# Patient Record
Sex: MALE | Race: BLACK OR AFRICAN AMERICAN | NOT HISPANIC OR LATINO | Employment: OTHER | RURAL
[De-identification: names, ages, dates, MRNs, and addresses within clinical notes are randomized per-mention and may not be internally consistent; named-entity substitution may affect disease eponyms.]

---

## 2021-06-26 ENCOUNTER — HOSPITAL ENCOUNTER (INPATIENT)
Facility: HOSPITAL | Age: 51
LOS: 2 days | Discharge: HOME | DRG: 440 | End: 2021-06-28
Attending: HOSPITALIST | Admitting: HOSPITALIST

## 2021-06-26 ENCOUNTER — HOSPITAL ENCOUNTER (EMERGENCY)
Facility: HOSPITAL | Age: 51
Discharge: ANOTHER HOSPITAL | End: 2021-06-26

## 2021-06-26 VITALS
SYSTOLIC BLOOD PRESSURE: 146 MMHG | DIASTOLIC BLOOD PRESSURE: 93 MMHG | TEMPERATURE: 98 F | RESPIRATION RATE: 21 BRPM | WEIGHT: 230 LBS | BODY MASS INDEX: 36.96 KG/M2 | HEIGHT: 66 IN | OXYGEN SATURATION: 95 % | HEART RATE: 77 BPM

## 2021-06-26 DIAGNOSIS — K85.90 ACUTE PANCREATITIS, UNSPECIFIED COMPLICATION STATUS, UNSPECIFIED PANCREATITIS TYPE: Primary | ICD-10-CM

## 2021-06-26 DIAGNOSIS — E83.42 HYPOMAGNESEMIA: ICD-10-CM

## 2021-06-26 DIAGNOSIS — I16.0 HYPERTENSIVE URGENCY: ICD-10-CM

## 2021-06-26 DIAGNOSIS — R06.02 SHORTNESS OF BREATH: ICD-10-CM

## 2021-06-26 DIAGNOSIS — I50.9 CHF (CONGESTIVE HEART FAILURE): ICD-10-CM

## 2021-06-26 DIAGNOSIS — I10 HYPERTENSION, UNSPECIFIED TYPE: ICD-10-CM

## 2021-06-26 DIAGNOSIS — K86.1 ACUTE ON CHRONIC PANCREATITIS: ICD-10-CM

## 2021-06-26 DIAGNOSIS — K85.90 ACUTE PANCREATITIS: ICD-10-CM

## 2021-06-26 DIAGNOSIS — K85.90 ACUTE ON CHRONIC PANCREATITIS: ICD-10-CM

## 2021-06-26 DIAGNOSIS — R10.9 INTRACTABLE ABDOMINAL PAIN: ICD-10-CM

## 2021-06-26 DIAGNOSIS — N39.0 ACUTE UTI: ICD-10-CM

## 2021-06-26 LAB
ALBUMIN SERPL BCP-MCNC: 4.2 G/DL (ref 3.5–5)
ALBUMIN/GLOB SERPL: 1 {RATIO}
ALP SERPL-CCNC: 79 U/L (ref 45–115)
ALT SERPL W P-5'-P-CCNC: 104 U/L (ref 16–61)
ANION GAP SERPL CALCULATED.3IONS-SCNC: 20 MMOL/L (ref 7–16)
AST SERPL W P-5'-P-CCNC: 105 U/L (ref 15–37)
BACTERIA #/AREA URNS HPF: ABNORMAL /HPF
BASOPHILS # BLD AUTO: 0.03 K/UL (ref 0–0.2)
BASOPHILS NFR BLD AUTO: 0.6 % (ref 0–1)
BILIRUB SERPL-MCNC: 1.4 MG/DL (ref 0–1.2)
BILIRUB UR QL STRIP: ABNORMAL
BUN SERPL-MCNC: 10 MG/DL (ref 7–18)
BUN/CREAT SERPL: 8 (ref 6–20)
CALCIUM SERPL-MCNC: 10 MG/DL (ref 8.5–10.1)
CHLORIDE SERPL-SCNC: 92 MMOL/L (ref 98–107)
CLARITY UR: ABNORMAL
CO2 SERPL-SCNC: 25 MMOL/L (ref 21–32)
COLOR UR: ABNORMAL
CREAT SERPL-MCNC: 1.23 MG/DL (ref 0.7–1.3)
DIFFERENTIAL METHOD BLD: ABNORMAL
EOSINOPHIL # BLD AUTO: 0.05 K/UL (ref 0–0.5)
EOSINOPHIL NFR BLD AUTO: 0.9 % (ref 1–4)
ERYTHROCYTE [DISTWIDTH] IN BLOOD BY AUTOMATED COUNT: 13.2 % (ref 11.5–14.5)
FINE GRAN CASTS #/AREA URNS LPF: ABNORMAL /LPF
GLOBULIN SER-MCNC: 4.4 G/DL (ref 2–4)
GLUCOSE SERPL-MCNC: 159 MG/DL (ref 74–106)
GLUCOSE UR STRIP-MCNC: NEGATIVE MG/DL
HCT VFR BLD AUTO: 47.4 % (ref 40–54)
HGB BLD-MCNC: 15.8 G/DL (ref 13.5–18)
KETONES UR STRIP-SCNC: >=160 MG/DL
LACTATE SERPL-SCNC: 0.8 MMOL/L (ref 0.4–2)
LEUKOCYTE ESTERASE UR QL STRIP: NEGATIVE
LIPASE SERPL-CCNC: 772 U/L (ref 73–393)
LYMPHOCYTES # BLD AUTO: 1.35 K/UL (ref 1–4.8)
LYMPHOCYTES NFR BLD AUTO: 24.8 % (ref 27–41)
MAGNESIUM SERPL-MCNC: 1.5 MG/DL (ref 1.7–2.3)
MCH RBC QN AUTO: 31.5 PG (ref 27–31)
MCHC RBC AUTO-ENTMCNC: 33.3 G/DL (ref 32–36)
MCV RBC AUTO: 94.4 FL (ref 80–96)
MONOCYTES # BLD AUTO: 0.44 K/UL (ref 0–0.8)
MONOCYTES NFR BLD AUTO: 8.1 % (ref 2–6)
MPC BLD CALC-MCNC: 11 FL (ref 9.4–12.4)
MUCOUS THREADS #/AREA URNS HPF: ABNORMAL /HPF
NEUTROPHILS # BLD AUTO: 3.57 K/UL (ref 1.8–7.7)
NEUTROPHILS NFR BLD AUTO: 65.6 % (ref 53–65)
NITRITE UR QL STRIP: POSITIVE
PH UR STRIP: 6 PH UNITS
PLATELET # BLD AUTO: 146 K/UL (ref 150–400)
POTASSIUM SERPL-SCNC: 3.8 MMOL/L (ref 3.5–5.1)
PROT SERPL-MCNC: 8.6 G/DL (ref 6.4–8.2)
PROT UR QL STRIP: >=300
RBC # BLD AUTO: 5.02 M/UL (ref 4.6–6.2)
RBC # UR STRIP: ABNORMAL /UL
RBC #/AREA URNS HPF: ABNORMAL /HPF
SODIUM SERPL-SCNC: 133 MMOL/L (ref 136–145)
SP GR UR STRIP: >=1.03
SQUAMOUS #/AREA URNS LPF: ABNORMAL /LPF
TRICHOMONAS #/AREA URNS HPF: ABNORMAL /HPF
TROPONIN I SERPL-MCNC: <0.017 NG/ML
UROBILINOGEN UR STRIP-ACNC: 1 MG/DL
WBC # BLD AUTO: 5.44 K/UL (ref 4.5–11)
WBC #/AREA URNS HPF: ABNORMAL /HPF
YEAST #/AREA URNS HPF: ABNORMAL /HPF

## 2021-06-26 PROCEDURE — 25000003 PHARM REV CODE 250: Performed by: HOSPITALIST

## 2021-06-26 PROCEDURE — 25500020 PHARM REV CODE 255: Performed by: NURSE PRACTITIONER

## 2021-06-26 PROCEDURE — 93010 ELECTROCARDIOGRAM REPORT: CPT | Performed by: HOSPITALIST

## 2021-06-26 PROCEDURE — 85025 COMPLETE CBC W/AUTO DIFF WBC: CPT | Performed by: NURSE PRACTITIONER

## 2021-06-26 PROCEDURE — 11000001 HC ACUTE MED/SURG PRIVATE ROOM

## 2021-06-26 PROCEDURE — 99285 EMERGENCY DEPT VISIT HI MDM: CPT | Mod: 25

## 2021-06-26 PROCEDURE — 96361 HYDRATE IV INFUSION ADD-ON: CPT

## 2021-06-26 PROCEDURE — 99223 PR INITIAL HOSPITAL CARE,LEVL III: ICD-10-PCS | Mod: GC,,, | Performed by: INTERNAL MEDICINE

## 2021-06-26 PROCEDURE — 96375 TX/PRO/DX INJ NEW DRUG ADDON: CPT

## 2021-06-26 PROCEDURE — 36415 COLL VENOUS BLD VENIPUNCTURE: CPT | Performed by: NURSE PRACTITIONER

## 2021-06-26 PROCEDURE — 99285 PR EMERGENCY DEPT VISIT,LEVEL V: ICD-10-PCS | Mod: ,,, | Performed by: NURSE PRACTITIONER

## 2021-06-26 PROCEDURE — 63600175 PHARM REV CODE 636 W HCPCS: Performed by: HOSPITALIST

## 2021-06-26 PROCEDURE — 83605 ASSAY OF LACTIC ACID: CPT | Performed by: NURSE PRACTITIONER

## 2021-06-26 PROCEDURE — 96376 TX/PRO/DX INJ SAME DRUG ADON: CPT

## 2021-06-26 PROCEDURE — 96368 THER/DIAG CONCURRENT INF: CPT

## 2021-06-26 PROCEDURE — 87040 BLOOD CULTURE FOR BACTERIA: CPT | Performed by: NURSE PRACTITIONER

## 2021-06-26 PROCEDURE — 99285 EMERGENCY DEPT VISIT HI MDM: CPT | Mod: ,,, | Performed by: NURSE PRACTITIONER

## 2021-06-26 PROCEDURE — 81003 URINALYSIS AUTO W/O SCOPE: CPT | Performed by: NURSE PRACTITIONER

## 2021-06-26 PROCEDURE — 83690 ASSAY OF LIPASE: CPT | Performed by: NURSE PRACTITIONER

## 2021-06-26 PROCEDURE — 96365 THER/PROPH/DIAG IV INF INIT: CPT

## 2021-06-26 PROCEDURE — 83735 ASSAY OF MAGNESIUM: CPT | Performed by: NURSE PRACTITIONER

## 2021-06-26 PROCEDURE — 81001 URINALYSIS AUTO W/SCOPE: CPT | Performed by: NURSE PRACTITIONER

## 2021-06-26 PROCEDURE — 85610 PROTHROMBIN TIME: CPT | Performed by: HOSPITALIST

## 2021-06-26 PROCEDURE — 80053 COMPREHEN METABOLIC PANEL: CPT | Performed by: NURSE PRACTITIONER

## 2021-06-26 PROCEDURE — 63600175 PHARM REV CODE 636 W HCPCS: Performed by: NURSE PRACTITIONER

## 2021-06-26 PROCEDURE — 87086 URINE CULTURE/COLONY COUNT: CPT | Performed by: NURSE PRACTITIONER

## 2021-06-26 PROCEDURE — 25000003 PHARM REV CODE 250: Performed by: NURSE PRACTITIONER

## 2021-06-26 PROCEDURE — 99223 1ST HOSP IP/OBS HIGH 75: CPT | Mod: GC,,, | Performed by: INTERNAL MEDICINE

## 2021-06-26 PROCEDURE — 93005 ELECTROCARDIOGRAM TRACING: CPT

## 2021-06-26 PROCEDURE — 84484 ASSAY OF TROPONIN QUANT: CPT | Performed by: NURSE PRACTITIONER

## 2021-06-26 RX ORDER — HYDRALAZINE HYDROCHLORIDE 20 MG/ML
10 INJECTION INTRAMUSCULAR; INTRAVENOUS
Status: COMPLETED | OUTPATIENT
Start: 2021-06-26 | End: 2021-06-26

## 2021-06-26 RX ORDER — MORPHINE SULFATE 4 MG/ML
4 INJECTION, SOLUTION INTRAMUSCULAR; INTRAVENOUS
Status: COMPLETED | OUTPATIENT
Start: 2021-06-26 | End: 2021-06-26

## 2021-06-26 RX ORDER — CARVEDILOL 12.5 MG/1
12.5 TABLET ORAL
COMMUNITY
End: 2022-04-04 | Stop reason: SDUPTHER

## 2021-06-26 RX ORDER — SODIUM CHLORIDE 9 MG/ML
INJECTION, SOLUTION INTRAVENOUS
Status: COMPLETED | OUTPATIENT
Start: 2021-06-26 | End: 2021-06-26

## 2021-06-26 RX ORDER — PANTOPRAZOLE SODIUM 40 MG/10ML
40 INJECTION, POWDER, LYOPHILIZED, FOR SOLUTION INTRAVENOUS DAILY
Status: DISCONTINUED | OUTPATIENT
Start: 2021-06-27 | End: 2021-06-28

## 2021-06-26 RX ORDER — ONDANSETRON 2 MG/ML
4 INJECTION INTRAMUSCULAR; INTRAVENOUS
Status: COMPLETED | OUTPATIENT
Start: 2021-06-26 | End: 2021-06-26

## 2021-06-26 RX ORDER — CARVEDILOL 12.5 MG/1
12.5 TABLET ORAL ONCE
Status: DISCONTINUED | OUTPATIENT
Start: 2021-06-27 | End: 2021-06-27

## 2021-06-26 RX ORDER — MAGNESIUM SULFATE HEPTAHYDRATE 40 MG/ML
2 INJECTION, SOLUTION INTRAVENOUS
Status: COMPLETED | OUTPATIENT
Start: 2021-06-26 | End: 2021-06-26

## 2021-06-26 RX ORDER — MAGNESIUM SULFATE HEPTAHYDRATE 40 MG/ML
2 INJECTION, SOLUTION INTRAVENOUS ONCE
Status: COMPLETED | OUTPATIENT
Start: 2021-06-27 | End: 2021-06-27

## 2021-06-26 RX ORDER — HYDROMORPHONE HYDROCHLORIDE 2 MG/ML
1 INJECTION, SOLUTION INTRAMUSCULAR; INTRAVENOUS; SUBCUTANEOUS
Status: COMPLETED | OUTPATIENT
Start: 2021-06-26 | End: 2021-06-26

## 2021-06-26 RX ORDER — LABETALOL HYDROCHLORIDE 5 MG/ML
20 INJECTION, SOLUTION INTRAVENOUS
Status: COMPLETED | OUTPATIENT
Start: 2021-06-26 | End: 2021-06-26

## 2021-06-26 RX ORDER — HYDROMORPHONE HYDROCHLORIDE 2 MG/ML
2 INJECTION, SOLUTION INTRAMUSCULAR; INTRAVENOUS; SUBCUTANEOUS
Status: DISCONTINUED | OUTPATIENT
Start: 2021-06-26 | End: 2021-06-28 | Stop reason: HOSPADM

## 2021-06-26 RX ORDER — ONDANSETRON 2 MG/ML
4 INJECTION INTRAMUSCULAR; INTRAVENOUS EVERY 6 HOURS PRN
Status: DISCONTINUED | OUTPATIENT
Start: 2021-06-27 | End: 2021-06-28 | Stop reason: HOSPADM

## 2021-06-26 RX ORDER — SODIUM CHLORIDE 9 MG/ML
INJECTION, SOLUTION INTRAVENOUS CONTINUOUS
Status: DISCONTINUED | OUTPATIENT
Start: 2021-06-26 | End: 2021-06-27

## 2021-06-26 RX ORDER — ENOXAPARIN SODIUM 100 MG/ML
40 INJECTION SUBCUTANEOUS EVERY 24 HOURS
Status: DISCONTINUED | OUTPATIENT
Start: 2021-06-27 | End: 2021-06-28 | Stop reason: HOSPADM

## 2021-06-26 RX ADMIN — PROMETHAZINE HYDROCHLORIDE 25 MG: 25 INJECTION INTRAMUSCULAR; INTRAVENOUS at 02:06

## 2021-06-26 RX ADMIN — IOPAMIDOL 100 ML: 755 INJECTION, SOLUTION INTRAVENOUS at 10:06

## 2021-06-26 RX ADMIN — HYDRALAZINE HYDROCHLORIDE 10 MG: 20 INJECTION, SOLUTION INTRAMUSCULAR; INTRAVENOUS at 09:06

## 2021-06-26 RX ADMIN — HYDROMORPHONE HYDROCHLORIDE 1 MG: 2 INJECTION, SOLUTION INTRAMUSCULAR; INTRAVENOUS; SUBCUTANEOUS at 11:06

## 2021-06-26 RX ADMIN — HYDROMORPHONE HYDROCHLORIDE 2 MG: 2 INJECTION, SOLUTION INTRAMUSCULAR; INTRAVENOUS; SUBCUTANEOUS at 10:06

## 2021-06-26 RX ADMIN — LABETALOL HYDROCHLORIDE 20 MG: 5 INJECTION, SOLUTION INTRAVENOUS at 03:06

## 2021-06-26 RX ADMIN — MAGNESIUM SULFATE HEPTAHYDRATE 2 G: 40 INJECTION, SOLUTION INTRAVENOUS at 05:06

## 2021-06-26 RX ADMIN — HYDRALAZINE HYDROCHLORIDE 10 MG: 20 INJECTION, SOLUTION INTRAMUSCULAR; INTRAVENOUS at 02:06

## 2021-06-26 RX ADMIN — SODIUM CHLORIDE 1000 ML: 9 INJECTION, SOLUTION INTRAVENOUS at 07:06

## 2021-06-26 RX ADMIN — CEFTRIAXONE 1 G: 1 INJECTION, POWDER, FOR SOLUTION INTRAMUSCULAR; INTRAVENOUS at 02:06

## 2021-06-26 RX ADMIN — LABETALOL HYDROCHLORIDE 20 MG: 5 INJECTION, SOLUTION INTRAVENOUS at 04:06

## 2021-06-26 RX ADMIN — SODIUM CHLORIDE: 9 INJECTION, SOLUTION INTRAVENOUS at 10:06

## 2021-06-26 RX ADMIN — HYDROMORPHONE HYDROCHLORIDE 1 MG: 2 INJECTION, SOLUTION INTRAMUSCULAR; INTRAVENOUS; SUBCUTANEOUS at 08:06

## 2021-06-26 RX ADMIN — SODIUM CHLORIDE 150 ML/HR: 9 INJECTION, SOLUTION INTRAVENOUS at 02:06

## 2021-06-26 RX ADMIN — MORPHINE SULFATE 4 MG: 4 INJECTION, SOLUTION INTRAMUSCULAR; INTRAVENOUS at 07:06

## 2021-06-26 RX ADMIN — MAGNESIUM SULFATE IN WATER 2 G: 40 INJECTION, SOLUTION INTRAVENOUS at 11:06

## 2021-06-26 RX ADMIN — ONDANSETRON 4 MG: 2 INJECTION INTRAMUSCULAR; INTRAVENOUS at 07:06

## 2021-06-27 PROBLEM — I25.10 CAD (CORONARY ARTERY DISEASE): Chronic | Status: ACTIVE | Noted: 2021-06-27

## 2021-06-27 LAB
ALBUMIN SERPL BCP-MCNC: 3.6 G/DL (ref 3.5–5)
ALBUMIN/GLOB SERPL: 0.8 {RATIO}
ALP SERPL-CCNC: 74 U/L (ref 45–115)
ALT SERPL W P-5'-P-CCNC: 68 U/L (ref 16–61)
ANION GAP SERPL CALCULATED.3IONS-SCNC: 17 MMOL/L (ref 7–16)
AST SERPL W P-5'-P-CCNC: 46 U/L (ref 15–37)
BILIRUB SERPL-MCNC: 0.7 MG/DL (ref 0–1.2)
BUN SERPL-MCNC: 9 MG/DL (ref 7–18)
BUN/CREAT SERPL: 9 (ref 6–20)
CALCIUM SERPL-MCNC: 9.5 MG/DL (ref 8.5–10.1)
CHLORIDE SERPL-SCNC: 96 MMOL/L (ref 98–107)
CO2 SERPL-SCNC: 27 MMOL/L (ref 21–32)
CREAT SERPL-MCNC: 0.99 MG/DL (ref 0.7–1.3)
GLOBULIN SER-MCNC: 4.3 G/DL (ref 2–4)
GLUCOSE SERPL-MCNC: 116 MG/DL (ref 74–106)
IGA SERPL-MCNC: 295 MG/DL (ref 61–356)
IGG SERPL-MCNC: 975 MG/DL (ref 767–1590)
IGM SERPL-MCNC: 48 MG/DL (ref 37–286)
INR BLD: 0.92 (ref 0.9–1.1)
POTASSIUM SERPL-SCNC: 3.6 MMOL/L (ref 3.5–5.1)
PROT SERPL-MCNC: 7.9 G/DL (ref 6.4–8.2)
PROTHROMBIN TIME: 11.9 SECONDS (ref 11.7–14.7)
SODIUM SERPL-SCNC: 136 MMOL/L (ref 136–145)
TRIGL SERPL-MCNC: 115 MG/DL (ref 35–150)

## 2021-06-27 PROCEDURE — 99221 1ST HOSP IP/OBS SF/LOW 40: CPT | Mod: ,,, | Performed by: STUDENT IN AN ORGANIZED HEALTH CARE EDUCATION/TRAINING PROGRAM

## 2021-06-27 PROCEDURE — 36415 COLL VENOUS BLD VENIPUNCTURE: CPT | Performed by: HOSPITALIST

## 2021-06-27 PROCEDURE — 11000001 HC ACUTE MED/SURG PRIVATE ROOM

## 2021-06-27 PROCEDURE — 99232 PR SUBSEQUENT HOSPITAL CARE,LEVL II: ICD-10-PCS | Mod: ,,, | Performed by: INTERNAL MEDICINE

## 2021-06-27 PROCEDURE — 25000003 PHARM REV CODE 250: Performed by: INTERNAL MEDICINE

## 2021-06-27 PROCEDURE — 25000003 PHARM REV CODE 250: Performed by: HOSPITALIST

## 2021-06-27 PROCEDURE — 99221 PR INITIAL HOSPITAL CARE,LEVL I: ICD-10-PCS | Mod: ,,, | Performed by: STUDENT IN AN ORGANIZED HEALTH CARE EDUCATION/TRAINING PROGRAM

## 2021-06-27 PROCEDURE — 82787 IGG 1 2 3 OR 4 EACH: CPT | Mod: 90 | Performed by: INTERNAL MEDICINE

## 2021-06-27 PROCEDURE — 84478 ASSAY OF TRIGLYCERIDES: CPT | Performed by: STUDENT IN AN ORGANIZED HEALTH CARE EDUCATION/TRAINING PROGRAM

## 2021-06-27 PROCEDURE — 63600175 PHARM REV CODE 636 W HCPCS: Performed by: HOSPITALIST

## 2021-06-27 PROCEDURE — 80053 COMPREHEN METABOLIC PANEL: CPT | Performed by: HOSPITALIST

## 2021-06-27 PROCEDURE — C9113 INJ PANTOPRAZOLE SODIUM, VIA: HCPCS | Performed by: HOSPITALIST

## 2021-06-27 PROCEDURE — 83921 ORGANIC ACID SINGLE QUANT: CPT | Mod: 90 | Performed by: STUDENT IN AN ORGANIZED HEALTH CARE EDUCATION/TRAINING PROGRAM

## 2021-06-27 PROCEDURE — 82784 ASSAY IGA/IGD/IGG/IGM EACH: CPT | Performed by: STUDENT IN AN ORGANIZED HEALTH CARE EDUCATION/TRAINING PROGRAM

## 2021-06-27 PROCEDURE — 99232 SBSQ HOSP IP/OBS MODERATE 35: CPT | Mod: ,,, | Performed by: INTERNAL MEDICINE

## 2021-06-27 RX ORDER — SODIUM CHLORIDE, SODIUM GLUCONATE, SODIUM ACETATE, POTASSIUM CHLORIDE AND MAGNESIUM CHLORIDE 30; 37; 368; 526; 502 MG/100ML; MG/100ML; MG/100ML; MG/100ML; MG/100ML
1000 INJECTION, SOLUTION INTRAVENOUS CONTINUOUS
Status: DISCONTINUED | OUTPATIENT
Start: 2021-06-27 | End: 2021-06-28 | Stop reason: HOSPADM

## 2021-06-27 RX ORDER — SODIUM CHLORIDE, SODIUM GLUCONATE, SODIUM ACETATE, POTASSIUM CHLORIDE AND MAGNESIUM CHLORIDE 30; 37; 368; 526; 502 MG/100ML; MG/100ML; MG/100ML; MG/100ML; MG/100ML
1000 INJECTION, SOLUTION INTRAVENOUS CONTINUOUS
Status: DISCONTINUED | OUTPATIENT
Start: 2021-06-27 | End: 2021-06-27

## 2021-06-27 RX ORDER — HYDRALAZINE HYDROCHLORIDE 20 MG/ML
5 INJECTION INTRAMUSCULAR; INTRAVENOUS EVERY 4 HOURS PRN
Status: DISCONTINUED | OUTPATIENT
Start: 2021-06-27 | End: 2021-06-28 | Stop reason: HOSPADM

## 2021-06-27 RX ORDER — CARVEDILOL 12.5 MG/1
12.5 TABLET ORAL
Status: DISCONTINUED | OUTPATIENT
Start: 2021-06-27 | End: 2021-06-27

## 2021-06-27 RX ORDER — CARVEDILOL 6.25 MG/1
6.25 TABLET ORAL 2 TIMES DAILY WITH MEALS
Status: DISCONTINUED | OUTPATIENT
Start: 2021-06-27 | End: 2021-06-28 | Stop reason: HOSPADM

## 2021-06-27 RX ADMIN — HYDROMORPHONE HYDROCHLORIDE 2 MG: 2 INJECTION, SOLUTION INTRAMUSCULAR; INTRAVENOUS; SUBCUTANEOUS at 05:06

## 2021-06-27 RX ADMIN — CARVEDILOL 6.25 MG: 6.25 TABLET, FILM COATED ORAL at 08:06

## 2021-06-27 RX ADMIN — ONDANSETRON 4 MG: 2 INJECTION INTRAMUSCULAR; INTRAVENOUS at 08:06

## 2021-06-27 RX ADMIN — SODIUM CHLORIDE, SODIUM GLUCONATE, SODIUM ACETATE, POTASSIUM CHLORIDE AND MAGNESIUM CHLORIDE 1000 ML: 526; 502; 368; 37; 30 INJECTION, SOLUTION INTRAVENOUS at 11:06

## 2021-06-27 RX ADMIN — PANTOPRAZOLE SODIUM 40 MG: 40 INJECTION, POWDER, FOR SOLUTION INTRAVENOUS at 08:06

## 2021-06-27 RX ADMIN — HYDROMORPHONE HYDROCHLORIDE 2 MG: 2 INJECTION, SOLUTION INTRAMUSCULAR; INTRAVENOUS; SUBCUTANEOUS at 08:06

## 2021-06-27 RX ADMIN — CARVEDILOL 6.25 MG: 6.25 TABLET, FILM COATED ORAL at 05:06

## 2021-06-27 RX ADMIN — ENOXAPARIN SODIUM 40 MG: 40 INJECTION SUBCUTANEOUS at 05:06

## 2021-06-27 RX ADMIN — HYDROMORPHONE HYDROCHLORIDE 2 MG: 2 INJECTION, SOLUTION INTRAMUSCULAR; INTRAVENOUS; SUBCUTANEOUS at 12:06

## 2021-06-27 RX ADMIN — HYDROMORPHONE HYDROCHLORIDE 2 MG: 2 INJECTION, SOLUTION INTRAMUSCULAR; INTRAVENOUS; SUBCUTANEOUS at 04:06

## 2021-06-28 VITALS
DIASTOLIC BLOOD PRESSURE: 87 MMHG | HEART RATE: 64 BPM | HEIGHT: 66 IN | WEIGHT: 236.31 LBS | BODY MASS INDEX: 37.98 KG/M2 | SYSTOLIC BLOOD PRESSURE: 131 MMHG | RESPIRATION RATE: 17 BRPM | OXYGEN SATURATION: 99 % | TEMPERATURE: 98 F

## 2021-06-28 LAB
ALBUMIN SERPL BCP-MCNC: 2.9 G/DL (ref 3.5–5)
ALBUMIN/GLOB SERPL: 0.8 {RATIO}
ALP SERPL-CCNC: 60 U/L (ref 45–115)
ALT SERPL W P-5'-P-CCNC: 42 U/L (ref 16–61)
ANION GAP SERPL CALCULATED.3IONS-SCNC: 15 MMOL/L (ref 7–16)
AST SERPL W P-5'-P-CCNC: 30 U/L (ref 15–37)
AV INDEX (PROSTH): 0.91
AV VALVE AREA: 4.13 CM2
BASOPHILS # BLD AUTO: 0.05 K/UL (ref 0–0.2)
BASOPHILS NFR BLD AUTO: 0.8 % (ref 0–1)
BILIRUB SERPL-MCNC: 0.7 MG/DL (ref 0–1.2)
BSA FOR ECHO PROCEDURE: 2.23 M2
BUN SERPL-MCNC: 11 MG/DL (ref 7–18)
BUN/CREAT SERPL: 12 (ref 6–20)
CALCIUM SERPL-MCNC: 9 MG/DL (ref 8.5–10.1)
CHLORIDE SERPL-SCNC: 97 MMOL/L (ref 98–107)
CO2 SERPL-SCNC: 28 MMOL/L (ref 21–32)
CREAT SERPL-MCNC: 0.94 MG/DL (ref 0.7–1.3)
CV ECHO LV RWT: 0.7 CM
DIFFERENTIAL METHOD BLD: ABNORMAL
DOP CALC AO VTI: 28.3 CM
DOP CALC LVOT AREA: 4.5 CM2
DOP CALC LVOT DIAMETER: 2.4 CM
DOP CALC LVOT STROKE VOLUME: 116.79 CM3
DOP CALCLVOT PEAK VEL VTI: 25.83 CM
ECHO LV POSTERIOR WALL: 1.3 CM (ref 0.6–1.1)
EJECTION FRACTION: 65 %
EOSINOPHIL # BLD AUTO: 0.15 K/UL (ref 0–0.5)
EOSINOPHIL NFR BLD AUTO: 2.3 % (ref 1–4)
ERYTHROCYTE [DISTWIDTH] IN BLOOD BY AUTOMATED COUNT: 13.6 % (ref 11.5–14.5)
FRACTIONAL SHORTENING: 35 % (ref 28–44)
GLOBULIN SER-MCNC: 3.8 G/DL (ref 2–4)
GLUCOSE SERPL-MCNC: 80 MG/DL (ref 74–106)
HCT VFR BLD AUTO: 39.7 % (ref 40–54)
HGB BLD-MCNC: 13 G/DL (ref 13.5–18)
IMM GRANULOCYTES # BLD AUTO: 0.02 K/UL (ref 0–0.04)
IMM GRANULOCYTES NFR BLD: 0.3 % (ref 0–0.4)
INTERVENTRICULAR SEPTUM: 1.4 CM (ref 0.6–1.1)
IVC DIAMETER: 1.8 CM
LEFT ATRIUM SIZE: 3.9 CM
LEFT INTERNAL DIMENSION IN SYSTOLE: 2.4 CM (ref 2.1–4)
LEFT VENTRICLE MASS INDEX: 82 G/M2
LEFT VENTRICULAR INTERNAL DIMENSION IN DIASTOLE: 3.7 CM (ref 3.5–6)
LEFT VENTRICULAR MASS: 176.56 G
LYMPHOCYTES # BLD AUTO: 1.65 K/UL (ref 1–4.8)
LYMPHOCYTES NFR BLD AUTO: 25.4 % (ref 27–41)
MAGNESIUM SERPL-MCNC: 1.9 MG/DL (ref 1.7–2.3)
MCH RBC QN AUTO: 31.6 PG (ref 27–31)
MCHC RBC AUTO-ENTMCNC: 32.7 G/DL (ref 32–36)
MCV RBC AUTO: 96.6 FL (ref 80–96)
MONOCYTES # BLD AUTO: 0.55 K/UL (ref 0–0.8)
MONOCYTES NFR BLD AUTO: 8.5 % (ref 2–6)
MPC BLD CALC-MCNC: 10.9 FL (ref 9.4–12.4)
NEUTROPHILS # BLD AUTO: 4.07 K/UL (ref 1.8–7.7)
NEUTROPHILS NFR BLD AUTO: 62.7 % (ref 53–65)
NRBC # BLD AUTO: 0 X10E3/UL
NRBC, AUTO (.00): 0 %
PLATELET # BLD AUTO: 113 K/UL (ref 150–400)
POTASSIUM SERPL-SCNC: 3.9 MMOL/L (ref 3.5–5.1)
PROT SERPL-MCNC: 6.7 G/DL (ref 6.4–8.2)
RA PRESSURE: 3 MMHG
RBC # BLD AUTO: 4.11 M/UL (ref 4.6–6.2)
SODIUM SERPL-SCNC: 136 MMOL/L (ref 136–145)
UA COMPLETE W REFLEX CULTURE PNL UR: NO GROWTH
WBC # BLD AUTO: 6.49 K/UL (ref 4.5–11)

## 2021-06-28 PROCEDURE — 99232 SBSQ HOSP IP/OBS MODERATE 35: CPT | Mod: ,,, | Performed by: STUDENT IN AN ORGANIZED HEALTH CARE EDUCATION/TRAINING PROGRAM

## 2021-06-28 PROCEDURE — 99239 PR HOSPITAL DISCHARGE DAY,>30 MIN: ICD-10-PCS | Mod: ,,, | Performed by: INTERNAL MEDICINE

## 2021-06-28 PROCEDURE — 83735 ASSAY OF MAGNESIUM: CPT | Performed by: INTERNAL MEDICINE

## 2021-06-28 PROCEDURE — 85025 COMPLETE CBC W/AUTO DIFF WBC: CPT | Performed by: INTERNAL MEDICINE

## 2021-06-28 PROCEDURE — 63600175 PHARM REV CODE 636 W HCPCS: Performed by: HOSPITALIST

## 2021-06-28 PROCEDURE — 99239 HOSP IP/OBS DSCHRG MGMT >30: CPT | Mod: ,,, | Performed by: INTERNAL MEDICINE

## 2021-06-28 PROCEDURE — 36415 COLL VENOUS BLD VENIPUNCTURE: CPT | Performed by: INTERNAL MEDICINE

## 2021-06-28 PROCEDURE — 25000003 PHARM REV CODE 250: Performed by: INTERNAL MEDICINE

## 2021-06-28 PROCEDURE — C9113 INJ PANTOPRAZOLE SODIUM, VIA: HCPCS | Performed by: HOSPITALIST

## 2021-06-28 PROCEDURE — 25000003 PHARM REV CODE 250: Performed by: HOSPITALIST

## 2021-06-28 PROCEDURE — 99232 PR SUBSEQUENT HOSPITAL CARE,LEVL II: ICD-10-PCS | Mod: ,,, | Performed by: STUDENT IN AN ORGANIZED HEALTH CARE EDUCATION/TRAINING PROGRAM

## 2021-06-28 PROCEDURE — 80053 COMPREHEN METABOLIC PANEL: CPT | Performed by: INTERNAL MEDICINE

## 2021-06-28 RX ORDER — PANTOPRAZOLE SODIUM 40 MG/1
40 TABLET, DELAYED RELEASE ORAL DAILY
Qty: 30 TABLET | Refills: 11 | Status: SHIPPED | OUTPATIENT
Start: 2021-06-28 | End: 2022-05-02 | Stop reason: CLARIF

## 2021-06-28 RX ORDER — PANTOPRAZOLE SODIUM 40 MG/1
40 TABLET, DELAYED RELEASE ORAL DAILY
Status: DISCONTINUED | OUTPATIENT
Start: 2021-06-29 | End: 2021-06-28 | Stop reason: HOSPADM

## 2021-06-28 RX ADMIN — HYDROMORPHONE HYDROCHLORIDE 2 MG: 2 INJECTION, SOLUTION INTRAMUSCULAR; INTRAVENOUS; SUBCUTANEOUS at 12:06

## 2021-06-28 RX ADMIN — PANTOPRAZOLE SODIUM 40 MG: 40 INJECTION, POWDER, FOR SOLUTION INTRAVENOUS at 09:06

## 2021-06-28 RX ADMIN — CARVEDILOL 6.25 MG: 6.25 TABLET, FILM COATED ORAL at 09:06

## 2021-06-28 RX ADMIN — SODIUM CHLORIDE, SODIUM GLUCONATE, SODIUM ACETATE, POTASSIUM CHLORIDE AND MAGNESIUM CHLORIDE 1000 ML: 526; 502; 368; 37; 30 INJECTION, SOLUTION INTRAVENOUS at 02:06

## 2021-06-28 RX ADMIN — HYDROMORPHONE HYDROCHLORIDE 2 MG: 2 INJECTION, SOLUTION INTRAMUSCULAR; INTRAVENOUS; SUBCUTANEOUS at 05:06

## 2021-06-29 ENCOUNTER — TELEPHONE (OUTPATIENT)
Dept: ORTHOPEDICS | Facility: HOSPITAL | Age: 51
End: 2021-06-29

## 2021-06-29 LAB — MAYO GENERIC ORDERABLE RESULT: NORMAL

## 2021-06-30 ENCOUNTER — TELEPHONE (OUTPATIENT)
Dept: ORTHOPEDICS | Facility: HOSPITAL | Age: 51
End: 2021-06-30

## 2021-06-30 LAB — METHYLMALONATE SERPL-SCNC: 0.09 NMOL/ML

## 2021-07-02 LAB
BACTERIA BLD CULT: NORMAL
BACTERIA BLD CULT: NORMAL

## 2022-03-08 ENCOUNTER — HOSPITAL ENCOUNTER (EMERGENCY)
Facility: HOSPITAL | Age: 52
Discharge: ANOTHER HOSPITAL | End: 2022-03-08
Attending: EMERGENCY MEDICINE
Payer: MEDICARE

## 2022-03-08 VITALS
WEIGHT: 240 LBS | DIASTOLIC BLOOD PRESSURE: 92 MMHG | HEIGHT: 66 IN | HEART RATE: 87 BPM | BODY MASS INDEX: 38.57 KG/M2 | OXYGEN SATURATION: 100 % | SYSTOLIC BLOOD PRESSURE: 131 MMHG | RESPIRATION RATE: 15 BRPM | TEMPERATURE: 97 F

## 2022-03-08 DIAGNOSIS — K85.90 ACUTE PANCREATITIS, UNSPECIFIED COMPLICATION STATUS, UNSPECIFIED PANCREATITIS TYPE: ICD-10-CM

## 2022-03-08 DIAGNOSIS — E11.65 TYPE 2 DIABETES MELLITUS WITH HYPERGLYCEMIA, WITHOUT LONG-TERM CURRENT USE OF INSULIN: ICD-10-CM

## 2022-03-08 DIAGNOSIS — E86.0 DEHYDRATION: Primary | ICD-10-CM

## 2022-03-08 DIAGNOSIS — R11.2 N&V (NAUSEA AND VOMITING): ICD-10-CM

## 2022-03-08 LAB
ACETONE SERPL QL SCN: NEGATIVE
ALBUMIN SERPL BCP-MCNC: 3.8 G/DL (ref 3.5–5)
ALBUMIN/GLOB SERPL: 0.8 {RATIO}
ALP SERPL-CCNC: 84 U/L (ref 45–115)
ALT SERPL W P-5'-P-CCNC: 18 U/L (ref 16–61)
AMYLASE SERPL-CCNC: 58 U/L (ref 25–115)
ANION GAP SERPL CALCULATED.3IONS-SCNC: 13 MMOL/L (ref 7–16)
AST SERPL W P-5'-P-CCNC: 23 U/L (ref 15–37)
BACTERIA #/AREA URNS HPF: ABNORMAL /HPF
BASOPHILS # BLD AUTO: 0.02 K/UL (ref 0–0.2)
BASOPHILS NFR BLD AUTO: 0.2 % (ref 0–1)
BILIRUB SERPL-MCNC: 1 MG/DL (ref 0–1.2)
BILIRUB UR QL STRIP: ABNORMAL
BUN SERPL-MCNC: 29 MG/DL (ref 7–18)
BUN/CREAT SERPL: 22 (ref 6–20)
CALCIUM SERPL-MCNC: 10.8 MG/DL (ref 8.5–10.1)
CHLORIDE SERPL-SCNC: 92 MMOL/L (ref 98–107)
CLARITY UR: CLEAR
CO2 SERPL-SCNC: 30 MMOL/L (ref 21–32)
COLOR UR: YELLOW
CREAT SERPL-MCNC: 1.33 MG/DL (ref 0.7–1.3)
DIFFERENTIAL METHOD BLD: ABNORMAL
EOSINOPHIL # BLD AUTO: 0.18 K/UL (ref 0–0.5)
EOSINOPHIL NFR BLD AUTO: 2 % (ref 1–4)
ERYTHROCYTE [DISTWIDTH] IN BLOOD BY AUTOMATED COUNT: 13.7 % (ref 11.5–14.5)
GLOBULIN SER-MCNC: 4.7 G/DL (ref 2–4)
GLUCOSE SERPL-MCNC: 297 MG/DL (ref 70–105)
GLUCOSE SERPL-MCNC: 350 MG/DL (ref 74–106)
GLUCOSE UR STRIP-MCNC: >=1000 MG/DL
HCT VFR BLD AUTO: 44.7 % (ref 40–54)
HGB BLD-MCNC: 15.5 G/DL (ref 13.5–18)
KETONES UR STRIP-SCNC: 15 MG/DL
LEUKOCYTE ESTERASE UR QL STRIP: NEGATIVE
LIPASE SERPL-CCNC: 623 U/L (ref 73–393)
LYMPHOCYTES # BLD AUTO: 1.16 K/UL (ref 1–4.8)
LYMPHOCYTES NFR BLD AUTO: 12.8 % (ref 27–41)
MCH RBC QN AUTO: 32.2 PG (ref 27–31)
MCHC RBC AUTO-ENTMCNC: 34.7 G/DL (ref 32–36)
MCV RBC AUTO: 92.7 FL (ref 80–96)
MONOCYTES # BLD AUTO: 0.79 K/UL (ref 0–0.8)
MONOCYTES NFR BLD AUTO: 8.7 % (ref 2–6)
MPC BLD CALC-MCNC: 10.7 FL (ref 9.4–12.4)
MUCOUS THREADS #/AREA URNS HPF: ABNORMAL /HPF
NEUTROPHILS # BLD AUTO: 6.91 K/UL (ref 1.8–7.7)
NEUTROPHILS NFR BLD AUTO: 76.3 % (ref 53–65)
NITRITE UR QL STRIP: NEGATIVE
PH UR STRIP: 6 PH UNITS
PLATELET # BLD AUTO: 126 K/UL (ref 150–400)
POTASSIUM SERPL-SCNC: 3.6 MMOL/L (ref 3.5–5.1)
PROT SERPL-MCNC: 8.5 G/DL (ref 6.4–8.2)
PROT UR QL STRIP: >=300
RBC # BLD AUTO: 4.82 M/UL (ref 4.6–6.2)
RBC # UR STRIP: ABNORMAL /UL
RBC #/AREA URNS HPF: ABNORMAL /HPF
SARS-COV+SARS-COV-2 AG RESP QL IA.RAPID: NEGATIVE
SODIUM SERPL-SCNC: 131 MMOL/L (ref 136–145)
SP GR UR STRIP: 1.02
SQUAMOUS #/AREA URNS LPF: ABNORMAL /LPF
TROPONIN I SERPL HS-MCNC: 19.6 PG/ML
UROBILINOGEN UR STRIP-ACNC: 0.2 MG/DL
WBC # BLD AUTO: 9.06 K/UL (ref 4.5–11)
WBC #/AREA URNS HPF: ABNORMAL /HPF

## 2022-03-08 PROCEDURE — 25000003 PHARM REV CODE 250: Performed by: EMERGENCY MEDICINE

## 2022-03-08 PROCEDURE — 99285 EMERGENCY DEPT VISIT HI MDM: CPT | Mod: 25,CS

## 2022-03-08 PROCEDURE — 82009 KETONE BODYS QUAL: CPT | Performed by: EMERGENCY MEDICINE

## 2022-03-08 PROCEDURE — 63600175 PHARM REV CODE 636 W HCPCS: Performed by: EMERGENCY MEDICINE

## 2022-03-08 PROCEDURE — 63600175 PHARM REV CODE 636 W HCPCS: Performed by: NURSE PRACTITIONER

## 2022-03-08 PROCEDURE — 99284 EMERGENCY DEPT VISIT MOD MDM: CPT | Performed by: EMERGENCY MEDICINE

## 2022-03-08 PROCEDURE — 96374 THER/PROPH/DIAG INJ IV PUSH: CPT

## 2022-03-08 PROCEDURE — C9113 INJ PANTOPRAZOLE SODIUM, VIA: HCPCS

## 2022-03-08 PROCEDURE — 83690 ASSAY OF LIPASE: CPT | Performed by: EMERGENCY MEDICINE

## 2022-03-08 PROCEDURE — 81001 URINALYSIS AUTO W/SCOPE: CPT | Performed by: EMERGENCY MEDICINE

## 2022-03-08 PROCEDURE — 96376 TX/PRO/DX INJ SAME DRUG ADON: CPT

## 2022-03-08 PROCEDURE — 82962 GLUCOSE BLOOD TEST: CPT

## 2022-03-08 PROCEDURE — 87426 SARSCOV CORONAVIRUS AG IA: CPT | Mod: CR | Performed by: NURSE PRACTITIONER

## 2022-03-08 PROCEDURE — 96361 HYDRATE IV INFUSION ADD-ON: CPT

## 2022-03-08 PROCEDURE — 96375 TX/PRO/DX INJ NEW DRUG ADDON: CPT

## 2022-03-08 PROCEDURE — 93005 ELECTROCARDIOGRAM TRACING: CPT

## 2022-03-08 PROCEDURE — 63600175 PHARM REV CODE 636 W HCPCS

## 2022-03-08 PROCEDURE — 84484 ASSAY OF TROPONIN QUANT: CPT | Performed by: EMERGENCY MEDICINE

## 2022-03-08 PROCEDURE — 82150 ASSAY OF AMYLASE: CPT | Performed by: EMERGENCY MEDICINE

## 2022-03-08 PROCEDURE — 80053 COMPREHEN METABOLIC PANEL: CPT | Performed by: EMERGENCY MEDICINE

## 2022-03-08 PROCEDURE — 93010 ELECTROCARDIOGRAM REPORT: CPT | Performed by: INTERNAL MEDICINE

## 2022-03-08 PROCEDURE — 85025 COMPLETE CBC W/AUTO DIFF WBC: CPT | Performed by: EMERGENCY MEDICINE

## 2022-03-08 PROCEDURE — 36415 COLL VENOUS BLD VENIPUNCTURE: CPT | Performed by: EMERGENCY MEDICINE

## 2022-03-08 RX ORDER — ATORVASTATIN CALCIUM 40 MG/1
40 TABLET, FILM COATED ORAL DAILY
COMMUNITY
End: 2022-04-04 | Stop reason: DRUGHIGH

## 2022-03-08 RX ORDER — PANTOPRAZOLE SODIUM 40 MG/10ML
INJECTION, POWDER, LYOPHILIZED, FOR SOLUTION INTRAVENOUS
Status: COMPLETED
Start: 2022-03-08 | End: 2022-03-08

## 2022-03-08 RX ORDER — PANTOPRAZOLE SODIUM 40 MG/10ML
40 INJECTION, POWDER, LYOPHILIZED, FOR SOLUTION INTRAVENOUS DAILY
Status: DISCONTINUED | OUTPATIENT
Start: 2022-03-09 | End: 2022-03-08 | Stop reason: HOSPADM

## 2022-03-08 RX ORDER — HYDROMORPHONE HYDROCHLORIDE 2 MG/ML
0.5 INJECTION, SOLUTION INTRAMUSCULAR; INTRAVENOUS; SUBCUTANEOUS
Status: COMPLETED | OUTPATIENT
Start: 2022-03-08 | End: 2022-03-08

## 2022-03-08 RX ORDER — NITROGLYCERIN 2.5 MG/1
2.5 CAPSULE ORAL EVERY 8 HOURS
COMMUNITY
End: 2022-05-02 | Stop reason: CLARIF

## 2022-03-08 RX ORDER — HYDROMORPHONE HYDROCHLORIDE 2 MG/ML
1 INJECTION, SOLUTION INTRAMUSCULAR; INTRAVENOUS; SUBCUTANEOUS
Status: COMPLETED | OUTPATIENT
Start: 2022-03-08 | End: 2022-03-08

## 2022-03-08 RX ORDER — ONDANSETRON 2 MG/ML
4 INJECTION INTRAMUSCULAR; INTRAVENOUS
Status: COMPLETED | OUTPATIENT
Start: 2022-03-08 | End: 2022-03-08

## 2022-03-08 RX ADMIN — PANTOPRAZOLE SODIUM 40 MG: 40 INJECTION, POWDER, LYOPHILIZED, FOR SOLUTION INTRAVENOUS at 04:03

## 2022-03-08 RX ADMIN — HYDROMORPHONE HYDROCHLORIDE 1 MG: 2 INJECTION, SOLUTION INTRAMUSCULAR; INTRAVENOUS; SUBCUTANEOUS at 05:03

## 2022-03-08 RX ADMIN — PANTOPRAZOLE SODIUM 40 MG: 40 INJECTION, POWDER, FOR SOLUTION INTRAVENOUS at 04:03

## 2022-03-08 RX ADMIN — ONDANSETRON 4 MG: 2 INJECTION INTRAMUSCULAR; INTRAVENOUS at 04:03

## 2022-03-08 RX ADMIN — SODIUM CHLORIDE 500 ML: 9 INJECTION, SOLUTION INTRAVENOUS at 04:03

## 2022-03-08 RX ADMIN — SODIUM CHLORIDE 500 ML: 9 INJECTION, SOLUTION INTRAVENOUS at 07:03

## 2022-03-08 RX ADMIN — HYDROMORPHONE HYDROCHLORIDE 0.5 MG: 2 INJECTION, SOLUTION INTRAMUSCULAR; INTRAVENOUS; SUBCUTANEOUS at 09:03

## 2022-03-08 NOTE — ED PROVIDER NOTES
Encounter Date: 3/8/2022       History     Chief Complaint   Patient presents with    Weakness    Abdominal Pain     Pt has hx of pancreatitis and has not been eating, feels weak     PT HAS HAD ABDOMINAL PAIN AND N/V ONSET 5 D AGO WITH LAST N/V 2 D AGO. PT HAS CONTINUED WEAKNESS AND PRESENTS DUE TO MINIMAL IMPROVEMENT IN 5 DAYS. PT HAS HX PANCREATITIS 6/2022 TREATED AT [facility]. PT HAS HAD MINIMAL INTAKE FOR THE PAST 5 DAYS DUE TO THE FACT THAT HE THOUGHT HE HAD PANCREATITIS. PT DENIES ETOH IN 20 YRS BUT DID DRINK HEAVILY FOR 10 YRS PRIOR TO THAT TIME. PT DENIES DIARRHEA; HX LAST BM 3 D AGO.  PT HAS HX CAD WITH STENT 2005 AND 2012. PT HAS HX CHF. PT DENIES CP OR DYSPNEA.  PT HAS NOT SEEN MDIN FOLLOW UP SINCE DISCHARGE PER [place] 6/21; EXCEPT FREE CLINIC OCCASIONALLY.        Review of patient's allergies indicates:  No Known Allergies  Past Medical History:   Diagnosis Date    Cardiomegaly     CHF (congestive heart failure)     Coronary artery disease     Diabetes mellitus     Hypertension     Irregular heart beat     Myocardial infarction     Pancreatitis     Renal failure      Past Surgical History:   Procedure Laterality Date    CARDIAC SURGERY      Stents x2     Family History   Problem Relation Age of Onset    Hypertension Father     Heart disease Brother      Social History     Tobacco Use    Smoking status: Current Some Day Smoker    Smokeless tobacco: Never Used   Substance Use Topics    Alcohol use: Not Currently    Drug use: Never     Review of Systems   Constitutional: Positive for activity change, appetite change and fatigue.   HENT: Negative.    Eyes: Negative.    Respiratory: Negative.    Cardiovascular: Negative.    Gastrointestinal: Positive for abdominal pain, diarrhea, nausea and vomiting.   Endocrine: Negative.    Genitourinary: Negative.    Musculoskeletal: Negative.    Skin: Negative.    Allergic/Immunologic: Negative.    Neurological: Positive for weakness (GENERALIZED).    Psychiatric/Behavioral: Negative.        Physical Exam     Initial Vitals [03/08/22 1629]   BP Pulse Resp Temp SpO2   (!) 152/106 105 18 97.3 °F (36.3 °C) 100 %      MAP       --         Physical Exam    Constitutional: He appears well-developed and well-nourished. He is cooperative. Distressed: MODERATE.   HENT:   Head: Normocephalic and atraumatic.   Eyes: Conjunctivae and EOM are normal. Pupils are equal, round, and reactive to light.   Neck: Trachea normal. Neck supple.   Normal range of motion.  Cardiovascular: Regular rhythm, normal heart sounds and intact distal pulses.   Pulses:       Radial pulses are 3+ on the right side and 3+ on the left side.        Dorsalis pedis pulses are 3+ on the right side and 3+ on the left side.   Pulmonary/Chest: Breath sounds normal. No respiratory distress. He has no wheezes. He has no rales.   Abdominal: Abdomen is soft. He exhibits distension. There is abdominal tenderness (MIDEPIGASTRIC).   Genitourinary:    Penis normal.     Musculoskeletal:         General: Normal range of motion.      Right shoulder: Normal.      Left shoulder: Normal.      Right upper arm: Normal.      Left upper arm: Normal.      Right elbow: Normal.      Left elbow: Normal.      Right forearm: Normal.      Left forearm: Normal.      Right wrist: Normal.      Left wrist: Normal.      Right hand: Normal.      Left hand: Normal.      Cervical back: Normal range of motion and neck supple.     Lymphadenopathy:     He has no cervical adenopathy.     He has no axillary adenopathy.   Neurological: He is alert and oriented to person, place, and time. He has normal strength. No cranial nerve deficit or sensory deficit. He displays a negative Romberg sign. GCS eye subscore is 4. GCS verbal subscore is 5. GCS motor subscore is 6.   Reflex Scores:       Tricep reflexes are 4+ on the left side.       Bicep reflexes are 4+ on the right side and 4+ on the left side.       Brachioradialis reflexes are 4+ on the  right side and 4+ on the left side.       Patellar reflexes are 4+ on the right side and 4+ on the left side.       Achilles reflexes are 4+ on the right side and 4+ on the left side.  Skin: Skin is warm. Capillary refill takes less than 2 seconds.   MILDLY DIAPHORETIC   Psychiatric: He has a normal mood and affect. His speech is normal and behavior is normal. Judgment and thought content normal. Cognition and memory are normal.         Medical Screening Exam   See Full Note    ED Course   Procedures  Labs Reviewed   COMPREHENSIVE METABOLIC PANEL - Abnormal; Notable for the following components:       Result Value    Sodium 131 (*)     Chloride 92 (*)     Glucose 350 (*)     BUN 29 (*)     Creatinine 1.33 (*)     BUN/Creatinine Ratio 22 (*)     Calcium 10.8 (*)     Total Protein 8.5 (*)     Globulin 4.7 (*)     All other components within normal limits   URINALYSIS - Abnormal; Notable for the following components:    Protein, UA >=300 (*)     Glucose, UA >=1000 (*)     Ketones, UA 15  (*)     Bilirubin, UA Moderate (*)     Blood, UA Small (*)     All other components within normal limits   LIPASE - Abnormal; Notable for the following components:    Lipase 623 (*)     All other components within normal limits   CBC WITH DIFFERENTIAL - Abnormal; Notable for the following components:    MCH 32.2 (*)     Platelet Count 126 (*)     Neutrophils % 76.3 (*)     Lymphocytes % 12.8 (*)     Monocytes % 8.7 (*)     All other components within normal limits   URINALYSIS, MICROSCOPIC - Abnormal; Notable for the following components:    RBC, UA 5-10 (*)     Bacteria, UA Few (*)     Squamous Epithelial Cells, UA Few (*)     Mucus, UA Moderate (*)     All other components within normal limits   AMYLASE - Normal   TROPONIN I - Normal   CBC W/ AUTO DIFFERENTIAL    Narrative:     The following orders were created for panel order CBC auto differential.  Procedure                               Abnormality         Status                      ---------                               -----------         ------                     CBC with Differential[[id number]]        Abnormal            Final result                 Please view results for these tests on the individual orders.   ACETONE     EKG Readings: (Independently Interpreted)   Initial Reading: No STEMI. Rhythm: Normal Sinus Rhythm. Ectopy: No Ectopy. Conduction: LAFB. ST Segments: Normal ST Segments. T Waves Flipped: AVR, V1 and V2. Q Waves: II, III, AVF and V3. Clinical Impression: Normal Sinus Rhythm     ECG Results          EKG 12-lead (In process)  Result time 03/08/22 18:20:37    In process by Interface, Lab In [facility] (03/08/22 18:20:37)                 Narrative:    Test Reason : R11.2,    Vent. Rate : 089 BPM     Atrial Rate : 089 BPM     P-R Int : 166 ms          QRS Dur : 086 ms      QT Int : 376 ms       P-R-T Axes : 037 -69 049 degrees     QTc Int : 457 ms    Normal sinus rhythm  Possible Left atrial enlargement  Low voltage QRS  Left anterior fascicular block  Inferior infarct (cited on or before 26-JUN-2021)  Anterolateral infarct (cited on or before 26-JUN-2021)  Abnormal ECG  When compared with ECG of 26-JUN-2021 07:25,  No significant change was found    Referred By: [email or web address]   SELF           Confirmed By:                             Imaging Results          CT Abdomen Pelvis  Without Contrast (Final result)  Result time 03/08/22 17:08:19    Final result by Sly Garcia MD (03/08/22 17:08:19)                 Impression:      Motion degraded exam.  Stranding surrounding the pancreas suggestive of pancreatitis.      Electronically signed by: Sly Garcia  Date:    03/08/2022  Time:    17:08             Narrative:    EXAMINATION:  CT ABDOMEN PELVIS WITHOUT CONTRAST    CLINICAL HISTORY:  Pancreatitis, acute, severe;    TECHNIQUE:  Low dose axial images, sagittal and coronal reformations were obtained from the lung bases to the pubic  symphysis.    COMPARISON:  06/26/2021    FINDINGS:  Motion compromises this exam.    Lung bases appear clear.  Liver and gallbladder unremarkable.  Kidneys appear without hydronephrosis or stone.  Adrenals and spleen are unremarkable.  There is stranding surrounding min the pancreas as seen previously.    There is no pneumoperitoneum.  No ascites.  No bowel obstruction or acute bowel abnormality detected.  Vascular structures have calcifications.    The urinary bladder is unremarkable.    No adenopathy.    There is no fracture or osseous lesion.                               X-Ray Chest 1 View (Final result)  Result time 03/08/22 16:53:57    Final result by Sly Garcia MD (03/08/22 16:53:57)                 Impression:      No acute findings.      Electronically signed by: Sly Garcia  Date:    03/08/2022  Time:    16:53             Narrative:    EXAMINATION:  XR CHEST 1 VIEW    CLINICAL HISTORY:  ABDOMINAL PAIN;    TECHNIQUE:  Single frontal view of the chest was performed.    COMPARISON:  09/12/2019    FINDINGS:  Heart size normal. Lungs clear. No pneumothorax or pleural effusion.                              X-Rays:   Independently Interpreted Readings:   Chest X-Ray: Normal heart size.  No infiltrates.  No acute abnormalities.   Abdomen: Pancreas: Thickened from pancreatitis.    Medications   pantoprazole injection 40 mg (40 mg Intravenous Given 3/8/22 1651)   sodium chloride 0.9% bolus 500 mL (has no administration in time range)   sodium chloride 0.9% bolus 500 mL (0 mLs Intravenous Stopped 3/8/22 1730)   ondansetron injection 4 mg (4 mg Intravenous Given 3/8/22 1653)   HYDROmorphone (PF) injection 1 mg (1 mg Intravenous Given 3/8/22 1739)     Medical Decision Making:   Clinical Tests:   Lab Tests: Ordered and Reviewed  The following lab test(s) were unremarkable: CBC       <> Summary of Lab: CBC NL EXCEPT   Radiological Study: Ordered and Reviewed  Medical Tests: Ordered and Reviewed  ED  Management:  PT HAS PANCREATITIS, DEHYDRATION  AND HYPERGLYCEMIA WITH DM2  WITH NONCOMPLIANCE  PT IMPROVED WITH IVF, ZOFRAN, PROTONIX AND DILAUDID  AWAITING TRANSFER TO [facility]  VBG 22/42/7.43   WITH CO2 30  BUN/CREAT 29/1.33  LIPASE 623  CA 10.8  CBC NORMAL, UA >1000 GLU, PROT >300  PT WILL BE TRANSFERRED TO HIGHER LEVEL OF CARE  1850 AWAITING TRANSFER TO [facility] PER [facility]             ED Course as of 03/23/22 2303   Tue Mar 08, 2022   2052 Nausea improved. Pain returning. LUQ tenderness. Rush holding pts in ER. Accepted by Dr Stinson at [facility]. [GM]      ED Course User Index  [GM] DOC Cordon          Clinical Impression:   Final diagnoses:  [R11.2] N&V (nausea and vomiting)  [E86.0] Dehydration (Primary)  [K85.90] Acute pancreatitis, unspecified complication status, unspecified pancreatitis type  [E11.65] Type 2 diabetes mellitus with hyperglycemia, without long-term current use of insulin          ED Disposition Condition    Transfer to Another Facility Stable              Janet Bautista MD  03/08/22 [id number]       Janet Bautista MD  03/23/22 1269

## 2022-03-09 ENCOUNTER — TELEPHONE (OUTPATIENT)
Dept: EMERGENCY MEDICINE | Facility: HOSPITAL | Age: 52
End: 2022-03-09
Payer: MEDICARE

## 2022-03-11 LAB
HCO3 UR-SCNC: 27.9 MMOL/L (ref 24–28)
PCO2 BLDA: 42 MMHG (ref 41–51)
PH SMN: 7.43 [PH] (ref 7.32–7.42)
PO2 BLDA: 22 MMHG (ref 25–40)
POC BASE EXCESS: 3.2 MMOL/L (ref -2–3)
POC CO2: 29.2 MMOL/L
POC SATURATED O2: 40 %

## 2022-03-17 ENCOUNTER — OFFICE VISIT (OUTPATIENT)
Dept: FAMILY MEDICINE | Facility: CLINIC | Age: 52
End: 2022-03-17
Payer: MEDICARE

## 2022-03-17 VITALS
WEIGHT: 249 LBS | HEART RATE: 93 BPM | DIASTOLIC BLOOD PRESSURE: 100 MMHG | BODY MASS INDEX: 40.02 KG/M2 | SYSTOLIC BLOOD PRESSURE: 158 MMHG | HEIGHT: 66 IN

## 2022-03-17 DIAGNOSIS — Z12.5 SCREENING FOR MALIGNANT NEOPLASM OF PROSTATE: ICD-10-CM

## 2022-03-17 DIAGNOSIS — E11.9 TYPE 2 DIABETES MELLITUS WITHOUT COMPLICATION, WITHOUT LONG-TERM CURRENT USE OF INSULIN: Primary | Chronic | ICD-10-CM

## 2022-03-17 DIAGNOSIS — Z12.11 SCREENING FOR MALIGNANT NEOPLASM OF COLON: ICD-10-CM

## 2022-03-17 DIAGNOSIS — I25.2 HISTORY OF MI (MYOCARDIAL INFARCTION): ICD-10-CM

## 2022-03-17 DIAGNOSIS — I25.10 CORONARY ARTERY DISEASE INVOLVING NATIVE CORONARY ARTERY OF NATIVE HEART, UNSPECIFIED WHETHER ANGINA PRESENT: Chronic | ICD-10-CM

## 2022-03-17 DIAGNOSIS — Z23 IMMUNIZATION DUE: ICD-10-CM

## 2022-03-17 DIAGNOSIS — I10 PRIMARY HYPERTENSION: Chronic | ICD-10-CM

## 2022-03-17 DIAGNOSIS — I50.22 CHRONIC SYSTOLIC CONGESTIVE HEART FAILURE: Chronic | ICD-10-CM

## 2022-03-17 DIAGNOSIS — Z23 NEED FOR VACCINATION: ICD-10-CM

## 2022-03-17 LAB
ALBUMIN SERPL BCP-MCNC: 3.1 G/DL (ref 3.5–5)
ALBUMIN/GLOB SERPL: 0.8 {RATIO}
ALP SERPL-CCNC: 68 U/L (ref 45–115)
ALT SERPL W P-5'-P-CCNC: 17 U/L (ref 16–61)
ANION GAP SERPL CALCULATED.3IONS-SCNC: 13 MMOL/L (ref 7–16)
AST SERPL W P-5'-P-CCNC: 14 U/L (ref 15–37)
BASOPHILS # BLD AUTO: 0.09 K/UL (ref 0–0.2)
BASOPHILS NFR BLD AUTO: 1.2 % (ref 0–1)
BILIRUB SERPL-MCNC: 0.2 MG/DL (ref 0–1.2)
BUN SERPL-MCNC: 6 MG/DL (ref 7–18)
BUN/CREAT SERPL: 6 (ref 6–20)
CALCIUM SERPL-MCNC: 8.9 MG/DL (ref 8.5–10.1)
CHLORIDE SERPL-SCNC: 107 MMOL/L (ref 98–107)
CHOLEST SERPL-MCNC: 165 MG/DL (ref 0–200)
CHOLEST/HDLC SERPL: 4.6 {RATIO}
CO2 SERPL-SCNC: 28 MMOL/L (ref 21–32)
CREAT SERPL-MCNC: 0.98 MG/DL (ref 0.7–1.3)
DIFFERENTIAL METHOD BLD: ABNORMAL
EOSINOPHIL # BLD AUTO: 0.11 K/UL (ref 0–0.5)
EOSINOPHIL NFR BLD AUTO: 1.5 % (ref 1–4)
ERYTHROCYTE [DISTWIDTH] IN BLOOD BY AUTOMATED COUNT: 14.2 % (ref 11.5–14.5)
EST. AVERAGE GLUCOSE BLD GHB EST-MCNC: 127 MG/DL
GLOBULIN SER-MCNC: 4.1 G/DL (ref 2–4)
GLUCOSE SERPL-MCNC: 124 MG/DL (ref 74–106)
HBA1C MFR BLD HPLC: 6.4 % (ref 4.5–6.6)
HCT VFR BLD AUTO: 40.1 % (ref 40–54)
HDLC SERPL-MCNC: 36 MG/DL (ref 40–60)
HGB BLD-MCNC: 12.7 G/DL (ref 13.5–18)
IMM GRANULOCYTES # BLD AUTO: 0.04 K/UL (ref 0–0.04)
IMM GRANULOCYTES NFR BLD: 0.5 % (ref 0–0.4)
LDLC SERPL CALC-MCNC: 101 MG/DL
LDLC/HDLC SERPL: 2.8 {RATIO}
LYMPHOCYTES # BLD AUTO: 2.25 K/UL (ref 1–4.8)
LYMPHOCYTES NFR BLD AUTO: 30.8 % (ref 27–41)
MCH RBC QN AUTO: 32.1 PG (ref 27–31)
MCHC RBC AUTO-ENTMCNC: 31.7 G/DL (ref 32–36)
MCV RBC AUTO: 101.3 FL (ref 80–96)
MONOCYTES # BLD AUTO: 0.45 K/UL (ref 0–0.8)
MONOCYTES NFR BLD AUTO: 6.2 % (ref 2–6)
MPC BLD CALC-MCNC: 10.5 FL (ref 9.4–12.4)
NEUTROPHILS # BLD AUTO: 4.36 K/UL (ref 1.8–7.7)
NEUTROPHILS NFR BLD AUTO: 59.8 % (ref 53–65)
NONHDLC SERPL-MCNC: 129 MG/DL
NRBC # BLD AUTO: 0 X10E3/UL
NRBC, AUTO (.00): 0 %
PLATELET # BLD AUTO: 451 K/UL (ref 150–400)
POTASSIUM SERPL-SCNC: 3.8 MMOL/L (ref 3.5–5.1)
PROT SERPL-MCNC: 7.2 G/DL (ref 6.4–8.2)
PSA SERPL-MCNC: 0.4 NG/ML (ref 0–3.1)
RBC # BLD AUTO: 3.96 M/UL (ref 4.6–6.2)
SODIUM SERPL-SCNC: 144 MMOL/L (ref 136–145)
TRIGL SERPL-MCNC: 138 MG/DL (ref 35–150)
VLDLC SERPL-MCNC: 28 MG/DL
WBC # BLD AUTO: 7.3 K/UL (ref 4.5–11)

## 2022-03-17 PROCEDURE — 85025 CBC WITH DIFFERENTIAL: ICD-10-PCS | Mod: ,,, | Performed by: CLINICAL MEDICAL LABORATORY

## 2022-03-17 PROCEDURE — 80061 LIPID PANEL: ICD-10-PCS | Mod: ,,, | Performed by: CLINICAL MEDICAL LABORATORY

## 2022-03-17 PROCEDURE — 90686 IIV4 VACC NO PRSV 0.5 ML IM: CPT | Mod: ,,, | Performed by: FAMILY MEDICINE

## 2022-03-17 PROCEDURE — 90686 FLU VACCINE (QUAD) GREATER THAN OR EQUAL TO 3YO PRESERVATIVE FREE IM: ICD-10-PCS | Mod: ,,, | Performed by: FAMILY MEDICINE

## 2022-03-17 PROCEDURE — 0064A COVID-19, MRNA, LNP-S, PF, 100 MCG/0.25 ML DOSE VACCINE (MODERNA BOOSTER): ICD-10-PCS | Mod: ,,, | Performed by: FAMILY MEDICINE

## 2022-03-17 PROCEDURE — 99214 PR OFFICE/OUTPT VISIT, EST, LEVL IV, 30-39 MIN: ICD-10-PCS | Mod: ,,, | Performed by: FAMILY MEDICINE

## 2022-03-17 PROCEDURE — 0064A COVID-19, MRNA, LNP-S, PF, 100 MCG/0.25 ML DOSE VACCINE (MODERNA BOOSTER): CPT | Mod: ,,, | Performed by: FAMILY MEDICINE

## 2022-03-17 PROCEDURE — G0103 PSA SCREENING: HCPCS | Mod: ,,, | Performed by: CLINICAL MEDICAL LABORATORY

## 2022-03-17 PROCEDURE — 85025 COMPLETE CBC W/AUTO DIFF WBC: CPT | Mod: ,,, | Performed by: CLINICAL MEDICAL LABORATORY

## 2022-03-17 PROCEDURE — 83036 HEMOGLOBIN GLYCOSYLATED A1C: CPT | Mod: ,,, | Performed by: CLINICAL MEDICAL LABORATORY

## 2022-03-17 PROCEDURE — 99214 OFFICE O/P EST MOD 30 MIN: CPT | Mod: ,,, | Performed by: FAMILY MEDICINE

## 2022-03-17 PROCEDURE — 80061 LIPID PANEL: CPT | Mod: ,,, | Performed by: CLINICAL MEDICAL LABORATORY

## 2022-03-17 PROCEDURE — 83036 HEMOGLOBIN A1C: ICD-10-PCS | Mod: ,,, | Performed by: CLINICAL MEDICAL LABORATORY

## 2022-03-17 PROCEDURE — G0103 PSA, SCREENING: ICD-10-PCS | Mod: ,,, | Performed by: CLINICAL MEDICAL LABORATORY

## 2022-03-17 PROCEDURE — 80053 COMPREHEN METABOLIC PANEL: CPT | Mod: ,,, | Performed by: CLINICAL MEDICAL LABORATORY

## 2022-03-17 PROCEDURE — 91306 COVID-19, MRNA, LNP-S, PF, 100 MCG/0.25 ML DOSE VACCINE (MODERNA BOOSTER): ICD-10-PCS | Mod: ,,, | Performed by: FAMILY MEDICINE

## 2022-03-17 PROCEDURE — G0008 FLU VACCINE (QUAD) GREATER THAN OR EQUAL TO 3YO PRESERVATIVE FREE IM: ICD-10-PCS | Mod: ,,, | Performed by: FAMILY MEDICINE

## 2022-03-17 PROCEDURE — 80053 COMPREHENSIVE METABOLIC PANEL: ICD-10-PCS | Mod: ,,, | Performed by: CLINICAL MEDICAL LABORATORY

## 2022-03-17 PROCEDURE — G0008 ADMIN INFLUENZA VIRUS VAC: HCPCS | Mod: ,,, | Performed by: FAMILY MEDICINE

## 2022-03-17 PROCEDURE — 91306 COVID-19, MRNA, LNP-S, PF, 100 MCG/0.25 ML DOSE VACCINE (MODERNA BOOSTER): CPT | Mod: ,,, | Performed by: FAMILY MEDICINE

## 2022-03-17 RX ORDER — METFORMIN HYDROCHLORIDE 500 MG/1
500 TABLET, EXTENDED RELEASE ORAL
Qty: 90 TABLET | Refills: 2 | Status: SHIPPED | OUTPATIENT
Start: 2022-03-17 | End: 2022-10-04 | Stop reason: SDUPTHER

## 2022-03-17 RX ORDER — BLOOD-GLUCOSE METER
EACH MISCELLANEOUS 2 TIMES DAILY
Status: ON HOLD | COMMUNITY
Start: 2022-03-13 | End: 2023-12-09 | Stop reason: HOSPADM

## 2022-03-17 RX ORDER — ISOSORBIDE MONONITRATE 30 MG/1
15 TABLET, EXTENDED RELEASE ORAL DAILY
COMMUNITY
Start: 2022-03-13 | End: 2022-04-04 | Stop reason: SDUPTHER

## 2022-03-17 RX ORDER — FUROSEMIDE 20 MG/1
20 TABLET ORAL DAILY
Qty: 90 TABLET | Refills: 2 | Status: SHIPPED | OUTPATIENT
Start: 2022-03-17 | End: 2022-10-04 | Stop reason: SDUPTHER

## 2022-03-17 RX ORDER — ATORVASTATIN CALCIUM 80 MG/1
80 TABLET, FILM COATED ORAL DAILY
COMMUNITY
Start: 2022-03-13 | End: 2022-04-04 | Stop reason: SDUPTHER

## 2022-03-17 RX ORDER — FENOFIBRATE 145 MG/1
145 TABLET, FILM COATED ORAL DAILY
COMMUNITY
Start: 2022-03-13 | End: 2022-04-04 | Stop reason: SDUPTHER

## 2022-03-17 RX ORDER — CALCIUM CARB/VITAMIN D3/VIT K1 500-500-40
TABLET,CHEWABLE ORAL 2 TIMES DAILY
Status: ON HOLD | COMMUNITY
Start: 2022-03-13 | End: 2023-12-09 | Stop reason: HOSPADM

## 2022-03-17 RX ORDER — LOSARTAN POTASSIUM 50 MG/1
50 TABLET ORAL DAILY
COMMUNITY
Start: 2022-03-13 | End: 2022-04-04 | Stop reason: SDUPTHER

## 2022-03-17 NOTE — PROGRESS NOTES
Juan Pablo Ryan MD   [facility]  MEDICAL [facility] - FAMILY MEDICINE  [address] [place] [id number]  [phone]      PATIENT NAME: Faustino Fischer  : 1970  DATE: 3/17/22  MRN: [id number]      Billing Provider: Juan Pablo Ryan MD  Level of Service:   Patient PCP Information       Provider PCP Type    Juan Pablo Ryan MD General            Reason for Visit / Chief Complaint: Establish Care and Medication Refill       Update PCP  Update Chief Complaint         History of Present Illness / Problem Focused Workflow     Faustino Fischer presents to the clinic with Establish Care and Medication Refill       50 yo AAM recently admitted to [facility] 3/08/22 for treatment of pancreatitis.  Discharged on 22.  Says that he has been doing well since discharge.  Deniesd any abdominal pain.  Needs to establish care.  He has multiple medical problems and does not have a PCP.  Medical history includes MI x 2, CAD c stent placement, CHF, DM, HTN, renal insufficiency and pancreatitis.  Previous care was through [facility] but has not been in > 1 year.  Last cardiology visit was >2 years ago- Dr. Anthony at [facility].  He has not had flu shot this year.  Needs COVID-19 booster.  Has never had screening colonoscopy.  Needs a couple of meds refilled today.  He does report that he adheres to a diabetic diet and exercises (walks) on a regular basis.  Does not drink alcohol.        Review of Systems     Review of Systems   Constitutional: Negative for chills, fatigue and fever.   HENT: Negative for sinus pressure/congestion, sore throat and trouble swallowing.    Eyes: Negative for pain, itching and visual disturbance.   Respiratory: Negative for cough, shortness of breath and wheezing.    Cardiovascular: Negative for chest pain, palpitations and leg swelling.   Gastrointestinal: Negative for abdominal pain, change in bowel habit, nausea, vomiting and change in bowel habit.    Musculoskeletal: Negative for arthralgias, back pain, joint swelling and myalgias.   Integumentary:  Negative for rash and mole/lesion.   Neurological: Negative for dizziness, syncope, weakness, light-headedness, numbness and headaches.   Psychiatric/Behavioral: Negative for confusion.        Medical / Social / Family History     Past Medical History:   Diagnosis Date    Cardiomegaly     CHF (congestive heart failure)     Coronary artery disease     Diabetes mellitus     Hypertension     Irregular heart beat     Myocardial infarction     Pancreatitis     Renal failure        Past Surgical History:   Procedure Laterality Date    CARDIAC SURGERY      Stents x2    UNDESCENDED TESTICLE EXPLORATION N/A        Social History    reports that he has been smoking. He has never used smokeless tobacco. He reports previous alcohol use. He reports that he does not use drugs.   Social History     Tobacco Use    Smoking status: Current Some Day Smoker    Smokeless tobacco: Never Used   Substance Use Topics    Alcohol use: Not Currently    Drug use: Never       Family History  Family History   Problem Relation Age of Onset    Hypertension Father     Heart disease Brother        Medications and Allergies     Medications  No outpatient medications have been marked as taking for the 3/17/22 encounter (Office Visit) with Juan Pablo Ryan MD.       Allergies  Review of patient's allergies indicates:  No Known Allergies    Physical Examination     Vitals:    03/17/22 0854   BP: (!) 158/100   Pulse: 93     Physical Exam  Vitals reviewed.   Constitutional:       Appearance: Normal appearance. He is obese.   HENT:      Head: Normocephalic and atraumatic.   Eyes:      Extraocular Movements: Extraocular movements intact.      Conjunctiva/sclera: Conjunctivae normal.      Pupils: Pupils are equal, round, and reactive to light.   Cardiovascular:      Rate and Rhythm: Normal rate and regular rhythm.      Heart sounds: Normal  heart sounds.   Pulmonary:      Effort: Pulmonary effort is normal.      Breath sounds: Normal breath sounds.   Musculoskeletal:         General: Normal range of motion.      Cervical back: Normal range of motion and neck supple.   Skin:     General: Skin is warm and dry.   Neurological:      General: No focal deficit present.      Mental Status: He is alert and oriented to person, place, and time.   Psychiatric:         Mood and Affect: Mood normal.         Behavior: Behavior normal.          Assessment and Plan (including Health Maintenance)      Problem List  Smart Sets  Document Outside HM   :    Plan: meds refilled as requested  Referral for screening colonoscopy  Cardiology referral  RTC 1 month        Health Maintenance Due   Topic Date Due    Hepatitis C Screening  Never done    COVID-19 Vaccine (1) 03/21/1975    Pneumococcal Vaccines (Age 0-64) (1 of 2 - PPSV23) Never done    HIV Screening  Never done    TETANUS VACCINE  Never done    Colorectal Cancer Screening  Never done    Shingles Vaccine (1 of 2) Never done       Problem List Items Addressed This Visit        Cardiac/Vascular    CAD (coronary artery disease) (Chronic)    Relevant Orders    Ambulatory referral/consult to Cardiology    Chronic systolic congestive heart failure (Chronic)    Relevant Medications    furosemide (LASIX) 20 MG tablet    Other Relevant Orders    Ambulatory referral/consult to Cardiology    Primary hypertension (Chronic)    Relevant Orders    CBC Auto Differential    Comprehensive Metabolic Panel    Lipid Panel    History of MI (myocardial infarction)    Relevant Orders    Ambulatory referral/consult to Cardiology       Endocrine    Type 2 diabetes mellitus without complication, without long-term current use of insulin - Primary (Chronic)    Relevant Medications    metFORMIN (GLUCOPHAGE-XR) 500 MG ER 24hr tablet    Other Relevant Orders    Hemoglobin A1C      Other Visit Diagnoses     Screening for malignant neoplasm of  prostate        Relevant Orders    PSA, Screening    Immunization due        Relevant Orders    Influenza - Quadrivalent *Preferred* (6 months+) (PF) (Completed)    Need for vaccination        Relevant Orders    COVID-19-MRNA-(PF)(Moderna Booster) Vaccine (Completed)    Screening for malignant neoplasm of colon        Relevant Orders    Ambulatory referral/consult to Gastroenterology          Health Maintenance Topics with due status: Not Due       Topic Last Completion Date    Lipid Panel 06/27/2021    High Dose Statin 03/13/2022       Future Appointments   Date Time Provider Department Center   4/15/2022  3:00 PM AWV NURSE, [facility]            Signature:  [facility]  [facility] - FAMILY MEDICINE  [address] [place] [id number]  [phone]    Date of encounter: 3/17/22

## 2022-04-04 DIAGNOSIS — I10 PRIMARY HYPERTENSION: Primary | ICD-10-CM

## 2022-04-04 DIAGNOSIS — E78.2 MIXED HYPERLIPIDEMIA: ICD-10-CM

## 2022-04-04 DIAGNOSIS — I50.22 CHRONIC SYSTOLIC CONGESTIVE HEART FAILURE: ICD-10-CM

## 2022-04-06 RX ORDER — ISOSORBIDE MONONITRATE 30 MG/1
TABLET, EXTENDED RELEASE ORAL
Qty: 30 TABLET | Refills: 2 | Status: SHIPPED | OUTPATIENT
Start: 2022-04-06 | End: 2022-05-02 | Stop reason: CLARIF

## 2022-04-06 RX ORDER — ATORVASTATIN CALCIUM 80 MG/1
80 TABLET, FILM COATED ORAL DAILY
Qty: 30 TABLET | Refills: 2 | Status: SHIPPED | OUTPATIENT
Start: 2022-04-06 | End: 2022-10-04 | Stop reason: SDUPTHER

## 2022-04-06 RX ORDER — CARVEDILOL 12.5 MG/1
12.5 TABLET ORAL
Qty: 30 TABLET | Refills: 2 | Status: SHIPPED | OUTPATIENT
Start: 2022-04-06 | End: 2022-10-04 | Stop reason: SDUPTHER

## 2022-04-06 RX ORDER — FENOFIBRATE 145 MG/1
145 TABLET, FILM COATED ORAL DAILY
Qty: 30 TABLET | Refills: 2 | Status: SHIPPED | OUTPATIENT
Start: 2022-04-06 | End: 2022-10-04 | Stop reason: SDUPTHER

## 2022-04-06 RX ORDER — LOSARTAN POTASSIUM 50 MG/1
50 TABLET ORAL DAILY
Qty: 30 TABLET | Refills: 2 | Status: SHIPPED | OUTPATIENT
Start: 2022-04-06 | End: 2022-10-04 | Stop reason: SDUPTHER

## 2022-04-15 ENCOUNTER — OFFICE VISIT (OUTPATIENT)
Dept: FAMILY MEDICINE | Facility: CLINIC | Age: 52
End: 2022-04-15
Payer: MEDICARE

## 2022-04-15 VITALS
RESPIRATION RATE: 16 BRPM | HEIGHT: 66 IN | BODY MASS INDEX: 40.02 KG/M2 | TEMPERATURE: 97 F | HEART RATE: 87 BPM | OXYGEN SATURATION: 96 % | WEIGHT: 249 LBS | SYSTOLIC BLOOD PRESSURE: 136 MMHG | DIASTOLIC BLOOD PRESSURE: 92 MMHG

## 2022-04-15 DIAGNOSIS — E78.2 MIXED HYPERLIPIDEMIA: Chronic | ICD-10-CM

## 2022-04-15 DIAGNOSIS — I50.22 CHRONIC SYSTOLIC CONGESTIVE HEART FAILURE: Chronic | ICD-10-CM

## 2022-04-15 DIAGNOSIS — E66.01 MORBID OBESITY DUE TO EXCESS CALORIES: ICD-10-CM

## 2022-04-15 DIAGNOSIS — I10 PRIMARY HYPERTENSION: Primary | Chronic | ICD-10-CM

## 2022-04-15 DIAGNOSIS — Z12.11 SCREENING FOR MALIGNANT NEOPLASM OF COLON: ICD-10-CM

## 2022-04-15 DIAGNOSIS — Z11.59 SCREENING FOR VIRAL DISEASE: ICD-10-CM

## 2022-04-15 DIAGNOSIS — E11.9 TYPE 2 DIABETES MELLITUS WITHOUT COMPLICATION, WITHOUT LONG-TERM CURRENT USE OF INSULIN: Chronic | ICD-10-CM

## 2022-04-15 PROCEDURE — G0402 PR WELCOME MEDICARE PREVENTIVE VISIT NEW ENROLLEE: ICD-10-PCS | Mod: ,,, | Performed by: FAMILY MEDICINE

## 2022-04-15 PROCEDURE — G0402 INITIAL PREVENTIVE EXAM: HCPCS | Mod: ,,, | Performed by: FAMILY MEDICINE

## 2022-04-15 PROCEDURE — 82043 MICROALBUMIN / CREATININE RATIO URINE: ICD-10-PCS | Mod: ,,, | Performed by: CLINICAL MEDICAL LABORATORY

## 2022-04-15 PROCEDURE — 82043 UR ALBUMIN QUANTITATIVE: CPT | Mod: ,,, | Performed by: CLINICAL MEDICAL LABORATORY

## 2022-04-15 PROCEDURE — 82570 ASSAY OF URINE CREATININE: CPT | Mod: ,,, | Performed by: CLINICAL MEDICAL LABORATORY

## 2022-04-15 PROCEDURE — 82570 MICROALBUMIN / CREATININE RATIO URINE: ICD-10-PCS | Mod: ,,, | Performed by: CLINICAL MEDICAL LABORATORY

## 2022-04-15 NOTE — PATIENT INSTRUCTIONS
Counseling and Referral of Other Preventative  (Italic type indicates deductible and co-insurance are waived)    Patient Name: Faustino Fischer  Today's Date: 4/15/2022    Health Maintenance         Date Due Completion Date    Hepatitis C Screening Never done ---    Diabetes Urine Screening Never done ---    Pneumococcal Vaccines (Age 0-64) (1 - PCV) Never done ---    Foot Exam Never done ---    Eye Exam Never done ---    HIV Screening Never done ---    TETANUS VACCINE Never done ---    Colorectal Cancer Screening Never done ---    Shingles Vaccine (1 of 2) Never done ---    Hemoglobin A1c 09/17/2022 3/17/2022    Lipid Panel 03/17/2023 3/17/2022    High Dose Statin 04/15/2023 4/15/2022

## 2022-04-16 LAB
CREAT UR-MCNC: 563 MG/DL (ref 39–259)
MICROALBUMIN UR-MCNC: 45.8 MG/DL (ref 0–2.8)
MICROALBUMIN/CREAT RATIO PNL UR: 81.3 MG/G (ref 0–30)

## 2022-05-02 ENCOUNTER — HOSPITAL ENCOUNTER (EMERGENCY)
Facility: HOSPITAL | Age: 52
Discharge: ANOTHER HOSPITAL | End: 2022-05-02
Attending: EMERGENCY MEDICINE
Payer: MEDICARE

## 2022-05-02 VITALS
TEMPERATURE: 98 F | BODY MASS INDEX: 36.16 KG/M2 | OXYGEN SATURATION: 97 % | WEIGHT: 225 LBS | HEIGHT: 66 IN | DIASTOLIC BLOOD PRESSURE: 110 MMHG | SYSTOLIC BLOOD PRESSURE: 175 MMHG | HEART RATE: 79 BPM | RESPIRATION RATE: 18 BRPM

## 2022-05-02 DIAGNOSIS — E11.9 TYPE 2 DIABETES MELLITUS WITHOUT COMPLICATION, WITHOUT LONG-TERM CURRENT USE OF INSULIN: Chronic | ICD-10-CM

## 2022-05-02 DIAGNOSIS — E83.42 HYPOMAGNESEMIA: ICD-10-CM

## 2022-05-02 DIAGNOSIS — I25.2 HISTORY OF MI (MYOCARDIAL INFARCTION): ICD-10-CM

## 2022-05-02 DIAGNOSIS — E78.2 MIXED HYPERLIPIDEMIA: Chronic | ICD-10-CM

## 2022-05-02 DIAGNOSIS — R06.00 DYSPNEA: ICD-10-CM

## 2022-05-02 DIAGNOSIS — I25.10 CAD (CORONARY ARTERY DISEASE): Chronic | ICD-10-CM

## 2022-05-02 DIAGNOSIS — I50.22 CHRONIC SYSTOLIC CONGESTIVE HEART FAILURE: Chronic | ICD-10-CM

## 2022-05-02 DIAGNOSIS — I50.9 CONGESTIVE HEART FAILURE, UNSPECIFIED HF CHRONICITY, UNSPECIFIED HEART FAILURE TYPE: ICD-10-CM

## 2022-05-02 DIAGNOSIS — I25.10 CAD, MULTIPLE VESSEL: ICD-10-CM

## 2022-05-02 DIAGNOSIS — I10 HYPERTENSION, UNSPECIFIED TYPE: ICD-10-CM

## 2022-05-02 DIAGNOSIS — R07.9 CHEST PAIN, UNSPECIFIED TYPE: Primary | ICD-10-CM

## 2022-05-02 DIAGNOSIS — K85.90 ACUTE ON CHRONIC PANCREATITIS: ICD-10-CM

## 2022-05-02 DIAGNOSIS — I10 PRIMARY HYPERTENSION: Chronic | ICD-10-CM

## 2022-05-02 DIAGNOSIS — K86.1 ACUTE ON CHRONIC PANCREATITIS: ICD-10-CM

## 2022-05-02 LAB
ALBUMIN SERPL BCP-MCNC: 3.5 G/DL (ref 3.5–5)
ALBUMIN/GLOB SERPL: 0.9 {RATIO}
ALP SERPL-CCNC: 95 U/L (ref 45–115)
ALT SERPL W P-5'-P-CCNC: 20 U/L (ref 16–61)
ANION GAP SERPL CALCULATED.3IONS-SCNC: 19 MMOL/L (ref 7–16)
AST SERPL W P-5'-P-CCNC: 27 U/L (ref 15–37)
BACTERIA #/AREA URNS HPF: ABNORMAL /HPF
BASOPHILS # BLD AUTO: 0.04 K/UL (ref 0–0.2)
BASOPHILS NFR BLD AUTO: 0.5 % (ref 0–1)
BILIRUB SERPL-MCNC: 1.6 MG/DL (ref 0–1.2)
BILIRUB UR QL STRIP: ABNORMAL
BUN SERPL-MCNC: 11 MG/DL (ref 7–18)
BUN/CREAT SERPL: 13 (ref 6–20)
CALCIUM SERPL-MCNC: 9 MG/DL (ref 8.5–10.1)
CHLORIDE SERPL-SCNC: 99 MMOL/L (ref 98–107)
CLARITY UR: CLEAR
CO2 SERPL-SCNC: 25 MMOL/L (ref 21–32)
COLOR UR: YELLOW
CREAT SERPL-MCNC: 0.87 MG/DL (ref 0.7–1.3)
D DIMER PPP FEU-MCNC: 0.89 ΜG/ML (ref 0–0.47)
DIFFERENTIAL METHOD BLD: ABNORMAL
EOSINOPHIL # BLD AUTO: 0.03 K/UL (ref 0–0.5)
EOSINOPHIL NFR BLD AUTO: 0.4 % (ref 1–4)
ERYTHROCYTE [DISTWIDTH] IN BLOOD BY AUTOMATED COUNT: 14.9 % (ref 11.5–14.5)
GLOBULIN SER-MCNC: 4 G/DL (ref 2–4)
GLUCOSE SERPL-MCNC: 110 MG/DL (ref 74–106)
GLUCOSE UR STRIP-MCNC: NEGATIVE MG/DL
HCT VFR BLD AUTO: 40.4 % (ref 40–54)
HGB BLD-MCNC: 13 G/DL (ref 13.5–18)
KETONES UR STRIP-SCNC: >=160 MG/DL
LEUKOCYTE ESTERASE UR QL STRIP: NEGATIVE
LYMPHOCYTES # BLD AUTO: 1.27 K/UL (ref 1–4.8)
LYMPHOCYTES NFR BLD AUTO: 17.4 % (ref 27–41)
MCH RBC QN AUTO: 32.3 PG (ref 27–31)
MCHC RBC AUTO-ENTMCNC: 32.2 G/DL (ref 32–36)
MCV RBC AUTO: 100.5 FL (ref 80–96)
MONOCYTES # BLD AUTO: 0.4 K/UL (ref 0–0.8)
MONOCYTES NFR BLD AUTO: 5.5 % (ref 2–6)
MPC BLD CALC-MCNC: 9.3 FL (ref 9.4–12.4)
NEUTROPHILS # BLD AUTO: 5.56 K/UL (ref 1.8–7.7)
NEUTROPHILS NFR BLD AUTO: 76.2 % (ref 53–65)
NITRITE UR QL STRIP: NEGATIVE
NT-PROBNP SERPL-MCNC: 462 PG/ML (ref 1–125)
PH UR STRIP: 7 PH UNITS
PLATELET # BLD AUTO: 247 K/UL (ref 150–400)
POTASSIUM SERPL-SCNC: 3.7 MMOL/L (ref 3.5–5.1)
PROT SERPL-MCNC: 7.5 G/DL (ref 6.4–8.2)
PROT UR QL STRIP: 100
RBC # BLD AUTO: 4.02 M/UL (ref 4.6–6.2)
RBC # UR STRIP: ABNORMAL /UL
RBC #/AREA URNS HPF: ABNORMAL /HPF
SODIUM SERPL-SCNC: 139 MMOL/L (ref 136–145)
SP GR UR STRIP: 1.02
SQUAMOUS #/AREA URNS LPF: ABNORMAL /LPF
TROPONIN I SERPL HS-MCNC: 25.4 PG/ML
UROBILINOGEN UR STRIP-ACNC: 2 MG/DL
WBC # BLD AUTO: 7.3 K/UL (ref 4.5–11)
WBC #/AREA URNS HPF: ABNORMAL /HPF

## 2022-05-02 PROCEDURE — 25500020 PHARM REV CODE 255: Performed by: EMERGENCY MEDICINE

## 2022-05-02 PROCEDURE — 80053 COMPREHEN METABOLIC PANEL: CPT | Performed by: EMERGENCY MEDICINE

## 2022-05-02 PROCEDURE — 96375 TX/PRO/DX INJ NEW DRUG ADDON: CPT | Mod: 59 | Performed by: EMERGENCY MEDICINE

## 2022-05-02 PROCEDURE — 94761 N-INVAS EAR/PLS OXIMETRY MLT: CPT

## 2022-05-02 PROCEDURE — C9113 INJ PANTOPRAZOLE SODIUM, VIA: HCPCS | Performed by: EMERGENCY MEDICINE

## 2022-05-02 PROCEDURE — 93005 ELECTROCARDIOGRAM TRACING: CPT

## 2022-05-02 PROCEDURE — 27000221 HC OXYGEN, UP TO 24 HOURS

## 2022-05-02 PROCEDURE — 85025 COMPLETE CBC W/AUTO DIFF WBC: CPT | Performed by: EMERGENCY MEDICINE

## 2022-05-02 PROCEDURE — 99285 EMERGENCY DEPT VISIT HI MDM: CPT | Performed by: EMERGENCY MEDICINE

## 2022-05-02 PROCEDURE — 36415 COLL VENOUS BLD VENIPUNCTURE: CPT | Performed by: EMERGENCY MEDICINE

## 2022-05-02 PROCEDURE — 25000242 PHARM REV CODE 250 ALT 637 W/ HCPCS: Performed by: EMERGENCY MEDICINE

## 2022-05-02 PROCEDURE — 96374 THER/PROPH/DIAG INJ IV PUSH: CPT | Performed by: EMERGENCY MEDICINE

## 2022-05-02 PROCEDURE — 84484 ASSAY OF TROPONIN QUANT: CPT | Performed by: EMERGENCY MEDICINE

## 2022-05-02 PROCEDURE — 63600175 PHARM REV CODE 636 W HCPCS: Performed by: EMERGENCY MEDICINE

## 2022-05-02 PROCEDURE — 85378 FIBRIN DEGRADE SEMIQUANT: CPT | Performed by: EMERGENCY MEDICINE

## 2022-05-02 PROCEDURE — 81001 URINALYSIS AUTO W/SCOPE: CPT | Performed by: EMERGENCY MEDICINE

## 2022-05-02 PROCEDURE — 83880 ASSAY OF NATRIURETIC PEPTIDE: CPT | Performed by: EMERGENCY MEDICINE

## 2022-05-02 PROCEDURE — 99285 EMERGENCY DEPT VISIT HI MDM: CPT | Mod: 25 | Performed by: EMERGENCY MEDICINE

## 2022-05-02 PROCEDURE — 93010 ELECTROCARDIOGRAM REPORT: CPT | Performed by: HOSPITALIST

## 2022-05-02 PROCEDURE — 25000003 PHARM REV CODE 250: Performed by: EMERGENCY MEDICINE

## 2022-05-02 RX ORDER — PANTOPRAZOLE SODIUM 40 MG/10ML
40 INJECTION, POWDER, LYOPHILIZED, FOR SOLUTION INTRAVENOUS
Status: COMPLETED | OUTPATIENT
Start: 2022-05-02 | End: 2022-05-02

## 2022-05-02 RX ORDER — ASPIRIN 325 MG
325 TABLET ORAL
Status: COMPLETED | OUTPATIENT
Start: 2022-05-02 | End: 2022-05-02

## 2022-05-02 RX ORDER — NITROGLYCERIN 0.4 MG/1
0.4 TABLET SUBLINGUAL
Status: COMPLETED | OUTPATIENT
Start: 2022-05-02 | End: 2022-05-02

## 2022-05-02 RX ORDER — NAPROXEN SODIUM 220 MG/1
81 TABLET, FILM COATED ORAL DAILY
COMMUNITY
End: 2022-10-04 | Stop reason: SDUPTHER

## 2022-05-02 RX ORDER — FUROSEMIDE 10 MG/ML
20 INJECTION INTRAMUSCULAR; INTRAVENOUS
Status: COMPLETED | OUTPATIENT
Start: 2022-05-02 | End: 2022-05-02

## 2022-05-02 RX ADMIN — NITROGLYCERIN 0.4 MG: 0.4 TABLET SUBLINGUAL at 10:05

## 2022-05-02 RX ADMIN — ASPIRIN 325 MG ORAL TABLET 325 MG: 325 PILL ORAL at 10:05

## 2022-05-02 RX ADMIN — PANTOPRAZOLE SODIUM 40 MG: 40 INJECTION, POWDER, FOR SOLUTION INTRAVENOUS at 10:05

## 2022-05-02 RX ADMIN — IOPAMIDOL 100 ML: 755 INJECTION, SOLUTION INTRAVENOUS at 10:05

## 2022-05-02 RX ADMIN — FUROSEMIDE 20 MG: 10 INJECTION, SOLUTION INTRAMUSCULAR; INTRAVENOUS at 11:05

## 2022-05-02 NOTE — ED PROVIDER NOTES
Encounter Date: 5/2/2022       History     Chief Complaint   Patient presents with    Shortness of Breath     Having trouble breathing     PT IS A 52 YR OLD BM WITH DYSPNEA  (DIFFERENT THAN CP PT NOTED 4/19/2022)  NOTED TODAY. PT STATES HE NOTED EDEMA AND HAS BEEN IN BED FOR 2 DAYS WITH FEET ELEVATED WITH IMPROVEMENT.  PT DENIES FEVER, COUGH, HEMOPTYSIS, SYNCOPE OR INCREASING EDEMA.  PT IS S/P MI WITH CAD WITH CARDIAC STENT LAD ON 4/19/2022  AT [facility] PER DR POPE  PT CONTINUES TO SMOKE UNFORTUNATELY.  PT HAS HXCHF, DM2, HTN, PANCREATITIS, RENAL FAILURE AND NONCOMPLIANCE.        Review of patient's allergies indicates:  No Known Allergies  Past Medical History:   Diagnosis Date    Cardiomegaly     CHF (congestive heart failure)     Coronary artery disease     Diabetes mellitus     Hypertension     Irregular heart beat     Myocardial infarction     Pancreatitis     Renal failure      Past Surgical History:   Procedure Laterality Date    CARDIAC SURGERY      Stents x2    UNDESCENDED TESTICLE EXPLORATION N/A      Family History   Problem Relation Age of Onset    Heart disease Father     Hypertension Father     Heart disease Brother     Heart disease Maternal Grandmother      Social History     Tobacco Use    Smoking status: Current Some Day Smoker     Packs/day: 0.25     Years: 18.00     Pack years: 4.50     Types: Cigarettes     Start date: 2004    Smokeless tobacco: Never Used   Substance Use Topics    Alcohol use: Yes     Comment: occasional drink previous heavy drinker quit heavly in 2001    Drug use: Never     Review of Systems   Constitutional: Positive for activity change (IN BED FOR 2 DAYS) and fatigue. Negative for fever.   HENT: Negative.  Negative for sore throat.    Eyes: Negative.    Respiratory: Positive for chest tightness. Negative for cough, choking and shortness of breath.    Cardiovascular: Negative for chest pain.   Gastrointestinal: Negative.  Negative for abdominal  distention, abdominal pain, diarrhea, nausea and vomiting.   Endocrine: Negative.    Genitourinary: Negative for dysuria.   Musculoskeletal: Negative for arthralgias and back pain.   Skin: Negative for rash.   Allergic/Immunologic: Positive for immunocompromised state (DM2).   Neurological: Negative for weakness.   Hematological: Does not bruise/bleed easily.   Psychiatric/Behavioral: Negative for suicidal ideas. The patient is nervous/anxious.        Physical Exam     Initial Vitals   BP Pulse Resp Temp SpO2   05/02/22 0918 05/02/22 0908 05/02/22 0908 05/02/22 0908 05/02/22 0908   (!) 193/87 69 20 98 °F (36.7 °C) 96 %      MAP       --                Physical Exam    Constitutional: He appears well-developed and well-nourished. He is cooperative. He appears distressed.   HENT:   Head: Normocephalic and atraumatic.   Eyes: Conjunctivae and EOM are normal. Pupils are equal, round, and reactive to light.   Neck: Trachea normal. Neck supple.   Normal range of motion.  Cardiovascular: Normal rate, regular rhythm, normal heart sounds and intact distal pulses.   No murmur heard.  Pulses:       Radial pulses are 3+ on the right side and 3+ on the left side.        Dorsalis pedis pulses are 3+ on the right side and 3+ on the left side.   Pulmonary/Chest: Breath sounds normal. No respiratory distress. He has no wheezes. He has no rhonchi. He has no rales. He exhibits no tenderness.   Abdominal: Abdomen is soft. Bowel sounds are normal. He exhibits no distension. There is no abdominal tenderness. There is no guarding.   Musculoskeletal:         General: No tenderness or edema. Normal range of motion.      Right shoulder: Normal.      Left shoulder: Normal.      Right upper arm: Normal.      Left upper arm: Normal.      Right elbow: Normal.      Left elbow: Normal.      Right forearm: Normal.      Left forearm: Normal.      Right wrist: Normal.      Left wrist: Normal.      Right hand: Normal.      Left hand: Normal.       Cervical back: Normal range of motion and neck supple.     Lymphadenopathy:     He has no cervical adenopathy.     He has no axillary adenopathy.   Neurological: He is alert and oriented to person, place, and time. He has normal strength. No cranial nerve deficit or sensory deficit. He displays a negative Romberg sign. GCS eye subscore is 4. GCS verbal subscore is 5. GCS motor subscore is 6.   Reflex Scores:       Patellar reflexes are 2+ on the right side and 2+ on the left side.  Skin: Skin is warm and dry. Capillary refill takes less than 2 seconds. No erythema.   Psychiatric: He has a normal mood and affect. His speech is normal and behavior is normal. Judgment and thought content normal. Cognition and memory are normal.         Medical Screening Exam   See Full Note    ED Course   Lac Repair    Date/Time: 5/2/2022 11:55 AM  Performed by: Janet Bautista MD  Authorized by: Janet Bautista MD     Consent:     Consent obtained:  Verbal    Consent given by:  Patient    Risks, benefits, and alternatives were discussed: yes      Risks discussed:  Infection, need for additional repair and tendon damage    Alternatives discussed:  Referral  Horace protocol:     Procedure explained and questions answered to patient or proxy's satisfaction: yes      Relevant documents present and verified: yes      Test results available: yes      Imaging studies available: yes      Required blood products, implants, devices, and special equipment available: no      Site/side marked: yes      Immediately prior to procedure, a time out was called: yes      Patient identity confirmed:  Verbally with patient  Anesthesia:     Anesthesia method:  Local infiltration    Local anesthetic:  Lidocaine 1% w/o epi  Laceration details:     Length (cm):  25    Depth (mm):  20  Pre-procedure details:     Preparation:  Patient was prepped and draped in usual sterile fashion and imaging obtained to evaluate for foreign bodies  Exploration:      Limited defect created (wound extended): yes      Hemostasis achieved with:  Direct pressure    Imaging obtained: x-ray      Imaging outcome: foreign body noted      Wound exploration: wound explored through full range of motion      Tendon damage: FLEXOR.      Contaminated: yes    Treatment:     Area cleansed with:  Povidone-iodine and saline    Amount of cleaning:  Extensive    Irrigation solution:  Sterile saline    Irrigation volume:  250 ML    Irrigation method:  Pressure wash    Visualized foreign bodies/material removed: no      Debridement:  None    Undermining:  None    Scar revision: no    Comments:      WILL REFER TO PRS FOR EXPLORATION AND TENDON REPAIR  SALINE SOAKED GUAZE DRESSING      Labs Reviewed   COMPREHENSIVE METABOLIC PANEL - Abnormal; Notable for the following components:       Result Value    Anion Gap 19 (*)     Glucose 110 (*)     Bilirubin, Total 1.6 (*)     All other components within normal limits   URINALYSIS - Abnormal; Notable for the following components:    Protein,   (*)     Ketones, UA >=160 (*)     Urobilinogen, UA 2.0 (*)     Bilirubin, UA Small (*)     Blood, UA Large (*)     All other components within normal limits   NT-PRO NATRIURETIC PEPTIDE - Abnormal; Notable for the following components:    ProBNP 462 (*)     All other components within normal limits   D DIMER, QUANTITATIVE - Abnormal; Notable for the following components:    D-Dimer 0.89 (*)     All other components within normal limits   CBC WITH DIFFERENTIAL - Abnormal; Notable for the following components:    RBC 4.02 (*)     Hemoglobin 13.0 (*)     .5 (*)     MCH 32.3 (*)     RDW 14.9 (*)     MPV 9.3 (*)     Neutrophils % 76.2 (*)     Lymphocytes % 17.4 (*)     Eosinophils % 0.4 (*)     All other components within normal limits   URINALYSIS, MICROSCOPIC - Abnormal; Notable for the following components:    RBC, UA 15-25 (*)     Bacteria, UA Occasional (*)     Squamous Epithelial Cells, UA Few (*)     All  other components within normal limits   TROPONIN I - Normal   CBC W/ AUTO DIFFERENTIAL    Narrative:     The following orders were created for panel order CBC auto differential.  Procedure                               Abnormality         Status                     ---------                               -----------         ------                     CBC with Differential[[id number]]        Abnormal            Final result                 Please view results for these tests on the individual orders.   EXTRA TUBES    Narrative:     The following orders were created for panel order EXTRA TUBES.  Procedure                               Abnormality         Status                     ---------                               -----------         ------                     Red Top Hold[[id number]]                                     In process                   Please view results for these tests on the individual orders.   RED TOP HOLD     EKG Readings: (Independently Interpreted)   Initial Reading: No STEMI. Rhythm: Normal Sinus Rhythm. Ectopy: No Ectopy. Conduction: LAFB. T Waves Flipped: AVR, V1 and V2. Q Waves: II, III, AVR, AVF, V1, V2 and V3. Clinical Impression: Normal Sinus Rhythm     ECG Results          EKG 12-lead (Final result)  Result time 05/03/22 21:50:58    Final result by Interface, Lab In [facility] (05/03/22 21:50:58)                 Narrative:    Test Reason : R06.00,    Vent. Rate : 070 BPM     Atrial Rate : 070 BPM     P-R Int : 162 ms          QRS Dur : 082 ms      QT Int : 444 ms       P-R-T Axes : 046 -75 083 degrees     QTc Int : 479 ms    Normal sinus rhythm  Possible Left atrial enlargement  Low voltage QRS  Left anterior fascicular block  Inferior infarct (cited on or before 26-JUN-2021)  Anterolateral infarct (cited on or before 26-JUN-2021)  Abnormal ECG    Confirmed by Reba Guidry MD ([id number]) on 5/3/2022 9:50:48 PM    Referred By: [email or web address]   SELF           Confirmed By:Reba Guidry  MD                            Imaging Results          CTA Chest Non-Coronary (PE Study) (Final result)  Result time 05/02/22 11:07:05    Final result by Naveen Torre II, MD (05/02/22 11:07:05)                 Impression:      No definite pulmonary thromboembolism, enhancement of the pulmonary arteries is limited.  No other definite acute process.      Electronically signed by: Naveen Torre  Date:    05/02/2022  Time:    11:07             Narrative:    EXAMINATION:  CTA CHEST NON CORONARY    CLINICAL HISTORY:  Pulmonary embolism (PE) suspected, positive D-dimer;    TECHNIQUE:  Axial CT imaging of the chest is performed with intravenous contrast. Contrast dose is 100 cc Isovue 370.    CT dose reduction technique used - Dose Rite and tube current modulation.    COMPARISON:  None available    FINDINGS:  There is poor enhancement of the pulmonary arteries.  No definite thrombus or other abnormality is identified in the pulmonary arteries or veins.  The pulmonary vessel caliber is within normal limits.  Large amount of coronary artery calcification present, some of this could be coronary stents.  Otherwise the heart, mediastinum and great vessels appear within normal limits.    Lung parenchyma shows no evidence of airspace disease or abnormal density.  No effusion or pneumothorax is present.                               X-Ray Chest 1 View (Final result)  Result time 05/02/22 09:30:49    Final result by Naveen Torre II, MD (05/02/22 09:30:49)                 Impression:      No evidence of acute cardiopulmonary disease.      Electronically signed by: Naveen Torre  Date:    05/02/2022  Time:    09:30             Narrative:    EXAMINATION:  XR CHEST 1 VIEW    CLINICAL HISTORY:  DYSPNEA;    COMPARISON:  8 March 2022    FINDINGS:  The heart and mediastinum are normal in size and configuration.  The pulmonary vascularity is normal in caliber.  No lung infiltrates, effusions, pneumothorax or other abnormality  is demonstrated.                              X-Rays:   Independently Interpreted Readings:   Chest X-Ray: Normal heart size.  No infiltrates.  No acute abnormalities.   Other Readings:  CT KYLAH NEG FOR PE AND OTHERWISE NEGATIVE    Medications   nitroGLYCERIN SL tablet 0.4 mg (0.4 mg Sublingual Given 5/2/22 1006)   nitroGLYCERIN SL tablet 0.4 mg (0.4 mg Sublingual Given 5/2/22 1017)   nitroGLYCERIN SL tablet 0.4 mg (0.4 mg Sublingual Given 5/2/22 1026)   aspirin tablet 325 mg (325 mg Oral Given 5/2/22 1029)   pantoprazole injection 40 mg (40 mg Intravenous Given 5/2/22 1043)   iopamidoL (ISOVUE-370) injection 100 mL (100 mLs Intravenous Given 5/2/22 1050)   furosemide injection 20 mg (20 mg Intravenous Given 5/2/22 1109)     Medical Decision Making:   Clinical Tests:   Lab Tests: Ordered and Reviewed  The following lab test(s) were unremarkable: CBC  Radiological Study: Ordered and Reviewed  Medical Tests: Ordered and Reviewed  ED Management:  PT HAS CHEST TIGHTNESS AND DYSPNEA WITH RECENT CARDIAC STENT AND HX CHF  PT CONTINUES TO SMOKE  PT IS IMPROVED POST 3 SL NTG.  PT WILL BE TRANSFERRED TO [facility] FOR EVALUATION AND IM/CARDIOLOGY CONSULTATIONS  DR MAYFIELD ACCEPTS FOR ED TO ED TRANSFER  PT IS STABLE FOR TRANSFER PER EMS WITH CARDIAC MONITORING                   Clinical Impression:   Final diagnoses:  [R06.00] Dyspnea  [R07.9] Chest pain, unspecified type (Primary)  [I50.9] Congestive heart failure, unspecified HF chronicity, unspecified heart failure type  [I10] Hypertension, unspecified type  [I25.10] CAD, multiple vessel          ED Disposition Condition    Transfer to Another Facility Stable              Janet Bautista MD  05/02/22 1210       Janet Bautista MD  07/28/22 2120

## 2022-05-10 DIAGNOSIS — Z71.89 COMPLEX CARE COORDINATION: ICD-10-CM

## 2022-09-18 ENCOUNTER — HOSPITAL ENCOUNTER (EMERGENCY)
Facility: HOSPITAL | Age: 52
Discharge: OTHER INSTITUTION | End: 2022-09-19
Payer: MEDICARE

## 2022-09-18 DIAGNOSIS — E83.42 HYPOMAGNESEMIA: ICD-10-CM

## 2022-09-18 DIAGNOSIS — I16.0 HYPERTENSIVE URGENCY: ICD-10-CM

## 2022-09-18 DIAGNOSIS — K85.90 ACUTE PANCREATITIS, UNSPECIFIED COMPLICATION STATUS, UNSPECIFIED PANCREATITIS TYPE: Primary | ICD-10-CM

## 2022-09-18 DIAGNOSIS — R10.13 EPIGASTRIC PAIN: ICD-10-CM

## 2022-09-18 LAB
ALBUMIN SERPL BCP-MCNC: 4.2 G/DL (ref 3.5–5)
ALBUMIN/GLOB SERPL: 1 {RATIO}
ALP SERPL-CCNC: 76 U/L (ref 45–115)
ALT SERPL W P-5'-P-CCNC: 110 U/L (ref 16–61)
AMPHET UR QL SCN: NEGATIVE
AMYLASE SERPL-CCNC: 129 U/L (ref 25–115)
ANION GAP SERPL CALCULATED.3IONS-SCNC: 20 MMOL/L (ref 7–16)
APTT PPP: 28.2 SECONDS (ref 25.2–37.3)
AST SERPL W P-5'-P-CCNC: 155 U/L (ref 15–37)
BACTERIA #/AREA URNS HPF: ABNORMAL /HPF
BARBITURATES UR QL SCN: NEGATIVE
BASOPHILS # BLD AUTO: 0.02 K/UL (ref 0–0.2)
BASOPHILS NFR BLD AUTO: 0.3 % (ref 0–1)
BENZODIAZ METAB UR QL SCN: NEGATIVE
BILIRUB SERPL-MCNC: 2.1 MG/DL (ref ?–1.2)
BILIRUB UR QL STRIP: ABNORMAL
BUN SERPL-MCNC: 7 MG/DL (ref 7–18)
BUN/CREAT SERPL: 7 (ref 6–20)
CALCIUM SERPL-MCNC: 9.8 MG/DL (ref 8.5–10.1)
CANNABINOIDS UR QL SCN: NEGATIVE
CHLORIDE SERPL-SCNC: 93 MMOL/L (ref 98–107)
CLARITY UR: ABNORMAL
CO2 SERPL-SCNC: 26 MMOL/L (ref 21–32)
COCAINE UR QL SCN: NEGATIVE
COLOR UR: ABNORMAL
CREAT SERPL-MCNC: 0.99 MG/DL (ref 0.7–1.3)
DIFFERENTIAL METHOD BLD: ABNORMAL
EGFR (NO RACE VARIABLE) (RUSH/TITUS): 91 ML/MIN/1.73M²
EOSINOPHIL # BLD AUTO: 0.01 K/UL (ref 0–0.5)
EOSINOPHIL NFR BLD AUTO: 0.1 % (ref 1–4)
ERYTHROCYTE [DISTWIDTH] IN BLOOD BY AUTOMATED COUNT: 14.9 % (ref 11.5–14.5)
ETHANOL, BLOOD (CATEGORY): NOT DETECTED
GLOBULIN SER-MCNC: 4.2 G/DL (ref 2–4)
GLUCOSE SERPL-MCNC: 137 MG/DL (ref 74–106)
GLUCOSE UR STRIP-MCNC: NEGATIVE MG/DL
HCT VFR BLD AUTO: 44.8 % (ref 40–54)
HGB BLD-MCNC: 16.2 G/DL (ref 13.5–18)
INR BLD: 0.93
KETONES UR STRIP-SCNC: 80 MG/DL
LACTATE SERPL-SCNC: 1.1 MMOL/L (ref 0.4–2)
LACTATE SERPL-SCNC: 2.9 MMOL/L (ref 0.4–2)
LEUKOCYTE ESTERASE UR QL STRIP: NEGATIVE
LIPASE SERPL-CCNC: 1430 U/L (ref 73–393)
LYMPHOCYTES # BLD AUTO: 0.96 K/UL (ref 1–4.8)
LYMPHOCYTES NFR BLD AUTO: 13.3 % (ref 27–41)
MAGNESIUM SERPL-MCNC: 1.1 MG/DL (ref 1.7–2.3)
MCH RBC QN AUTO: 33.5 PG (ref 27–31)
MCHC RBC AUTO-ENTMCNC: 36.2 G/DL (ref 32–36)
MCV RBC AUTO: 92.6 FL (ref 80–96)
MONOCYTES # BLD AUTO: 0.4 K/UL (ref 0–0.8)
MONOCYTES NFR BLD AUTO: 5.5 % (ref 2–6)
MPC BLD CALC-MCNC: 10.8 FL (ref 9.4–12.4)
NEUTROPHILS # BLD AUTO: 5.84 K/UL (ref 1.8–7.7)
NEUTROPHILS NFR BLD AUTO: 80.8 % (ref 53–65)
NITRITE UR QL STRIP: NEGATIVE
NT-PROBNP SERPL-MCNC: 246 PG/ML (ref 1–125)
OPIATES UR QL SCN: POSITIVE
PCP UR QL SCN: NEGATIVE
PH UR STRIP: 7 PH UNITS
PLATELET # BLD AUTO: 103 K/UL (ref 150–400)
POTASSIUM SERPL-SCNC: 4.1 MMOL/L (ref 3.5–5.1)
PROT SERPL-MCNC: 8.4 G/DL (ref 6.4–8.2)
PROT UR QL STRIP: >=300
PROTHROMBIN TIME: 12.4 SECONDS (ref 11.7–14.7)
RBC # BLD AUTO: 4.84 M/UL (ref 4.6–6.2)
RBC # UR STRIP: ABNORMAL /UL
RBC #/AREA URNS HPF: ABNORMAL /HPF
SODIUM SERPL-SCNC: 135 MMOL/L (ref 136–145)
SP GR UR STRIP: 1.02
SQUAMOUS #/AREA URNS LPF: ABNORMAL /LPF
TROPONIN I SERPL HS-MCNC: 22.9 PG/ML
UROBILINOGEN UR STRIP-ACNC: 4 MG/DL
WBC # BLD AUTO: 7.23 K/UL (ref 4.5–11)
WBC #/AREA URNS HPF: ABNORMAL /HPF

## 2022-09-18 PROCEDURE — 82150 ASSAY OF AMYLASE: CPT | Performed by: NURSE PRACTITIONER

## 2022-09-18 PROCEDURE — 36415 COLL VENOUS BLD VENIPUNCTURE: CPT | Performed by: NURSE PRACTITIONER

## 2022-09-18 PROCEDURE — 96376 TX/PRO/DX INJ SAME DRUG ADON: CPT

## 2022-09-18 PROCEDURE — 93010 ELECTROCARDIOGRAM REPORT: CPT | Performed by: HOSPITALIST

## 2022-09-18 PROCEDURE — 96361 HYDRATE IV INFUSION ADD-ON: CPT

## 2022-09-18 PROCEDURE — 96368 THER/DIAG CONCURRENT INF: CPT

## 2022-09-18 PROCEDURE — 85730 THROMBOPLASTIN TIME PARTIAL: CPT | Performed by: NURSE PRACTITIONER

## 2022-09-18 PROCEDURE — 85610 PROTHROMBIN TIME: CPT | Performed by: NURSE PRACTITIONER

## 2022-09-18 PROCEDURE — 93005 ELECTROCARDIOGRAM TRACING: CPT

## 2022-09-18 PROCEDURE — 83880 ASSAY OF NATRIURETIC PEPTIDE: CPT | Performed by: NURSE PRACTITIONER

## 2022-09-18 PROCEDURE — 83735 ASSAY OF MAGNESIUM: CPT | Performed by: NURSE PRACTITIONER

## 2022-09-18 PROCEDURE — 25500020 PHARM REV CODE 255: Performed by: NURSE PRACTITIONER

## 2022-09-18 PROCEDURE — 96366 THER/PROPH/DIAG IV INF ADDON: CPT

## 2022-09-18 PROCEDURE — 25000003 PHARM REV CODE 250: Performed by: NURSE PRACTITIONER

## 2022-09-18 PROCEDURE — 96375 TX/PRO/DX INJ NEW DRUG ADDON: CPT

## 2022-09-18 PROCEDURE — 99285 EMERGENCY DEPT VISIT HI MDM: CPT | Mod: GF | Performed by: NURSE PRACTITIONER

## 2022-09-18 PROCEDURE — 85025 COMPLETE CBC W/AUTO DIFF WBC: CPT | Performed by: NURSE PRACTITIONER

## 2022-09-18 PROCEDURE — 80053 COMPREHEN METABOLIC PANEL: CPT | Performed by: NURSE PRACTITIONER

## 2022-09-18 PROCEDURE — 99285 EMERGENCY DEPT VISIT HI MDM: CPT | Mod: 25

## 2022-09-18 PROCEDURE — 96365 THER/PROPH/DIAG IV INF INIT: CPT | Mod: 59

## 2022-09-18 PROCEDURE — 80307 DRUG TEST PRSMV CHEM ANLYZR: CPT | Performed by: NURSE PRACTITIONER

## 2022-09-18 PROCEDURE — 83690 ASSAY OF LIPASE: CPT | Performed by: NURSE PRACTITIONER

## 2022-09-18 PROCEDURE — 83605 ASSAY OF LACTIC ACID: CPT | Performed by: NURSE PRACTITIONER

## 2022-09-18 PROCEDURE — 63600175 PHARM REV CODE 636 W HCPCS: Performed by: NURSE PRACTITIONER

## 2022-09-18 PROCEDURE — 82077 ASSAY SPEC XCP UR&BREATH IA: CPT | Performed by: NURSE PRACTITIONER

## 2022-09-18 PROCEDURE — 84484 ASSAY OF TROPONIN QUANT: CPT | Performed by: NURSE PRACTITIONER

## 2022-09-18 PROCEDURE — 96367 TX/PROPH/DG ADDL SEQ IV INF: CPT

## 2022-09-18 PROCEDURE — 81001 URINALYSIS AUTO W/SCOPE: CPT | Performed by: NURSE PRACTITIONER

## 2022-09-18 RX ORDER — MAGNESIUM SULFATE HEPTAHYDRATE 40 MG/ML
2 INJECTION, SOLUTION INTRAVENOUS
Status: COMPLETED | OUTPATIENT
Start: 2022-09-18 | End: 2022-09-19

## 2022-09-18 RX ORDER — MAGNESIUM SULFATE HEPTAHYDRATE 40 MG/ML
2 INJECTION, SOLUTION INTRAVENOUS
Status: COMPLETED | OUTPATIENT
Start: 2022-09-18 | End: 2022-09-18

## 2022-09-18 RX ORDER — HYDROMORPHONE HYDROCHLORIDE 2 MG/ML
1 INJECTION, SOLUTION INTRAMUSCULAR; INTRAVENOUS; SUBCUTANEOUS
Status: COMPLETED | OUTPATIENT
Start: 2022-09-18 | End: 2022-09-18

## 2022-09-18 RX ORDER — ISOSORBIDE MONONITRATE 30 MG/1
30 TABLET, EXTENDED RELEASE ORAL DAILY
COMMUNITY
End: 2022-10-04 | Stop reason: SDUPTHER

## 2022-09-18 RX ORDER — HYDRALAZINE HYDROCHLORIDE 20 MG/ML
10 INJECTION INTRAMUSCULAR; INTRAVENOUS
Status: COMPLETED | OUTPATIENT
Start: 2022-09-18 | End: 2022-09-18

## 2022-09-18 RX ORDER — MORPHINE SULFATE 4 MG/ML
4 INJECTION, SOLUTION INTRAMUSCULAR; INTRAVENOUS
Status: COMPLETED | OUTPATIENT
Start: 2022-09-18 | End: 2022-09-18

## 2022-09-18 RX ORDER — ONDANSETRON 2 MG/ML
4 INJECTION INTRAMUSCULAR; INTRAVENOUS
Status: COMPLETED | OUTPATIENT
Start: 2022-09-18 | End: 2022-09-18

## 2022-09-18 RX ADMIN — IOPAMIDOL 100 ML: 755 INJECTION, SOLUTION INTRAVENOUS at 09:09

## 2022-09-18 RX ADMIN — SODIUM CHLORIDE 1000 ML: 9 INJECTION, SOLUTION INTRAVENOUS at 08:09

## 2022-09-18 RX ADMIN — PIPERACILLIN AND TAZOBACTAM 4.5 G: 4; .5 INJECTION, POWDER, LYOPHILIZED, FOR SOLUTION INTRAVENOUS at 09:09

## 2022-09-18 RX ADMIN — ONDANSETRON 4 MG: 2 INJECTION INTRAMUSCULAR; INTRAVENOUS at 08:09

## 2022-09-18 RX ADMIN — MAGNESIUM SULFATE HEPTAHYDRATE 2 G: 40 INJECTION, SOLUTION INTRAVENOUS at 09:09

## 2022-09-18 RX ADMIN — MORPHINE SULFATE 4 MG: 4 INJECTION INTRAVENOUS at 08:09

## 2022-09-18 RX ADMIN — SODIUM CHLORIDE 1000 ML: 9 INJECTION, SOLUTION INTRAVENOUS at 10:09

## 2022-09-18 RX ADMIN — MAGNESIUM SULFATE HEPTAHYDRATE 2 G: 40 INJECTION, SOLUTION INTRAVENOUS at 10:09

## 2022-09-18 RX ADMIN — PROMETHAZINE HYDROCHLORIDE 25 MG: 25 INJECTION INTRAMUSCULAR; INTRAVENOUS at 10:09

## 2022-09-18 RX ADMIN — HYDROMORPHONE HYDROCHLORIDE 1 MG: 2 INJECTION, SOLUTION INTRAMUSCULAR; INTRAVENOUS; SUBCUTANEOUS at 10:09

## 2022-09-18 RX ADMIN — HYDRALAZINE HYDROCHLORIDE 10 MG: 20 INJECTION INTRAMUSCULAR; INTRAVENOUS at 09:09

## 2022-09-18 RX ADMIN — HYDROMORPHONE HYDROCHLORIDE 1 MG: 2 INJECTION, SOLUTION INTRAMUSCULAR; INTRAVENOUS; SUBCUTANEOUS at 08:09

## 2022-09-19 VITALS
SYSTOLIC BLOOD PRESSURE: 160 MMHG | RESPIRATION RATE: 18 BRPM | OXYGEN SATURATION: 98 % | TEMPERATURE: 98 F | HEART RATE: 90 BPM | BODY MASS INDEX: 36.8 KG/M2 | HEIGHT: 66 IN | DIASTOLIC BLOOD PRESSURE: 107 MMHG | WEIGHT: 229 LBS

## 2022-09-19 PROCEDURE — 63600175 PHARM REV CODE 636 W HCPCS: Performed by: NURSE PRACTITIONER

## 2022-09-19 RX ORDER — HYDROMORPHONE HYDROCHLORIDE 2 MG/ML
1 INJECTION, SOLUTION INTRAMUSCULAR; INTRAVENOUS; SUBCUTANEOUS
Status: COMPLETED | OUTPATIENT
Start: 2022-09-19 | End: 2022-09-19

## 2022-09-19 RX ADMIN — HYDROMORPHONE HYDROCHLORIDE 1 MG: 2 INJECTION, SOLUTION INTRAMUSCULAR; INTRAVENOUS; SUBCUTANEOUS at 12:09

## 2022-09-19 NOTE — ED PROVIDER NOTES
Encounter Date: 9/18/2022       History     Chief Complaint   Patient presents with    Abdominal Pain     Presented with c/o episgastric pain that radiates through to back, nausea and vomiting that started 2 days ago and has worsened. States has PMH of bouts of acute pancreatitis and these symptoms feel like another episode. States when he first felt ill, he started on clear liquid diet in hopes that it would subside but it just worsened. States has vomited more than 10 times today and is just dry heaving now. Denies diarrhea or constipation. Denies chest pain or dyspnea. Denies ETOH use. PMH of hyperlipidemia and DM taking fenofibrate and statin.    Review of patient's allergies indicates:  No Known Allergies  Past Medical History:   Diagnosis Date    Cardiomegaly     CHF (congestive heart failure)     Coronary artery disease     Diabetes mellitus     Hypertension     Irregular heart beat     Myocardial infarction     Pancreatitis     Renal failure      Past Surgical History:   Procedure Laterality Date    CARDIAC SURGERY      Stents x2    UNDESCENDED TESTICLE EXPLORATION N/A      Family History   Problem Relation Age of Onset    Heart disease Father     Hypertension Father     Heart disease Brother     Heart disease Maternal Grandmother      Social History     Tobacco Use    Smoking status: Some Days     Packs/day: 0.25     Years: 18.00     Pack years: 4.50     Types: Cigarettes     Start date: 2004    Smokeless tobacco: Never   Substance Use Topics    Alcohol use: Yes     Comment: occasional drink previous heavy drinker quit heavly in 2001    Drug use: Never     Review of Systems   Constitutional:  Positive for activity change, appetite change and fatigue. Negative for chills and fever.   HENT: Negative.     Eyes: Negative.    Respiratory: Negative.  Negative for cough, chest tightness, shortness of breath and wheezing.    Cardiovascular: Negative.  Negative for chest pain, palpitations and leg swelling.    Gastrointestinal:  Positive for abdominal pain, nausea and vomiting. Negative for constipation and diarrhea.   Genitourinary: Negative.  Negative for difficulty urinating, dysuria and urgency.   Musculoskeletal: Negative.    Skin: Negative.    Neurological:  Negative for dizziness and light-headedness.   Psychiatric/Behavioral: Negative.       Physical Exam     Initial Vitals [09/18/22 1945]   BP Pulse Resp Temp SpO2   (!) 229/120 (!) 125 (!) 22 98.3 °F (36.8 °C) 98 %      MAP       --         Physical Exam    Nursing note and vitals reviewed.  Constitutional: He appears well-developed and well-nourished. He appears distressed (with pain and nausea).   HENT:   Head: Normocephalic.   Right Ear: External ear normal.   Left Ear: External ear normal.   Nose: Nose normal.   Mouth/Throat: Mucous membranes are dry.   Eyes: Conjunctivae are normal. Pupils are equal, round, and reactive to light.   Neck: Neck supple.   Normal range of motion.  Cardiovascular:  Normal rate, regular rhythm, normal heart sounds and intact distal pulses.           Pulmonary/Chest: Breath sounds normal. No respiratory distress. He has no wheezes. He has no rhonchi. He has no rales.   Abdominal: Abdomen is soft. Bowel sounds are normal. He exhibits distension. There is abdominal tenderness (epigastric and RUQ). There is no rebound and no guarding.   Musculoskeletal:         General: No tenderness or edema. Normal range of motion.      Cervical back: Normal range of motion and neck supple.     Neurological: He is alert and oriented to person, place, and time. He has normal strength. GCS score is 15. GCS eye subscore is 4. GCS verbal subscore is 5. GCS motor subscore is 6.   Skin: Skin is warm and dry. Capillary refill takes less than 2 seconds.   Psychiatric: He has a normal mood and affect.       Medical Screening Exam   See Full Note    ED Course   Procedures  Labs Reviewed   COMPREHENSIVE METABOLIC PANEL - Abnormal; Notable for the following  components:       Result Value    Sodium 135 (*)     Chloride 93 (*)     Anion Gap 20 (*)     Glucose 137 (*)     Total Protein 8.4 (*)     Globulin 4.2 (*)     Bilirubin, Total 2.1 (*)      (*)      (*)     All other components within normal limits   URINALYSIS, REFLEX TO URINE CULTURE - Abnormal; Notable for the following components:    Protein, UA >=300 (*)     Ketones, UA 80 (*)     Urobilinogen, UA 4.0 (*)     Bilirubin, UA Moderate (*)     Blood, UA Moderate (*)     All other components within normal limits   AMYLASE - Abnormal; Notable for the following components:    Amylase 129 (*)     All other components within normal limits   LIPASE - Abnormal; Notable for the following components:    Lipase 1,430 (*)     All other components within normal limits   MAGNESIUM - Abnormal; Notable for the following components:    Magnesium 1.1 (*)     All other components within normal limits   DRUG SCREEN, URINE (BEAKER) - Abnormal; Notable for the following components:    Opiates, Urine Positive (*)     All other components within normal limits    Narrative:     The results of screening tests should be considered presumptive. Confirmatory testing is available upon request.    Cutoff Points:  PCP:         25ng/mL  AMPH:        500ng/mL  AICHA:        200ng/mL  JANET:        200ng/mL  THC:         50ng/mL  CHAYITO:         300ng/mL  OPI:         2000ng/mL   LACTIC ACID, PLASMA - Abnormal; Notable for the following components:    Lactic Acid 2.9 (*)     All other components within normal limits   CBC WITH DIFFERENTIAL - Abnormal; Notable for the following components:    MCH 33.5 (*)     MCHC 36.2 (*)     RDW 14.9 (*)     Platelet Count 103 (*)     Neutrophils % 80.8 (*)     Lymphocytes % 13.3 (*)     Lymphocytes, Absolute 0.96 (*)     Eosinophils % 0.1 (*)     All other components within normal limits   NT-PRO NATRIURETIC PEPTIDE - Abnormal; Notable for the following components:    ProBNP 246 (*)     All other  components within normal limits   URINALYSIS, MICROSCOPIC - Abnormal; Notable for the following components:    RBC, UA 5-10 (*)     All other components within normal limits   TROPONIN I - Normal   PROTIME-INR - Normal   APTT - Normal   LACTIC ACID, PLASMA - Normal   CBC W/ AUTO DIFFERENTIAL    Narrative:     The following orders were created for panel order CBC auto differential.  Procedure                               Abnormality         Status                     ---------                               -----------         ------                     CBC with Differential[[id number]]        Abnormal            Final result                 Please view results for these tests on the individual orders.   ALCOHOL,MEDICAL (ETHANOL)     EKG Readings: (Independently Interpreted)   Initial Reading: No STEMI. Previous EKG: Compared with most recent EKG Previous EKG Date: 5/2/22. Rhythm: Sinus Tachycardia. Heart Rate: 115.   Reviewed at 2003.   ECG Results              EKG 12-lead (In process)  Result time 09/18/22 20:24:25      In process by Interface, Lab In [facility] (09/18/22 20:24:25)                   Narrative:    Test Reason : R10.13,    Vent. Rate : 115 BPM     Atrial Rate : 115 BPM     P-R Int : 158 ms          QRS Dur : 084 ms      QT Int : 332 ms       P-R-T Axes : 067 263 080 degrees     QTc Int : 459 ms    Sinus tachycardia  Possible Left atrial enlargement  Inferior infarct (cited on or before 26-JUN-2021)  Anterolateral infarct (cited on or before 26-JUN-2021)  Abnormal ECG  When compared with ECG of 02-MAY-2022 09:26,  Vent. rate has increased BY  45 BPM  Left anterior fascicular block is no longer Present  Questionable change in initial forces of Lateral leads  Nonspecific T wave abnormality no longer evident in Anterior-lateral leads    Referred By: [email or web address]   SELF           Confirmed By:                                   Imaging Results              CT Abdomen Pelvis With Contrast (In process)                    X-Rays:   Independently Interpreted Readings:   Abdomen: Peripancreatic, mesenteric soft tissue stranding and/or edema along with pancreatic head edema. No discrete or defined fluid collection is seen to suggest cyst or abscess. Mild fatter liver. No significant abnormality and without identification of calcification or gall stone. No biliary ductal dilation. Right renal cyst.   Medications   magnesium sulfate 2g in water 50mL IVPB (premix) (2 g Intravenous New Bag 9/18/22 2256)   sodium chloride 0.9% bolus 1,000 mL (0 mLs Intravenous Stopped 9/18/22 2137)   ondansetron injection 4 mg (4 mg Intravenous Given 9/18/22 2010)   morphine injection 4 mg (4 mg Intravenous Given 9/18/22 2011)   HYDROmorphone (PF) injection 1 mg (1 mg Intravenous Given 9/18/22 2059)   magnesium sulfate 2g in water 50mL IVPB (premix) (0 g Intravenous Stopped 9/18/22 2255)   piperacillin-tazobactam (ZOSYN) 4.5 g in sodium chloride 0.9 % 100 mL IVPB (MB+) (0 g Intravenous Stopped 9/18/22 2219)   sodium chloride 0.9% bolus 1,000 mL (0 mLs Intravenous Stopped 9/18/22 2309)   iopamidoL (ISOVUE-370) injection 100 mL (100 mLs Intravenous Given 9/18/22 2126)   hydrALAZINE injection 10 mg (10 mg Intravenous Given 9/18/22 2149)   promethazine (PHENERGAN) 25 mg in dextrose 5 % 50 mL IVPB (0 mg Intravenous Stopped 9/18/22 2248)   HYDROmorphone (PF) injection 1 mg (1 mg Intravenous Given 9/18/22 2226)     Medical Decision Making:   ED Management:  Pt's lipase 1430 with , , BR 2.1. Bun 7, creatinine 0.99. K+ 4.1, Mg 1.1, WBC 7260, H&H 16 and 44.  BP upon arrival 207/132. States has been taking BP meds but BP usually goes high with his pain. Morphine 4 mg IVP with ondansetron IV given. Pain persists. Dilaudid 1 mg IVP given. Pain improved to 5/10 but nausea restarted. Phenergan 25 mg IV given and dilaudid repeated. Pain and nausea have improved. Hydralazine 10 mg IVP for BP. BP improved to 168/93, . Pt has had 2 L/NS. Magnesium  4 Gm IVPB. Zosyn 4.5Gm IVPB.    PFC order placed. Discussed pt's case with Dr. Kim, [facility]. He did not accept patient due to possible need for ERCP. Pt was accepted by Dr. Quintana, ED physician at [facility] ED for transfer. Informed pt of transfer and he is in agreement.                 Clinical Impression:   Final diagnoses:  [R10.13] Epigastric pain  [K85.90] Acute pancreatitis, unspecified complication status, unspecified pancreatitis type (Primary)  [E83.42] Hypomagnesemia  [I16.0] Hypertensive urgency        ED Disposition Condition    Transfer to Another Facility Stable                Sadia Mcpherson NP  09/18/22 3905

## 2022-09-19 NOTE — ED NOTES
PATIENT AMBULATORY TO ER 1 WITH C/O ABDOMINAL PAIN FOR TWO DAYS AND VOMITING. PATIENT STATES THAT IT IS HIS PANCREATITIS FLARED UP. PATIENT RATES HIS PAIN AT 9.5/10.

## 2022-10-04 ENCOUNTER — OFFICE VISIT (OUTPATIENT)
Dept: FAMILY MEDICINE | Facility: CLINIC | Age: 52
End: 2022-10-04
Payer: MEDICARE

## 2022-10-04 VITALS
HEART RATE: 73 BPM | BODY MASS INDEX: 36.8 KG/M2 | HEIGHT: 66 IN | SYSTOLIC BLOOD PRESSURE: 199 MMHG | WEIGHT: 229 LBS | DIASTOLIC BLOOD PRESSURE: 111 MMHG

## 2022-10-04 DIAGNOSIS — I10 PRIMARY HYPERTENSION: ICD-10-CM

## 2022-10-04 DIAGNOSIS — I50.9 CONGESTIVE HEART FAILURE, UNSPECIFIED HF CHRONICITY, UNSPECIFIED HEART FAILURE TYPE: ICD-10-CM

## 2022-10-04 DIAGNOSIS — I50.22 CHRONIC SYSTOLIC CONGESTIVE HEART FAILURE: Chronic | ICD-10-CM

## 2022-10-04 DIAGNOSIS — I25.10 CORONARY ARTERY DISEASE INVOLVING NATIVE CORONARY ARTERY OF NATIVE HEART, UNSPECIFIED WHETHER ANGINA PRESENT: ICD-10-CM

## 2022-10-04 DIAGNOSIS — Z90.49 HISTORY OF CHOLECYSTECTOMY: Primary | ICD-10-CM

## 2022-10-04 DIAGNOSIS — E11.9 TYPE 2 DIABETES MELLITUS WITHOUT COMPLICATION, WITHOUT LONG-TERM CURRENT USE OF INSULIN: Chronic | ICD-10-CM

## 2022-10-04 DIAGNOSIS — E78.2 MIXED HYPERLIPIDEMIA: ICD-10-CM

## 2022-10-04 PROCEDURE — 99214 PR OFFICE/OUTPT VISIT, EST, LEVL IV, 30-39 MIN: ICD-10-PCS | Mod: ,,, | Performed by: FAMILY MEDICINE

## 2022-10-04 PROCEDURE — 99214 OFFICE O/P EST MOD 30 MIN: CPT | Mod: ,,, | Performed by: FAMILY MEDICINE

## 2022-10-04 RX ORDER — CARVEDILOL 12.5 MG/1
12.5 TABLET ORAL
Qty: 90 TABLET | Refills: 1 | Status: ON HOLD | OUTPATIENT
Start: 2022-10-04 | End: 2024-02-20

## 2022-10-04 RX ORDER — FENOFIBRATE 145 MG/1
145 TABLET, FILM COATED ORAL DAILY
Qty: 90 TABLET | Refills: 1 | Status: ON HOLD | OUTPATIENT
Start: 2022-10-04 | End: 2023-12-09 | Stop reason: HOSPADM

## 2022-10-04 RX ORDER — NAPROXEN SODIUM 220 MG/1
81 TABLET, FILM COATED ORAL DAILY
Qty: 90 TABLET | Refills: 1 | Status: SHIPPED | OUTPATIENT
Start: 2022-10-04

## 2022-10-04 RX ORDER — FUROSEMIDE 20 MG/1
20 TABLET ORAL DAILY
Qty: 90 TABLET | Refills: 1 | Status: ON HOLD | OUTPATIENT
Start: 2022-10-04 | End: 2023-06-15 | Stop reason: HOSPADM

## 2022-10-04 RX ORDER — LOSARTAN POTASSIUM 50 MG/1
50 TABLET ORAL DAILY
Qty: 90 TABLET | Refills: 1 | Status: ON HOLD | OUTPATIENT
Start: 2022-10-04 | End: 2023-06-15 | Stop reason: HOSPADM

## 2022-10-04 RX ORDER — METFORMIN HYDROCHLORIDE 500 MG/1
500 TABLET, EXTENDED RELEASE ORAL 2 TIMES DAILY WITH MEALS
Qty: 180 TABLET | Refills: 1 | Status: ON HOLD | OUTPATIENT
Start: 2022-10-04 | End: 2023-06-15 | Stop reason: HOSPADM

## 2022-10-04 RX ORDER — ISOSORBIDE MONONITRATE 30 MG/1
30 TABLET, EXTENDED RELEASE ORAL DAILY
Qty: 90 TABLET | Refills: 1 | Status: SHIPPED | OUTPATIENT
Start: 2022-10-04

## 2022-10-04 RX ORDER — ATORVASTATIN CALCIUM 80 MG/1
80 TABLET, FILM COATED ORAL DAILY
Qty: 90 TABLET | Refills: 1 | Status: ON HOLD | OUTPATIENT
Start: 2022-10-04 | End: 2023-12-09 | Stop reason: HOSPADM

## 2022-10-04 NOTE — PROGRESS NOTES
Juan Pablo Ryan MD   [facility]  [facility]  [address] [place] [id number]  [phone]      PATIENT NAME: Faustino Fischer  : 1970  DATE: 10/4/22  MRN: [id number]      Billing Provider: Juan Pablo Ryan MD  Level of Service:   Patient PCP Information       Provider PCP Type    Juan Pablo Ryan MD General            Reason for Visit / Chief Complaint: Other (Follow-up status post gallbladder removal. )       Update PCP  Update Chief Complaint         History of Present Illness / Problem Focused Workflow     Faustino Fischer presents to the clinic with Other (Follow-up status post gallbladder removal. )       Medical history includes MI x 2, CAD c stent placement, CHF, DM, HTN, renal insufficiency and pancreatitis (nonalcoholic).  He recently was seen at ED (2022) for pancreatitis and transferred to [facility] in [place] for treatment.  (There were no beds available in [place].)  He had lap georges done last 2022.  Has follow up with surgeon in [place], Dr. Ruvalcaba, next week.  He says that he is doing well since discharge.  Has seen his cardiologist since he was here last and had another stent placed in distal LAD (Dr. Anthony) on 2022.  Needs all meds refilled.  He has not had any blood pressure medication today.    Review of Systems     Review of Systems   Constitutional:  Negative for chills, fatigue and fever.   HENT:  Negative for sinus pressure/congestion, sore throat and trouble swallowing.    Eyes:  Negative for pain, itching and visual disturbance.   Respiratory:  Negative for cough, shortness of breath and wheezing.    Cardiovascular:  Negative for chest pain, palpitations and leg swelling.   Gastrointestinal:  Negative for abdominal pain, change in bowel habit, nausea, vomiting and change in bowel habit.   Musculoskeletal:  Negative for arthralgias, back pain, joint swelling and myalgias.   Integumentary:  Negative for  rash and mole/lesion.   Neurological:  Negative for dizziness, syncope, weakness, light-headedness, numbness and headaches.   Psychiatric/Behavioral:  Negative for confusion.       Medical / Social / Family History     Past Medical History:   Diagnosis Date    Cardiomegaly     CHF (congestive heart failure)     Coronary artery disease     Diabetes mellitus     Hypertension     Irregular heart beat     Myocardial infarction     Pancreatitis     Renal failure        Past Surgical History:   Procedure Laterality Date    CARDIAC SURGERY      Stents x2    UNDESCENDED TESTICLE EXPLORATION N/A        Social History    reports that he has been smoking cigarettes. He started smoking about 18 years ago. He has a 4.50 pack-year smoking history. He has never used smokeless tobacco. He reports current alcohol use. He reports that he does not use drugs.   Social History     Tobacco Use    Smoking status: Some Days     Packs/day: 0.25     Years: 18.00     Pack years: 4.50     Types: Cigarettes     Start date: 2004    Smokeless tobacco: Never   Substance Use Topics    Alcohol use: Yes     Comment: occasional drink previous heavy drinker quit heavly in 2001    Drug use: Never       Family History  Family History   Problem Relation Age of Onset    Heart disease Father     Hypertension Father     Heart disease Brother     Heart disease Maternal Grandmother        Medications and Allergies     Medications  Outpatient Medications Marked as Taking for the 10/4/22 encounter (Office Visit) with Juan Pablo Ryan MD   Medication Sig Dispense Refill    SUPER THIN LANCETS 28 gauge Misc 2 (two) times daily.      TRUE METRIX GLUCOSE METER Misc 2 (two) times daily.      [DISCONTINUED] aspirin 81 MG Chew Take 81 mg by mouth once daily.      [DISCONTINUED] atorvastatin (LIPITOR) 80 MG tablet Take 1 tablet (80 mg total) by mouth once daily. 30 tablet 2    [DISCONTINUED] carvediloL (COREG) 12.5 MG tablet Take 1 tablet (12.5  mg total) by mouth before breakfast. 30 tablet 2    [DISCONTINUED] fenofibrate (TRICOR) 145 MG tablet Take 1 tablet (145 mg total) by mouth once daily. 30 tablet 2    [DISCONTINUED] furosemide (LASIX) 20 MG tablet Take 1 tablet (20 mg total) by mouth once daily. 90 tablet 2    [DISCONTINUED] isosorbide mononitrate (IMDUR) 30 MG 24 hr tablet Take 30 mg by mouth once daily. Take 1/2 tablet with breakfast      [DISCONTINUED] losartan (COZAAR) 50 MG tablet Take 1 tablet (50 mg total) by mouth once daily. 30 tablet 2    [DISCONTINUED] metFORMIN (GLUCOPHAGE-XR) 500 MG ER 24hr tablet Take 1 tablet (500 mg total) by mouth daily with breakfast. 90 tablet 2    [DISCONTINUED] ticagrelor (BRILINTA) 90 mg tablet Take 90 mg by mouth 2 (two) times daily.         Allergies  Review of patient's allergies indicates:  No Known Allergies    Physical Examination     Vitals:    10/04/22 0948   BP: (!) 199/111   Pulse: 73     Physical Exam  Vitals reviewed.   Constitutional:       Appearance: Normal appearance. He is not ill-appearing.   HENT:      Head: Normocephalic and atraumatic.   Eyes:      Extraocular Movements: Extraocular movements intact.      Conjunctiva/sclera: Conjunctivae normal.      Pupils: Pupils are equal, round, and reactive to light.   Cardiovascular:      Rate and Rhythm: Normal rate and regular rhythm.      Heart sounds: Normal heart sounds.   Pulmonary:      Effort: Pulmonary effort is normal.      Breath sounds: Normal breath sounds.   Musculoskeletal:         General: Normal range of motion.      Cervical back: Normal range of motion and neck supple.   Skin:     General: Skin is warm and dry.      Findings: Lesion (surgical incisions healing well) present.   Neurological:      General: No focal deficit present.      Mental Status: He is alert and oriented to person, place, and time.   Psychiatric:         Mood and Affect: Mood normal.         Behavior: Behavior normal.        Assessment and Plan (including  Health Maintenance)      Problem List  Smart Sets  Document Outside HM   :    Plan: blood pressure med ASAP.  Continue all other current care.  Follow up with surgeon as scheduled.  Low fat diet.        Health Maintenance Due   Topic Date Due    Hepatitis C Screening  Never done    Pneumococcal Vaccines (Age 0-64) (1 - PCV) Never done    Foot Exam  Never done    Eye Exam  Never done    HIV Screening  Never done    TETANUS VACCINE  Never done    Colorectal Cancer Screening  Never done    Shingles Vaccine (1 of 2) Never done    COVID-19 Vaccine (4 - Booster for Pfizer series) 05/12/2022    Influenza Vaccine (1) 09/01/2022       Problem List Items Addressed This Visit          Cardiac/Vascular    CAD (coronary artery disease) (Chronic)    Relevant Medications    ticagrelor (BRILINTA) 90 mg tablet    aspirin 81 MG Chew    isosorbide mononitrate (IMDUR) 30 MG 24 hr tablet    Chronic systolic congestive heart failure (Chronic)    Relevant Medications    furosemide (LASIX) 20 MG tablet    carvediloL (COREG) 12.5 MG tablet    Primary hypertension (Chronic)    Relevant Medications    losartan (COZAAR) 50 MG tablet    Mixed hyperlipidemia (Chronic)    Relevant Medications    atorvastatin (LIPITOR) 80 MG tablet    fenofibrate (TRICOR) 145 MG tablet       Endocrine    Type 2 diabetes mellitus without complication, without long-term current use of insulin (Chronic)    Relevant Medications    metFORMIN (GLUCOPHAGE-XR) 500 MG ER 24hr tablet     Other Visit Diagnoses       History of cholecystectomy    -  Primary    Congestive heart failure, unspecified HF chronicity, unspecified heart failure type        Relevant Medications    isosorbide mononitrate (IMDUR) 30 MG 24 hr tablet            Health Maintenance Topics with due status: Not Due       Topic Last Completion Date    Diabetes Urine Screening 04/15/2022    Lipid Panel 09/20/2022    Hemoglobin A1c 09/20/2022    High Dose Statin 10/04/2022    Aspirin/Antiplatelet  Therapy 10/04/2022       Future Appointments   Date Time Provider Department Center   11/4/2022  9:45 AM Juan Pablo Ryan MD [facility]   4/20/2023 11:00 AM AWV NURSE, [facility] MEDICINE [facility] Medical            Signature:  MD JOSHUA Jackson LUPIS [facility]  MEDICAL GROUP [facility]  [address] [place] [address]  [phone]    Date of encounter: 10/4/22

## 2022-12-09 DIAGNOSIS — Z71.89 COMPLEX CARE COORDINATION: ICD-10-CM

## 2023-03-07 ENCOUNTER — HOSPITAL ENCOUNTER (EMERGENCY)
Facility: HOSPITAL | Age: 53
Discharge: HOME | End: 2023-03-07
Payer: MEDICARE

## 2023-03-07 VITALS
RESPIRATION RATE: 18 BRPM | BODY MASS INDEX: 34.55 KG/M2 | DIASTOLIC BLOOD PRESSURE: 88 MMHG | OXYGEN SATURATION: 98 % | HEART RATE: 83 BPM | SYSTOLIC BLOOD PRESSURE: 132 MMHG | WEIGHT: 215 LBS | TEMPERATURE: 98 F | HEIGHT: 66 IN

## 2023-03-07 DIAGNOSIS — J10.1 INFLUENZA A: ICD-10-CM

## 2023-03-07 DIAGNOSIS — K85.80 OTHER ACUTE PANCREATITIS WITHOUT INFECTION OR NECROSIS: Primary | ICD-10-CM

## 2023-03-07 DIAGNOSIS — R10.9 ABDOMINAL PAIN: ICD-10-CM

## 2023-03-07 LAB
ALBUMIN SERPL BCP-MCNC: 3.9 G/DL (ref 3.5–5)
ALBUMIN/GLOB SERPL: 0.8 {RATIO}
ALP SERPL-CCNC: 102 U/L (ref 45–115)
ALT SERPL W P-5'-P-CCNC: 26 U/L (ref 16–61)
ANION GAP SERPL CALCULATED.3IONS-SCNC: 19 MMOL/L (ref 7–16)
AST SERPL W P-5'-P-CCNC: 23 U/L (ref 15–37)
BASOPHILS # BLD AUTO: 0.02 K/UL (ref 0–0.2)
BASOPHILS NFR BLD AUTO: 0.3 % (ref 0–1)
BILIRUB SERPL-MCNC: 1.8 MG/DL (ref ?–1.2)
BUN SERPL-MCNC: 12 MG/DL (ref 7–18)
BUN/CREAT SERPL: 10 (ref 6–20)
CALCIUM SERPL-MCNC: 10.1 MG/DL (ref 8.5–10.1)
CHLORIDE SERPL-SCNC: 92 MMOL/L (ref 98–107)
CO2 SERPL-SCNC: 27 MMOL/L (ref 21–32)
CREAT SERPL-MCNC: 1.25 MG/DL (ref 0.7–1.3)
DIFFERENTIAL METHOD BLD: ABNORMAL
EGFR (NO RACE VARIABLE) (RUSH/TITUS): 69 ML/MIN/1.73M²
EOSINOPHIL # BLD AUTO: 0.05 K/UL (ref 0–0.5)
EOSINOPHIL NFR BLD AUTO: 0.8 % (ref 1–4)
ERYTHROCYTE [DISTWIDTH] IN BLOOD BY AUTOMATED COUNT: 14.5 % (ref 11.5–14.5)
FLUAV AG UPPER RESP QL IA.RAPID: POSITIVE
FLUBV AG UPPER RESP QL IA.RAPID: NEGATIVE
GLOBULIN SER-MCNC: 5.1 G/DL (ref 2–4)
GLUCOSE SERPL-MCNC: 168 MG/DL (ref 74–106)
GLUCOSE SERPL-MCNC: 174 MG/DL (ref 70–105)
HCT VFR BLD AUTO: 48.8 % (ref 40–54)
HGB BLD-MCNC: 17.6 G/DL (ref 13.5–18)
LIPASE SERPL-CCNC: 1089 U/L (ref 73–393)
LYMPHOCYTES # BLD AUTO: 1.46 K/UL (ref 1–4.8)
LYMPHOCYTES NFR BLD AUTO: 23.2 % (ref 27–41)
MCH RBC QN AUTO: 33.4 PG (ref 27–31)
MCHC RBC AUTO-ENTMCNC: 36.1 G/DL (ref 32–36)
MCV RBC AUTO: 92.6 FL (ref 80–96)
MONOCYTES # BLD AUTO: 0.65 K/UL (ref 0–0.8)
MONOCYTES NFR BLD AUTO: 10.3 % (ref 2–6)
MPC BLD CALC-MCNC: 11.1 FL (ref 9.4–12.4)
NEUTROPHILS # BLD AUTO: 4.12 K/UL (ref 1.8–7.7)
NEUTROPHILS NFR BLD AUTO: 65.4 % (ref 53–65)
PLATELET # BLD AUTO: 147 K/UL (ref 150–400)
POTASSIUM SERPL-SCNC: 3.6 MMOL/L (ref 3.5–5.1)
PROT SERPL-MCNC: 9 G/DL (ref 6.4–8.2)
RBC # BLD AUTO: 5.27 M/UL (ref 4.6–6.2)
SARS-COV+SARS-COV-2 AG RESP QL IA.RAPID: NEGATIVE
SODIUM SERPL-SCNC: 134 MMOL/L (ref 136–145)
TROPONIN I SERPL HS-MCNC: 29.2 PG/ML
WBC # BLD AUTO: 6.3 K/UL (ref 4.5–11)

## 2023-03-07 PROCEDURE — 80053 COMPREHEN METABOLIC PANEL: CPT | Performed by: NURSE PRACTITIONER

## 2023-03-07 PROCEDURE — 83690 ASSAY OF LIPASE: CPT | Performed by: NURSE PRACTITIONER

## 2023-03-07 PROCEDURE — 82962 GLUCOSE BLOOD TEST: CPT

## 2023-03-07 PROCEDURE — 87428 SARSCOV & INF VIR A&B AG IA: CPT | Performed by: NURSE PRACTITIONER

## 2023-03-07 PROCEDURE — 99284 EMERGENCY DEPT VISIT MOD MDM: CPT | Mod: 25

## 2023-03-07 PROCEDURE — 96374 THER/PROPH/DIAG INJ IV PUSH: CPT

## 2023-03-07 PROCEDURE — 93005 ELECTROCARDIOGRAM TRACING: CPT

## 2023-03-07 PROCEDURE — 84484 ASSAY OF TROPONIN QUANT: CPT | Performed by: NURSE PRACTITIONER

## 2023-03-07 PROCEDURE — 96361 HYDRATE IV INFUSION ADD-ON: CPT

## 2023-03-07 PROCEDURE — 93010 ELECTROCARDIOGRAM REPORT: CPT | Performed by: HOSPITALIST

## 2023-03-07 PROCEDURE — 25000003 PHARM REV CODE 250: Performed by: NURSE PRACTITIONER

## 2023-03-07 PROCEDURE — 63600175 PHARM REV CODE 636 W HCPCS: Performed by: NURSE PRACTITIONER

## 2023-03-07 PROCEDURE — 99284 EMERGENCY DEPT VISIT MOD MDM: CPT | Mod: EDII,,, | Performed by: NURSE PRACTITIONER

## 2023-03-07 PROCEDURE — 85025 COMPLETE CBC W/AUTO DIFF WBC: CPT | Performed by: NURSE PRACTITIONER

## 2023-03-07 PROCEDURE — 99284 PR EMERGENCY DEPT VISIT,LEVEL IV: ICD-10-PCS | Mod: EDII,,, | Performed by: NURSE PRACTITIONER

## 2023-03-07 RX ORDER — PROMETHAZINE HYDROCHLORIDE 25 MG/1
25 TABLET ORAL EVERY 6 HOURS PRN
Qty: 9 TABLET | Refills: 1 | Status: SHIPPED | OUTPATIENT
Start: 2023-03-07 | End: 2023-03-10

## 2023-03-07 RX ORDER — ONDANSETRON 2 MG/ML
4 INJECTION INTRAMUSCULAR; INTRAVENOUS
Status: COMPLETED | OUTPATIENT
Start: 2023-03-07 | End: 2023-03-07

## 2023-03-07 RX ORDER — ONDANSETRON 4 MG/1
4 TABLET, FILM COATED ORAL 3 TIMES DAILY PRN
Qty: 20 TABLET | Refills: 1 | Status: SHIPPED | OUTPATIENT
Start: 2023-03-07 | End: 2023-06-07 | Stop reason: CLARIF

## 2023-03-07 RX ADMIN — SODIUM CHLORIDE 1000 ML: 9 INJECTION, SOLUTION INTRAVENOUS at 12:03

## 2023-03-07 RX ADMIN — ONDANSETRON 4 MG: 2 INJECTION INTRAMUSCULAR; INTRAVENOUS at 12:03

## 2023-03-07 NOTE — ED PROVIDER NOTES
Encounter Date: 3/7/2023       History     Chief Complaint   Patient presents with    Vomiting    Abdominal Pain     X 3 days     Patient presents to the ED with complaints of vomiting and abdominal pain for 3 days. Patient states he has not been able to eat anything for 3 days. Denies any fever. Denies diarrhea, reports he had his gallbladder removed 5 months ago.     The history is provided by the patient.   Review of patient's allergies indicates:  No Known Allergies  Past Medical History:   Diagnosis Date    Cardiomegaly     CHF (congestive heart failure)     Coronary artery disease     Diabetes mellitus     Hypertension     Irregular heart beat     Myocardial infarction     Pancreatitis     Renal failure      Past Surgical History:   Procedure Laterality Date    CARDIAC SURGERY      Stents x2    CHOLECYSTECTOMY      UNDESCENDED TESTICLE EXPLORATION N/A      Family History   Problem Relation Age of Onset    Heart disease Father     Hypertension Father     Heart disease Brother     Heart disease Maternal Grandmother      Social History     Tobacco Use    Smoking status: Some Days     Packs/day: 0.25     Years: 18.00     Pack years: 4.50     Types: Cigarettes     Start date: 2004    Smokeless tobacco: Never   Substance Use Topics    Alcohol use: Never     Comment: occasional drink previous heavy drinker quit heavly in 2001    Drug use: Never     Review of Systems   Constitutional: Negative.    Respiratory: Negative.     Cardiovascular: Negative.    Gastrointestinal:  Positive for abdominal pain, nausea and vomiting. Negative for constipation and diarrhea.   Musculoskeletal: Negative.    Skin: Negative.    Neurological: Negative.    Psychiatric/Behavioral: Negative.     All other systems reviewed and are negative.    Physical Exam     Initial Vitals [03/07/23 1136]   BP Pulse Resp Temp SpO2   (!) 137/95 (!) 117 18 98.1 °F (36.7 °C) 97 %      MAP       --         Physical Exam    Vitals reviewed.  Constitutional: He  appears well-developed and well-nourished.   Cardiovascular:  Normal rate, regular rhythm, normal heart sounds and intact distal pulses.           Pulmonary/Chest: Breath sounds normal.   Abdominal: Abdomen is soft. Bowel sounds are normal. There is abdominal tenderness (generalized).   Musculoskeletal:         General: Normal range of motion.     Neurological: He is alert and oriented to person, place, and time. He has normal strength. GCS score is 15. GCS eye subscore is 4. GCS verbal subscore is 5. GCS motor subscore is 6.   Skin: Skin is warm and dry. Capillary refill takes less than 2 seconds.   Psychiatric: He has a normal mood and affect. His behavior is normal. Judgment and thought content normal.       Medical Screening Exam   See Full Note    ED Course   Procedures  Labs Reviewed   COMPREHENSIVE METABOLIC PANEL - Abnormal; Notable for the following components:       Result Value    Sodium 134 (*)     Chloride 92 (*)     Anion Gap 19 (*)     Glucose 168 (*)     Total Protein 9.0 (*)     Globulin 5.1 (*)     Bilirubin, Total 1.8 (*)     All other components within normal limits   LIPASE - Abnormal; Notable for the following components:    Lipase 1,089 (*)     All other components within normal limits   CBC WITH DIFFERENTIAL - Abnormal; Notable for the following components:    MCH 33.4 (*)     MCHC 36.1 (*)     Platelet Count 147 (*)     Neutrophils % 65.4 (*)     Lymphocytes % 23.2 (*)     Monocytes % 10.3 (*)     Eosinophils % 0.8 (*)     All other components within normal limits   SARS-COV2 (COVID) W/ FLU ANTIGEN - Abnormal; Notable for the following components:    Influenza A Positive (*)     All other components within normal limits    Narrative:     Notification of POSITIVE INFLUENZA A antigen made to [facility]DOC [facility] verified/confirmed receipt.  Negative SARS-CoV results should not be used as the sole basis for treatment or patient management decisions; negative results should  be considered in the context of a patient's recent exposures, history and the presene of clinical signs and symptoms consistent with COVID-19.  Negative results should be treated as presumptive and confirmed by molecular assay, if necessary for patient management.   POCT GLUCOSE MONITORING CONTINUOUS - Abnormal; Notable for the following components:    POC Glucose 174 (*)     All other components within normal limits   TROPONIN I - Normal   CBC W/ AUTO DIFFERENTIAL    Narrative:     The following orders were created for panel order CBC W/ AUTO DIFFERENTIAL.  Procedure                               Abnormality         Status                     ---------                               -----------         ------                     CBC with Differential[[id number]]        Abnormal            Final result                 Please view results for these tests on the individual orders.   URINALYSIS, REFLEX TO URINE CULTURE          Imaging Results    None          Medications   ondansetron injection 4 mg (4 mg Intravenous Given 3/7/23 1226)   sodium chloride 0.9% bolus 1,000 mL 1,000 mL (1,000 mLs Intravenous New Bag 3/7/23 1224)     Medical Decision Making:   Initial Assessment:   Patient appeared ill on arrival, after fluid bolus and Zofran, patient appeared better and reports he feels so much better. Reports he has a long-standing history of pancreatitis, states he knows how to handle this at home and understands he needs to return to the emergency department with any new or worsening symptoms.   Clinical Tests:   Lab Tests: Ordered and Reviewed  The following lab test(s) were unremarkable: CBC, CMP and Troponin       <> Summary of Lab: Lipase 1,089, Na 134, Cl 92, Bilirubin 1.8, Flu A postive  Medical Tests: Ordered and Reviewed  ED Management:  MDM    Patient presents for emergent evaluation of acute abdominal pain and N/V that poses a threat to life and/or bodily function.    In the ED patient found to have acute Flu A  and Pancreatitis.    I ordered labs and personally reviewed them.  Labs significant for Pancreatitis and Flu A   I ordered EKG and personally reviewed it.  EKG significant for NSR.      Discharge MDM  I discussed the patient presentation labs, ekg, with the consultant for [facility] telemedicine, Dr. Castelan, will PO challenge patient prior to discharge, patient has not vomited since arrival and reports he feels much better since getting a fluid bolus, patient states he is very familiar with pancreatitis and knows how to treat it at home and states he will return with any new or worsening of symptoms.     Patient was managed in the ED with IV fluids and Zofran.    The response to treatment was good.    Patient was discharged in stable condition.  Detailed return precautions discussed.                  Clinical Impression:   Final diagnoses:  [R10.9] Abdominal pain  [K85.80] Other acute pancreatitis without infection or necrosis (Primary)  [J10.1] Influenza A        ED Disposition Condition    Discharge Stable          ED Prescriptions       Medication Sig Dispense Start Date End Date Auth. Provider    ondansetron (ZOFRAN) 4 MG tablet Take 1 tablet (4 mg total) by mouth 3 (three) times daily as needed for Nausea. 20 tablet 3/7/2023 -- DOC Rainey    promethazine (PHENERGAN) 25 MG tablet Take 1 tablet (25 mg total) by mouth every 6 (six) hours as needed for Nausea. 9 tablet 3/7/2023 3/10/2023 DOC Rainey          Follow-up Information       Follow up With Specialties Details Why Contact Info    Juan Pablo Ryan MD Family Medicine In 1 week  [address] [facility]  [facility] [address]  [phone]      [facility] - Emergency Department Emergency Medicine  As needed, If symptoms worsen [address] [facility] [address] [phone]             DOC Rainey  03/07/23 5538

## 2023-03-24 ENCOUNTER — HOSPITAL ENCOUNTER (EMERGENCY)
Facility: HOSPITAL | Age: 53
Discharge: HOME | End: 2023-03-24
Payer: MEDICARE

## 2023-03-24 VITALS
HEIGHT: 66 IN | WEIGHT: 226.19 LBS | SYSTOLIC BLOOD PRESSURE: 156 MMHG | OXYGEN SATURATION: 98 % | BODY MASS INDEX: 36.35 KG/M2 | DIASTOLIC BLOOD PRESSURE: 106 MMHG | HEART RATE: 102 BPM | RESPIRATION RATE: 18 BRPM | TEMPERATURE: 98 F

## 2023-03-24 DIAGNOSIS — Z76.89 ENCOUNTER FOR INCISION AND DRAINAGE PROCEDURE: ICD-10-CM

## 2023-03-24 DIAGNOSIS — L02.412 ABSCESS OF LEFT AXILLA: Primary | ICD-10-CM

## 2023-03-24 PROCEDURE — 99284 EMERGENCY DEPT VISIT MOD MDM: CPT | Mod: 25,GF

## 2023-03-24 PROCEDURE — 10060 I&D ABSCESS SIMPLE/SINGLE: CPT

## 2023-03-24 PROCEDURE — 10061 I&D ABSCESS COMP/MULTIPLE: CPT

## 2023-03-24 PROCEDURE — 25000003 PHARM REV CODE 250: Performed by: NURSE PRACTITIONER

## 2023-03-24 PROCEDURE — 96372 THER/PROPH/DIAG INJ SC/IM: CPT | Performed by: NURSE PRACTITIONER

## 2023-03-24 PROCEDURE — 99284 EMERGENCY DEPT VISIT MOD MDM: CPT | Mod: 25

## 2023-03-24 PROCEDURE — 87070 CULTURE OTHR SPECIMN AEROBIC: CPT | Performed by: NURSE PRACTITIONER

## 2023-03-24 PROCEDURE — 63600175 PHARM REV CODE 636 W HCPCS: Performed by: NURSE PRACTITIONER

## 2023-03-24 RX ORDER — LIDOCAINE HYDROCHLORIDE 10 MG/ML
1 INJECTION INFILTRATION; PERINEURAL
Status: COMPLETED | OUTPATIENT
Start: 2023-03-24 | End: 2023-03-24

## 2023-03-24 RX ORDER — ONDANSETRON 4 MG/1
4 TABLET, ORALLY DISINTEGRATING ORAL
Status: COMPLETED | OUTPATIENT
Start: 2023-03-24 | End: 2023-03-24

## 2023-03-24 RX ORDER — LIDOCAINE HYDROCHLORIDE 10 MG/ML
10 INJECTION, SOLUTION EPIDURAL; INFILTRATION; INTRACAUDAL; PERINEURAL
Status: DISCONTINUED | OUTPATIENT
Start: 2023-03-24 | End: 2023-03-24 | Stop reason: HOSPADM

## 2023-03-24 RX ORDER — LIDOCAINE HYDROCHLORIDE 10 MG/ML
5 INJECTION, SOLUTION EPIDURAL; INFILTRATION; INTRACAUDAL; PERINEURAL
Status: COMPLETED | OUTPATIENT
Start: 2023-03-24 | End: 2023-03-24

## 2023-03-24 RX ORDER — LIDOCAINE HYDROCHLORIDE 20 MG/ML
5 INJECTION, SOLUTION EPIDURAL; INFILTRATION; INTRACAUDAL; PERINEURAL
Status: DISCONTINUED | OUTPATIENT
Start: 2023-03-24 | End: 2023-03-24

## 2023-03-24 RX ORDER — KETOROLAC TROMETHAMINE 30 MG/ML
60 INJECTION, SOLUTION INTRAMUSCULAR; INTRAVENOUS
Status: COMPLETED | OUTPATIENT
Start: 2023-03-24 | End: 2023-03-24

## 2023-03-24 RX ORDER — LIDOCAINE HYDROCHLORIDE 20 MG/ML
5 INJECTION INTRAVENOUS
Status: DISCONTINUED | OUTPATIENT
Start: 2023-03-24 | End: 2023-03-24

## 2023-03-24 RX ORDER — SULFAMETHOXAZOLE AND TRIMETHOPRIM 800; 160 MG/1; MG/1
1 TABLET ORAL 2 TIMES DAILY
Qty: 14 TABLET | Refills: 0 | Status: SHIPPED | OUTPATIENT
Start: 2023-03-24 | End: 2023-03-31

## 2023-03-24 RX ORDER — HYDROCODONE BITARTRATE AND ACETAMINOPHEN 5; 325 MG/1; MG/1
1 TABLET ORAL EVERY 6 HOURS PRN
Qty: 6 TABLET | Refills: 0 | Status: SHIPPED | OUTPATIENT
Start: 2023-03-24 | End: 2023-06-07 | Stop reason: CLARIF

## 2023-03-24 RX ADMIN — LIDOCAINE HYDROCHLORIDE 1 ML: 10 INJECTION, SOLUTION INFILTRATION; PERINEURAL at 02:03

## 2023-03-24 RX ADMIN — ONDANSETRON 4 MG: 4 TABLET, ORALLY DISINTEGRATING ORAL at 02:03

## 2023-03-24 RX ADMIN — KETOROLAC TROMETHAMINE 60 MG: 30 INJECTION, SOLUTION INTRAMUSCULAR; INTRAVENOUS at 02:03

## 2023-03-24 RX ADMIN — LIDOCAINE HYDROCHLORIDE 50 MG: 10 INJECTION, SOLUTION EPIDURAL; INFILTRATION; INTRACAUDAL; PERINEURAL at 02:03

## 2023-03-24 NOTE — DISCHARGE INSTRUCTIONS
Take Rx as directed---don't drive while taking pain medications  2. Wash wound daily w/ soap and water. Apply dry gauze daily  3. Packing may be removed in 48 hours---get in hot shower and take pain tablet one hour before you remove it  4. Follow up with your PCP in 3-4 days for wound check, sooner if needed

## 2023-03-24 NOTE — ED PROVIDER NOTES
Encounter Date: 3/24/2023       History     Chief Complaint   Patient presents with    Abscess     Presents to ED with c/o Left Axillary Abscess / Pain x 3 days. Says he has history of Abscess that has required ID in the past. + history of DM. Denies fever / chills. Pain 9/10 and worse w/ movement.     Review of patient's allergies indicates:  No Known Allergies  Past Medical History:   Diagnosis Date    Cardiomegaly     CHF (congestive heart failure)     Coronary artery disease     Diabetes mellitus     Hypertension     Irregular heart beat     Myocardial infarction     Pancreatitis     Renal failure      Past Surgical History:   Procedure Laterality Date    CARDIAC SURGERY      Stents x2    CHOLECYSTECTOMY      UNDESCENDED TESTICLE EXPLORATION N/A      Family History   Problem Relation Age of Onset    Heart disease Father     Hypertension Father     Heart disease Brother     Heart disease Maternal Grandmother      Social History     Tobacco Use    Smoking status: Some Days     Packs/day: 0.25     Years: 18.00     Pack years: 4.50     Types: Cigarettes     Start date: 2004    Smokeless tobacco: Never   Substance Use Topics    Alcohol use: Never     Comment: occasional drink previous heavy drinker quit heavly in 2001    Drug use: Never     Review of Systems   Respiratory:  Negative for chest tightness.    Cardiovascular:  Negative for chest pain.   Skin:  Positive for wound.   All other systems reviewed and are negative.    Physical Exam     Initial Vitals [03/24/23 0116]   BP Pulse Resp Temp SpO2   (!) 171/112 (!) 121 20 98.1 °F (36.7 °C) 98 %      MAP       --         Physical Exam    Nursing note and vitals reviewed.  Constitutional: He appears well-developed and well-nourished.   HENT:   Head: Normocephalic and atraumatic.   Eyes: Conjunctivae and EOM are normal. Pupils are equal, round, and reactive to light.   Neck: Neck supple.   Normal range of motion.  Cardiovascular:  Regular rhythm and normal heart  sounds.           No murmur heard.  Tachycardic - 121 BPM   Pulmonary/Chest: Breath sounds normal.   Abdominal: Abdomen is soft. Bowel sounds are normal.   Musculoskeletal:         General: Normal range of motion.      Cervical back: Normal range of motion and neck supple.     Skin: Skin is warm and dry. Capillary refill takes less than 2 seconds. Abscess noted.   Large Abscess to Left Axilla. Fluctuant w/ exquisite tendernes. Measures 20cm x 7cm    Psychiatric: He has a normal mood and affect. Thought content normal.       Medical Screening Exam   See Full Note    ED Course   I & D - Incision and Drainage    Date/Time: 3/24/2023 2:53 AM  Location procedure was performed: [facility] EMERGENCY DEPARTMENT  Performed by: Ashu Sandoval NP  Authorized by: Ashu Sandoval NP   Post-operative diagnosis: abscess  Type: abscess  Body area: upper extremity (Left Axilla)  Anesthesia: local infiltration    Anesthesia:  Local Anesthetic: lidocaine 1% without epinephrine  Anesthetic total: 14 mL    Patient sedated: no  Scalpel size: 11  Incision type: single straight  Incision depth: subcutaneous  Complexity: complex  Drainage: pus and serosanguinous  Drainage amount: copious  Wound treatment: incision, drainage, expression of material, removal of cyst capsule and wound packed  Packing material: 1/2 in gauze  Complications: No  Estimated blood loss (mL): 5  Specimens: Yes (Wound Culture)  Implants: No  Patient tolerance: Patient tolerated the procedure well with no immediate complications    Incision depth: subcutaneous      Labs Reviewed   CULTURE, WOUND          Imaging Results    None          Medications   LIDOcaine (PF) 10 mg/ml (1%) injection 50 mg (50 mg Infiltration Given 3/24/23 0204)   LIDOcaine HCL 10 mg/ml (1%) injection 1 mL (1 mL Other Given 3/24/23 0220)   ondansetron disintegrating tablet 4 mg (4 mg Oral Given 3/24/23 0249)   ketorolac injection 60 mg (60 mg Intramuscular Given 3/24/23 0249)     Medical  Decision Making:   Differential Diagnosis:   Cellulitis, Abscess   Clinical Tests:   Lab Tests: Ordered  ED Management:  I&D Performed to Left Axillary Abscess. Patient tolerated without complication. Culture Pending. Will treat w/ Bactrim DS x one week and prn [place].                  Clinical Impression:   Final diagnoses:  [L02.412] Abscess of left axilla (Primary)  [Z76.89] Encounter for incision and drainage procedure        ED Disposition Condition    Discharge Good          ED Prescriptions       Medication Sig Dispense Start Date End Date Auth. Provider    sulfamethoxazole-trimethoprim 800-160mg (BACTRIM DS) 800-160 mg Tab Take 1 tablet by mouth 2 (two) times daily. for 7 days 14 tablet 3/24/2023 3/31/2023 Ashu Sandoval NP    HYDROcodone-acetaminophen (NORCO) 5-325 mg per tablet Take 1 tablet by mouth every 6 (six) hours as needed for Pain. 6 tablet 3/24/2023 -- Ashu Sandoval NP          Follow-up Information       Follow up With Specialties Details Why Contact Info    Juan Pablo Ryan MD Family Medicine In 3 days  [address] [place] [address]  [facility] [place] [address]  [phone]               Ashu Sandoval NP  03/25/23 0794

## 2023-03-24 NOTE — ED TRIAGE NOTES
Presents to ER with c/o swollen area to left axilla area. States area started hurting and swelling about 5 days ago. Called his PCP and was waiting to hear to get appointment.  Area is about 2-3 inches long and about 1 1/2 inches from top to bottom. Has been using warm compresses. Nothing has drained from area.

## 2023-03-26 LAB — MICROORGANISM SPEC CULT: NORMAL

## 2023-03-28 NOTE — ADDENDUM NOTE
Encounter addended by: Rosie Modi on: 3/28/2023 3:06 PM   Actions taken: SmartForm saved, Flowsheet accepted

## 2023-04-13 ENCOUNTER — HOSPITAL ENCOUNTER (EMERGENCY)
Facility: HOSPITAL | Age: 53
Discharge: ANOTHER HOSPITAL | End: 2023-04-13
Payer: MEDICARE

## 2023-04-13 VITALS
WEIGHT: 220 LBS | DIASTOLIC BLOOD PRESSURE: 146 MMHG | RESPIRATION RATE: 20 BRPM | BODY MASS INDEX: 35.36 KG/M2 | HEIGHT: 66 IN | HEART RATE: 106 BPM | TEMPERATURE: 98 F | SYSTOLIC BLOOD PRESSURE: 205 MMHG | OXYGEN SATURATION: 91 %

## 2023-04-13 DIAGNOSIS — E87.6 HYPOKALEMIA: ICD-10-CM

## 2023-04-13 DIAGNOSIS — K85.21 ALCOHOL-INDUCED ACUTE PANCREATITIS WITH UNINFECTED NECROSIS: Primary | ICD-10-CM

## 2023-04-13 DIAGNOSIS — E86.0 MILD DEHYDRATION: ICD-10-CM

## 2023-04-13 DIAGNOSIS — I10 HYPERTENSION, UNSPECIFIED TYPE: ICD-10-CM

## 2023-04-13 DIAGNOSIS — R11.2 NAUSEA AND VOMITING, UNSPECIFIED VOMITING TYPE: ICD-10-CM

## 2023-04-13 LAB
ALBUMIN SERPL BCP-MCNC: 2.8 G/DL (ref 3.5–5)
ALBUMIN/GLOB SERPL: 0.7 {RATIO}
ALP SERPL-CCNC: 139 U/L (ref 45–115)
ALT SERPL W P-5'-P-CCNC: 24 U/L (ref 16–61)
ANION GAP SERPL CALCULATED.3IONS-SCNC: 17 MMOL/L (ref 7–16)
ANISOCHROMASIA BLD QL SMEAR: ABNORMAL
ANISOCYTOSIS BLD QL SMEAR: ABNORMAL
AST SERPL W P-5'-P-CCNC: 39 U/L (ref 15–37)
BASOPHILS # BLD AUTO: 0.01 K/UL (ref 0–0.2)
BASOPHILS NFR BLD AUTO: 0.1 % (ref 0–1)
BILIRUB SERPL-MCNC: 0.8 MG/DL (ref ?–1.2)
BUN SERPL-MCNC: 7 MG/DL (ref 7–18)
BUN/CREAT SERPL: 9 (ref 6–20)
CALCIUM SERPL-MCNC: 8.1 MG/DL (ref 8.5–10.1)
CHLORIDE SERPL-SCNC: 99 MMOL/L (ref 98–107)
CO2 SERPL-SCNC: 26 MMOL/L (ref 21–32)
CREAT SERPL-MCNC: 0.82 MG/DL (ref 0.7–1.3)
DIFFERENTIAL METHOD BLD: ABNORMAL
EGFR (NO RACE VARIABLE) (RUSH/TITUS): 104 ML/MIN/1.73M²
EOSINOPHIL # BLD AUTO: 0.01 K/UL (ref 0–0.5)
EOSINOPHIL NFR BLD AUTO: 0.1 % (ref 1–4)
EOSINOPHIL NFR BLD MANUAL: 2 % (ref 1–4)
ERYTHROCYTE [DISTWIDTH] IN BLOOD BY AUTOMATED COUNT: 16.3 % (ref 11.5–14.5)
ETHANOL SERPL-MCNC: 33 MG/DL
GLOBULIN SER-MCNC: 4.1 G/DL (ref 2–4)
GLUCOSE SERPL-MCNC: 175 MG/DL (ref 74–106)
HCT VFR BLD AUTO: 37.3 % (ref 40–54)
HGB BLD-MCNC: 13 G/DL (ref 13.5–18)
LIPASE SERPL-CCNC: 1730 U/L (ref 73–393)
LYMPHOCYTES # BLD AUTO: 0.74 K/UL (ref 1–4.8)
LYMPHOCYTES NFR BLD AUTO: 9.6 % (ref 27–41)
LYMPHOCYTES NFR BLD MANUAL: 14 % (ref 27–41)
MCH RBC QN AUTO: 33.9 PG (ref 27–31)
MCHC RBC AUTO-ENTMCNC: 34.9 G/DL (ref 32–36)
MCV RBC AUTO: 97.4 FL (ref 80–96)
MONOCYTES # BLD AUTO: 0.35 K/UL (ref 0–0.8)
MONOCYTES NFR BLD AUTO: 4.6 % (ref 2–6)
MONOCYTES NFR BLD MANUAL: 4 % (ref 2–6)
MPC BLD CALC-MCNC: 9 FL (ref 9.4–12.4)
NEUTROPHILS # BLD AUTO: 6.58 K/UL (ref 1.8–7.7)
NEUTROPHILS NFR BLD AUTO: 85.6 % (ref 53–65)
NEUTS BAND NFR BLD MANUAL: 3 % (ref 1–5)
NEUTS SEG NFR BLD MANUAL: 77 % (ref 50–62)
NRBC BLD MANUAL-RTO: ABNORMAL %
PLATELET # BLD AUTO: 181 K/UL (ref 150–400)
PLATELET MORPHOLOGY: NORMAL
POLYCHROMASIA BLD QL SMEAR: ABNORMAL
POTASSIUM SERPL-SCNC: 3.1 MMOL/L (ref 3.5–5.1)
PROT SERPL-MCNC: 6.9 G/DL (ref 6.4–8.2)
RBC # BLD AUTO: 3.83 M/UL (ref 4.6–6.2)
SODIUM SERPL-SCNC: 139 MMOL/L (ref 136–145)
WBC # BLD AUTO: 7.69 K/UL (ref 4.5–11)

## 2023-04-13 PROCEDURE — 99285 EMERGENCY DEPT VISIT HI MDM: CPT | Mod: GF

## 2023-04-13 PROCEDURE — 96376 TX/PRO/DX INJ SAME DRUG ADON: CPT

## 2023-04-13 PROCEDURE — 80053 COMPREHEN METABOLIC PANEL: CPT | Performed by: NURSE PRACTITIONER

## 2023-04-13 PROCEDURE — 25500020 PHARM REV CODE 255: Performed by: NURSE PRACTITIONER

## 2023-04-13 PROCEDURE — 82077 ASSAY SPEC XCP UR&BREATH IA: CPT | Performed by: NURSE PRACTITIONER

## 2023-04-13 PROCEDURE — 63600175 PHARM REV CODE 636 W HCPCS: Performed by: NURSE PRACTITIONER

## 2023-04-13 PROCEDURE — 85025 COMPLETE CBC W/AUTO DIFF WBC: CPT | Performed by: NURSE PRACTITIONER

## 2023-04-13 PROCEDURE — 96361 HYDRATE IV INFUSION ADD-ON: CPT

## 2023-04-13 PROCEDURE — 99285 EMERGENCY DEPT VISIT HI MDM: CPT | Mod: 25

## 2023-04-13 PROCEDURE — 83690 ASSAY OF LIPASE: CPT | Performed by: NURSE PRACTITIONER

## 2023-04-13 PROCEDURE — 96375 TX/PRO/DX INJ NEW DRUG ADDON: CPT

## 2023-04-13 PROCEDURE — 96374 THER/PROPH/DIAG INJ IV PUSH: CPT | Mod: 59

## 2023-04-13 PROCEDURE — 99285 PR EMERGENCY DEPT VISIT,LEVEL V: ICD-10-PCS | Mod: EDII,,, | Performed by: NURSE PRACTITIONER

## 2023-04-13 PROCEDURE — 25000003 PHARM REV CODE 250: Performed by: NURSE PRACTITIONER

## 2023-04-13 PROCEDURE — 27000221 HC OXYGEN, UP TO 24 HOURS

## 2023-04-13 PROCEDURE — 99285 EMERGENCY DEPT VISIT HI MDM: CPT | Mod: EDII,,, | Performed by: NURSE PRACTITIONER

## 2023-04-13 RX ORDER — POTASSIUM CHLORIDE 20 MEQ/1
40 TABLET, EXTENDED RELEASE ORAL
Status: COMPLETED | OUTPATIENT
Start: 2023-04-13 | End: 2023-04-13

## 2023-04-13 RX ORDER — SODIUM CHLORIDE 9 MG/ML
1000 INJECTION, SOLUTION INTRAVENOUS
Status: COMPLETED | OUTPATIENT
Start: 2023-04-13 | End: 2023-04-13

## 2023-04-13 RX ORDER — PANTOPRAZOLE SODIUM 40 MG/1
40 TABLET, DELAYED RELEASE ORAL
COMMUNITY
End: 2023-12-06 | Stop reason: CLARIF

## 2023-04-13 RX ORDER — HYDRALAZINE HYDROCHLORIDE 20 MG/ML
10 INJECTION INTRAMUSCULAR; INTRAVENOUS
Status: COMPLETED | OUTPATIENT
Start: 2023-04-13 | End: 2023-04-13

## 2023-04-13 RX ORDER — HYDROMORPHONE HYDROCHLORIDE 2 MG/ML
1 INJECTION, SOLUTION INTRAMUSCULAR; INTRAVENOUS; SUBCUTANEOUS
Status: COMPLETED | OUTPATIENT
Start: 2023-04-13 | End: 2023-04-13

## 2023-04-13 RX ORDER — CLOPIDOGREL BISULFATE 75 MG/1
75 TABLET ORAL
COMMUNITY
End: 2023-12-06 | Stop reason: CLARIF

## 2023-04-13 RX ADMIN — HYDROMORPHONE HYDROCHLORIDE 1 MG: 2 INJECTION, SOLUTION INTRAMUSCULAR; INTRAVENOUS; SUBCUTANEOUS at 08:04

## 2023-04-13 RX ADMIN — HYDROMORPHONE HYDROCHLORIDE 1 MG: 2 INJECTION INTRAMUSCULAR; INTRAVENOUS; SUBCUTANEOUS at 04:04

## 2023-04-13 RX ADMIN — HYDROMORPHONE HYDROCHLORIDE 1 MG: 2 INJECTION, SOLUTION INTRAMUSCULAR; INTRAVENOUS; SUBCUTANEOUS at 06:04

## 2023-04-13 RX ADMIN — SODIUM CHLORIDE 500 ML: 9 INJECTION, SOLUTION INTRAVENOUS at 04:04

## 2023-04-13 RX ADMIN — SODIUM CHLORIDE 1000 ML: 9 INJECTION, SOLUTION INTRAVENOUS at 08:04

## 2023-04-13 RX ADMIN — HYDRALAZINE HYDROCHLORIDE 10 MG: 20 INJECTION INTRAMUSCULAR; INTRAVENOUS at 06:04

## 2023-04-13 RX ADMIN — IOPAMIDOL 100 ML: 755 INJECTION, SOLUTION INTRAVENOUS at 06:04

## 2023-04-13 RX ADMIN — POTASSIUM CHLORIDE 40 MEQ: 1500 TABLET, EXTENDED RELEASE ORAL at 06:04

## 2023-04-13 RX ADMIN — HYDRALAZINE HYDROCHLORIDE 10 MG: 20 INJECTION INTRAMUSCULAR; INTRAVENOUS at 05:04

## 2023-04-13 NOTE — ED PROVIDER NOTES
"Encounter Date: 4/13/2023       History     Chief Complaint   Patient presents with    Abdominal Pain    Vomiting     For 24 hours     53 year old AAM presents to the ER with upper abd pain radiating into his back and n/v.  Patient reports hx of pancreatitis, and he reports these are his typical symptoms.  Pt noted to smell of ETOH, reporting he last had a "shot" at 3am today.  Denies chest pain, SOB, fever and diarrhea.  He is followed by [facility] at [facility] in [place].      The history is provided by the patient.   Abdominal Pain  The current episode started several hours ago. The onset of the illness was gradual. The problem has been gradually worsening. The abdominal pain is located in the epigastric region. The abdominal pain radiates to the back. The severity of the abdominal pain is 8/10. The abdominal pain is relieved by nothing. The abdominal pain is exacerbated by vomiting. The other symptoms of the illness include nausea and vomiting. The other symptoms of the illness do not include fever, fatigue, jaundice, melena, shortness of breath, diarrhea, dysuria, hematemesis or hematochezia.   The patient has not had a change in bowel habit. Symptoms associated with the illness do not include chills, anorexia, diaphoresis, heartburn, constipation, urgency, hematuria, frequency or back pain. Significant associated medical issues include diabetes and cardiac disease. Significant associated medical issues do not include PUD, GERD, inflammatory bowel disease, sickle cell disease, gallstones, liver disease, substance abuse, diverticulitis or HIV.   Review of patient's allergies indicates:  No Known Allergies  Past Medical History:   Diagnosis Date    Cardiomegaly     CHF (congestive heart failure)     Coronary artery disease     Diabetes mellitus     Hypertension     Irregular heart beat     Myocardial infarction     Pancreatitis     Renal failure      Past Surgical History:   Procedure Laterality Date    " CARDIAC SURGERY      Stents x2    CHOLECYSTECTOMY      UNDESCENDED TESTICLE EXPLORATION N/A      Family History   Problem Relation Age of Onset    Heart disease Father     Hypertension Father     Heart disease Brother     Heart disease Maternal Grandmother      Social History     Tobacco Use    Smoking status: Some Days     Packs/day: 0.25     Years: 18.00     Pack years: 4.50     Types: Cigarettes     Start date: 2004    Smokeless tobacco: Never   Substance Use Topics    Alcohol use: Yes     Comment: occasional drink previous heavy drinker quit heavly in 2001    Drug use: Never     Review of Systems   Constitutional:  Negative for chills, diaphoresis, fatigue and fever.   HENT:  Negative for sore throat.    Respiratory:  Negative for shortness of breath.    Cardiovascular:  Negative for chest pain.   Gastrointestinal:  Positive for abdominal pain, nausea and vomiting. Negative for anorexia, constipation, diarrhea, heartburn, hematemesis, hematochezia, jaundice and melena.   Genitourinary:  Negative for dysuria, frequency, hematuria and urgency.   Musculoskeletal:  Negative for back pain.   Skin:  Negative for rash.   Neurological:  Negative for weakness.   Hematological:  Does not bruise/bleed easily.     Physical Exam     Initial Vitals [04/13/23 1635]   BP Pulse Resp Temp SpO2   (!) 204/145 (!) 120 (!) 22 98.2 °F (36.8 °C) 96 %      MAP       --         Physical Exam    Nursing note and vitals reviewed.  Constitutional: He appears well-developed and well-nourished. He is not diaphoretic. He is Obese . He is cooperative.  Non-toxic appearance. He has a sickly appearance. He appears ill. No distress.   HENT:   Head: Normocephalic and atraumatic.   Eyes: Pupils are equal, round, and reactive to light.   Neck: Neck supple.   Cardiovascular:  Regular rhythm, normal heart sounds and intact distal pulses.   Tachycardia present.   Exam reveals no gallop and no friction rub.       No murmur heard.  Pulmonary/Chest: Breath  sounds normal. No respiratory distress. He has no wheezes. He has no rhonchi. He has no rales. He exhibits no tenderness.   Abdominal: Abdomen is soft. There is abdominal tenderness in the epigastric area.   Obese protuberant abd There is no rebound and no guarding.   Musculoskeletal:      Cervical back: Neck supple.     Neurological: He is alert and oriented to person, place, and time. GCS score is 15. GCS eye subscore is 4. GCS verbal subscore is 5. GCS motor subscore is 6.   Skin: Skin is warm and dry. Capillary refill takes less than 2 seconds.   Psychiatric: He has a normal mood and affect.       Medical Screening Exam   See Full Note    ED Course   Procedures  Labs Reviewed   COMPREHENSIVE METABOLIC PANEL - Abnormal; Notable for the following components:       Result Value    Potassium 3.1 (*)     Anion Gap 17 (*)     Glucose 175 (*)     Calcium 8.1 (*)     Albumin 2.8 (*)     Globulin 4.1 (*)     Alk Phos 139 (*)     AST 39 (*)     All other components within normal limits   LIPASE - Abnormal; Notable for the following components:    Lipase 1,730 (*)     All other components within normal limits   ALCOHOL,MEDICAL (ETHANOL) - Abnormal; Notable for the following components:    Ethanol 33 (*)     All other components within normal limits   CBC WITH DIFFERENTIAL - Abnormal; Notable for the following components:    RBC 3.83 (*)     Hemoglobin 13.0 (*)     Hematocrit 37.3 (*)     MCV 97.4 (*)     MCH 33.9 (*)     RDW 16.3 (*)     MPV 9.0 (*)     Neutrophils % 85.6 (*)     Lymphocytes % 9.6 (*)     Lymphocytes, Absolute 0.74 (*)     Eosinophils % 0.1 (*)     All other components within normal limits   MANUAL DIFFERENTIAL - Abnormal; Notable for the following components:    Segmented Neutrophils, Man % 77 (*)     Lymphocytes, Man % 14 (*)     All other components within normal limits   CBC W/ AUTO DIFFERENTIAL    Narrative:     The following orders were created for panel order CBC auto differential.  Procedure                                Abnormality         Status                     ---------                               -----------         ------                     CBC with Differential[[id number]]        Abnormal            Final result               Manual Differential[[id number]]          Abnormal            Final result                 Please view results for these tests on the individual orders.          Imaging Results              CT Abdomen Pelvis With Contrast (Final result)  Result time 04/13/23 18:35:09      Final result by Reinier Baker DO (04/13/23 18:35:09)                   Impression:      Findings consistent with sequela pancreatitis.  Correlate with amylase/lipase.  Prior cholecystectomy.    Similar small irregular hypoattenuation along the ventral liver may reflect small focal fatty infiltration but is nonspecific.  Consider MRI of the liver within without contrast for further evaluation.  Question minimal nodularity of the inferior right hepatic lobe.  Cirrhosis is not excluded.    Other/detailed findings as above.    The CT exam was performed using one or more of the following dose    reduction techniques- Automated exposure control, adjustment of the mA    and/or kV according to patient size, and/or use of iterative    reconstructed technique.    Point of Service: [facility]      Electronically signed by: Reinier Baker  Date:    04/13/2023  Time:    18:35               Narrative:    EXAMINATION:  CT ABDOMEN PELVIS WITH CONTRAST    CLINICAL HISTORY:  upper abd pain, n/v, hx pancreatitis;    COMPARISON:  CT abdomen pelvis September 18, 2022    TECHNIQUE:  Multiple axial tomographic images of the abdomen and pelvis were obtained after administration of 100 cc Isovue 370 intravenous contrast.    FINDINGS:  Mild dependent changes of the lungs noted.    Similar small irregular hypoattenuation along the ventral liver may reflect small focal fatty infiltration but is nonspecific.  Consider MRI of  the liver within without contrast for further evaluation.  Question minimal nodularity of the inferior right hepatic lobe.  Cirrhosis is not excluded.  Prior cholecystectomy.  Fat stranding and small fluid about the pancreas suggestive of pancreatitis.  Correlate with amylase/lipase.  Spleen grossly unremarkable.    Bilateral adrenal glands grossly unremarkable.  Small scarring at several locations within the superior pole of the right kidney.  Incompletely characterized small superior pole right renal cysts which appear grossly stable dating back to October 19, 2019 CT.  No specific imaging follow-up recommended.  No evidence of hydronephrosis.  Urinary bladder incompletely distended.  Prostate and seminal vesicles grossly unremarkable.    No convincing evidence of gastrointestinal obstruction or acute appendicitis.  Atherosclerotic calcification demonstrated.  Scattered skeletal degenerative change.                                       Medications   0.9%  NaCl infusion (has no administration in time range)   HYDROmorphone (PF) injection 1 mg (has no administration in time range)   sodium chloride 0.9% bolus 500 mL 500 mL (0 mLs Intravenous Stopped 4/13/23 1748)   HYDROmorphone (PF) injection 1 mg (1 mg Intravenous Given 4/13/23 1648)   hydrALAZINE injection 10 mg (10 mg Intravenous Given 4/13/23 1739)   iopamidoL (ISOVUE-370) injection 100 mL (100 mLs Intravenous Given 4/13/23 1812)   HYDROmorphone (PF) injection 1 mg (1 mg Intravenous Given 4/13/23 1824)   hydrALAZINE injection 10 mg (10 mg Intravenous Given 4/13/23 1846)   potassium chloride SA CR tablet 40 mEq (40 mEq Oral Given 4/13/23 1847)     Medical Decision Making:   Differential Diagnosis:   Pancreatitis, electrolyte abnormality, GERD, dehydration  Clinical Tests:   Lab Tests: Ordered and Reviewed  Radiological Study: Ordered and Reviewed           ED Course as of 04/13/23 1952   Thu Apr 13, 2023   1711 BP(!): 204/145 [AG]   1711 Pulse(!): 120 [AG]    1715 BP(!): 210/126 [AG]   1736 Alcohol, Serum(!): 33 [AG]   1737 Lipase(!): 1,730 [AG]   1737 Potassium(!): 3.1 [AG]   1737 Anion Gap(!): 17 [AG]   1737 Glucose(!): 175 [AG]   1748 BP(!): 194/124 [AG]   1822 Pain is back up to a 10/10, will repeat pain meds. [AG]   1839 Pt reports this is one of the worst episodes of pancreatitis that he has experienced.  He is still in pain after two rounds of Dilaudid.  No vomiting since arrival.  Unable to bolus too much IVFs due to his CHF.  Due to his poor pain control, will try to transfer patient for admission for acute pancreatitis. Patient has requested we try [facility] first, because this is where his cardiologist is located.  [AG]   1941 Dr Laws at [facility] has accepted the patient  [AG]   1944 Will place patient on oxygen, pulse ox 91-92% on room air. [AG]      ED Course User Index  [AG] DOC Chapman          Clinical Impression:   Final diagnoses:  [K85.21] Alcohol-induced acute pancreatitis with uninfected necrosis (Primary)  [E87.6] Hypokalemia  [R11.2] Nausea and vomiting, unspecified vomiting type  [E86.0] Mild dehydration  [I10] Hypertension, unspecified type        ED Disposition Condition    Transfer to Another Facility Stable                DOC Chapman  04/13/23 1942       DOC Chapman  04/13/23 1945       DOC Chapman  04/13/23 1952

## 2023-04-13 NOTE — ED TRIAGE NOTES
Has a history of pancreatitis, began to have upper abd pain last night that has gotten worse, vomited 4 times today, last BM this am was normal, last oral intake this am at 0500

## 2023-04-17 NOTE — ADDENDUM NOTE
Encounter addended by: Rosie Modi on: 4/17/2023 2:21 PM   Actions taken: SmartForm saved, Flowsheet accepted

## 2023-06-07 ENCOUNTER — HOSPITAL ENCOUNTER (INPATIENT)
Facility: HOSPITAL | Age: 53
LOS: 7 days | Discharge: ANOTHER HOSPITAL | DRG: 439 | End: 2023-06-14
Attending: FAMILY MEDICINE | Admitting: FAMILY MEDICINE
Payer: MEDICARE

## 2023-06-07 DIAGNOSIS — K85.90 ACUTE PANCREATITIS WITHOUT INFECTION OR NECROSIS, UNSPECIFIED PANCREATITIS TYPE: Primary | ICD-10-CM

## 2023-06-07 DIAGNOSIS — L73.2 HYDRADENITIS: ICD-10-CM

## 2023-06-07 DIAGNOSIS — L02.31 ABSCESS OF LEFT BUTTOCK: ICD-10-CM

## 2023-06-07 DIAGNOSIS — K86.1 ACUTE ON CHRONIC PANCREATITIS: ICD-10-CM

## 2023-06-07 DIAGNOSIS — E83.42 HYPOMAGNESEMIA: ICD-10-CM

## 2023-06-07 DIAGNOSIS — K85.90 ACUTE ON CHRONIC PANCREATITIS: ICD-10-CM

## 2023-06-07 DIAGNOSIS — R10.13 EPIGASTRIC PAIN: ICD-10-CM

## 2023-06-07 LAB
ALBUMIN SERPL BCP-MCNC: 2.9 G/DL (ref 3.5–5)
ALBUMIN/GLOB SERPL: 0.7 {RATIO}
ALP SERPL-CCNC: 83 U/L (ref 45–115)
ALT SERPL W P-5'-P-CCNC: 23 U/L (ref 16–61)
AMYLASE SERPL-CCNC: 126 U/L (ref 25–115)
ANION GAP SERPL CALCULATED.3IONS-SCNC: 14 MMOL/L (ref 7–16)
AST SERPL W P-5'-P-CCNC: 23 U/L (ref 15–37)
BACTERIA #/AREA URNS HPF: ABNORMAL /HPF
BASOPHILS # BLD AUTO: 0.03 K/UL (ref 0–0.2)
BASOPHILS NFR BLD AUTO: 0.4 % (ref 0–1)
BILIRUB SERPL-MCNC: 0.5 MG/DL (ref ?–1.2)
BILIRUB UR QL STRIP: ABNORMAL
BUN SERPL-MCNC: 9 MG/DL (ref 7–18)
BUN/CREAT SERPL: 9 (ref 6–20)
CALCIUM SERPL-MCNC: 8.7 MG/DL (ref 8.5–10.1)
CHLORIDE SERPL-SCNC: 97 MMOL/L (ref 98–107)
CLARITY UR: CLEAR
CO2 SERPL-SCNC: 27 MMOL/L (ref 21–32)
COLOR UR: ABNORMAL
CREAT SERPL-MCNC: 1 MG/DL (ref 0.7–1.3)
DIFFERENTIAL METHOD BLD: ABNORMAL
EGFR (NO RACE VARIABLE) (RUSH/TITUS): 89 ML/MIN/1.73M2
EOSINOPHIL # BLD AUTO: 0.05 K/UL (ref 0–0.5)
EOSINOPHIL NFR BLD AUTO: 0.6 % (ref 1–4)
EOSINOPHIL NFR BLD MANUAL: 2 % (ref 1–4)
ERYTHROCYTE [DISTWIDTH] IN BLOOD BY AUTOMATED COUNT: 12.8 % (ref 11.5–14.5)
ETHANOL SERPL-MCNC: <3 MG/DL
GLOBULIN SER-MCNC: 3.9 G/DL (ref 2–4)
GLUCOSE SERPL-MCNC: 260 MG/DL (ref 70–105)
GLUCOSE SERPL-MCNC: 318 MG/DL (ref 74–106)
GLUCOSE UR STRIP-MCNC: 500 MG/DL
HCT VFR BLD AUTO: 35.6 % (ref 40–54)
HGB BLD-MCNC: 12.6 G/DL (ref 13.5–18)
KETONES UR STRIP-SCNC: ABNORMAL MG/DL
LEUKOCYTE ESTERASE UR QL STRIP: NEGATIVE
LIPASE SERPL-CCNC: 1069 U/L (ref 73–393)
LYMPHOCYTES # BLD AUTO: 0.92 K/UL (ref 1–4.8)
LYMPHOCYTES NFR BLD AUTO: 10.7 % (ref 27–41)
LYMPHOCYTES NFR BLD MANUAL: 13 % (ref 27–41)
MACROCYTES BLD QL SMEAR: ABNORMAL
MAGNESIUM SERPL-MCNC: 1 MG/DL (ref 1.7–2.3)
MCH RBC QN AUTO: 36.1 PG (ref 27–31)
MCHC RBC AUTO-ENTMCNC: 35.4 G/DL (ref 32–36)
MCV RBC AUTO: 102 FL (ref 80–96)
MONOCYTES # BLD AUTO: 0.49 K/UL (ref 0–0.8)
MONOCYTES NFR BLD AUTO: 5.7 % (ref 2–6)
MONOCYTES NFR BLD MANUAL: 6 % (ref 2–6)
MPC BLD CALC-MCNC: 10.2 FL (ref 9.4–12.4)
MUCOUS THREADS #/AREA URNS HPF: ABNORMAL /HPF
NEUTROPHILS # BLD AUTO: 7.08 K/UL (ref 1.8–7.7)
NEUTROPHILS NFR BLD AUTO: 82.6 % (ref 53–65)
NEUTS BAND NFR BLD MANUAL: 8 % (ref 1–5)
NEUTS SEG NFR BLD MANUAL: 71 % (ref 50–62)
NITRITE UR QL STRIP: NEGATIVE
NRBC BLD MANUAL-RTO: ABNORMAL %
PH UR STRIP: 7 PH UNITS
PLATELET # BLD AUTO: 121 K/UL (ref 150–400)
PLATELET MORPHOLOGY: ABNORMAL
POTASSIUM SERPL-SCNC: 3.8 MMOL/L (ref 3.5–5.1)
PROT SERPL-MCNC: 6.8 G/DL (ref 6.4–8.2)
PROT UR QL STRIP: 100
RBC # BLD AUTO: 3.49 M/UL (ref 4.6–6.2)
RBC # UR STRIP: ABNORMAL /UL
RBC #/AREA URNS HPF: ABNORMAL /HPF
RENAL EPI CELLS #/AREA URNS LPF: ABNORMAL /LPF
ROULEAUX BLD QL SMEAR: ABNORMAL
SODIUM SERPL-SCNC: 134 MMOL/L (ref 136–145)
SP GR UR STRIP: 1.02
SQUAMOUS #/AREA URNS LPF: ABNORMAL /LPF
TRANS CELLS #/AREA URNS LPF: ABNORMAL /LPF
TROPONIN I SERPL DL<=0.01 NG/ML-MCNC: 19.9 PG/ML
UROBILINOGEN UR STRIP-ACNC: 0.2 MG/DL
WBC # BLD AUTO: 8.57 K/UL (ref 4.5–11)
WBC #/AREA URNS HPF: ABNORMAL /HPF

## 2023-06-07 PROCEDURE — 85025 COMPLETE CBC W/AUTO DIFF WBC: CPT | Performed by: NURSE PRACTITIONER

## 2023-06-07 PROCEDURE — 11000001 HC ACUTE MED/SURG PRIVATE ROOM

## 2023-06-07 PROCEDURE — 63600175 PHARM REV CODE 636 W HCPCS: Performed by: FAMILY MEDICINE

## 2023-06-07 PROCEDURE — 99285 EMERGENCY DEPT VISIT HI MDM: CPT | Mod: 25

## 2023-06-07 PROCEDURE — 96365 THER/PROPH/DIAG IV INF INIT: CPT

## 2023-06-07 PROCEDURE — 81001 URINALYSIS AUTO W/SCOPE: CPT | Performed by: NURSE PRACTITIONER

## 2023-06-07 PROCEDURE — 63600175 PHARM REV CODE 636 W HCPCS: Performed by: NURSE PRACTITIONER

## 2023-06-07 PROCEDURE — 99285 EMERGENCY DEPT VISIT HI MDM: CPT | Mod: GF

## 2023-06-07 PROCEDURE — 96361 HYDRATE IV INFUSION ADD-ON: CPT

## 2023-06-07 PROCEDURE — C9113 INJ PANTOPRAZOLE SODIUM, VIA: HCPCS | Performed by: NURSE PRACTITIONER

## 2023-06-07 PROCEDURE — 25500020 PHARM REV CODE 255: Performed by: NURSE PRACTITIONER

## 2023-06-07 PROCEDURE — 87070 CULTURE OTHR SPECIMN AEROBIC: CPT | Performed by: NURSE PRACTITIONER

## 2023-06-07 PROCEDURE — 82150 ASSAY OF AMYLASE: CPT | Performed by: NURSE PRACTITIONER

## 2023-06-07 PROCEDURE — 82077 ASSAY SPEC XCP UR&BREATH IA: CPT | Performed by: NURSE PRACTITIONER

## 2023-06-07 PROCEDURE — 93010 ELECTROCARDIOGRAM REPORT: CPT | Performed by: HOSPITALIST

## 2023-06-07 PROCEDURE — 80053 COMPREHEN METABOLIC PANEL: CPT | Performed by: NURSE PRACTITIONER

## 2023-06-07 PROCEDURE — 82962 GLUCOSE BLOOD TEST: CPT

## 2023-06-07 PROCEDURE — 83735 ASSAY OF MAGNESIUM: CPT | Performed by: NURSE PRACTITIONER

## 2023-06-07 PROCEDURE — 87086 URINE CULTURE/COLONY COUNT: CPT | Performed by: NURSE PRACTITIONER

## 2023-06-07 PROCEDURE — 83690 ASSAY OF LIPASE: CPT | Performed by: NURSE PRACTITIONER

## 2023-06-07 PROCEDURE — 25000003 PHARM REV CODE 250: Performed by: NURSE PRACTITIONER

## 2023-06-07 PROCEDURE — 25000003 PHARM REV CODE 250: Performed by: FAMILY MEDICINE

## 2023-06-07 PROCEDURE — 93005 ELECTROCARDIOGRAM TRACING: CPT

## 2023-06-07 PROCEDURE — 96375 TX/PRO/DX INJ NEW DRUG ADDON: CPT

## 2023-06-07 PROCEDURE — 84484 ASSAY OF TROPONIN QUANT: CPT | Performed by: NURSE PRACTITIONER

## 2023-06-07 PROCEDURE — 94761 N-INVAS EAR/PLS OXIMETRY MLT: CPT

## 2023-06-07 RX ORDER — HYDROMORPHONE HYDROCHLORIDE 2 MG/ML
1 INJECTION, SOLUTION INTRAMUSCULAR; INTRAVENOUS; SUBCUTANEOUS
Status: COMPLETED | OUTPATIENT
Start: 2023-06-07 | End: 2023-06-07

## 2023-06-07 RX ORDER — ONDANSETRON 2 MG/ML
4 INJECTION INTRAMUSCULAR; INTRAVENOUS EVERY 8 HOURS PRN
Status: DISCONTINUED | OUTPATIENT
Start: 2023-06-07 | End: 2023-06-12

## 2023-06-07 RX ORDER — INSULIN ASPART 100 [IU]/ML
1-10 INJECTION, SOLUTION INTRAVENOUS; SUBCUTANEOUS EVERY 6 HOURS PRN
Status: DISCONTINUED | OUTPATIENT
Start: 2023-06-07 | End: 2023-06-14 | Stop reason: HOSPADM

## 2023-06-07 RX ORDER — NAPROXEN SODIUM 220 MG/1
81 TABLET, FILM COATED ORAL DAILY
Status: DISCONTINUED | OUTPATIENT
Start: 2023-06-08 | End: 2023-06-14 | Stop reason: HOSPADM

## 2023-06-07 RX ORDER — FENOFIBRATE 145 MG/1
145 TABLET, FILM COATED ORAL DAILY
Status: DISCONTINUED | OUTPATIENT
Start: 2023-06-08 | End: 2023-06-12

## 2023-06-07 RX ORDER — ONDANSETRON 2 MG/ML
4 INJECTION INTRAMUSCULAR; INTRAVENOUS
Status: COMPLETED | OUTPATIENT
Start: 2023-06-07 | End: 2023-06-07

## 2023-06-07 RX ORDER — MAGNESIUM SULFATE HEPTAHYDRATE 40 MG/ML
2 INJECTION, SOLUTION INTRAVENOUS
Status: DISPENSED | OUTPATIENT
Start: 2023-06-07 | End: 2023-06-08

## 2023-06-07 RX ORDER — CARVEDILOL 12.5 MG/1
12.5 TABLET ORAL
Status: DISCONTINUED | OUTPATIENT
Start: 2023-06-08 | End: 2023-06-14 | Stop reason: HOSPADM

## 2023-06-07 RX ORDER — ISOSORBIDE MONONITRATE 30 MG/1
30 TABLET, EXTENDED RELEASE ORAL DAILY
Status: DISCONTINUED | OUTPATIENT
Start: 2023-06-08 | End: 2023-06-14 | Stop reason: HOSPADM

## 2023-06-07 RX ORDER — GLUCAGON 1 MG
1 KIT INJECTION
Status: DISCONTINUED | OUTPATIENT
Start: 2023-06-07 | End: 2023-06-14 | Stop reason: HOSPADM

## 2023-06-07 RX ORDER — MAGNESIUM SULFATE HEPTAHYDRATE 40 MG/ML
2 INJECTION, SOLUTION INTRAVENOUS ONCE
Status: COMPLETED | OUTPATIENT
Start: 2023-06-08 | End: 2023-06-08

## 2023-06-07 RX ORDER — MORPHINE SULFATE 4 MG/ML
4 INJECTION, SOLUTION INTRAMUSCULAR; INTRAVENOUS
Status: COMPLETED | OUTPATIENT
Start: 2023-06-07 | End: 2023-06-07

## 2023-06-07 RX ORDER — ENOXAPARIN SODIUM 100 MG/ML
40 INJECTION SUBCUTANEOUS EVERY 24 HOURS
Status: DISCONTINUED | OUTPATIENT
Start: 2023-06-07 | End: 2023-06-12 | Stop reason: DRUGHIGH

## 2023-06-07 RX ORDER — HYDROMORPHONE HYDROCHLORIDE 2 MG/ML
1 INJECTION, SOLUTION INTRAMUSCULAR; INTRAVENOUS; SUBCUTANEOUS
Status: ACTIVE | OUTPATIENT
Start: 2023-06-07 | End: 2023-06-08

## 2023-06-07 RX ORDER — SODIUM CHLORIDE 9 MG/ML
INJECTION, SOLUTION INTRAVENOUS CONTINUOUS
Status: DISCONTINUED | OUTPATIENT
Start: 2023-06-07 | End: 2023-06-14 | Stop reason: HOSPADM

## 2023-06-07 RX ORDER — PANTOPRAZOLE SODIUM 40 MG/10ML
40 INJECTION, POWDER, LYOPHILIZED, FOR SOLUTION INTRAVENOUS DAILY
Status: DISCONTINUED | OUTPATIENT
Start: 2023-06-08 | End: 2023-06-10

## 2023-06-07 RX ORDER — CLOPIDOGREL BISULFATE 75 MG/1
75 TABLET ORAL DAILY
Status: DISCONTINUED | OUTPATIENT
Start: 2023-06-08 | End: 2023-06-14 | Stop reason: HOSPADM

## 2023-06-07 RX ORDER — HYDROMORPHONE HYDROCHLORIDE 2 MG/ML
1 INJECTION, SOLUTION INTRAMUSCULAR; INTRAVENOUS; SUBCUTANEOUS EVERY 4 HOURS PRN
Status: DISCONTINUED | OUTPATIENT
Start: 2023-06-07 | End: 2023-06-09

## 2023-06-07 RX ORDER — MAGNESIUM SULFATE HEPTAHYDRATE 40 MG/ML
2 INJECTION, SOLUTION INTRAVENOUS
Status: COMPLETED | OUTPATIENT
Start: 2023-06-07 | End: 2023-06-07

## 2023-06-07 RX ORDER — LOSARTAN POTASSIUM 50 MG/1
50 TABLET ORAL DAILY
Status: DISCONTINUED | OUTPATIENT
Start: 2023-06-08 | End: 2023-06-09

## 2023-06-07 RX ORDER — PANTOPRAZOLE SODIUM 40 MG/10ML
40 INJECTION, POWDER, LYOPHILIZED, FOR SOLUTION INTRAVENOUS
Status: COMPLETED | OUTPATIENT
Start: 2023-06-07 | End: 2023-06-07

## 2023-06-07 RX ORDER — SODIUM CHLORIDE 0.9 % (FLUSH) 0.9 %
10 SYRINGE (ML) INJECTION
Status: DISCONTINUED | OUTPATIENT
Start: 2023-06-07 | End: 2023-06-14 | Stop reason: HOSPADM

## 2023-06-07 RX ORDER — ATORVASTATIN CALCIUM 40 MG/1
80 TABLET, FILM COATED ORAL DAILY
Status: DISCONTINUED | OUTPATIENT
Start: 2023-06-08 | End: 2023-06-14 | Stop reason: HOSPADM

## 2023-06-07 RX ADMIN — PANTOPRAZOLE SODIUM 40 MG: 40 INJECTION, POWDER, LYOPHILIZED, FOR SOLUTION INTRAVENOUS at 12:06

## 2023-06-07 RX ADMIN — ENOXAPARIN SODIUM 40 MG: 40 INJECTION SUBCUTANEOUS at 06:06

## 2023-06-07 RX ADMIN — HYDROMORPHONE HYDROCHLORIDE 1 MG: 2 INJECTION INTRAMUSCULAR; INTRAVENOUS; SUBCUTANEOUS at 04:06

## 2023-06-07 RX ADMIN — THIAMINE HYDROCHLORIDE 100 MG: 100 INJECTION, SOLUTION INTRAMUSCULAR; INTRAVENOUS at 02:06

## 2023-06-07 RX ADMIN — ONDANSETRON 4 MG: 2 INJECTION INTRAMUSCULAR; INTRAVENOUS at 10:06

## 2023-06-07 RX ADMIN — IOPAMIDOL 100 ML: 755 INJECTION, SOLUTION INTRAVENOUS at 02:06

## 2023-06-07 RX ADMIN — HYDROMORPHONE HYDROCHLORIDE 1 MG: 2 INJECTION, SOLUTION INTRAMUSCULAR; INTRAVENOUS; SUBCUTANEOUS at 10:06

## 2023-06-07 RX ADMIN — ONDANSETRON 4 MG: 2 INJECTION INTRAMUSCULAR; INTRAVENOUS at 12:06

## 2023-06-07 RX ADMIN — SODIUM CHLORIDE: 9 INJECTION, SOLUTION INTRAVENOUS at 06:06

## 2023-06-07 RX ADMIN — MORPHINE SULFATE 4 MG: 4 INJECTION INTRAVENOUS at 12:06

## 2023-06-07 RX ADMIN — MAGNESIUM SULFATE HEPTAHYDRATE 2 G: 40 INJECTION, SOLUTION INTRAVENOUS at 10:06

## 2023-06-07 RX ADMIN — MAGNESIUM SULFATE HEPTAHYDRATE 2 G: 40 INJECTION, SOLUTION INTRAVENOUS at 02:06

## 2023-06-07 RX ADMIN — PIPERACILLIN AND TAZOBACTAM 4.5 G: 4; .5 INJECTION, POWDER, LYOPHILIZED, FOR SOLUTION INTRAVENOUS; PARENTERAL at 04:06

## 2023-06-07 RX ADMIN — VANCOMYCIN HYDROCHLORIDE 2000 MG: 1 INJECTION, POWDER, LYOPHILIZED, FOR SOLUTION INTRAVENOUS at 07:06

## 2023-06-07 RX ADMIN — HYDROMORPHONE HYDROCHLORIDE 1 MG: 2 INJECTION INTRAMUSCULAR; INTRAVENOUS; SUBCUTANEOUS at 02:06

## 2023-06-07 RX ADMIN — SODIUM CHLORIDE 1000 ML: 9 INJECTION, SOLUTION INTRAVENOUS at 12:06

## 2023-06-07 NOTE — ED PROVIDER NOTES
Encounter Date: 6/7/2023       History     Chief Complaint   Patient presents with    Abdominal Pain    Nausea     Presented with c/o epigastric pain with nausea and dry heaves that started last night.  has freq bouts of pancreatitis and presenting symptoms today are indicative of an acute episode for him. Denies ETOH or DM (states since losing wt so he hasn't taken his DM med for the last mo). Previous cholecystectomy. Denies fever or chills, chest pain, dyspnea, diarrhea or constipation. States that pain started after eating a Bratwurst and hot chips last night.  has been trying to treat himself at home by remaining NPO but is not better. Reports compliance with all other medications.    Review of patient's allergies indicates:  No Known Allergies  Past Medical History:   Diagnosis Date    Cardiomegaly     CHF (congestive heart failure)     Coronary artery disease     Diabetes mellitus     Hypertension     Irregular heart beat     Myocardial infarction     Pancreatitis     Renal failure      Past Surgical History:   Procedure Laterality Date    CARDIAC SURGERY      Stents x2    CHOLECYSTECTOMY      UNDESCENDED TESTICLE EXPLORATION N/A      Family History   Problem Relation Age of Onset    Heart disease Father     Hypertension Father     Heart disease Brother     Heart disease Maternal Grandmother      Social History     Tobacco Use    Smoking status: Some Days     Packs/day: 0.25     Years: 18.00     Pack years: 4.50     Types: Cigarettes     Start date: 2004    Smokeless tobacco: Never   Substance Use Topics    Alcohol use: Not Currently     Comment: occasional drink previous heavy drinker quit heavly in 2001    Drug use: Never     Review of Systems   Constitutional:  Positive for activity change and appetite change. Negative for fever.   HENT: Negative.     Respiratory:  Negative for cough and shortness of breath.    Cardiovascular:  Negative for chest pain, palpitations and leg swelling.    Gastrointestinal:  Positive for abdominal pain, nausea and vomiting. Negative for blood in stool, constipation and diarrhea.   Genitourinary:  Positive for decreased urine volume. Negative for difficulty urinating.   Musculoskeletal: Negative.    Skin: Negative.    Neurological: Negative.    Psychiatric/Behavioral: Negative.       Physical Exam     Initial Vitals [06/07/23 1214]   BP Pulse Resp Temp SpO2   (!) 208/140 97 20 98.9 °F (37.2 °C) 97 %      MAP       --         Physical Exam    Nursing note and vitals reviewed.  Constitutional: He appears well-developed and well-nourished.   HENT:   Head: Normocephalic.   Right Ear: External ear normal.   Left Ear: External ear normal.   Nose: Nose normal.   Mouth/Throat: Oropharynx is clear and moist. Mucous membranes are dry.   Eyes: EOM are normal. Pupils are equal, round, and reactive to light. Right eye exhibits discharge. Right eye exudate: right > left.Left eye exhibits discharge.   Neck: Neck supple. No JVD present.   Normal range of motion.  Cardiovascular:  Normal rate, regular rhythm, normal heart sounds and intact distal pulses.           No murmur heard.  Pulmonary/Chest: Breath sounds normal. No respiratory distress. He has no wheezes. He has no rhonchi. He has no rales. He exhibits no tenderness.   Abdominal: Abdomen is soft. Bowel sounds are normal. He exhibits no distension and no mass. There is abdominal tenderness (epigastric). There is guarding. There is no rebound.   Musculoskeletal:         General: No tenderness or edema. Normal range of motion.      Cervical back: Normal range of motion and neck supple.     Neurological: He is alert and oriented to person, place, and time. He has normal strength. GCS score is 15. GCS eye subscore is 4. GCS verbal subscore is 5. GCS motor subscore is 6.   Skin: Skin is warm and dry. Capillary refill takes less than 2 seconds.   Psychiatric: He has a normal mood and affect.       Medical Screening Exam   See Full  Note    ED Course   Procedures  Labs Reviewed   COMPREHENSIVE METABOLIC PANEL - Abnormal; Notable for the following components:       Result Value    Sodium 134 (*)     Chloride 97 (*)     Glucose 318 (*)     Albumin 2.9 (*)     All other components within normal limits   LIPASE - Abnormal; Notable for the following components:    Lipase 1,069 (*)     All other components within normal limits   AMYLASE - Abnormal; Notable for the following components:    Amylase 126 (*)     All other components within normal limits   MAGNESIUM - Abnormal; Notable for the following components:    Magnesium 1.0 (*)     All other components within normal limits   URINALYSIS, REFLEX TO URINE CULTURE - Abnormal; Notable for the following components:    Protein,  (*)     Glucose,  (*)     Bilirubin, UA Small (*)     Blood, UA Trace-Lysed (*)     All other components within normal limits   CBC WITH DIFFERENTIAL - Abnormal; Notable for the following components:    RBC 3.49 (*)     Hemoglobin 12.6 (*)     Hematocrit 35.6 (*)     .0 (*)     MCH 36.1 (*)     Platelet Count 121 (*)     Neutrophils % 82.6 (*)     Lymphocytes % 10.7 (*)     Lymphocytes, Absolute 0.92 (*)     Eosinophils % 0.6 (*)     All other components within normal limits   MANUAL DIFFERENTIAL - Abnormal; Notable for the following components:    Segmented Neutrophils, Man % 71 (*)     Bands, Man % 8 (*)     Lymphocytes, Man % 13 (*)     Platelet Morphology Few Large Platelets (*)     All other components within normal limits   URINALYSIS, MICROSCOPIC - Abnormal; Notable for the following components:    WBC, UA 15-25 (*)     RBC, UA 3-5 (*)     Bacteria, UA Moderate (*)     Squamous Epithelial Cells, UA Moderate (*)     Transitional Epithelial Cells, UA Few (*)     Renal Epithelial Cells, UA Occasional (*)     Mucus, UA Few (*)     All other components within normal limits   TROPONIN I - Normal   ALCOHOL,MEDICAL (ETHANOL) - Normal   CULTURE, URINE   CULTURE,  WOUND   CULTURE, WOUND   CBC W/ AUTO DIFFERENTIAL    Narrative:     The following orders were created for panel order CBC auto differential.  Procedure                               Abnormality         Status                     ---------                               -----------         ------                     CBC with Differential[[id number]]        Abnormal            Final result               Manual Differential[[id number]]          Abnormal            Final result                 Please view results for these tests on the individual orders.     EKG Readings: (Independently Interpreted)   Initial Reading: No STEMI. Previous EKG: Compared with most recent EKG Rhythm: Normal Sinus Rhythm. Heart Rate: 87.   Reviewed at 1244.   ECG Results              EKG 12-lead (In process)  Result time 06/07/23 12:55:15      In process by Interface, Lab In [facility] (06/07/23 12:55:15)                   Narrative:    Test Reason : R10.13,    Vent. Rate : 087 BPM     Atrial Rate : 087 BPM     P-R Int : 156 ms          QRS Dur : 082 ms      QT Int : 408 ms       P-R-T Axes : 052 -59 067 degrees     QTc Int : 490 ms    Normal sinus rhythm  Possible Left atrial enlargement  Left anterior fascicular block  Inferior infarct ,age undetermined  Anterolateral infarct (cited on or before 26-JUN-2021)  Abnormal ECG  When compared with ECG of 07-MAR-2023 12:05,  Left anterior fascicular block is now Present  Inferior infarct is now Present  Questionable change in initial forces of Lateral leads  T wave inversion no longer evident in Inferior leads    Referred By:  RYLEY           Confirmed By:                                   Imaging Results              CT Abdomen Pelvis With Contrast (Final result)  Result time 06/07/23 14:35:40      Final result by Reinier Baker DO (06/07/23 14:35:40)                   Impression:      There is moderate fat stranding about the pancreas suspicious for pancreatitis. Correlate with amylase/lipase.   Other/detailed findings as above.    The CT exam was performed using one or more of the following dose    reduction techniques- Automated exposure control, adjustment of the mA    and/or kV according to patient size, and/or use of iterative    reconstructed technique.    Point of Service: [facility]      Electronically signed by: Reinier Baker  Date:    06/07/2023  Time:    14:35               Narrative:    EXAMINATION:  CT ABDOMEN PELVIS WITH CONTRAST    CLINICAL HISTORY:  Pancreatitis, acute, severe;    COMPARISON:  CT abdomen pelvis April 13, 2023    TECHNIQUE:  Multiple axial tomographic images of the abdomen and pelvis were obtained after administration of 100 cc Isovue 370 intravenous contrast.    FINDINGS:  Mild dependent changes of the lungs noted.    Grossly stable hypoattenuation involving the central liver as well as the ventral aspect of the left lobe of liver dating back to at least June 26, 2021 suggestive of focal fatty infiltration.  Prior cholecystectomy.  No abnormal pancreatic ductal dilatation.  There is moderate fat stranding about the pancreas suspicious for pancreatitis.  Correlate with amylase/lipase.  Spleen grossly unremarkable.    Bilateral adrenal glands grossly unremarkable.  Small renal hypodensities noted which are too small to characterize but may reflect cysts.  No specific imaging follow-up recommended for the cysts.  Prostate and seminal vesicles grossly unremarkable.  Urinary bladder incompletely distended.    No convincing evidence of gastrointestinal obstruction or acute appendicitis.  Atherosclerotic calcification demonstrated.  Scattered skeletal degenerative change.                                    X-Rays:   Independently Interpreted Readings:   Abdomen: Mild dependent changes of the lungs noted.     Grossly stable hypoattenuation involving the central liver as well as the ventral aspect of the left lobe of liver dating back to at least June 26, 2021 suggestive  of focal fatty infiltration.  Prior cholecystectomy.  No abnormal pancreatic ductal dilatation.  There is moderate fat stranding about the pancreas suspicious for pancreatitis.  Correlate with amylase/lipase.  Spleen grossly unremarkable.     Bilateral adrenal glands grossly unremarkable.  Small renal hypodensities noted which are too small to characterize but may reflect cysts.  No specific imaging follow-up recommended for the cysts.  Prostate and seminal vesicles grossly unremarkable.  Urinary bladder incompletely distended.     No convincing evidence of gastrointestinal obstruction or acute appendicitis.  Atherosclerotic calcification demonstrated.  Scattered skeletal degenerative change   Medications   magnesium sulfate 2g in water 50mL IVPB (premix) (2 g Intravenous New Bag 6/7/23 1446)   piperacillin-tazobactam (ZOSYN) 4.5 g in sodium chloride 0.9 % 100 mL IVPB (MB+) (has no administration in time range)   sodium chloride 0.9% bolus 1,000 mL 1,000 mL (0 mLs Intravenous Stopped 6/7/23 1355)   morphine injection 4 mg (4 mg Intravenous Given 6/7/23 1252)   ondansetron injection 4 mg (4 mg Intravenous Given 6/7/23 1251)   pantoprazole injection 40 mg (40 mg Intravenous Given 6/7/23 1257)   thiamine (B-1) 100 mg in dextrose 5 % (D5W) 100 mL IVPB (0 mg Intravenous Stopped 6/7/23 1445)   HYDROmorphone (PF) injection 1 mg (1 mg Intravenous Given 6/7/23 1415)   iopamidoL (ISOVUE-370) injection 100 mL (100 mLs Intravenous Given 6/7/23 1414)     Medical Decision Making:   ED Management:  Presented with c/o epigastric pain with nausea and dry heaves that started last night. States has freq bouts of pancreatitis and presenting symptoms today are indicative of an acute episode for him. Denies ETOH or DM (states since losing wt). Previous cholecystectomy. Denies fever or chills, chest pain, dyspnea, diarrhea or constipation. States that pain started after eating a Bratwurst and hot chips last night. States has been trying  "to treat himself at home by remaining NPO but is not better.    Labs ordered and reviewed. Lipase 1069, amylase 126, AST 23, ALT 23, T BR 0.5, troponin 19.9, BUN 9, creatinine 1, Mg 1, , K+ 3.8, WBC 8570 with H&H 12.6 and 35.6 and plt [id number]. glucose 318. UA shows mod bacteria with 15-25 WBCs. CT abd pelvis with contrast ordered and reviewed.     NS bolus x 1 Liter, MS 4 mg IVP, ondansetron 4 mg IVP given in the ED.    CT abd/pelvis with contrast shows fat stranding indicative of acute pancreatitis, fatty liver, previous cholecystectomy with no ductal dilation.    Discussed findings and need for adm with pt. He agreed. Discussed pt's case with Dr. Astorga. He accepted pt for admission.    Upon discussing adm with pt, he reports that he has a draining abscess to his left "inner thigh". Upon exam, an open draining abscess is noted located on the lower aspect of his left buttock. Mild surrounding erythema with no swelling. Opening is approx 3 cm in length. Pt states has multiple abscesses in the last year to his axilla and groin. Had one incised but says that he has no sought medical care for all of them. Has multiple scarring under both arms as well.    Discussed these findings with Dr. Astorga. He evaluated pt in the ED. Zosyn 4.5 Gm IVPB started in the ED. Pt will be treated with Vancomycin as well.               ED Course as of 06/07/23 1609   Wed Jun 07, 2023   1359 WBC: 8.57 [DP]   1359 Hemoglobin(!): 12.6 [DP]   1359 Hematocrit(!): 35.6 [DP]   1359 Platelets(!): 121 [DP]   1359 Sodium(!): 134 [DP]   1359 Potassium: 3.8 [DP]   1359 Magnesium(!): 1.0 [DP]   1400 BUN: 9 [DP]   1400 Creatinine: 1.00 [DP]   1400 Glucose(!): 318 [DP]   1400 ALT: 23 [DP]   1400 AST: 23 [DP]   1400 BILIRUBIN TOTAL: 0.5 [DP]   1400 Amylase(!): 126 [DP]   1400 Troponin I High Sensitivity: 19.9 [DP]   1400 Lipase(!): 1,069 [DP]   1400 WBC, UA(!): 15-25 [DP]   1400 Bacteria, UA(!): Moderate [DP]      ED Course User Index  [DP] Sadia " LEILANI Mcpherson                Clinical Impression:   Final diagnoses:  [R10.13] Epigastric pain  [K85.90] Acute pancreatitis without infection or necrosis, unspecified pancreatitis type (Primary)  [E83.42] Hypomagnesemia  [L02.31] Abscess of left buttock  [L73.2] Hydradenitis        ED Disposition Condition    Admit Stable                Sadia Mcpherson NP  06/07/23 1609       Sadia Mcpherson NP  06/07/23 [id number]

## 2023-06-07 NOTE — HPI
"Presented with c/o epigastric pain with nausea and dry heaves that started last night. States has freq bouts of pancreatitis and presenting symptoms today are indicative of an acute episode for him. Denies ETOH or DM (states since losing wt). Previous cholecystectomy. Denies fever or chills, chest pain, dyspnea, diarrhea or constipation. States that pain started after eating a Bratwurst and hot chips last night. States has been trying to treat himself at home by remaining NPO but is not better.     Labs ordered and reviewed. Lipase 1069, amylase 126, AST 23, ALT 23, T BR 0.5, troponin 19.9, BUN 9, creatinine 1, Mg 1, , K+ 3.8, WBC 8570 with H&H 12.6 and 35.6 and plt 850473. glucose 318. UA shows mod bacteria with 15-25 WBCs. CT abd pelvis with contrast ordered and reviewed.      NS bolus x 1 Liter, MS 4 mg IVP, ondansetron 4 mg IVP given in the ED.     CT abd/pelvis with contrast shows fat stranding indicative of acute pancreatitis, fatty liver, previous cholecystectomy with no ductal dilation.     Discussed findings and need for adm with pt. He agreed. Discussed pt's case with Dr. Astorga. He accepted pt for admission.     Upon discussing adm with pt, he reports that he has a draining abscess to his left "inner thigh". Upon exam, an open draining abscess is noted located on the lower aspect of his left buttock. Mild surrounding erythema with no swelling. Opening is approx 3 cm in length. Pt states has multiple abscesses in the last year to his axilla and groin. Had one incised but says that he has no sought medical care for all of them. Has multiple scarring under both arms as well.     Discussed these findings with Dr. Astorag. He evaluated pt in the ED. Zosyn 4.5 Gm IVPB started in the ED. Pt will be treated with Vancomycin as well.  "

## 2023-06-07 NOTE — SUBJECTIVE & OBJECTIVE
Past Medical History:   Diagnosis Date    Cardiomegaly     CHF (congestive heart failure)     Coronary artery disease     Diabetes mellitus     Hypertension     Irregular heart beat     Myocardial infarction     Pancreatitis     Renal failure        Past Surgical History:   Procedure Laterality Date    CARDIAC SURGERY      Stents x2    CHOLECYSTECTOMY      UNDESCENDED TESTICLE EXPLORATION N/A        Review of patient's allergies indicates:  No Known Allergies    No current facility-administered medications on file prior to encounter.     Current Outpatient Medications on File Prior to Encounter   Medication Sig    aspirin 81 MG Chew Take 1 tablet (81 mg total) by mouth once daily.    atorvastatin (LIPITOR) 80 MG tablet Take 1 tablet (80 mg total) by mouth once daily.    carvediloL (COREG) 12.5 MG tablet Take 1 tablet (12.5 mg total) by mouth before breakfast.    clopidogreL (PLAVIX) 75 mg tablet Take 75 mg by mouth.    fenofibrate (TRICOR) 145 MG tablet Take 1 tablet (145 mg total) by mouth once daily.    furosemide (LASIX) 20 MG tablet Take 1 tablet (20 mg total) by mouth once daily.    isosorbide mononitrate (IMDUR) 30 MG 24 hr tablet Take 1 tablet (30 mg total) by mouth once daily. Take 1/2 tablet with breakfast    losartan (COZAAR) 50 MG tablet Take 1 tablet (50 mg total) by mouth once daily.    metFORMIN (GLUCOPHAGE-XR) 500 MG ER 24hr tablet Take 1 tablet (500 mg total) by mouth 2 (two) times daily with meals.    pantoprazole (PROTONIX) 40 MG tablet Take 40 mg by mouth.    SUPER THIN LANCETS 28 gauge Misc 2 (two) times daily.    TRUE METRIX GLUCOSE METER Misc 2 (two) times daily.    [DISCONTINUED] HYDROcodone-acetaminophen (NORCO) 5-325 mg per tablet Take 1 tablet by mouth every 6 (six) hours as needed for Pain.    [DISCONTINUED] ondansetron (ZOFRAN) 4 MG tablet Take 1 tablet (4 mg total) by mouth 3 (three) times daily as needed for Nausea.     Family History       Problem Relation (Age of Onset)    Heart  disease Father, Brother, Maternal Grandmother    Hypertension Father          Tobacco Use    Smoking status: Some Days     Packs/day: 0.25     Years: 18.00     Pack years: 4.50     Types: Cigarettes     Start date: 2004    Smokeless tobacco: Never   Substance and Sexual Activity    Alcohol use: Not Currently     Comment: occasional drink previous heavy drinker quit heavly in 2001    Drug use: Never    Sexual activity: Not Currently     Review of Systems   Constitutional:  Positive for activity change and appetite change. Negative for fever.   HENT: Negative.     Respiratory:  Negative for chest tightness and shortness of breath.    Cardiovascular:  Negative for chest pain.   Gastrointestinal:  Positive for abdominal pain (epigastric pain), nausea and vomiting (mostly dry heaves). Negative for anal bleeding, blood in stool, constipation and diarrhea.   Genitourinary:  Negative for difficulty urinating.   Skin:  Positive for wound (abscess to left buttock, abscess to left axilla).   Neurological: Negative.    Psychiatric/Behavioral: Negative.     Objective:     Vital Signs (Most Recent):  Temp: 98.9 °F (37.2 °C) (06/07/23 1214)  Pulse: 76 (06/07/23 1655)  Resp: 18 (06/07/23 1655)  BP: 131/89 (06/07/23 1655)  SpO2: (!) 94 % (06/07/23 1655) Vital Signs (24h Range):  Temp:  [98.9 °F (37.2 °C)] 98.9 °F (37.2 °C)  Pulse:  [76-97] 76  Resp:  [16-20] 18  SpO2:  [93 %-97 %] 94 %  BP: (131-208)/() 131/89     Weight: 95.3 kg (210 lb)  Body mass index is 33.89 kg/m².     Physical Exam  Vitals and nursing note reviewed.   Constitutional:       General: He is not in acute distress.     Appearance: He is ill-appearing. He is not toxic-appearing.   HENT:      Head: Normocephalic.      Mouth/Throat:      Mouth: Mucous membranes are dry.   Eyes:      Extraocular Movements: Extraocular movements intact.      Conjunctiva/sclera: Conjunctivae normal.      Pupils: Pupils are equal, round, and reactive to light.   Cardiovascular:       Rate and Rhythm: Normal rate and regular rhythm.      Pulses: Normal pulses.      Heart sounds: Normal heart sounds.   Pulmonary:      Effort: Pulmonary effort is normal.      Breath sounds: Normal breath sounds.   Abdominal:      General: Bowel sounds are normal. There is no distension.      Palpations: Abdomen is soft.      Tenderness: There is abdominal tenderness (epigastric).   Musculoskeletal:      Cervical back: Normal range of motion.      Right lower leg: No edema.      Left lower leg: No edema.   Skin:     General: Skin is warm and dry.      Capillary Refill: Capillary refill takes less than 2 seconds.      Findings: Lesion present.      Comments: Abscess to left axilla with purulent drng. No erythema surrounding. Open wound to left buttocks with purulent drng. See pics   Neurological:      General: No focal deficit present.      Mental Status: He is alert.   Psychiatric:         Mood and Affect: Mood normal.                  CRANIAL NERVES     CN III, IV, VI   Pupils are equal, round, and reactive to light.     Significant Labs: All pertinent labs within the past 24 hours have been reviewed.  CBC:   Recent Labs   Lab 06/07/23  1306   WBC 8.57   HGB 12.6*   HCT 35.6*   *     CMP:   Recent Labs   Lab 06/07/23  1306   *   K 3.8   CL 97*   CO2 27   *   BUN 9   CREATININE 1.00   CALCIUM 8.7   PROT 6.8   ALBUMIN 2.9*   BILITOT 0.5   ALKPHOS 83   AST 23   ALT 23   ANIONGAP 14     Lipase:   Recent Labs   Lab 06/07/23  1306   LIPASE 1,069*     Magnesium:   Recent Labs   Lab 06/07/23  1306   MG 1.0*     Troponin:   Recent Labs   Lab 06/07/23  1306   TROPONINIHS 19.9     Urine Studies:   Recent Labs   Lab 06/07/23  1250   COLORU Dark Yellow   APPEARANCEUA Clear   PHUR 7.0   SPECGRAV 1.025   PROTEINUA 100*   GLUCUA 500*   KETONESU Trace   BILIRUBINUA Small*   OCCULTUA Trace-Lysed*   NITRITE Negative   UROBILINOGEN 0.2   LEUKOCYTESUR Negative   RBCUA 3-5*   WBCUA 15-25*   BACTERIA Moderate*        Significant Imaging: I have reviewed all pertinent imaging results/findings within the past 24 hours.

## 2023-06-07 NOTE — PROGRESS NOTES
Pharmacokinetic Initial Assessment: IV Vancomycin    Assessment/Plan:    Initiate intravenous vancomycin 2gm q18 hrs  Desired empiric serum trough concentration is 10 to 20 mcg/mL  Draw vancomycin trough level 30 min prior to fourth dose on 6/10/23 at approximately 0030  Pharmacy will continue to follow and monitor vancomycin.      Please contact pharmacy at extension    with any questions regarding this assessment.     Thank you for the consult,   Howard Owen       Patient brief summary:  Faustino Fischer is a 53 y.o. male initiated on antimicrobial therapy with IV Vancomycin for treatment of suspected  infection    Drug Allergies:   Review of patient's allergies indicates:  No Known Allergies    Actual Body Weight:   95.3 kg    Renal Function:   Estimated Creatinine Clearance: 91.3 mL/min (based on SCr of 1 mg/dL).,     Dialysis Method (if applicable):  N/A    CBC (last 72 hours):  Recent Labs   Lab Result Units 06/07/23  1306   WBC K/uL 8.57   Hemoglobin g/dL 12.6*   Hematocrit % 35.6*   Platelet Count K/uL 121*   Lymphocytes % % 10.7*   Lymphocytes, Man % % 13*   Monocytes % % 5.7   Monocytes, Man % % 6   Eosinophils % % 0.6*   Eosinophils, Man % % 2   Basophils % % 0.4       Metabolic Panel (last 72 hours):  Recent Labs   Lab Result Units 06/07/23  1250 06/07/23  1306   Sodium mmol/L  --  134*   Potassium mmol/L  --  3.8   Chloride mmol/L  --  97*   CO2 mmol/L  --  27   Glucose mg/dL  --  318*   Glucose, UA mg/dL 500*  --    BUN mg/dL  --  9   Creatinine mg/dL  --  1.00   Albumin g/dL  --  2.9*   Bilirubin, Total mg/dL  --  0.5   Alk Phos U/L  --  83   AST U/L  --  23   ALT U/L  --  23   Magnesium mg/dL  --  1.0*       Drug levels (last 3 results):  No results for input(s): VANCOMYCINRA, VANCORANDOM, VANCOMYCINPE, VANCOPEAK, VANCOMYCINTR, VANCOTROUGH in the last 72 hours.    Microbiologic Results:  Microbiology Results (last 7 days)       Procedure Component Value Units Date/Time    Culture, Wound  [[id number]] Collected: 06/07/23 1558    Order Status: Sent Specimen: Abscess from Arm, Left Updated: 06/07/23 1600    Culture, Wound [[id number]] Collected: 06/07/23 1537    Order Status: Sent Specimen: Abscess from Leg, Left Updated: 06/07/23 1537    Urine culture [[id number]] Collected: 06/07/23 1250    Order Status: Sent Specimen: Urine Updated: 06/07/23 1323

## 2023-06-07 NOTE — ED TRIAGE NOTES
Patient presented to the ER c/o epigastric pain that developed this morning. Patient states that he had a sausage last night for dinner, and feels that attributes to his pain.

## 2023-06-07 NOTE — NURSING
Received patient to room 1103 via wheelchair from ED. Resp even and unlabored. Voiced no concerns at this time. Bed in low position. Call bell in reach. Patient is NPO. Signed placed on door. Will cont to monitor.

## 2023-06-08 ENCOUNTER — CLINICAL SUPPORT (OUTPATIENT)
Dept: WOUND CARE | Facility: HOSPITAL | Age: 53
DRG: 439 | End: 2023-06-08
Payer: MEDICARE

## 2023-06-08 DIAGNOSIS — L73.2 HYDRADENITIS: Primary | ICD-10-CM

## 2023-06-08 LAB
ALBUMIN SERPL BCP-MCNC: 2.7 G/DL (ref 3.5–5)
ALBUMIN/GLOB SERPL: 0.7 {RATIO}
ALP SERPL-CCNC: 85 U/L (ref 45–115)
ALT SERPL W P-5'-P-CCNC: 23 U/L (ref 16–61)
ANION GAP SERPL CALCULATED.3IONS-SCNC: 13 MMOL/L (ref 7–16)
AST SERPL W P-5'-P-CCNC: 16 U/L (ref 15–37)
BASOPHILS # BLD AUTO: 0.02 K/UL (ref 0–0.2)
BASOPHILS NFR BLD AUTO: 0.3 % (ref 0–1)
BILIRUB SERPL-MCNC: 0.4 MG/DL (ref ?–1.2)
BUN SERPL-MCNC: 11 MG/DL (ref 7–18)
BUN/CREAT SERPL: 11 (ref 6–20)
CALCIUM SERPL-MCNC: 8.8 MG/DL (ref 8.5–10.1)
CHLORIDE SERPL-SCNC: 100 MMOL/L (ref 98–107)
CHOLEST SERPL-MCNC: 142 MG/DL (ref 0–200)
CHOLEST/HDLC SERPL: 3.1 {RATIO}
CO2 SERPL-SCNC: 31 MMOL/L (ref 21–32)
CREAT SERPL-MCNC: 0.99 MG/DL (ref 0.7–1.3)
DIFFERENTIAL METHOD BLD: ABNORMAL
EGFR (NO RACE VARIABLE) (RUSH/TITUS): 91 ML/MIN/1.73M2
EOSINOPHIL # BLD AUTO: 0.11 K/UL (ref 0–0.5)
EOSINOPHIL NFR BLD AUTO: 1.9 % (ref 1–4)
ERYTHROCYTE [DISTWIDTH] IN BLOOD BY AUTOMATED COUNT: 13 % (ref 11.5–14.5)
EST. AVERAGE GLUCOSE BLD GHB EST-MCNC: 124 MG/DL
GLOBULIN SER-MCNC: 3.9 G/DL (ref 2–4)
GLUCOSE SERPL-MCNC: 106 MG/DL (ref 70–105)
GLUCOSE SERPL-MCNC: 109 MG/DL (ref 74–106)
GLUCOSE SERPL-MCNC: 129 MG/DL (ref 70–105)
GLUCOSE SERPL-MCNC: 162 MG/DL (ref 70–105)
GLUCOSE SERPL-MCNC: 239 MG/DL (ref 70–105)
HBA1C MFR BLD HPLC: 6.3 % (ref 4.5–6.6)
HCT VFR BLD AUTO: 35 % (ref 40–54)
HDLC SERPL-MCNC: 46 MG/DL (ref 40–60)
HGB BLD-MCNC: 11.7 G/DL (ref 13.5–18)
LDLC SERPL CALC-MCNC: 66 MG/DL
LDLC/HDLC SERPL: 1.4 {RATIO}
LYMPHOCYTES # BLD AUTO: 1.3 K/UL (ref 1–4.8)
LYMPHOCYTES NFR BLD AUTO: 22.2 % (ref 27–41)
MAGNESIUM SERPL-MCNC: 2.8 MG/DL (ref 1.7–2.3)
MCH RBC QN AUTO: 34.7 PG (ref 27–31)
MCHC RBC AUTO-ENTMCNC: 33.4 G/DL (ref 32–36)
MCV RBC AUTO: 103.9 FL (ref 80–96)
MONOCYTES # BLD AUTO: 0.39 K/UL (ref 0–0.8)
MONOCYTES NFR BLD AUTO: 6.7 % (ref 2–6)
MPC BLD CALC-MCNC: 10.2 FL (ref 9.4–12.4)
NEUTROPHILS # BLD AUTO: 4.04 K/UL (ref 1.8–7.7)
NEUTROPHILS NFR BLD AUTO: 68.9 % (ref 53–65)
NONHDLC SERPL-MCNC: 96 MG/DL
PLATELET # BLD AUTO: 266 K/UL (ref 150–400)
POTASSIUM SERPL-SCNC: 3.5 MMOL/L (ref 3.5–5.1)
PROT SERPL-MCNC: 6.6 G/DL (ref 6.4–8.2)
RBC # BLD AUTO: 3.37 M/UL (ref 4.6–6.2)
SODIUM SERPL-SCNC: 140 MMOL/L (ref 136–145)
TRIGL SERPL-MCNC: 148 MG/DL (ref 35–150)
VLDLC SERPL-MCNC: 30 MG/DL
WBC # BLD AUTO: 5.86 K/UL (ref 4.5–11)

## 2023-06-08 PROCEDURE — 83036 HEMOGLOBIN GLYCOSYLATED A1C: CPT | Performed by: NURSE PRACTITIONER

## 2023-06-08 PROCEDURE — C9113 INJ PANTOPRAZOLE SODIUM, VIA: HCPCS | Performed by: NURSE PRACTITIONER

## 2023-06-08 PROCEDURE — 25000003 PHARM REV CODE 250: Performed by: NURSE PRACTITIONER

## 2023-06-08 PROCEDURE — 85025 COMPLETE CBC W/AUTO DIFF WBC: CPT | Performed by: NURSE PRACTITIONER

## 2023-06-08 PROCEDURE — 11000001 HC ACUTE MED/SURG PRIVATE ROOM

## 2023-06-08 PROCEDURE — 99232 PR SUBSEQUENT HOSPITAL CARE,LEVL II: ICD-10-PCS | Mod: ,,, | Performed by: NURSE PRACTITIONER

## 2023-06-08 PROCEDURE — 99214 OFFICE O/P EST MOD 30 MIN: CPT

## 2023-06-08 PROCEDURE — 63600175 PHARM REV CODE 636 W HCPCS: Performed by: FAMILY MEDICINE

## 2023-06-08 PROCEDURE — 80061 LIPID PANEL: CPT | Performed by: NURSE PRACTITIONER

## 2023-06-08 PROCEDURE — 83735 ASSAY OF MAGNESIUM: CPT | Performed by: NURSE PRACTITIONER

## 2023-06-08 PROCEDURE — 25000003 PHARM REV CODE 250: Performed by: FAMILY MEDICINE

## 2023-06-08 PROCEDURE — 99232 SBSQ HOSP IP/OBS MODERATE 35: CPT | Mod: ,,, | Performed by: NURSE PRACTITIONER

## 2023-06-08 PROCEDURE — 63600175 PHARM REV CODE 636 W HCPCS: Performed by: NURSE PRACTITIONER

## 2023-06-08 PROCEDURE — 82962 GLUCOSE BLOOD TEST: CPT

## 2023-06-08 PROCEDURE — 94761 N-INVAS EAR/PLS OXIMETRY MLT: CPT

## 2023-06-08 PROCEDURE — 80053 COMPREHEN METABOLIC PANEL: CPT | Performed by: NURSE PRACTITIONER

## 2023-06-08 RX ORDER — DOCUSATE SODIUM 100 MG/1
100 CAPSULE, LIQUID FILLED ORAL 2 TIMES DAILY
Status: DISCONTINUED | OUTPATIENT
Start: 2023-06-08 | End: 2023-06-14 | Stop reason: HOSPADM

## 2023-06-08 RX ORDER — CLINDAMYCIN PHOSPHATE 11.9 MG/ML
SOLUTION TOPICAL 2 TIMES DAILY
Qty: 60 ML | Refills: 2 | Status: ON HOLD | OUTPATIENT
Start: 2023-06-08 | End: 2023-06-15 | Stop reason: HOSPADM

## 2023-06-08 RX ADMIN — CLOPIDOGREL BISULFATE 75 MG: 75 TABLET ORAL at 09:06

## 2023-06-08 RX ADMIN — HYDROMORPHONE HYDROCHLORIDE 1 MG: 2 INJECTION, SOLUTION INTRAMUSCULAR; INTRAVENOUS; SUBCUTANEOUS at 06:06

## 2023-06-08 RX ADMIN — HYDROMORPHONE HYDROCHLORIDE 1 MG: 2 INJECTION, SOLUTION INTRAMUSCULAR; INTRAVENOUS; SUBCUTANEOUS at 02:06

## 2023-06-08 RX ADMIN — FENOFIBRATE 145 MG: 145 TABLET ORAL at 09:06

## 2023-06-08 RX ADMIN — CARVEDILOL 12.5 MG: 12.5 TABLET, FILM COATED ORAL at 05:06

## 2023-06-08 RX ADMIN — ASPIRIN 81 MG 81 MG: 81 TABLET ORAL at 09:06

## 2023-06-08 RX ADMIN — ISOSORBIDE MONONITRATE 30 MG: 30 TABLET, EXTENDED RELEASE ORAL at 09:06

## 2023-06-08 RX ADMIN — INSULIN ASPART 2 UNITS: 100 INJECTION, SOLUTION INTRAVENOUS; SUBCUTANEOUS at 12:06

## 2023-06-08 RX ADMIN — ENOXAPARIN SODIUM 40 MG: 40 INJECTION SUBCUTANEOUS at 04:06

## 2023-06-08 RX ADMIN — HYDROMORPHONE HYDROCHLORIDE 1 MG: 2 INJECTION, SOLUTION INTRAMUSCULAR; INTRAVENOUS; SUBCUTANEOUS at 10:06

## 2023-06-08 RX ADMIN — PIPERACILLIN AND TAZOBACTAM 4.5 G: 4; .5 INJECTION, POWDER, LYOPHILIZED, FOR SOLUTION INTRAVENOUS; PARENTERAL at 11:06

## 2023-06-08 RX ADMIN — VANCOMYCIN HYDROCHLORIDE 2000 MG: 1 INJECTION, POWDER, LYOPHILIZED, FOR SOLUTION INTRAVENOUS at 01:06

## 2023-06-08 RX ADMIN — LOSARTAN POTASSIUM 50 MG: 50 TABLET, FILM COATED ORAL at 09:06

## 2023-06-08 RX ADMIN — ONDANSETRON 4 MG: 2 INJECTION INTRAMUSCULAR; INTRAVENOUS at 06:06

## 2023-06-08 RX ADMIN — DOCUSATE SODIUM 100 MG: 100 CAPSULE, LIQUID FILLED ORAL at 09:06

## 2023-06-08 RX ADMIN — INSULIN ASPART 2 UNITS: 100 INJECTION, SOLUTION INTRAVENOUS; SUBCUTANEOUS at 01:06

## 2023-06-08 RX ADMIN — DOCUSATE SODIUM 100 MG: 100 CAPSULE, LIQUID FILLED ORAL at 08:06

## 2023-06-08 RX ADMIN — SODIUM CHLORIDE 250 ML: 9 INJECTION, SOLUTION INTRAVENOUS at 07:06

## 2023-06-08 RX ADMIN — PIPERACILLIN AND TAZOBACTAM 4.5 G: 4; .5 INJECTION, POWDER, LYOPHILIZED, FOR SOLUTION INTRAVENOUS; PARENTERAL at 12:06

## 2023-06-08 RX ADMIN — PIPERACILLIN AND TAZOBACTAM 4.5 G: 4; .5 INJECTION, POWDER, LYOPHILIZED, FOR SOLUTION INTRAVENOUS; PARENTERAL at 08:06

## 2023-06-08 RX ADMIN — PIPERACILLIN AND TAZOBACTAM 4.5 G: 4; .5 INJECTION, POWDER, LYOPHILIZED, FOR SOLUTION INTRAVENOUS; PARENTERAL at 03:06

## 2023-06-08 RX ADMIN — ATORVASTATIN CALCIUM 80 MG: 40 TABLET, FILM COATED ORAL at 09:06

## 2023-06-08 RX ADMIN — SODIUM CHLORIDE: 9 INJECTION, SOLUTION INTRAVENOUS at 11:06

## 2023-06-08 RX ADMIN — MAGNESIUM SULFATE HEPTAHYDRATE 2 G: 40 INJECTION, SOLUTION INTRAVENOUS at 12:06

## 2023-06-08 RX ADMIN — PANTOPRAZOLE SODIUM 40 MG: 40 INJECTION, POWDER, LYOPHILIZED, FOR SOLUTION INTRAVENOUS at 09:06

## 2023-06-08 NOTE — PLAN OF CARE
[facility]  Initial Discharge Assessment       Primary Care Provider: Juan Pablo Ryan MD    Admission Diagnosis: Hypomagnesemia [E83.42]  Hydradenitis [L73.2]  Epigastric pain [R10.13]  Abscess of left buttock [L02.31]  Acute pancreatitis without infection or necrosis, unspecified pancreatitis type [K85.90]    Admission Date: 6/7/2023  Expected Discharge Date:     Transition of Care Barriers: (P) Mobility    Payor: MEDICARE / Plan: MEDICARE PART A & B / Product Type: Government /     Extended Emergency Contact Information  Primary Emergency Contact: Vera Mehta  Mobile Phone: [phone]  Relation: Mother  Preferred language: English   needed? No    Discharge Plan A: (P) Home         [facility] [address]  [address]  [facility] [address]  Phone: [phone] Fax: [phone]    [facility] [address] [place] [address] [address] [facility] [address]  [address]  [facility] [address]  Phone: [phone] Fax: [phone]    [facility] [address]  [address]  [place] [address]  Phone: [phone] Fax: [phone]      Initial Assessment (most recent)       Adult Discharge Assessment - 06/08/23 1146          Discharge Assessment    Assessment Type Discharge Planning Assessment (P)      Confirmed/corrected address, phone number and insurance Yes (P)      Confirmed Demographics Correct on Facesheet (P)      Source of Information patient (P)      Reason For Admission Acute on chronic pancreatitis (P)      People in Home alone (P)      Facility Arrived From: home (P)      Do you expect to return to your current living situation? Yes (P)      Do you have help at home or someone to help you manage your care at home? No (P)      Prior to hospitilization cognitive status: Alert/Oriented (P)      Current cognitive status: Alert/Oriented (P)      Walking or Climbing Stairs  "ambulation difficulty, requires equipment (P)      Home Accessibility stairs to enter home (P)      Home Layout Other (see comments) (P)    Live on 2nd floor    Equipment Currently Used at Home none (P)      Patient currently being followed by outpatient case management? No (P)      Do you currently have service(s) that help you manage your care at home? No (P)      Who is going to help you get home at discharge? Will be alone (P)      How do you get to doctors appointments? family or friend will provide (P)      Discharge Plan A Home (P)      DME Needed Upon Discharge  cane, straight;walker, standard (P)      Discharge Plan discussed with: Patient (P)      Transition of Care Barriers Mobility (P)                    Mr. Fischer says he live in the local apartment complex, he has been having a hard time getting into and out of his home because he live on the second floor.  "I don't  have any type of assistive device but I need a walker but I probably can't manage it up the stairs so I guess I'll have to get a cane to help me."  Mr. Fischer has denied having any type of Home Health or  services.             "

## 2023-06-08 NOTE — PROGRESS NOTES
"[facility]  Hospital Medicine  Progress Note    Patient Name: Faustino Fischer  MRN: [id number]  Patient Class: IP- Inpatient   Admission Date: 6/7/2023  Length of Stay: 1 days  Attending Physician: Edson Astorga Jr., MD  Primary Care Provider: Juan Pablo Ryan MD        Subjective:     Principal Problem:Acute on chronic pancreatitis        HPI:  Presented with c/o epigastric pain with nausea and dry heaves that started last night. States has freq bouts of pancreatitis and presenting symptoms today are indicative of an acute episode for him. Denies ETOH or DM (states since losing wt). Previous cholecystectomy. Denies fever or chills, chest pain, dyspnea, diarrhea or constipation. States that pain started after eating a Bratwurst and hot chips last night. States has been trying to treat himself at home by remaining NPO but is not better.     Labs ordered and reviewed. Lipase 1069, amylase 126, AST 23, ALT 23, T BR 0.5, troponin 19.9, BUN 9, creatinine 1, Mg 1, , K+ 3.8, WBC 8570 with H&H 12.6 and 35.6 and plt 123966. glucose 318. UA shows mod bacteria with 15-25 WBCs. CT abd pelvis with contrast ordered and reviewed.      NS bolus x 1 Liter, MS 4 mg IVP, ondansetron 4 mg IVP given in the ED.     CT abd/pelvis with contrast shows fat stranding indicative of acute pancreatitis, fatty liver, previous cholecystectomy with no ductal dilation.     Discussed findings and need for adm with pt. He agreed. Discussed pt's case with Dr. Astorga. He accepted pt for admission.     Upon discussing adm with pt, he reports that he has a draining abscess to his left "inner thigh". Upon exam, an open draining abscess is noted located on the lower aspect of his left buttock. Mild surrounding erythema with no swelling. Opening is approx 3 cm in length. Pt states has multiple abscesses in the last year to his axilla and groin. Had one incised but says that he has no sought medical care for all " of them. Has multiple scarring under both arms as well.     Discussed these findings with Dr. Astorga. He evaluated pt in the ED. Zosyn 4.5 Gm IVPB started in the ED. Pt will be treated with Vancomycin as well.      Overview/Hospital Course:  06/08/23 Awake and resting in bed. Reports pain to epigastric area at level 2 . States he recently had pain med and prior to that , pain was at level 7.  No nausea , vomiting or diarrhea overnight.  Wound care consult placed for right axilla abscess and left buttock. Continue abx. Cultures from areas pending. Continue hydration and pain control.      Interval History: 06/08/23 Awake and resting in bed. Reports pain to epigastric area at level 2 . States he recently had pain med and prior to that , pain was at level 7.  No nausea , vomiting or diarrhea overnight.  Wound care consult placed for right axilla abscess and left buttock. Continue abx. Cultures from areas pending. Continue hydration and pain control.    Review of Systems   Constitutional:  Negative for appetite change.   Respiratory:  Negative for cough.    Gastrointestinal:  Positive for abdominal pain.   Genitourinary:  Negative for difficulty urinating.   Skin:  Positive for wound.        Abscess left axilla, left buttock    Objective:     Vital Signs (Most Recent):  Temp: 97.5 °F (36.4 °C) (06/08/23 0735)  Pulse: 70 (06/08/23 0735)  Resp: 18 (06/08/23 0735)  BP: 110/74 (06/08/23 0735)  SpO2: 95 % (06/08/23 0735) Vital Signs (24h Range):  Temp:  [97.5 °F (36.4 °C)-98.9 °F (37.2 °C)] 97.5 °F (36.4 °C)  Pulse:  [67-97] 70  Resp:  [16-20] 18  SpO2:  [92 %-98 %] 95 %  BP: (110-208)/() 110/74     Weight: 95.3 kg (210 lb)  Body mass index is 33.89 kg/m².    Intake/Output Summary (Last 24 hours) at 6/8/2023 0819  Last data filed at 6/7/2023 1652  Gross per 24 hour   Intake 1250 ml   Output --   Net 1250 ml         Physical Exam  HENT:      Head: Normocephalic.      Mouth/Throat:      Mouth: Mucous membranes are  moist.   Cardiovascular:      Rate and Rhythm: Normal rate and regular rhythm.      Pulses: Normal pulses.      Heart sounds: Normal heart sounds.   Pulmonary:      Effort: Pulmonary effort is normal.      Breath sounds: Normal breath sounds.   Abdominal:      General: Bowel sounds are normal.      Palpations: Abdomen is soft.      Tenderness: There is abdominal tenderness.      Comments: Epigastric tenderness   Musculoskeletal:         General: Normal range of motion.   Skin:     General: Skin is warm and dry.   Neurological:      Mental Status: He is alert and oriented to person, place, and time.   Psychiatric:         Mood and Affect: Mood normal.           Significant Labs: All pertinent labs within the past 24 hours have been reviewed.  Recent Lab Results  (Last 5 results in the past 24 hours)        06/08/23  0555   06/08/23  0531   06/08/23  0021   06/07/23  1838   06/07/23  1410        Albumin/Globulin Ratio   0.7             Albumin   2.7             Alcohol, Serum         <3       Alkaline Phosphatase   85             ALT   23             Anion Gap   13             AST   16             Baso #   0.02             Basophil %   0.3             BILIRUBIN TOTAL   0.4             BUN   11             BUN/CREAT RATIO   11             Calcium   8.8             Chloride   100             CO2   31             Creatinine   0.99             Differential Type   Auto             eGFR   91             Eos #   0.11             Eosinophil %   1.9             Globulin, Total   3.9             Glucose   109             Hematocrit   35.0             Hemoglobin   11.7             Lymph #   1.30             Lymph %   22.2             Magnesium   2.8             MCH   34.7             MCHC   33.4             MCV   103.9             Mono #   0.39             Mono %   6.7             MPV   10.2             Neutrophils, Abs   4.04             Neutrophils Relative   68.9             Platelets   266             POC Glucose 129      239   260         Potassium   3.5             PROTEIN TOTAL   6.6             RBC   3.37             RDW   13.0             Sodium   140             WBC   5.86                                    Significant Imaging: I have reviewed all pertinent imaging results/findings within the past 24 hours.      Assessment/Plan:      * Acute on chronic pancreatitis  NS 1 liter bolus given in the ED  NS at 150ml/h  Ondansetron q8hprn/Phenergan 25 mg IVPB q6hprn  Dilaudid 1 mg q4hprn pain  NPO  CMP, CBC daily    06/08/23 continue fluids, NPO, pain control     Hydradenitis  Abscess to right axilla and left buttock  Wound cultures pending  Vancomycin (pharmacy to dose and manage)  Zosyn 4.5Gms IVPB q8h    06/08/23 cultures pending  Continue abx    Mixed hyperlipidemia  Lipid profile  Continue atorvastatin and fenofibrate      Type 2 diabetes mellitus without complication, without long-term current use of insulin  Pt reports not taking any oral meds x 1 mo because he had lost wt.  Glucose 318 today  NS bolus in ED  Accuchecks q6h with mod sliding scale coverage    06/08/23 stable    Hypomagnesemia  Mg 1.0 in ED  MgSO4 2 Gms IVPB given in ED  MgSO4 2Gms IVPB q1h x 2 doses  Repeat Mg in am      06/08/23 magnesium 2.8      VTE Risk Mitigation (From admission, onward)         Ordered     enoxaparin injection 40 mg  Daily         06/07/23 1737     IP VTE HIGH RISK PATIENT  Once         06/07/23 1737     Place JULIO hose  Until discontinued         06/07/23 1737                Discharge Planning   BETY:      Code Status: Full Code   Is the patient medically ready for discharge?:     Reason for patient still in hospital (select all that apply): Treatment                     DOC Thomas  Department of Hospital Medicine   [facility]

## 2023-06-08 NOTE — ASSESSMENT & PLAN NOTE
Abscess to right axilla and left buttock  Wound cultures pending  Vancomycin (pharmacy to dose and manage)  Zosyn 4.5Gms IVPB q8h    06/08/23 cultures pending  Continue abx

## 2023-06-08 NOTE — ASSESSMENT & PLAN NOTE
Abscess to right axilla and left buttock  Wound cultures pending  Vancomycin (pharmacy to dose and manage)  Zosyn 4.5Gms IVPB q8h

## 2023-06-08 NOTE — ASSESSMENT & PLAN NOTE
Mg 1.0 in ED  MgSO4 2 Gms IVPB given in ED  MgSO4 2Gms IVPB q1h x 2 doses  Repeat Mg in am      06/08/23 magnesium 2.8

## 2023-06-08 NOTE — HOSPITAL COURSE
"06/08/23 Awake and resting in bed. Reports pain to epigastric area at level 2 . States he recently had pain med and prior to that , pain was at level 7.  No nausea , vomiting or diarrhea overnight.  Wound care consult placed for right axilla abscess and left buttock. Continue abx. Cultures from areas pending. Continue hydration and pain control.  06/09/23 Complains of having what felt like a bladder spasm last night. States has resolved. Having less abdominal pain now. Reports having more soreness like pain to epigastric area. No vomiting or diarrhea. Reports pain level is at level 2. Will start clear liquids today and see how it tolerates it. Cultures from right axilla and left buttock pending. Continue vancomycin and zosyn. No drainage noted from right axilla this morning. Labs pending.  Lipase is normal at 173.  Tolerated CL breakfast without nausea , vomiting or pain. Probable dc in am.    6/10/23 Rounds on patient this morning: Patient reports he is feeling well.  He reports mild suprapubic abd "pressure", intermittent bladder spasms (when needing to void) and "dribbling" when trying to void.  Denies upper abd pain and n/v/d.  Pt noted to still be on clear liquid diet.  I was able to advance him to full liquids for breakfast, which he tolerated well.  Will advance his diet for lunch and supper as tolerated.  WBC normal and Lipase is normal.  However, I noted creatinine increased from 1.19 to 2.84 and GFR went from 73 to 26 in 24 hour period.  Pt reports he is drinking plenty of water. He is on IVF's at 50mls/hr.  BUN normal.  Pt reports when he was admitted at [facility] in [place] that he was having the same bladder spasms and dribbling.  However, he doesn't remember what the MD told him.  I did not see any records of him having those issues during his stay at [facility].  I will increase his IVFs to 100mls/hr and add Flomax daily to his regimen.  I spoke with him about seeing if he is possibly retaining " urine.  We do not have bladder scanner here at our facility.  I discussed with him to let him void, then do post-void I&O cath to assess for retention due to possible ARRIAZA.  He agreed to the cath.  However, when the nurse went in to perform the cath, the patient reports he stream has increased, and he wanted to wait on the catheter.  Will repeat the lab tomorrow morning to assess for changes.   Wounds to allison axilla and left groin appear to be healing well.  No drainage.         06/11/23 Mr. Fischer reports this morning that he is no longer having difficulty urinating. He denies abdominal pain or pressure today. He is continuing to receive Flomax po. He is tolerating a regular ADA since yesterday. He denies n/v/d. He reports that his abscess to right axilla and left buttock drng has improved. His wound culture grew out Staph lugdenensis. Yesterday, Vancomycin and Zosyn were discontinued. He was started on Doxcycline po and has tolerated well po. Today, his BUN and creatinine have increased to 19 and 4.2 (up from 2.84). Ck was 34. Random vanc trough was 20.5. NS bolus x 1 liter IV given. Continue maintenance fluids at 100 ml/h. CMP daily. Renal u/s in am.    06/12/23 Awake and sitting up in bed. Denies any pain or sob. Reports that he is voiding well without hesitancy. Renal ultrasound scheduled for today. Creatinine is up to 4.67 this am. Continue fluids.    06/13/23 Creatinine is up to 4.8. He is voiding well. No complaints. Talked with Dr. Edson Astorga re transfer to higher level of care due to HAKEEM, for nephrology consult. He has agreed to accept patient in transfer. PFC request placed. Awaiting return call. Mr. Fischer is agreeable to transfer to [facility].

## 2023-06-08 NOTE — ASSESSMENT & PLAN NOTE
Pt reports not taking any oral meds x 1 mo because he had lost wt.  Glucose 318 today  NS bolus in ED  Accuchecks q6h with mod sliding scale coverage    06/08/23 stable

## 2023-06-08 NOTE — ASSESSMENT & PLAN NOTE
Pt reports not taking any oral meds x 1 mo because he had lost wt.  Glucose 318 today  NS bolus in ED  Accuchecks q6h with mod sliding scale coverage

## 2023-06-08 NOTE — ASSESSMENT & PLAN NOTE
NS 1 liter bolus given in the ED  NS at 150ml/h  Ondansetron q8hprn/Phenergan 25 mg IVPB q6hprn  Dilaudid 1 mg q4hprn pain  NPO  CMP, CBC daily

## 2023-06-08 NOTE — H&P
"[facility] - Medical Surgical Unit  Hospital Medicine  History & Physical    Patient Name: Faustino Fischer  MRN: [id number]  Patient Class: IP- Inpatient  Admission Date: 6/7/2023  Attending Physician: Edson Astorga Jr., MD   Primary Care Provider: Juan Pablo Ryan MD         Patient information was obtained from patient and ER records.     Subjective:     Principal Problem:Acute on chronic pancreatitis    Chief Complaint:   Chief Complaint   Patient presents with    Abdominal Pain    Nausea        HPI: Presented with c/o epigastric pain with nausea and dry heaves that started last night. States has freq bouts of pancreatitis and presenting symptoms today are indicative of an acute episode for him. Denies ETOH or DM (states since losing wt). Previous cholecystectomy. Denies fever or chills, chest pain, dyspnea, diarrhea or constipation. States that pain started after eating a Bratwurst and hot chips last night. States has been trying to treat himself at home by remaining NPO but is not better.     Labs ordered and reviewed. Lipase 1069, amylase 126, AST 23, ALT 23, T BR 0.5, troponin 19.9, BUN 9, creatinine 1, Mg 1, , K+ 3.8, WBC 8570 with H&H 12.6 and 35.6 and plt [id number]. glucose 318. UA shows mod bacteria with 15-25 WBCs. CT abd pelvis with contrast ordered and reviewed.      NS bolus x 1 Liter, MS 4 mg IVP, ondansetron 4 mg IVP given in the ED.     CT abd/pelvis with contrast shows fat stranding indicative of acute pancreatitis, fatty liver, previous cholecystectomy with no ductal dilation.     Discussed findings and need for adm with pt. He agreed. Discussed pt's case with Dr. Astorga. He accepted pt for admission.     Upon discussing adm with pt, he reports that he has a draining abscess to his left "inner thigh". Upon exam, an open draining abscess is noted located on the lower aspect of his left buttock. Mild surrounding erythema with no swelling. Opening is approx 3 cm in length. Pt " states has multiple abscesses in the last year to his axilla and groin. Had one incised but says that he has no sought medical care for all of them. Has multiple scarring under both arms as well.     Discussed these findings with Dr. Astorga. He evaluated pt in the ED. Zosyn 4.5 Gm IVPB started in the ED. Pt will be treated with Vancomycin as well.      Past Medical History:   Diagnosis Date    Cardiomegaly     CHF (congestive heart failure)     Coronary artery disease     Diabetes mellitus     Hypertension     Irregular heart beat     Myocardial infarction     Pancreatitis     Renal failure        Past Surgical History:   Procedure Laterality Date    CARDIAC SURGERY      Stents x2    CHOLECYSTECTOMY      UNDESCENDED TESTICLE EXPLORATION N/A        Review of patient's allergies indicates:  No Known Allergies    No current facility-administered medications on file prior to encounter.     Current Outpatient Medications on File Prior to Encounter   Medication Sig    aspirin 81 MG Chew Take 1 tablet (81 mg total) by mouth once daily.    atorvastatin (LIPITOR) 80 MG tablet Take 1 tablet (80 mg total) by mouth once daily.    carvediloL (COREG) 12.5 MG tablet Take 1 tablet (12.5 mg total) by mouth before breakfast.    clopidogreL (PLAVIX) 75 mg tablet Take 75 mg by mouth.    fenofibrate (TRICOR) 145 MG tablet Take 1 tablet (145 mg total) by mouth once daily.    furosemide (LASIX) 20 MG tablet Take 1 tablet (20 mg total) by mouth once daily.    isosorbide mononitrate (IMDUR) 30 MG 24 hr tablet Take 1 tablet (30 mg total) by mouth once daily. Take 1/2 tablet with breakfast    losartan (COZAAR) 50 MG tablet Take 1 tablet (50 mg total) by mouth once daily.    metFORMIN (GLUCOPHAGE-XR) 500 MG ER 24hr tablet Take 1 tablet (500 mg total) by mouth 2 (two) times daily with meals.    pantoprazole (PROTONIX) 40 MG tablet Take 40 mg by mouth.    SUPER THIN LANCETS 28 gauge Misc 2 (two) times daily.    TRUE  METRIX GLUCOSE METER Misc 2 (two) times daily.    [DISCONTINUED] HYDROcodone-acetaminophen (NORCO) 5-325 mg per tablet Take 1 tablet by mouth every 6 (six) hours as needed for Pain.    [DISCONTINUED] ondansetron (ZOFRAN) 4 MG tablet Take 1 tablet (4 mg total) by mouth 3 (three) times daily as needed for Nausea.     Family History       Problem Relation (Age of Onset)    Heart disease Father, Brother, Maternal Grandmother    Hypertension Father          Tobacco Use    Smoking status: Some Days     Packs/day: 0.25     Years: 18.00     Pack years: 4.50     Types: Cigarettes     Start date: 2004    Smokeless tobacco: Never   Substance and Sexual Activity    Alcohol use: Not Currently     Comment: occasional drink previous heavy drinker quit heavly in 2001    Drug use: Never    Sexual activity: Not Currently     Review of Systems   Constitutional:  Positive for activity change and appetite change. Negative for fever.   HENT: Negative.     Respiratory:  Negative for chest tightness and shortness of breath.    Cardiovascular:  Negative for chest pain.   Gastrointestinal:  Positive for abdominal pain (epigastric pain), nausea and vomiting (mostly dry heaves). Negative for anal bleeding, blood in stool, constipation and diarrhea.   Genitourinary:  Negative for difficulty urinating.   Skin:  Positive for wound (abscess to left buttock, abscess to left axilla).   Neurological: Negative.    Psychiatric/Behavioral: Negative.     Objective:     Vital Signs (Most Recent):  Temp: 98.9 °F (37.2 °C) (06/07/23 1214)  Pulse: 76 (06/07/23 1655)  Resp: 18 (06/07/23 1655)  BP: 131/89 (06/07/23 1655)  SpO2: (!) 94 % (06/07/23 1655) Vital Signs (24h Range):  Temp:  [98.9 °F (37.2 °C)] 98.9 °F (37.2 °C)  Pulse:  [76-97] 76  Resp:  [16-20] 18  SpO2:  [93 %-97 %] 94 %  BP: (131-208)/() 131/89     Weight: 95.3 kg (210 lb)  Body mass index is 33.89 kg/m².     Physical Exam  Vitals and nursing note reviewed.   Constitutional:        General: He is not in acute distress.     Appearance: He is ill-appearing. He is not toxic-appearing.   HENT:      Head: Normocephalic.      Mouth/Throat:      Mouth: Mucous membranes are dry.   Eyes:      Extraocular Movements: Extraocular movements intact.      Conjunctiva/sclera: Conjunctivae normal.      Pupils: Pupils are equal, round, and reactive to light.   Cardiovascular:      Rate and Rhythm: Normal rate and regular rhythm.      Pulses: Normal pulses.      Heart sounds: Normal heart sounds.   Pulmonary:      Effort: Pulmonary effort is normal.      Breath sounds: Normal breath sounds.   Abdominal:      General: Bowel sounds are normal. There is no distension.      Palpations: Abdomen is soft.      Tenderness: There is abdominal tenderness (epigastric).   Musculoskeletal:      Cervical back: Normal range of motion.      Right lower leg: No edema.      Left lower leg: No edema.   Skin:     General: Skin is warm and dry.      Capillary Refill: Capillary refill takes less than 2 seconds.      Findings: Lesion present.      Comments: Abscess to left axilla with purulent drng. No erythema surrounding. Open wound to left buttocks with purulent drng. See pics   Neurological:      General: No focal deficit present.      Mental Status: He is alert.   Psychiatric:         Mood and Affect: Mood normal.                  CRANIAL NERVES     CN III, IV, VI   Pupils are equal, round, and reactive to light.     Significant Labs: All pertinent labs within the past 24 hours have been reviewed.  CBC:   Recent Labs   Lab 06/07/23  1306   WBC 8.57   HGB 12.6*   HCT 35.6*   *     CMP:   Recent Labs   Lab 06/07/23  1306   *   K 3.8   CL 97*   CO2 27   *   BUN 9   CREATININE 1.00   CALCIUM 8.7   PROT 6.8   ALBUMIN 2.9*   BILITOT 0.5   ALKPHOS 83   AST 23   ALT 23   ANIONGAP 14     Lipase:   Recent Labs   Lab 06/07/23  1306   LIPASE 1,069*     Magnesium:   Recent Labs   Lab 06/07/23  1306   MG 1.0*     Troponin:    Recent Labs   Lab 06/07/23  1306   TROPONINIHS 19.9     Urine Studies:   Recent Labs   Lab 06/07/23  1250   COLORU Dark Yellow   APPEARANCEUA Clear   PHUR 7.0   SPECGRAV 1.025   PROTEINUA 100*   GLUCUA 500*   KETONESU Trace   BILIRUBINUA Small*   OCCULTUA Trace-Lysed*   NITRITE Negative   UROBILINOGEN 0.2   LEUKOCYTESUR Negative   RBCUA 3-5*   WBCUA 15-25*   BACTERIA Moderate*       Significant Imaging: I have reviewed all pertinent imaging results/findings within the past 24 hours.    Assessment/Plan:     * Acute on chronic pancreatitis  NS 1 liter bolus given in the ED  NS at 150ml/h  Ondansetron q8hprn/Phenergan 25 mg IVPB q6hprn  Dilaudid 1 mg q4hprn pain  NPO  CMP, CBC daily      Hydradenitis  Abscess to right axilla and left buttock  Wound cultures pending  Vancomycin (pharmacy to dose and manage)  Zosyn 4.5Gms IVPB q8h        Mixed hyperlipidemia  Lipid profile  Continue atorvastatin and fenofibrate      Type 2 diabetes mellitus without complication, without long-term current use of insulin  Pt reports not taking any oral meds x 1 mo because he had lost wt.  Glucose 318 today  NS bolus in ED  Accuchecks q6h with mod sliding scale coverage    Hypomagnesemia  Mg 1.0 in ED  MgSO4 2 Gms IVPB given in ED  MgSO4 2Gms IVPB q1h x 2 doses  Repeat Mg in am        VTE Risk Mitigation (From admission, onward)         Ordered     enoxaparin injection 40 mg  Daily         06/07/23 1737     IP VTE HIGH RISK PATIENT  Once         06/07/23 1737     Place JULIO hose  Until discontinued         06/07/23 1737                           Sadia Mcpherson NP  Department of Hospital Medicine  [facility] - Medical Surgical Unit

## 2023-06-08 NOTE — ASSESSMENT & PLAN NOTE
NS 1 liter bolus given in the ED  NS at 150ml/h  Ondansetron q8hprn/Phenergan 25 mg IVPB q6hprn  Dilaudid 1 mg q4hprn pain  NPO  CMP, CBC daily    06/08/23 continue fluids, NPO, pain control

## 2023-06-08 NOTE — PROGRESS NOTES
"NEW WOUND CARE CONSULT          Patient Name: Faustino Fischer is a 53 y.o. male       Chief Complaint:  New Wound Care Consult    Review of patient's allergies indicates:   Allergen Reactions    Ticagrelor Shortness Of Breath        I have reviewed the patient's medical, surgical, family and social history.      Subjective:    HPI     Wound Location: left groin and right axilla    Wound Duration: onset approximately 5/8/23    Wound Context: hydradenitis    Wound Pain: mild to moderate    Associated S/S: drainage    52 YO AAM seen today for initial inpatient assessment. Pt is currently admitted due to acute on chronic pancreatitis. On admit, it was also noted that patient had "abscesses" to L groin and R axilla. Review of history reveals multiple "abscesses" to the groin and axillae. Wound to axilla is only a "pin hole" but is draining purulent exudate. Wound to groin is open and draining. Pt reports it "opened on it's own." Wound cultures for both wounds are pending. Pt is currently receiving IV Vancomycin and Zosyn as per attending orders.   Pt reports he no longer smokes.       Review of Systems   Constitutional:  Positive for appetite change.   Respiratory:  Negative for shortness of breath.    Gastrointestinal:  Positive for abdominal pain and nausea.   Genitourinary:  Negative for bladder incontinence and difficulty urinating.   Integumentary:  Positive for wound. Negative for color change, rash and mole/lesion.   Neurological:  Negative for weakness and numbness.   Hematological:  Bruises/bleeds easily (anticoagulants).   Psychiatric/Behavioral:  The patient is not nervous/anxious.       Medications:    Current Facility-Administered Medications on File Prior to Visit   Medication Dose Route Frequency Provider Last Rate Last Admin    0.9%  NaCl infusion   Intravenous Continuous Sadia Mcpherson  mL/hr at 06/08/23 1150 New Bag at 06/08/23 1150    aspirin chewable tablet 81 mg  81 mg Oral Daily Sadia Mcpherson" NP   81 mg at 23 0915    atorvastatin tablet 80 mg  80 mg Oral Daily Sadia Mcpherson NP   80 mg at 23 0915    carvediloL tablet 12.5 mg  12.5 mg Oral Before breakfast Sadia Mcpherson NP   12.5 mg at 23 0558    clopidogreL tablet 75 mg  75 mg Oral Daily Sadia Mcpherson NP   75 mg at 23 0915    dextrose 50% injection 12.5 g  12.5 g Intravenous PRN Edson Astorga Jr., MD        docusate sodium capsule 100 mg  100 mg Oral BID Emma Cantu, FNP   100 mg at 23 0915    enoxaparin injection 40 mg  40 mg Subcutaneous Daily Sadia Mcpherson NP   40 mg at 23 1828    fenofibrate tablet 145 mg  145 mg Oral Daily Sadia Mcpherson NP   145 mg at 23 0916    glucagon (human recombinant) injection 1 mg  1 mg Intramuscular PRN Sadia Mcpherson NP        HYDROmorphone (PF) injection 1 mg  1 mg Intravenous Q4H PRN Sadia Mcpherson NP   1 mg at 23 1426    [] HYDROmorphone (PF) injection 1 mg  1 mg Intravenous ED 1 Time Sadia Mcpherson NP        [COMPLETED] HYDROmorphone (PF) injection 1 mg  1 mg Intravenous ED 1 Time Sadia Mcpherson NP   1 mg at 23 1630    insulin aspart U-100 injection 1-10 Units  1-10 Units Subcutaneous Q6H PRN Sadia Mcpherson NP   2 Units at 23 1345    isosorbide mononitrate 24 hr tablet 30 mg  30 mg Oral Daily Sadia Mcpherson NP   30 mg at 23 0915    losartan tablet 50 mg  50 mg Oral Daily Sadia Mcpherson NP   50 mg at 23 0916    [COMPLETED] magnesium sulfate 2g in water 50mL IVPB (premix)  2 g Intravenous ED 1 Time Sadia Mcpherson NP   Stopped at 23 1652    [] magnesium sulfate 2g in water 50mL IVPB (premix)  2 g Intravenous Q2H Sadia Mcpherson NP   Stopped at 23 0024    [COMPLETED] magnesium sulfate 2g in water 50mL IVPB (premix)  2 g Intravenous Once Sadia Mcpherson NP   Stopped at 23 0256    ondansetron injection 4 mg  4 mg Intravenous Q8H PRN Sadia Mcpherson NP   4 mg at 23 0606    pantoprazole injection 40 mg  40 mg Intravenous  Daily Sadia Mcpherson NP   40 mg at 06/08/23 0914    [COMPLETED] piperacillin-tazobactam (ZOSYN) 4.5 g in sodium chloride 0.9 % 100 mL IVPB (MB+)  4.5 g Intravenous ED 1 Time Sadia Mcpherson NP   Stopped at 06/07/23 1652    piperacillin-tazobactam (ZOSYN) 4.5 g in sodium chloride 0.9 % 100 mL IVPB (MB+)  4.5 g Intravenous Q8H Sadia Mcpherson NP 25 mL/hr at 06/08/23 1150 4.5 g at 06/08/23 1150    [COMPLETED] piperacillin-tazobactam (ZOSYN) 4.5 g in sodium chloride 0.9 % 100 mL IVPB (MB+)  4.5 g Intravenous Once Edson Astorga Jr., MD   Stopped at 06/08/23 0042    sodium chloride 0.9% flush 10 mL  10 mL Intravenous PRN Sadia Mcpherson NP        vancomycin (VANCOCIN) 2,000 mg in dextrose 5 % (D5W) 500 mL IVPB  2,000 mg Intravenous Q18H Edson Astorga Jr.,  mL/hr at 06/08/23 1340 2,000 mg at 06/08/23 1340    vancomycin - pharmacy to dose   Intravenous pharmacy to manage frequency Sadia Mcpherson NP        [DISCONTINUED] dextrose 10% bolus 125 mL 125 mL  12.5 g Intravenous PRN Edson Astorga Jr., MD        [DISCONTINUED] dextrose 50% injection 12.5 g  12.5 g Intravenous PRN Sadia Mcpherson NP         Current Outpatient Medications on File Prior to Visit   Medication Sig Dispense Refill    aspirin 81 MG Chew Take 1 tablet (81 mg total) by mouth once daily. 90 tablet 1    atorvastatin (LIPITOR) 80 MG tablet Take 1 tablet (80 mg total) by mouth once daily. 90 tablet 1    carvediloL (COREG) 12.5 MG tablet Take 1 tablet (12.5 mg total) by mouth before breakfast. 90 tablet 1    clindamycin (CLEOCIN T) 1 % external solution Apply topically 2 (two) times daily. 60 mL 2    clopidogreL (PLAVIX) 75 mg tablet Take 75 mg by mouth.      fenofibrate (TRICOR) 145 MG tablet Take 1 tablet (145 mg total) by mouth once daily. 90 tablet 1    furosemide (LASIX) 20 MG tablet Take 1 tablet (20 mg total) by mouth once daily. 90 tablet 1    isosorbide mononitrate (IMDUR) 30 MG 24 hr tablet Take 1 tablet (30 mg total) by mouth once daily. Take 1/2  tablet with breakfast 90 tablet 1    losartan (COZAAR) 50 MG tablet Take 1 tablet (50 mg total) by mouth once daily. 90 tablet 1    metFORMIN (GLUCOPHAGE-XR) 500 MG ER 24hr tablet Take 1 tablet (500 mg total) by mouth 2 (two) times daily with meals. 180 tablet 1    pantoprazole (PROTONIX) 40 MG tablet Take 40 mg by mouth.      SUPER THIN LANCETS 28 gauge Misc 2 (two) times daily.      TRUE METRIX GLUCOSE METER Misc 2 (two) times daily.            Physical Exam  Vitals reviewed.   Constitutional:       Appearance: He is obese.   HENT:      Head: Normocephalic and atraumatic.      Right Ear: External ear normal.      Left Ear: External ear normal.   Cardiovascular:      Rate and Rhythm: Normal rate and regular rhythm.   Pulmonary:      Effort: Pulmonary effort is normal.      Breath sounds: Normal breath sounds.   Abdominal:      Palpations: Abdomen is soft.   Musculoskeletal:         General: Normal range of motion.   Skin:     General: Skin is warm and dry.      Findings: Wound present.          Neurological:      Mental Status: He is alert.        Please see Wound Docs for complete Wound Assessment and lower extremity assessment when applicable.    Documentation of any procedures can be viewed in Wound Docs if applicable.         Assessment and Plan:    1. Hydradenitis  Axilla:  Clean BID with Vashe and leave open to air  Groin:   Clean with Vashe daily and cover with Aquacel Ag and ABD.   Recommend doxycycline 100mg PO BID fo 60-90 days once patient can tolerate oral intake.   C&S to guide any additional antibiotic treatment, however it is not generally indicated.   Clindamycin 1% topical solution ordered upon discharge- apply to clean cotton round and pat affected areas with solution BID       - Ambulatory referral/consult to Dermatology; Future          Please see Wound Docs for complete plan including patient instructions.

## 2023-06-08 NOTE — SUBJECTIVE & OBJECTIVE
Interval History: 06/08/23 Awake and resting in bed. Reports pain to epigastric area at level 2 . States he recently had pain med and prior to that , pain was at level 7.  No nausea , vomiting or diarrhea overnight.  Wound care consult placed for right axilla abscess and left buttock. Continue abx. Cultures from areas pending. Continue hydration and pain control.    Review of Systems   Constitutional:  Negative for appetite change.   Respiratory:  Negative for cough.    Gastrointestinal:  Positive for abdominal pain.   Genitourinary:  Negative for difficulty urinating.   Skin:  Positive for wound.        Abscess left axilla, left buttock    Objective:     Vital Signs (Most Recent):  Temp: 97.5 °F (36.4 °C) (06/08/23 0735)  Pulse: 70 (06/08/23 0735)  Resp: 18 (06/08/23 0735)  BP: 110/74 (06/08/23 0735)  SpO2: 95 % (06/08/23 0735) Vital Signs (24h Range):  Temp:  [97.5 °F (36.4 °C)-98.9 °F (37.2 °C)] 97.5 °F (36.4 °C)  Pulse:  [67-97] 70  Resp:  [16-20] 18  SpO2:  [92 %-98 %] 95 %  BP: (110-208)/() 110/74     Weight: 95.3 kg (210 lb)  Body mass index is 33.89 kg/m².    Intake/Output Summary (Last 24 hours) at 6/8/2023 0819  Last data filed at 6/7/2023 1652  Gross per 24 hour   Intake 1250 ml   Output --   Net 1250 ml         Physical Exam  HENT:      Head: Normocephalic.      Mouth/Throat:      Mouth: Mucous membranes are moist.   Cardiovascular:      Rate and Rhythm: Normal rate and regular rhythm.      Pulses: Normal pulses.      Heart sounds: Normal heart sounds.   Pulmonary:      Effort: Pulmonary effort is normal.      Breath sounds: Normal breath sounds.   Abdominal:      General: Bowel sounds are normal.      Palpations: Abdomen is soft.      Tenderness: There is abdominal tenderness.      Comments: Epigastric tenderness   Musculoskeletal:         General: Normal range of motion.   Skin:     General: Skin is warm and dry.   Neurological:      Mental Status: He is alert and oriented to person, place, and  time.   Psychiatric:         Mood and Affect: Mood normal.           Significant Labs: All pertinent labs within the past 24 hours have been reviewed.  Recent Lab Results  (Last 5 results in the past 24 hours)        06/08/23  0555   06/08/23  0531   06/08/23  0021   06/07/23  1838   06/07/23  1410        Albumin/Globulin Ratio   0.7             Albumin   2.7             Alcohol, Serum         <3       Alkaline Phosphatase   85             ALT   23             Anion Gap   13             AST   16             Baso #   0.02             Basophil %   0.3             BILIRUBIN TOTAL   0.4             BUN   11             BUN/CREAT RATIO   11             Calcium   8.8             Chloride   100             CO2   31             Creatinine   0.99             Differential Type   Auto             eGFR   91             Eos #   0.11             Eosinophil %   1.9             Globulin, Total   3.9             Glucose   109             Hematocrit   35.0             Hemoglobin   11.7             Lymph #   1.30             Lymph %   22.2             Magnesium   2.8             MCH   34.7             MCHC   33.4             MCV   103.9             Mono #   0.39             Mono %   6.7             MPV   10.2             Neutrophils, Abs   4.04             Neutrophils Relative   68.9             Platelets   266             POC Glucose 129     239   260         Potassium   3.5             PROTEIN TOTAL   6.6             RBC   3.37             RDW   13.0             Sodium   140             WBC   5.86                                    Significant Imaging: I have reviewed all pertinent imaging results/findings within the past 24 hours.

## 2023-06-08 NOTE — PLAN OF CARE
Problem: Adult Inpatient Plan of Care  Goal: Plan of Care Review  Outcome: Ongoing, Progressing     Problem: Diabetes Comorbidity  Goal: Blood Glucose Level Within Targeted Range  Outcome: Ongoing, Progressing  Intervention: Monitor and Manage Glycemia  Flowsheets (Taken 6/8/2023 0435)  Glycemic Management:   supplemental insulin given   blood glucose monitored     Problem: Impaired Wound Healing  Goal: Optimal Wound Healing  Outcome: Ongoing, Progressing  Intervention: Promote Wound Healing  Flowsheets (Taken 6/8/2023 0435)  Oral Nutrition Promotion: medicated  Sleep/Rest Enhancement:   awakenings minimized   regular sleep/rest pattern promoted  Activity Management: Ambulated in room - L4  Pain Management Interventions: pain management plan reviewed with patient/caregiver

## 2023-06-09 LAB
ALBUMIN SERPL BCP-MCNC: 2.7 G/DL (ref 3.5–5)
ALBUMIN/GLOB SERPL: 0.7 {RATIO}
ALP SERPL-CCNC: 74 U/L (ref 45–115)
ALT SERPL W P-5'-P-CCNC: 17 U/L (ref 16–61)
ANION GAP SERPL CALCULATED.3IONS-SCNC: 14 MMOL/L (ref 7–16)
AST SERPL W P-5'-P-CCNC: 15 U/L (ref 15–37)
BASOPHILS # BLD AUTO: 0.01 K/UL (ref 0–0.2)
BASOPHILS NFR BLD AUTO: 0.1 % (ref 0–1)
BILIRUB SERPL-MCNC: 0.4 MG/DL (ref ?–1.2)
BUN SERPL-MCNC: 16 MG/DL (ref 7–18)
BUN/CREAT SERPL: 13 (ref 6–20)
CALCIUM SERPL-MCNC: 8.4 MG/DL (ref 8.5–10.1)
CHLORIDE SERPL-SCNC: 99 MMOL/L (ref 98–107)
CO2 SERPL-SCNC: 29 MMOL/L (ref 21–32)
CREAT SERPL-MCNC: 1.19 MG/DL (ref 0.7–1.3)
DIFFERENTIAL METHOD BLD: ABNORMAL
EGFR (NO RACE VARIABLE) (RUSH/TITUS): 73 ML/MIN/1.73M2
EOSINOPHIL # BLD AUTO: 0.09 K/UL (ref 0–0.5)
EOSINOPHIL NFR BLD AUTO: 1.3 % (ref 1–4)
ERYTHROCYTE [DISTWIDTH] IN BLOOD BY AUTOMATED COUNT: 12.8 % (ref 11.5–14.5)
GLOBULIN SER-MCNC: 3.9 G/DL (ref 2–4)
GLUCOSE SERPL-MCNC: 110 MG/DL (ref 70–105)
GLUCOSE SERPL-MCNC: 149 MG/DL (ref 70–105)
GLUCOSE SERPL-MCNC: 154 MG/DL (ref 74–106)
GLUCOSE SERPL-MCNC: 165 MG/DL (ref 70–105)
GLUCOSE SERPL-MCNC: 192 MG/DL (ref 70–105)
HCT VFR BLD AUTO: 30.8 % (ref 40–54)
HGB BLD-MCNC: 10.2 G/DL (ref 13.5–18)
LIPASE SERPL-CCNC: 173 U/L (ref 73–393)
LYMPHOCYTES # BLD AUTO: 1.51 K/UL (ref 1–4.8)
LYMPHOCYTES NFR BLD AUTO: 21.9 % (ref 27–41)
MACROCYTES BLD QL SMEAR: ABNORMAL
MCH RBC QN AUTO: 34.8 PG (ref 27–31)
MCHC RBC AUTO-ENTMCNC: 33.1 G/DL (ref 32–36)
MCV RBC AUTO: 105.1 FL (ref 80–96)
MONOCYTES # BLD AUTO: 0.26 K/UL (ref 0–0.8)
MONOCYTES NFR BLD AUTO: 3.8 % (ref 2–6)
MPC BLD CALC-MCNC: 10 FL (ref 9.4–12.4)
NEUTROPHILS # BLD AUTO: 5.03 K/UL (ref 1.8–7.7)
NEUTROPHILS NFR BLD AUTO: 72.9 % (ref 53–65)
PLATELET # BLD AUTO: 215 K/UL (ref 150–400)
PLATELET MORPHOLOGY: ABNORMAL
POTASSIUM SERPL-SCNC: 3.6 MMOL/L (ref 3.5–5.1)
PROT SERPL-MCNC: 6.6 G/DL (ref 6.4–8.2)
RBC # BLD AUTO: 2.93 M/UL (ref 4.6–6.2)
SODIUM SERPL-SCNC: 138 MMOL/L (ref 136–145)
UA COMPLETE W REFLEX CULTURE PNL UR: NORMAL
VANCOMYCIN SERPL-MCNC: 31.9 ΜG/ML (ref 0–20)
VANCOMYCIN TROUGH SERPL-MCNC: 59.1 ΜG/ML (ref 10–20)
WBC # BLD AUTO: 6.9 K/UL (ref 4.5–11)

## 2023-06-09 PROCEDURE — 80053 COMPREHEN METABOLIC PANEL: CPT | Performed by: NURSE PRACTITIONER

## 2023-06-09 PROCEDURE — 85025 COMPLETE CBC W/AUTO DIFF WBC: CPT | Performed by: NURSE PRACTITIONER

## 2023-06-09 PROCEDURE — 80202 ASSAY OF VANCOMYCIN: CPT | Performed by: FAMILY MEDICINE

## 2023-06-09 PROCEDURE — 94761 N-INVAS EAR/PLS OXIMETRY MLT: CPT

## 2023-06-09 PROCEDURE — 63600175 PHARM REV CODE 636 W HCPCS: Performed by: NURSE PRACTITIONER

## 2023-06-09 PROCEDURE — 99232 PR SUBSEQUENT HOSPITAL CARE,LEVL II: ICD-10-PCS | Mod: ,,, | Performed by: NURSE PRACTITIONER

## 2023-06-09 PROCEDURE — 82962 GLUCOSE BLOOD TEST: CPT

## 2023-06-09 PROCEDURE — 25000003 PHARM REV CODE 250: Performed by: NURSE PRACTITIONER

## 2023-06-09 PROCEDURE — 27000944

## 2023-06-09 PROCEDURE — C9113 INJ PANTOPRAZOLE SODIUM, VIA: HCPCS | Performed by: NURSE PRACTITIONER

## 2023-06-09 PROCEDURE — 83690 ASSAY OF LIPASE: CPT | Performed by: NURSE PRACTITIONER

## 2023-06-09 PROCEDURE — 25000003 PHARM REV CODE 250: Performed by: FAMILY MEDICINE

## 2023-06-09 PROCEDURE — 99232 SBSQ HOSP IP/OBS MODERATE 35: CPT | Mod: ,,, | Performed by: NURSE PRACTITIONER

## 2023-06-09 PROCEDURE — 11000001 HC ACUTE MED/SURG PRIVATE ROOM

## 2023-06-09 PROCEDURE — 63600175 PHARM REV CODE 636 W HCPCS: Performed by: FAMILY MEDICINE

## 2023-06-09 RX ORDER — LOSARTAN POTASSIUM 25 MG/1
25 TABLET ORAL DAILY
Status: DISCONTINUED | OUTPATIENT
Start: 2023-06-10 | End: 2023-06-12

## 2023-06-09 RX ORDER — DOXYCYCLINE HYCLATE 100 MG
100 TABLET ORAL EVERY 12 HOURS
Status: DISCONTINUED | OUTPATIENT
Start: 2023-06-09 | End: 2023-06-14 | Stop reason: HOSPADM

## 2023-06-09 RX ORDER — HYDROCODONE BITARTRATE AND ACETAMINOPHEN 5; 325 MG/1; MG/1
1 TABLET ORAL EVERY 6 HOURS PRN
Status: DISCONTINUED | OUTPATIENT
Start: 2023-06-09 | End: 2023-06-14 | Stop reason: HOSPADM

## 2023-06-09 RX ADMIN — DOCUSATE SODIUM 100 MG: 100 CAPSULE, LIQUID FILLED ORAL at 09:06

## 2023-06-09 RX ADMIN — DOXYCYCLINE HYCLATE 100 MG: 100 TABLET, COATED ORAL at 09:06

## 2023-06-09 RX ADMIN — CLOPIDOGREL BISULFATE 75 MG: 75 TABLET ORAL at 08:06

## 2023-06-09 RX ADMIN — ISOSORBIDE MONONITRATE 30 MG: 30 TABLET, EXTENDED RELEASE ORAL at 08:06

## 2023-06-09 RX ADMIN — HYDROMORPHONE HYDROCHLORIDE 1 MG: 2 INJECTION, SOLUTION INTRAMUSCULAR; INTRAVENOUS; SUBCUTANEOUS at 04:06

## 2023-06-09 RX ADMIN — DOCUSATE SODIUM 100 MG: 100 CAPSULE, LIQUID FILLED ORAL at 08:06

## 2023-06-09 RX ADMIN — LOSARTAN POTASSIUM 50 MG: 50 TABLET, FILM COATED ORAL at 08:06

## 2023-06-09 RX ADMIN — ATORVASTATIN CALCIUM 80 MG: 40 TABLET, FILM COATED ORAL at 08:06

## 2023-06-09 RX ADMIN — PIPERACILLIN AND TAZOBACTAM 4.5 G: 4; .5 INJECTION, POWDER, LYOPHILIZED, FOR SOLUTION INTRAVENOUS; PARENTERAL at 10:06

## 2023-06-09 RX ADMIN — FENOFIBRATE 145 MG: 145 TABLET ORAL at 08:06

## 2023-06-09 RX ADMIN — PANTOPRAZOLE SODIUM 40 MG: 40 INJECTION, POWDER, LYOPHILIZED, FOR SOLUTION INTRAVENOUS at 08:06

## 2023-06-09 RX ADMIN — HYDROMORPHONE HYDROCHLORIDE 1 MG: 2 INJECTION, SOLUTION INTRAMUSCULAR; INTRAVENOUS; SUBCUTANEOUS at 11:06

## 2023-06-09 RX ADMIN — VANCOMYCIN HYDROCHLORIDE 2000 MG: 1 INJECTION, POWDER, LYOPHILIZED, FOR SOLUTION INTRAVENOUS at 06:06

## 2023-06-09 RX ADMIN — INSULIN ASPART 2 UNITS: 100 INJECTION, SOLUTION INTRAVENOUS; SUBCUTANEOUS at 06:06

## 2023-06-09 RX ADMIN — ENOXAPARIN SODIUM 40 MG: 40 INJECTION SUBCUTANEOUS at 04:06

## 2023-06-09 RX ADMIN — ASPIRIN 81 MG 81 MG: 81 TABLET ORAL at 08:06

## 2023-06-09 RX ADMIN — HYDROCODONE BITARTRATE AND ACETAMINOPHEN 1 TABLET: 5; 325 TABLET ORAL at 06:06

## 2023-06-09 RX ADMIN — PIPERACILLIN AND TAZOBACTAM 4.5 G: 4; .5 INJECTION, POWDER, LYOPHILIZED, FOR SOLUTION INTRAVENOUS; PARENTERAL at 03:06

## 2023-06-09 RX ADMIN — SODIUM CHLORIDE: 9 INJECTION, SOLUTION INTRAVENOUS at 07:06

## 2023-06-09 NOTE — PLAN OF CARE
Problem: Adult Inpatient Plan of Care  Goal: Plan of Care Review  Outcome: Ongoing, Progressing  Flowsheets (Taken 6/9/2023 0206)  Plan of Care Reviewed With: patient  Goal: Patient-Specific Goal (Individualized)  Outcome: Ongoing, Progressing  Goal: Absence of Hospital-Acquired Illness or Injury  Outcome: Ongoing, Progressing  Intervention: Identify and Manage Fall Risk  Flowsheets (Taken 6/9/2023 0206)  Safety Promotion/Fall Prevention:   assistive device/personal item within reach   commode/urinal/bedpan at bedside   medications reviewed   nonskid shoes/socks when out of bed   side rails raised x 2   instructed to call staff for mobility  Intervention: Prevent Skin Injury  Flowsheets (Taken 6/9/2023 0206)  Body Position: position changed independently  Intervention: Prevent and Manage VTE (Venous Thromboembolism) Risk  Flowsheets (Taken 6/9/2023 0206)  Activity Management:   Ambulated to bathroom - L4   Arm raise - L1   Standing - L3   Heel slide - L1  VTE Prevention/Management: intravenous hydration  Range of Motion: active ROM (range of motion) encouraged  Intervention: Prevent Infection  Flowsheets (Taken 6/9/2023 0206)  Infection Prevention:   equipment surfaces disinfected   hand hygiene promoted   personal protective equipment utilized   rest/sleep promoted  Goal: Optimal Comfort and Wellbeing  Outcome: Ongoing, Progressing  Intervention: Monitor Pain and Promote Comfort  Flowsheets (Taken 6/9/2023 0206)  Pain Management Interventions:   diversional activity provided   medication offered   pain management plan reviewed with patient/caregiver  Intervention: Provide Person-Centered Care  Flowsheets (Taken 6/9/2023 0206)  Trust Relationship/Rapport:   care explained   questions encouraged  Goal: Readiness for Transition of Care  Outcome: Ongoing, Progressing     Problem: Diabetes Comorbidity  Goal: Blood Glucose Level Within Targeted Range  Outcome: Ongoing, Progressing  Intervention: Monitor and Manage  Glycemia  Flowsheets (Taken 6/9/2023 0206)  Glycemic Management: blood glucose monitored     Problem: Impaired Wound Healing  Goal: Optimal Wound Healing  Outcome: Ongoing, Progressing  Intervention: Promote Wound Healing  Flowsheets (Taken 6/9/2023 0206)  Sleep/Rest Enhancement:   awakenings minimized   regular sleep/rest pattern promoted  Activity Management:   Ambulated to bathroom - L4   Arm raise - L1   Standing - L3   Heel slide - L1  Pain Management Interventions:   diversional activity provided   medication offered   pain management plan reviewed with patient/caregiver

## 2023-06-09 NOTE — NURSING
I performed wound care to patient left buttock and right and left armpit. Patient tolerated it well.

## 2023-06-09 NOTE — ASSESSMENT & PLAN NOTE
NS 1 liter bolus given in the ED  NS at 150ml/h  Ondansetron q8hprn/Phenergan 25 mg IVPB q6hprn  Dilaudid 1 mg q4hprn pain  NPO  CMP, CBC daily    06/08/23 continue fluids, NPO, pain control     06/09/23 no nausea or vomiting reported. Complains of epigastric soreness at level 2 - start CL and monitor

## 2023-06-09 NOTE — SUBJECTIVE & OBJECTIVE
Interval History: 6/09/23 Complains of having what felt like a bladder spasm last night. States has resolved. Having less abdominal pain now. Reports having more soreness like pain to epigastric area. No vomiting or diarrhea. Reports pain level is at level 2. Will start clear liquids today and see how it tolerates it. Cultures from right axilla and left buttock pending. Continue vancomycin and zosyn. No drainage noted from right axilla this morning. Labs pending.  Review of Systems   Constitutional:  Negative for appetite change, chills and fever.   Respiratory:  Negative for shortness of breath.    Cardiovascular:  Negative for chest pain.   Gastrointestinal:  Positive for abdominal pain. Negative for constipation, diarrhea, nausea and vomiting.        Abdominal soreness at level 2 - epigastric area   Genitourinary:  Negative for difficulty urinating.   Skin:         Abscess right axilla - no drainage noted this morning    Abscess left buttock    Neurological:  Positive for weakness.   Objective:     Vital Signs (Most Recent):  Temp: 97.8 °F (36.6 °C) (06/09/23 0440)  Pulse: 60 (06/09/23 0534)  Resp: 18 (06/09/23 0440)  BP: 112/77 (06/09/23 0600)  SpO2: 98 % (06/09/23 0534) Vital Signs (24h Range):  Temp:  [97.5 °F (36.4 °C)-98.3 °F (36.8 °C)] 97.8 °F (36.6 °C)  Pulse:  [60-70] 60  Resp:  [18-20] 18  SpO2:  [93 %-100 %] 98 %  BP: ()/(45-84) 112/77     Weight: 95.3 kg (210 lb)  Body mass index is 33.89 kg/m².  No intake or output data in the 24 hours ending 06/09/23 0714      Physical Exam  HENT:      Head: Normocephalic.      Mouth/Throat:      Mouth: Mucous membranes are moist.   Cardiovascular:      Rate and Rhythm: Normal rate and regular rhythm.      Pulses: Normal pulses.      Heart sounds: Normal heart sounds.   Pulmonary:      Effort: Pulmonary effort is normal.      Breath sounds: Normal breath sounds.   Abdominal:      General: Bowel sounds are normal.      Palpations: Abdomen is soft.       Tenderness: There is abdominal tenderness.      Comments: Epigastric tenderness   Musculoskeletal:         General: Normal range of motion.   Skin:     Comments: Right axilla abscess - no drainage noted this morning     Left buttock abscess    Neurological:      Mental Status: He is alert and oriented to person, place, and time.      Motor: Weakness present.   Psychiatric:         Mood and Affect: Mood normal.           Significant Labs: All pertinent labs within the past 24 hours have been reviewed.  Recent Lab Results         06/09/23  0620   06/09/23  0004   06/08/23  1800   06/08/23  1235        POC Glucose 165   149   106   162               Significant Imaging: I have reviewed all pertinent imaging results/findings within the past 24 hours.

## 2023-06-09 NOTE — ASSESSMENT & PLAN NOTE
Abscess to right axilla and left buttock  Wound cultures pending  Vancomycin (pharmacy to dose and manage)  Zosyn 4.5Gms IVPB q8h    06/08/23 cultures pending  Continue abx    06/09/23 wound care consult done yesterday  Cultures pending   Continue abx  Follow up with derm at [place]

## 2023-06-09 NOTE — PROGRESS NOTES
Pharmacokinetic Assessment Follow Up: IV Vancomycin    Vancomycin serum concentration assessment(s):    The trough level was drawn incorrectly and cannot be used to guide therapy at this time. Random was ordered for 1300. Calculations with these levels estimate the trough close to 14.6-20.6. Target trough 10-15.        Vancomycin Regimen Plan:    Change regimen to Vancomycin 1500 mg IV every 18 hours with next serum trough concentration measured at 30 minutes prior to 3rd dose on 06/11/2023.    Drug levels (last 3 results):  Recent Labs   Lab Result Units 06/09/23  0956 06/09/23  1258   Vancomycin, Random µg/mL  --  31.9*   Vancomycin, Trough µg/mL 59.1*  --        Pharmacy will continue to follow and monitor vancomycin.      Thank you for the consult,   Nadia Flores       Patient brief summary:  Faustino Fischer is a 53 y.o. male initiated on antimicrobial therapy with IV Vancomycin for treatment of skin & soft tissue infection    The patient's current regimen is vancomycin 2,000mg IV every 18 hours.    Drug Allergies:   Review of patient's allergies indicates:   Allergen Reactions    Ticagrelor Shortness Of Breath       Actual Body Weight:   95.3kg    Renal Function:   Estimated Creatinine Clearance: 76.7 mL/min (based on SCr of 1.19 mg/dL).,     Dialysis Method (if applicable):  N/A    CBC (last 72 hours):  Recent Labs   Lab Result Units 06/07/23  1306 06/08/23  0531 06/09/23  0956   WBC K/uL 8.57 5.86 6.90   Hemoglobin g/dL 12.6* 11.7* 10.2*   Hemoglobin A1C %  --  6.3  --    Hematocrit % 35.6* 35.0* 30.8*   Platelet Count K/uL 121* 266 215   Lymphocytes % % 10.7* 22.2* 21.9*   Lymphocytes, Man % % 13*  --   --    Monocytes % % 5.7 6.7* 3.8   Monocytes, Man % % 6  --   --    Eosinophils % % 0.6* 1.9 1.3   Eosinophils, Man % % 2  --   --    Basophils % % 0.4 0.3 0.1       Metabolic Panel (last 72 hours):  Recent Labs   Lab Result Units 06/07/23  1250 06/07/23  1306 06/08/23  0531 06/09/23  0956   Sodium  mmol/L  --  134* 140 138   Potassium mmol/L  --  3.8 3.5 3.6   Chloride mmol/L  --  97* 100 99   CO2 mmol/L  --  27 31 29   Glucose mg/dL  --  318* 109* 154*   Glucose, UA mg/dL 500*  --   --   --    BUN mg/dL  --  9 11 16   Creatinine mg/dL  --  1.00 0.99 1.19   Albumin g/dL  --  2.9* 2.7* 2.7*   Bilirubin, Total mg/dL  --  0.5 0.4 0.4   Alk Phos U/L  --  83 85 74   AST U/L  --  23 16 15   ALT U/L  --  23 23 17   Magnesium mg/dL  --  1.0* 2.8*  --        Vancomycin Administrations:  vancomycin given in the last 96 hours                     vancomycin (VANCOCIN) 2,000 mg in dextrose 5 % (D5W) 500 mL IVPB (mg) 2,000 mg New Bag 06/09/23 0659     2,000 mg New Bag 06/08/23 1340     2,000 mg New Bag 06/07/23 1934                    Microbiologic Results:  Microbiology Results (last 7 days)       Procedure Component Value Units Date/Time    Culture, Wound [[id number]] Collected: 06/07/23 1558    Order Status: Completed Specimen: Abscess from Arm, Left Updated: 06/09/23 0854     Culture, Wound/Abscess Culture In Progress    Culture, Wound [[id number]]  (Abnormal) Collected: 06/07/23 1537    Order Status: Completed Specimen: Abscess from Leg, Left Updated: 06/09/23 0852     Culture, Wound/Abscess Heavy Growth Staphylococcus aureus     Comment: Susceptibility To Follow       Urine culture [[id number]] Collected: 06/07/23 1250    Order Status: Completed Specimen: Urine Updated: 06/09/23 0720     Culture, Urine Skin/Urogenital Jayshree Isolated, no further workup.

## 2023-06-09 NOTE — PROGRESS NOTES
"[facility]  Hospital Medicine  Progress Note    Patient Name: Faustino Fischer  MRN: [id number]  Patient Class: IP- Inpatient   Admission Date: 6/7/2023  Length of Stay: 2 days  Attending Physician: Edson Astorga Jr., MD  Primary Care Provider: Juan Pablo Ryan MD        Subjective:     Principal Problem:Acute on chronic pancreatitis        HPI:  Presented with c/o epigastric pain with nausea and dry heaves that started last night. States has freq bouts of pancreatitis and presenting symptoms today are indicative of an acute episode for him. Denies ETOH or DM (states since losing wt). Previous cholecystectomy. Denies fever or chills, chest pain, dyspnea, diarrhea or constipation. States that pain started after eating a Bratwurst and hot chips last night. States has been trying to treat himself at home by remaining NPO but is not better.     Labs ordered and reviewed. Lipase 1069, amylase 126, AST 23, ALT 23, T BR 0.5, troponin 19.9, BUN 9, creatinine 1, Mg 1, , K+ 3.8, WBC 8570 with H&H 12.6 and 35.6 and plt [id number]. glucose 318. UA shows mod bacteria with 15-25 WBCs. CT abd pelvis with contrast ordered and reviewed.      NS bolus x 1 Liter, MS 4 mg IVP, ondansetron 4 mg IVP given in the ED.     CT abd/pelvis with contrast shows fat stranding indicative of acute pancreatitis, fatty liver, previous cholecystectomy with no ductal dilation.     Discussed findings and need for adm with pt. He agreed. Discussed pt's case with Dr. Astorga. He accepted pt for admission.     Upon discussing adm with pt, he reports that he has a draining abscess to his left "inner thigh". Upon exam, an open draining abscess is noted located on the lower aspect of his left buttock. Mild surrounding erythema with no swelling. Opening is approx 3 cm in length. Pt states has multiple abscesses in the last year to his axilla and groin. Had one incised but says that he has no sought medical care for all " of them. Has multiple scarring under both arms as well.     Discussed these findings with Dr. Astorga. He evaluated pt in the ED. Zosyn 4.5 Gm IVPB started in the ED. Pt will be treated with Vancomycin as well.      Overview/Hospital Course:  06/08/23 Awake and resting in bed. Reports pain to epigastric area at level 2 . States he recently had pain med and prior to that , pain was at level 7.  No nausea , vomiting or diarrhea overnight.  Wound care consult placed for right axilla abscess and left buttock. Continue abx. Cultures from areas pending. Continue hydration and pain control.  06/09/23 Complains of having what felt like a bladder spasm last night. States has resolved. Having less abdominal pain now. Reports having more soreness like pain to epigastric area. No vomiting or diarrhea. Reports pain level is at level 2. Will start clear liquids today and see how it tolerates it. Cultures from right axilla and left buttock pending. Continue vancomycin and zosyn. No drainage noted from right axilla this morning. Labs pending.      Interval History: 6/09/23 Complains of having what felt like a bladder spasm last night. States has resolved. Having less abdominal pain now. Reports having more soreness like pain to epigastric area. No vomiting or diarrhea. Reports pain level is at level 2. Will start clear liquids today and see how it tolerates it. Cultures from right axilla and left buttock pending. Continue vancomycin and zosyn. No drainage noted from right axilla this morning. Labs pending.  Review of Systems   Constitutional:  Negative for appetite change, chills and fever.   Respiratory:  Negative for shortness of breath.    Cardiovascular:  Negative for chest pain.   Gastrointestinal:  Positive for abdominal pain. Negative for constipation, diarrhea, nausea and vomiting.        Abdominal soreness at level 2 - epigastric area   Genitourinary:  Negative for difficulty urinating.   Skin:         Abscess right  axilla - no drainage noted this morning    Abscess left buttock    Neurological:  Positive for weakness.   Objective:     Vital Signs (Most Recent):  Temp: 97.8 °F (36.6 °C) (06/09/23 0440)  Pulse: 60 (06/09/23 0534)  Resp: 18 (06/09/23 0440)  BP: 112/77 (06/09/23 0600)  SpO2: 98 % (06/09/23 0534) Vital Signs (24h Range):  Temp:  [97.5 °F (36.4 °C)-98.3 °F (36.8 °C)] 97.8 °F (36.6 °C)  Pulse:  [60-70] 60  Resp:  [18-20] 18  SpO2:  [93 %-100 %] 98 %  BP: ()/(45-84) 112/77     Weight: 95.3 kg (210 lb)  Body mass index is 33.89 kg/m².  No intake or output data in the 24 hours ending 06/09/23 0714      Physical Exam  HENT:      Head: Normocephalic.      Mouth/Throat:      Mouth: Mucous membranes are moist.   Cardiovascular:      Rate and Rhythm: Normal rate and regular rhythm.      Pulses: Normal pulses.      Heart sounds: Normal heart sounds.   Pulmonary:      Effort: Pulmonary effort is normal.      Breath sounds: Normal breath sounds.   Abdominal:      General: Bowel sounds are normal.      Palpations: Abdomen is soft.      Tenderness: There is abdominal tenderness.      Comments: Epigastric tenderness   Musculoskeletal:         General: Normal range of motion.   Skin:     Comments: Right axilla abscess - no drainage noted this morning     Left buttock abscess    Neurological:      Mental Status: He is alert and oriented to person, place, and time.      Motor: Weakness present.   Psychiatric:         Mood and Affect: Mood normal.           Significant Labs: All pertinent labs within the past 24 hours have been reviewed.  Recent Lab Results         06/09/23  0620   06/09/23  0004   06/08/23  1800   06/08/23  1235        POC Glucose 165   149   106   162               Significant Imaging: I have reviewed all pertinent imaging results/findings within the past 24 hours.      Assessment/Plan:      * Acute on chronic pancreatitis  NS 1 liter bolus given in the ED  NS at 150ml/h  Ondansetron q8hprn/Phenergan 25 mg  IVPB q6hprn  Dilaudid 1 mg q4hprn pain  NPO  CMP, CBC daily    06/08/23 continue fluids, NPO, pain control     06/09/23 no nausea or vomiting reported. Complains of epigastric soreness at level 2 - start CL and monitor     Hydradenitis  Abscess to right axilla and left buttock  Wound cultures pending  Vancomycin (pharmacy to dose and manage)  Zosyn 4.5Gms IVPB q8h    06/08/23 cultures pending  Continue abx    06/09/23 wound care consult done yesterday  Cultures pending   Continue abx  Follow up with derm at [place]     Mixed hyperlipidemia  Lipid profile  Continue atorvastatin and fenofibrate  Triglycerides 35 - 150 mg/dL 148  138 CM  115 CM    Comment:    Normal: <150 mg/dL   Borderline High: 150-199 mg/dL   High: 200-499 mg/dL   Very High: >=500   Cholesterol 0 - 200 mg/dL 142  165 CM     Comment:    <200 mg/dL: Desirable   200-240 mg/dL: Borderline High   >240 mg/dL: High   HDL Cholesterol 40 - 60 mg/dL 46  36 Low  CM     Comment:    <40 mg/dL: Low HDL   40-60 mg/dL: Normal   >60 mg/dL: Desirable   Cholesterol/HDL Ratio (Risk Factor)  3.1  4.6     Non-HDL mg/dL 96  129     LDL Calculated mg/dL 66  101 CM     LDL/HDL  1.4  2.8 CM     VLDL mg/dL 30  28     Resulting Agency  [facility]           Type 2 diabetes mellitus without complication, without long-term current use of insulin  Pt reports not taking any oral meds x 1 mo because he had lost wt.  Glucose 318 today  NS bolus in ED  Accuchecks q6h with mod sliding scale coverage    06/08/23 stable      06/09/23 a1c 6.3, stable blood glucose readings    Hypomagnesemia  Mg 1.0 in ED  MgSO4 2 Gms IVPB given in ED  MgSO4 2Gms IVPB q1h x 2 doses  Repeat Mg in am      06/08/23 magnesium 2.8      VTE Risk Mitigation (From admission, onward)         Ordered     enoxaparin injection 40 mg  Daily         06/07/23 1737     IP VTE HIGH RISK PATIENT  Once         06/07/23 1737     Place JULIO hose  Until discontinued         06/07/23 1737                Discharge Planning    BETY:      Code Status: Full Code   Is the patient medically ready for discharge?:     Reason for patient still in hospital (select all that apply): Treatment  Discharge Plan A: Home                  DOC Thomas  Department of Hospital Medicine   [facility]

## 2023-06-09 NOTE — ASSESSMENT & PLAN NOTE
Pt reports not taking any oral meds x 1 mo because he had lost wt.  Glucose 318 today  NS bolus in ED  Accuchecks q6h with mod sliding scale coverage    06/08/23 stable      06/09/23 a1c 6.3, stable blood glucose readings

## 2023-06-09 NOTE — ASSESSMENT & PLAN NOTE
Lipid profile  Continue atorvastatin and fenofibrate  Triglycerides 35 - 150 mg/dL 148  138 CM  115 CM    Comment:    Normal: <150 mg/dL   Borderline High: 150-199 mg/dL   High: 200-499 mg/dL   Very High: >=500   Cholesterol 0 - 200 mg/dL 142  165 CM     Comment:    <200 mg/dL: Desirable   200-240 mg/dL: Borderline High   >240 mg/dL: High   HDL Cholesterol 40 - 60 mg/dL 46  36 Low  CM     Comment:    <40 mg/dL: Low HDL   40-60 mg/dL: Normal   >60 mg/dL: Desirable   Cholesterol/HDL Ratio (Risk Factor)  3.1  4.6     Non-HDL mg/dL 96  129     LDL Calculated mg/dL 66  101 CM     LDL/HDL  1.4  2.8 CM     VLDL mg/dL 30  28     Resulting Agency  [facility]

## 2023-06-10 PROBLEM — N17.9 AKI (ACUTE KIDNEY INJURY): Status: ACTIVE | Noted: 2023-06-10

## 2023-06-10 LAB
ALBUMIN SERPL BCP-MCNC: 2.6 G/DL (ref 3.5–5)
ALBUMIN/GLOB SERPL: 0.7 {RATIO}
ALP SERPL-CCNC: 75 U/L (ref 45–115)
ALT SERPL W P-5'-P-CCNC: 19 U/L (ref 16–61)
ANION GAP SERPL CALCULATED.3IONS-SCNC: 12 MMOL/L (ref 7–16)
AST SERPL W P-5'-P-CCNC: 20 U/L (ref 15–37)
BASOPHILS # BLD AUTO: 0.02 K/UL (ref 0–0.2)
BASOPHILS NFR BLD AUTO: 0.4 % (ref 0–1)
BILIRUB SERPL-MCNC: 0.3 MG/DL (ref ?–1.2)
BUN SERPL-MCNC: 17 MG/DL (ref 7–18)
BUN/CREAT SERPL: 6 (ref 6–20)
CALCIUM SERPL-MCNC: 8.6 MG/DL (ref 8.5–10.1)
CHLORIDE SERPL-SCNC: 100 MMOL/L (ref 98–107)
CO2 SERPL-SCNC: 29 MMOL/L (ref 21–32)
CREAT SERPL-MCNC: 2.84 MG/DL (ref 0.7–1.3)
DIFFERENTIAL METHOD BLD: ABNORMAL
EGFR (NO RACE VARIABLE) (RUSH/TITUS): 26 ML/MIN/1.73M2
EOSINOPHIL # BLD AUTO: 0.06 K/UL (ref 0–0.5)
EOSINOPHIL NFR BLD AUTO: 1.2 % (ref 1–4)
ERYTHROCYTE [DISTWIDTH] IN BLOOD BY AUTOMATED COUNT: 12.9 % (ref 11.5–14.5)
GLOBULIN SER-MCNC: 3.9 G/DL (ref 2–4)
GLUCOSE SERPL-MCNC: 123 MG/DL (ref 70–105)
GLUCOSE SERPL-MCNC: 125 MG/DL (ref 70–105)
GLUCOSE SERPL-MCNC: 129 MG/DL (ref 74–106)
GLUCOSE SERPL-MCNC: 151 MG/DL (ref 70–105)
HCT VFR BLD AUTO: 31.4 % (ref 40–54)
HGB BLD-MCNC: 10.5 G/DL (ref 13.5–18)
LYMPHOCYTES # BLD AUTO: 0.99 K/UL (ref 1–4.8)
LYMPHOCYTES NFR BLD AUTO: 19.5 % (ref 27–41)
MCH RBC QN AUTO: 35.1 PG (ref 27–31)
MCHC RBC AUTO-ENTMCNC: 33.4 G/DL (ref 32–36)
MCV RBC AUTO: 105 FL (ref 80–96)
MICROORGANISM SPEC CULT: ABNORMAL
MONOCYTES # BLD AUTO: 0.33 K/UL (ref 0–0.8)
MONOCYTES NFR BLD AUTO: 6.5 % (ref 2–6)
MPC BLD CALC-MCNC: 9.4 FL (ref 9.4–12.4)
NEUTROPHILS # BLD AUTO: 3.67 K/UL (ref 1.8–7.7)
NEUTROPHILS NFR BLD AUTO: 72.4 % (ref 53–65)
PLATELET # BLD AUTO: 175 K/UL (ref 150–400)
POTASSIUM SERPL-SCNC: 3.9 MMOL/L (ref 3.5–5.1)
PROT SERPL-MCNC: 6.5 G/DL (ref 6.4–8.2)
RBC # BLD AUTO: 2.99 M/UL (ref 4.6–6.2)
SODIUM SERPL-SCNC: 137 MMOL/L (ref 136–145)
WBC # BLD AUTO: 5.07 K/UL (ref 4.5–11)

## 2023-06-10 PROCEDURE — 25000003 PHARM REV CODE 250: Performed by: NURSE PRACTITIONER

## 2023-06-10 PROCEDURE — 85025 COMPLETE CBC W/AUTO DIFF WBC: CPT | Performed by: NURSE PRACTITIONER

## 2023-06-10 PROCEDURE — 11000001 HC ACUTE MED/SURG PRIVATE ROOM

## 2023-06-10 PROCEDURE — 94761 N-INVAS EAR/PLS OXIMETRY MLT: CPT

## 2023-06-10 PROCEDURE — 80053 COMPREHEN METABOLIC PANEL: CPT | Performed by: NURSE PRACTITIONER

## 2023-06-10 PROCEDURE — C9113 INJ PANTOPRAZOLE SODIUM, VIA: HCPCS | Performed by: NURSE PRACTITIONER

## 2023-06-10 PROCEDURE — 63600175 PHARM REV CODE 636 W HCPCS: Performed by: NURSE PRACTITIONER

## 2023-06-10 PROCEDURE — 82962 GLUCOSE BLOOD TEST: CPT

## 2023-06-10 RX ORDER — TAMSULOSIN HYDROCHLORIDE 0.4 MG/1
0.4 CAPSULE ORAL DAILY
Status: DISCONTINUED | OUTPATIENT
Start: 2023-06-10 | End: 2023-06-14 | Stop reason: HOSPADM

## 2023-06-10 RX ORDER — PANTOPRAZOLE SODIUM 40 MG/1
40 TABLET, DELAYED RELEASE ORAL DAILY
Status: DISCONTINUED | OUTPATIENT
Start: 2023-06-11 | End: 2023-06-14 | Stop reason: HOSPADM

## 2023-06-10 RX ADMIN — PANTOPRAZOLE SODIUM 40 MG: 40 INJECTION, POWDER, LYOPHILIZED, FOR SOLUTION INTRAVENOUS at 11:06

## 2023-06-10 RX ADMIN — TAMSULOSIN HYDROCHLORIDE 0.4 MG: 0.4 CAPSULE ORAL at 12:06

## 2023-06-10 RX ADMIN — ATORVASTATIN CALCIUM 80 MG: 40 TABLET, FILM COATED ORAL at 11:06

## 2023-06-10 RX ADMIN — INSULIN ASPART 2 UNITS: 100 INJECTION, SOLUTION INTRAVENOUS; SUBCUTANEOUS at 06:06

## 2023-06-10 RX ADMIN — DOCUSATE SODIUM 100 MG: 100 CAPSULE, LIQUID FILLED ORAL at 08:06

## 2023-06-10 RX ADMIN — ENOXAPARIN SODIUM 40 MG: 40 INJECTION SUBCUTANEOUS at 04:06

## 2023-06-10 RX ADMIN — DOCUSATE SODIUM 100 MG: 100 CAPSULE, LIQUID FILLED ORAL at 11:06

## 2023-06-10 RX ADMIN — LOSARTAN POTASSIUM 25 MG: 25 TABLET, FILM COATED ORAL at 11:06

## 2023-06-10 RX ADMIN — DOXYCYCLINE HYCLATE 100 MG: 100 TABLET, COATED ORAL at 11:06

## 2023-06-10 RX ADMIN — CLOPIDOGREL BISULFATE 75 MG: 75 TABLET ORAL at 11:06

## 2023-06-10 RX ADMIN — CARVEDILOL 12.5 MG: 12.5 TABLET, FILM COATED ORAL at 06:06

## 2023-06-10 RX ADMIN — ISOSORBIDE MONONITRATE 30 MG: 30 TABLET, EXTENDED RELEASE ORAL at 11:06

## 2023-06-10 RX ADMIN — SODIUM CHLORIDE: 9 INJECTION, SOLUTION INTRAVENOUS at 03:06

## 2023-06-10 RX ADMIN — FENOFIBRATE 145 MG: 145 TABLET ORAL at 11:06

## 2023-06-10 RX ADMIN — ASPIRIN 81 MG 81 MG: 81 TABLET ORAL at 11:06

## 2023-06-10 RX ADMIN — DOXYCYCLINE HYCLATE 100 MG: 100 TABLET, COATED ORAL at 08:06

## 2023-06-10 NOTE — PROGRESS NOTES
"[facility]  Hospital Medicine  Progress Note    Patient Name: Faustino Fischer  MRN: [id number]  Patient Class: IP- Inpatient   Admission Date: 6/7/2023  Length of Stay: 3 days  Attending Physician: Edson Astorga Jr., MD  Primary Care Provider: Juan Pablo Ryan MD        Subjective:     Principal Problem:Acute on chronic pancreatitis        HPI:  Presented with c/o epigastric pain with nausea and dry heaves that started last night. States has freq bouts of pancreatitis and presenting symptoms today are indicative of an acute episode for him. Denies ETOH or DM (states since losing wt). Previous cholecystectomy. Denies fever or chills, chest pain, dyspnea, diarrhea or constipation. States that pain started after eating a Bratwurst and hot chips last night. States has been trying to treat himself at home by remaining NPO but is not better.     Labs ordered and reviewed. Lipase 1069, amylase 126, AST 23, ALT 23, T BR 0.5, troponin 19.9, BUN 9, creatinine 1, Mg 1, , K+ 3.8, WBC 8570 with H&H 12.6 and 35.6 and plt [id number]. glucose 318. UA shows mod bacteria with 15-25 WBCs. CT abd pelvis with contrast ordered and reviewed.      NS bolus x 1 Liter, MS 4 mg IVP, ondansetron 4 mg IVP given in the ED.     CT abd/pelvis with contrast shows fat stranding indicative of acute pancreatitis, fatty liver, previous cholecystectomy with no ductal dilation.     Discussed findings and need for adm with pt. He agreed. Discussed pt's case with Dr. Astorga. He accepted pt for admission.     Upon discussing adm with pt, he reports that he has a draining abscess to his left "inner thigh". Upon exam, an open draining abscess is noted located on the lower aspect of his left buttock. Mild surrounding erythema with no swelling. Opening is approx 3 cm in length. Pt states has multiple abscesses in the last year to his axilla and groin. Had one incised but says that he has no sought medical care for all " "of them. Has multiple scarring under both arms as well.     Discussed these findings with Dr. Astorga. He evaluated pt in the ED. Zosyn 4.5 Gm IVPB started in the ED. Pt will be treated with Vancomycin as well.      Overview/Hospital Course:  06/08/23 Awake and resting in bed. Reports pain to epigastric area at level 2 . States he recently had pain med and prior to that , pain was at level 7.  No nausea , vomiting or diarrhea overnight.  Wound care consult placed for right axilla abscess and left buttock. Continue abx. Cultures from areas pending. Continue hydration and pain control.  06/09/23 Complains of having what felt like a bladder spasm last night. States has resolved. Having less abdominal pain now. Reports having more soreness like pain to epigastric area. No vomiting or diarrhea. Reports pain level is at level 2. Will start clear liquids today and see how it tolerates it. Cultures from right axilla and left buttock pending. Continue vancomycin and zosyn. No drainage noted from right axilla this morning. Labs pending.  Lipase is normal at 173.  Tolerated CL breakfast without nausea , vomiting or pain. Probable dc in am.    6/10/23 Rounds on patient this morning: Patient reports he is feeling well.  He reports mild suprapubic abd "pressure", intermittent bladder spasms (when needing to void) and "dribbling" when trying to void.  Denies upper abd pain and n/v/d.  Pt noted to still be on clear liquid diet.  I was able to advance him to full liquids for breakfast, which he tolerated well.  Will advance his diet for lunch and supper as tolerated.  WBC normal and Lipase is normal.  However, I noted creatinine increased from 1.19 to 2.84 and GFR went from 73 to 26 in 24 hour period.  Pt reports he is drinking plenty of water. He is on IVF's at 50mls/hr.  BUN normal.  Pt reports when he was admitted at [facility] in [place] that he was having the same bladder spasms and dribbling.  However, he doesn't " remember what the MD told him.  I did not see any records of him having those issues during his stay at [facility].  I will increase his IVFs to 100mls/hr and add Flomax daily to his regimen.  I spoke with him about seeing if he is possibly retaining urine.  We do not have bladder scanner here at our facility.  I discussed with him to let him void, then do post-void I&O cath to assess for retention due to possible ARRIAZA.  He agreed to the cath.  However, when the nurse went in to perform the cath, the patient reports he stream has increased, and he wanted to wait on the catheter.  Will repeat the lab tomorrow morning to assess for changes.   Wounds to allison axilla and left groin appear to be healing well.  No drainage.       Subjective & objective note cannot be loaded without a specified hospital service.      Assessment/Plan:      Assessment & plan notes cannot be loaded without a specified hospital service.    VTE Risk Mitigation (From admission, onward)         Ordered     enoxaparin injection 40 mg  Daily         06/07/23 1737     IP VTE HIGH RISK PATIENT  Once         06/07/23 1737     Place JULIO hose  Until discontinued         06/07/23 1737                Discharge Planning   BETY:      Code Status: Full Code   Is the patient medically ready for discharge?:     Reason for patient still in hospital (select all that apply): Patient new problem, Patient trending condition, Laboratory test and Treatment  Discharge Plan A: Home                  DOC Chapman  Department of Hospital Medicine   [facility]

## 2023-06-10 NOTE — SUBJECTIVE & OBJECTIVE
Interval History: Discussed patient's case with Dr. Ross.    Review of Systems   Constitutional:  Negative for fever.   Respiratory:  Negative for cough and shortness of breath.    Cardiovascular:  Negative for chest pain.   Gastrointestinal:  Negative for abdominal distention, abdominal pain, diarrhea, nausea and vomiting.   Genitourinary:  Negative for decreased urine volume, difficulty urinating, dysuria, flank pain, frequency, hematuria, penile discharge, penile pain, penile swelling, scrotal swelling, testicular pain and urgency.   Musculoskeletal:  Negative for back pain.   Skin:  Positive for wound.   Neurological:  Negative for dizziness, weakness and headaches.   Psychiatric/Behavioral: Negative.     Objective:     Vital Signs (Most Recent):  Temp: 97.8 °F (36.6 °C) (06/10/23 0847)  Pulse: 64 (06/10/23 0847)  Resp: 20 (06/10/23 0847)  BP: (!) 153/94 (06/10/23 0847)  SpO2: 100 % (06/10/23 0847) Vital Signs (24h Range):  Temp:  [97.5 °F (36.4 °C)-98.6 °F (37 °C)] 97.8 °F (36.6 °C)  Pulse:  [55-78] 64  Resp:  [18-20] 20  SpO2:  [93 %-100 %] 100 %  BP: ()/(52-94) 153/94     Weight: 95.3 kg (210 lb)  Body mass index is 33.89 kg/m².    Intake/Output Summary (Last 24 hours) at 6/10/2023 1252  Last data filed at 6/10/2023 0830  Gross per 24 hour   Intake 1300 ml   Output --   Net 1300 ml         Physical Exam  Vitals and nursing note reviewed.   Constitutional:       General: He is awake. He is not in acute distress.     Appearance: Normal appearance. He is obese. He is not ill-appearing, toxic-appearing or diaphoretic.   HENT:      Head: Normocephalic and atraumatic.      Mouth/Throat:      Mouth: Mucous membranes are moist.   Eyes:      Extraocular Movements: Extraocular movements intact.      Conjunctiva/sclera: Conjunctivae normal.   Cardiovascular:      Rate and Rhythm: Normal rate and regular rhythm.      Pulses: Normal pulses.      Heart sounds: Normal heart sounds. No murmur heard.  Pulmonary:       Effort: Pulmonary effort is normal. No respiratory distress.      Breath sounds: Normal breath sounds. No stridor. No wheezing, rhonchi or rales.   Abdominal:      General: Bowel sounds are normal. There is no distension.      Palpations: Abdomen is soft.      Tenderness: There is no abdominal tenderness. There is no right CVA tenderness, left CVA tenderness, guarding or rebound.   Musculoskeletal:         General: Normal range of motion.      Cervical back: Normal range of motion and neck supple. No rigidity.   Skin:     General: Skin is warm and dry.      Capillary Refill: Capillary refill takes less than 2 seconds.      Comments: Right axilla wound noted to be healing well.  No erythema or drainage noted.  He is noted to have chronic scarring from recurrent infections, but he is without erythema and fluctuance. Left buttock wound is noted with no drainage, erythema or fluctuance.    Neurological:      General: No focal deficit present.      Mental Status: He is alert and oriented to person, place, and time.      GCS: GCS eye subscore is 4. GCS verbal subscore is 5. GCS motor subscore is 6.   Psychiatric:         Mood and Affect: Mood normal.         Behavior: Behavior is cooperative.           Significant Labs: See above    Significant Imaging:  no new imaging.

## 2023-06-10 NOTE — PLAN OF CARE
Problem: Adult Inpatient Plan of Care  Goal: Plan of Care Review  6/10/2023 1848 by Michelle Lozoya RN  Outcome: Ongoing, Progressing  6/10/2023 1848 by Michelle Lozoya RN  Outcome: Ongoing, Progressing  Goal: Patient-Specific Goal (Individualized)  Outcome: Ongoing, Progressing  Goal: Absence of Hospital-Acquired Illness or Injury  Outcome: Ongoing, Progressing  Goal: Optimal Comfort and Wellbeing  Outcome: Ongoing, Progressing  Goal: Readiness for Transition of Care  Outcome: Ongoing, Progressing     Problem: Diabetes Comorbidity  Goal: Blood Glucose Level Within Targeted Range  Outcome: Ongoing, Progressing     Problem: Impaired Wound Healing  Goal: Optimal Wound Healing  Outcome: Ongoing, Progressing     Problem: Fluid and Electrolyte Imbalance (Acute Kidney Injury/Impairment)  Goal: Fluid and Electrolyte Balance  Outcome: Ongoing, Progressing     Problem: Oral Intake Inadequate (Acute Kidney Injury/Impairment)  Goal: Optimal Nutrition Intake  Outcome: Ongoing, Progressing     Problem: Renal Function Impairment (Acute Kidney Injury/Impairment)  Goal: Effective Renal Function  Outcome: Ongoing, Progressing

## 2023-06-11 PROBLEM — E83.42 HYPOMAGNESEMIA: Status: RESOLVED | Noted: 2021-06-26 | Resolved: 2023-06-11

## 2023-06-11 LAB
ALBUMIN SERPL BCP-MCNC: 2.7 G/DL (ref 3.5–5)
ALBUMIN/GLOB SERPL: 0.7 {RATIO}
ALP SERPL-CCNC: 70 U/L (ref 45–115)
ALT SERPL W P-5'-P-CCNC: 15 U/L (ref 16–61)
ANION GAP SERPL CALCULATED.3IONS-SCNC: 16 MMOL/L (ref 7–16)
AST SERPL W P-5'-P-CCNC: 16 U/L (ref 15–37)
BACTERIA #/AREA URNS HPF: ABNORMAL /HPF
BASOPHILS # BLD AUTO: 0.02 K/UL (ref 0–0.2)
BASOPHILS NFR BLD AUTO: 0.4 % (ref 0–1)
BILIRUB SERPL-MCNC: 0.3 MG/DL (ref ?–1.2)
BILIRUB UR QL STRIP: NEGATIVE
BUN SERPL-MCNC: 19 MG/DL (ref 7–18)
BUN/CREAT SERPL: 5 (ref 6–20)
CALCIUM SERPL-MCNC: 8.5 MG/DL (ref 8.5–10.1)
CHLORIDE SERPL-SCNC: 100 MMOL/L (ref 98–107)
CK SERPL-CCNC: 34 U/L (ref 39–308)
CLARITY UR: CLEAR
CO2 SERPL-SCNC: 25 MMOL/L (ref 21–32)
COLOR UR: YELLOW
CREAT SERPL-MCNC: 4.2 MG/DL (ref 0.7–1.3)
DIFFERENTIAL METHOD BLD: ABNORMAL
EGFR (NO RACE VARIABLE) (RUSH/TITUS): 16 ML/MIN/1.73M2
EOSINOPHIL # BLD AUTO: 0.05 K/UL (ref 0–0.5)
EOSINOPHIL NFR BLD AUTO: 0.9 % (ref 1–4)
ERYTHROCYTE [DISTWIDTH] IN BLOOD BY AUTOMATED COUNT: 12.6 % (ref 11.5–14.5)
GLOBULIN SER-MCNC: 3.8 G/DL (ref 2–4)
GLUCOSE SERPL-MCNC: 116 MG/DL (ref 70–105)
GLUCOSE SERPL-MCNC: 118 MG/DL (ref 74–106)
GLUCOSE SERPL-MCNC: 147 MG/DL (ref 70–105)
GLUCOSE SERPL-MCNC: 243 MG/DL (ref 70–105)
GLUCOSE UR STRIP-MCNC: NEGATIVE MG/DL
HCT VFR BLD AUTO: 30.8 % (ref 40–54)
HGB BLD-MCNC: 10.5 G/DL (ref 13.5–18)
KETONES UR STRIP-SCNC: NEGATIVE MG/DL
LEUKOCYTE ESTERASE UR QL STRIP: NEGATIVE
LYMPHOCYTES # BLD AUTO: 1.01 K/UL (ref 1–4.8)
LYMPHOCYTES NFR BLD AUTO: 18.7 % (ref 27–41)
MCH RBC QN AUTO: 34.8 PG (ref 27–31)
MCHC RBC AUTO-ENTMCNC: 34.1 G/DL (ref 32–36)
MCV RBC AUTO: 102 FL (ref 80–96)
MICROORGANISM SPEC CULT: ABNORMAL
MONOCYTES # BLD AUTO: 0.41 K/UL (ref 0–0.8)
MONOCYTES NFR BLD AUTO: 7.6 % (ref 2–6)
MPC BLD CALC-MCNC: 10.3 FL (ref 9.4–12.4)
NEUTROPHILS # BLD AUTO: 3.92 K/UL (ref 1.8–7.7)
NEUTROPHILS NFR BLD AUTO: 72.4 % (ref 53–65)
NITRITE UR QL STRIP: NEGATIVE
PH UR STRIP: 5.5 PH UNITS
PLATELET # BLD AUTO: 168 K/UL (ref 150–400)
POTASSIUM SERPL-SCNC: 3.7 MMOL/L (ref 3.5–5.1)
PROT SERPL-MCNC: 6.5 G/DL (ref 6.4–8.2)
PROT UR QL STRIP: 30
RBC # BLD AUTO: 3.02 M/UL (ref 4.6–6.2)
RBC # UR STRIP: ABNORMAL /UL
RBC #/AREA URNS HPF: ABNORMAL /HPF
RENAL EPI CELLS #/AREA URNS LPF: ABNORMAL /LPF
SODIUM SERPL-SCNC: 137 MMOL/L (ref 136–145)
SP GR UR STRIP: <=1.005
SQUAMOUS #/AREA URNS LPF: ABNORMAL /LPF
TRANS CELLS #/AREA URNS LPF: ABNORMAL /LPF
UROBILINOGEN UR STRIP-ACNC: 0.2 MG/DL
VANCOMYCIN TROUGH SERPL-MCNC: 20.5 ΜG/ML (ref 10–20)
WBC # BLD AUTO: 5.41 K/UL (ref 4.5–11)
WBC #/AREA URNS HPF: ABNORMAL /HPF

## 2023-06-11 PROCEDURE — 85025 COMPLETE CBC W/AUTO DIFF WBC: CPT | Performed by: NURSE PRACTITIONER

## 2023-06-11 PROCEDURE — 25000003 PHARM REV CODE 250: Performed by: NURSE PRACTITIONER

## 2023-06-11 PROCEDURE — 82962 GLUCOSE BLOOD TEST: CPT

## 2023-06-11 PROCEDURE — 11000001 HC ACUTE MED/SURG PRIVATE ROOM

## 2023-06-11 PROCEDURE — 63600175 PHARM REV CODE 636 W HCPCS: Performed by: NURSE PRACTITIONER

## 2023-06-11 PROCEDURE — 82550 ASSAY OF CK (CPK): CPT | Performed by: NURSE PRACTITIONER

## 2023-06-11 PROCEDURE — 81001 URINALYSIS AUTO W/SCOPE: CPT | Performed by: NURSE PRACTITIONER

## 2023-06-11 PROCEDURE — 80053 COMPREHEN METABOLIC PANEL: CPT | Performed by: NURSE PRACTITIONER

## 2023-06-11 PROCEDURE — 80202 ASSAY OF VANCOMYCIN: CPT | Performed by: NURSE PRACTITIONER

## 2023-06-11 RX ADMIN — ASPIRIN 81 MG 81 MG: 81 TABLET ORAL at 09:06

## 2023-06-11 RX ADMIN — DOCUSATE SODIUM 100 MG: 100 CAPSULE, LIQUID FILLED ORAL at 10:06

## 2023-06-11 RX ADMIN — DOXYCYCLINE HYCLATE 100 MG: 100 TABLET, COATED ORAL at 10:06

## 2023-06-11 RX ADMIN — SODIUM CHLORIDE: 9 INJECTION, SOLUTION INTRAVENOUS at 02:06

## 2023-06-11 RX ADMIN — CLOPIDOGREL BISULFATE 75 MG: 75 TABLET ORAL at 10:06

## 2023-06-11 RX ADMIN — INSULIN ASPART 2 UNITS: 100 INJECTION, SOLUTION INTRAVENOUS; SUBCUTANEOUS at 09:06

## 2023-06-11 RX ADMIN — DOXYCYCLINE HYCLATE 100 MG: 100 TABLET, COATED ORAL at 09:06

## 2023-06-11 RX ADMIN — PANTOPRAZOLE SODIUM 40 MG: 40 TABLET, DELAYED RELEASE ORAL at 10:06

## 2023-06-11 RX ADMIN — SODIUM CHLORIDE 1000 ML: 9 INJECTION, SOLUTION INTRAVENOUS at 09:06

## 2023-06-11 RX ADMIN — TAMSULOSIN HYDROCHLORIDE 0.4 MG: 0.4 CAPSULE ORAL at 10:06

## 2023-06-11 RX ADMIN — FENOFIBRATE 145 MG: 145 TABLET ORAL at 10:06

## 2023-06-11 RX ADMIN — ENOXAPARIN SODIUM 40 MG: 40 INJECTION SUBCUTANEOUS at 05:06

## 2023-06-11 RX ADMIN — ATORVASTATIN CALCIUM 80 MG: 40 TABLET, FILM COATED ORAL at 09:06

## 2023-06-11 RX ADMIN — ISOSORBIDE MONONITRATE 30 MG: 30 TABLET, EXTENDED RELEASE ORAL at 10:06

## 2023-06-11 RX ADMIN — CARVEDILOL 12.5 MG: 12.5 TABLET, FILM COATED ORAL at 06:06

## 2023-06-11 RX ADMIN — LOSARTAN POTASSIUM 25 MG: 25 TABLET, FILM COATED ORAL at 10:06

## 2023-06-11 NOTE — PLAN OF CARE
Problem: Adult Inpatient Plan of Care  Goal: Patient-Specific Goal (Individualized)  Outcome: Ongoing, Progressing  Goal: Absence of Hospital-Acquired Illness or Injury  Outcome: Ongoing, Progressing  Intervention: Prevent and Manage VTE (Venous Thromboembolism) Risk  Flowsheets (Taken 6/11/2023 0247)  Activity Management: Ambulated to bathroom - L4  VTE Prevention/Management:   ambulation promoted   intravenous hydration   fluids promoted     Problem: Diabetes Comorbidity  Goal: Blood Glucose Level Within Targeted Range  Outcome: Ongoing, Progressing  Intervention: Monitor and Manage Glycemia  Flowsheets (Taken 6/11/2023 0247)  Glycemic Management:   blood glucose monitored   supplemental insulin given

## 2023-06-12 LAB
ALBUMIN SERPL BCP-MCNC: 2.5 G/DL (ref 3.5–5)
ALBUMIN/GLOB SERPL: 0.7 {RATIO}
ALP SERPL-CCNC: 61 U/L (ref 45–115)
ALT SERPL W P-5'-P-CCNC: 16 U/L (ref 16–61)
ANION GAP SERPL CALCULATED.3IONS-SCNC: 17 MMOL/L (ref 7–16)
AST SERPL W P-5'-P-CCNC: 14 U/L (ref 15–37)
BASOPHILS # BLD AUTO: 0.01 K/UL (ref 0–0.2)
BASOPHILS NFR BLD AUTO: 0.2 % (ref 0–1)
BILIRUB SERPL-MCNC: 0.3 MG/DL (ref ?–1.2)
BUN SERPL-MCNC: 19 MG/DL (ref 7–18)
BUN/CREAT SERPL: 4 (ref 6–20)
CALCIUM SERPL-MCNC: 8.4 MG/DL (ref 8.5–10.1)
CHLORIDE SERPL-SCNC: 105 MMOL/L (ref 98–107)
CO2 SERPL-SCNC: 22 MMOL/L (ref 21–32)
CREAT SERPL-MCNC: 4.67 MG/DL (ref 0.7–1.3)
DIFFERENTIAL METHOD BLD: ABNORMAL
EGFR (NO RACE VARIABLE) (RUSH/TITUS): 14 ML/MIN/1.73M2
EOSINOPHIL # BLD AUTO: 0.08 K/UL (ref 0–0.5)
EOSINOPHIL NFR BLD AUTO: 1.5 % (ref 1–4)
ERYTHROCYTE [DISTWIDTH] IN BLOOD BY AUTOMATED COUNT: 12.5 % (ref 11.5–14.5)
GLOBULIN SER-MCNC: 3.8 G/DL (ref 2–4)
GLUCOSE SERPL-MCNC: 125 MG/DL (ref 70–105)
GLUCOSE SERPL-MCNC: 132 MG/DL (ref 74–106)
GLUCOSE SERPL-MCNC: 169 MG/DL (ref 70–105)
HCT VFR BLD AUTO: 29.5 % (ref 40–54)
HGB BLD-MCNC: 10.2 G/DL (ref 13.5–18)
LYMPHOCYTES # BLD AUTO: 1.17 K/UL (ref 1–4.8)
LYMPHOCYTES NFR BLD AUTO: 22 % (ref 27–41)
MCH RBC QN AUTO: 35.1 PG (ref 27–31)
MCHC RBC AUTO-ENTMCNC: 34.6 G/DL (ref 32–36)
MCV RBC AUTO: 101.4 FL (ref 80–96)
MONOCYTES # BLD AUTO: 0.43 K/UL (ref 0–0.8)
MONOCYTES NFR BLD AUTO: 8.1 % (ref 2–6)
MPC BLD CALC-MCNC: 10.6 FL (ref 9.4–12.4)
NEUTROPHILS # BLD AUTO: 3.62 K/UL (ref 1.8–7.7)
NEUTROPHILS NFR BLD AUTO: 68.2 % (ref 53–65)
PLATELET # BLD AUTO: 184 K/UL (ref 150–400)
POTASSIUM SERPL-SCNC: 3.6 MMOL/L (ref 3.5–5.1)
PROT SERPL-MCNC: 6.3 G/DL (ref 6.4–8.2)
RBC # BLD AUTO: 2.91 M/UL (ref 4.6–6.2)
SODIUM SERPL-SCNC: 140 MMOL/L (ref 136–145)
WBC # BLD AUTO: 5.31 K/UL (ref 4.5–11)

## 2023-06-12 PROCEDURE — 99232 SBSQ HOSP IP/OBS MODERATE 35: CPT | Mod: ,,, | Performed by: NURSE PRACTITIONER

## 2023-06-12 PROCEDURE — 63600175 PHARM REV CODE 636 W HCPCS: Performed by: NURSE PRACTITIONER

## 2023-06-12 PROCEDURE — 85025 COMPLETE CBC W/AUTO DIFF WBC: CPT | Performed by: NURSE PRACTITIONER

## 2023-06-12 PROCEDURE — 27200960 HC CATHETER, FOLEY (ANY)

## 2023-06-12 PROCEDURE — 25000003 PHARM REV CODE 250: Performed by: NURSE PRACTITIONER

## 2023-06-12 PROCEDURE — 82962 GLUCOSE BLOOD TEST: CPT

## 2023-06-12 PROCEDURE — 99232 PR SUBSEQUENT HOSPITAL CARE,LEVL II: ICD-10-PCS | Mod: ,,, | Performed by: NURSE PRACTITIONER

## 2023-06-12 PROCEDURE — 63600175 PHARM REV CODE 636 W HCPCS: Performed by: FAMILY MEDICINE

## 2023-06-12 PROCEDURE — 80053 COMPREHEN METABOLIC PANEL: CPT | Performed by: NURSE PRACTITIONER

## 2023-06-12 PROCEDURE — 11000001 HC ACUTE MED/SURG PRIVATE ROOM

## 2023-06-12 RX ORDER — ENOXAPARIN SODIUM 100 MG/ML
30 INJECTION SUBCUTANEOUS EVERY 24 HOURS
Status: DISCONTINUED | OUTPATIENT
Start: 2023-06-12 | End: 2023-06-14 | Stop reason: HOSPADM

## 2023-06-12 RX ORDER — LACTOBACILLUS ACIDOPHILUS 500MM CELL
1 CAPSULE ORAL
Status: DISCONTINUED | OUTPATIENT
Start: 2023-06-12 | End: 2023-06-14 | Stop reason: HOSPADM

## 2023-06-12 RX ADMIN — LOSARTAN POTASSIUM 25 MG: 25 TABLET, FILM COATED ORAL at 09:06

## 2023-06-12 RX ADMIN — CARVEDILOL 12.5 MG: 12.5 TABLET, FILM COATED ORAL at 05:06

## 2023-06-12 RX ADMIN — CLOPIDOGREL BISULFATE 75 MG: 75 TABLET ORAL at 09:06

## 2023-06-12 RX ADMIN — DOCUSATE SODIUM 100 MG: 100 CAPSULE, LIQUID FILLED ORAL at 09:06

## 2023-06-12 RX ADMIN — ATORVASTATIN CALCIUM 80 MG: 40 TABLET, FILM COATED ORAL at 09:06

## 2023-06-12 RX ADMIN — Medication 1 CAPSULE: at 12:06

## 2023-06-12 RX ADMIN — SODIUM CHLORIDE: 9 INJECTION, SOLUTION INTRAVENOUS at 04:06

## 2023-06-12 RX ADMIN — PANTOPRAZOLE SODIUM 40 MG: 40 TABLET, DELAYED RELEASE ORAL at 09:06

## 2023-06-12 RX ADMIN — DOXYCYCLINE HYCLATE 100 MG: 100 TABLET, COATED ORAL at 08:06

## 2023-06-12 RX ADMIN — TAMSULOSIN HYDROCHLORIDE 0.4 MG: 0.4 CAPSULE ORAL at 09:06

## 2023-06-12 RX ADMIN — DOXYCYCLINE HYCLATE 100 MG: 100 TABLET, COATED ORAL at 09:06

## 2023-06-12 RX ADMIN — Medication 1 CAPSULE: at 05:06

## 2023-06-12 RX ADMIN — HYDROCODONE BITARTRATE AND ACETAMINOPHEN 1 TABLET: 5; 325 TABLET ORAL at 08:06

## 2023-06-12 RX ADMIN — ISOSORBIDE MONONITRATE 30 MG: 30 TABLET, EXTENDED RELEASE ORAL at 09:06

## 2023-06-12 RX ADMIN — FENOFIBRATE 145 MG: 145 TABLET ORAL at 09:06

## 2023-06-12 RX ADMIN — ASPIRIN 81 MG 81 MG: 81 TABLET ORAL at 09:06

## 2023-06-12 RX ADMIN — ENOXAPARIN SODIUM 30 MG: 30 INJECTION SUBCUTANEOUS at 05:06

## 2023-06-12 RX ADMIN — INSULIN ASPART 2 UNITS: 100 INJECTION, SOLUTION INTRAVENOUS; SUBCUTANEOUS at 06:06

## 2023-06-12 RX ADMIN — DOCUSATE SODIUM 100 MG: 100 CAPSULE, LIQUID FILLED ORAL at 08:06

## 2023-06-12 RX ADMIN — SODIUM CHLORIDE: 9 INJECTION, SOLUTION INTRAVENOUS at 06:06

## 2023-06-12 NOTE — ASSESSMENT & PLAN NOTE
Lipid profile  Continue atorvastatin and fenofibrate  Triglycerides 35 - 150 mg/dL 148  138 CM  115 CM    Comment:    Normal: <150 mg/dL   Borderline High: 150-199 mg/dL   High: 200-499 mg/dL   Very High: >=500   Cholesterol 0 - 200 mg/dL 142  165 CM     Comment:    <200 mg/dL: Desirable   200-240 mg/dL: Borderline High   >240 mg/dL: High   HDL Cholesterol 40 - 60 mg/dL 46  36 Low  CM     Comment:    <40 mg/dL: Low HDL   40-60 mg/dL: Normal   >60 mg/dL: Desirable   Cholesterol/HDL Ratio (Risk Factor)  3.1  4.6     Non-HDL mg/dL 96  129     LDL Calculated mg/dL 66  101 CM     LDL/HDL  1.4  2.8 CM     VLDL mg/dL 30  28     Resulting Agency  [facility]

## 2023-06-12 NOTE — PROGRESS NOTES
"[facility]  Hospital Medicine  Progress Note    Patient Name: Faustino Fischer  MRN: [id number]  Patient Class: IP- Inpatient   Admission Date: 6/7/2023  Length of Stay: 5 days  Attending Physician: Edson Astorga Jr., MD  Primary Care Provider: Juan Pablo Ryan MD        Subjective:     Principal Problem:Acute on chronic pancreatitis        HPI:  Presented with c/o epigastric pain with nausea and dry heaves that started last night. States has freq bouts of pancreatitis and presenting symptoms today are indicative of an acute episode for him. Denies ETOH or DM (states since losing wt). Previous cholecystectomy. Denies fever or chills, chest pain, dyspnea, diarrhea or constipation. States that pain started after eating a Bratwurst and hot chips last night. States has been trying to treat himself at home by remaining NPO but is not better.     Labs ordered and reviewed. Lipase 1069, amylase 126, AST 23, ALT 23, T BR 0.5, troponin 19.9, BUN 9, creatinine 1, Mg 1, , K+ 3.8, WBC 8570 with H&H 12.6 and 35.6 and plt 647002. glucose 318. UA shows mod bacteria with 15-25 WBCs. CT abd pelvis with contrast ordered and reviewed.      NS bolus x 1 Liter, MS 4 mg IVP, ondansetron 4 mg IVP given in the ED.     CT abd/pelvis with contrast shows fat stranding indicative of acute pancreatitis, fatty liver, previous cholecystectomy with no ductal dilation.     Discussed findings and need for adm with pt. He agreed. Discussed pt's case with Dr. Astorga. He accepted pt for admission.     Upon discussing adm with pt, he reports that he has a draining abscess to his left "inner thigh". Upon exam, an open draining abscess is noted located on the lower aspect of his left buttock. Mild surrounding erythema with no swelling. Opening is approx 3 cm in length. Pt states has multiple abscesses in the last year to his axilla and groin. Had one incised but says that he has no sought medical care for all " "of them. Has multiple scarring under both arms as well.     Discussed these findings with Dr. Astorga. He evaluated pt in the ED. Zosyn 4.5 Gm IVPB started in the ED. Pt will be treated with Vancomycin as well.      Overview/Hospital Course:  06/08/23 Awake and resting in bed. Reports pain to epigastric area at level 2 . States he recently had pain med and prior to that , pain was at level 7.  No nausea , vomiting or diarrhea overnight.  Wound care consult placed for right axilla abscess and left buttock. Continue abx. Cultures from areas pending. Continue hydration and pain control.  06/09/23 Complains of having what felt like a bladder spasm last night. States has resolved. Having less abdominal pain now. Reports having more soreness like pain to epigastric area. No vomiting or diarrhea. Reports pain level is at level 2. Will start clear liquids today and see how it tolerates it. Cultures from right axilla and left buttock pending. Continue vancomycin and zosyn. No drainage noted from right axilla this morning. Labs pending.  Lipase is normal at 173.  Tolerated CL breakfast without nausea , vomiting or pain. Probable dc in am.    6/10/23 Rounds on patient this morning: Patient reports he is feeling well.  He reports mild suprapubic abd "pressure", intermittent bladder spasms (when needing to void) and "dribbling" when trying to void.  Denies upper abd pain and n/v/d.  Pt noted to still be on clear liquid diet.  I was able to advance him to full liquids for breakfast, which he tolerated well.  Will advance his diet for lunch and supper as tolerated.  WBC normal and Lipase is normal.  However, I noted creatinine increased from 1.19 to 2.84 and GFR went from 73 to 26 in 24 hour period.  Pt reports he is drinking plenty of water. He is on IVF's at 50mls/hr.  BUN normal.  Pt reports when he was admitted at [facility] in [place] that he was having the same bladder spasms and dribbling.  However, he doesn't " remember what the MD told him.  I did not see any records of him having those issues during his stay at [facility] [place].  I will increase his IVFs to 100mls/hr and add Flomax daily to his regimen.  I spoke with him about seeing if he is possibly retaining urine.  We do not have bladder scanner here at our facility.  I discussed with him to let him void, then do post-void I&O cath to assess for retention due to possible ARRIAZA.  He agreed to the cath.  However, when the nurse went in to perform the cath, the patient reports he stream has increased, and he wanted to wait on the catheter.  Will repeat the lab tomorrow morning to assess for changes.   Wounds to allison axilla and left groin appear to be healing well.  No drainage.     06/12/23 Awake and sitting up in bed. Denies any pain or sob. Reports that he is voiding well without hesitancy. Renal ultrasound scheduled for today. Creatinine is up to 4.67 this am. Continue fluids.    06/11/23 Mr. Fischer reports this morning that he is no longer having difficulty urinating. He denies abdominal pain or pressure today. He is continuing to receive Flomax po. He is tolerating a regular ADA since yesterday. He denies n/v/d. He reports that his abscess to right axilla and left buttock drng has improved. His wound culture grew out Staph lugdenensis. Yesterday, Vancomycin and Zosyn were discontinued. He was started on Doxcycline po and has tolerated well po. Today, his BUN and creatinine have increased to 19 and 4.2 (up from 2.84). Ck was 34. Random vanc trough was 20.5. NS bolus x 1 liter IV given. Continue maintenance fluids at 100 ml/h. CMP daily. Renal u/s in am.      Interval History: 06/12/23 Awake and sitting up in bed. Denies any pain or sob. Reports that he is voiding well without hesitancy. Renal ultrasound scheduled for today. Creatinine is up to 4.67 this am. Continue fluids.    Review of Systems   Constitutional:  Negative for appetite change.   Respiratory:  Negative for cough  and shortness of breath.    Cardiovascular:  Negative for chest pain.   Gastrointestinal:  Negative for constipation, diarrhea, nausea and vomiting.   Genitourinary:  Negative for difficulty urinating.   Objective:     Vital Signs (Most Recent):  Temp: 98.7 °F (37.1 °C) (06/12/23 0730)  Pulse: (!) 59 (06/12/23 0730)  Resp: 18 (06/12/23 0730)  BP: (!) 176/107 (Oksana,NP is aware) (06/12/23 0730)  SpO2: 98 % (06/12/23 0730) Vital Signs (24h Range):  Temp:  [98 °F (36.7 °C)-98.7 °F (37.1 °C)] 98.7 °F (37.1 °C)  Pulse:  [59-72] 59  Resp:  [18-20] 18  SpO2:  [95 %-100 %] 98 %  BP: (122-176)/() 176/107     Weight: 95.3 kg (210 lb)  Body mass index is 33.89 kg/m².    Intake/Output Summary (Last 24 hours) at 6/12/2023 0955  Last data filed at 6/12/2023 0915  Gross per 24 hour   Intake 1100 ml   Output 950 ml   Net 150 ml         Physical Exam  Vitals reviewed.   Constitutional:       General: He is not in acute distress.     Appearance: He is not toxic-appearing.   HENT:      Head: Normocephalic.      Mouth/Throat:      Mouth: Mucous membranes are moist.   Eyes:      General: No scleral icterus.  Cardiovascular:      Rate and Rhythm: Normal rate and regular rhythm.      Heart sounds: Normal heart sounds.   Pulmonary:      Effort: Pulmonary effort is normal.      Breath sounds: Normal breath sounds.   Abdominal:      General: Bowel sounds are normal. There is no distension.      Palpations: Abdomen is soft.      Tenderness: There is no abdominal tenderness.   Musculoskeletal:      Cervical back: Neck supple.      Right lower leg: No edema.      Left lower leg: No edema.   Skin:     General: Skin is warm and dry.      Comments: No drainage to right axilla       Neurological:      General: No focal deficit present.      Mental Status: He is alert.   Psychiatric:         Mood and Affect: Mood normal.         Behavior: Behavior normal.           Significant Labs: All pertinent labs within the past 24 hours have been  reviewed.  Recent Lab Results         06/12/23  0844   06/12/23  0558   06/11/23 2026        Albumin/Globulin Ratio 0.7           Albumin 2.5           Alkaline Phosphatase 61           ALT 16           Anion Gap 17           AST 14           Baso # 0.01           Basophil % 0.2           BILIRUBIN TOTAL 0.3           BUN 19           BUN/CREAT RATIO 4           Calcium 8.4           Chloride 105           CO2 22           Creatinine 4.67           Differential Type Auto           eGFR 14           Eos # 0.08           Eosinophil % 1.5           Globulin, Total 3.8           Glucose 132           Hematocrit 29.5           Hemoglobin 10.2           Lymph # 1.17           Lymph % 22.0           MCH 35.1           MCHC 34.6           .4           Mono # 0.43           Mono % 8.1           MPV 10.6           Neutrophils, Abs 3.62           Neutrophils Relative 68.2           Platelets 184           POC Glucose   125   243       Potassium 3.6           PROTEIN TOTAL 6.3           RBC 2.91           RDW 12.5           Sodium 140           WBC 5.31                   Significant Imaging: I have reviewed all pertinent imaging results/findings within the past 24 hours.      Assessment/Plan:      * Acute on chronic pancreatitis  NS 1 liter bolus given in the ED  NS at 150ml/h  Ondansetron q8hprn/Phenergan 25 mg IVPB q6hprn  Dilaudid 1 mg q4hprn pain  NPO  CMP, CBC daily    06/08/23 continue fluids, NPO, pain control     06/09/23 no nausea or vomiting reported. Complains of epigastric soreness at level 2 - start CL and monitor     06/11/23 Lipase 173, triglycerides 148  No n/v. Denies abd pain.   Regino regular diet po.    HAKEEM (acute kidney injury)   Labs reviewed- Renal function/electrolytes with Estimated Creatinine Clearance: 19.5 mL/min (A) (based on SCr of 4.67 mg/dL (H)). according to latest data. Monitor urine output and serial BMP and adjust therapy as needed. Avoid nephrotoxins and renally dose meds for GFR listed  above.     06/11/23  Bun 19, creatinine 4.2 today   NS bolus 1 liter today and 100 ml/h  Renal ultrasound tomorrow  CMP daily      06/12/233 BUN 19 cr 4.67  Continue fluids   Renal ultrasound today     Hydradenitis        Abscess to right axilla and left buttock  Wound cultures pending  Vancomycin (pharmacy to dose and manage)  Zosyn 4.5Gms IVPB q8h    06/08/23 cultures pending  Continue abx    06/09/23 wound care consult done yesterday  Cultures pending   Continue abx  Follow up with derm at [facility]     06/11/23 Wound culture grew out Staph lugdenensis  D/c'd vancomycin and zosyn  Doxycyline po    06/12/23 continue doxycycline     Mixed hyperlipidemia  Lipid profile  Continue atorvastatin and fenofibrate  Triglycerides 35 - 150 mg/dL 148  138 CM  115 CM    Comment:    Normal: <150 mg/dL   Borderline High: 150-199 mg/dL   High: 200-499 mg/dL   Very High: >=500   Cholesterol 0 - 200 mg/dL 142  165 CM     Comment:    <200 mg/dL: Desirable   200-240 mg/dL: Borderline High   >240 mg/dL: High   HDL Cholesterol 40 - 60 mg/dL 46  36 Low  CM     Comment:    <40 mg/dL: Low HDL   40-60 mg/dL: Normal   >60 mg/dL: Desirable   Cholesterol/HDL Ratio (Risk Factor)  3.1  4.6     Non-HDL mg/dL 96  129     LDL Calculated mg/dL 66  101 CM     LDL/HDL  1.4  2.8 CM     VLDL mg/dL 30  28     Resulting Agency  [facility]           Type 2 diabetes mellitus without complication, without long-term current use of insulin  Pt reports not taking any oral meds x 1 mo because he had lost wt.  Glucose 318 today  NS bolus in ED  Accuchecks q6h with mod sliding scale coverage    06/08/23 stable      06/09/23 a1c 6.3, stable blood glucose readings    06/11/23 glucose 116-243  Regino ADA diet    06/12/23 stable      VTE Risk Mitigation (From admission, onward)         Ordered     enoxaparin injection 30 mg  Every 24 hours         06/12/23 0951     IP VTE HIGH RISK PATIENT  Once         06/07/23 1737     Place JULIO hose  Until discontinued          06/07/23 1737                Discharge Planning   BETY:      Code Status: Full Code   Is the patient medically ready for discharge?:     Reason for patient still in hospital (select all that apply): Treatment  Discharge Plan A: Home                  DOC Thomas  Department of Hospital Medicine   [facility]

## 2023-06-12 NOTE — ASSESSMENT & PLAN NOTE
Labs reviewed- Renal function/electrolytes with Estimated Creatinine Clearance: 21.7 mL/min (A) (based on SCr of 4.2 mg/dL (H)). according to latest data. Monitor urine output and serial BMP and adjust therapy as needed. Avoid nephrotoxins and renally dose meds for GFR listed above.     06/11/23  Bun 19, creatinine 4.2 today   NS bolus 1 liter today and 100 ml/h  Renal ultrasound tomorrow  CMP daily

## 2023-06-12 NOTE — SUBJECTIVE & OBJECTIVE
Interval History: 06/12/23 Awake and sitting up in bed. Denies any pain or sob. Reports that he is voiding well without hesitancy. Renal ultrasound scheduled for today. Creatinine is up to 4.67 this am. Continue fluids.    Review of Systems   Constitutional:  Negative for appetite change.   Respiratory:  Negative for cough and shortness of breath.    Cardiovascular:  Negative for chest pain.   Gastrointestinal:  Negative for constipation, diarrhea, nausea and vomiting.   Genitourinary:  Negative for difficulty urinating.   Objective:     Vital Signs (Most Recent):  Temp: 98.7 °F (37.1 °C) (06/12/23 0730)  Pulse: (!) 59 (06/12/23 0730)  Resp: 18 (06/12/23 0730)  BP: (!) 176/107 (Oksana,NP is aware) (06/12/23 0730)  SpO2: 98 % (06/12/23 0730) Vital Signs (24h Range):  Temp:  [98 °F (36.7 °C)-98.7 °F (37.1 °C)] 98.7 °F (37.1 °C)  Pulse:  [59-72] 59  Resp:  [18-20] 18  SpO2:  [95 %-100 %] 98 %  BP: (122-176)/() 176/107     Weight: 95.3 kg (210 lb)  Body mass index is 33.89 kg/m².    Intake/Output Summary (Last 24 hours) at 6/12/2023 0955  Last data filed at 6/12/2023 0915  Gross per 24 hour   Intake 1100 ml   Output 950 ml   Net 150 ml         Physical Exam  Vitals reviewed.   Constitutional:       General: He is not in acute distress.     Appearance: He is not toxic-appearing.   HENT:      Head: Normocephalic.      Mouth/Throat:      Mouth: Mucous membranes are moist.   Eyes:      General: No scleral icterus.  Cardiovascular:      Rate and Rhythm: Normal rate and regular rhythm.      Heart sounds: Normal heart sounds.   Pulmonary:      Effort: Pulmonary effort is normal.      Breath sounds: Normal breath sounds.   Abdominal:      General: Bowel sounds are normal. There is no distension.      Palpations: Abdomen is soft.      Tenderness: There is no abdominal tenderness.   Musculoskeletal:      Cervical back: Neck supple.      Right lower leg: No edema.      Left lower leg: No edema.   Skin:     General: Skin is  warm and dry.      Comments: No drainage to right axilla       Neurological:      General: No focal deficit present.      Mental Status: He is alert.   Psychiatric:         Mood and Affect: Mood normal.         Behavior: Behavior normal.           Significant Labs: All pertinent labs within the past 24 hours have been reviewed.  Recent Lab Results         06/12/23  0844   06/12/23  0558   06/11/23 2026        Albumin/Globulin Ratio 0.7           Albumin 2.5           Alkaline Phosphatase 61           ALT 16           Anion Gap 17           AST 14           Baso # 0.01           Basophil % 0.2           BILIRUBIN TOTAL 0.3           BUN 19           BUN/CREAT RATIO 4           Calcium 8.4           Chloride 105           CO2 22           Creatinine 4.67           Differential Type Auto           eGFR 14           Eos # 0.08           Eosinophil % 1.5           Globulin, Total 3.8           Glucose 132           Hematocrit 29.5           Hemoglobin 10.2           Lymph # 1.17           Lymph % 22.0           MCH 35.1           MCHC 34.6           .4           Mono # 0.43           Mono % 8.1           MPV 10.6           Neutrophils, Abs 3.62           Neutrophils Relative 68.2           Platelets 184           POC Glucose   125   243       Potassium 3.6           PROTEIN TOTAL 6.3           RBC 2.91           RDW 12.5           Sodium 140           WBC 5.31                   Significant Imaging: I have reviewed all pertinent imaging results/findings within the past 24 hours.

## 2023-06-12 NOTE — PLAN OF CARE
Problem: Adult Inpatient Plan of Care  Goal: Patient-Specific Goal (Individualized)  Outcome: Ongoing, Progressing     Problem: Diabetes Comorbidity  Goal: Blood Glucose Level Within Targeted Range  Outcome: Ongoing, Progressing  Intervention: Monitor and Manage Glycemia  Flowsheets (Taken 6/12/2023 0350)  Glycemic Management:   supplemental insulin given   blood glucose monitored     Problem: Impaired Wound Healing  Goal: Optimal Wound Healing  Outcome: Ongoing, Progressing  Intervention: Promote Wound Healing  Flowsheets (Taken 6/12/2023 0350)  Sleep/Rest Enhancement: awakenings minimized  Activity Management:   Ambulated to bathroom - L4   Rolling - L1   Sitting at edge of bed - L2  Pain Management Interventions: medication offered but refused     Problem: Fluid and Electrolyte Imbalance (Acute Kidney Injury/Impairment)  Goal: Fluid and Electrolyte Balance  Outcome: Ongoing, Progressing  Intervention: Monitor and Manage Fluid and Electrolyte Balance  Flowsheets (Taken 6/12/2023 0350)  Fluid/Electrolyte Management:   fluids adjusted   fluids provided   intravenous fluids adjusted

## 2023-06-12 NOTE — ASSESSMENT & PLAN NOTE
Abscess to right axilla and left buttock  Wound cultures pending  Vancomycin (pharmacy to dose and manage)  Zosyn 4.5Gms IVPB q8h    06/08/23 cultures pending  Continue abx    06/09/23 wound care consult done yesterday  Cultures pending   Continue abx  Follow up with derm at [place]     06/11/23 Wound culture grew out Staph lugdenensis  D/c'd vancomycin and zosyn  Doxycyline po   Prednisone Counseling:  I discussed with the patient the risks of prolonged use of prednisone including but not limited to weight gain, insomnia, osteoporosis, mood changes, diabetes, susceptibility to infection, glaucoma and high blood pressure.  In cases where prednisone use is prolonged, patients should be monitored with blood pressure checks, serum glucose levels and an eye exam.  Additionally, the patient may need to be placed on GI prophylaxis, PCP prophylaxis, and calcium and vitamin D supplementation and/or a bisphosphonate.  The patient verbalized understanding of the proper use and the possible adverse effects of prednisone.  All of the patient's questions and concerns were addressed.

## 2023-06-12 NOTE — ASSESSMENT & PLAN NOTE
Pt reports not taking any oral meds x 1 mo because he had lost wt.  Glucose 318 today  NS bolus in ED  Accuchecks q6h with mod sliding scale coverage    06/08/23 stable      06/09/23 a1c 6.3, stable blood glucose readings    06/11/23 glucose 116-243  Regino ADA diet

## 2023-06-12 NOTE — PLAN OF CARE
"[facility]  Discharge Assessment    Received Mr. Fischer sitting up in the recliner today.  He states, "It feels so good to be out of bed.  "They're planning on sending me home soon.  I'll be alone but I know I'll be okay.  My mom checks on me all the time throughout the day."    "

## 2023-06-12 NOTE — PLAN OF CARE
Problem: Adult Inpatient Plan of Care  Goal: Plan of Care Review  Outcome: Ongoing, Progressing  Goal: Patient-Specific Goal (Individualized)  Outcome: Ongoing, Progressing  Goal: Absence of Hospital-Acquired Illness or Injury  Outcome: Ongoing, Progressing  Goal: Optimal Comfort and Wellbeing  Outcome: Ongoing, Progressing  Goal: Readiness for Transition of Care  Outcome: Ongoing, Progressing     Problem: Diabetes Comorbidity  Goal: Blood Glucose Level Within Targeted Range  Outcome: Ongoing, Progressing     Problem: Impaired Wound Healing  Goal: Optimal Wound Healing  Outcome: Ongoing, Progressing     Problem: Fluid and Electrolyte Imbalance (Acute Kidney Injury/Impairment)  Goal: Fluid and Electrolyte Balance  Outcome: Ongoing, Progressing     Problem: Oral Intake Inadequate (Acute Kidney Injury/Impairment)  Goal: Optimal Nutrition Intake  Outcome: Ongoing, Progressing     Problem: Renal Function Impairment (Acute Kidney Injury/Impairment)  Goal: Effective Renal Function  Outcome: Ongoing, Progressing

## 2023-06-12 NOTE — PROGRESS NOTES
Pharmacist Renal Dose Adjustment Note    Faustino Fischer is a 53 y.o. male being treated with the medication enoxaparin.     Patient Data:    Vital Signs (Most Recent):  Temp: 98.7 °F (37.1 °C) (06/12/23 0730)  Pulse: (!) 59 (06/12/23 0730)  Resp: 18 (06/12/23 0730)  BP: (!) 176/107 (OksanaNP is aware) (06/12/23 0730)  SpO2: 98 % (06/12/23 0730) Vital Signs (72h Range):  Temp:  [97.5 °F (36.4 °C)-98.7 °F (37.1 °C)]   Pulse:  [55-78]   Resp:  [18-20]   BP: ()/()   SpO2:  [93 %-100 %]      Recent Labs   Lab 06/10/23  0717 06/11/23  0605 06/12/23  0844   CREATININE 2.84* 4.20* 4.67*     Serum creatinine: 4.67 mg/dL (H) 06/12/23 0844  Estimated creatinine clearance: 19.5 mL/min (A)    Medication: enoxaparin 40mg under the skin every 24 hours  will be changed to medication: enoxaparin 30mg under the skin every 24 hours.     Pharmacist's Name: Nadia Flores, PharmD

## 2023-06-12 NOTE — ASSESSMENT & PLAN NOTE
Labs reviewed- Renal function/electrolytes with Estimated Creatinine Clearance: 19.5 mL/min (A) (based on SCr of 4.67 mg/dL (H)). according to latest data. Monitor urine output and serial BMP and adjust therapy as needed. Avoid nephrotoxins and renally dose meds for GFR listed above.     06/11/23  Bun 19, creatinine 4.2 today   NS bolus 1 liter today and 100 ml/h  Renal ultrasound tomorrow  CMP daily      06/12/233 BUN 19 cr 4.67  Continue fluids   Renal ultrasound today

## 2023-06-12 NOTE — ASSESSMENT & PLAN NOTE
Pt reports not taking any oral meds x 1 mo because he had lost wt.  Glucose 318 today  NS bolus in ED  Accuchecks q6h with mod sliding scale coverage    06/08/23 stable      06/09/23 a1c 6.3, stable blood glucose readings    06/11/23 glucose 116-243  Regino ADA diet    06/12/23 stable

## 2023-06-12 NOTE — PROGRESS NOTES
"[facility]  Hospital Medicine  Progress Note    Patient Name: Faustino Fischer  MRN: [id number]  Patient Class: IP- Inpatient   Admission Date: 6/7/2023  Length of Stay: 4 days  Attending Physician: Edson Astorga Jr., MD  Primary Care Provider: Juan Pablo Ryan MD        Subjective:     Principal Problem:Acute on chronic pancreatitis        HPI:  Presented with c/o epigastric pain with nausea and dry heaves that started last night. States has freq bouts of pancreatitis and presenting symptoms today are indicative of an acute episode for him. Denies ETOH or DM (states since losing wt). Previous cholecystectomy. Denies fever or chills, chest pain, dyspnea, diarrhea or constipation. States that pain started after eating a Bratwurst and hot chips last night. States has been trying to treat himself at home by remaining NPO but is not better.     Labs ordered and reviewed. Lipase 1069, amylase 126, AST 23, ALT 23, T BR 0.5, troponin 19.9, BUN 9, creatinine 1, Mg 1, , K+ 3.8, WBC 8570 with H&H 12.6 and 35.6 and plt [id number]. glucose 318. UA shows mod bacteria with 15-25 WBCs. CT abd pelvis with contrast ordered and reviewed.      NS bolus x 1 Liter, MS 4 mg IVP, ondansetron 4 mg IVP given in the ED.     CT abd/pelvis with contrast shows fat stranding indicative of acute pancreatitis, fatty liver, previous cholecystectomy with no ductal dilation.     Discussed findings and need for adm with pt. He agreed. Discussed pt's case with Dr. Astorga. He accepted pt for admission.     Upon discussing adm with pt, he reports that he has a draining abscess to his left "inner thigh". Upon exam, an open draining abscess is noted located on the lower aspect of his left buttock. Mild surrounding erythema with no swelling. Opening is approx 3 cm in length. Pt states has multiple abscesses in the last year to his axilla and groin. Had one incised but says that he has no sought medical care for all " "of them. Has multiple scarring under both arms as well.     Discussed these findings with Dr. Astorga. He evaluated pt in the ED. Zosyn 4.5 Gm IVPB started in the ED. Pt will be treated with Vancomycin as well.      Overview/Hospital Course:  06/08/23 Awake and resting in bed. Reports pain to epigastric area at level 2 . States he recently had pain med and prior to that , pain was at level 7.  No nausea , vomiting or diarrhea overnight.  Wound care consult placed for right axilla abscess and left buttock. Continue abx. Cultures from areas pending. Continue hydration and pain control.  06/09/23 Complains of having what felt like a bladder spasm last night. States has resolved. Having less abdominal pain now. Reports having more soreness like pain to epigastric area. No vomiting or diarrhea. Reports pain level is at level 2. Will start clear liquids today and see how it tolerates it. Cultures from right axilla and left buttock pending. Continue vancomycin and zosyn. No drainage noted from right axilla this morning. Labs pending.  Lipase is normal at 173.  Tolerated CL breakfast without nausea , vomiting or pain. Probable dc in am.    6/10/23 Rounds on patient this morning: Patient reports he is feeling well.  He reports mild suprapubic abd "pressure", intermittent bladder spasms (when needing to void) and "dribbling" when trying to void.  Denies upper abd pain and n/v/d.  Pt noted to still be on clear liquid diet.  I was able to advance him to full liquids for breakfast, which he tolerated well.  Will advance his diet for lunch and supper as tolerated.  WBC normal and Lipase is normal.  However, I noted creatinine increased from 1.19 to 2.84 and GFR went from 73 to 26 in 24 hour period.  Pt reports he is drinking plenty of water. He is on IVF's at 50mls/hr.  BUN normal.  Pt reports when he was admitted at [facility] in [place] that he was having the same bladder spasms and dribbling.  However, he doesn't " remember what the MD told him.  I did not see any records of him having those issues during his stay at [facility], [place], operated by [facility].  I will increase his IVFs to 100mls/hr and add Flomax daily to his regimen.  I spoke with him about seeing if he is possibly retaining urine.  We do not have bladder scanner here at our facility.  I discussed with him to let him void, then do post-void I&O cath to assess for retention due to possible ARRIAZA.  He agreed to the cath.  However, when the nurse went in to perform the cath, the patient reports he stream has increased, and he wanted to wait on the catheter.  Will repeat the lab tomorrow morning to assess for changes.   Wounds to allison axilla and left groin appear to be healing well.  No drainage.     06/11/23 Mr. Fischer reports this morning that he is no longer having difficulty urinating. He denies abdominal pain or pressure today. He is continuing to receive Flomax po. He is tolerating a regular ADA since yesterday. He denies n/v/d. He reports that his abscess to right axilla and left buttock drng has improved. His wound culture grew out Staph lugdenensis. Yesterday, Vancomycin and Zosyn were discontinued. He was started on Doxcycline po and has tolerated well po. Today, his BUN and creatinine have increased to 19 and 4.2 (up from 2.84). Ck was 34. Random vanc trough was 20.5. NS bolus x 1 liter IV given. Continue maintenance fluids at 100 ml/h. CMP daily. Renal u/s in am.      Interval History: Discussed patient's case with Dr. Ross.    Review of Systems   Constitutional:  Negative for fever.   Respiratory:  Negative for cough and shortness of breath.    Cardiovascular:  Negative for chest pain.   Gastrointestinal:  Negative for abdominal distention, abdominal pain, diarrhea, nausea and vomiting.   Genitourinary:  Negative for decreased urine volume, difficulty urinating, dysuria, flank pain, frequency, hematuria, penile discharge, penile pain, penile swelling, scrotal swelling, testicular pain  and urgency.   Musculoskeletal:  Negative for back pain.   Skin:  Positive for wound.   Neurological:  Negative for dizziness, weakness and headaches.   Psychiatric/Behavioral: Negative.     Objective:     Vital Signs (Most Recent):  Temp: 97.8 °F (36.6 °C) (06/10/23 0847)  Pulse: 64 (06/10/23 0847)  Resp: 20 (06/10/23 0847)  BP: (!) 153/94 (06/10/23 0847)  SpO2: 100 % (06/10/23 0847) Vital Signs (24h Range):  Temp:  [97.5 °F (36.4 °C)-98.6 °F (37 °C)] 97.8 °F (36.6 °C)  Pulse:  [55-78] 64  Resp:  [18-20] 20  SpO2:  [93 %-100 %] 100 %  BP: ()/(52-94) 153/94     Weight: 95.3 kg (210 lb)  Body mass index is 33.89 kg/m².    Intake/Output Summary (Last 24 hours) at 6/10/2023 1252  Last data filed at 6/10/2023 0830  Gross per 24 hour   Intake 1300 ml   Output --   Net 1300 ml         Physical Exam  Vitals and nursing note reviewed.   Constitutional:       General: He is awake. He is not in acute distress.     Appearance: Normal appearance. He is obese. He is not ill-appearing, toxic-appearing or diaphoretic.   HENT:      Head: Normocephalic and atraumatic.      Mouth/Throat:      Mouth: Mucous membranes are moist.   Eyes:      Extraocular Movements: Extraocular movements intact.      Conjunctiva/sclera: Conjunctivae normal.   Cardiovascular:      Rate and Rhythm: Normal rate and regular rhythm.      Pulses: Normal pulses.      Heart sounds: Normal heart sounds. No murmur heard.  Pulmonary:      Effort: Pulmonary effort is normal. No respiratory distress.      Breath sounds: Normal breath sounds. No stridor. No wheezing, rhonchi or rales.   Abdominal:      General: Bowel sounds are normal. There is no distension.      Palpations: Abdomen is soft.      Tenderness: There is no abdominal tenderness. There is no right CVA tenderness, left CVA tenderness, guarding or rebound.   Musculoskeletal:         General: Normal range of motion.      Cervical back: Normal range of motion and neck supple. No rigidity.   Skin:      General: Skin is warm and dry.      Capillary Refill: Capillary refill takes less than 2 seconds.      Comments: Right axilla wound noted to be healing well.  No erythema or drainage noted.  He is noted to have chronic scarring from recurrent infections, but he is without erythema and fluctuance. Left buttock wound is noted with no drainage, erythema or fluctuance.    Neurological:      General: No focal deficit present.      Mental Status: He is alert and oriented to person, place, and time.      GCS: GCS eye subscore is 4. GCS verbal subscore is 5. GCS motor subscore is 6.   Psychiatric:         Mood and Affect: Mood normal.         Behavior: Behavior is cooperative.           Significant Labs: See above    Significant Imaging:  no new imaging.      Assessment/Plan:      * Acute on chronic pancreatitis  NS 1 liter bolus given in the ED  NS at 150ml/h  Ondansetron q8hprn/Phenergan 25 mg IVPB q6hprn  Dilaudid 1 mg q4hprn pain  NPO  CMP, CBC daily    06/08/23 continue fluids, NPO, pain control     06/09/23 no nausea or vomiting reported. Complains of epigastric soreness at level 2 - start CL and monitor     06/11/23 Lipase 173, triglycerides 148  No n/v. Denies abd pain.   Regino regular diet po.    HAKEEM (acute kidney injury)   Labs reviewed- Renal function/electrolytes with Estimated Creatinine Clearance: 21.7 mL/min (A) (based on SCr of 4.2 mg/dL (H)). according to latest data. Monitor urine output and serial BMP and adjust therapy as needed. Avoid nephrotoxins and renally dose meds for GFR listed above.     06/11/23  Bun 19, creatinine 4.2 today   NS bolus 1 liter today and 100 ml/h  Renal ultrasound tomorrow  CMP daily      Hydradenitis  Abscess to right axilla and left buttock  Wound cultures pending  Vancomycin (pharmacy to dose and manage)  Zosyn 4.5Gms IVPB q8h    06/08/23 cultures pending  Continue abx    06/09/23 wound care consult done yesterday  Cultures pending   Continue abx  Follow up with derm at [place]      06/11/23 Wound culture grew out Staph lugdenensis  D/c'd vancomycin and zosyn  Doxycyline po    Mixed hyperlipidemia  Lipid profile  Continue atorvastatin and fenofibrate  Triglycerides 35 - 150 mg/dL 148  138 CM  115 CM    Comment:    Normal: <150 mg/dL   Borderline High: 150-199 mg/dL   High: 200-499 mg/dL   Very High: >=500   Cholesterol 0 - 200 mg/dL 142  165 CM     Comment:    <200 mg/dL: Desirable   200-240 mg/dL: Borderline High   >240 mg/dL: High   HDL Cholesterol 40 - 60 mg/dL 46  36 Low  CM     Comment:    <40 mg/dL: Low HDL   40-60 mg/dL: Normal   >60 mg/dL: Desirable   Cholesterol/HDL Ratio (Risk Factor)  3.1  4.6     Non-HDL mg/dL 96  129     LDL Calculated mg/dL 66  101 CM     LDL/HDL  1.4  2.8 CM     VLDL mg/dL 30  28     Resulting Agency  [facility]           Type 2 diabetes mellitus without complication, without long-term current use of insulin  Pt reports not taking any oral meds x 1 mo because he had lost wt.  Glucose 318 today  NS bolus in ED  Accuchecks q6h with mod sliding scale coverage    06/08/23 stable      06/09/23 a1c 6.3, stable blood glucose readings    06/11/23 glucose 116-243  Regino ADA diet      VTE Risk Mitigation (From admission, onward)         Ordered     enoxaparin injection 40 mg  Daily         06/07/23 1737     IP VTE HIGH RISK PATIENT  Once         06/07/23 1737     Place JULIO hose  Until discontinued         06/07/23 1737                Discharge Planning   BETY:      Code Status: Full Code   Is the patient medically ready for discharge?:     Reason for patient still in hospital (select all that apply): Treatment  Discharge Plan A: Home                  Sadia Mcpherson NP  Department of Hospital Medicine   [facility]

## 2023-06-12 NOTE — ASSESSMENT & PLAN NOTE
Abscess to right axilla and left buttock  Wound cultures pending  Vancomycin (pharmacy to dose and manage)  Zosyn 4.5Gms IVPB q8h    06/08/23 cultures pending  Continue abx    06/09/23 wound care consult done yesterday  Cultures pending   Continue abx  Follow up with derm at [place]     06/11/23 Wound culture grew out Staph lugdenensis  D/c'd vancomycin and zosyn  Doxycyline po    06/12/23 continue doxycycline

## 2023-06-12 NOTE — ASSESSMENT & PLAN NOTE
NS 1 liter bolus given in the ED  NS at 150ml/h  Ondansetron q8hprn/Phenergan 25 mg IVPB q6hprn  Dilaudid 1 mg q4hprn pain  NPO  CMP, CBC daily    06/08/23 continue fluids, NPO, pain control     06/09/23 no nausea or vomiting reported. Complains of epigastric soreness at level 2 - start CL and monitor     06/11/23 Lipase 173, triglycerides 148  No n/v. Denies abd pain.   Regino regular diet po.

## 2023-06-13 VITALS
TEMPERATURE: 99 F | BODY MASS INDEX: 33.75 KG/M2 | WEIGHT: 210 LBS | HEART RATE: 57 BPM | OXYGEN SATURATION: 99 % | SYSTOLIC BLOOD PRESSURE: 158 MMHG | HEIGHT: 66 IN | RESPIRATION RATE: 20 BRPM | DIASTOLIC BLOOD PRESSURE: 98 MMHG

## 2023-06-13 LAB
ALBUMIN SERPL BCP-MCNC: 2.4 G/DL (ref 3.5–5)
ALBUMIN/GLOB SERPL: 0.6 {RATIO}
ALP SERPL-CCNC: 52 U/L (ref 45–115)
ALT SERPL W P-5'-P-CCNC: 9 U/L (ref 16–61)
ANION GAP SERPL CALCULATED.3IONS-SCNC: 16 MMOL/L (ref 7–16)
AST SERPL W P-5'-P-CCNC: 11 U/L (ref 15–37)
BASOPHILS # BLD AUTO: 0.02 K/UL (ref 0–0.2)
BASOPHILS NFR BLD AUTO: 0.3 % (ref 0–1)
BILIRUB SERPL-MCNC: 0.2 MG/DL (ref ?–1.2)
BUN SERPL-MCNC: 20 MG/DL (ref 7–18)
BUN/CREAT SERPL: 4 (ref 6–20)
CALCIUM SERPL-MCNC: 8.7 MG/DL (ref 8.5–10.1)
CHLORIDE SERPL-SCNC: 107 MMOL/L (ref 98–107)
CO2 SERPL-SCNC: 22 MMOL/L (ref 21–32)
CREAT SERPL-MCNC: 4.83 MG/DL (ref 0.7–1.3)
DIFFERENTIAL METHOD BLD: ABNORMAL
EGFR (NO RACE VARIABLE) (RUSH/TITUS): 14 ML/MIN/1.73M2
EOSINOPHIL # BLD AUTO: 0.06 K/UL (ref 0–0.5)
EOSINOPHIL NFR BLD AUTO: 1 % (ref 1–4)
ERYTHROCYTE [DISTWIDTH] IN BLOOD BY AUTOMATED COUNT: 12.7 % (ref 11.5–14.5)
GLOBULIN SER-MCNC: 3.7 G/DL (ref 2–4)
GLUCOSE SERPL-MCNC: 127 MG/DL (ref 70–105)
GLUCOSE SERPL-MCNC: 127 MG/DL (ref 74–106)
GLUCOSE SERPL-MCNC: 157 MG/DL (ref 70–105)
GLUCOSE SERPL-MCNC: 159 MG/DL (ref 70–105)
GLUCOSE SERPL-MCNC: 239 MG/DL (ref 70–105)
HCT VFR BLD AUTO: 30 % (ref 40–54)
HGB BLD-MCNC: 10.2 G/DL (ref 13.5–18)
LYMPHOCYTES # BLD AUTO: 1.25 K/UL (ref 1–4.8)
LYMPHOCYTES NFR BLD AUTO: 21.7 % (ref 27–41)
MACROCYTES BLD QL SMEAR: ABNORMAL
MCH RBC QN AUTO: 35.8 PG (ref 27–31)
MCHC RBC AUTO-ENTMCNC: 34 G/DL (ref 32–36)
MCV RBC AUTO: 105.3 FL (ref 80–96)
MONOCYTES # BLD AUTO: 0.37 K/UL (ref 0–0.8)
MONOCYTES NFR BLD AUTO: 6.4 % (ref 2–6)
MPC BLD CALC-MCNC: 10.5 FL (ref 9.4–12.4)
NEUTROPHILS # BLD AUTO: 4.07 K/UL (ref 1.8–7.7)
NEUTROPHILS NFR BLD AUTO: 70.6 % (ref 53–65)
PLATELET # BLD AUTO: 169 K/UL (ref 150–400)
PLATELET MORPHOLOGY: ABNORMAL
POTASSIUM SERPL-SCNC: 4 MMOL/L (ref 3.5–5.1)
PROT SERPL-MCNC: 6.1 G/DL (ref 6.4–8.2)
RBC # BLD AUTO: 2.85 M/UL (ref 4.6–6.2)
SODIUM SERPL-SCNC: 141 MMOL/L (ref 136–145)
VANCOMYCIN SERPL-MCNC: 12.7 ΜG/ML (ref 0–20)
WBC # BLD AUTO: 5.77 K/UL (ref 4.5–11)

## 2023-06-13 PROCEDURE — 99239 HOSP IP/OBS DSCHRG MGMT >30: CPT | Mod: ,,, | Performed by: NURSE PRACTITIONER

## 2023-06-13 PROCEDURE — 99239 PR HOSPITAL DISCHARGE DAY,>30 MIN: ICD-10-PCS | Mod: ,,, | Performed by: NURSE PRACTITIONER

## 2023-06-13 PROCEDURE — 63600175 PHARM REV CODE 636 W HCPCS: Performed by: FAMILY MEDICINE

## 2023-06-13 PROCEDURE — 80053 COMPREHEN METABOLIC PANEL: CPT | Performed by: NURSE PRACTITIONER

## 2023-06-13 PROCEDURE — 25000003 PHARM REV CODE 250: Performed by: NURSE PRACTITIONER

## 2023-06-13 PROCEDURE — 11000001 HC ACUTE MED/SURG PRIVATE ROOM

## 2023-06-13 PROCEDURE — 80202 ASSAY OF VANCOMYCIN: CPT | Performed by: NURSE PRACTITIONER

## 2023-06-13 PROCEDURE — 63600175 PHARM REV CODE 636 W HCPCS: Performed by: NURSE PRACTITIONER

## 2023-06-13 PROCEDURE — 85025 COMPLETE CBC W/AUTO DIFF WBC: CPT | Performed by: NURSE PRACTITIONER

## 2023-06-13 PROCEDURE — 82962 GLUCOSE BLOOD TEST: CPT

## 2023-06-13 PROCEDURE — 27000944

## 2023-06-13 RX ORDER — ACETAMINOPHEN 325 MG/1
650 TABLET ORAL ONCE
Status: COMPLETED | OUTPATIENT
Start: 2023-06-13 | End: 2023-06-13

## 2023-06-13 RX ADMIN — DOXYCYCLINE HYCLATE 100 MG: 100 TABLET, COATED ORAL at 09:06

## 2023-06-13 RX ADMIN — Medication 1 CAPSULE: at 11:06

## 2023-06-13 RX ADMIN — ACETAMINOPHEN 650 MG: 325 TABLET ORAL at 08:06

## 2023-06-13 RX ADMIN — DOCUSATE SODIUM 100 MG: 100 CAPSULE, LIQUID FILLED ORAL at 09:06

## 2023-06-13 RX ADMIN — CARVEDILOL 12.5 MG: 12.5 TABLET, FILM COATED ORAL at 05:06

## 2023-06-13 RX ADMIN — DOCUSATE SODIUM 100 MG: 100 CAPSULE, LIQUID FILLED ORAL at 08:06

## 2023-06-13 RX ADMIN — INSULIN ASPART 1 UNITS: 100 INJECTION, SOLUTION INTRAVENOUS; SUBCUTANEOUS at 12:06

## 2023-06-13 RX ADMIN — ENOXAPARIN SODIUM 30 MG: 30 INJECTION SUBCUTANEOUS at 05:06

## 2023-06-13 RX ADMIN — ASPIRIN 81 MG 81 MG: 81 TABLET ORAL at 09:06

## 2023-06-13 RX ADMIN — INSULIN ASPART 4 UNITS: 100 INJECTION, SOLUTION INTRAVENOUS; SUBCUTANEOUS at 06:06

## 2023-06-13 RX ADMIN — SODIUM CHLORIDE: 9 INJECTION, SOLUTION INTRAVENOUS at 02:06

## 2023-06-13 RX ADMIN — PANTOPRAZOLE SODIUM 40 MG: 40 TABLET, DELAYED RELEASE ORAL at 09:06

## 2023-06-13 RX ADMIN — Medication 1 CAPSULE: at 05:06

## 2023-06-13 RX ADMIN — Medication 1 CAPSULE: at 08:06

## 2023-06-13 RX ADMIN — ISOSORBIDE MONONITRATE 30 MG: 30 TABLET, EXTENDED RELEASE ORAL at 09:06

## 2023-06-13 RX ADMIN — DOXYCYCLINE HYCLATE 100 MG: 100 TABLET, COATED ORAL at 08:06

## 2023-06-13 RX ADMIN — TAMSULOSIN HYDROCHLORIDE 0.4 MG: 0.4 CAPSULE ORAL at 09:06

## 2023-06-13 RX ADMIN — CLOPIDOGREL BISULFATE 75 MG: 75 TABLET ORAL at 09:06

## 2023-06-13 RX ADMIN — ATORVASTATIN CALCIUM 80 MG: 40 TABLET, FILM COATED ORAL at 09:06

## 2023-06-13 NOTE — ASSESSMENT & PLAN NOTE
Labs reviewed- Renal function/electrolytes with Estimated Creatinine Clearance: 18.9 mL/min (A) (based on SCr of 4.83 mg/dL (H)). according to latest data. Monitor urine output and serial BMP and adjust therapy as needed. Avoid nephrotoxins and renally dose meds for GFR listed above.     06/11/23  Bun 19, creatinine 4.2 today   NS bolus 1 liter today and 100 ml/h  Renal ultrasound tomorrow  CMP daily      06/12/23 cr 4.6     06/13/23 cr 4.8   Dc to [facility] care of Dr. Astorga     06/12/233 BUN 19 cr 4.67  Continue fluids   Renal ultrasound today

## 2023-06-13 NOTE — DISCHARGE SUMMARY
"[facility] - Medical Surgical Unit  Hospital Medicine  Discharge Summary      Patient Name: Faustino Fischer  MRN: [id number]  ELÍAS: [id number]  Patient Class: IP- Inpatient  Admission Date: 6/7/2023  Hospital Length of Stay: 6 days  Discharge Date and Time:  06/13/2023 11:27 AM  Attending Physician: Edson Astorga Jr., MD   Discharging Provider: DOC Thomas  Primary Care Provider: Juan Pablo Ryan MD    Primary Care Team: Networked reference to record PCT     HPI:   Presented with c/o epigastric pain with nausea and dry heaves that started last night. States has freq bouts of pancreatitis and presenting symptoms today are indicative of an acute episode for him. Denies ETOH or DM (states since losing wt). Previous cholecystectomy. Denies fever or chills, chest pain, dyspnea, diarrhea or constipation. States that pain started after eating a Bratwurst and hot chips last night. States has been trying to treat himself at home by remaining NPO but is not better.     Labs ordered and reviewed. Lipase 1069, amylase 126, AST 23, ALT 23, T BR 0.5, troponin 19.9, BUN 9, creatinine 1, Mg 1, , K+ 3.8, WBC 8570 with H&H 12.6 and 35.6 and plt [id number]. glucose 318. UA shows mod bacteria with 15-25 WBCs. CT abd pelvis with contrast ordered and reviewed.      NS bolus x 1 Liter, MS 4 mg IVP, ondansetron 4 mg IVP given in the ED.     CT abd/pelvis with contrast shows fat stranding indicative of acute pancreatitis, fatty liver, previous cholecystectomy with no ductal dilation.     Discussed findings and need for adm with pt. He agreed. Discussed pt's case with Dr. Astorga. He accepted pt for admission.     Upon discussing adm with pt, he reports that he has a draining abscess to his left "inner thigh". Upon exam, an open draining abscess is noted located on the lower aspect of his left buttock. Mild surrounding erythema with no swelling. Opening is approx 3 cm in length. Pt states has multiple abscesses in " "the last year to his axilla and groin. Had one incised but says that he has no sought medical care for all of them. Has multiple scarring under both arms as well.     Discussed these findings with Dr. Astorga. He evaluated pt in the ED. Zosyn 4.5 Gm IVPB started in the ED. Pt will be treated with Vancomycin as well.      * No surgery found *      Hospital Course:   06/08/23 Awake and resting in bed. Reports pain to epigastric area at level 2 . States he recently had pain med and prior to that , pain was at level 7.  No nausea , vomiting or diarrhea overnight.  Wound care consult placed for right axilla abscess and left buttock. Continue abx. Cultures from areas pending. Continue hydration and pain control.  06/09/23 Complains of having what felt like a bladder spasm last night. States has resolved. Having less abdominal pain now. Reports having more soreness like pain to epigastric area. No vomiting or diarrhea. Reports pain level is at level 2. Will start clear liquids today and see how it tolerates it. Cultures from right axilla and left buttock pending. Continue vancomycin and zosyn. No drainage noted from right axilla this morning. Labs pending.  Lipase is normal at 173.  Tolerated CL breakfast without nausea , vomiting or pain. Probable dc in am.    6/10/23 Rounds on patient this morning: Patient reports he is feeling well.  He reports mild suprapubic abd "pressure", intermittent bladder spasms (when needing to void) and "dribbling" when trying to void.  Denies upper abd pain and n/v/d.  Pt noted to still be on clear liquid diet.  I was able to advance him to full liquids for breakfast, which he tolerated well.  Will advance his diet for lunch and supper as tolerated.  WBC normal and Lipase is normal.  However, I noted creatinine increased from 1.19 to 2.84 and GFR went from 73 to 26 in 24 hour period.  Pt reports he is drinking plenty of water. He is on IVF's at 50mls/hr.  BUN normal.  Pt reports when he was " admitted at [facility] in [place] that he was having the same bladder spasms and dribbling.  However, he doesn't remember what the MD told him.  I did not see any records of him having those issues during his stay at [facility].  I will increase his IVFs to 100mls/hr and add Flomax daily to his regimen.  I spoke with him about seeing if he is possibly retaining urine.  We do not have bladder scanner here at our facility.  I discussed with him to let him void, then do post-void I&O cath to assess for retention due to possible ARRIAZA.  He agreed to the cath.  However, when the nurse went in to perform the cath, the patient reports he stream has increased, and he wanted to wait on the catheter.  Will repeat the lab tomorrow morning to assess for changes.   Wounds to allison axilla and left groin appear to be healing well.  No drainage.         06/11/23 Mr. Fischer reports this morning that he is no longer having difficulty urinating. He denies abdominal pain or pressure today. He is continuing to receive Flomax po. He is tolerating a regular ADA since yesterday. He denies n/v/d. He reports that his abscess to right axilla and left buttock drng has improved. His wound culture grew out Staph lugdenensis. Yesterday, Vancomycin and Zosyn were discontinued. He was started on Doxcycline po and has tolerated well po. Today, his BUN and creatinine have increased to 19 and 4.2 (up from 2.84). Ck was 34. Random vanc trough was 20.5. NS bolus x 1 liter IV given. Continue maintenance fluids at 100 ml/h. CMP daily. Renal u/s in am.    06/12/23 Awake and sitting up in bed. Denies any pain or sob. Reports that he is voiding well without hesitancy. Renal ultrasound scheduled for today. Creatinine is up to 4.67 this am. Continue fluids.    06/13/23 Creatinine is up to 4.8. He is voiding well. No complaints. Talked with Dr. Edson Astorga re transfer to higher level of care due to HAKEEM, for nephrology consult. He has agreed to accept patient  in transfer. PFC request placed. Awaiting return call. Mr. Fischer is agreeable to transfer to [facility].          Goals of Care Treatment Preferences:  Code Status: Full Code      Consults:     Renal/  HAKEEM (acute kidney injury)   Labs reviewed- Renal function/electrolytes with Estimated Creatinine Clearance: 18.9 mL/min (A) (based on SCr of 4.83 mg/dL (H)). according to latest data. Monitor urine output and serial BMP and adjust therapy as needed. Avoid nephrotoxins and renally dose meds for GFR listed above.     06/11/23  Bun 19, creatinine 4.2 today   NS bolus 1 liter today and 100 ml/h  Renal ultrasound tomorrow  CMP daily      06/12/23 cr 4.6     06/13/23 cr 4.8   Dc to [facility] care of Dr. Astorga     06/12/233 BUN 19 cr 4.67  Continue fluids   Renal ultrasound today       Final Active Diagnoses:    Diagnosis Date Noted POA    PRINCIPAL PROBLEM:  Acute on chronic pancreatitis [K85.90, K86.1] 06/26/2021 Yes    HAKEEM (acute kidney injury) [N17.9] 06/10/2023 No    Hydradenitis [L73.2] 06/07/2023 Unknown    Mixed hyperlipidemia [E78.2] 04/15/2022 Yes     Chronic    Type 2 diabetes mellitus without complication, without long-term current use of insulin [E11.9] 03/17/2022 Yes     Chronic      Problems Resolved During this Admission:    Diagnosis Date Noted Date Resolved POA    Hypomagnesemia [E83.42] 06/26/2021 06/11/2023 Yes       Discharged Condition: stable    Disposition: Another Health Care Inst*    Follow Up:   Follow-up Information     Juan Pablo Ryan MD Follow up.    Specialty: Family Medicine  Contact information:  [address]  [facility] [place] [address]  [phone]                       Patient Instructions:   No discharge procedures on file.    Significant Diagnostic Studies: Labs: All labs within the past 24 hours have been reviewed    Pending Diagnostic Studies:     None         Medications:  Reconciled Home Medications:      Medication List      START  taking these medications    clindamycin 1 % external solution  Commonly known as: CLEOCIN T  Apply topically 2 (two) times daily.        ASK your doctor about these medications    aspirin 81 MG Chew  Take 1 tablet (81 mg total) by mouth once daily.     atorvastatin 80 MG tablet  Commonly known as: LIPITOR  Take 1 tablet (80 mg total) by mouth once daily.     carvediloL 12.5 MG tablet  Commonly known as: COREG  Take 1 tablet (12.5 mg total) by mouth before breakfast.     clopidogreL 75 mg tablet  Commonly known as: PLAVIX  Take 75 mg by mouth.     fenofibrate 145 MG tablet  Commonly known as: TRICOR  Take 1 tablet (145 mg total) by mouth once daily.     furosemide 20 MG tablet  Commonly known as: LASIX  Take 1 tablet (20 mg total) by mouth once daily.     isosorbide mononitrate 30 MG 24 hr tablet  Commonly known as: IMDUR  Take 1 tablet (30 mg total) by mouth once daily. Take 1/2 tablet with breakfast     losartan 50 MG tablet  Commonly known as: COZAAR  Take 1 tablet (50 mg total) by mouth once daily.     metFORMIN 500 MG ER 24hr tablet  Commonly known as: GLUCOPHAGE-XR  Take 1 tablet (500 mg total) by mouth 2 (two) times daily with meals.     pantoprazole 40 MG tablet  Commonly known as: PROTONIX  Take 40 mg by mouth.     SUPER THIN LANCETS 28 gauge Misc  Generic drug: lancets  2 (two) times daily.     TRUE METRIX GLUCOSE METER Misc  Generic drug: blood-glucose meter  2 (two) times daily.            Indwelling Lines/Drains at time of discharge:   Lines/Drains/Airways     None                 Time spent on the discharge of patient: 30 minutes         DOC Thomas  Department of Hospital Medicine  [facility] - Medical Surgical Unit

## 2023-06-13 NOTE — PLAN OF CARE
Problem: Adult Inpatient Plan of Care  Goal: Optimal Comfort and Wellbeing  Outcome: Ongoing, Progressing     Problem: Diabetes Comorbidity  Goal: Blood Glucose Level Within Targeted Range  Outcome: Ongoing, Progressing     Problem: Impaired Wound Healing  Goal: Optimal Wound Healing  Outcome: Ongoing, Progressing     Problem: Fluid and Electrolyte Imbalance (Acute Kidney Injury/Impairment)  Goal: Fluid and Electrolyte Balance  Outcome: Ongoing, Progressing     Problem: Oral Intake Inadequate (Acute Kidney Injury/Impairment)  Goal: Optimal Nutrition Intake  Outcome: Ongoing, Progressing

## 2023-06-14 ENCOUNTER — HOSPITAL ENCOUNTER (INPATIENT)
Facility: HOSPITAL | Age: 53
LOS: 1 days | Discharge: HOME | DRG: 683 | End: 2023-06-15
Attending: FAMILY MEDICINE | Admitting: FAMILY MEDICINE
Payer: MEDICARE

## 2023-06-14 ENCOUNTER — TELEPHONE (OUTPATIENT)
Dept: CASE MANAGEMENT | Facility: HOSPITAL | Age: 53
End: 2023-06-14
Payer: MEDICARE

## 2023-06-14 DIAGNOSIS — R07.9 CHEST PAIN: ICD-10-CM

## 2023-06-14 DIAGNOSIS — N17.9 ACUTE KIDNEY INJURY: ICD-10-CM

## 2023-06-14 DIAGNOSIS — R00.1 BRADYCARDIA: ICD-10-CM

## 2023-06-14 DIAGNOSIS — I25.10 CORONARY ARTERY DISEASE, UNSPECIFIED VESSEL OR LESION TYPE, UNSPECIFIED WHETHER ANGINA PRESENT, UNSPECIFIED WHETHER NATIVE OR TRANSPLANTED HEART: ICD-10-CM

## 2023-06-14 DIAGNOSIS — N17.9 AKI (ACUTE KIDNEY INJURY): Primary | ICD-10-CM

## 2023-06-14 DIAGNOSIS — E11.65 TYPE 2 DIABETES MELLITUS WITH HYPERGLYCEMIA, WITHOUT LONG-TERM CURRENT USE OF INSULIN: ICD-10-CM

## 2023-06-14 DIAGNOSIS — L73.2 HIDRADENITIS SUPPURATIVA: ICD-10-CM

## 2023-06-14 DIAGNOSIS — E66.01 SEVERE OBESITY (BMI >= 40): ICD-10-CM

## 2023-06-14 PROBLEM — D53.9 MACROCYTIC ANEMIA: Status: ACTIVE | Noted: 2023-06-14

## 2023-06-14 PROBLEM — I10 HYPERTENSION: Status: ACTIVE | Noted: 2023-06-14

## 2023-06-14 PROBLEM — E78.5 DYSLIPIDEMIA: Status: ACTIVE | Noted: 2023-06-14

## 2023-06-14 PROBLEM — D53.9 MACROCYTIC ANEMIA: Status: RESOLVED | Noted: 2023-06-14 | Resolved: 2023-06-14

## 2023-06-14 LAB
ALBUMIN SERPL BCP-MCNC: 2.7 G/DL (ref 3.5–5)
ALBUMIN/GLOB SERPL: 0.7 {RATIO}
ALP SERPL-CCNC: 55 U/L (ref 45–115)
ALT SERPL W P-5'-P-CCNC: 7 U/L (ref 16–61)
ANION GAP SERPL CALCULATED.3IONS-SCNC: 15 MMOL/L (ref 7–16)
APTT PPP: 31.3 SECONDS (ref 25.2–37.3)
AST SERPL W P-5'-P-CCNC: 4 U/L (ref 15–37)
BASOPHILS # BLD AUTO: 0.03 K/UL (ref 0–0.2)
BASOPHILS NFR BLD AUTO: 0.5 % (ref 0–1)
BILIRUB SERPL-MCNC: 0.3 MG/DL (ref ?–1.2)
BILIRUB UR QL STRIP: NEGATIVE
BUN SERPL-MCNC: 19 MG/DL (ref 7–18)
BUN/CREAT SERPL: 4 (ref 6–20)
CALCIUM SERPL-MCNC: 8.7 MG/DL (ref 8.5–10.1)
CHLORIDE SERPL-SCNC: 105 MMOL/L (ref 98–107)
CK SERPL-CCNC: 37 U/L (ref 39–308)
CLARITY UR: CLEAR
CO2 SERPL-SCNC: 23 MMOL/L (ref 21–32)
COLOR UR: NORMAL
CREAT SERPL-MCNC: 4.59 MG/DL (ref 0.7–1.3)
CREAT UR-MCNC: 112 MG/DL (ref 39–259)
DIFFERENTIAL METHOD BLD: ABNORMAL
EGFR (NO RACE VARIABLE) (RUSH/TITUS): 14 ML/MIN/1.73M2
EOSINOPHIL # BLD AUTO: 0.09 K/UL (ref 0–0.5)
EOSINOPHIL NFR BLD AUTO: 1.4 % (ref 1–4)
ERYTHROCYTE [DISTWIDTH] IN BLOOD BY AUTOMATED COUNT: 13.2 % (ref 11.5–14.5)
GLOBULIN SER-MCNC: 3.9 G/DL (ref 2–4)
GLUCOSE SERPL-MCNC: 119 MG/DL (ref 70–105)
GLUCOSE SERPL-MCNC: 148 MG/DL (ref 74–106)
GLUCOSE SERPL-MCNC: 153 MG/DL (ref 70–105)
GLUCOSE SERPL-MCNC: 198 MG/DL (ref 70–105)
GLUCOSE SERPL-MCNC: 223 MG/DL (ref 70–105)
GLUCOSE UR STRIP-MCNC: NORMAL MG/DL
HCT VFR BLD AUTO: 32.2 % (ref 40–54)
HGB BLD-MCNC: 10.2 G/DL (ref 13.5–18)
IMM GRANULOCYTES # BLD AUTO: 0.03 K/UL (ref 0–0.04)
IMM GRANULOCYTES NFR BLD: 0.5 % (ref 0–0.4)
INR BLD: 0.96
KETONES UR STRIP-SCNC: NEGATIVE MG/DL
LACTATE SERPL-SCNC: 1 MMOL/L (ref 0.4–2)
LEUKOCYTE ESTERASE UR QL STRIP: NEGATIVE
LIPASE SERPL-CCNC: 73 U/L (ref 73–393)
LYMPHOCYTES # BLD AUTO: 1.47 K/UL (ref 1–4.8)
LYMPHOCYTES NFR BLD AUTO: 23.2 % (ref 27–41)
MACROCYTES BLD QL SMEAR: NORMAL
MAGNESIUM SERPL-MCNC: 1.4 MG/DL (ref 1.7–2.3)
MCH RBC QN AUTO: 35.1 PG (ref 27–31)
MCHC RBC AUTO-ENTMCNC: 31.7 G/DL (ref 32–36)
MCV RBC AUTO: 110.7 FL (ref 80–96)
MONOCYTES # BLD AUTO: 0.38 K/UL (ref 0–0.8)
MONOCYTES NFR BLD AUTO: 6 % (ref 2–6)
MPC BLD CALC-MCNC: 10.6 FL (ref 9.4–12.4)
NEUTROPHILS # BLD AUTO: 4.34 K/UL (ref 1.8–7.7)
NEUTROPHILS NFR BLD AUTO: 68.4 % (ref 53–65)
NITRITE UR QL STRIP: NEGATIVE
NRBC # BLD AUTO: 0 X10E3/UL
NRBC, AUTO (.00): 0 %
PH UR STRIP: 5 PH UNITS
PHOSPHATE SERPL-MCNC: 5.9 MG/DL (ref 2.5–4.5)
PLATELET # BLD AUTO: 208 K/UL (ref 150–400)
PLATELET MORPHOLOGY: NORMAL
POTASSIUM SERPL-SCNC: 4.1 MMOL/L (ref 3.5–5.1)
PROT SERPL-MCNC: 6.6 G/DL (ref 6.4–8.2)
PROT UR QL STRIP: NEGATIVE
PROTHROMBIN TIME: 12.4 SECONDS (ref 11.7–14.7)
RBC # BLD AUTO: 2.91 M/UL (ref 4.6–6.2)
RBC # UR STRIP: NEGATIVE /UL
SODIUM SERPL-SCNC: 139 MMOL/L (ref 136–145)
SODIUM UR-SCNC: 45 MMOL/L (ref 40–220)
SP GR UR STRIP: 1.01
UROBILINOGEN UR STRIP-ACNC: NORMAL MG/DL
WBC # BLD AUTO: 6.34 K/UL (ref 4.5–11)

## 2023-06-14 PROCEDURE — 97161 PT EVAL LOW COMPLEX 20 MIN: CPT

## 2023-06-14 PROCEDURE — 93005 ELECTROCARDIOGRAM TRACING: CPT

## 2023-06-14 PROCEDURE — 85730 THROMBOPLASTIN TIME PARTIAL: CPT | Performed by: HOSPITALIST

## 2023-06-14 PROCEDURE — 82550 ASSAY OF CK (CPK): CPT | Performed by: HOSPITALIST

## 2023-06-14 PROCEDURE — 81003 URINALYSIS AUTO W/O SCOPE: CPT | Performed by: HOSPITALIST

## 2023-06-14 PROCEDURE — 63600175 PHARM REV CODE 636 W HCPCS: Performed by: HOSPITALIST

## 2023-06-14 PROCEDURE — 99223 1ST HOSP IP/OBS HIGH 75: CPT | Mod: ,,, | Performed by: HOSPITALIST

## 2023-06-14 PROCEDURE — 99223 PR INITIAL HOSPITAL CARE,LEVL III: ICD-10-PCS | Mod: ,,, | Performed by: HOSPITALIST

## 2023-06-14 PROCEDURE — 84100 ASSAY OF PHOSPHORUS: CPT | Performed by: HOSPITALIST

## 2023-06-14 PROCEDURE — 99499 UNLISTED E&M SERVICE: CPT | Mod: ,,, | Performed by: FAMILY MEDICINE

## 2023-06-14 PROCEDURE — 93010 ELECTROCARDIOGRAM REPORT: CPT | Mod: ,,, | Performed by: STUDENT IN AN ORGANIZED HEALTH CARE EDUCATION/TRAINING PROGRAM

## 2023-06-14 PROCEDURE — 63600175 PHARM REV CODE 636 W HCPCS

## 2023-06-14 PROCEDURE — 25000003 PHARM REV CODE 250: Performed by: HOSPITALIST

## 2023-06-14 PROCEDURE — 80053 COMPREHEN METABOLIC PANEL: CPT | Performed by: HOSPITALIST

## 2023-06-14 PROCEDURE — 82962 GLUCOSE BLOOD TEST: CPT

## 2023-06-14 PROCEDURE — 84300 ASSAY OF URINE SODIUM: CPT | Performed by: HOSPITALIST

## 2023-06-14 PROCEDURE — 11000001 HC ACUTE MED/SURG PRIVATE ROOM

## 2023-06-14 PROCEDURE — 82570 ASSAY OF URINE CREATININE: CPT | Performed by: HOSPITALIST

## 2023-06-14 PROCEDURE — 99223 PR INITIAL HOSPITAL CARE,LEVL III: ICD-10-PCS | Mod: ,,, | Performed by: INTERNAL MEDICINE

## 2023-06-14 PROCEDURE — 93010 EKG 12-LEAD: ICD-10-PCS | Mod: ,,, | Performed by: STUDENT IN AN ORGANIZED HEALTH CARE EDUCATION/TRAINING PROGRAM

## 2023-06-14 PROCEDURE — 83735 ASSAY OF MAGNESIUM: CPT | Performed by: HOSPITALIST

## 2023-06-14 PROCEDURE — 99499 NO LOS: ICD-10-PCS | Mod: ,,, | Performed by: FAMILY MEDICINE

## 2023-06-14 PROCEDURE — 97165 OT EVAL LOW COMPLEX 30 MIN: CPT

## 2023-06-14 PROCEDURE — 85025 COMPLETE CBC W/AUTO DIFF WBC: CPT | Performed by: HOSPITALIST

## 2023-06-14 PROCEDURE — 83605 ASSAY OF LACTIC ACID: CPT | Performed by: HOSPITALIST

## 2023-06-14 PROCEDURE — 25000003 PHARM REV CODE 250

## 2023-06-14 PROCEDURE — 83690 ASSAY OF LIPASE: CPT | Performed by: HOSPITALIST

## 2023-06-14 PROCEDURE — 85610 PROTHROMBIN TIME: CPT | Performed by: HOSPITALIST

## 2023-06-14 PROCEDURE — 99223 1ST HOSP IP/OBS HIGH 75: CPT | Mod: ,,, | Performed by: INTERNAL MEDICINE

## 2023-06-14 RX ORDER — FENOFIBRATE 145 MG/1
145 TABLET, FILM COATED ORAL DAILY
Status: DISCONTINUED | OUTPATIENT
Start: 2023-06-14 | End: 2023-06-15 | Stop reason: HOSPADM

## 2023-06-14 RX ORDER — SIMETHICONE 80 MG
1 TABLET,CHEWABLE ORAL 3 TIMES DAILY PRN
Status: DISCONTINUED | OUTPATIENT
Start: 2023-06-14 | End: 2023-06-15 | Stop reason: HOSPADM

## 2023-06-14 RX ORDER — GUAIFENESIN/DEXTROMETHORPHAN 100-10MG/5
10 SYRUP ORAL EVERY 6 HOURS PRN
Status: DISCONTINUED | OUTPATIENT
Start: 2023-06-14 | End: 2023-06-15 | Stop reason: HOSPADM

## 2023-06-14 RX ORDER — ATORVASTATIN CALCIUM 80 MG/1
80 TABLET, FILM COATED ORAL DAILY
Status: DISCONTINUED | OUTPATIENT
Start: 2023-06-14 | End: 2023-06-15 | Stop reason: HOSPADM

## 2023-06-14 RX ORDER — SODIUM CHLORIDE, SODIUM LACTATE, POTASSIUM CHLORIDE, CALCIUM CHLORIDE 600; 310; 30; 20 MG/100ML; MG/100ML; MG/100ML; MG/100ML
INJECTION, SOLUTION INTRAVENOUS CONTINUOUS
Status: DISCONTINUED | OUTPATIENT
Start: 2023-06-14 | End: 2023-06-14

## 2023-06-14 RX ORDER — TRAZODONE HYDROCHLORIDE 50 MG/1
50 TABLET ORAL NIGHTLY PRN
Status: DISCONTINUED | OUTPATIENT
Start: 2023-06-14 | End: 2023-06-15 | Stop reason: HOSPADM

## 2023-06-14 RX ORDER — SODIUM CHLORIDE 0.9 % (FLUSH) 0.9 %
10 SYRINGE (ML) INJECTION EVERY 12 HOURS PRN
Status: DISCONTINUED | OUTPATIENT
Start: 2023-06-14 | End: 2023-06-15 | Stop reason: HOSPADM

## 2023-06-14 RX ORDER — MAGNESIUM SULFATE HEPTAHYDRATE 40 MG/ML
2 INJECTION, SOLUTION INTRAVENOUS ONCE
Status: DISCONTINUED | OUTPATIENT
Start: 2023-06-14 | End: 2023-06-14

## 2023-06-14 RX ORDER — MAGNESIUM SULFATE HEPTAHYDRATE 40 MG/ML
2 INJECTION, SOLUTION INTRAVENOUS DAILY
Status: DISCONTINUED | OUTPATIENT
Start: 2023-06-14 | End: 2023-06-14

## 2023-06-14 RX ORDER — CARVEDILOL 12.5 MG/1
12.5 TABLET ORAL
Status: DISCONTINUED | OUTPATIENT
Start: 2023-06-14 | End: 2023-06-15 | Stop reason: HOSPADM

## 2023-06-14 RX ORDER — HEPARIN SODIUM 5000 [USP'U]/ML
7500 INJECTION, SOLUTION INTRAVENOUS; SUBCUTANEOUS EVERY 8 HOURS
Status: DISCONTINUED | OUTPATIENT
Start: 2023-06-14 | End: 2023-06-15 | Stop reason: HOSPADM

## 2023-06-14 RX ORDER — AMLODIPINE BESYLATE 10 MG/1
10 TABLET ORAL DAILY
Status: DISCONTINUED | OUTPATIENT
Start: 2023-06-14 | End: 2023-06-15 | Stop reason: HOSPADM

## 2023-06-14 RX ORDER — GLUCAGON 1 MG
1 KIT INJECTION
Status: DISCONTINUED | OUTPATIENT
Start: 2023-06-14 | End: 2023-06-15 | Stop reason: HOSPADM

## 2023-06-14 RX ORDER — SODIUM CHLORIDE, SODIUM LACTATE, POTASSIUM CHLORIDE, CALCIUM CHLORIDE 600; 310; 30; 20 MG/100ML; MG/100ML; MG/100ML; MG/100ML
INJECTION, SOLUTION INTRAVENOUS CONTINUOUS
Status: DISCONTINUED | OUTPATIENT
Start: 2023-06-14 | End: 2023-06-15 | Stop reason: HOSPADM

## 2023-06-14 RX ORDER — IBUPROFEN 200 MG
24 TABLET ORAL
Status: DISCONTINUED | OUTPATIENT
Start: 2023-06-14 | End: 2023-06-15 | Stop reason: HOSPADM

## 2023-06-14 RX ORDER — ONDANSETRON 2 MG/ML
8 INJECTION INTRAMUSCULAR; INTRAVENOUS EVERY 6 HOURS PRN
Status: DISCONTINUED | OUTPATIENT
Start: 2023-06-14 | End: 2023-06-15 | Stop reason: HOSPADM

## 2023-06-14 RX ORDER — AMLODIPINE BESYLATE 10 MG/1
10 TABLET ORAL ONCE
Status: DISCONTINUED | OUTPATIENT
Start: 2023-06-14 | End: 2023-06-14

## 2023-06-14 RX ORDER — BISACODYL 5 MG
10 TABLET, DELAYED RELEASE (ENTERIC COATED) ORAL DAILY PRN
Status: DISCONTINUED | OUTPATIENT
Start: 2023-06-14 | End: 2023-06-15 | Stop reason: HOSPADM

## 2023-06-14 RX ORDER — NALOXONE HCL 0.4 MG/ML
0.02 VIAL (ML) INJECTION
Status: DISCONTINUED | OUTPATIENT
Start: 2023-06-14 | End: 2023-06-15 | Stop reason: HOSPADM

## 2023-06-14 RX ORDER — ACETAMINOPHEN 500 MG
1000 TABLET ORAL EVERY 6 HOURS PRN
Status: DISCONTINUED | OUTPATIENT
Start: 2023-06-14 | End: 2023-06-15 | Stop reason: HOSPADM

## 2023-06-14 RX ORDER — PANTOPRAZOLE SODIUM 40 MG/1
40 TABLET, DELAYED RELEASE ORAL DAILY
Status: DISCONTINUED | OUTPATIENT
Start: 2023-06-14 | End: 2023-06-15 | Stop reason: HOSPADM

## 2023-06-14 RX ORDER — NAPROXEN SODIUM 220 MG/1
81 TABLET, FILM COATED ORAL DAILY
Status: DISCONTINUED | OUTPATIENT
Start: 2023-06-14 | End: 2023-06-15 | Stop reason: HOSPADM

## 2023-06-14 RX ORDER — CLOPIDOGREL BISULFATE 75 MG/1
75 TABLET ORAL DAILY
Status: DISCONTINUED | OUTPATIENT
Start: 2023-06-14 | End: 2023-06-15 | Stop reason: HOSPADM

## 2023-06-14 RX ORDER — INSULIN ASPART 100 [IU]/ML
1-10 INJECTION, SOLUTION INTRAVENOUS; SUBCUTANEOUS
Status: DISCONTINUED | OUTPATIENT
Start: 2023-06-14 | End: 2023-06-15 | Stop reason: HOSPADM

## 2023-06-14 RX ORDER — ISOSORBIDE MONONITRATE 30 MG/1
30 TABLET, EXTENDED RELEASE ORAL DAILY
Status: DISCONTINUED | OUTPATIENT
Start: 2023-06-14 | End: 2023-06-15 | Stop reason: HOSPADM

## 2023-06-14 RX ORDER — HYDRALAZINE HYDROCHLORIDE 20 MG/ML
10 INJECTION INTRAMUSCULAR; INTRAVENOUS EVERY 6 HOURS PRN
Status: DISCONTINUED | OUTPATIENT
Start: 2023-06-14 | End: 2023-06-15 | Stop reason: HOSPADM

## 2023-06-14 RX ORDER — IBUPROFEN 200 MG
16 TABLET ORAL
Status: DISCONTINUED | OUTPATIENT
Start: 2023-06-14 | End: 2023-06-15 | Stop reason: HOSPADM

## 2023-06-14 RX ADMIN — AMPICILLIN AND SULBACTAM 3 G: 2; 1 INJECTION, POWDER, FOR SOLUTION INTRAVENOUS at 05:06

## 2023-06-14 RX ADMIN — HYDRALAZINE HYDROCHLORIDE 10 MG: 20 INJECTION INTRAMUSCULAR; INTRAVENOUS at 05:06

## 2023-06-14 RX ADMIN — ISOSORBIDE MONONITRATE 30 MG: 30 TABLET, EXTENDED RELEASE ORAL at 08:06

## 2023-06-14 RX ADMIN — PANTOPRAZOLE SODIUM 40 MG: 40 TABLET, DELAYED RELEASE ORAL at 08:06

## 2023-06-14 RX ADMIN — ATORVASTATIN CALCIUM 80 MG: 80 TABLET, FILM COATED ORAL at 08:06

## 2023-06-14 RX ADMIN — SODIUM CHLORIDE, POTASSIUM CHLORIDE, SODIUM LACTATE AND CALCIUM CHLORIDE: 600; 310; 30; 20 INJECTION, SOLUTION INTRAVENOUS at 08:06

## 2023-06-14 RX ADMIN — TRAZODONE HYDROCHLORIDE 50 MG: 50 TABLET ORAL at 08:06

## 2023-06-14 RX ADMIN — ASPIRIN 81 MG 81 MG: 81 TABLET ORAL at 08:06

## 2023-06-14 RX ADMIN — AMPICILLIN AND SULBACTAM 3 G: 2; 1 INJECTION, POWDER, FOR SOLUTION INTRAVENOUS at 06:06

## 2023-06-14 RX ADMIN — HEPARIN SODIUM 7500 UNITS: 5000 INJECTION INTRAVENOUS; SUBCUTANEOUS at 05:06

## 2023-06-14 RX ADMIN — ACETAMINOPHEN 1000 MG: 500 TABLET ORAL at 02:06

## 2023-06-14 RX ADMIN — HEPARIN SODIUM 7500 UNITS: 5000 INJECTION INTRAVENOUS; SUBCUTANEOUS at 09:06

## 2023-06-14 RX ADMIN — HEPARIN SODIUM 7500 UNITS: 5000 INJECTION INTRAVENOUS; SUBCUTANEOUS at 02:06

## 2023-06-14 RX ADMIN — CLOPIDOGREL BISULFATE 75 MG: 75 TABLET ORAL at 08:06

## 2023-06-14 RX ADMIN — AMLODIPINE BESYLATE 10 MG: 10 TABLET ORAL at 06:06

## 2023-06-14 RX ADMIN — SODIUM CHLORIDE, POTASSIUM CHLORIDE, SODIUM LACTATE AND CALCIUM CHLORIDE: 600; 310; 30; 20 INJECTION, SOLUTION INTRAVENOUS at 11:06

## 2023-06-14 RX ADMIN — INSULIN DETEMIR 10 UNITS: 100 INJECTION, SOLUTION SUBCUTANEOUS at 08:06

## 2023-06-14 RX ADMIN — CARVEDILOL 12.5 MG: 12.5 TABLET, FILM COATED ORAL at 06:06

## 2023-06-14 RX ADMIN — SODIUM CHLORIDE, POTASSIUM CHLORIDE, SODIUM LACTATE AND CALCIUM CHLORIDE: 600; 310; 30; 20 INJECTION, SOLUTION INTRAVENOUS at 02:06

## 2023-06-14 RX ADMIN — INSULIN ASPART 2 UNITS: 100 INJECTION, SOLUTION INTRAVENOUS; SUBCUTANEOUS at 11:06

## 2023-06-14 NOTE — PLAN OF CARE
Problem: Adult Inpatient Plan of Care  Goal: Plan of Care Review  Outcome: Ongoing, Progressing  Flowsheets (Taken 6/14/2023 0201)  Plan of Care Reviewed With: patient  Goal: Optimal Comfort and Wellbeing  Outcome: Ongoing, Progressing  Intervention: Monitor Pain and Promote Comfort  Flowsheets (Taken 6/14/2023 0201)  Pain Management Interventions:   pain management plan reviewed with patient/caregiver   pillow support provided   position adjusted   relaxation techniques promoted   quiet environment facilitated  Intervention: Provide Person-Centered Care  Flowsheets (Taken 6/14/2023 0201)  Trust Relationship/Rapport:   care explained   choices provided   empathic listening provided   questions answered   emotional support provided   thoughts/feelings acknowledged   reassurance provided   questions encouraged     Problem: Diabetes Comorbidity  Goal: Blood Glucose Level Within Targeted Range  Outcome: Ongoing, Progressing  Intervention: Monitor and Manage Glycemia  Flowsheets (Taken 6/14/2023 0201)  Glycemic Management:   blood glucose monitored   supplemental insulin given     Problem: Fluid and Electrolyte Imbalance (Acute Kidney Injury/Impairment)  Goal: Fluid and Electrolyte Balance  Outcome: Ongoing, Progressing  Intervention: Monitor and Manage Fluid and Electrolyte Balance  Flowsheets (Taken 6/14/2023 0201)  Fluid/Electrolyte Management: fluids provided     Problem: Oral Intake Inadequate (Acute Kidney Injury/Impairment)  Goal: Optimal Nutrition Intake  Outcome: Ongoing, Progressing  Intervention: Promote and Optimize Nutrition  Flowsheets (Taken 6/14/2023 0201)  Oral Nutrition Promotion:   physical activity promoted   rest periods promoted   safe use of adaptive equipment encouraged   social interaction promoted     Problem: Renal Function Impairment (Acute Kidney Injury/Impairment)  Goal: Effective Renal Function  Outcome: Ongoing, Progressing  Intervention: Monitor and Support Renal Function  Flowsheets  (Taken 6/14/2023 0201)  Medication Review/Management: medications reviewed     Problem: Impaired Wound Healing  Goal: Optimal Wound Healing  Outcome: Ongoing, Progressing  Intervention: Promote Wound Healing  Flowsheets (Taken 6/14/2023 0201)  Oral Nutrition Promotion:   physical activity promoted   rest periods promoted   safe use of adaptive equipment encouraged   social interaction promoted  Sleep/Rest Enhancement:   awakenings minimized   relaxation techniques promoted   noise level reduced   regular sleep/rest pattern promoted  Pain Management Interventions:   pain management plan reviewed with patient/caregiver   pillow support provided   position adjusted   relaxation techniques promoted   quiet environment facilitated

## 2023-06-14 NOTE — PLAN OF CARE
[facility]  Initial Discharge Assessment       Primary Care Provider: Juan Pablo Ryan MD    Admission Diagnosis: Acute kidney injury [N17.9]    Admission Date: 6/14/2023  Expected Discharge Date:     Transition of Care Barriers: Mobility    Payor: MEDICARE / Plan: MEDICARE PART A & B / Product Type: Government /     Extended Emergency Contact Information  Primary Emergency Contact: Vera Mehta  Mobile Phone: [phone]  Relation: Mother  Preferred language: English   needed? No    Discharge Plan A: Home  Discharge Plan B: Home      [facility] [place] - [address]  [address]  [place] [address]  Phone: [phone] Fax: [phone]    [id number] - [place], [place] - [address] AT [facility] [address]  [address]  [place] [address]  Phone: [phone] Fax: [phone]    [facility] [address]  [address]  [place] [address]  Phone: [phone] Fax: [phone]      Initial Assessment (most recent)       Adult Discharge Assessment - 06/14/23 1344          Discharge Assessment    Assessment Type Discharge Planning Assessment     Source of Information patient     People in Home alone     Do you expect to return to your current living situation? Yes     Do you have help at home or someone to help you manage your care at home? No     Prior to hospitilization cognitive status: Alert/Oriented     Current cognitive status: Alert/Oriented     Home Accessibility stairs to enter home     Number of Stairs, Main Entrance other (see comments)   20    Equipment Currently Used at Home none     Patient currently being followed by outpatient case management? No     Do you currently have service(s) that help you manage your care at home? No     Do you take prescription medications? Yes     Do you have prescription coverage? Yes     Coverage Medicare     Do you have any problems  affording any of your prescribed medications? No     Is the patient taking medications as prescribed? yes     How do you get to doctors appointments? car, drives self     Are you on dialysis? No     Do you take coumadin? No     Discharge Plan A Home     Discharge Plan B Home     DME Needed Upon Discharge  cane, straight     Discharge Plan discussed with: Patient     Transition of Care Barriers Mobility        Physical Activity    On average, how many days per week do you engage in moderate to strenuous exercise (like a brisk walk)? 0 days     On average, how many minutes do you engage in exercise at this level? 0 min        Financial Resource Strain    How hard is it for you to pay for the very basics like food, housing, medical care, and heating? Not hard at all        Housing Stability    In the last 12 months, was there a time when you were not able to pay the mortgage or rent on time? No     In the last 12 months, was there a time when you did not have a steady place to sleep or slept in a shelter (including now)? No        Transportation Needs    In the past 12 months, has lack of transportation kept you from medical appointments or from getting medications? Yes     In the past 12 months, has lack of transportation kept you from meetings, work, or from getting things needed for daily living? Yes        Food Insecurity    Within the past 12 months, you worried that your food would run out before you got the money to buy more. Sometimes true     Within the past 12 months, the food you bought just didn't last and you didn't have money to get more. Sometimes true        Stress    Do you feel stress - tense, restless, nervous, or anxious, or unable to sleep at night because your mind is troubled all the time - these days? Only a little        Social Connections    In a typical week, how many times do you talk on the phone with family, friends, or neighbors? Once a week     How often do you get together with friends  or relatives? Once a week     How often do you attend Methodist or Zoroastrianism services? Never     Do you belong to any clubs or organizations such as Methodist groups, unions, fraternal or athletic groups, or school groups? No     How often do you attend meetings of the clubs or organizations you belong to? Never     Are you , , , , never , or living with a partner?         Alcohol Use    Q1: How often do you have a drink containing alcohol? Never     Q2: How many drinks containing alcohol do you have on a typical day when you are drinking? Patient does not drink     Q3: How often do you have six or more drinks on one occasion? Never                   Pt lives at home alone not current with hh and uses no dme. Pt requested a cane, MD informed. Pt plans to dc back home when medically ready for dc. I.M. obtained. Pt is ok with receiving cane from [facility] if ordered. SDOH questions completed. SW will cont to follow for dc needs.

## 2023-06-14 NOTE — ASSESSMENT & PLAN NOTE
Body mass index is 41.13 kg/m². Morbid obesity complicates all aspects of disease management from diagnostic modalities to treatment. Weight loss encouraged and health benefits explained to patient.     - Patient would benefit from sleep study outpatient

## 2023-06-14 NOTE — ASSESSMENT & PLAN NOTE
Patient recently underwent CT with contrast, was given Vancomycin and Zosyn IV for hidradenitis suppurativa, was given an ARB and lasix - all contributing factors to his worsening HAKEEM.     - Renal US demonstrated: Simple appearing right renal cyst.  No evidence of abnormality demonstrated.  - UA negative   - random Ur Cr, Ur Na pending  - Continue LR @ 125cc/hr   - Mg and P daily   - I/Os and daily weights   - Nephrology consulted - appreciate recommendations

## 2023-06-14 NOTE — PT/OT/SLP EVAL
Physical Therapy Evaluation     Patient Name: Faustino Fischer   MRN: [id number]  Recent Surgery: * No surgery found *      Recommendations:     Discharge Recommendations: home health PT, independent living facility   Discharge Equipment Recommendations: rollator   Barriers to discharge: Decreased caregiver support    Assessment:     Faustino Fischer is a 53 y.o. male admitted with a medical diagnosis of HAKEEM (acute kidney injury). He presents with the following impairments/functional limitations: impaired endurance, impaired functional mobility, gait instability, other (comment) (falls). Pt reports multiple falls at home and lives in an upstairs apartment. He has no assistive devices but could benefit from rollator due to fatigue with ambulation. He would benefit from home evaluation via HHPT. Should be able to return home once medically cleared. PT to follow to maximize safety with mobility    Rehab Prognosis: Good; patient would benefit from acute PT services to address these deficits and reach maximum level of function.    Plan:     During this hospitalization, patient to be seen 5 x/week to address the above listed problems via gait training, therapeutic activities, therapeutic exercises, neuromuscular re-education    Plan of Care Expires: 06/21/23    Subjective     Chief Complaint: decreased endurance  Patient Comments/Goals: Pt reports decline in mobility due to decreased endurance  Pain/Comfort:  Pain Rating 1: 3/10  Location 1: head (headache)  Pain Addressed 1: Distraction  Pain Rating Post-Intervention 1: 3/10    Social History:  Living Environment: Patient lives alone in a second floor apartment with number of outside stair(s): 20  Prior Level of Function: Prior to admission, patient was independent  Equipment Used at Home: none  DME owned (not currently used): none  Assistance Upon Discharge: unknown    Objective:     Communicated with EVELIA Thompson RN prior to session. Patient found sitting edge of bed with  peripheral IV upon PT entry to room.    General Precautions: Standard, fall   Orthopedic Precautions: N/A   Braces: N/A    Respiratory Status: Room air    Exams:  Cognition: Patient is oriented to Person, Place, Time, Situation  RLE ROM: WFL  RLE Strength: WFL  LLE ROM: WFL  LLE Strength: WFL  Gross Motor Coordination: WFL    Functional Mobility:  Gait belt applied - No  Bed Mobility  Scooting: independence  Transfers  Sit to Stand: supervision with no AD  Bed to Chair: stand by assistance with IV pole using Step Transfer  Gait  Patient ambulated 240 ft with  IV pole  and stand by assistance. Patient demonstrates occasional unsteady gait and decreased mic.   Balance  Sitting: independence  Standing: supervision      Therapeutic Activities and Exercises:   Patient educated on role of acute care PT and PT POC, safety while in hospital including calling nurse for mobility, and call light usage      AM-PAC 6 CLICK MOBILITY  Total Score:22    Patient left up in chair with all lines intact and call button in reach.    GOALS:   Multidisciplinary Problems       Physical Therapy Goals          Problem: Physical Therapy    Goal Priority Disciplines Outcome Goal Variances Interventions   Physical Therapy Goal     PT, PT/OT Ongoing, Progressing     Description: Short term goals:  1. Gait  x 300 feet with Supervision using Rolling Walker.   2. Ascend/descend 10 stair with left Handrails Stand-by Assistance using No Assistive Device.   3. Lower extremity exercise program x30 reps per handout, with independence    Long term goals:  1. Gait  x 500 feet with Modified [place] using Rolling Walker.   2. Ascend/descend 20 stair with left Handrails Modified [place] using No Assistive Device  3. Pt will have all needed equipment at discharge.                          History:     Past Medical History:   Diagnosis Date    Chronic pancreatitis, unspecified pancreatitis type     Coronary artery disease     Diabetes mellitus      Dyslipidemia     Hidradenitis     Hypertension        Past Surgical History:   Procedure Laterality Date    CARDIAC SURGERY      Stents x6    CHOLECYSTECTOMY      UNDESCENDED TESTICLE EXPLORATION N/A        Time Tracking:     PT Received On: 06/14/23  PT Start Time: 1105  PT Stop Time: 1127  PT Total Time (min): 22 min     Billable Minutes: Evaluation low complexity    6/14/2023

## 2023-06-14 NOTE — PLAN OF CARE
Problem: Adult Inpatient Plan of Care  Goal: Absence of Hospital-Acquired Illness or Injury  Outcome: Ongoing, Progressing     Problem: Oral Intake Inadequate (Acute Kidney Injury/Impairment)  Goal: Optimal Nutrition Intake  Outcome: Ongoing, Progressing     Problem: Impaired Wound Healing  Goal: Optimal Wound Healing  Outcome: Ongoing, Progressing

## 2023-06-14 NOTE — PROGRESS NOTES
[facility] - [facility] Medical Telemetry  Wound Care    Patient Name:  Faustino Fischer   MRN:  [id number]  Date: 6/14/2023  Diagnosis: HAKEEM (acute kidney injury)    History:     Past Medical History:   Diagnosis Date    Chronic pancreatitis, unspecified pancreatitis type     Coronary artery disease     Diabetes mellitus     Dyslipidemia     Hidradenitis     Hypertension        Social History     Socioeconomic History    Marital status:    Tobacco Use    Smoking status: Former     Packs/day: 0.25     Years: 18.00     Pack years: 4.50     Types: Cigarettes, Cigars     Start date: 2004    Smokeless tobacco: Never   Substance and Sexual Activity    Alcohol use: Not Currently     Comment: occasional drink previous heavy drinker quit heavily in 2001    Drug use: Never    Sexual activity: Not Currently     Social Determinants of Health     Financial Resource Strain: Low Risk     Difficulty of Paying Living Expenses: Not hard at all   Food Insecurity: Food Insecurity Present    Worried About Running Out of Food in the Last Year: Sometimes true    Ran Out of Food in the Last Year: Sometimes true   Transportation Needs: Unmet Transportation Needs    Lack of Transportation (Medical): Yes    Lack of Transportation (Non-Medical): Yes   Physical Activity: Inactive    Days of Exercise per Week: 0 days    Minutes of Exercise per Session: 0 min   Stress: No Stress Concern Present    Feeling of Stress : Only a little   Social Connections: Socially Isolated    Frequency of Communication with Friends and Family: Once a week    Frequency of Social Gatherings with Friends and Family: Once a week    Attends Hinduism Services: Never    Active Member of Clubs or Organizations: No    Attends Club or Organization Meetings: Never    Marital Status:    Housing Stability: Unknown    Unable to Pay for Housing in the Last Year: No    Unstable Housing in the Last Year: No       Precautions:     Allergies as of 06/13/2023 -  Reviewed 06/08/2023   Allergen Reaction Noted    Ticagrelor Shortness Of Breath 11/23/2022       WOC Assessment Details/Treatment        06/14/23 1217   WOCN Assessment   WOCN Total Time (mins) 75   Visit Date 06/14/23   Visit Time 1130   Consult Type New   WOCN Speciality Wound   Wound hidradenitis   Number of Wounds 3   Intervention chart review;assessed;applied;team conference;coordination of care;orders        Altered Skin Integrity 06/14/23 0130 Left Axilla Abscess   Date First Assessed/Time First Assessed: 06/14/23 0130   Altered Skin Integrity Present on Admission - Did Patient arrive to the hospital with altered skin?: yes  Side: Left  Location: Axilla  Primary Wound Type: Abscess   Wound Image     Dressing Appearance Open to air   Drainage Amount None   Appearance Intact        Altered Skin Integrity 06/07/23 1730 Left medial Anus #1 Abscess   Date First Assessed/Time First Assessed: 06/07/23 1730   Altered Skin Integrity Present on Admission - Did Patient arrive to the hospital with altered skin?: yes  Side: Left  Orientation: medial  Location: Anus  Wound Number: #1  Is this injury device...   Wound Image     Dressing Appearance Intact   Drainage Characteristics/Odor Creamy;Tan;Yellow   Appearance Red;Pink;Gray   Tissue loss description Full thickness   Periwound Area Intact;Pustules;Scar tissue   Wound Edges Defined;Irregular;Open;Rolled/closed   Wound Length (cm) 3 cm   Wound Width (cm) 0.2 cm   Wound Depth (cm) 1.2 cm   Wound Volume (cm^3) 0.72 cm^3   Wound Surface Area (cm^2) 0.6 cm^2   Care Cleansed with:;Antimicrobial agent;Wound cleanser   Dressing Reinforced;Hydrofiber;Silver;Absorptive Pad   Periwound Care Other (see comments)  (Vashe)   Dressing Change Due 06/15/23        Altered Skin Integrity 06/07/23 1730 Right anterior;proximal;upper Arm #2 Abscess   Date First Assessed/Time First Assessed: 06/07/23 1730   Altered Skin Integrity Present on Admission - Did Patient arrive to the hospital with  "altered skin?: yes  Side: Right  Orientation: anterior;proximal;upper  Location: (c) Arm  Wound Number: #2  ...   Wound Image     Dressing Appearance Open to air   Drainage Amount None   Appearance Intact       WOC Team consulted for "Left medial anus, Rt anterior arm, Left axilla.    Narrative: Pt alert and oriented, sitting in chair.  Pleasant - able to perform self care of wounds dx hidradenitis suppurativa.  States condition evidenced approx "last month or so."  Sees OP wound care. Reports uses Vashe, Ag hydrofiber, abd on only draining abscess to perineum.  R axilla intact with obvious swelling - however pt reports that it "drained itself."     Active Wounds: Open draining abscess to R distal gluteal fold (perianal)     Goals per TIME Model: Promote moist wound healing, Manage drainage, Reduce bioburden, and Educate on proper wound management post D/C     Barriers to Wound Healing: multiple co-morbidities diabetes chronicity;  severe obesity.    Recommendations made to primary team for Wound care Vashe, Ag Hydrofiber; ABDs.     Orders placed.    Thank you for the consult.     We will continue to follow. See additional note under Notes TAB for tentative f/u plan/dates.    06/14/2023   "

## 2023-06-14 NOTE — PLAN OF CARE
Problem: Physical Therapy  Goal: Physical Therapy Goal  Description: Short term goals:  1. Gait  x 300 feet with Supervision using Rolling Walker.   2. Ascend/descend 10 stair with left Handrails Stand-by Assistance using No Assistive Device.   3. Lower extremity exercise program x30 reps per handout, with independence    Long term goals:  1. Gait  x 500 feet with Modified Cobb using Rolling Walker.   2. Ascend/descend 20 stair with left Handrails Modified Cobb using No Assistive Device  3. Pt will have all needed equipment at discharge.     Outcome: Ongoing, Progressing

## 2023-06-14 NOTE — ASSESSMENT & PLAN NOTE
Patient's FSGs are uncontrolled due to hyperglycemia on current medication regimen.  Last A1c reviewed-   Lab Results   Component Value Date    HGBA1C 6.3 06/08/2023     Most recent fingerstick glucose reviewed- No results for input(s): POCTGLUCOSE in the last 24 hours.  Current correctional scale  Medium  Maintain anti-hyperglycemic dose as follows-   Antihyperglycemics (From admission, onward)    Start     Stop Route Frequency Ordered    06/14/23 0245  insulin detemir U-100 injection 10 Units         -- SubQ Nightly 06/14/23 0136    06/14/23 0235  insulin aspart U-100 injection 1-10 Units         -- SubQ Before meals & nightly PRN 06/14/23 0136        Hold Oral hypoglycemics while patient is in the hospital.

## 2023-06-14 NOTE — ASSESSMENT & PLAN NOTE
Recent wound cultures from the left groin (Staph Aureus) and Left arm (Staph lugdunensis) were sensitive to Unasyn. After discussion with the pharmacist Eliz Erickson - decision was made to use renally adjusted Unasyn.     - Wound care consulted - appreciate recommendations

## 2023-06-14 NOTE — HPI
Patient is a 54yo male who presents from hospitalization for acute pancreatitis from [place] (6/7/2023) for nephrology consult for worsening renal function. During that admission, CT of the abdomen and pelvis w/ contrast was obtained for diagnosis of the acute pancreatitis. The patient was also being treated for hidradenitis suppurativa with IV Vancomycin and Zosyn. A wound culture was completed that demonstrated Staph lugdenensis from left arm and Staph aureus from left leg that was sensitive to doxycycline PO. The patient's acute pancreatitis resolved over the course of the hospitalization, but his renal function continued to worsen despite IV fluids. There was also concern for urinary retention for which he was started on Flomax.  The decision was to transfer the patient higher level of care due to HAKEEM for nephrology consult due to increase in creatinine from his baseline of 0.99 to 4.8.     Patient sees Dr. Price Cardiologist at [facility]. Next appointment was 6/14/23 but it had to be rescheduled to July. 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 From [id number], the patient states that he had 6 cardiac stents placed. As a result, he is currently on disability.     Upon admission, VSS remarkable for /99, P 56. Labs showed H/H 10.2/32.2, Mg 1.4, BUN/Cr 19/4.59, glucose 148. Baseline Cr 1.19 5 days ago. UA unremarkable. Patient is admitted to hospital medicine for further management and care.

## 2023-06-14 NOTE — ASSESSMENT & PLAN NOTE
Continue home medications - ASA 81mg QD, Atorvastatin 80mg QD, Clopidogrel 76mg QD, Isosorbide Mononitrate 30mg QD     - ECHO pending (no record of ECHO)

## 2023-06-14 NOTE — NURSING
Report given to ROCIO Whelan at OR for pt transfer to room 554. Denies any questions at this time. [facility] notified of need for transport at 2048. Patient notified of current progress on transfer. Denies any questions or concerns.       0023: Pt ambulated to stretcher and d/c'd via [facility]. Condition stable. VS unchanged upon discharge. Notified 5N at [facility] of patient's departure.

## 2023-06-14 NOTE — ASSESSMENT & PLAN NOTE
- Continue home medication fenofibrate 145mg QD   - Recent lipid panel demonstrated TG of 148, Cholesterol of 142, and HDL of 46

## 2023-06-14 NOTE — SUBJECTIVE & OBJECTIVE
Past Medical History:   Diagnosis Date    Chronic pancreatitis, unspecified pancreatitis type     Coronary artery disease     Diabetes mellitus     Dyslipidemia     Hidradenitis     Hypertension        Past Surgical History:   Procedure Laterality Date    CARDIAC SURGERY      Stents x2    CHOLECYSTECTOMY      UNDESCENDED TESTICLE EXPLORATION N/A        Review of patient's allergies indicates:   Allergen Reactions    Ticagrelor Shortness Of Breath       Current Facility-Administered Medications on File Prior to Encounter   Medication    [COMPLETED] acetaminophen tablet 650 mg    [DISCONTINUED] 0.9%  NaCl infusion    [DISCONTINUED] aspirin chewable tablet 81 mg    [DISCONTINUED] atorvastatin tablet 80 mg    [DISCONTINUED] carvediloL tablet 12.5 mg    [DISCONTINUED] clopidogreL tablet 75 mg    [DISCONTINUED] dextrose 50% injection 12.5 g    [DISCONTINUED] docusate sodium capsule 100 mg    [DISCONTINUED] doxycycline tablet 100 mg    [DISCONTINUED] enoxaparin injection 30 mg    [DISCONTINUED] glucagon (human recombinant) injection 1 mg    [DISCONTINUED] HYDROcodone-acetaminophen 5-325 mg per tablet 1 tablet    [DISCONTINUED] insulin aspart U-100 injection 1-10 Units    [DISCONTINUED] isosorbide mononitrate 24 hr tablet 30 mg    [DISCONTINUED] Lactobacillus acidophilus capsule 1 capsule    [DISCONTINUED] pantoprazole EC tablet 40 mg    [DISCONTINUED] sodium chloride 0.9% flush 10 mL    [DISCONTINUED] tamsulosin 24 hr capsule 0.4 mg     Current Outpatient Medications on File Prior to Encounter   Medication Sig    aspirin 81 MG Chew Take 1 tablet (81 mg total) by mouth once daily.    atorvastatin (LIPITOR) 80 MG tablet Take 1 tablet (80 mg total) by mouth once daily.    carvediloL (COREG) 12.5 MG tablet Take 1 tablet (12.5 mg total) by mouth before breakfast.    clindamycin (CLEOCIN T) 1 % external solution Apply topically 2 (two) times daily.    clopidogreL (PLAVIX) 75 mg tablet Take 75 mg by mouth.    fenofibrate (TRICOR)  145 MG tablet Take 1 tablet (145 mg total) by mouth once daily.    furosemide (LASIX) 20 MG tablet Take 1 tablet (20 mg total) by mouth once daily.    isosorbide mononitrate (IMDUR) 30 MG 24 hr tablet Take 1 tablet (30 mg total) by mouth once daily. Take 1/2 tablet with breakfast    losartan (COZAAR) 50 MG tablet Take 1 tablet (50 mg total) by mouth once daily.    metFORMIN (GLUCOPHAGE-XR) 500 MG ER 24hr tablet Take 1 tablet (500 mg total) by mouth 2 (two) times daily with meals.    pantoprazole (PROTONIX) 40 MG tablet Take 40 mg by mouth.    SUPER THIN LANCETS 28 gauge Misc 2 (two) times daily.    TRUE METRIX GLUCOSE METER Misc 2 (two) times daily.     Family History       Problem Relation (Age of Onset)    Heart disease Father, Brother, Maternal Grandmother    Hypertension Father          Tobacco Use    Smoking status: Former     Packs/day: 0.25     Years: 18.00     Pack years: 4.50     Types: Cigarettes, Cigars     Start date: 2004    Smokeless tobacco: Never   Substance and Sexual Activity    Alcohol use: Not Currently     Comment: occasional drink previous heavy drinker quit heavily in 2001    Drug use: Never    Sexual activity: Not Currently     Review of Systems   Constitutional:  Negative for chills and fever.   HENT:  Negative for trouble swallowing.    Eyes:  Negative for visual disturbance.   Respiratory:  Negative for shortness of breath.    Cardiovascular:  Negative for chest pain.   Gastrointestinal:  Negative for abdominal pain, constipation, diarrhea, nausea and vomiting.   Genitourinary:  Negative for difficulty urinating and hematuria.   Musculoskeletal:  Negative for back pain and neck pain.   Skin:  Positive for wound. Negative for rash.        Hidradenitis suppurativa under both armpits, left groin, see media   Neurological:  Negative for dizziness, weakness and light-headedness.   Psychiatric/Behavioral:  Negative for sleep disturbance.    Objective:     Vital Signs (Most Recent):  Temp: 98 °F  (36.7 °C) (06/14/23 0055)  Pulse: (!) 56 (06/14/23 0055)  Resp: 18 (06/14/23 0055)  BP: (!) 175/99 (06/14/23 0055)  SpO2: 99 % (06/14/23 0055) Vital Signs (24h Range):  Temp:  [98 °F (36.7 °C)-98.8 °F (37.1 °C)] 98 °F (36.7 °C)  Pulse:  [56-72] 56  Resp:  [18-20] 18  SpO2:  [98 %-99 %] 99 %  BP: (158-175)/(94-99) 175/99     Weight: 115.6 kg (254 lb 13.6 oz)  Body mass index is 41.13 kg/m².     Physical Exam  Vitals and nursing note reviewed.   Constitutional:       General: He is not in acute distress.     Appearance: Normal appearance. He is obese. He is not toxic-appearing or diaphoretic.   HENT:      Head: Normocephalic and atraumatic.      Right Ear: External ear normal.      Left Ear: External ear normal.      Nose: Nose normal.      Mouth/Throat:      Mouth: Mucous membranes are moist.      Pharynx: Oropharynx is clear.   Eyes:      General: No scleral icterus.     Extraocular Movements: Extraocular movements intact.      Pupils: Pupils are equal, round, and reactive to light.   Cardiovascular:      Rate and Rhythm: Normal rate and regular rhythm.      Pulses: Normal pulses.      Heart sounds: Normal heart sounds. No murmur heard.  Pulmonary:      Effort: Pulmonary effort is normal. No respiratory distress.      Breath sounds: Normal breath sounds. No wheezing.   Abdominal:      General: Bowel sounds are normal. There is no distension.      Palpations: Abdomen is soft.      Tenderness: There is no abdominal tenderness.   Musculoskeletal:         General: No deformity.      Cervical back: Neck supple.   Skin:     General: Skin is warm and dry.      Coloration: Skin is not jaundiced.      Findings: Lesion present.      Comments: Hidradenitis suppurativa in bilateral armpits and left perineum area (worst), see media pictures   Neurological:      Mental Status: He is alert and oriented to person, place, and time.      Sensory: No sensory deficit.   Psychiatric:         Mood and Affect: Mood normal.          Behavior: Behavior normal.            CRANIAL NERVES     CN III, IV, VI   Pupils are equal, round, and reactive to light.     Significant Labs: All pertinent labs within the past 24 hours have been reviewed.    Significant Imaging: I have reviewed all pertinent imaging results/findings within the past 24 hours.

## 2023-06-14 NOTE — TELEPHONE ENCOUNTER
"Spoke with Mr. Fischer, he states, "I'm still at [facility] and they trying to figure out what's going on with my kidneys.  They seem to think I got too many antibiotics."  Mr. Fischer thanked me for calling and thanked me for the care he received while he was at [facility].  "

## 2023-06-14 NOTE — PROGRESS NOTES
06/14/23 1216   Wound Care Follow Up   Wound Care Follow-up? Yes   Wound Care- Next Visit Date 06/20/23   Follow Up Plan Bita POC

## 2023-06-14 NOTE — PT/OT/SLP EVAL
Occupational Therapy   Evaluation    Name: Faustino Fischer  MRN: [id number]  Admitting Diagnosis: HAKEEM (acute kidney injury)  Recent Surgery: * No surgery found *      Recommendations:     Discharge Recommendations: home with home health  Discharge Equipment Recommendations:   (to be determined)  Barriers to discharge:  None    Assessment:     Faustino Fischer is a 53 y.o. male with a medical diagnosis of HAKEEM (acute kidney injury).  He presents with complaints of headache. Pt agreed to OT treatments. Performance deficits affecting function: impaired endurance, impaired self care skills, gait instability.      Rehab Prognosis: Good; patient would benefit from acute skilled OT services to address these deficits and reach maximum level of function.       Plan:     Patient to be seen 5 x/week to address the above listed problems via self-care/home management, therapeutic activities, therapeutic exercises  Plan of Care Expires:    Plan of Care Reviewed with: patient    Subjective     Chief Complaint: HAKEEM  Patient/Family Comments/goals: To return home    Occupational Profile:  Living Environment: Pt lives in upstairs apartment with a flight of stairs with rail  Previous level of function: I with self care prior  Roles and Routines: I with daily activities, but has experienced falls on stairs  Equipment Used at Home: none  Assistance upon Discharge: none stated    Pain/Comfort:  Pain Rating 1: 3/10  Location 1: head  Pain Addressed 1: Reposition, Distraction  Pain Rating Post-Intervention 1: 3/10    Patients cultural, spiritual, Samaritan conflicts given the current situation: no    Objective:     Communicated with: ROCIO Thompson prior to session.  Patient found HOB elevated with peripheral IV upon OT entry to room.    General Precautions: Standard, fall  Orthopedic Precautions: N/A  Braces: N/A  Respiratory Status: Room air    Occupational Performance:    Bed Mobility:    Patient completed Supine to Sit with  independence    Functional Mobility/Transfers:  Patient completed Sit <> Stand Transfer with stand by assistance  with  no assistive device and IV pole   Patient completed Bed <> Chair Transfer using Step Transfer technique with stand by assistance with IV pole  Functional Mobility: SBA with IV pole    Activities of Daily Living:  Upper Body Dressing: minimum assistance donning gown as a robe  Lower Body Dressing: independence donning socks    Cognitive/Visual Perceptual:  Cognitive/Psychosocial Skills:     -       Oriented to: Person, Place, and Situation   -       Follows Commands/attention:Follows one-step commands  -       Communication: clear/fluent  Visual/Perceptual:      -wears glasses hearing wfl .    Physical Exam:  Balance:    -       I with EOB sitting, SBA with mobility with IV pole  Skin integrity: Visible skin intact  Edema:  None noted  Sensation:    -       no feeling on 4th and 5th on (R) hand otherwise intact  Motor Planning:    -       wfl  Dominant hand:    -       Left  Upper Extremity Range of Motion:     -       Right Upper Extremity: WFL  -       Left Upper Extremity: WFL  Upper Extremity Strength:    -       Right Upper Extremity: WFL  -       Left Upper Extremity: WFL   Strength:    -       Right Upper Extremity: WFL  -       Left Upper Extremity: WFL    AMPAC 6 Click ADL:  AMPAC Total Score: 22    Treatment & Education:  OT evaluation completed. See eval for details. Pt reports activity tolerance is impaired and he fatigues easily.  Pt educated on OT role/POC.   Importance of OOB activity with staff assistance.  Importance of sitting up in the chair throughout the day as tolerated, especially for meals   Safety during functional t/f and mobility with use of AD as needed  Importance of assisting with self-care activities   All questions/concerns answered within OT scope of practice     Patient left up in chair with all lines intact, call button in reach, and nurse notified    GOALS:    Multidisciplinary Problems       Occupational Therapy Goals          Problem: Occupational Therapy    Goal Priority Disciplines Outcome Interventions   Occupational Therapy Goal     OT, PT/OT Ongoing, Progressing    Description: STG:  Pt will perform grooming I at sink  Pt will be (I) with bathing with self pacing  Pt will perform UE dressing with (I)  Pt will perform LE dressing with (I)  Pt will transfer bed/chair/bsc (I)/Mod I  Pt will perform standing task x 2 min with (I)/mod i  Pt will tolerate 15 minutes of tx without fatigue      LT.Restore to max I with self care and mobility.                          History:     Past Medical History:   Diagnosis Date    Chronic pancreatitis, unspecified pancreatitis type     Coronary artery disease     Diabetes mellitus     Dyslipidemia     Hidradenitis     Hypertension          Past Surgical History:   Procedure Laterality Date    CARDIAC SURGERY      Stents x6    CHOLECYSTECTOMY      UNDESCENDED TESTICLE EXPLORATION N/A        Time Tracking:     OT Date of Treatment: 23  OT Start Time: 1105  OT Stop Time: 1126  OT Total Time (min): 21 min    Billable Minutes:Evaluation 2023

## 2023-06-14 NOTE — PLAN OF CARE
Problem: Occupational Therapy  Goal: Occupational Therapy Goal  Description: STG:  Pt will perform grooming I at sink  Pt will be (I) with bathing with self pacing  Pt will perform UE dressing with (I)  Pt will perform LE dressing with (I)  Pt will transfer bed/chair/bsc (I)/Mod I  Pt will perform standing task x 2 min with (I)/mod i  Pt will tolerate 15 minutes of tx without fatigue      LT.Restore to max I with self care and mobility.     Outcome: Ongoing, Progressing

## 2023-06-14 NOTE — CONSULTS
"                                  [facility] Consult History and Physical   Patient Name: Faustino Fischer  MRN: [id number]  Age: 53 y.o.  : 1970  Time:  11:22 AM  Admission Date: 2023    Consulted for:  HAKEEM  Consulted by: Dr. Lopez    HPI:   Faustino Fischer is a 44 yo with medical history significant for DM, chronic pancreatitis, HTN, CAD who presents from OSH for worsening renal function. Patient as recently admitted - for episode of acute pancreatitis. During his hospitalization he was evaluated with CT imaging with contrast. He received treatment for Vanc Zosyn for infection and PO doxycycline. He received IVF for pancreatitis. Nephrology consulted for HAKEEM.    Past Medical History:  has a past medical history of Chronic pancreatitis, unspecified pancreatitis type, Coronary artery disease, Diabetes mellitus, Dyslipidemia, Hidradenitis, and Hypertension.     Past Surgical History:   has a past surgical history that includes Cardiac surgery; Undescended testicle exploration (N/A); and Cholecystectomy.     Family History:  family history includes Heart disease in his brother, father, and maternal grandmother; Hypertension in his father. No family history of kidney disease.     Social History:   reports that he has quit smoking. His smoking use included cigarettes and cigars. He started smoking about 19 years ago. He has a 4.50 pack-year smoking history. He has never used smokeless tobacco. He reports that he does not currently use alcohol. He reports that he does not use drugs.     Allergies: is allergic to ticagrelor.     Medications prior to admission: Reviewed     Old records have been reviewed.       Review of Systems  ROS: A 10 point ROS was completed and found to be negative except for that mentioned above in the HPI.       Physical Exam:   BP (!) 165/99   Pulse 69   Temp 97.5 °F (36.4 °C)   Resp 18   Ht 5' 6" (1.676 m)   Wt 115.6 kg (254 lb 13.6 oz)   SpO2 98%   BMI " 41.13 kg/m²     Constitutional: lying in bed, in NAD  Eyes: EOMI, white sclera  ENMT: moist mucus membranes, nares patent  Cardiovascular: normal rate, S1/S2 noted  Respiratory: symmetrical chest expansion, CTA-B  Gastrointestinal: +BS, soft, NT/ND  Musculoskeletal: normal, no joint erythema/effusions  Skin: no rash, no purpura, warm extremities  Neurological: Alert and Oriented x 4, afocal    Data Review:  Lab:   Labs reviewed and significant values discussed below.    Recent Labs     06/12/23  0844 06/13/23  0507 06/14/23  0158   CALCIUM 8.4* 8.7 8.7    141 139   K 3.6 4.0 4.1    107 105   CO2 22 22 23   BUN 19* 20* 19*   CREATININE 4.67* 4.83* 4.59*   * 127* 148*       Imaging:  Reviewed     Assessment/Plan:     Patient Active Problem List   Diagnosis    Acute on chronic pancreatitis    CAD (coronary artery disease)    Hidradenitis suppurativa    HAKEEM (acute kidney injury)    Type 2 diabetes mellitus with hyperglycemia, without long-term current use of insulin    Dyslipidemia    Severe obesity (BMI >= 40)    Hypertension         HAKEEM  - Baseline cr 1.0  - I suspect HAKEEM in the setting of recent nephrotoxic agents (Vancomycin trough 59 on 6/9, contrast exposure)   - Patient has non-oliguric HAKEEM (reports good UOP) with normal electrolytes. No need for RRT. Will continue to monitor.   - will add C3, C4, UPCR  - Will continue to monitor daily renal function.    - Please avoid nephrotoxic agents/NSAIDs  - Renally dose all medications   - Please obtain daily BMP, Mg, and Phos levels  - Please monitor strict UOP  - Daily weights    Thank you for the consult. Will follow along. Please call with questions.    Alisha S. Parker, DO [facility]   06/14/2023

## 2023-06-14 NOTE — H&P
[facility] Tele[facility] Hospital Medicine  History & Physical    Patient Name: Faustino Fischer  MRN: [id number]  Patient Class: IP- Inpatient  Admission Date: 6/14/2023  Attending Physician: Pedro Pierce DO   Primary Care Provider: Juan Pablo Ryan MD         Patient information was obtained from patient, past medical records and ER records.     Subjective:     Principal Problem:HAKEEM (acute kidney injury)    Chief Complaint:   Chief Complaint   Patient presents with    Acute Renal Failure        HPI: Patient is a 52yo male who presents from hospitalization for acute pancreatitis from [place] (6/7/2023) for nephrology consult for worsening renal function. During that admission, CT of the abdomen and pelvis w/ contrast was obtained for diagnosis of the acute pancreatitis. The patient was also being treated for hidradenitis suppurativa with IV Vancomycin and Zosyn. A wound culture was completed that demonstrated Staph lugdenensis from left arm and Staph aureus from left leg that was sensitive to doxycycline PO. The patient's acute pancreatitis resolved over the course of the hospitalization, but his renal function continued to worsen despite IV fluids. There was also concern for urinary retention for which he was started on Flomax.  The decision was to transfer the patient higher level of care due to HAKEEM for nephrology consult due to increase in creatinine from his baseline of 0.99 to 4.8.     Patient sees Dr. Price Cardiologist at [facility]. Next appointment was 6/14/23 but it had to be rescheduled to July. 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 From 8820-9125, the patient states that he had 6 cardiac stents placed. As a result, he is currently on disability.     Upon admission, VSS remarkable for /99, P 56. Labs showed H/H 10.2/32.2, Mg 1.4, BUN/Cr 19/4.59, glucose 148. Baseline Cr 1.19 5 days ago. UA unremarkable. Patient is admitted to hospital medicine for  further management and care.      Past Medical History:   Diagnosis Date    Chronic pancreatitis, unspecified pancreatitis type     Coronary artery disease     Diabetes mellitus     Dyslipidemia     Hidradenitis     Hypertension        Past Surgical History:   Procedure Laterality Date    CARDIAC SURGERY      Stents x2    CHOLECYSTECTOMY      UNDESCENDED TESTICLE EXPLORATION N/A        Review of patient's allergies indicates:   Allergen Reactions    Ticagrelor Shortness Of Breath       Current Facility-Administered Medications on File Prior to Encounter   Medication    [COMPLETED] acetaminophen tablet 650 mg    [DISCONTINUED] 0.9%  NaCl infusion    [DISCONTINUED] aspirin chewable tablet 81 mg    [DISCONTINUED] atorvastatin tablet 80 mg    [DISCONTINUED] carvediloL tablet 12.5 mg    [DISCONTINUED] clopidogreL tablet 75 mg    [DISCONTINUED] dextrose 50% injection 12.5 g    [DISCONTINUED] docusate sodium capsule 100 mg    [DISCONTINUED] doxycycline tablet 100 mg    [DISCONTINUED] enoxaparin injection 30 mg    [DISCONTINUED] glucagon (human recombinant) injection 1 mg    [DISCONTINUED] HYDROcodone-acetaminophen 5-325 mg per tablet 1 tablet    [DISCONTINUED] insulin aspart U-100 injection 1-10 Units    [DISCONTINUED] isosorbide mononitrate 24 hr tablet 30 mg    [DISCONTINUED] Lactobacillus acidophilus capsule 1 capsule    [DISCONTINUED] pantoprazole EC tablet 40 mg    [DISCONTINUED] sodium chloride 0.9% flush 10 mL    [DISCONTINUED] tamsulosin 24 hr capsule 0.4 mg     Current Outpatient Medications on File Prior to Encounter   Medication Sig    aspirin 81 MG Chew Take 1 tablet (81 mg total) by mouth once daily.    atorvastatin (LIPITOR) 80 MG tablet Take 1 tablet (80 mg total) by mouth once daily.    carvediloL (COREG) 12.5 MG tablet Take 1 tablet (12.5 mg total) by mouth before breakfast.    clindamycin (CLEOCIN T) 1 % external solution Apply topically 2 (two) times daily.    clopidogreL  (PLAVIX) 75 mg tablet Take 75 mg by mouth.    fenofibrate (TRICOR) 145 MG tablet Take 1 tablet (145 mg total) by mouth once daily.    furosemide (LASIX) 20 MG tablet Take 1 tablet (20 mg total) by mouth once daily.    isosorbide mononitrate (IMDUR) 30 MG 24 hr tablet Take 1 tablet (30 mg total) by mouth once daily. Take 1/2 tablet with breakfast    losartan (COZAAR) 50 MG tablet Take 1 tablet (50 mg total) by mouth once daily.    metFORMIN (GLUCOPHAGE-XR) 500 MG ER 24hr tablet Take 1 tablet (500 mg total) by mouth 2 (two) times daily with meals.    pantoprazole (PROTONIX) 40 MG tablet Take 40 mg by mouth.    SUPER THIN LANCETS 28 gauge Misc 2 (two) times daily.    TRUE METRIX GLUCOSE METER Misc 2 (two) times daily.     Family History       Problem Relation (Age of Onset)    Heart disease Father, Brother, Maternal Grandmother    Hypertension Father          Tobacco Use    Smoking status: Former     Packs/day: 0.25     Years: 18.00     Pack years: 4.50     Types: Cigarettes, Cigars     Start date: 2004    Smokeless tobacco: Never   Substance and Sexual Activity    Alcohol use: Not Currently     Comment: occasional drink previous heavy drinker quit heavily in 2001    Drug use: Never    Sexual activity: Not Currently     Review of Systems   Constitutional:  Negative for chills and fever.   HENT:  Negative for trouble swallowing.    Eyes:  Negative for visual disturbance.   Respiratory:  Negative for shortness of breath.    Cardiovascular:  Negative for chest pain.   Gastrointestinal:  Negative for abdominal pain, constipation, diarrhea, nausea and vomiting.   Genitourinary:  Negative for difficulty urinating and hematuria.   Musculoskeletal:  Negative for back pain and neck pain.   Skin:  Positive for wound. Negative for rash.        Hidradenitis suppurativa under both armpits, left groin, see media   Neurological:  Negative for dizziness, weakness and light-headedness.   Psychiatric/Behavioral:  Negative  for sleep disturbance.    Objective:     Vital Signs (Most Recent):  Temp: 98 °F (36.7 °C) (06/14/23 0055)  Pulse: (!) 56 (06/14/23 0055)  Resp: 18 (06/14/23 0055)  BP: (!) 175/99 (06/14/23 0055)  SpO2: 99 % (06/14/23 0055) Vital Signs (24h Range):  Temp:  [98 °F (36.7 °C)-98.8 °F (37.1 °C)] 98 °F (36.7 °C)  Pulse:  [56-72] 56  Resp:  [18-20] 18  SpO2:  [98 %-99 %] 99 %  BP: (158-175)/(94-99) 175/99     Weight: 115.6 kg (254 lb 13.6 oz)  Body mass index is 41.13 kg/m².     Physical Exam  Vitals and nursing note reviewed.   Constitutional:       General: He is not in acute distress.     Appearance: Normal appearance. He is obese. He is not toxic-appearing or diaphoretic.   HENT:      Head: Normocephalic and atraumatic.      Right Ear: External ear normal.      Left Ear: External ear normal.      Nose: Nose normal.      Mouth/Throat:      Mouth: Mucous membranes are moist.      Pharynx: Oropharynx is clear.   Eyes:      General: No scleral icterus.     Extraocular Movements: Extraocular movements intact.      Pupils: Pupils are equal, round, and reactive to light.   Cardiovascular:      Rate and Rhythm: Normal rate and regular rhythm.      Pulses: Normal pulses.      Heart sounds: Normal heart sounds. No murmur heard.  Pulmonary:      Effort: Pulmonary effort is normal. No respiratory distress.      Breath sounds: Normal breath sounds. No wheezing.   Abdominal:      General: Bowel sounds are normal. There is no distension.      Palpations: Abdomen is soft.      Tenderness: There is no abdominal tenderness.   Musculoskeletal:         General: No deformity.      Cervical back: Neck supple.   Skin:     General: Skin is warm and dry.      Coloration: Skin is not jaundiced.      Findings: Lesion present.      Comments: Hidradenitis suppurativa in bilateral armpits and left perineum area (worst), see media pictures   Neurological:      Mental Status: He is alert and oriented to person, place, and time.      Sensory: No  sensory deficit.   Psychiatric:         Mood and Affect: Mood normal.         Behavior: Behavior normal.            CRANIAL NERVES     CN III, IV, VI   Pupils are equal, round, and reactive to light.     Significant Labs: All pertinent labs within the past 24 hours have been reviewed.    Significant Imaging: I have reviewed all pertinent imaging results/findings within the past 24 hours.    Assessment/Plan:     * HAKEEM (acute kidney injury)  Patient recently underwent CT with contrast, was given Vancomycin and Zosyn IV for hidradenitis suppurativa, was given an ARB and lasix - all contributing factors to his worsening HAKEEM.     - Renal US demonstrated: Simple appearing right renal cyst.  No evidence of abnormality demonstrated.  - UA negative   - random Ur Cr, Ur Na pending  - Continue LR @ 125cc/hr   - Mg and P daily   - I/Os and daily weights   - Nephrology consulted - appreciate recommendations     Hypertension  - Continue Carvedilol 12.5mg QD   - Add Amlodipine 10mg QD    Severe obesity (BMI >= 40)  Body mass index is 41.13 kg/m². Morbid obesity complicates all aspects of disease management from diagnostic modalities to treatment. Weight loss encouraged and health benefits explained to patient.     - Patient would benefit from sleep study outpatient         Dyslipidemia  - Continue home medication fenofibrate 145mg QD   - Recent lipid panel demonstrated TG of 148, Cholesterol of 142, and HDL of 46      Type 2 diabetes mellitus with hyperglycemia, without long-term current use of insulin  Patient's FSGs are uncontrolled due to hyperglycemia on current medication regimen.  Last A1c reviewed-   Lab Results   Component Value Date    HGBA1C 6.3 06/08/2023     Most recent fingerstick glucose reviewed- No results for input(s): POCTGLUCOSE in the last 24 hours.  Current correctional scale  Medium  Maintain anti-hyperglycemic dose as follows-   Antihyperglycemics (From admission, onward)    Start     Stop Route Frequency  Ordered    06/14/23 0245  insulin detemir U-100 injection 10 Units         -- SubQ Nightly 06/14/23 0136    06/14/23 0235  insulin aspart U-100 injection 1-10 Units         -- SubQ Before meals & nightly PRN 06/14/23 0136        Hold Oral hypoglycemics while patient is in the hospital.    Hidradenitis suppurativa  Recent wound cultures from the left groin (Staph Aureus) and Left arm (Staph lugdunensis) were sensitive to Unasyn. After discussion with the pharmacist Eliz Erickson - decision was made to use renally adjusted Unasyn.     - Wound care consulted - appreciate recommendations       CAD (coronary artery disease)  Continue home medications - ASA 81mg QD, Atorvastatin 80mg QD, Clopidogrel 76mg QD, Isosorbide Mononitrate 30mg QD     - ECHO pending (no record of ECHO)       VTE Risk Mitigation (From admission, onward)         Ordered     heparin (porcine) injection 7,500 Units  Every 8 hours         06/14/23 0443     IP VTE HIGH RISK PATIENT  Once         06/14/23 0443     Place sequential compression device  Until discontinued         06/14/23 0443                           Aby Wilburn DO  Department of Hospital Medicine  [facility] - [address] [place] [address]

## 2023-06-15 VITALS
WEIGHT: 254.88 LBS | RESPIRATION RATE: 20 BRPM | OXYGEN SATURATION: 99 % | BODY MASS INDEX: 40.96 KG/M2 | TEMPERATURE: 98 F | DIASTOLIC BLOOD PRESSURE: 109 MMHG | HEIGHT: 66 IN | HEART RATE: 72 BPM | SYSTOLIC BLOOD PRESSURE: 192 MMHG

## 2023-06-15 DIAGNOSIS — N17.9 AKI (ACUTE KIDNEY INJURY): Primary | ICD-10-CM

## 2023-06-15 LAB
ANION GAP SERPL CALCULATED.3IONS-SCNC: 15 MMOL/L (ref 7–16)
BASOPHILS # BLD AUTO: 0.02 K/UL (ref 0–0.2)
BASOPHILS NFR BLD AUTO: 0.4 % (ref 0–1)
BUN SERPL-MCNC: 19 MG/DL (ref 7–18)
BUN/CREAT SERPL: 4 (ref 6–20)
C3 SERPL-MCNC: 134 MG/DL (ref 90–180)
C4 SERPL-MCNC: 42 MG/DL (ref 10–40)
CALCIUM SERPL-MCNC: 8.6 MG/DL (ref 8.5–10.1)
CHLORIDE SERPL-SCNC: 106 MMOL/L (ref 98–107)
CO2 SERPL-SCNC: 25 MMOL/L (ref 21–32)
CREAT SERPL-MCNC: 4.29 MG/DL (ref 0.7–1.3)
DIFFERENTIAL METHOD BLD: ABNORMAL
EGFR (NO RACE VARIABLE) (RUSH/TITUS): 16 ML/MIN/1.73M2
EOSINOPHIL # BLD AUTO: 0.08 K/UL (ref 0–0.5)
EOSINOPHIL NFR BLD AUTO: 1.4 % (ref 1–4)
ERYTHROCYTE [DISTWIDTH] IN BLOOD BY AUTOMATED COUNT: 13.2 % (ref 11.5–14.5)
GLUCOSE SERPL-MCNC: 148 MG/DL (ref 74–106)
GLUCOSE SERPL-MCNC: 152 MG/DL (ref 70–105)
GLUCOSE SERPL-MCNC: 207 MG/DL (ref 70–105)
HCT VFR BLD AUTO: 31.7 % (ref 40–54)
HGB BLD-MCNC: 10.2 G/DL (ref 13.5–18)
IMM GRANULOCYTES # BLD AUTO: 0.03 K/UL (ref 0–0.04)
IMM GRANULOCYTES NFR BLD: 0.5 % (ref 0–0.4)
LYMPHOCYTES # BLD AUTO: 1.28 K/UL (ref 1–4.8)
LYMPHOCYTES NFR BLD AUTO: 22.9 % (ref 27–41)
MACROCYTES BLD QL SMEAR: NORMAL
MAGNESIUM SERPL-MCNC: 1.3 MG/DL (ref 1.7–2.3)
MCH RBC QN AUTO: 34.9 PG (ref 27–31)
MCHC RBC AUTO-ENTMCNC: 32.2 G/DL (ref 32–36)
MCV RBC AUTO: 108.6 FL (ref 80–96)
MONOCYTES # BLD AUTO: 0.45 K/UL (ref 0–0.8)
MONOCYTES NFR BLD AUTO: 8.1 % (ref 2–6)
MPC BLD CALC-MCNC: 10.8 FL (ref 9.4–12.4)
NEUTROPHILS # BLD AUTO: 3.73 K/UL (ref 1.8–7.7)
NEUTROPHILS NFR BLD AUTO: 66.7 % (ref 53–65)
NRBC # BLD AUTO: 0 X10E3/UL
NRBC, AUTO (.00): 0 %
OVALOCYTES BLD QL SMEAR: NORMAL
PHOSPHATE SERPL-MCNC: 5.5 MG/DL (ref 2.5–4.5)
PLATELET # BLD AUTO: 224 K/UL (ref 150–400)
PLATELET MORPHOLOGY: NORMAL
POTASSIUM SERPL-SCNC: 3.9 MMOL/L (ref 3.5–5.1)
RBC # BLD AUTO: 2.92 M/UL (ref 4.6–6.2)
SODIUM SERPL-SCNC: 142 MMOL/L (ref 136–145)
WBC # BLD AUTO: 5.59 K/UL (ref 4.5–11)

## 2023-06-15 PROCEDURE — 99239 HOSP IP/OBS DSCHRG MGMT >30: CPT | Mod: ,,, | Performed by: FAMILY MEDICINE

## 2023-06-15 PROCEDURE — 25000003 PHARM REV CODE 250

## 2023-06-15 PROCEDURE — 85025 COMPLETE CBC W/AUTO DIFF WBC: CPT

## 2023-06-15 PROCEDURE — 80048 BASIC METABOLIC PNL TOTAL CA: CPT

## 2023-06-15 PROCEDURE — 99239 PR HOSPITAL DISCHARGE DAY,>30 MIN: ICD-10-PCS | Mod: ,,, | Performed by: FAMILY MEDICINE

## 2023-06-15 PROCEDURE — 83735 ASSAY OF MAGNESIUM: CPT

## 2023-06-15 PROCEDURE — 86160 COMPLEMENT ANTIGEN: CPT | Performed by: INTERNAL MEDICINE

## 2023-06-15 PROCEDURE — 82962 GLUCOSE BLOOD TEST: CPT

## 2023-06-15 PROCEDURE — 63600175 PHARM REV CODE 636 W HCPCS

## 2023-06-15 PROCEDURE — 25000003 PHARM REV CODE 250: Performed by: HOSPITALIST

## 2023-06-15 PROCEDURE — 97116 GAIT TRAINING THERAPY: CPT

## 2023-06-15 PROCEDURE — 84100 ASSAY OF PHOSPHORUS: CPT

## 2023-06-15 PROCEDURE — 99233 PR SUBSEQUENT HOSPITAL CARE,LEVL III: ICD-10-PCS | Mod: ,,, | Performed by: INTERNAL MEDICINE

## 2023-06-15 PROCEDURE — 63600175 PHARM REV CODE 636 W HCPCS: Performed by: FAMILY MEDICINE

## 2023-06-15 PROCEDURE — 99233 SBSQ HOSP IP/OBS HIGH 50: CPT | Mod: ,,, | Performed by: INTERNAL MEDICINE

## 2023-06-15 PROCEDURE — 97110 THERAPEUTIC EXERCISES: CPT | Mod: CO

## 2023-06-15 RX ORDER — AMLODIPINE BESYLATE 10 MG/1
10 TABLET ORAL DAILY
Qty: 30 TABLET | Refills: 11 | Status: SHIPPED | OUTPATIENT
Start: 2023-06-16 | End: 2023-12-06 | Stop reason: CLARIF

## 2023-06-15 RX ORDER — MAGNESIUM SULFATE HEPTAHYDRATE 40 MG/ML
4 INJECTION, SOLUTION INTRAVENOUS ONCE
Status: COMPLETED | OUTPATIENT
Start: 2023-06-15 | End: 2023-06-15

## 2023-06-15 RX ADMIN — CARVEDILOL 12.5 MG: 12.5 TABLET, FILM COATED ORAL at 05:06

## 2023-06-15 RX ADMIN — MAGNESIUM SULFATE HEPTAHYDRATE 4 G: 40 INJECTION, SOLUTION INTRAVENOUS at 10:06

## 2023-06-15 RX ADMIN — ASPIRIN 81 MG 81 MG: 81 TABLET ORAL at 10:06

## 2023-06-15 RX ADMIN — AMLODIPINE BESYLATE 10 MG: 10 TABLET ORAL at 10:06

## 2023-06-15 RX ADMIN — HEPARIN SODIUM 7500 UNITS: 5000 INJECTION INTRAVENOUS; SUBCUTANEOUS at 05:06

## 2023-06-15 RX ADMIN — AMPICILLIN AND SULBACTAM 3 G: 2; 1 INJECTION, POWDER, FOR SOLUTION INTRAVENOUS at 05:06

## 2023-06-15 RX ADMIN — FENOFIBRATE 145 MG: 145 TABLET ORAL at 10:06

## 2023-06-15 RX ADMIN — PANTOPRAZOLE SODIUM 40 MG: 40 TABLET, DELAYED RELEASE ORAL at 10:06

## 2023-06-15 RX ADMIN — HYDRALAZINE HYDROCHLORIDE 10 MG: 20 INJECTION INTRAMUSCULAR; INTRAVENOUS at 11:06

## 2023-06-15 RX ADMIN — CLOPIDOGREL BISULFATE 75 MG: 75 TABLET ORAL at 10:06

## 2023-06-15 RX ADMIN — ISOSORBIDE MONONITRATE 30 MG: 30 TABLET, EXTENDED RELEASE ORAL at 10:06

## 2023-06-15 RX ADMIN — HYDRALAZINE HYDROCHLORIDE 10 MG: 20 INJECTION INTRAMUSCULAR; INTRAVENOUS at 06:06

## 2023-06-15 RX ADMIN — ATORVASTATIN CALCIUM 80 MG: 80 TABLET, FILM COATED ORAL at 10:06

## 2023-06-15 NOTE — SUBJECTIVE & OBJECTIVE
Interval History:    Review of Systems  Objective:     Vital Signs (Most Recent):  Temp: 97.6 °F (36.4 °C) (06/14/23 1901)  Pulse: 66 (06/14/23 1901)  Resp: 18 (06/14/23 1901)  BP: (!) 170/94 (06/14/23 1901)  SpO2: 99 % (06/14/23 1901) Vital Signs (24h Range):  Temp:  [97.4 °F (36.3 °C)-98.8 °F (37.1 °C)] 97.6 °F (36.4 °C)  Pulse:  [56-76] 66  Resp:  [18-20] 18  SpO2:  [98 %-100 %] 99 %  BP: (153-222)/() 170/94     Weight: 115.6 kg (254 lb 13.6 oz)  Body mass index is 41.13 kg/m².    Intake/Output Summary (Last 24 hours) at 6/14/2023 2129  Last data filed at 6/14/2023 1830  Gross per 24 hour   Intake 720 ml   Output --   Net 720 ml         Physical Exam        Significant Labs: All pertinent labs within the past 24 hours have been reviewed.    Significant Imaging: I have reviewed all pertinent imaging results/findings within the past 24 hours.

## 2023-06-15 NOTE — SUBJECTIVE & OBJECTIVE
Interval History: Patient feels ok.  No SOB.     Review of Systems  Objective:     Vital Signs (Most Recent):  Temp: 97.6 °F (36.4 °C) (06/14/23 1901)  Pulse: 66 (06/14/23 1901)  Resp: 18 (06/14/23 1901)  BP: (!) 170/94 (06/14/23 1901)  SpO2: 99 % (06/14/23 1901) Vital Signs (24h Range):  Temp:  [97.4 °F (36.3 °C)-98.8 °F (37.1 °C)] 97.6 °F (36.4 °C)  Pulse:  [56-76] 66  Resp:  [18-20] 18  SpO2:  [98 %-100 %] 99 %  BP: (153-222)/() 170/94     Weight: 115.6 kg (254 lb 13.6 oz)  Body mass index is 41.13 kg/m².    Intake/Output Summary (Last 24 hours) at 6/14/2023 2107  Last data filed at 6/14/2023 1830  Gross per 24 hour   Intake 720 ml   Output --   Net 720 ml         Physical Exam  Vitals reviewed.   Constitutional:       General: He is not in acute distress.     Appearance: He is not toxic-appearing.   HENT:      Head: Normocephalic.      Mouth/Throat:      Mouth: Mucous membranes are moist.   Eyes:      General: No scleral icterus.  Cardiovascular:      Rate and Rhythm: Normal rate and regular rhythm.      Heart sounds: Normal heart sounds.   Pulmonary:      Effort: Pulmonary effort is normal.      Breath sounds: Normal breath sounds.   Abdominal:      General: Bowel sounds are normal. There is no distension.      Palpations: Abdomen is soft.      Tenderness: There is no abdominal tenderness.   Musculoskeletal:      Cervical back: Neck supple.      Right lower leg: No edema.      Left lower leg: No edema.   Skin:     General: Skin is warm and dry.      Comments: No drainage to right axilla       Neurological:      General: No focal deficit present.      Mental Status: He is alert.   Psychiatric:         Mood and Affect: Mood normal.         Behavior: Behavior normal.           Significant Labs: All pertinent labs within the past 24 hours have been reviewed.  BMP:   Recent Labs   Lab 06/14/23  0158   *      K 4.1      CO2 23   BUN 19*   CREATININE 4.59*   CALCIUM 8.7   MG 1.4*     CBC:    Recent Labs   Lab 06/13/23  0507 06/14/23  0155   WBC 5.77 6.34   HGB 10.2* 10.2*   HCT 30.0* 32.2*    208       Significant Imaging: I have reviewed all pertinent imaging results/findings within the past 24 hours.

## 2023-06-15 NOTE — ASSESSMENT & PLAN NOTE
Patient recently underwent CT with contrast, was given Vancomycin and Zosyn IV for hidradenitis suppurativa, was given an ARB and lasix - all contributing factors to his worsening HAKEEM.     - Renal US demonstrated: Simple appearing right renal cyst.  No evidence of abnormality demonstrated.  - UA negative   - random Ur Cr, Ur Na pending  - Continue LR @ 125cc/hr   - Mg and P daily   - I/Os and daily weights   - Nephrology consulted - appreciate recommendations       Patient with slightly improved creatinine today.

## 2023-06-15 NOTE — PT/OT/SLP PROGRESS
Occupational Therapy   Treatment    Name: Faustino Fischer  MRN: [id number]  Admitting Diagnosis:  HAKEEM (acute kidney injury)       Recommendations:     Discharge Recommendations: home with home health  Discharge Equipment Recommendations:   (to be determined)  Barriers to discharge:       Assessment:     Faustino Fischer is a 53 y.o. male with a medical diagnosis of HAKEEM (acute kidney injury).  Performance deficits affecting function are weakness, impaired endurance.     Rehab Prognosis:  Good; patient would benefit from acute skilled OT services to address these deficits and reach maximum level of function.       Plan:     Patient to be seen 5 x/week to address the above listed problems via self-care/home management, therapeutic activities, therapeutic exercises  Plan of Care Expires:    Plan of Care Reviewed with: patient    Subjective     Chief Complaint:   Patient/Family Comments/goals:   Pain/Comfort:  Pain Rating 1: 0/10    Objective:     Communicated with: RIKKI Herrmann prior to session.  Patient found up in chair with peripheral IV upon OT entry to room.    General Precautions: Standard, fall    Orthopedic Precautions:N/A  Braces: N/A  Respiratory Status: Room air     Occupational Performance:     Bed Mobility:         Functional Mobility/Transfers:  Sit to stand I  Chair <--> bathroom/toilet I/s  Functional Mobility: Pt ambulated from his chair to the bathroom while pushing a IV pole with s and no l;ob    Activities of Daily Living:  I toileting  I to wash his hands @ sink      AMPA 6 Click ADL:      Treatment & Education:  Pt performed hand helper with 3 rubber band resistances 20 reps on R (none on L secondary to IV)rom ex's with 2 lb wt 15 reps x 2 B elbow flex/ext,abd/add, R shld flex, red t band 15 reps x 2 R elbow flex/ext     Patient left up in chair with all lines intact and call button in reach    GOALS:   Multidisciplinary Problems       Occupational Therapy Goals          Problem: Occupational  Therapy    Goal Priority Disciplines Outcome Interventions   Occupational Therapy Goal     OT, PT/OT Ongoing, Progressing    Description: STG:  Pt will perform grooming I at sink  Pt will be (I) with bathing with self pacing  Pt will perform UE dressing with (I)  Pt will perform LE dressing with (I)  Pt will transfer bed/chair/bsc (I)/Mod I  Pt will perform standing task x 2 min with (I)/mod i  Pt will tolerate 15 minutes of tx without fatigue      LT.Restore to max I with self care and mobility.                          Time Tracking:     OT Date of Treatment: 06/15/23  OT Start Time: 1031  OT Stop Time: 1112  OT Total Time (min): 41 min    Billable Minutes:Therapeutic Exercise 30    OT/RADHA: RADHA          6/15/2023

## 2023-06-15 NOTE — PROGRESS NOTES
"[facility] Consult Follow-Up Note     HPI:    Faustino Fischer is a 44 yo with medical history significant for DM, chronic pancreatitis, HTN, CAD who presents from OSH for worsening renal function. Patient as recently admitted 6/7-6/13 for episode of acute pancreatitis. During his hospitalization he was evaluated with CT imaging with contrast. He received treatment for Vanc Zosyn for infection and PO doxycycline. He received IVF for pancreatitis. Nephrology consulted for HAKEEM.    Subjective/Interval History:  No acute events overnight  Ready to go home    Objective     Medications:   amLODIPine  10 mg Oral Daily    ampicillin-sulbactim (UNASYN) IVPB  3 g Intravenous Q12H    aspirin  81 mg Oral Daily    atorvastatin  80 mg Oral Daily    carvediloL  12.5 mg Oral Before breakfast    clopidogreL  75 mg Oral Daily    fenofibrate  145 mg Oral Daily    heparin (porcine)  7,500 Units Subcutaneous Q8H    insulin detemir U-100  10 Units Subcutaneous QHS    isosorbide mononitrate  30 mg Oral Daily    pantoprazole  40 mg Oral Daily       Physical Exam:   BP (!) 192/109   Pulse 72   Temp 98.2 °F (36.8 °C)   Resp 20   Ht 5' 6" (1.676 m)   Wt 115.6 kg (254 lb 13.6 oz)   SpO2 99%   BMI 41.13 kg/m²   General: WD, WN , lying in bed in NAD  Eyes: PERRL, EOMI, no conjunctival icterus  HENT: NC/AT, nares patent, OP benign  Neck: supple, no LAD or thyromegaly  Lungs: CTAB, no w/r/r  CV: normal rate, regular rhythm, no m/r/g, no edema  Abd: soft, NT/ND, +BS   Ext: no clubbing or cyanosis  Skin: no rashes or lesions appreciated  Neuro: awake, alert, following commands    I/Os:   I/O last 3 completed shifts:  In: 720 [P.O.:720]  Out: -     Labs, micro, imaging reviewed.         Assessment and Plan:     Patient Active Problem List   Diagnosis    Acute on chronic pancreatitis    CAD (coronary artery disease)    Hidradenitis suppurativa    HAKEEM (acute kidney injury)    Type 2 diabetes mellitus with hyperglycemia, without " long-term current use of insulin    Dyslipidemia    Severe obesity (BMI >= 40)    Hypertension       HAKEEM  - Baseline cr 1.0  - I suspect HAKEEM in the setting of recent nephrotoxic agents (Vancomycin trough 59 on 6/9, contrast exposure)   - Patient has non-oliguric HAKEEM (reports good UOP) with normal electrolytes.   - Labs improving. OK to DC. Recommend RFP in one week.   - Please avoid nephrotoxic agents/NSAIDs  - Renally dose all medications   - Please obtain daily BMP, Mg, and Phos levels  - Please monitor strict UOP  - Daily weights    Thank you for this consult. [facility] will arrange outpatient f/u. Please call with any questions.     Fe Pierce,   [facility]   06/15/2023

## 2023-06-15 NOTE — PROGRESS NOTES
[facility] Tele[facility] Hospital Medicine  Progress Note    Patient Name: Faustino Fischer  MRN: [id number]  Patient Class: IP- Inpatient   Admission Date: 6/14/2023  Length of Stay: 0 days  Attending Physician: Edson Astorga Jr., MD  Primary Care Provider: Juan Pablo Ryan MD        Subjective:     Principal Problem:HAKEEM (acute kidney injury)        HPI:  Patient is a 52yo male who presents from hospitalization for acute pancreatitis from [place] (6/7/2023) for nephrology consult for worsening renal function. During that admission, CT of the abdomen and pelvis w/ contrast was obtained for diagnosis of the acute pancreatitis. The patient was also being treated for hidradenitis suppurativa with IV Vancomycin and Zosyn. A wound culture was completed that demonstrated Staph lugdenensis from left arm and Staph aureus from left leg that was sensitive to doxycycline PO. The patient's acute pancreatitis resolved over the course of the hospitalization, but his renal function continued to worsen despite IV fluids. There was also concern for urinary retention for which he was started on Flomax.  The decision was to transfer the patient higher level of care due to HAKEEM for nephrology consult due to increase in creatinine from his baseline of 0.99 to 4.8.     Patient sees Dr. Price Cardiologist at [facility]. Next appointment was 6/14/23 but it had to be rescheduled to July. 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 From [id number], the patient states that he had 6 cardiac stents placed. As a result, he is currently on disability.     Upon admission, VSS remarkable for /99, P 56. Labs showed H/H 10.2/32.2, Mg 1.4, BUN/Cr 19/4.59, glucose 148. Baseline Cr 1.19 5 days ago. UA unremarkable. Patient is admitted to hospital medicine for further management and care.      Overview/Hospital Course:  No notes on file    Interval History: Patient feels ok.  No SOB.     Review of  Systems  Objective:     Vital Signs (Most Recent):  Temp: 97.6 °F (36.4 °C) (06/14/23 1901)  Pulse: 66 (06/14/23 1901)  Resp: 18 (06/14/23 1901)  BP: (!) 170/94 (06/14/23 1901)  SpO2: 99 % (06/14/23 1901) Vital Signs (24h Range):  Temp:  [97.4 °F (36.3 °C)-98.8 °F (37.1 °C)] 97.6 °F (36.4 °C)  Pulse:  [56-76] 66  Resp:  [18-20] 18  SpO2:  [98 %-100 %] 99 %  BP: (153-222)/() 170/94     Weight: 115.6 kg (254 lb 13.6 oz)  Body mass index is 41.13 kg/m².    Intake/Output Summary (Last 24 hours) at 6/14/2023 2107  Last data filed at 6/14/2023 1830  Gross per 24 hour   Intake 720 ml   Output --   Net 720 ml         Physical Exam  Vitals reviewed.   Constitutional:       General: He is not in acute distress.     Appearance: He is not toxic-appearing.   HENT:      Head: Normocephalic.      Mouth/Throat:      Mouth: Mucous membranes are moist.   Eyes:      General: No scleral icterus.  Cardiovascular:      Rate and Rhythm: Normal rate and regular rhythm.      Heart sounds: Normal heart sounds.   Pulmonary:      Effort: Pulmonary effort is normal.      Breath sounds: Normal breath sounds.   Abdominal:      General: Bowel sounds are normal. There is no distension.      Palpations: Abdomen is soft.      Tenderness: There is no abdominal tenderness.   Musculoskeletal:      Cervical back: Neck supple.      Right lower leg: No edema.      Left lower leg: No edema.   Skin:     General: Skin is warm and dry.      Comments: No drainage to right axilla       Neurological:      General: No focal deficit present.      Mental Status: He is alert.   Psychiatric:         Mood and Affect: Mood normal.         Behavior: Behavior normal.           Significant Labs: All pertinent labs within the past 24 hours have been reviewed.  BMP:   Recent Labs   Lab 06/14/23  0158   *      K 4.1      CO2 23   BUN 19*   CREATININE 4.59*   CALCIUM 8.7   MG 1.4*     CBC:   Recent Labs   Lab 06/13/23  0507 06/14/23  0155   WBC 5.77  6.34   HGB 10.2* 10.2*   HCT 30.0* 32.2*    208       Significant Imaging: I have reviewed all pertinent imaging results/findings within the past 24 hours.      Assessment/Plan:      * HAKEEM (acute kidney injury)  Patient recently underwent CT with contrast, was given Vancomycin and Zosyn IV for hidradenitis suppurativa, was given an ARB and lasix - all contributing factors to his worsening HAKEEM.     - Renal US demonstrated: Simple appearing right renal cyst.  No evidence of abnormality demonstrated.  - UA negative   - random Ur Cr, Ur Na pending  - Continue LR @ 125cc/hr   - Mg and P daily   - I/Os and daily weights   - Nephrology consulted - appreciate recommendations       Patient with slightly improved creatinine today.     Hypertension  - Continue Carvedilol 12.5mg QD   - Add Amlodipine 10mg QD    Severe obesity (BMI >= 40)  Body mass index is 41.13 kg/m². Morbid obesity complicates all aspects of disease management from diagnostic modalities to treatment. Weight loss encouraged and health benefits explained to patient.     - Patient would benefit from sleep study outpatient         Dyslipidemia  - Continue home medication fenofibrate 145mg QD   - Recent lipid panel demonstrated TG of 148, Cholesterol of 142, and HDL of 46      Type 2 diabetes mellitus with hyperglycemia, without long-term current use of insulin  Patient's FSGs are uncontrolled due to hyperglycemia on current medication regimen.  Last A1c reviewed-   Lab Results   Component Value Date    HGBA1C 6.3 06/08/2023     Most recent fingerstick glucose reviewed- No results for input(s): POCTGLUCOSE in the last 24 hours.  Current correctional scale  Medium  Maintain anti-hyperglycemic dose as follows-   Antihyperglycemics (From admission, onward)    Start     Stop Route Frequency Ordered    06/14/23 0245  insulin detemir U-100 injection 10 Units         -- SubQ Nightly 06/14/23 0136    06/14/23 0235  insulin aspart U-100 injection 1-10 Units          -- SubQ Before meals & nightly PRN 06/14/23 0136        Hold Oral hypoglycemics while patient is in the hospital.    Hidradenitis suppurativa  Recent wound cultures from the left groin (Staph Aureus) and Left arm (Staph lugdunensis) were sensitive to Unasyn. After discussion with the pharmacist Eliz Erickson - decision was made to use renally adjusted Unasyn.     - Wound care consulted - appreciate recommendations       CAD (coronary artery disease)  Continue home medications - ASA 81mg QD, Atorvastatin 80mg QD, Clopidogrel 76mg QD, Isosorbide Mononitrate 30mg QD     - ECHO pending (no record of ECHO)       VTE Risk Mitigation (From admission, onward)         Ordered     heparin (porcine) injection 7,500 Units  Every 8 hours         06/14/23 0443     IP VTE HIGH RISK PATIENT  Once         06/14/23 0443     Place sequential compression device  Until discontinued         06/14/23 0443                Discharge Planning   BETY:      Code Status: Full Code   Is the patient medically ready for discharge?:     Reason for patient still in hospital (select all that apply): Treatment  Discharge Plan A: Home                  Edson Astorga Jr, MD  Department of Hospital Medicine   [facility]

## 2023-06-15 NOTE — PLAN OF CARE
[facility] - [facility] Telemetry  Discharge Final Note    Primary Care Provider: Juan Pablo Ryan MD    Expected Discharge Date: 6/15/2023    Final Discharge Note (most recent)       Final Note - 06/15/23 1319          Final Note    Assessment Type Final Discharge Note     Anticipated Discharge Disposition Home or Self Care     What phone number can be called within the next 1-3 days to see how you are doing after discharge? [phone]        Post-Acute Status    Discharge Delays None known at this time                     Important Message from Medicare  Important Message from Medicare regarding Discharge Appeal Rights: Signed/date by patient/caregiver     Date IMM was signed: 06/14/23  Time IMM was signed: 1320      Pt to dc home today, no needs.

## 2023-06-15 NOTE — PT/OT/SLP PROGRESS
Physical Therapy Treatment    Patient Name:  Faustino Fischer   MRN:  [id number]    Recommendations:     Discharge Recommendations: home health PT  Discharge Equipment Recommendations: rollator  Barriers to discharge: Decreased caregiver support    Assessment:     Faustino Fischer is a 53 y.o. male admitted with a medical diagnosis of HAKEEM (acute kidney injury).  He presents with the following impairments/functional limitations: impaired endurance, impaired functional mobility, gait instability, other (comment) (falls) Pt has requested cane for use at home, so pt educated on use of cane today. Demonstrates good use but gets shortness of breath with increased distance. Pt lives in 2nd story apartment so pt practiced on stairs with cues for sequencing. He would benefit from HHPT to address mobility deficits    Rehab Prognosis: Fair; patient would benefit from acute skilled PT services to address these deficits and reach maximum level of function.    Recent Surgery: * No surgery found *      Plan:     During this hospitalization, patient to be seen 5 x/week to address the identified rehab impairments via gait training, therapeutic activities, therapeutic exercises, neuromuscular re-education and progress toward the following goals:    Plan of Care Expires:  06/21/23    Subjective     Chief Complaint: shortness of breath   Patient/Family Comments/goals: Pt is agreeable to PT  Pain/Comfort:  Pain Rating 1: 0/10  Pain Rating Post-Intervention 1: 0/10      Objective:     Communicated with TRUDI Herrmann LPN prior to session.  Patient found sitting edge of bed with peripheral IV upon PT entry to room.     General Precautions: Standard, fall  Orthopedic Precautions: N/A  Braces: N/A  Respiratory Status: Room air     Functional Mobility:  Bed Mobility:     Scooting: modified independence  Transfers:     Sit to Stand:  modified independence with quad cane  Bed to Chair: modified independence with  quad cane  using  Step Transfer  Gait:  220 ft contact guard assistance with quad cane, slow mic, short step length, mild shortness of breath   Balance: fair      AM-PAC 6 CLICK MOBILITY  Turning over in bed (including adjusting bedclothes, sheets and blankets)?: 4  Sitting down on and standing up from a chair with arms (e.g., wheelchair, bedside commode, etc.): 4  Moving from lying on back to sitting on the side of the bed?: 4  Moving to and from a bed to a chair (including a wheelchair)?: 4  Need to walk in hospital room?: 3  Climbing 3-5 steps with a railing?: 3  Basic Mobility Total Score: 22       Treatment & Education:  Ambulated in hallway with quad cane  Negotiated 1 flight of stairs with quad cane in left hand while holding onto right handrail, pt educated on sequencing    Patient left up in chair with all lines intact and call button in reach..    GOALS:   Multidisciplinary Problems       Physical Therapy Goals          Problem: Physical Therapy    Goal Priority Disciplines Outcome Goal Variances Interventions   Physical Therapy Goal     PT, PT/OT Ongoing, Progressing     Description: Short term goals:  1. Gait  x 300 feet with Supervision using Rolling Walker.   2. Ascend/descend 10 stair with left Handrails Stand-by Assistance using No Assistive Device.   3. Lower extremity exercise program x30 reps per handout, with independence    Long term goals:  1. Gait  x 500 feet with Modified [place] using Rolling Walker.   2. Ascend/descend 20 stair with left Handrails Modified [place] using No Assistive Device  3. Pt will have all needed equipment at discharge.                          Time Tracking:     PT Received On: 06/15/23  PT Start Time: 0935     PT Stop Time: 0953  PT Total Time (min): 18 min     Billable Minutes: Gait Training 18    Treatment Type: Treatment  PT/PTA: PT     Number of PTA visits since last PT visit: 0     06/15/2023

## 2023-06-16 ENCOUNTER — PATIENT OUTREACH (OUTPATIENT)
Dept: ADMINISTRATIVE | Facility: CLINIC | Age: 53
End: 2023-06-16

## 2023-06-16 ENCOUNTER — NURSE TRIAGE (OUTPATIENT)
Dept: ADMINISTRATIVE | Facility: CLINIC | Age: 53
End: 2023-06-16

## 2023-06-16 NOTE — PROGRESS NOTES
C3 nurse spoke with Faustino Fischer  for a TCC post hospital discharge follow up call. The patient has a scheduled HOSFU appointment with Juan Pablo Ryan MD  on 6/22/23 @ 215.

## 2023-06-16 NOTE — TELEPHONE ENCOUNTER
Reason for Disposition   Thigh, calf, or ankle swelling in only one leg    Additional Information   Negative: Sounds like a life-threatening emergency to the triager   Negative: Chest pain   Negative: Small area of swelling and followed an insect bite to the area   Negative: Followed a knee injury   Negative: Ankle or foot injury   Negative: Pregnant with leg swelling or edema   Negative: Difficulty breathing at rest   Negative: Entire foot is cool or blue in comparison to other side   Negative: SEVERE swelling (e.g., swelling extends above knee, entire leg is swollen, weeping fluid)   Negative: Thigh or calf pain and only 1 side and present > 1 hour    Protocols used: Leg Swelling and Edema-A-OH  Pt states he was discharged from the hospital on Thursday. States he has new onset severe swelling in his left leg. States he urinates six times each hour. Pt states his leg feels very tight due to the fluid. Advised per Triage protocol to go to the nearest ED for evaluation. He verbalized understanding.

## 2023-06-30 ENCOUNTER — OUTPATIENT CASE MANAGEMENT (OUTPATIENT)
Dept: ADMINISTRATIVE | Facility: OTHER | Age: 53
End: 2023-06-30

## 2023-06-30 NOTE — LETTER
June 30, 2023             Dear Faustino,    Welcome to [facility].  It was a pleasure talking with you today.  My name is Kendy Mckeon RN CCM and I look forward to being your Care Manager.  My goal is to help you function at the healthiest and highest level possible.  You can contact me directly at [phone].    As an [facility] patient, some of the services we may be able to provide include:      Development of an individualized care plan with a Registered Nurse    Connection with a    Connection with available resources and services     Coordinate communication among your care team members    Provide coaching and education    Help you understand your doctors treatment plan   Help you obtain information about your insurance coverage.     All services provided by [facility] Complex Care Managers and other care team members are coordinated with and communicated to your primary care team.      As part of your enrollment, you will be receiving education materials and more information about these services in your [facility] account, by phone or through the mail.  If you do not wish to participate or receive information, please contact our office at [phone].      Sincerely,        Kendy Mckeon RN CCM [facility]   Out-patient RN Complex Care Manager

## 2023-06-30 NOTE — PROGRESS NOTES
Outpatient Care Management  Initial Patient Assessment    Patient: Faustino Fischer  MRN: [id number]  Date of Service: 06/30/2023  Completed by: Kendy Mckeon RN  Referral Date: 06/14/2023  Program: High Risk  Status: Ongoing  Effective Dates: 6/30/2023 - present  Responsible Staff: Kendy Mckeon RN        Reason for Visit   Patient presents with    OPCM Enrollment Call    Nursing Assessment       Brief Summary:  Faustino Fischer was referred by Dr. Astorga for HAKEEM.. Patient qualifies for program based on high risk score 82.6%. .   Active problem list, medical, surgical and social history reviewed. Active comorbidities include CAD, HTN, HAKEEM, diabetes and pancreatitis. . Areas of need identified by patient include pancreatis, heart and healthy diet.   Admitted to [facility] on 06/07/2023 for complaints of abdominal pain and nausea. Principal Problem: Chronic Pancreatitis. Transferred to [facility] for higher level of care with HAKEEM on 06/14/23. Nephrology consulted for HAKEEM. Discharged home on 06/15/23.   Called and assessment done with Mr Fischer. He lives alone and has 20 steps to his apartment. He is requesting a straight cane. He had his gallbladder removed in November 2022. Two falls in the past 12 months. He is talking to the apartment manger about moving to first floor apartment. Pain is a 4 or 5. His legs have edema but he is wearing compression stockings. He checks his BP daily, 130/70, heart rate 70. He weighs around 210 pounds. He is trying to lose weight.He was 280 pounds.  Not taking diabetic medication since he list weight. A1C 6.3 on 06/08/23. Only eating one meals a day. He is not having any urination problems. He denies chest pains but shortness of breath with too much exertion. He feels like he needs to start eating healthier to assist with his pancreatis and heart problems.   [facility]ed [facility] at [phone] and spoke to Yun. They do not have an order for a straight cane.  Fax order to [phone].   PCP and dermatology appt on 07/11/23  Cardiology appt in July   Med Rec done - alarm on phone to help with taking his medications -following meds not on his Med List-  Nitroglycerin  as needed   Furosemide 20 mg daily       Next steps: Follow up per request in two weeks in or around 07/14/23  Refer to  for financial assistance, PHQ=8, advance directive/living will   Mail pill box, diabetic grocery list, healthy snack and fruit, food high in iron, heart healthy diet   Message to PCP- PHQ =8, Nitroglycerin  as needed, Furosemide 20 mg daily (not on Med list).

## 2023-07-03 ENCOUNTER — DOCUMENTATION ONLY (OUTPATIENT)
Dept: FAMILY MEDICINE | Facility: CLINIC | Age: 53
End: 2023-07-03
Payer: MEDICARE

## 2023-07-03 RX ORDER — FUROSEMIDE 20 MG/1
20 TABLET ORAL DAILY
COMMUNITY

## 2023-07-03 NOTE — PROGRESS NOTES
Kendy Mckeon RN (Outpatient Complex Care Management) stated patient needed the following medications added to their list, after medication reconciliation was performed:  Nitroglycerin (strength not provided) PRN and Furosemide 20mg once daily.

## 2023-07-05 ENCOUNTER — OUTPATIENT CASE MANAGEMENT (OUTPATIENT)
Dept: ADMINISTRATIVE | Facility: OTHER | Age: 53
End: 2023-07-05

## 2023-07-05 NOTE — PROGRESS NOTES
Outpatient Care Management   - High Risk Patient Assessment    Patient: Faustino Fischer  MRN:  [id number]  Date of Service:  7/5/2023  Completed by:  Sima Solorzano LCSW  Referral Date: 06/14/2023    Reason for Visit   Patient presents with    Social Work Assessment - High Risk       Brief Summary[facility] the following patient identified psycho-social needs of pt needing financial assistance, elevated PHQ and advance care planning. Care plan was created in collaboration with patient/caregiver input.

## 2023-07-06 ENCOUNTER — OUTPATIENT CASE MANAGEMENT (OUTPATIENT)
Dept: ADMINISTRATIVE | Facility: OTHER | Age: 53
End: 2023-07-06

## 2023-07-06 NOTE — ASSESSMENT & PLAN NOTE
Patient recently underwent CT with contrast, was given Vancomycin and Zosyn IV for hidradenitis suppurativa, was given an ARB and lasix - all contributing factors to his worsening HAKEEM.     - Renal US demonstrated: Simple appearing right renal cyst.  No evidence of abnormality demonstrated.  - UA negative   - random Ur Cr, Ur Na pending  - Continue LR @ 125cc/hr   - Mg and P daily   - I/Os and daily weights   - Nephrology consulted - appreciate recommendations       Patient with slightly improved creatinine today.     6/15 - Creatinine continues to fall. Patient desires to go home and it is felt safe to do so. F/u next week with BMP.

## 2023-07-06 NOTE — DISCHARGE SUMMARY
[facility] Tele[facility] Hospital Medicine  Discharge Summary      Patient Name: Faustino Fischer  MRN: [id number]  [facility]: [id number]  Patient Class: IP- Inpatient  Admission Date: 6/14/2023  Hospital Length of Stay: 1 days  Discharge Date and Time: 6/15/2023  3:29 PM  Attending Physician: Monique att. providers found   Discharging Provider: Edson Astorga Jr, MD  Primary Care Provider: Juan Pablo Ryan MD    Primary Care Team: Networked reference to record PCT     HPI:   Patient is a 52yo male who presents from hospitalization for acute pancreatitis from [place] (6/7/2023) for nephrology consult for worsening renal function. During that admission, CT of the abdomen and pelvis w/ contrast was obtained for diagnosis of the acute pancreatitis. The patient was also being treated for hidradenitis suppurativa with IV Vancomycin and Zosyn. A wound culture was completed that demonstrated Staph lugdenensis from left arm and Staph aureus from left leg that was sensitive to doxycycline PO. The patient's acute pancreatitis resolved over the course of the hospitalization, but his renal function continued to worsen despite IV fluids. There was also concern for urinary retention for which he was started on Flomax.  The decision was to transfer the patient higher level of care due to HAKEEM for nephrology consult due to increase in creatinine from his baseline of 0.99 to 4.8.     Patient sees Dr. Price Cardiologist at [facility]. Next appointment was 6/14/23 but it had to be rescheduled to July. 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 From [id number], the patient states that he had 6 cardiac stents placed. As a result, he is currently on disability.     Upon admission, VSS remarkable for /99, P 56. Labs showed H/H 10.2/32.2, Mg 1.4, BUN/Cr 19/4.59, glucose 148. Baseline Cr 1.19 5 days ago. UA unremarkable. Patient is admitted to hospital medicine for further management and care.      * No  surgery found *      Hospital Course:   The patient is a very pleasant 54 y/o AA male originally admitted to [facility] on 6/7 with pancreatitis and concurrently had a hidradenitis suppurativa flare.  His pancreatitis improved with treatment there but rising creatinine was noted.  Patient had been on vanc for hidradenitis. Also received IV contrast for abdominal CT. When creatinine was greater than 4 and rising it was felt best to transfer to [facility] where nephrology was available. We continued IVF and consulted Dr. Fe Pierce.  Renal ultrasound was normal except for simple cyst incidentally found.  Creatinine peaked at 4.83 on 6/13 then began to fall.  By 6/15 it had fallen to 4.29.  Patient maintained good urine output throughout his hospitalization.  He was felt safe to discharge with close followup with his PCP to include bmp.        Goals of Care Treatment Preferences:  Code Status: Full Code      Consults:   Consults (From admission, onward)        Status Ordering Provider     Inpatient consult to Social Work  Once        Provider:  (Not yet assigned)    Completed SAYDA CASEY     Inpatient consult to Nephrology  Once        Provider:  (Not yet assigned)    Completed SAYDA CASEY          Derm  Hidradenitis suppurativa  Recent wound cultures from the left groin (Staph Aureus) and Left arm (Staph lugdunensis) were sensitive to Unasyn. After discussion with the pharmacist Eliz Erickson - decision was made to use renally adjusted Unasyn.     - Wound care consulted - appreciate recommendations       Cardiac/Vascular  CAD (coronary artery disease)  Continue home medications - ASA 81mg QD, Atorvastatin 80mg QD, Clopidogrel 76mg QD, Isosorbide Mononitrate 30mg QD     - ECHO pending (no record of ECHO)     Renal/  * HAKEEM (acute kidney injury)  Patient recently underwent CT with contrast, was given Vancomycin and Zosyn IV for hidradenitis suppurativa, was given an ARB and lasix - all contributing factors to his worsening  HAKEEM.     - Renal US demonstrated: Simple appearing right renal cyst.  No evidence of abnormality demonstrated.  - UA negative   - random Ur Cr, Ur Na pending  - Continue LR @ 125cc/hr   - Mg and P daily   - I/Os and daily weights   - Nephrology consulted - appreciate recommendations       Patient with slightly improved creatinine today.     6/15 - Creatinine continues to fall. Patient desires to go home and it is felt safe to do so. F/u next week with BMP.       Final Active Diagnoses:    Diagnosis Date Noted POA    PRINCIPAL PROBLEM:  HAKEEM (acute kidney injury) [N17.9] 06/14/2023 Yes    Type 2 diabetes mellitus with hyperglycemia, without long-term current use of insulin [E11.65] 06/14/2023 Yes    Dyslipidemia [E78.5] 06/14/2023 Yes    Severe obesity (BMI >= 40) [E66.01] 06/14/2023 Yes    Hypertension [I10] 06/14/2023 Yes    Hidradenitis suppurativa [L73.2] 06/07/2023 Yes    CAD (coronary artery disease) [I25.10] 06/27/2021 Yes     Chronic      Problems Resolved During this Admission:    Diagnosis Date Noted Date Resolved POA    Bradycardia [R00.1] 06/14/2023 06/14/2023 Unknown    Macrocytic anemia [D53.9] 06/14/2023 06/14/2023 Unknown       Discharged Condition: good    Disposition: Home or Self Care    Follow Up:    Patient Instructions:      Ambulatory referral/consult to Outpatient Case Management   Referral Priority: Routine Referral Type: Consultation   Referral Reason: Specialty Services Required   Number of Visits Requested: 1       Significant Diagnostic Studies: N/A    Pending Diagnostic Studies:     None         Medications:  Reconciled Home Medications:      Medication List      START taking these medications    amLODIPine 10 MG tablet  Commonly known as: NORVASC  Take 1 tablet (10 mg total) by mouth once daily.        CONTINUE taking these medications    aspirin 81 MG Chew  Take 1 tablet (81 mg total) by mouth once daily.     atorvastatin 80 MG tablet  Commonly known as: LIPITOR  Take 1 tablet  (80 mg total) by mouth once daily.     carvediloL 12.5 MG tablet  Commonly known as: COREG  Take 1 tablet (12.5 mg total) by mouth before breakfast.     clopidogreL 75 mg tablet  Commonly known as: PLAVIX  Take 75 mg by mouth.     fenofibrate 145 MG tablet  Commonly known as: TRICOR  Take 1 tablet (145 mg total) by mouth once daily.     isosorbide mononitrate 30 MG 24 hr tablet  Commonly known as: IMDUR  Take 1 tablet (30 mg total) by mouth once daily. Take 1/2 tablet with breakfast     pantoprazole 40 MG tablet  Commonly known as: PROTONIX  Take 40 mg by mouth.     SUPER THIN LANCETS 28 gauge Misc  Generic drug: lancets  2 (two) times daily.     TRUE METRIX GLUCOSE METER Misc  Generic drug: blood-glucose meter  2 (two) times daily.        STOP taking these medications    clindamycin 1 % external solution  Commonly known as: CLEOCIN T     furosemide 20 MG tablet  Commonly known as: LASIX     losartan 50 MG tablet  Commonly known as: COZAAR     metFORMIN 500 MG ER 24hr tablet  Commonly known as: GLUCOPHAGE-XR            Indwelling Lines/Drains at time of discharge:   Lines/Drains/Airways     None                 Time spent on the discharge of patient: 32 minutes         Edson Astorga Jr, MD  Department of Hospital Medicine  [facility] - [facility] Tele[facility]

## 2023-07-06 NOTE — PROGRESS NOTES
07/06/23-Received call back from diabetes educator, Janet Hawkins, [phone].She needs a referral from PCP to see patient. -Diabetes education. Patient has an appt with PCP on 07/11/23. Will send message to PCP.

## 2023-07-06 NOTE — HOSPITAL COURSE
The patient is a very pleasant 54 y/o AA male originally admitted to [facility] on 6/7 with pancreatitis and concurrently had a hidradenitis suppurativa flare.  His pancreatitis improved with treatment there but rising creatinine was noted.  Patient had been on vanc for hidradenitis. Also received IV contrast for abdominal CT. When creatinine was greater than 4 and rising it was felt best to transfer to [facility] where nephrology was available. We continued IVF and consulted Dr. Fe Pierce.  Renal ultrasound was normal except for simple cyst incidentally found.  Creatinine peaked at 4.83 on 6/13 then began to fall.  By 6/15 it had fallen to 4.29.  Patient maintained good urine output throughout his hospitalization.  He was felt safe to discharge with close followup with his PCP to include bmp.

## 2023-07-09 DIAGNOSIS — Z71.89 COMPLEX CARE COORDINATION: ICD-10-CM

## 2023-07-11 ENCOUNTER — OFFICE VISIT (OUTPATIENT)
Dept: DERMATOLOGY | Facility: CLINIC | Age: 53
End: 2023-07-11
Payer: MEDICARE

## 2023-07-11 DIAGNOSIS — L73.2 HYDRADENITIS: ICD-10-CM

## 2023-07-11 DIAGNOSIS — L02.92 FURUNCLE: ICD-10-CM

## 2023-07-11 DIAGNOSIS — D48.9 NEOPLASM OF UNCERTAIN BEHAVIOR: Primary | ICD-10-CM

## 2023-07-11 PROCEDURE — 88313 SPECIAL STAINS GROUP 2: CPT | Mod: 26,,, | Performed by: PATHOLOGY

## 2023-07-11 PROCEDURE — 99203 PR OFFICE/OUTPT VISIT, NEW, LEVL III, 30-44 MIN: ICD-10-PCS | Mod: 25,,, | Performed by: STUDENT IN AN ORGANIZED HEALTH CARE EDUCATION/TRAINING PROGRAM

## 2023-07-11 PROCEDURE — 88305 PATHOLOGY, DERMATOLOGY: ICD-10-PCS | Mod: 26,,, | Performed by: PATHOLOGY

## 2023-07-11 PROCEDURE — 11102 PR TANGENTIAL BIOPSY, SKIN, SINGLE LESION: ICD-10-PCS | Mod: ,,, | Performed by: STUDENT IN AN ORGANIZED HEALTH CARE EDUCATION/TRAINING PROGRAM

## 2023-07-11 PROCEDURE — 88313 SPECIAL STAINS GROUP 2: CPT | Mod: TC,SUR | Performed by: STUDENT IN AN ORGANIZED HEALTH CARE EDUCATION/TRAINING PROGRAM

## 2023-07-11 PROCEDURE — 88305 TISSUE EXAM BY PATHOLOGIST: CPT | Mod: 26,,, | Performed by: PATHOLOGY

## 2023-07-11 PROCEDURE — 88313 PATHOLOGY, DERMATOLOGY: ICD-10-PCS | Mod: 26,,, | Performed by: PATHOLOGY

## 2023-07-11 PROCEDURE — 99203 OFFICE O/P NEW LOW 30 MIN: CPT | Mod: 25,,, | Performed by: STUDENT IN AN ORGANIZED HEALTH CARE EDUCATION/TRAINING PROGRAM

## 2023-07-11 PROCEDURE — 11102 TANGNTL BX SKIN SINGLE LES: CPT | Mod: ,,, | Performed by: STUDENT IN AN ORGANIZED HEALTH CARE EDUCATION/TRAINING PROGRAM

## 2023-07-11 NOTE — PROGRESS NOTES
[facility]  Mo Boyer MD    [address] [place] [address]  [phone]    Fax: [phone]    Patient Name: Faustino Fischer  Medical Record Number: [id number]  PCP: Juan Pablo Ryan MD  Age: 53 y.o. : 1970  Contact: [phone] (home)     CC: cysts in axilla and groin  History of Present Illness:     Faustino Fischer is a 53 y.o.  male with no history of skin cancer who presents to clinic today for cysts in axilla and groin. Symptoms include drainage and pain. Previous treatments include I&D. These are not present today and appear to be isolated events. Culture previously grew staph. He does have a history of renal failure and has developed pruritic papules on elbows and knees.     The patient has no other concerns today.    Review of Systems:     Unremarkable other than mentioned above.     Physical Exam:     General: Relaxed, oriented, alert    Skin examination of the scalp, face, neck, chest, back, abdomen, upper extremities and lower extremities were normal except for as listed below        Assessment and Plan:     1. Neoplasm of Uncertain Behavior   - folliculocentric papules with central keratin core located on the left elbow  Ddx includes: Reactive perforating collagenosis vs prurigo nodule vs Lichen planus    Shave biopsy      Pre-procedure Diagnosis: as above  Post_procedure Diagnosis: same  Estimated Blood Loss: <1cc    Findings: None  Complications: None  Specimens: to pathology      Written informed consent was obtained after discussing risks including pain, bleeding, infection, recurrence and scarring. The biopsy site was sterilely prepped with alcohol, which was allowed to dry completely, then locally infiltrated with 1% lidocaine with epinephrine, ~3 cc total. A shave biopsy was obtained using a Dermablade/15 and the specimen was sent to dermatopathology. Aluminum chloride was used for hemostasis. Antibiotic ointment and a clean dressing  were applied. The patient tolerated the procedure well without complications. Verbal and written wound care instructions were given.    Mo Boyer MD         2.  Furunculosis, not present today   - although the history is concerning for stage 1 hidradenitis suppurativa, I will consider this furunculosis/abscesses given that it was an isolated event   - Hibiclens in shower 3x weekly       Return to clinic in 6 months.    AVS printed with patient instructions     Mo Boyer MD   Mohs Surgery/Dermatologic Oncology  Dermatology

## 2023-07-11 NOTE — PATIENT INSTRUCTIONS
Over the counter hibiclens wash in shower     Biopsy Site Care  Starting tomorrow you may shower and wash the area with antibacterial soap  Pat dry and apply vaseline and a bandaid  The area will be irritated, sore, slightly red, and may itch, sting, or burn  Do not apply make-up to the area until it is healed  There will be a scar anytime we cut the skin to the level of the dermis, which occurs in a biopsy   The area will take 1-2 weeks to heal  No soaking in the tub or hot tub for one week

## 2023-07-13 ENCOUNTER — OUTPATIENT CASE MANAGEMENT (OUTPATIENT)
Dept: ADMINISTRATIVE | Facility: OTHER | Age: 53
End: 2023-07-13

## 2023-07-13 LAB
DHEA SERPL-MCNC: NORMAL
ESTROGEN SERPL-MCNC: NORMAL PG/ML
INSULIN SERPL-ACNC: NORMAL U[IU]/ML
LAB AP GROSS DESCRIPTION: NORMAL
LAB AP LABORATORY NOTES: NORMAL
LAB AP SPEC A DDX: NORMAL
LAB AP SPEC A MORPHOLOGY: NORMAL
LAB AP SPEC A PROCEDURE: NORMAL
T3RU NFR SERPL: NORMAL %

## 2023-07-14 ENCOUNTER — OUTPATIENT CASE MANAGEMENT (OUTPATIENT)
Dept: ADMINISTRATIVE | Facility: OTHER | Age: 53
End: 2023-07-14

## 2023-07-14 NOTE — PROGRESS NOTES
07/14/2023-Attempt follow up with patient for outpatient case management. No answer and unable to leave a message. Letter mailed. RN OPCM 1'st follow up attempt.

## 2023-07-20 ENCOUNTER — OUTPATIENT CASE MANAGEMENT (OUTPATIENT)
Dept: ADMINISTRATIVE | Facility: OTHER | Age: 53
End: 2023-07-20

## 2023-07-20 NOTE — PROGRESS NOTES
07/20/23-Attempt follow up  with patient for outpatient case management. No answer and unable to leave a message. RN OPCM 2'nd follow up attempt.

## 2023-07-27 ENCOUNTER — OUTPATIENT CASE MANAGEMENT (OUTPATIENT)
Dept: ADMINISTRATIVE | Facility: OTHER | Age: 53
End: 2023-07-27

## 2023-07-27 NOTE — PROGRESS NOTES
Outpatient Care Management  Plan of Care Follow Up Visit    Patient: Faustino Fischer  MRN: [id number]  Date of Service: 07/27/2023  Completed by: Kendy Mckeon RN  Referral Date: 06/14/2023    Reason for Visit   Patient presents with    OPCM RN Follow Up Call       Brief Summary: see care plans   Next Steps: Follow up in two weeks in or around 08/10/23 per his request   Message to Dr Ryan for appt since he missed his last appt due to a scheduling conflict.   Needs SDOH

## 2023-07-28 ENCOUNTER — OUTPATIENT CASE MANAGEMENT (OUTPATIENT)
Dept: ADMINISTRATIVE | Facility: OTHER | Age: 53
End: 2023-07-28

## 2023-07-28 NOTE — PROGRESS NOTES
07/28/23-Received message back from Rosie Vásquez MA for patient to call for appt. Called and spoke to Mr. Fischer and he will call Dr. Ryan's office for an appt. [phone].

## 2023-08-01 ENCOUNTER — OUTPATIENT CASE MANAGEMENT (OUTPATIENT)
Dept: ADMINISTRATIVE | Facility: OTHER | Age: 53
End: 2023-08-01

## 2023-08-10 ENCOUNTER — OUTPATIENT CASE MANAGEMENT (OUTPATIENT)
Dept: ADMINISTRATIVE | Facility: OTHER | Age: 53
End: 2023-08-10

## 2023-08-10 NOTE — PROGRESS NOTES
8/10/2023  1st attempt to complete Follow-Up  for Outpatient Care Management, left message.  Will mail unable to assess letter.

## 2023-08-10 NOTE — LETTER
Faustino Fischer  [address]  [place] [address]      Dear Faustino Fischer,     I am your nurse with [facility] Department. I have been unsuccessful at reaching you since we spoke on 07/28/2023.  At your earliest convenience, I would like to discuss your healthcare progress.      Please contact me at [phone] from 8:00AM to 4:30 PM on Monday thru Friday.     As you know, [facility] is a program offered to you through [facility] where a nurse is available 24/7 to answer questions or provide medical advice, their number is [phone].    Thanks,  Kendy Mckeon RN [facility]   Outpatient Care Management

## 2023-08-14 ENCOUNTER — HOSPITAL ENCOUNTER (OUTPATIENT)
Facility: HOSPITAL | Age: 53
Discharge: HOME | End: 2023-08-15
Attending: FAMILY MEDICINE | Admitting: FAMILY MEDICINE
Payer: MEDICARE

## 2023-08-14 ENCOUNTER — HOSPITAL ENCOUNTER (EMERGENCY)
Facility: HOSPITAL | Age: 53
Discharge: HOME | End: 2023-08-14
Payer: MEDICARE

## 2023-08-14 VITALS
WEIGHT: 233 LBS | TEMPERATURE: 100 F | SYSTOLIC BLOOD PRESSURE: 139 MMHG | OXYGEN SATURATION: 97 % | BODY MASS INDEX: 37.45 KG/M2 | HEART RATE: 100 BPM | DIASTOLIC BLOOD PRESSURE: 86 MMHG | RESPIRATION RATE: 18 BRPM | HEIGHT: 66 IN

## 2023-08-14 DIAGNOSIS — L73.2 HIDRADENITIS SUPPURATIVA OF LEFT AXILLA: ICD-10-CM

## 2023-08-14 DIAGNOSIS — R11.2 NAUSEA AND VOMITING, UNSPECIFIED VOMITING TYPE: ICD-10-CM

## 2023-08-14 DIAGNOSIS — N17.9 AKI (ACUTE KIDNEY INJURY): ICD-10-CM

## 2023-08-14 DIAGNOSIS — K86.1 ACUTE ON CHRONIC PANCREATITIS: ICD-10-CM

## 2023-08-14 DIAGNOSIS — K85.90 ACUTE ON CHRONIC PANCREATITIS: ICD-10-CM

## 2023-08-14 DIAGNOSIS — E11.65 TYPE 2 DIABETES MELLITUS WITH HYPERGLYCEMIA, WITHOUT LONG-TERM CURRENT USE OF INSULIN: Primary | ICD-10-CM

## 2023-08-14 DIAGNOSIS — K85.90 ACUTE PANCREATITIS WITHOUT INFECTION OR NECROSIS, UNSPECIFIED PANCREATITIS TYPE: Primary | ICD-10-CM

## 2023-08-14 LAB
ALBUMIN SERPL BCP-MCNC: 3.2 G/DL (ref 3.5–5)
ALBUMIN SERPL BCP-MCNC: 3.5 G/DL (ref 3.5–5)
ALBUMIN/GLOB SERPL: 0.7 {RATIO}
ALBUMIN/GLOB SERPL: 0.8 {RATIO}
ALP SERPL-CCNC: 163 U/L (ref 45–115)
ALP SERPL-CCNC: 188 U/L (ref 45–115)
ALT SERPL W P-5'-P-CCNC: 21 U/L (ref 16–61)
ALT SERPL W P-5'-P-CCNC: 22 U/L (ref 16–61)
AMYLASE SERPL-CCNC: 126 U/L (ref 25–115)
ANION GAP SERPL CALCULATED.3IONS-SCNC: 14 MMOL/L (ref 7–16)
ANION GAP SERPL CALCULATED.3IONS-SCNC: 17 MMOL/L (ref 7–16)
AST SERPL W P-5'-P-CCNC: 23 U/L (ref 15–37)
AST SERPL W P-5'-P-CCNC: 28 U/L (ref 15–37)
BASOPHILS # BLD AUTO: 0.02 K/UL (ref 0–0.2)
BASOPHILS # BLD AUTO: 0.02 K/UL (ref 0–0.2)
BASOPHILS NFR BLD AUTO: 0.2 % (ref 0–1)
BASOPHILS NFR BLD AUTO: 0.2 % (ref 0–1)
BILIRUB SERPL-MCNC: 2.2 MG/DL (ref ?–1.2)
BILIRUB SERPL-MCNC: 2.8 MG/DL (ref ?–1.2)
BUN SERPL-MCNC: 10 MG/DL (ref 7–18)
BUN SERPL-MCNC: 7 MG/DL (ref 7–18)
BUN/CREAT SERPL: 11 (ref 6–20)
BUN/CREAT SERPL: 6 (ref 6–20)
CALCIUM SERPL-MCNC: 9 MG/DL (ref 8.5–10.1)
CALCIUM SERPL-MCNC: 9.4 MG/DL (ref 8.5–10.1)
CHLORIDE SERPL-SCNC: 93 MMOL/L (ref 98–107)
CHLORIDE SERPL-SCNC: 94 MMOL/L (ref 98–107)
CO2 SERPL-SCNC: 28 MMOL/L (ref 21–32)
CO2 SERPL-SCNC: 30 MMOL/L (ref 21–32)
CREAT SERPL-MCNC: 0.94 MG/DL (ref 0.7–1.3)
CREAT SERPL-MCNC: 1.1 MG/DL (ref 0.7–1.3)
DIFFERENTIAL METHOD BLD: ABNORMAL
DIFFERENTIAL METHOD BLD: ABNORMAL
EGFR (NO RACE VARIABLE) (RUSH/TITUS): 80 ML/MIN/1.73M2
EGFR (NO RACE VARIABLE) (RUSH/TITUS): 96 ML/MIN/1.73M2
EOSINOPHIL # BLD AUTO: 0 K/UL (ref 0–0.5)
EOSINOPHIL # BLD AUTO: 0.03 K/UL (ref 0–0.5)
EOSINOPHIL NFR BLD AUTO: 0 % (ref 1–4)
EOSINOPHIL NFR BLD AUTO: 0.2 % (ref 1–4)
ERYTHROCYTE [DISTWIDTH] IN BLOOD BY AUTOMATED COUNT: 14.3 % (ref 11.5–14.5)
ERYTHROCYTE [DISTWIDTH] IN BLOOD BY AUTOMATED COUNT: 14.4 % (ref 11.5–14.5)
GLOBULIN SER-MCNC: 4.3 G/DL (ref 2–4)
GLOBULIN SER-MCNC: 4.5 G/DL (ref 2–4)
GLUCOSE SERPL-MCNC: 130 MG/DL (ref 70–105)
GLUCOSE SERPL-MCNC: 133 MG/DL (ref 70–105)
GLUCOSE SERPL-MCNC: 138 MG/DL (ref 70–105)
GLUCOSE SERPL-MCNC: 188 MG/DL (ref 74–106)
GLUCOSE SERPL-MCNC: 226 MG/DL (ref 74–106)
HCT VFR BLD AUTO: 44.6 % (ref 40–54)
HCT VFR BLD AUTO: 46.5 % (ref 40–54)
HGB BLD-MCNC: 14.8 G/DL (ref 13.5–18)
HGB BLD-MCNC: 15.7 G/DL (ref 13.5–18)
LIPASE SERPL-CCNC: 1011 U/L (ref 73–393)
LIPASE SERPL-CCNC: 418 U/L (ref 73–393)
LYMPHOCYTES # BLD AUTO: 0.8 K/UL (ref 1–4.8)
LYMPHOCYTES # BLD AUTO: 1.11 K/UL (ref 1–4.8)
LYMPHOCYTES NFR BLD AUTO: 6 % (ref 27–41)
LYMPHOCYTES NFR BLD AUTO: 9.1 % (ref 27–41)
MAGNESIUM SERPL-MCNC: 1.2 MG/DL (ref 1.7–2.3)
MCH RBC QN AUTO: 33.3 PG (ref 27–31)
MCH RBC QN AUTO: 33.5 PG (ref 27–31)
MCHC RBC AUTO-ENTMCNC: 33.2 G/DL (ref 32–36)
MCHC RBC AUTO-ENTMCNC: 33.8 G/DL (ref 32–36)
MCV RBC AUTO: 100.5 FL (ref 80–96)
MCV RBC AUTO: 99.1 FL (ref 80–96)
MONOCYTES # BLD AUTO: 0.66 K/UL (ref 0–0.8)
MONOCYTES # BLD AUTO: 0.74 K/UL (ref 0–0.8)
MONOCYTES NFR BLD AUTO: 5.4 % (ref 2–6)
MONOCYTES NFR BLD AUTO: 5.6 % (ref 2–6)
MPC BLD CALC-MCNC: 10.1 FL (ref 9.4–12.4)
MPC BLD CALC-MCNC: 9.5 FL (ref 9.4–12.4)
NEUTROPHILS # BLD AUTO: 10.4 K/UL (ref 1.8–7.7)
NEUTROPHILS # BLD AUTO: 11.73 K/UL (ref 1.8–7.7)
NEUTROPHILS NFR BLD AUTO: 85.1 % (ref 53–65)
NEUTROPHILS NFR BLD AUTO: 88.2 % (ref 53–65)
PLATELET # BLD AUTO: 162 K/UL (ref 150–400)
PLATELET # BLD AUTO: 206 K/UL (ref 150–400)
POTASSIUM SERPL-SCNC: 3.9 MMOL/L (ref 3.5–5.1)
POTASSIUM SERPL-SCNC: 3.9 MMOL/L (ref 3.5–5.1)
PROT SERPL-MCNC: 7.5 G/DL (ref 6.4–8.2)
PROT SERPL-MCNC: 8 G/DL (ref 6.4–8.2)
RBC # BLD AUTO: 4.44 M/UL (ref 4.6–6.2)
RBC # BLD AUTO: 4.69 M/UL (ref 4.6–6.2)
SODIUM SERPL-SCNC: 134 MMOL/L (ref 136–145)
SODIUM SERPL-SCNC: 134 MMOL/L (ref 136–145)
WBC # BLD AUTO: 12.22 K/UL (ref 4.5–11)
WBC # BLD AUTO: 13.29 K/UL (ref 4.5–11)

## 2023-08-14 PROCEDURE — 85025 COMPLETE CBC W/AUTO DIFF WBC: CPT | Performed by: NURSE PRACTITIONER

## 2023-08-14 PROCEDURE — 96375 TX/PRO/DX INJ NEW DRUG ADDON: CPT

## 2023-08-14 PROCEDURE — 96376 TX/PRO/DX INJ SAME DRUG ADON: CPT

## 2023-08-14 PROCEDURE — 63600175 PHARM REV CODE 636 W HCPCS: Performed by: NURSE PRACTITIONER

## 2023-08-14 PROCEDURE — 27201920 HC DRESSING, AQUACEL AG

## 2023-08-14 PROCEDURE — 99285 EMERGENCY DEPT VISIT HI MDM: CPT | Mod: 25

## 2023-08-14 PROCEDURE — 25500020 PHARM REV CODE 255: Performed by: NURSE PRACTITIONER

## 2023-08-14 PROCEDURE — 82962 GLUCOSE BLOOD TEST: CPT | Mod: 91

## 2023-08-14 PROCEDURE — C9113 INJ PANTOPRAZOLE SODIUM, VIA: HCPCS | Performed by: NURSE PRACTITIONER

## 2023-08-14 PROCEDURE — 83690 ASSAY OF LIPASE: CPT | Performed by: NURSE PRACTITIONER

## 2023-08-14 PROCEDURE — G0378 HOSPITAL OBSERVATION PER HR: HCPCS

## 2023-08-14 PROCEDURE — 27000958

## 2023-08-14 PROCEDURE — 96374 THER/PROPH/DIAG INJ IV PUSH: CPT | Mod: 59

## 2023-08-14 PROCEDURE — 25000003 PHARM REV CODE 250: Performed by: NURSE PRACTITIONER

## 2023-08-14 PROCEDURE — 82150 ASSAY OF AMYLASE: CPT | Performed by: NURSE PRACTITIONER

## 2023-08-14 PROCEDURE — 80053 COMPREHEN METABOLIC PANEL: CPT | Performed by: NURSE PRACTITIONER

## 2023-08-14 PROCEDURE — 83735 ASSAY OF MAGNESIUM: CPT | Performed by: NURSE PRACTITIONER

## 2023-08-14 PROCEDURE — 87070 CULTURE OTHR SPECIMN AEROBIC: CPT | Performed by: NURSE PRACTITIONER

## 2023-08-14 PROCEDURE — 80053 COMPREHEN METABOLIC PANEL: CPT | Mod: 91 | Performed by: NURSE PRACTITIONER

## 2023-08-14 PROCEDURE — 85025 COMPLETE CBC W/AUTO DIFF WBC: CPT | Mod: 91 | Performed by: NURSE PRACTITIONER

## 2023-08-14 PROCEDURE — 96361 HYDRATE IV INFUSION ADD-ON: CPT

## 2023-08-14 PROCEDURE — 83690 ASSAY OF LIPASE: CPT | Mod: 91 | Performed by: NURSE PRACTITIONER

## 2023-08-14 PROCEDURE — 96372 THER/PROPH/DIAG INJ SC/IM: CPT | Mod: 59 | Performed by: NURSE PRACTITIONER

## 2023-08-14 PROCEDURE — 99285 EMERGENCY DEPT VISIT HI MDM: CPT | Mod: GF,25

## 2023-08-14 PROCEDURE — 96374 THER/PROPH/DIAG INJ IV PUSH: CPT

## 2023-08-14 PROCEDURE — 99285 EMERGENCY DEPT VISIT HI MDM: CPT | Mod: GF

## 2023-08-14 PROCEDURE — 11000001 HC ACUTE MED/SURG PRIVATE ROOM

## 2023-08-14 RX ORDER — SODIUM CHLORIDE 9 MG/ML
1000 INJECTION, SOLUTION INTRAVENOUS CONTINUOUS
Status: DISCONTINUED | OUTPATIENT
Start: 2023-08-14 | End: 2023-08-15

## 2023-08-14 RX ORDER — ONDANSETRON 2 MG/ML
4 INJECTION INTRAMUSCULAR; INTRAVENOUS
Status: COMPLETED | OUTPATIENT
Start: 2023-08-14 | End: 2023-08-14

## 2023-08-14 RX ORDER — AMLODIPINE BESYLATE 5 MG/1
10 TABLET ORAL DAILY
Status: DISCONTINUED | OUTPATIENT
Start: 2023-08-14 | End: 2023-08-15 | Stop reason: HOSPADM

## 2023-08-14 RX ORDER — ISOSORBIDE MONONITRATE 30 MG/1
30 TABLET, EXTENDED RELEASE ORAL DAILY
Status: DISCONTINUED | OUTPATIENT
Start: 2023-08-15 | End: 2023-08-15 | Stop reason: HOSPADM

## 2023-08-14 RX ORDER — PANTOPRAZOLE SODIUM 40 MG/10ML
40 INJECTION, POWDER, LYOPHILIZED, FOR SOLUTION INTRAVENOUS
Status: COMPLETED | OUTPATIENT
Start: 2023-08-14 | End: 2023-08-14

## 2023-08-14 RX ORDER — HYDROMORPHONE HYDROCHLORIDE 2 MG/ML
1 INJECTION, SOLUTION INTRAMUSCULAR; INTRAVENOUS; SUBCUTANEOUS
Status: COMPLETED | OUTPATIENT
Start: 2023-08-14 | End: 2023-08-14

## 2023-08-14 RX ORDER — DOXYCYCLINE HYCLATE 100 MG
100 TABLET ORAL EVERY 12 HOURS
Status: DISCONTINUED | OUTPATIENT
Start: 2023-08-14 | End: 2023-08-15 | Stop reason: HOSPADM

## 2023-08-14 RX ORDER — LACTOBACILLUS ACIDOPHILUS 500MM CELL
1 CAPSULE ORAL
Status: DISCONTINUED | OUTPATIENT
Start: 2023-08-14 | End: 2023-08-15 | Stop reason: HOSPADM

## 2023-08-14 RX ORDER — HYDROCODONE BITARTRATE AND ACETAMINOPHEN 10; 325 MG/1; MG/1
1 TABLET ORAL EVERY 6 HOURS PRN
Qty: 12 TABLET | Refills: 0 | Status: SHIPPED | OUTPATIENT
Start: 2023-08-14 | End: 2023-12-06 | Stop reason: CLARIF

## 2023-08-14 RX ORDER — DOXYCYCLINE 100 MG/1
100 CAPSULE ORAL 2 TIMES DAILY
Qty: 20 CAPSULE | Refills: 0 | Status: ON HOLD | OUTPATIENT
Start: 2023-08-14 | End: 2023-08-15 | Stop reason: HOSPADM

## 2023-08-14 RX ORDER — ONDANSETRON 2 MG/ML
4 INJECTION INTRAMUSCULAR; INTRAVENOUS EVERY 6 HOURS PRN
Status: DISCONTINUED | OUTPATIENT
Start: 2023-08-14 | End: 2023-08-15 | Stop reason: HOSPADM

## 2023-08-14 RX ORDER — ONDANSETRON 4 MG/1
4 TABLET, ORALLY DISINTEGRATING ORAL EVERY 8 HOURS PRN
Qty: 15 TABLET | Refills: 0 | Status: SHIPPED | OUTPATIENT
Start: 2023-08-14 | End: 2023-12-06 | Stop reason: CLARIF

## 2023-08-14 RX ORDER — PANTOPRAZOLE SODIUM 40 MG/10ML
40 INJECTION, POWDER, LYOPHILIZED, FOR SOLUTION INTRAVENOUS DAILY
Status: DISCONTINUED | OUTPATIENT
Start: 2023-08-15 | End: 2023-08-15 | Stop reason: HOSPADM

## 2023-08-14 RX ORDER — MAGNESIUM SULFATE HEPTAHYDRATE 40 MG/ML
2 INJECTION, SOLUTION INTRAVENOUS ONCE
Status: COMPLETED | OUTPATIENT
Start: 2023-08-14 | End: 2023-08-14

## 2023-08-14 RX ORDER — ENOXAPARIN SODIUM 100 MG/ML
40 INJECTION SUBCUTANEOUS EVERY 24 HOURS
Status: DISCONTINUED | OUTPATIENT
Start: 2023-08-14 | End: 2023-08-15 | Stop reason: HOSPADM

## 2023-08-14 RX ORDER — MORPHINE SULFATE 4 MG/ML
4 INJECTION, SOLUTION INTRAMUSCULAR; INTRAVENOUS
Status: COMPLETED | OUTPATIENT
Start: 2023-08-14 | End: 2023-08-14

## 2023-08-14 RX ORDER — CLOPIDOGREL BISULFATE 75 MG/1
75 TABLET ORAL DAILY
Status: DISCONTINUED | OUTPATIENT
Start: 2023-08-14 | End: 2023-08-15 | Stop reason: HOSPADM

## 2023-08-14 RX ORDER — MORPHINE SULFATE 4 MG/ML
4 INJECTION, SOLUTION INTRAMUSCULAR; INTRAVENOUS EVERY 4 HOURS PRN
Status: DISCONTINUED | OUTPATIENT
Start: 2023-08-14 | End: 2023-08-15 | Stop reason: HOSPADM

## 2023-08-14 RX ORDER — CARVEDILOL 12.5 MG/1
12.5 TABLET ORAL
Status: DISCONTINUED | OUTPATIENT
Start: 2023-08-15 | End: 2023-08-15 | Stop reason: HOSPADM

## 2023-08-14 RX ORDER — MAGNESIUM SULFATE 1 G/100ML
1 INJECTION INTRAVENOUS ONCE
Status: COMPLETED | OUTPATIENT
Start: 2023-08-14 | End: 2023-08-14

## 2023-08-14 RX ADMIN — SODIUM CHLORIDE 1000 ML: 9 INJECTION, SOLUTION INTRAVENOUS at 01:08

## 2023-08-14 RX ADMIN — AMLODIPINE BESYLATE 10 MG: 5 TABLET ORAL at 01:08

## 2023-08-14 RX ADMIN — ONDANSETRON 4 MG: 2 INJECTION INTRAMUSCULAR; INTRAVENOUS at 01:08

## 2023-08-14 RX ADMIN — Medication 1 CAPSULE: at 04:08

## 2023-08-14 RX ADMIN — MAGNESIUM SULFATE 2 G: 2 INJECTION INTRAVENOUS at 03:08

## 2023-08-14 RX ADMIN — ONDANSETRON 4 MG: 2 INJECTION INTRAMUSCULAR; INTRAVENOUS at 10:08

## 2023-08-14 RX ADMIN — ONDANSETRON 4 MG: 2 INJECTION INTRAMUSCULAR; INTRAVENOUS at 08:08

## 2023-08-14 RX ADMIN — MORPHINE SULFATE 4 MG: 4 INJECTION, SOLUTION INTRAMUSCULAR; INTRAVENOUS at 01:08

## 2023-08-14 RX ADMIN — HYDROMORPHONE HYDROCHLORIDE 1 MG: 2 INJECTION, SOLUTION INTRAMUSCULAR; INTRAVENOUS; SUBCUTANEOUS at 01:08

## 2023-08-14 RX ADMIN — MORPHINE SULFATE 4 MG: 4 INJECTION, SOLUTION INTRAMUSCULAR; INTRAVENOUS at 05:08

## 2023-08-14 RX ADMIN — PANTOPRAZOLE SODIUM 40 MG: 40 INJECTION, POWDER, LYOPHILIZED, FOR SOLUTION INTRAVENOUS at 10:08

## 2023-08-14 RX ADMIN — HYDROMORPHONE HYDROCHLORIDE 1 MG: 2 INJECTION INTRAMUSCULAR; INTRAVENOUS; SUBCUTANEOUS at 02:08

## 2023-08-14 RX ADMIN — MORPHINE SULFATE 4 MG: 4 INJECTION, SOLUTION INTRAMUSCULAR; INTRAVENOUS at 09:08

## 2023-08-14 RX ADMIN — SODIUM CHLORIDE 1000 ML: 9 INJECTION, SOLUTION INTRAVENOUS at 09:08

## 2023-08-14 RX ADMIN — MAGNESIUM SULFATE 1 G: 1 INJECTION INTRAVENOUS at 01:08

## 2023-08-14 RX ADMIN — SODIUM CHLORIDE 1000 ML: 9 INJECTION, SOLUTION INTRAVENOUS at 10:08

## 2023-08-14 RX ADMIN — MORPHINE SULFATE 4 MG: 4 INJECTION, SOLUTION INTRAMUSCULAR; INTRAVENOUS at 10:08

## 2023-08-14 RX ADMIN — CLOPIDOGREL BISULFATE 75 MG: 75 TABLET ORAL at 01:08

## 2023-08-14 RX ADMIN — IOPAMIDOL 100 ML: 755 INJECTION, SOLUTION INTRAVENOUS at 01:08

## 2023-08-14 RX ADMIN — ENOXAPARIN SODIUM 40 MG: 40 INJECTION SUBCUTANEOUS at 04:08

## 2023-08-14 RX ADMIN — DOXYCYCLINE HYCLATE 100 MG: 100 TABLET, COATED ORAL at 08:08

## 2023-08-14 NOTE — ED PROVIDER NOTES
Encounter Date: 8/14/2023       History     Chief Complaint   Patient presents with    Abdominal Pain     Patient comes in with continued vomiting and abdominal pain . Hx pancreatitis states it is getting worse and worse     Presents to ED with worsening epigastric pain, n/v----seen in ED earlier this morning and diagnosed w/ pancreatitis. Unable to keep food / fluids down. Pain 7/10 Max---history of chronic pancreatitis---last hospitalization approximately one month ago.     The history is provided by the patient.     Review of patient's allergies indicates:   Allergen Reactions    Ticagrelor Shortness Of Breath     Past Medical History:   Diagnosis Date    Chronic pancreatitis, unspecified pancreatitis type     Coronary artery disease     Diabetes mellitus     Dyslipidemia     Hidradenitis     Hypertension      Past Surgical History:   Procedure Laterality Date    CARDIAC SURGERY      Stents x6    CHOLECYSTECTOMY      UNDESCENDED TESTICLE EXPLORATION N/A      Family History   Problem Relation Age of Onset    Heart disease Father     Hypertension Father     Heart disease Brother     Heart disease Maternal Grandmother      Social History     Tobacco Use    Smoking status: Former     Current packs/day: 0.25     Average packs/day: 0.3 packs/day for 19.6 years (4.9 ttl pk-yrs)     Types: Cigarettes, Cigars     Start date: 2004    Smokeless tobacco: Never   Substance Use Topics    Alcohol use: Not Currently     Comment: occasional drink previous heavy drinker quit heavily in 2001    Drug use: Never     Review of Systems   Constitutional: Negative.    HENT: Negative.     Eyes: Negative.    Respiratory: Negative.     Cardiovascular: Negative.    Gastrointestinal:  Positive for abdominal pain, nausea and vomiting. Negative for abdominal distention, constipation, diarrhea and rectal pain.   Genitourinary: Negative.    Musculoskeletal: Negative.    Skin: Negative.    Neurological: Negative.    Hematological: Negative.     Psychiatric/Behavioral: Negative.     All other systems reviewed and are negative.      Physical Exam     Initial Vitals   BP Pulse Resp Temp SpO2   08/14/23 0946 08/14/23 0939 08/14/23 0939 08/14/23 0939 08/14/23 0939   (!) 214/124 (!) 111 20 98.3 °F (36.8 °C) 97 %      MAP       --                Physical Exam    Nursing note and vitals reviewed.  Constitutional: He appears well-developed and well-nourished.   HENT:   Head: Normocephalic and atraumatic.   Nose: Nose normal.   Mouth/Throat: Oropharynx is clear and moist.   Eyes: Conjunctivae and EOM are normal. Pupils are equal, round, and reactive to light.   Neck: Neck supple.   Normal range of motion.  Cardiovascular:  Normal rate, regular rhythm, normal heart sounds and intact distal pulses.           No murmur heard.  Pulmonary/Chest: Breath sounds normal.   Abdominal: Abdomen is soft. Bowel sounds are normal. He exhibits no mass. There is abdominal tenderness.   Epigastric tenderness w/ palpation. No palpable mass.  There is no rebound and no guarding.   Musculoskeletal:      Cervical back: Normal range of motion and neck supple.           Medical Screening Exam   See Full Note    ED Course   Procedures  Labs Reviewed   COMPREHENSIVE METABOLIC PANEL - Abnormal; Notable for the following components:       Result Value    Sodium 134 (*)     Chloride 94 (*)     Glucose 188 (*)     Albumin 3.2 (*)     Globulin 4.3 (*)     Bilirubin, Total 2.2 (*)     Alk Phos 163 (*)     All other components within normal limits   LIPASE - Abnormal; Notable for the following components:    Lipase 418 (*)     All other components within normal limits   MAGNESIUM - Abnormal; Notable for the following components:    Magnesium 1.2 (*)     All other components within normal limits   CBC WITH DIFFERENTIAL - Abnormal; Notable for the following components:    WBC 12.22 (*)     RBC 4.44 (*)     .5 (*)     MCH 33.3 (*)     Neutrophils % 85.1 (*)     Lymphocytes % 9.1 (*)      Neutrophils, Abs 10.40 (*)     Eosinophils % 0.2 (*)     All other components within normal limits   CBC W/ AUTO DIFFERENTIAL    Narrative:     The following orders were created for panel order CBC auto differential.  Procedure                               Abnormality         Status                     ---------                               -----------         ------                     CBC with Differential[[id number]]        Abnormal            Final result                 Please view results for these tests on the individual orders.          Imaging Results    None          Medications   sodium chloride 0.9% bolus 1,000 mL 1,000 mL (0 mLs Intravenous Stopped 8/14/23 1110)   pantoprazole injection 40 mg (40 mg Intravenous Given 8/14/23 1013)   ondansetron injection 4 mg (4 mg Intravenous Given 8/14/23 1012)   morphine injection 4 mg (4 mg Intravenous Given 8/14/23 1009)     Medical Decision Making:   ED Management:  Labs Reviewed. Discussed plan of care w/ patient. Pain / Nausea improved with medications. Will place in acute care for symptom management / support. He understands and agrees with the plan.                          Clinical Impression:   Final diagnoses:  [K85.90, K86.1] Acute on chronic pancreatitis        ED Disposition Condition    Admit Stable                Ashu Sandoval, NP  08/14/23 1114

## 2023-08-14 NOTE — H&P
[facility]  Hospital Medicine  History & Physical    Patient Name: Faustino Fischer  MRN: [id number]  Patient Class: IP- Inpatient  Admission Date: 8/14/2023  Attending Physician: Jean Crews IV, DO   Primary Care Provider: Juan Pablo Ryan MD         Patient information was obtained from patient, past medical records and ER records.     Subjective:     Principal Problem:Acute on chronic pancreatitis    Chief Complaint:   Chief Complaint   Patient presents with    Abdominal Pain     Patient comes in with continued vomiting and abdominal pain . Hx pancreatitis states it is getting worse and worse        HPI: 54 yo male with PMH of CAD, HAKEEM, hidradenitis suppurativa, NIDDM, dyslipidemia, macrocytic anemia, hypertension and obesity. Complains of having abdominal pain, nausea and vomiting that started yesterday around 1000. States it started after eating steak and eggs for breakfast. Reports he has vomited about 10 x  with last episode of vomiting this morning around 0500. State he has been NPO since breakfast yesterday. He also complains of drainage to left axilla , states this started Friday. States has hx of hidradenitis and is followed by Dr. Mo Boyer with last visit about a week ago.  Has 2  small open abscesses to left axilla, both with serosanguineous drainage. Will culture drainage. Will add clindamycin. Denies having fever but states he has had chills. Will continue with IV fluids and pain control for pancreatitis.      Past Medical History:   Diagnosis Date    Chronic pancreatitis, unspecified pancreatitis type     Coronary artery disease     Diabetes mellitus     Dyslipidemia     Hidradenitis     Hypertension        Past Surgical History:   Procedure Laterality Date    CARDIAC SURGERY      Stents x6    CHOLECYSTECTOMY      UNDESCENDED TESTICLE EXPLORATION N/A        Review of patient's allergies indicates:   Allergen Reactions    Ticagrelor Shortness Of Breath     Vancomycin analogues Other (See Comments)     Patient experienced adverse effect, and had some renal insufficiency after receiving Vancomycin.       Current Facility-Administered Medications on File Prior to Encounter   Medication    [COMPLETED] HYDROmorphone (PF) injection 1 mg    [COMPLETED] HYDROmorphone (PF) injection 1 mg    [COMPLETED] iopamidoL (ISOVUE-370) injection 100 mL    [COMPLETED] ondansetron injection 4 mg    [COMPLETED] sodium chloride 0.9% bolus 1,000 mL 1,000 mL     Current Outpatient Medications on File Prior to Encounter   Medication Sig    amLODIPine (NORVASC) 10 MG tablet Take 1 tablet (10 mg total) by mouth once daily.    aspirin 81 MG Chew Take 1 tablet (81 mg total) by mouth once daily.    atorvastatin (LIPITOR) 80 MG tablet Take 1 tablet (80 mg total) by mouth once daily.    carvediloL (COREG) 12.5 MG tablet Take 1 tablet (12.5 mg total) by mouth before breakfast.    clopidogreL (PLAVIX) 75 mg tablet Take 75 mg by mouth.    doxycycline (VIBRAMYCIN) 100 MG Cap Take 1 capsule (100 mg total) by mouth 2 (two) times daily. for 10 days    fenofibrate (TRICOR) 145 MG tablet Take 1 tablet (145 mg total) by mouth once daily.    furosemide (LASIX) 20 MG tablet Take 20 mg by mouth once daily.    HYDROcodone-acetaminophen (NORCO)  mg per tablet Take 1 tablet by mouth every 6 (six) hours as needed for Pain.    isosorbide mononitrate (IMDUR) 30 MG 24 hr tablet Take 1 tablet (30 mg total) by mouth once daily. Take 1/2 tablet with breakfast    NITROGLYCERIN SL Place under the tongue.    ondansetron (ZOFRAN-ODT) 4 MG TbDL Take 1 tablet (4 mg total) by mouth every 8 (eight) hours as needed (nausea, vomiting).    pantoprazole (PROTONIX) 40 MG tablet Take 40 mg by mouth.    SUPER THIN LANCETS 28 gauge Misc 2 (two) times daily.    TRUE METRIX GLUCOSE METER Misc 2 (two) times daily.     Family History       Problem Relation (Age of Onset)    Heart disease Father, Brother, Maternal Grandmother     Hypertension Father          Tobacco Use    Smoking status: Former     Current packs/day: 0.25     Average packs/day: 0.3 packs/day for 19.6 years (4.9 ttl pk-yrs)     Types: Cigarettes, Cigars     Start date: 2004    Smokeless tobacco: Never   Substance and Sexual Activity    Alcohol use: Not Currently     Comment: occasional drink previous heavy drinker quit heavily in 2001    Drug use: Never    Sexual activity: Not Currently     Review of Systems   Constitutional:  Positive for appetite change and chills.   HENT:  Negative for sore throat.    Respiratory:  Negative for cough, chest tightness and shortness of breath.    Cardiovascular:  Negative for chest pain.   Gastrointestinal:  Positive for abdominal pain, nausea and vomiting. Negative for abdominal distention, constipation and diarrhea.   Genitourinary:  Negative for difficulty urinating and dysuria.   Skin:  Positive for wound.        2 open abscesses left axilla    Neurological:  Negative for light-headedness and headaches.   Psychiatric/Behavioral:  Negative for agitation.      Objective:     Vital Signs (Most Recent):  Temp: 98.1 °F (36.7 °C) (08/14/23 1130)  Pulse: 100 (08/14/23 1130)  Resp: 20 (08/14/23 1130)  BP: (!) 180/113 (08/14/23 1130)  SpO2: 95 % (08/14/23 1130) Vital Signs (24h Range):  Temp:  [98.1 °F (36.7 °C)-99.7 °F (37.6 °C)] 98.1 °F (36.7 °C)  Pulse:  [] 100  Resp:  [18-22] 20  SpO2:  [94 %-98 %] 95 %  BP: (139-226)/() 180/113     Weight: (!) 236.4 kg (521 lb 2.7 oz)  Body mass index is 84.12 kg/m².     Physical Exam  Constitutional:       Appearance: He is obese.   HENT:      Head: Normocephalic.      Mouth/Throat:      Mouth: Mucous membranes are moist.   Cardiovascular:      Rate and Rhythm: Normal rate and regular rhythm.      Pulses: Normal pulses.      Heart sounds: Normal heart sounds.   Pulmonary:      Effort: Pulmonary effort is normal. No respiratory distress.      Breath sounds: Normal breath sounds. No stridor. No  wheezing or rhonchi.   Abdominal:      General: Bowel sounds are normal.      Palpations: Abdomen is soft. There is no mass.      Tenderness: There is abdominal tenderness. There is no guarding or rebound.      Hernia: No hernia is present.      Comments: Epigastric tenderness   Musculoskeletal:         General: Normal range of motion.      Cervical back: Normal range of motion.   Skin:     General: Skin is warm and dry.      Comments: Left axilla with 2 open abscesses , serosanguinous drainage    Neurological:      Mental Status: He is alert and oriented to person, place, and time.   Psychiatric:         Mood and Affect: Mood normal.                Significant Labs: All pertinent labs within the past 24 hours have been reviewed.  Recent Lab Results         08/14/23  1019   08/14/23  0101        Albumin/Globulin Ratio 0.7   0.8       Albumin 3.2   3.5       Alkaline Phosphatase 163   188       ALT 21   22       Amylase   126       Anion Gap 14   17       AST 23   28       Baso # 0.02   0.02       Basophil % 0.2   0.2       BILIRUBIN TOTAL 2.2   2.8       BUN 10   7       BUN/CREAT RATIO 11   6       Calcium 9.0   9.4       Chloride 94   93       CO2 30   28       Creatinine 0.94   1.10       Differential Type Auto   Auto       eGFR 96   80       Eos # 0.03   0.00       Eosinophil % 0.2   0.0       Globulin, Total 4.3   4.5       Glucose 188   226       Hematocrit 44.6   46.5       Hemoglobin 14.8   15.7       Lipase 418   1,011       Lymph # 1.11   0.80       Lymph % 9.1   6.0       Magnesium 1.2         MCH 33.3   33.5       MCHC 33.2   33.8       .5   99.1       Mono # 0.66   0.74       Mono % 5.4   5.6       MPV 10.1   9.5       Neutrophils, Abs 10.40   11.73       Neutrophils Relative 85.1   88.2       Platelets 162   206       Potassium 3.9   3.9       PROTEIN TOTAL 7.5   8.0       RBC 4.44   4.69       RDW 14.4   14.3       Sodium 134   134       WBC 12.22   13.29               Significant Imaging: I  "have reviewed all pertinent imaging results/findings within the past 24 hours.    Assessment/Plan:     * Acute on chronic pancreatitis    08/14/23 Lipase 418 today , down from 1011 early this am  Has vomited 10 x since yesterday morning around 1000  Has been npo since 1000 yesterday  Abdominal pain on arrival to ER was reported as level 9, after morphine pain is level 5.   Continue IV fluids and morphine for pain control     Lipase ,cbc,bmp in am    Hidradenitis suppurativa  08/14/23 2 open  abscesses with serosanguinous drainage noted. States areas are sore to touch. Denies fever but does endorse having chills. WBC is 12,000.  Will culture drainage and cover with doxycycline.  Warm compresses BID and prn     Type 2 diabetes mellitus with hyperglycemia, without long-term current use of insulin    Lab Results   Component Value Date    HGBA1C 6.3 06/08/2023     Most recent fingerstick glucose reviewed- No results for input(s): "POCTGLUCOSE" in the last 24 hours.  Current correctional scale  Low  Maintain anti-hyperglycemic dose as follows-   Antihyperglycemics (From admission, onward)      None          Hold Oral hypoglycemics while patient is in the hospital.      08/14/23 POCT glucose achs - hold SSI while NPO    CAD (coronary artery disease)  08/14/23 has hx of coronary stents x 4  Continue plavix  Continue imdur    Hypertension  08/14/23 continue home meds of norvasc 10 mg daily and coreg 12.5 mg BID      Hypomagnesemia  Patient has Abnormal Magnesium: hypomagnesemia. Will continue to monitor electrolytes closely. Will replace the affected electrolytes and repeat labs to be done after interventions completed. The patient's magnesium results have been reviewed and are listed below.  Recent Labs   Lab 08/14/23  1019   MG 1.2*        08/14/23 replace with 3 gms magnesium sulfate     Dyslipidemia  08/14/23 hold statins      Severe obesity (BMI >= 40)  Body mass index is 84.12 kg/m². Morbid obesity complicates all " aspects of disease management from diagnostic modalities to treatment. Weight loss encouraged and health benefits explained to patient.         VTE Risk Mitigation (From admission, onward)      None           Covered with doxycycline rather than clindamycin            DOC Thomas  Department of Hospital Medicine  [facility]

## 2023-08-14 NOTE — DISCHARGE INSTRUCTIONS
Keep the underarms clean and dry using antibacterial soap such as dial.  Do not clean with rubbing alcohol or peroxide.  Clear liquid for for 24 hours, then slowly advance as tolerated.  Take medication as prescribed.  Follow-up with your family doctor in 2-3 days for a recheck.  Return to the ER for new or worsening of your symptoms.

## 2023-08-14 NOTE — Clinical Note
Diagnosis: Acute on chronic pancreatitis [[id number]]   Admitting Provider:: PEPITO KUMAR IV [[id number]]   Future Attending Provider: PEPITO KUMAR IV [[id number]]   Reason for IP Medical Treatment  (Clinical interventions that can only be accomplished in the IP setting? ) :: IV Pain Control, Hydration, Medication   I certify that Inpatient services for greater than or equal to 2 midnights are medically necessary:: Yes   Plans for Post-Acute care--if anticipated (pick the single best option):: A. No post acute care anticipated at this time   Special Needs:: No Special Needs [1]

## 2023-08-14 NOTE — ASSESSMENT & PLAN NOTE
08/14/23 Lipase 418 today , down from 1011 early this am  Has vomited 10 x since yesterday morning around 1000  Has been npo since 1000 yesterday  Abdominal pain on arrival to ER was reported as level 9, after morphine pain is level 5.   Continue IV fluids and morphine for pain control     Lipase ,cbc,bmp in am

## 2023-08-14 NOTE — HPI
52 yo male with PMH of CAD, HAKEEM, hidradenitis suppurativa, NIDDM, dyslipidemia, macrocytic anemia, hypertension and obesity. Complains of having abdominal pain, nausea and vomiting that started yesterday around 1000. States it started after eating steak and eggs for breakfast. Reports he has vomited about 10 x  with last episode of vomiting this morning around 0500. State he has been NPO since breakfast yesterday. He also complains of drainage to left axilla , states this started Friday. States has hx of hidradenitis and is followed by Dr. Mo Boyer with last visit about a week ago.  Has 2  small open abscesses to left axilla, both with serosanguineous drainage. Will culture drainage. Will add doxycycline . Denies having fever but states he has had chills. Will continue with IV fluids and pain control for pancreatitis.

## 2023-08-14 NOTE — ASSESSMENT & PLAN NOTE
08/14/23 2 open  abscesses with serosanguinous drainage noted. States areas are sore to touch. Denies fever but does endorse having chills. WBC is 12,000.  Will culture drainage and cover with doxycycline.  Warm compresses BID and prn

## 2023-08-14 NOTE — ASSESSMENT & PLAN NOTE
Body mass index is 84.12 kg/m². Morbid obesity complicates all aspects of disease management from diagnostic modalities to treatment. Weight loss encouraged and health benefits explained to patient.

## 2023-08-14 NOTE — ED TRIAGE NOTES
Pt reports abdominal pain, nausea and vomiting that started about 18 hours PTA. Pt has history of pancreatitis. Pt also reports drainage under left axillae from incision and drainage procedure. Pt does not remember when this was done.

## 2023-08-14 NOTE — SUBJECTIVE & OBJECTIVE
Past Medical History:   Diagnosis Date    Chronic pancreatitis, unspecified pancreatitis type     Coronary artery disease     Diabetes mellitus     Dyslipidemia     Hidradenitis     Hypertension        Past Surgical History:   Procedure Laterality Date    CARDIAC SURGERY      Stents x6    CHOLECYSTECTOMY      UNDESCENDED TESTICLE EXPLORATION N/A        Review of patient's allergies indicates:   Allergen Reactions    Ticagrelor Shortness Of Breath    Vancomycin analogues Other (See Comments)     Patient experienced adverse effect, and had some renal insufficiency after receiving Vancomycin.       Current Facility-Administered Medications on File Prior to Encounter   Medication    [COMPLETED] HYDROmorphone (PF) injection 1 mg    [COMPLETED] HYDROmorphone (PF) injection 1 mg    [COMPLETED] iopamidoL (ISOVUE-370) injection 100 mL    [COMPLETED] ondansetron injection 4 mg    [COMPLETED] sodium chloride 0.9% bolus 1,000 mL 1,000 mL     Current Outpatient Medications on File Prior to Encounter   Medication Sig    amLODIPine (NORVASC) 10 MG tablet Take 1 tablet (10 mg total) by mouth once daily.    aspirin 81 MG Chew Take 1 tablet (81 mg total) by mouth once daily.    atorvastatin (LIPITOR) 80 MG tablet Take 1 tablet (80 mg total) by mouth once daily.    carvediloL (COREG) 12.5 MG tablet Take 1 tablet (12.5 mg total) by mouth before breakfast.    clopidogreL (PLAVIX) 75 mg tablet Take 75 mg by mouth.    doxycycline (VIBRAMYCIN) 100 MG Cap Take 1 capsule (100 mg total) by mouth 2 (two) times daily. for 10 days    fenofibrate (TRICOR) 145 MG tablet Take 1 tablet (145 mg total) by mouth once daily.    furosemide (LASIX) 20 MG tablet Take 20 mg by mouth once daily.    HYDROcodone-acetaminophen (NORCO)  mg per tablet Take 1 tablet by mouth every 6 (six) hours as needed for Pain.    isosorbide mononitrate (IMDUR) 30 MG 24 hr tablet Take 1 tablet (30 mg total) by mouth once daily. Take 1/2 tablet with breakfast     NITROGLYCERIN SL Place under the tongue.    ondansetron (ZOFRAN-ODT) 4 MG TbDL Take 1 tablet (4 mg total) by mouth every 8 (eight) hours as needed (nausea, vomiting).    pantoprazole (PROTONIX) 40 MG tablet Take 40 mg by mouth.    SUPER THIN LANCETS 28 gauge Misc 2 (two) times daily.    TRUE METRIX GLUCOSE METER Misc 2 (two) times daily.     Family History       Problem Relation (Age of Onset)    Heart disease Father, Brother, Maternal Grandmother    Hypertension Father          Tobacco Use    Smoking status: Former     Current packs/day: 0.25     Average packs/day: 0.3 packs/day for 19.6 years (4.9 ttl pk-yrs)     Types: Cigarettes, Cigars     Start date: 2004    Smokeless tobacco: Never   Substance and Sexual Activity    Alcohol use: Not Currently     Comment: occasional drink previous heavy drinker quit heavily in 2001    Drug use: Never    Sexual activity: Not Currently     Review of Systems   Constitutional:  Positive for appetite change and chills.   HENT:  Negative for sore throat.    Respiratory:  Negative for cough, chest tightness and shortness of breath.    Cardiovascular:  Negative for chest pain.   Gastrointestinal:  Positive for abdominal pain, nausea and vomiting. Negative for abdominal distention, constipation and diarrhea.   Genitourinary:  Negative for difficulty urinating and dysuria.   Skin:  Positive for wound.        2 open abscesses left axilla    Neurological:  Negative for light-headedness and headaches.   Psychiatric/Behavioral:  Negative for agitation.      Objective:     Vital Signs (Most Recent):  Temp: 98.1 °F (36.7 °C) (08/14/23 1130)  Pulse: 100 (08/14/23 1130)  Resp: 20 (08/14/23 1130)  BP: (!) 180/113 (08/14/23 1130)  SpO2: 95 % (08/14/23 1130) Vital Signs (24h Range):  Temp:  [98.1 °F (36.7 °C)-99.7 °F (37.6 °C)] 98.1 °F (36.7 °C)  Pulse:  [] 100  Resp:  [18-22] 20  SpO2:  [94 %-98 %] 95 %  BP: (139-226)/() 180/113     Weight: (!) 236.4 kg (521 lb 2.7 oz)  Body mass  index is 84.12 kg/m².     Physical Exam  Constitutional:       Appearance: He is obese.   HENT:      Head: Normocephalic.      Mouth/Throat:      Mouth: Mucous membranes are moist.   Cardiovascular:      Rate and Rhythm: Normal rate and regular rhythm.      Pulses: Normal pulses.      Heart sounds: Normal heart sounds.   Pulmonary:      Effort: Pulmonary effort is normal. No respiratory distress.      Breath sounds: Normal breath sounds. No stridor. No wheezing or rhonchi.   Abdominal:      General: Bowel sounds are normal.      Palpations: Abdomen is soft. There is no mass.      Tenderness: There is abdominal tenderness. There is no guarding or rebound.      Hernia: No hernia is present.      Comments: Epigastric tenderness   Musculoskeletal:         General: Normal range of motion.      Cervical back: Normal range of motion.   Skin:     General: Skin is warm and dry.      Comments: Left axilla with 2 open abscesses , serosanguinous drainage    Neurological:      Mental Status: He is alert and oriented to person, place, and time.   Psychiatric:         Mood and Affect: Mood normal.                Significant Labs: All pertinent labs within the past 24 hours have been reviewed.  Recent Lab Results         08/14/23  1019   08/14/23  0101        Albumin/Globulin Ratio 0.7   0.8       Albumin 3.2   3.5       Alkaline Phosphatase 163   188       ALT 21   22       Amylase   126       Anion Gap 14   17       AST 23   28       Baso # 0.02   0.02       Basophil % 0.2   0.2       BILIRUBIN TOTAL 2.2   2.8       BUN 10   7       BUN/CREAT RATIO 11   6       Calcium 9.0   9.4       Chloride 94   93       CO2 30   28       Creatinine 0.94   1.10       Differential Type Auto   Auto       eGFR 96   80       Eos # 0.03   0.00       Eosinophil % 0.2   0.0       Globulin, Total 4.3   4.5       Glucose 188   226       Hematocrit 44.6   46.5       Hemoglobin 14.8   15.7       Lipase 418   1,011       Lymph # 1.11   0.80       Lymph %  9.1   6.0       Magnesium 1.2         MCH 33.3   33.5       MCHC 33.2   33.8       .5   99.1       Mono # 0.66   0.74       Mono % 5.4   5.6       MPV 10.1   9.5       Neutrophils, Abs 10.40   11.73       Neutrophils Relative 85.1   88.2       Platelets 162   206       Potassium 3.9   3.9       PROTEIN TOTAL 7.5   8.0       RBC 4.44   4.69       RDW 14.4   14.3       Sodium 134   134       WBC 12.22   13.29               Significant Imaging: I have reviewed all pertinent imaging results/findings within the past 24 hours.

## 2023-08-14 NOTE — ASSESSMENT & PLAN NOTE
Patient has Abnormal Magnesium: hypomagnesemia. Will continue to monitor electrolytes closely. Will replace the affected electrolytes and repeat labs to be done after interventions completed. The patient's magnesium results have been reviewed and are listed below.  Recent Labs   Lab 08/14/23  1019   MG 1.2*        08/14/23 replace with 3 gms magnesium sulfate

## 2023-08-14 NOTE — PLAN OF CARE
[facility]  Initial Discharge Assessment       Primary Care Provider: Juan Pablo Ryan MD    Admission Diagnosis: Acute on chronic pancreatitis [K85.90, K86.1]    Admission Date: 8/14/2023  Expected Discharge Date:     Transition of Care Barriers: (P) None    Payor: MEDICARE / Plan: MEDICARE PART A & B / Product Type: Government /     Extended Emergency Contact Information  Primary Emergency Contact: Vera Mehta  Mobile Phone: [phone]  Relation: Mother  Preferred language: English   needed? No    Discharge Plan A: (P) Home         [facility] [place] - [address]  [address]  [place] [address]  Phone: [phone] Fax: [phone]    [id number] - [place], [place] - [address] [facility] [address]  [address]  [place] [address]  Phone: [phone] Fax: [phone]    [facility] [address]  [address]  [place] [address]  Phone: [phone] Fax: [phone]      Initial Assessment (most recent)       Adult Discharge Assessment - 08/14/23 1301          Discharge Assessment    Assessment Type Discharge Planning Assessment (P)      Confirmed/corrected address, phone number and insurance Yes (P)      Confirmed Demographics Correct on Facesheet (P)      Source of Information patient (P)      Communicated BETY with patient/caregiver Date not available/Unable to determine (P)      Reason For Admission Acute on chronic Pancreatitis (P)      People in Home alone (P)      Do you expect to return to your current living situation? Yes (P)      Prior to hospitilization cognitive status: Alert/Oriented (P)      Current cognitive status: Alert/Oriented (P)      Walking or Climbing Stairs ambulation difficulty, requires equipment;stair climbing difficulty, requires equipment (P)      Mobility Management requests cane at discharge (P)      Home Accessibility stairs to enter home  (P)      Stair Railings, Main Entrance railings safe and in good condition (P)      Equipment Currently Used at Home blood pressure machine;glucometer (P)      Patient currently being followed by outpatient case management? No (P)      Do you currently have service(s) that help you manage your care at home? No (P)      Do you take prescription medications? Yes (P)      Do you have prescription coverage? Yes (P)      Coverage Medicare (P)      Is the patient taking medications as prescribed? yes (P)      Who is going to help you get home at discharge? Vera Mehta (P)      How do you get to doctors appointments? family or friend will provide (P)      Are you on dialysis? No (P)      Do you take coumadin? No (P)      Discharge Plan A Home (P)      DME Needed Upon Discharge  cane, straight (P)      Discharge Plan discussed with: Patient (P)      Transition of Care Barriers None (P)                    Mr. Fischer lives in his apartment alone, he says he does have stairs to enter his apartment and needs a cane for support when climbing stairs.  He has a blood pressure machine and a glucometer at home.  He doesn't have any type of DME or home health services at this time.

## 2023-08-14 NOTE — ED PROVIDER NOTES
Encounter Date: 8/14/2023       History     Chief Complaint   Patient presents with    Vomiting    Abdominal Pain     53 year old AAM presents to the ER for upper abd pain and n/v.  He has hx of pancreatitis, and he reports this is a typical presentation of that.  He also reports wound under left arm secondary to hidradenitis    The history is provided by the patient.   Abdominal Pain  The current episode started several hours ago. The onset of the illness was gradual. The problem has been gradually worsening. The abdominal pain is located in the epigastric region, LUQ and RUQ. The abdominal pain radiates to the back. The abdominal pain is relieved by nothing. The other symptoms of the illness include nausea and vomiting. The other symptoms of the illness do not include fever, jaundice, melena, shortness of breath, diarrhea or dysuria.   The patient has not had a change in bowel habit. Additional symptoms associated with the illness include chills. Symptoms associated with the illness do not include anorexia, diaphoresis, heartburn, constipation, urgency, hematuria, frequency or back pain. Significant associated medical issues include diabetes. Significant associated medical issues do not include PUD, GERD, inflammatory bowel disease, sickle cell disease, gallstones, liver disease, substance abuse, diverticulitis, HIV or cardiac disease.     Review of patient's allergies indicates:   Allergen Reactions    Ticagrelor Shortness Of Breath     Past Medical History:   Diagnosis Date    Chronic pancreatitis, unspecified pancreatitis type     Coronary artery disease     Diabetes mellitus     Dyslipidemia     Hidradenitis     Hypertension      Past Surgical History:   Procedure Laterality Date    CARDIAC SURGERY      Stents x6    CHOLECYSTECTOMY      UNDESCENDED TESTICLE EXPLORATION N/A      Family History   Problem Relation Age of Onset    Heart disease Father     Hypertension Father     Heart disease Brother     Heart  disease Maternal Grandmother      Social History     Tobacco Use    Smoking status: Former     Current packs/day: 0.25     Average packs/day: 0.3 packs/day for 19.6 years (4.9 ttl pk-yrs)     Types: Cigarettes, Cigars     Start date: 2004    Smokeless tobacco: Never   Substance Use Topics    Alcohol use: Not Currently     Comment: occasional drink previous heavy drinker quit heavily in 2001    Drug use: Never     Review of Systems   Constitutional:  Positive for chills. Negative for diaphoresis and fever.   HENT:  Negative for sore throat.    Respiratory:  Negative for shortness of breath.    Cardiovascular:  Negative for chest pain.   Gastrointestinal:  Positive for abdominal pain, nausea and vomiting. Negative for anorexia, constipation, diarrhea, heartburn, jaundice and melena.   Genitourinary:  Negative for dysuria, frequency, hematuria and urgency.   Musculoskeletal:  Negative for back pain.   Skin:  Positive for wound. Negative for rash.   Neurological:  Negative for weakness.   Hematological:  Does not bruise/bleed easily.       Physical Exam     Initial Vitals   BP Pulse Resp Temp SpO2   08/14/23 0047 08/14/23 0047 08/14/23 0047 08/14/23 0047 08/14/23 0050   (!) 226/121 (!) 140 20 99.7 °F (37.6 °C) 98 %      MAP       --                Physical Exam    Nursing note and vitals reviewed.  Constitutional: He appears well-developed and well-nourished. He is not diaphoretic. He is cooperative.  Non-toxic appearance. He has a sickly appearance. He appears ill. No distress.   HENT:   Head: Normocephalic and atraumatic.   Eyes: Pupils are equal, round, and reactive to light.   Neck: Neck supple.   Cardiovascular:  Normal rate, regular rhythm, normal heart sounds and intact distal pulses.     Exam reveals no gallop and no friction rub.       No murmur heard.  Pulmonary/Chest: Breath sounds normal. No respiratory distress. He has no wheezes. He has no rhonchi. He has no rales. He exhibits no tenderness.   Abdominal:  Abdomen is soft and protuberant. There is abdominal tenderness in the right upper quadrant, epigastric area and left upper quadrant. There is guarding. There is no rebound.   Musculoskeletal:      Cervical back: Neck supple.     Neurological: He is alert and oriented to person, place, and time. GCS score is 15. GCS eye subscore is 4. GCS verbal subscore is 5. GCS motor subscore is 6.   Skin: Skin is warm and dry. Capillary refill takes less than 2 seconds.   Left axilla region noted with two small open abscesses, both draining serosanguineous fluid.   Psychiatric: He has a normal mood and affect.         Medical Screening Exam   See Full Note    ED Course   Procedures  Labs Reviewed   AMYLASE - Abnormal; Notable for the following components:       Result Value    Amylase 126 (*)     All other components within normal limits   COMPREHENSIVE METABOLIC PANEL - Abnormal; Notable for the following components:    Sodium 134 (*)     Chloride 93 (*)     Anion Gap 17 (*)     Glucose 226 (*)     Globulin 4.5 (*)     Bilirubin, Total 2.8 (*)     Alk Phos 188 (*)     All other components within normal limits   LIPASE - Abnormal; Notable for the following components:    Lipase 1,011 (*)     All other components within normal limits   CBC WITH DIFFERENTIAL - Abnormal; Notable for the following components:    WBC 13.29 (*)     MCV 99.1 (*)     MCH 33.5 (*)     Neutrophils % 88.2 (*)     Lymphocytes % 6.0 (*)     Neutrophils, Abs 11.73 (*)     Lymphocytes, Absolute 0.80 (*)     Eosinophils % 0.0 (*)     All other components within normal limits   CBC W/ AUTO DIFFERENTIAL    Narrative:     The following orders were created for panel order CBC auto differential.  Procedure                               Abnormality         Status                     ---------                               -----------         ------                     CBC with Differential[[id number]]        Abnormal            Final result                 Please view  results for these tests on the individual orders.          Imaging Results              CT Abdomen Pelvis With Contrast (Preliminary result)  Result time 08/14/23 03:14:59      Wet Read by Holly Moses FNP (08/14/23 03:14:59, [facility] - Emergency Department, Emergency Medicine)    Mild pancreatitis of the head of the pancreas with enlargement of fat stranding.  No peripancreatic fluid collection or abscess seen her VR                                     Medications   sodium chloride 0.9% bolus 1,000 mL 1,000 mL (0 mLs Intravenous Stopped 8/14/23 0209)   ondansetron injection 4 mg (4 mg Intravenous Given 8/14/23 0115)   HYDROmorphone (PF) injection 1 mg (1 mg Intravenous Given 8/14/23 0115)   HYDROmorphone (PF) injection 1 mg (1 mg Intravenous Given 8/14/23 0225)   iopamidoL (ISOVUE-370) injection 100 mL (100 mLs Intravenous Given 8/14/23 0154)     Medical Decision Making:   Differential Diagnosis:   Pancreatitis, gastritis, GERD, dehydration, hidradenitis.   Clinical Tests:   Lab Tests: Ordered and Reviewed  Radiological Study: Ordered and Reviewed  ED Management:  Patient has refused hospital admission, stating he wants to try to go home.   reviewed and only 2 meds filled.              ED Course as of 08/14/23 0320   Mon Aug 14, 2023   0107 WBC(!): 13.29 [AG]   0107 BP(!): 226/121 [AG]   0107 Pulse(!): 140 [AG]   0114 Pulse(!): 128 [AG]   0114 BP(!): 184/102 [AG]   0116 Lipase(!): 1,011 [AG]   0116 Amylase(!): 126 [AG]   0116 Sodium(!): 134 [AG]   0116 Chloride(!): 93 [AG]   0116 Anion Gap(!): 17 [AG]   0116 Glucose(!): 226 [AG]   0116 Creatinine: 1.10 [AG]   0116 Alkaline Phosphatase(!): 188 [AG]   0116 BILIRUBIN TOTAL(!): 2.8 [AG]   0116 eGFR: 80 [AG]      ED Course User Index  [AG] Holly Moses FNP                Clinical Impression:   Final diagnoses:  [R11.2] Nausea and vomiting, unspecified vomiting type  [K85.90] Acute pancreatitis without infection or necrosis, unspecified  pancreatitis type (Primary)  [L73.2] Hidradenitis suppurativa of left axilla        ED Disposition Condition    Discharge Stable          ED Prescriptions       Medication Sig Dispense Start Date End Date Auth. Provider    HYDROcodone-acetaminophen (NORCO)  mg per tablet Take 1 tablet by mouth every 6 (six) hours as needed for Pain. 12 tablet 8/14/2023 -- Holly Moses FNP    doxycycline (VIBRAMYCIN) 100 MG Cap Take 1 capsule (100 mg total) by mouth 2 (two) times daily. for 10 days 20 capsule 8/14/2023 8/24/2023 Holly Moses FNP    ondansetron (ZOFRAN-ODT) 4 MG TbDL Take 1 tablet (4 mg total) by mouth every 8 (eight) hours as needed (nausea, vomiting). 15 tablet 8/14/2023 -- Holly Moses FNP          Follow-up Information       Follow up With Specialties Details Why Contact Info    Juan Pablo Ryan MD Family Medicine Schedule an appointment as soon as possible for a visit in 2 days  [address] [place] [address]  [facility] [place] [address]  [phone]               Holly Moses FNP  08/14/23 0326

## 2023-08-14 NOTE — PLAN OF CARE
Problem: Adult Inpatient Plan of Care  Goal: Plan of Care Review  Outcome: Ongoing, Progressing  Flowsheets (Taken 8/14/2023 1206)  Plan of Care Reviewed With: patient  Goal: Patient-Specific Goal (Individualized)  Outcome: Ongoing, Progressing  Flowsheets (Taken 8/14/2023 1206)  Anxieties, Fears or Concerns: none  Individualized Care Needs: assist to ambulate with IV pole  Goal: Absence of Hospital-Acquired Illness or Injury  Outcome: Ongoing, Progressing  Intervention: Identify and Manage Fall Risk  Flowsheets (Taken 8/14/2023 1206)  Safety Promotion/Fall Prevention:   assistive device/personal item within reach   bed alarm set   diversional activities provided   Fall Risk reviewed with patient/family   Fall Risk signage in place   lighting adjusted   nonskid shoes/socks when out of bed   side rails raised x 3   room near unit station   instructed to call staff for mobility  Goal: Optimal Comfort and Wellbeing  Outcome: Ongoing, Progressing  Intervention: Monitor Pain and Promote Comfort  Flowsheets (Taken 8/14/2023 1206)  Pain Management Interventions:   care clustered   pain management plan reviewed with patient/caregiver   pillow support provided     Problem: Diabetes Comorbidity  Goal: Blood Glucose Level Within Targeted Range  Outcome: Ongoing, Progressing  Intervention: Monitor and Manage Glycemia  Flowsheets (Taken 8/14/2023 1206)  Glycemic Management: blood glucose monitored     Problem: Fluid and Electrolyte Imbalance (Acute Kidney Injury/Impairment)  Goal: Fluid and Electrolyte Balance  Outcome: Ongoing, Progressing  Intervention: Monitor and Manage Fluid and Electrolyte Balance  Flowsheets (Taken 8/14/2023 1206)  Fluid/Electrolyte Management:   fluids provided   intravenous fluid replacement initiated     Problem: Oral Intake Inadequate (Acute Kidney Injury/Impairment)  Goal: Optimal Nutrition Intake  Outcome: Ongoing, Progressing  Intervention: Promote and Optimize Nutrition  Flowsheets (Taken  8/14/2023 1206)  Oral Nutrition Promotion:   calorie-dense foods provided   physical activity promoted     Problem: Renal Function Impairment (Acute Kidney Injury/Impairment)  Goal: Effective Renal Function  Outcome: Ongoing, Progressing  Intervention: Monitor and Support Renal Function  Flowsheets (Taken 8/14/2023 1206)  Medication Review/Management: medications reviewed     Problem: Impaired Wound Healing  Goal: Optimal Wound Healing  Outcome: Ongoing, Progressing  Intervention: Promote Wound Healing  Flowsheets (Taken 8/14/2023 1206)  Oral Nutrition Promotion:   calorie-dense foods provided   physical activity promoted  Pain Management Interventions:   care clustered   pain management plan reviewed with patient/caregiver   pillow support provided

## 2023-08-14 NOTE — ASSESSMENT & PLAN NOTE
"  Lab Results   Component Value Date    HGBA1C 6.3 06/08/2023     Most recent fingerstick glucose reviewed- No results for input(s): "POCTGLUCOSE" in the last 24 hours.  Current correctional scale  Low  Maintain anti-hyperglycemic dose as follows-   Antihyperglycemics (From admission, onward)    None        Hold Oral hypoglycemics while patient is in the hospital.      08/14/23 POCT glucose achs - hold SSI while NPO  "

## 2023-08-14 NOTE — NURSING
Received patient to room 1114 from ED via wheel chair.  Oriented x4.   Does acknowledge pain to upper abdomen.  Rates pain at 7/10 on Numeric pain  scale. Assisted to bed.  Oriented to call system and instructed to call for assist.  Vital signs taken and noted. No distress noted at this time.  Patient reported that he has a draining wound to left axilla area and needs to be cleaned up.  Reported this to RADHA Cantu NP.   In to assess wound at this time.  Wound culture taken at this time. Cleaned and dressed the site.  Photograph will be take with next dressing change.

## 2023-08-15 ENCOUNTER — OUTPATIENT CASE MANAGEMENT (OUTPATIENT)
Dept: ADMINISTRATIVE | Facility: OTHER | Age: 53
End: 2023-08-15

## 2023-08-15 VITALS
DIASTOLIC BLOOD PRESSURE: 76 MMHG | HEIGHT: 66 IN | WEIGHT: 218.63 LBS | OXYGEN SATURATION: 95 % | RESPIRATION RATE: 20 BRPM | HEART RATE: 76 BPM | BODY MASS INDEX: 35.14 KG/M2 | SYSTOLIC BLOOD PRESSURE: 125 MMHG | TEMPERATURE: 98 F

## 2023-08-15 LAB
ANION GAP SERPL CALCULATED.3IONS-SCNC: 11 MMOL/L (ref 7–16)
BASOPHILS # BLD AUTO: 0.03 K/UL (ref 0–0.2)
BASOPHILS NFR BLD AUTO: 0.4 % (ref 0–1)
BUN SERPL-MCNC: 9 MG/DL (ref 7–18)
BUN/CREAT SERPL: 11 (ref 6–20)
CALCIUM SERPL-MCNC: 8.6 MG/DL (ref 8.5–10.1)
CHLORIDE SERPL-SCNC: 99 MMOL/L (ref 98–107)
CO2 SERPL-SCNC: 30 MMOL/L (ref 21–32)
CREAT SERPL-MCNC: 0.85 MG/DL (ref 0.7–1.3)
DIFFERENTIAL METHOD BLD: ABNORMAL
EGFR (NO RACE VARIABLE) (RUSH/TITUS): 103 ML/MIN/1.73M2
EOSINOPHIL # BLD AUTO: 0.12 K/UL (ref 0–0.5)
EOSINOPHIL NFR BLD AUTO: 1.8 % (ref 1–4)
ERYTHROCYTE [DISTWIDTH] IN BLOOD BY AUTOMATED COUNT: 14.4 % (ref 11.5–14.5)
GLUCOSE SERPL-MCNC: 141 MG/DL (ref 74–106)
GLUCOSE SERPL-MCNC: 203 MG/DL (ref 70–105)
HCT VFR BLD AUTO: 37.3 % (ref 40–54)
HGB BLD-MCNC: 12.5 G/DL (ref 13.5–18)
LIPASE SERPL-CCNC: 190 U/L (ref 73–393)
LYMPHOCYTES # BLD AUTO: 1.63 K/UL (ref 1–4.8)
LYMPHOCYTES NFR BLD AUTO: 24 % (ref 27–41)
MAGNESIUM SERPL-MCNC: 1.7 MG/DL (ref 1.7–2.3)
MCH RBC QN AUTO: 34.1 PG (ref 27–31)
MCHC RBC AUTO-ENTMCNC: 33.5 G/DL (ref 32–36)
MCV RBC AUTO: 101.6 FL (ref 80–96)
MONOCYTES # BLD AUTO: 0.41 K/UL (ref 0–0.8)
MONOCYTES NFR BLD AUTO: 6 % (ref 2–6)
MPC BLD CALC-MCNC: 9.6 FL (ref 9.4–12.4)
NEUTROPHILS # BLD AUTO: 4.61 K/UL (ref 1.8–7.7)
NEUTROPHILS NFR BLD AUTO: 67.8 % (ref 53–65)
PLATELET # BLD AUTO: 124 K/UL (ref 150–400)
POTASSIUM SERPL-SCNC: 3.5 MMOL/L (ref 3.5–5.1)
RBC # BLD AUTO: 3.67 M/UL (ref 4.6–6.2)
SODIUM SERPL-SCNC: 136 MMOL/L (ref 136–145)
WBC # BLD AUTO: 6.8 K/UL (ref 4.5–11)

## 2023-08-15 PROCEDURE — G0378 HOSPITAL OBSERVATION PER HR: HCPCS

## 2023-08-15 PROCEDURE — A6212 FOAM DRG <=16 SQ IN W/BORDER: HCPCS

## 2023-08-15 PROCEDURE — 25000003 PHARM REV CODE 250: Performed by: NURSE PRACTITIONER

## 2023-08-15 PROCEDURE — C9113 INJ PANTOPRAZOLE SODIUM, VIA: HCPCS | Performed by: NURSE PRACTITIONER

## 2023-08-15 PROCEDURE — 27201920 HC DRESSING, AQUACEL AG

## 2023-08-15 PROCEDURE — 83735 ASSAY OF MAGNESIUM: CPT | Performed by: NURSE PRACTITIONER

## 2023-08-15 PROCEDURE — 82962 GLUCOSE BLOOD TEST: CPT

## 2023-08-15 PROCEDURE — 83690 ASSAY OF LIPASE: CPT | Performed by: NURSE PRACTITIONER

## 2023-08-15 PROCEDURE — 63600175 PHARM REV CODE 636 W HCPCS: Performed by: NURSE PRACTITIONER

## 2023-08-15 PROCEDURE — 27000958

## 2023-08-15 PROCEDURE — 27000935

## 2023-08-15 PROCEDURE — 99239 HOSP IP/OBS DSCHRG MGMT >30: CPT | Performed by: NURSE PRACTITIONER

## 2023-08-15 PROCEDURE — 80048 BASIC METABOLIC PNL TOTAL CA: CPT | Performed by: NURSE PRACTITIONER

## 2023-08-15 PROCEDURE — 85025 COMPLETE CBC W/AUTO DIFF WBC: CPT | Performed by: NURSE PRACTITIONER

## 2023-08-15 RX ORDER — CLONIDINE HYDROCHLORIDE 0.1 MG/1
0.1 TABLET ORAL ONCE
Status: COMPLETED | OUTPATIENT
Start: 2023-08-15 | End: 2023-08-15

## 2023-08-15 RX ORDER — DOXYCYCLINE 100 MG/1
100 CAPSULE ORAL EVERY 12 HOURS
Qty: 14 CAPSULE | Refills: 0 | Status: SHIPPED | OUTPATIENT
Start: 2023-08-15 | End: 2023-08-15 | Stop reason: HOSPADM

## 2023-08-15 RX ORDER — NYSTATIN 100000 [USP'U]/ML
10 SUSPENSION ORAL 3 TIMES DAILY
Status: DISCONTINUED | OUTPATIENT
Start: 2023-08-15 | End: 2023-08-15 | Stop reason: HOSPADM

## 2023-08-15 RX ORDER — DOXYCYCLINE HYCLATE 100 MG
100 TABLET ORAL EVERY 12 HOURS
Qty: 14 TABLET | Refills: 0 | Status: SHIPPED | OUTPATIENT
Start: 2023-08-15 | End: 2023-08-22

## 2023-08-15 RX ORDER — SODIUM CHLORIDE 9 MG/ML
1000 INJECTION, SOLUTION INTRAVENOUS CONTINUOUS
Status: ACTIVE | OUTPATIENT
Start: 2023-08-15 | End: 2023-08-15

## 2023-08-15 RX ADMIN — CARVEDILOL 12.5 MG: 12.5 TABLET, FILM COATED ORAL at 05:08

## 2023-08-15 RX ADMIN — AMLODIPINE BESYLATE 10 MG: 5 TABLET ORAL at 09:08

## 2023-08-15 RX ADMIN — CLOPIDOGREL BISULFATE 75 MG: 75 TABLET ORAL at 09:08

## 2023-08-15 RX ADMIN — SODIUM CHLORIDE 1000 ML: 9 INJECTION, SOLUTION INTRAVENOUS at 05:08

## 2023-08-15 RX ADMIN — DOXYCYCLINE HYCLATE 100 MG: 100 TABLET, COATED ORAL at 09:08

## 2023-08-15 RX ADMIN — Medication 1 CAPSULE: at 12:08

## 2023-08-15 RX ADMIN — ISOSORBIDE MONONITRATE 30 MG: 30 TABLET, EXTENDED RELEASE ORAL at 09:08

## 2023-08-15 RX ADMIN — CLONIDINE HYDROCHLORIDE 0.1 MG: 0.1 TABLET ORAL at 04:08

## 2023-08-15 RX ADMIN — NYSTATIN 1000000 UNITS: 100000 SUSPENSION ORAL at 09:08

## 2023-08-15 RX ADMIN — MORPHINE SULFATE 4 MG: 4 INJECTION, SOLUTION INTRAMUSCULAR; INTRAVENOUS at 01:08

## 2023-08-15 RX ADMIN — PANTOPRAZOLE SODIUM 40 MG: 40 INJECTION, POWDER, LYOPHILIZED, FOR SOLUTION INTRAVENOUS at 05:08

## 2023-08-15 RX ADMIN — Medication 1 CAPSULE: at 09:08

## 2023-08-15 RX ADMIN — NYSTATIN 1000000 UNITS: 100000 SUSPENSION ORAL at 04:08

## 2023-08-15 NOTE — ASSESSMENT & PLAN NOTE
Patient has Abnormal Magnesium: hypomagnesemia. Will continue to monitor electrolytes closely. Will replace the affected electrolytes and repeat labs to be done after interventions completed. The patient's magnesium results have been reviewed and are listed below.  Recent Labs   Lab 08/15/23  0513   MG 1.7        08/14/23 replace with 3 gms magnesium sulfate     08/15/23 magnesium is 1.7

## 2023-08-15 NOTE — HOSPITAL COURSE
08/15/23 Awake and sitting up in bed without complaints. Lipase is 190 this morning. No vomiting since before admission. States no abdominal pain other than muscle soreness from vomiting. Continues to have drainage from abscesses left axilla. Dressing intact. Has small abscess to left  posterior thigh, no drainage noted. Talked with him re continuing doxy and following up with Dr. Mo Boyer.     Has tolerated lunch without problems. Denies any pain. Will dc home today. Pt is to continue wound care BID and prn to left axilla and right thigh with vashe. Continue doxycycline 100 mg po BID for one week . Follow up appt is to be made with Dr. Mo Boyer re hidradenitis. Follow up appt is to be made for follow up with PCP Juan Pablo Ryan for within in one week.

## 2023-08-15 NOTE — DISCHARGE SUMMARY
[facility] - Medical Surgical Unit  Hospital Medicine  Discharge Summary      Patient Name: Faustino Fischer  MRN: [id number]  ELÍAS: [id number]  Patient Class: OP- Observation  Admission Date: 8/14/2023  Hospital Length of Stay: 1 days  Discharge Date and Time:  08/15/2023 1:21 PM  Attending Physician: Jean Crews IV, DO   Discharging Provider: DOC Thomas  Primary Care Provider: Juan Pablo Ryan MD    Primary Care Team: Networked reference to record PCT     HPI:   52 yo male with PMH of CAD, HAKEEM, hidradenitis suppurativa, NIDDM, dyslipidemia, macrocytic anemia, hypertension and obesity. Complains of having abdominal pain, nausea and vomiting that started yesterday around 1000. States it started after eating steak and eggs for breakfast. Reports he has vomited about 10 x  with last episode of vomiting this morning around 0500. State he has been NPO since breakfast yesterday. He also complains of drainage to left axilla , states this started Friday. States has hx of hidradenitis and is followed by Dr. Mo Boyer with last visit about a week ago.  Has 2  small open abscesses to left axilla, both with serosanguineous drainage. Will culture drainage. Will add doxycycline . Denies having fever but states he has had chills. Will continue with IV fluids and pain control for pancreatitis.      * No surgery found *      Hospital Course:   08/15/23 Awake and sitting up in bed without complaints. Lipase is 190 this morning. No vomiting since before admission. States no abdominal pain other than muscle soreness from vomiting. Continues to have drainage from abscesses left axilla. Dressing intact. Has small abscess to left  posterior thigh, no drainage noted. Talked with him re continuing doxy and following up with Dr. Mo Boyer.     Has tolerated lunch without problems. Denies any pain. Will dc home today. Pt is to continue wound care BID and prn to left axilla and right thigh with vashe. Continue  "doxycycline 100 mg po BID for one week . Follow up appt is to be made with Dr. Mo Boyer re hidradenitis. Follow up appt is to be made for follow up with PCP Juan Pablo Ryan for within in one week.        Goals of Care Treatment Preferences:  Code Status: Full Code      Consults:     Derm  Hidradenitis suppurativa  08/14/23 2 open  abscesses with serosanguinous drainage noted. States areas are sore to touch. Denies fever but does endorse having chills. WBC is 12,000.  Will culture drainage and cover with doxycycline.  Warm compresses BID and prn       08/15/23 continue doxycycline , continue wound care with vashe bid and prn   Make follow up appt with Dr. Mo Boyer within the next week     Cardiac/Vascular  Dyslipidemia  08/14/23 hold statins      08/15/23 continue statin    Hypertension  08/14/23 continue home meds of norvasc 10 mg daily and coreg 12.5 mg BID      08/15/23 BP improved     Endocrine  Type 2 diabetes mellitus with hyperglycemia, without long-term current use of insulin    Lab Results   Component Value Date    HGBA1C 6.3 06/08/2023     Most recent fingerstick glucose reviewed- No results for input(s): "POCTGLUCOSE" in the last 24 hours.  Current correctional scale  Low  Maintain anti-hyperglycemic dose as follows-   Antihyperglycemics (From admission, onward)    None        Hold Oral hypoglycemics while patient is in the hospital.      08/14/23 POCT glucose achs - hold SSI while NPO    08/15/23 stable    GI  * Acute on chronic pancreatitis    08/14/23 Lipase 418 today , down from 1011 early this am  Has vomited 10 x since yesterday morning around 1000  Has been npo since 1000 yesterday  Abdominal pain on arrival to ER was reported as level 9, after morphine pain is level 5.   Continue IV fluids and morphine for pain control     Lipase ,cbc,bmp in am      08/15/23 lipase 190 this am , no vomiting since before admission . Has tolerated cl diet this morning. Will increase to regular for " lunch.    Tolerated lunch without problems . DC home. Make follow up appt with PCP Dr. Ryan for one week       Final Active Diagnoses:    Diagnosis Date Noted POA    PRINCIPAL PROBLEM:  Acute on chronic pancreatitis [K85.90, K86.1] 06/26/2021 Yes    Hidradenitis suppurativa [L73.2] 06/07/2023 Yes    Type 2 diabetes mellitus with hyperglycemia, without long-term current use of insulin [E11.65] 06/14/2023 Yes    CAD (coronary artery disease) [I25.10] 06/27/2021 Yes     Chronic    Hypertension [I10] 06/14/2023 Yes    Hypomagnesemia [E83.42] 06/26/2021 Unknown    Dyslipidemia [E78.5] 06/14/2023 Yes    Severe obesity (BMI >= 40) [E66.01] 06/14/2023 Yes      Problems Resolved During this Admission:       Discharged Condition: stable    Disposition: Home or Self Care    Follow Up:   Follow-up Information     Maty Boyer MD Follow up in 1 week(s).    Specialty: Dermatology  Contact information:  [address] [place] [address] [place] [address]  [phone]             Juan Pablo Ryan MD Follow up in 1 week(s).    Specialty: Family Medicine  Contact information:  [address] [place] [address]  [facility] [place] [address]  [phone]                       Patient Instructions:      Diet diabetic       Significant Diagnostic Studies: Labs: All labs within the past 24 hours have been reviewed    Pending Diagnostic Studies:     None         Medications:  Reconciled Home Medications:      Medication List      START taking these medications    * doxycycline 100 MG tablet  Commonly known as: VIBRA-TABS  Take 1 tablet (100 mg total) by mouth every 12 (twelve) hours. for 7 days  Replaces: doxycycline 100 MG Cap     * doxycycline 100 MG Cap  Commonly known as: VIBRAMYCIN  Take 1 capsule (100 mg total) by mouth every 12 (twelve) hours. for 7 days         * This list has 2 medication(s) that are the same as other medications prescribed for you. Read the directions carefully, and ask your doctor or other care  provider to review them with you.            CONTINUE taking these medications    amLODIPine 10 MG tablet  Commonly known as: NORVASC  Take 1 tablet (10 mg total) by mouth once daily.     aspirin 81 MG Chew  Take 1 tablet (81 mg total) by mouth once daily.     atorvastatin 80 MG tablet  Commonly known as: LIPITOR  Take 1 tablet (80 mg total) by mouth once daily.     carvediloL 12.5 MG tablet  Commonly known as: COREG  Take 1 tablet (12.5 mg total) by mouth before breakfast.     clopidogreL 75 mg tablet  Commonly known as: PLAVIX  Take 75 mg by mouth.     fenofibrate 145 MG tablet  Commonly known as: TRICOR  Take 1 tablet (145 mg total) by mouth once daily.     furosemide 20 MG tablet  Commonly known as: LASIX  Take 20 mg by mouth once daily.     HYDROcodone-acetaminophen  mg per tablet  Commonly known as: NORCO  Take 1 tablet by mouth every 6 (six) hours as needed for Pain.     isosorbide mononitrate 30 MG 24 hr tablet  Commonly known as: IMDUR  Take 1 tablet (30 mg total) by mouth once daily. Take 1/2 tablet with breakfast     NITROGLYCERIN SL  Place under the tongue.     ondansetron 4 MG Tbdl  Commonly known as: ZOFRAN-ODT  Take 1 tablet (4 mg total) by mouth every 8 (eight) hours as needed (nausea, vomiting).     pantoprazole 40 MG tablet  Commonly known as: PROTONIX  Take 40 mg by mouth.     SUPER THIN LANCETS 28 gauge Misc  Generic drug: lancets  2 (two) times daily.     TRUE METRIX GLUCOSE METER Misc  Generic drug: blood-glucose meter  2 (two) times daily.        STOP taking these medications    doxycycline 100 MG Cap  Commonly known as: VIBRAMYCIN  Replaced by: doxycycline 100 MG tablet            Indwelling Lines/Drains at time of discharge:   Lines/Drains/Airways     None                 Time spent on the discharge of patient: 30 minutes         DOC Thomas  Department of Hospital Medicine  [facility] - Medical Surgical Unit

## 2023-08-15 NOTE — ASSESSMENT & PLAN NOTE
08/14/23 Lipase 418 today , down from 1011 early this am  Has vomited 10 x since yesterday morning around 1000  Has been npo since 1000 yesterday  Abdominal pain on arrival to ER was reported as level 9, after morphine pain is level 5.   Continue IV fluids and morphine for pain control     Lipase ,cbc,bmp in am      08/15/23 lipase 190 this am , no vomiting since before admission . Has tolerated cl diet this morning. Will increase to regular for lunch.    Tolerated lunch without problems . DC home. Make follow up appt with PCP Dr. Ryan for one week

## 2023-08-15 NOTE — ASSESSMENT & PLAN NOTE
08/14/23 2 open  abscesses with serosanguinous drainage noted. States areas are sore to touch. Denies fever but does endorse having chills. WBC is 12,000.  Will culture drainage and cover with doxycycline.  Warm compresses BID and prn       08/15/23 continue doxycycline   Make follow up appt with Dr. Mo Boyer within the next week

## 2023-08-15 NOTE — PROGRESS NOTES
[facility] Surgical Unit  Hospital Medicine  Progress Note    Patient Name: Faustino Fischer  MRN: [id number]  Patient Class: IP- Inpatient   Admission Date: 8/14/2023  Length of Stay: 1 days  Attending Physician: Jean Crews IV, DO  Primary Care Provider: Juan Pablo Ryan MD        Subjective:     Principal Problem:Acute on chronic pancreatitis        HPI:  52 yo male with PMH of CAD, HAKEEM, hidradenitis suppurativa, NIDDM, dyslipidemia, macrocytic anemia, hypertension and obesity. Complains of having abdominal pain, nausea and vomiting that started yesterday around 1000. States it started after eating steak and eggs for breakfast. Reports he has vomited about 10 x  with last episode of vomiting this morning around 0500. State he has been NPO since breakfast yesterday. He also complains of drainage to left axilla , states this started Friday. States has hx of hidradenitis and is followed by Dr. Mo Boyer with last visit about a week ago.  Has 2  small open abscesses to left axilla, both with serosanguineous drainage. Will culture drainage. Will add doxycycline . Denies having fever but states he has had chills. Will continue with IV fluids and pain control for pancreatitis.      Overview/Hospital Course:  08/15/23 Awake and sitting up in bed without complaints. Lipase is 190 this morning. No vomiting since before admission. States no abdominal pain other than muscle soreness from vomiting. Continues to have drainage from abscesses left axilla. Dressing intact. Has small abscess to left  posterior thigh, no drainage noted. Talked with him re continuing doxy and following up with Dr. Mo Boyer.       No new subjective & objective note has been filed under this hospital service since the last note was generated.      Assessment/Plan:      * Acute on chronic pancreatitis    08/14/23 Lipase 418 today , down from 1011 early this am  Has vomited 10 x since yesterday morning around 1000  Has  "been npo since 1000 yesterday  Abdominal pain on arrival to ER was reported as level 9, after morphine pain is level 5.   Continue IV fluids and morphine for pain control     Lipase ,cbc,bmp in am      08/15/23 lipase 190 this am , no vomiting since before admission . Has tolerated cl diet this morning. Will increase to regular for lunch.    Hidradenitis suppurativa  08/14/23 2 open  abscesses with serosanguinous drainage noted. States areas are sore to touch. Denies fever but does endorse having chills. WBC is 12,000.  Will culture drainage and cover with doxycycline.  Warm compresses BID and prn       08/15/23 continue doxycycline   Make follow up appt with Dr. Mo Boyer within the next week     Type 2 diabetes mellitus with hyperglycemia, without long-term current use of insulin    Lab Results   Component Value Date    HGBA1C 6.3 06/08/2023     Most recent fingerstick glucose reviewed- No results for input(s): "POCTGLUCOSE" in the last 24 hours.  Current correctional scale  Low  Maintain anti-hyperglycemic dose as follows-   Antihyperglycemics (From admission, onward)    None        Hold Oral hypoglycemics while patient is in the hospital.      08/14/23 POCT glucose achs - hold SSI while NPO    08/15/23 stable    CAD (coronary artery disease)  08/14/23 has hx of coronary stents x 4  Continue plavix  Continue imdur    Hypertension  08/14/23 continue home meds of norvasc 10 mg daily and coreg 12.5 mg BID      08/15/23 BP improved     Hypomagnesemia  Patient has Abnormal Magnesium: hypomagnesemia. Will continue to monitor electrolytes closely. Will replace the affected electrolytes and repeat labs to be done after interventions completed. The patient's magnesium results have been reviewed and are listed below.  Recent Labs   Lab 08/15/23  0513   MG 1.7        08/14/23 replace with 3 gms magnesium sulfate     08/15/23 magnesium is 1.7    Dyslipidemia  08/14/23 hold statins      Severe obesity (BMI >= 40)  Body mass " index is 84.12 kg/m². Morbid obesity complicates all aspects of disease management from diagnostic modalities to treatment. Weight loss encouraged and health benefits explained to patient.           VTE Risk Mitigation (From admission, onward)         Ordered     enoxaparin injection 40 mg  Every 24 hours         08/14/23 1601                Discharge Planning   BETY:      Code Status: Full Code   Is the patient medically ready for discharge?:     Reason for patient still in hospital (select all that apply): Treatment  Discharge Plan A: (P) Home                  DOC Thomas  Department of Hospital Medicine   [facility]

## 2023-08-15 NOTE — PLAN OF CARE
Problem: Adult Inpatient Plan of Care  Goal: Plan of Care Review  Outcome: Ongoing, Progressing  Goal: Patient-Specific Goal (Individualized)  Outcome: Ongoing, Progressing  Goal: Absence of Hospital-Acquired Illness or Injury  Outcome: Ongoing, Progressing  Goal: Optimal Comfort and Wellbeing  Outcome: Ongoing, Progressing  Goal: Readiness for Transition of Care  Outcome: Ongoing, Progressing     Problem: Diabetes Comorbidity  Goal: Blood Glucose Level Within Targeted Range  Outcome: Ongoing, Progressing     Problem: Fluid and Electrolyte Imbalance (Acute Kidney Injury/Impairment)  Goal: Fluid and Electrolyte Balance  Outcome: Ongoing, Progressing     Problem: Oral Intake Inadequate (Acute Kidney Injury/Impairment)  Goal: Optimal Nutrition Intake  Outcome: Ongoing, Progressing     Problem: Renal Function Impairment (Acute Kidney Injury/Impairment)  Goal: Effective Renal Function  Outcome: Ongoing, Progressing     Problem: Impaired Wound Healing  Goal: Optimal Wound Healing  Outcome: Ongoing, Progressing     Problem: Bariatric Environmental Safety  Goal: Safety Maintained with Care  Outcome: Ongoing, Progressing     Problem: Infection  Goal: Absence of Infection Signs and Symptoms  Outcome: Ongoing, Progressing     Problem: Skin Injury Risk Increased  Goal: Skin Health and Integrity  Outcome: Ongoing, Progressing

## 2023-08-15 NOTE — ASSESSMENT & PLAN NOTE
08/14/23 continue home meds of norvasc 10 mg daily and coreg 12.5 mg BID      08/15/23 BP improved

## 2023-08-15 NOTE — ASSESSMENT & PLAN NOTE
08/14/23 2 open  abscesses with serosanguinous drainage noted. States areas are sore to touch. Denies fever but does endorse having chills. WBC is 12,000.  Will culture drainage and cover with doxycycline.  Warm compresses BID and prn       08/15/23 continue doxycycline , continue wound care with vashe bid and prn   Make follow up appt with Dr. Mo Boyer within the next week

## 2023-08-15 NOTE — DISCHARGE INSTRUCTIONS
Please take your medications as prescribed.  Start your antibiotics as soon as you get your prescription and finish them completely as prescribed.  Follow up with  Dr. Mo Boyer for your skin care.

## 2023-08-15 NOTE — ASSESSMENT & PLAN NOTE
08/14/23 Lipase 418 today , down from 1011 early this am  Has vomited 10 x since yesterday morning around 1000  Has been npo since 1000 yesterday  Abdominal pain on arrival to ER was reported as level 9, after morphine pain is level 5.   Continue IV fluids and morphine for pain control     Lipase ,cbc,bmp in am      08/15/23 lipase 190 this am , no vomiting since before admission . Has tolerated cl diet this morning. Will increase to regular for lunch.

## 2023-08-15 NOTE — ASSESSMENT & PLAN NOTE
"  Lab Results   Component Value Date    HGBA1C 6.3 06/08/2023     Most recent fingerstick glucose reviewed- No results for input(s): "POCTGLUCOSE" in the last 24 hours.  Current correctional scale  Low  Maintain anti-hyperglycemic dose as follows-   Antihyperglycemics (From admission, onward)    None        Hold Oral hypoglycemics while patient is in the hospital.      08/14/23 POCT glucose achs - hold SSI while NPO    08/15/23 stable  "

## 2023-08-16 NOTE — PLAN OF CARE
[facility]  Discharge Final Note    Primary Care Provider: Juan Pablo Ryan MD    Expected Discharge Date: 8/15/2023    Final Discharge Note (most recent)       Final Note - 08/16/23 0747          Final Note    Assessment Type Final Discharge Note (P)      Anticipated Discharge Disposition Home or Self Care (P)      What phone number can be called within the next 1-3 days to see how you are doing after discharge? [phone] (P)      Hospital Resources/Appts/Education Provided Provided patient/caregiver with written discharge plan information;Appointments scheduled and added to AVS (P)         Post-Acute Status    Discharge Delays None known at this time (P)                      Important Message from Medicare  Important Message from Medicare regarding Discharge Appeal Rights: Given to patient/caregiver, Explained to patient/caregiver, Signed/date by patient/caregiver     Date IMM was signed: 08/15/23  Time IMM was signed: 1100    Contact Info       Maty Boyer MD   Specialty: Dermatology    [address] [place] [address] [place] [address]   Phone: [phone]       Next Steps: Follow up in 1 week(s)    Juan Pablo Ryan MD   Specialty: Family Medicine   Relationship: PCP - General    [address] [place] [address]  [facility] [place] [address]   Phone: [phone]       Next Steps: Follow up in 1 week(s)        Mr. Moody discharged home on 08/15/2023.  No DME or home health ordered at this time.

## 2023-08-17 ENCOUNTER — OUTPATIENT CASE MANAGEMENT (OUTPATIENT)
Dept: ADMINISTRATIVE | Facility: OTHER | Age: 53
End: 2023-08-17

## 2023-08-17 LAB — MICROORGANISM SPEC CULT: NORMAL

## 2023-08-17 NOTE — PROGRESS NOTES
Outpatient Care Management  Plan of Care Follow Up Visit    Patient: Faustino Fischer  MRN: [id number]  Date of Service: 08/17/2023  Completed by: Kendy Mckeon RN  Referral Date: 06/14/2023    Reason for Visit   Patient presents with    [facility] RN Follow Up Call       Brief Summary:  Admitted to the hospital on 08/14/23 for complaints of abdominal pain and vomiting from his pancreatitis. Reported draining wound to left axilla area which was cleaned and dressed. He has a history of hidradenitis and is followed by Dr. Hassan dermatology. Discharged home on 08/15/23. He is going to go and make appts with Dr. Hassan and Dr. Ryan.  Next Steps: Follow up next week.   Mail chronic pancreatitis, pancreatitis and  Pancreatitis nutrition therapy   Review appts  and taking his medications

## 2023-08-24 ENCOUNTER — OUTPATIENT CASE MANAGEMENT (OUTPATIENT)
Dept: ADMINISTRATIVE | Facility: OTHER | Age: 53
End: 2023-08-24

## 2023-08-24 NOTE — PROGRESS NOTES
Outpatient Care Management  Plan of Care Follow Up Visit    Patient: Faustino Fischer  MRN: [id number]  Date of Service: 08/24/2023  Completed by: Kendy Mckeon RN  Referral Date: 06/14/2023    Reason for Visit   Patient presents with    [facility] RN Follow Up Call       Brief Summary: Message to Dr. Maty Boyer, dermatologist for appt for cyst under right arm and on right leg.   Next Steps: follow up in three weeks  Review appt with Dr. Ryan and Dr. Boyer   Review pancreatic nutrition

## 2023-09-05 ENCOUNTER — OFFICE VISIT (OUTPATIENT)
Dept: DERMATOLOGY | Facility: CLINIC | Age: 53
End: 2023-09-05
Payer: MEDICARE

## 2023-09-05 DIAGNOSIS — L87.1 REACTIVE PERFORATING COLLAGENOSIS: ICD-10-CM

## 2023-09-05 DIAGNOSIS — L73.2 HIDRADENITIS SUPPURATIVA: Primary | ICD-10-CM

## 2023-09-05 PROCEDURE — 99213 OFFICE O/P EST LOW 20 MIN: CPT | Mod: 25,,, | Performed by: STUDENT IN AN ORGANIZED HEALTH CARE EDUCATION/TRAINING PROGRAM

## 2023-09-05 PROCEDURE — 99213 PR OFFICE/OUTPT VISIT, EST, LEVL III, 20-29 MIN: ICD-10-PCS | Mod: 25,,, | Performed by: STUDENT IN AN ORGANIZED HEALTH CARE EDUCATION/TRAINING PROGRAM

## 2023-09-05 PROCEDURE — 11900 PR INJECTION INTO SKIN LESIONS, UP TO 7: ICD-10-PCS | Mod: ,,, | Performed by: STUDENT IN AN ORGANIZED HEALTH CARE EDUCATION/TRAINING PROGRAM

## 2023-09-05 PROCEDURE — 11900 INJECT SKIN LESIONS </W 7: CPT | Mod: ,,, | Performed by: STUDENT IN AN ORGANIZED HEALTH CARE EDUCATION/TRAINING PROGRAM

## 2023-09-05 RX ORDER — DOXYCYCLINE 100 MG/1
100 CAPSULE ORAL EVERY 12 HOURS
Qty: 28 CAPSULE | Refills: 3 | Status: SHIPPED | OUTPATIENT
Start: 2023-09-05 | End: 2023-10-31

## 2023-09-05 NOTE — PROGRESS NOTES
[facility]  Mo Boyer MD    [address], [place] [address]  [phone]    Fax: [phone]    Patient Name: Faustino Fischer  Medical Record Number: [id number]  PCP: Juan Pablo Ryan MD  Age: 53 y.o. : 1970  Contact: [phone] (home)     History of Present Illness:     Faustino Fischer is a 53 y.o.  male here for follow up of HS and reactive perforating collagenosis. He has a tender abscess in L axilla today.     The patient has no other concerns today.    Review of Systems:     Unremarkable other than mentioned above.     Physical Exam:     General: Relaxed, oriented, alert    Skin examination of the scalp, face, neck, chest, back, abdomen, upper extremities and lower extremities were normal except for as listed below      Assessment and Plan:       1. Hidradenitis Suppurativa  - Fistula formation and draining sinuses, weeping acneiform pustules and papules, scarring, and acneiform nodules  Jdae Stage: 2    Plan:   Will send doxycycline 100 mg bid x 14 days  for flares    Plan: Intralesional Kenalog  ( Left axilla )   Treatment Number: 1  Lesions Injected: 1  Medication Injected: 10 mg/cc of Kenalog  Total Volume Injected: 0.5 cc  Lot Number: [id number]  Expiration Date: 2024  NDC #: 6821-3153-56  Administered by: Mo Boyer MD     The risks of atrophy were reviewed with the patient.       Counseling.  Cleanse acneiform lesions and sinus tracts with anti-bacterial washes. Oral antibiotics can help reduce inflammation.  Discussed importance of not smoking and weight loss     2. Reactive perforating collagenosis   - excoriated papules on bilateral forearms     - stable and asymptomatic. Will monitor     Return to clinic in 6 months      AVS printed with patient instructions     Mo Boyer MD   Mohs Surgery/Dermatologic Oncology  Dermatology

## 2023-09-14 ENCOUNTER — OUTPATIENT CASE MANAGEMENT (OUTPATIENT)
Dept: ADMINISTRATIVE | Facility: OTHER | Age: 53
End: 2023-09-14

## 2023-09-14 NOTE — LETTER
Faustino Fischer  [address]  [place] [address]      Dear Faustino Fischer,     I am your nurse with [facility] Out[facility] Department. I have been unsuccessful at reaching you since we spoke on 08/24/2023.  At your earliest convenience, I would like to discuss your healthcare progress.      Please contact me at [phone] from 8:00AM to 4:30 PM on Monday thru Friday.     As you know, [facility] is a program offered to you through [facility] where a nurse is available 24/7 to answer questions or provide medical advice, their number is [phone].    Thanks,  Kendy Mckeon RN [facility]   Outpatient Care Management

## 2023-09-14 NOTE — PROGRESS NOTES
9/14/2023  1st attempt to complete Follow-Up  for Outpatient Care Management, left message.  Will mail unable to assess letter.

## 2023-09-21 ENCOUNTER — OUTPATIENT CASE MANAGEMENT (OUTPATIENT)
Dept: ADMINISTRATIVE | Facility: OTHER | Age: 53
End: 2023-09-21

## 2023-09-21 NOTE — PROGRESS NOTES
Outpatient Care Management  Plan of Care Follow Up Visit    Patient: Faustino Fischer  MRN: [id number]  Date of Service: 09/21/2023  Completed by: Kendy Mckeon RN  Referral Date: 06/14/2023    Reason for Visit   Patient presents with    [facility] RN Follow Up Call       Brief Summary: see care plan   Next Steps: Patient agrees to follow up with Care Manager in 3 weeks  Review pancreatitis nutrition therapy   - call if I am sick and can't take my medicine  - use a pillbox to sort medicine

## 2023-10-13 ENCOUNTER — OUTPATIENT CASE MANAGEMENT (OUTPATIENT)
Dept: ADMINISTRATIVE | Facility: OTHER | Age: 53
End: 2023-10-13

## 2023-10-13 NOTE — PROGRESS NOTES
Outpatient Care Management  Plan of Care Follow Up Visit    Patient: Faustino Fischer  MRN: [id number]  Date of Service: 10/13/2023  Completed by: Kendy Mckeon RN  Referral Date: 06/14/2023    No chief complaint on file.      Brief Summary: Will dis-enroll from OPCM and  [facility] Case Closure. Will mail [facility] on call magnet and RN contact info.

## 2023-12-06 ENCOUNTER — HOSPITAL ENCOUNTER (INPATIENT)
Facility: HOSPITAL | Age: 53
LOS: 3 days | Discharge: HOME | DRG: 439 | End: 2023-12-09
Attending: FAMILY MEDICINE | Admitting: FAMILY MEDICINE
Payer: MEDICARE

## 2023-12-06 DIAGNOSIS — I25.10 CORONARY ARTERY DISEASE, UNSPECIFIED VESSEL OR LESION TYPE, UNSPECIFIED WHETHER ANGINA PRESENT, UNSPECIFIED WHETHER NATIVE OR TRANSPLANTED HEART: ICD-10-CM

## 2023-12-06 DIAGNOSIS — E11.65 TYPE 2 DIABETES MELLITUS WITH HYPERGLYCEMIA, WITHOUT LONG-TERM CURRENT USE OF INSULIN: ICD-10-CM

## 2023-12-06 DIAGNOSIS — K85.90 ACUTE ON CHRONIC PANCREATITIS: ICD-10-CM

## 2023-12-06 DIAGNOSIS — R14.0 GASEOUS ABDOMINAL DISTENTION: ICD-10-CM

## 2023-12-06 DIAGNOSIS — E87.1 HYPONATREMIA: ICD-10-CM

## 2023-12-06 DIAGNOSIS — M54.9 BACK PAIN: ICD-10-CM

## 2023-12-06 DIAGNOSIS — E78.5 DYSLIPIDEMIA: ICD-10-CM

## 2023-12-06 DIAGNOSIS — I10 HYPERTENSION, UNSPECIFIED TYPE: ICD-10-CM

## 2023-12-06 DIAGNOSIS — E83.42 HYPOMAGNESEMIA: ICD-10-CM

## 2023-12-06 DIAGNOSIS — K85.90 ACUTE PANCREATITIS WITHOUT INFECTION OR NECROSIS, UNSPECIFIED PANCREATITIS TYPE: Primary | ICD-10-CM

## 2023-12-06 DIAGNOSIS — K86.1 ACUTE ON CHRONIC PANCREATITIS: ICD-10-CM

## 2023-12-06 DIAGNOSIS — R07.9 CHEST PAIN: ICD-10-CM

## 2023-12-06 PROBLEM — E66.01 SEVERE OBESITY (BMI >= 40): Status: RESOLVED | Noted: 2023-06-14 | Resolved: 2023-12-06

## 2023-12-06 PROBLEM — E87.6 HYPOKALEMIA: Status: ACTIVE | Noted: 2023-12-06

## 2023-12-06 LAB
ALBUMIN SERPL BCP-MCNC: 3.5 G/DL (ref 3.5–5)
ALBUMIN/GLOB SERPL: 0.9 {RATIO}
ALP SERPL-CCNC: 132 U/L (ref 45–115)
ALT SERPL W P-5'-P-CCNC: 13 U/L (ref 16–61)
ANION GAP SERPL CALCULATED.3IONS-SCNC: 13 MMOL/L (ref 7–16)
AST SERPL W P-5'-P-CCNC: 12 U/L (ref 15–37)
BACTERIA #/AREA URNS HPF: ABNORMAL /HPF
BASOPHILS # BLD AUTO: 0.02 K/UL (ref 0–0.2)
BASOPHILS NFR BLD AUTO: 0.2 % (ref 0–1)
BILIRUB SERPL-MCNC: 1.3 MG/DL (ref ?–1.2)
BILIRUB UR QL STRIP: NEGATIVE
BUN SERPL-MCNC: 8 MG/DL (ref 7–18)
BUN/CREAT SERPL: 8 (ref 6–20)
CALCIUM SERPL-MCNC: 9.4 MG/DL (ref 8.5–10.1)
CHLORIDE SERPL-SCNC: 95 MMOL/L (ref 98–107)
CLARITY UR: CLEAR
CO2 SERPL-SCNC: 27 MMOL/L (ref 21–32)
COLOR UR: ABNORMAL
CREAT SERPL-MCNC: 0.98 MG/DL (ref 0.7–1.3)
DIFFERENTIAL METHOD BLD: ABNORMAL
EGFR (NO RACE VARIABLE) (RUSH/TITUS): 92 ML/MIN/1.73M2
EOSINOPHIL # BLD AUTO: 0.04 K/UL (ref 0–0.5)
EOSINOPHIL NFR BLD AUTO: 0.4 % (ref 1–4)
ERYTHROCYTE [DISTWIDTH] IN BLOOD BY AUTOMATED COUNT: 15 % (ref 11.5–14.5)
GLOBULIN SER-MCNC: 4 G/DL (ref 2–4)
GLUCOSE SERPL-MCNC: 169 MG/DL (ref 70–105)
GLUCOSE SERPL-MCNC: 179 MG/DL (ref 70–105)
GLUCOSE SERPL-MCNC: 199 MG/DL (ref 70–105)
GLUCOSE SERPL-MCNC: 212 MG/DL (ref 74–106)
GLUCOSE UR STRIP-MCNC: 100 MG/DL
HCT VFR BLD AUTO: 42.7 % (ref 40–54)
HGB BLD-MCNC: 14 G/DL (ref 13.5–18)
KETONES UR STRIP-SCNC: ABNORMAL MG/DL
LEUKOCYTE ESTERASE UR QL STRIP: NEGATIVE
LIPASE SERPL-CCNC: 190 U/L (ref 16–77)
LYMPHOCYTES # BLD AUTO: 1.39 K/UL (ref 1–4.8)
LYMPHOCYTES NFR BLD AUTO: 13.4 % (ref 27–41)
MCH RBC QN AUTO: 30.6 PG (ref 27–31)
MCHC RBC AUTO-ENTMCNC: 32.8 G/DL (ref 32–36)
MCV RBC AUTO: 93.2 FL (ref 80–96)
MONOCYTES # BLD AUTO: 0.48 K/UL (ref 0–0.8)
MONOCYTES NFR BLD AUTO: 4.6 % (ref 2–6)
MPC BLD CALC-MCNC: 9.4 FL (ref 9.4–12.4)
MUCOUS THREADS #/AREA URNS HPF: ABNORMAL /HPF
NEUTROPHILS # BLD AUTO: 8.46 K/UL (ref 1.8–7.7)
NEUTROPHILS NFR BLD AUTO: 81.4 % (ref 53–65)
NITRITE UR QL STRIP: NEGATIVE
PH UR STRIP: 6.5 PH UNITS
PLATELET # BLD AUTO: 225 K/UL (ref 150–400)
POTASSIUM SERPL-SCNC: 3.2 MMOL/L (ref 3.5–5.1)
PROT SERPL-MCNC: 7.5 G/DL (ref 6.4–8.2)
PROT UR QL STRIP: >=300
RBC # BLD AUTO: 4.58 M/UL (ref 4.6–6.2)
RBC # UR STRIP: ABNORMAL /UL
RBC #/AREA URNS HPF: ABNORMAL /HPF
SODIUM SERPL-SCNC: 132 MMOL/L (ref 136–145)
SP GR UR STRIP: 1.02
SQUAMOUS #/AREA URNS LPF: ABNORMAL /LPF
TROPONIN I SERPL DL<=0.01 NG/ML-MCNC: 24.6 PG/ML
UROBILINOGEN UR STRIP-ACNC: 0.2 MG/DL
WBC # BLD AUTO: 10.39 K/UL (ref 4.5–11)
WBC #/AREA URNS HPF: ABNORMAL /HPF

## 2023-12-06 PROCEDURE — 96361 HYDRATE IV INFUSION ADD-ON: CPT

## 2023-12-06 PROCEDURE — 96365 THER/PROPH/DIAG IV INF INIT: CPT

## 2023-12-06 PROCEDURE — 93005 ELECTROCARDIOGRAM TRACING: CPT

## 2023-12-06 PROCEDURE — 27000982 HC MATTRESS, MATRIX LAL RENTAL

## 2023-12-06 PROCEDURE — 11000001 HC ACUTE MED/SURG PRIVATE ROOM

## 2023-12-06 PROCEDURE — 84484 ASSAY OF TROPONIN QUANT: CPT | Performed by: NURSE PRACTITIONER

## 2023-12-06 PROCEDURE — 63600175 PHARM REV CODE 636 W HCPCS: Performed by: NURSE PRACTITIONER

## 2023-12-06 PROCEDURE — 99222 PR INITIAL HOSPITAL CARE,LEVL II: ICD-10-PCS | Mod: FS,AI,, | Performed by: FAMILY MEDICINE

## 2023-12-06 PROCEDURE — 83690 ASSAY OF LIPASE: CPT | Performed by: NURSE PRACTITIONER

## 2023-12-06 PROCEDURE — 25000003 PHARM REV CODE 250: Performed by: NURSE PRACTITIONER

## 2023-12-06 PROCEDURE — 85025 COMPLETE CBC W/AUTO DIFF WBC: CPT | Performed by: NURSE PRACTITIONER

## 2023-12-06 PROCEDURE — 82962 GLUCOSE BLOOD TEST: CPT

## 2023-12-06 PROCEDURE — 99285 EMERGENCY DEPT VISIT HI MDM: CPT | Mod: 25

## 2023-12-06 PROCEDURE — 99222 1ST HOSP IP/OBS MODERATE 55: CPT | Mod: FS,AI,, | Performed by: FAMILY MEDICINE

## 2023-12-06 PROCEDURE — 81001 URINALYSIS AUTO W/SCOPE: CPT | Performed by: NURSE PRACTITIONER

## 2023-12-06 PROCEDURE — 93010 ELECTROCARDIOGRAM REPORT: CPT | Performed by: HOSPITALIST

## 2023-12-06 PROCEDURE — 27000944

## 2023-12-06 PROCEDURE — 80053 COMPREHEN METABOLIC PANEL: CPT | Performed by: NURSE PRACTITIONER

## 2023-12-06 PROCEDURE — 25500020 PHARM REV CODE 255: Performed by: NURSE PRACTITIONER

## 2023-12-06 PROCEDURE — 96375 TX/PRO/DX INJ NEW DRUG ADDON: CPT

## 2023-12-06 PROCEDURE — 99285 EMERGENCY DEPT VISIT HI MDM: CPT | Mod: GF

## 2023-12-06 RX ORDER — LOSARTAN POTASSIUM 50 MG/1
50 TABLET ORAL DAILY
Status: ON HOLD | COMMUNITY
End: 2023-12-09 | Stop reason: HOSPADM

## 2023-12-06 RX ORDER — ONDANSETRON 2 MG/ML
4 INJECTION INTRAMUSCULAR; INTRAVENOUS EVERY 8 HOURS PRN
Status: DISCONTINUED | OUTPATIENT
Start: 2023-12-06 | End: 2023-12-11 | Stop reason: HOSPADM

## 2023-12-06 RX ORDER — NAPROXEN SODIUM 220 MG/1
81 TABLET, FILM COATED ORAL DAILY
Status: DISCONTINUED | OUTPATIENT
Start: 2023-12-06 | End: 2023-12-11 | Stop reason: HOSPADM

## 2023-12-06 RX ORDER — MORPHINE SULFATE 4 MG/ML
4 INJECTION, SOLUTION INTRAMUSCULAR; INTRAVENOUS
Status: COMPLETED | OUTPATIENT
Start: 2023-12-06 | End: 2023-12-06

## 2023-12-06 RX ORDER — LOSARTAN POTASSIUM 50 MG/1
50 TABLET ORAL DAILY
Status: DISCONTINUED | OUTPATIENT
Start: 2023-12-06 | End: 2023-12-08

## 2023-12-06 RX ORDER — HYDROMORPHONE HYDROCHLORIDE 2 MG/ML
1 INJECTION, SOLUTION INTRAMUSCULAR; INTRAVENOUS; SUBCUTANEOUS EVERY 6 HOURS PRN
Status: DISCONTINUED | OUTPATIENT
Start: 2023-12-06 | End: 2023-12-07

## 2023-12-06 RX ORDER — ONDANSETRON 2 MG/ML
4 INJECTION INTRAMUSCULAR; INTRAVENOUS
Status: COMPLETED | OUTPATIENT
Start: 2023-12-06 | End: 2023-12-06

## 2023-12-06 RX ORDER — SODIUM CHLORIDE 9 MG/ML
INJECTION, SOLUTION INTRAVENOUS CONTINUOUS
Status: DISCONTINUED | OUTPATIENT
Start: 2023-12-06 | End: 2023-12-06

## 2023-12-06 RX ORDER — ISOSORBIDE MONONITRATE 30 MG/1
30 TABLET, EXTENDED RELEASE ORAL DAILY
Status: DISCONTINUED | OUTPATIENT
Start: 2023-12-06 | End: 2023-12-11 | Stop reason: HOSPADM

## 2023-12-06 RX ORDER — INSULIN ASPART 100 [IU]/ML
0-5 INJECTION, SOLUTION INTRAVENOUS; SUBCUTANEOUS
Status: DISCONTINUED | OUTPATIENT
Start: 2023-12-06 | End: 2023-12-06

## 2023-12-06 RX ORDER — NALOXONE HCL 0.4 MG/ML
0.02 VIAL (ML) INJECTION
Status: DISCONTINUED | OUTPATIENT
Start: 2023-12-06 | End: 2023-12-11 | Stop reason: HOSPADM

## 2023-12-06 RX ORDER — ONDANSETRON 4 MG/1
8 TABLET, ORALLY DISINTEGRATING ORAL EVERY 8 HOURS PRN
Status: DISCONTINUED | OUTPATIENT
Start: 2023-12-06 | End: 2023-12-11 | Stop reason: HOSPADM

## 2023-12-06 RX ORDER — CARVEDILOL 12.5 MG/1
12.5 TABLET ORAL
Status: DISCONTINUED | OUTPATIENT
Start: 2023-12-07 | End: 2023-12-11 | Stop reason: HOSPADM

## 2023-12-06 RX ORDER — SODIUM CHLORIDE AND POTASSIUM CHLORIDE 300; 900 MG/100ML; MG/100ML
INJECTION, SOLUTION INTRAVENOUS CONTINUOUS
Status: DISPENSED | OUTPATIENT
Start: 2023-12-06 | End: 2023-12-06

## 2023-12-06 RX ORDER — SODIUM CHLORIDE 0.9 % (FLUSH) 0.9 %
10 SYRINGE (ML) INJECTION EVERY 12 HOURS PRN
Status: DISCONTINUED | OUTPATIENT
Start: 2023-12-06 | End: 2023-12-11 | Stop reason: HOSPADM

## 2023-12-06 RX ORDER — MORPHINE SULFATE 4 MG/ML
2 INJECTION, SOLUTION INTRAMUSCULAR; INTRAVENOUS EVERY 4 HOURS PRN
Status: DISCONTINUED | OUTPATIENT
Start: 2023-12-06 | End: 2023-12-06

## 2023-12-06 RX ORDER — IBUPROFEN 200 MG
24 TABLET ORAL
Status: DISCONTINUED | OUTPATIENT
Start: 2023-12-06 | End: 2023-12-11 | Stop reason: HOSPADM

## 2023-12-06 RX ORDER — ACETAMINOPHEN 325 MG/1
650 TABLET ORAL EVERY 4 HOURS PRN
Status: DISCONTINUED | OUTPATIENT
Start: 2023-12-06 | End: 2023-12-11 | Stop reason: HOSPADM

## 2023-12-06 RX ORDER — IBUPROFEN 200 MG
16 TABLET ORAL
Status: DISCONTINUED | OUTPATIENT
Start: 2023-12-06 | End: 2023-12-11 | Stop reason: HOSPADM

## 2023-12-06 RX ORDER — ENOXAPARIN SODIUM 100 MG/ML
40 INJECTION SUBCUTANEOUS EVERY 24 HOURS
Status: DISCONTINUED | OUTPATIENT
Start: 2023-12-06 | End: 2023-12-11 | Stop reason: HOSPADM

## 2023-12-06 RX ORDER — SODIUM CHLORIDE 9 MG/ML
INJECTION, SOLUTION INTRAVENOUS CONTINUOUS
Status: DISCONTINUED | OUTPATIENT
Start: 2023-12-06 | End: 2023-12-08

## 2023-12-06 RX ORDER — GLUCAGON 1 MG
1 KIT INJECTION
Status: DISCONTINUED | OUTPATIENT
Start: 2023-12-06 | End: 2023-12-11 | Stop reason: HOSPADM

## 2023-12-06 RX ORDER — HYDROMORPHONE HYDROCHLORIDE 2 MG/ML
1 INJECTION, SOLUTION INTRAMUSCULAR; INTRAVENOUS; SUBCUTANEOUS
Status: COMPLETED | OUTPATIENT
Start: 2023-12-06 | End: 2023-12-06

## 2023-12-06 RX ORDER — BISACODYL 10 MG
10 SUPPOSITORY, RECTAL RECTAL DAILY PRN
Status: DISCONTINUED | OUTPATIENT
Start: 2023-12-06 | End: 2023-12-11 | Stop reason: HOSPADM

## 2023-12-06 RX ADMIN — POTASSIUM CHLORIDE AND SODIUM CHLORIDE: 900; 300 INJECTION, SOLUTION INTRAVENOUS at 11:12

## 2023-12-06 RX ADMIN — MORPHINE SULFATE 2 MG: 4 INJECTION, SOLUTION INTRAMUSCULAR; INTRAVENOUS at 05:12

## 2023-12-06 RX ADMIN — MORPHINE SULFATE 4 MG: 4 INJECTION, SOLUTION INTRAMUSCULAR; INTRAVENOUS at 08:12

## 2023-12-06 RX ADMIN — HYDROMORPHONE HYDROCHLORIDE 1 MG: 2 INJECTION INTRAMUSCULAR; INTRAVENOUS; SUBCUTANEOUS at 09:12

## 2023-12-06 RX ADMIN — ENOXAPARIN SODIUM 40 MG: 40 INJECTION SUBCUTANEOUS at 04:12

## 2023-12-06 RX ADMIN — IOPAMIDOL 100 ML: 755 INJECTION, SOLUTION INTRAVENOUS at 08:12

## 2023-12-06 RX ADMIN — ASPIRIN 81 MG 81 MG: 81 TABLET ORAL at 11:12

## 2023-12-06 RX ADMIN — ONDANSETRON 4 MG: 2 INJECTION INTRAMUSCULAR; INTRAVENOUS at 07:12

## 2023-12-06 RX ADMIN — HYDROMORPHONE HYDROCHLORIDE 1 MG: 2 INJECTION INTRAMUSCULAR; INTRAVENOUS; SUBCUTANEOUS at 08:12

## 2023-12-06 RX ADMIN — ISOSORBIDE MONONITRATE 30 MG: 30 TABLET, EXTENDED RELEASE ORAL at 11:12

## 2023-12-06 RX ADMIN — LOSARTAN POTASSIUM 50 MG: 50 TABLET, FILM COATED ORAL at 11:12

## 2023-12-06 RX ADMIN — PROMETHAZINE HYDROCHLORIDE 25 MG: 25 INJECTION INTRAMUSCULAR; INTRAVENOUS at 08:12

## 2023-12-06 RX ADMIN — SODIUM CHLORIDE: 9 INJECTION, SOLUTION INTRAVENOUS at 06:12

## 2023-12-06 RX ADMIN — SODIUM CHLORIDE 500 ML: 9 INJECTION, SOLUTION INTRAVENOUS at 07:12

## 2023-12-06 RX ADMIN — MORPHINE SULFATE 2 MG: 4 INJECTION, SOLUTION INTRAMUSCULAR; INTRAVENOUS at 01:12

## 2023-12-06 NOTE — PLAN OF CARE
Problem: Adult Inpatient Plan of Care  Goal: Plan of Care Review  Outcome: Ongoing, Progressing  Flowsheets (Taken 12/6/2023 1005)  Plan of Care Reviewed With: patient  Goal: Patient-Specific Goal (Individualized)  Outcome: Ongoing, Progressing  Goal: Absence of Hospital-Acquired Illness or Injury  Outcome: Ongoing, Progressing  Intervention: Prevent Skin Injury  Flowsheets (Taken 12/6/2023 1005)  Body Position: position changed independently  Skin Protection: adhesive use limited  Intervention: Prevent Infection  Flowsheets (Taken 12/6/2023 1005)  Infection Prevention:   hand hygiene promoted   equipment surfaces disinfected  Goal: Optimal Comfort and Wellbeing  Outcome: Ongoing, Progressing  Intervention: Provide Person-Centered Care  Flowsheets (Taken 12/6/2023 1005)  Trust Relationship/Rapport: care explained  Goal: Readiness for Transition of Care  Outcome: Ongoing, Progressing  Intervention: Mutually Develop Transition Plan  Flowsheets (Taken 12/6/2023 1005)  Transportation Anticipated: family or friend will provide     Problem: Diabetes Comorbidity  Goal: Blood Glucose Level Within Targeted Range  Outcome: Ongoing, Progressing     Problem: Fall Injury Risk  Goal: Absence of Fall and Fall-Related Injury  Outcome: Ongoing, Progressing  Intervention: Identify and Manage Contributors  Flowsheets (Taken 12/6/2023 1005)  Self-Care Promotion: independence encouraged  Medication Review/Management: medications reviewed     Problem: Pain Acute  Goal: Acceptable Pain Control and Functional Ability  Outcome: Ongoing, Progressing  Intervention: Prevent or Manage Pain  Flowsheets (Taken 12/6/2023 1005)  Sleep/Rest Enhancement: awakenings minimized  Medication Review/Management: medications reviewed  Intervention: Optimize Psychosocial Wellbeing  Flowsheets (Taken 12/6/2023 1005)  Supportive Measures: relaxation techniques promoted

## 2023-12-06 NOTE — ED TRIAGE NOTES
Reports abd pain across the top of his abd that radiates to his back, reports n/v also, states it sttarted 12/5/23

## 2023-12-06 NOTE — H&P
[facility] - Medical Surgical Unit  Hospital Medicine  History & Physical    Patient Name: Faustino Fischer  MRN: [id number]  Patient Class: IP- Inpatient  Admission Date: 12/6/2023  Attending Physician: Jean Crews IV, DO   Primary Care Provider: Juan Pablo Ryan MD         Patient information was obtained from patient, past medical records, and ER records.     Subjective:     Principal Problem:Acute on chronic pancreatitis    Chief Complaint:   Chief Complaint   Patient presents with    Abdominal Pain        HPI: Mr. Amado Harmon is a 53 year old male with pmh of CAD, hypertension, NIDDM, Hidradenitis suppurativa,dyslipidemia, HAKEEM, pancreatitis, macrocytic anemia and obesity. He presented to ER with complaints of abdominal pain with radiation to back. States pain started Monday afternoon. States he has had dry heaving but no vomiting. Denies diarrhea. Last BM his morning. Denies alcohol use. States when pain started he stopped eating and just sipped liquids. States pain worsened to level 6 and he came to ER this morning. WBC 10.39 H&H 14/42.7, lipase 190 CT abd shows mld-to-moderate edema about the pancreas suspicious for pancreatitis. Correlate with amylase/lipase.  Prior cholecystectomy. Potassium is 3.2 . Will place in patient, NPO, continue IV fluids, control pain with IV morphine, Will replace potassium and na. Talked with Mr. Fischer re code status and he chose Full code.    Past Medical History:   Diagnosis Date    Chronic pancreatitis, unspecified pancreatitis type     Coronary artery disease     Diabetes mellitus     Dyslipidemia     Hidradenitis     Hypertension        Past Surgical History:   Procedure Laterality Date    CARDIAC SURGERY      Stents x6    CHOLECYSTECTOMY      UNDESCENDED TESTICLE EXPLORATION N/A        Review of patient's allergies indicates:   Allergen Reactions    Ticagrelor Shortness Of Breath    Vancomycin analogues Other (See Comments)     Patient  experienced adverse effect, and had some renal insufficiency after receiving Vancomycin.       No current facility-administered medications on file prior to encounter.     Current Outpatient Medications on File Prior to Encounter   Medication Sig    aspirin 81 MG Chew Take 1 tablet (81 mg total) by mouth once daily.    atorvastatin (LIPITOR) 80 MG tablet Take 1 tablet (80 mg total) by mouth once daily.    carvediloL (COREG) 12.5 MG tablet Take 1 tablet (12.5 mg total) by mouth before breakfast.    fenofibrate (TRICOR) 145 MG tablet Take 1 tablet (145 mg total) by mouth once daily.    furosemide (LASIX) 20 MG tablet Take 20 mg by mouth once daily.    isosorbide mononitrate (IMDUR) 30 MG 24 hr tablet Take 1 tablet (30 mg total) by mouth once daily. Take 1/2 tablet with breakfast    losartan (COZAAR) 50 MG tablet Take 50 mg by mouth once daily.    NITROGLYCERIN SL Place under the tongue.    SUPER THIN LANCETS 28 gauge Misc 2 (two) times daily.    TRUE METRIX GLUCOSE METER Misc 2 (two) times daily.    [DISCONTINUED] amLODIPine (NORVASC) 10 MG tablet Take 1 tablet (10 mg total) by mouth once daily.    [DISCONTINUED] clopidogreL (PLAVIX) 75 mg tablet Take 75 mg by mouth.    [DISCONTINUED] doxycycline (VIBRAMYCIN) 100 MG Cap Take 100 mg by mouth.    [DISCONTINUED] HYDROcodone-acetaminophen (NORCO)  mg per tablet Take 1 tablet by mouth every 6 (six) hours as needed for Pain.    [DISCONTINUED] ondansetron (ZOFRAN-ODT) 4 MG TbDL Take 1 tablet (4 mg total) by mouth every 8 (eight) hours as needed (nausea, vomiting).    [DISCONTINUED] pantoprazole (PROTONIX) 40 MG tablet Take 40 mg by mouth.     Family History       Problem Relation (Age of Onset)    Heart disease Father, Brother, Maternal Grandmother    Hypertension Father          Tobacco Use    Smoking status: Former     Current packs/day: 0.25     Average packs/day: 0.3 packs/day for 19.9 years (5.0 ttl pk-yrs)     Types: Cigarettes, Cigars     Start date: 2004     Smokeless tobacco: Never   Substance and Sexual Activity    Alcohol use: Not Currently     Comment: occasional drink previous heavy drinker quit heavily in 2001    Drug use: Never    Sexual activity: Not Currently     Review of Systems   Constitutional:  Negative for appetite change, fatigue and fever.   HENT:  Negative for congestion, sinus pressure, sore throat and trouble swallowing.    Eyes:  Negative for photophobia and pain.   Respiratory:  Negative for cough and shortness of breath.         Reports having sob if he exerts himself, states would be able to walk 100 feet before having SOB    Denies orthopnea      Denies son on admission    Cardiovascular:  Negative for chest pain and palpitations.   Gastrointestinal:  Positive for abdominal pain and nausea. Negative for abdominal distention, constipation, diarrhea and vomiting.        Reports dry heaves that started Monday afternoon- no vomiting  No nausea on admission     Abdominal pain with radiation to back - states pain is at level 4 on admission    Genitourinary:  Negative for difficulty urinating and dysuria.   Musculoskeletal:  Positive for back pain.   Skin:  Negative for wound.        Hx of reactive perforating collagenosis and hidradenitis suppurativa  No open areas   Nodule under left breast and left axilla   Neurological:  Positive for light-headedness. Negative for weakness.        Reports having light headedness if he fasts   Psychiatric/Behavioral:  Negative for confusion. The patient is not nervous/anxious.      Objective:     Vital Signs (Most Recent):  Temp: 97.9 °F (36.6 °C) (12/06/23 0931)  Pulse: 93 (12/06/23 0931)  Resp: 18 (12/06/23 0931)  BP: (!) 170/102 (12/06/23 0931)  SpO2: 95 % (12/06/23 0931) Vital Signs (24h Range):  Temp:  [97.9 °F (36.6 °C)-98.5 °F (36.9 °C)] 97.9 °F (36.6 °C)  Pulse:  [] 93  Resp:  [16-22] 18  SpO2:  [95 %-100 %] 95 %  BP: (170-216)/(102-127) 170/102     Weight: 101.5 kg (223 lb 12.8 oz)  Body mass index is  36.12 kg/m².     Physical Exam  HENT:      Head: Normocephalic.      Mouth/Throat:      Mouth: Mucous membranes are moist.   Cardiovascular:      Rate and Rhythm: Normal rate and regular rhythm.      Pulses: Normal pulses.      Heart sounds: Normal heart sounds.   Pulmonary:      Effort: No respiratory distress.      Breath sounds: Normal breath sounds. No stridor. No wheezing or rhonchi.   Abdominal:      General: Bowel sounds are normal. There is no distension.      Palpations: Abdomen is soft. There is no mass.      Tenderness: There is abdominal tenderness. There is no guarding or rebound.      Hernia: No hernia is present.   Musculoskeletal:         General: Normal range of motion.      Cervical back: Normal range of motion.   Skin:     General: Skin is warm and dry.      Comments: Hx of reactive perforating collagenosis and hidradenitis suppurativa  No open areas   Nodule under left breast and left axilla   Neurological:      Mental Status: He is alert and oriented to person, place, and time.   Psychiatric:         Mood and Affect: Mood normal.                Significant Labs: All pertinent labs within the past 24 hours have been reviewed.  Recent Lab Results         12/06/23  0802   12/06/23  0741        Albumin/Globulin Ratio   0.9       Albumin   3.5       ALP   132       ALT   13       Anion Gap   13       Appearance, UA Clear         AST   12       Bacteria, UA None Seen To Occasional         Baso #   0.02       Basophil %   0.2       Bilirubin (UA) Negative         BILIRUBIN TOTAL   1.3       BUN   8       BUN/CREAT RATIO   8       Calcium   9.4       Chloride   95       CO2   27       Color, UA Dark Yellow         Creatinine   0.98       Differential Method   Auto       eGFR   92       Eos #   0.04       Eosinophil %   0.4       Globulin, Total   4.0       Glucose   212       Glucose,          Hematocrit   42.7       Hemoglobin   14.0       Ketones, UA Trace         Leukocytes, UA Negative          Lipase   190       Lymph #   1.39       Lymph %   13.4       MCH   30.6       MCHC   32.8       MCV   93.2       Mono #   0.48       Mono %   4.6       MPV   9.4       Mucus, UA Few         Neutrophils, Abs   8.46       Neutrophils Relative   81.4       NITRITE UA Negative         Occult Blood UA Trace-Lysed         pH, UA 6.5         Platelet Count   225       Potassium   3.2       PROTEIN TOTAL   7.5       Protein, UA >=300         RBC   4.58       RBC, UA 0-3         RDW   15.0       Sodium   132       Specific [place], UA 1.025         Squam Epithel, UA Occasional         Troponin I High Sensitivity   24.6       UROBILINOGEN UA 0.2         WBC, UA 0-5         WBC   10.39               Significant Imaging: I have reviewed all pertinent imaging results/findings within the past 24 hours.  Assessment/Plan:     * Acute on chronic pancreatitis    12/06/23 ct abd shows mild-to-moderate edema about the pancreas suspicious for pancreatitis. Correlate with amylase/lipase.  Prior cholecystectomy.   Lipase 190  Pain level on admission to acute care is 4 - has had morphine while in ER  Mild tenderness on abdominal palpation- no rebound   Continue IV fluids, NPO, pain control   Repeat labs in am      Hypertension  Chronic, uncontrolled. Latest blood pressure and vitals reviewed-     Temp:  [97.9 °F (36.6 °C)-98.5 °F (36.9 °C)]   Pulse:  []   Resp:  [16-22]   BP: (170-216)/(102-127)   SpO2:  [95 %-100 %] .   Home meds for hypertension were reviewed and noted below.   Hypertension Medications               carvediloL (COREG) 12.5 MG tablet Take 1 tablet (12.5 mg total) by mouth before breakfast.         isosorbide mononitrate (IMDUR) 30 MG 24 hr tablet Take 1 tablet (30 mg total) by mouth once daily. Take 1/2 tablet with breakfast    losartan (COZAAR) 50 MG tablet Take 50 mg by mouth once daily.    NITROGLYCERIN SL Place under the tongue.            While in the hospital, will manage blood pressure as follows; Continue  home antihypertensive regimen    Will utilize p.r.n. blood pressure medication only if patient's blood pressure greater than 180/110 and he develops symptoms such as worsening chest pain or shortness of breath.    CAD (coronary artery disease)  Patient with known CAD s/p stent placement x 4. which is controlled Will continue and monitor for S/Sx of angina/ACS. Continue to monitor on telemetry.         Type 2 diabetes mellitus with hyperglycemia, without long-term current use of insulin  12/06/23 states diabetes is now diet controlled  POCT glucose checks   Last A1c is 6.3 on 06/23      Dyslipidemia    12/06/23 home med of tricor and atorvastatin - hold    Hypokalemia     Lab Results   Component Value Date    K 3.2 (L) 12/06/2023   . Will continue potassium replacement per protocol and recheck repeat levels after replacement completed.   12/06/23 potassium is 3.2   Will replace with IV fluids NA with 40 me kcl   Recheck potassium in am    Hidradenitis suppurativa  History of above   Has nodule under left breast and left axilla - non tenderness, no open wound, will monitor these areas      Hyponatremia    Recent Labs   Lab 12/06/23  0741   *   12/06/23 na 132   IV fluids NS with 40 meq kcl   Recheck bmp in am      VTE Risk Mitigation (From admission, onward)           Ordered     enoxaparin injection 40 mg  Daily         12/06/23 1023     IP VTE HIGH RISK PATIENT  Once         12/06/23 1023     Place sequential compression device  Until discontinued         12/06/23 1023                                    DOC Thomas  Department of Hospital Medicine  [facility] - Medical Surgical Unit

## 2023-12-06 NOTE — ASSESSMENT & PLAN NOTE
Chronic, uncontrolled. Latest blood pressure and vitals reviewed-     Temp:  [97.9 °F (36.6 °C)-98.5 °F (36.9 °C)]   Pulse:  []   Resp:  [16-22]   BP: (170-216)/(102-127)   SpO2:  [95 %-100 %] .   Home meds for hypertension were reviewed and noted below.   Hypertension Medications               carvediloL (COREG) 12.5 MG tablet Take 1 tablet (12.5 mg total) by mouth before breakfast.         isosorbide mononitrate (IMDUR) 30 MG 24 hr tablet Take 1 tablet (30 mg total) by mouth once daily. Take 1/2 tablet with breakfast    losartan (COZAAR) 50 MG tablet Take 50 mg by mouth once daily.    NITROGLYCERIN SL Place under the tongue.            While in the hospital, will manage blood pressure as follows; Continue home antihypertensive regimen    Will utilize p.r.n. blood pressure medication only if patient's blood pressure greater than 180/110 and he develops symptoms such as worsening chest pain or shortness of breath.

## 2023-12-06 NOTE — HPI
Mr. Amado Harmon is a 53 year old male with pmh of CAD, hypertension, NIDDM, Hidradenitis suppurativa,dyslipidemia, HAKEEM, pancreatitis, macrocytic anemia and obesity. He presented to ER with complaints of abdominal pain with radiation to back. States pain started Monday afternoon. States he has had dry heaving but no vomiting. Denies diarrhea. Last BM his morning. Denies alcohol use. States when pain started he stopped eating and just sipped liquids. States pain worsened to level 6 and he came to ER this morning. WBC 10.39 H&H 14/42.7, lipase 190 CT abd shows mld-to-moderate edema about the pancreas suspicious for pancreatitis. Correlate with amylase/lipase.  Prior cholecystectomy. Potassium is 3.2 . Will place in patient, NPO, continue IV fluids, control pain with IV morphine, Will replace potassium and na. Talked with Mr. Fischer re code status and he chose Full code.

## 2023-12-06 NOTE — SUBJECTIVE & OBJECTIVE
Past Medical History:   Diagnosis Date    Chronic pancreatitis, unspecified pancreatitis type     Coronary artery disease     Diabetes mellitus     Dyslipidemia     Hidradenitis     Hypertension        Past Surgical History:   Procedure Laterality Date    CARDIAC SURGERY      Stents x6    CHOLECYSTECTOMY      UNDESCENDED TESTICLE EXPLORATION N/A        Review of patient's allergies indicates:   Allergen Reactions    Ticagrelor Shortness Of Breath    Vancomycin analogues Other (See Comments)     Patient experienced adverse effect, and had some renal insufficiency after receiving Vancomycin.       No current facility-administered medications on file prior to encounter.     Current Outpatient Medications on File Prior to Encounter   Medication Sig    aspirin 81 MG Chew Take 1 tablet (81 mg total) by mouth once daily.    atorvastatin (LIPITOR) 80 MG tablet Take 1 tablet (80 mg total) by mouth once daily.    carvediloL (COREG) 12.5 MG tablet Take 1 tablet (12.5 mg total) by mouth before breakfast.    fenofibrate (TRICOR) 145 MG tablet Take 1 tablet (145 mg total) by mouth once daily.    furosemide (LASIX) 20 MG tablet Take 20 mg by mouth once daily.    isosorbide mononitrate (IMDUR) 30 MG 24 hr tablet Take 1 tablet (30 mg total) by mouth once daily. Take 1/2 tablet with breakfast    losartan (COZAAR) 50 MG tablet Take 50 mg by mouth once daily.    NITROGLYCERIN SL Place under the tongue.    SUPER THIN LANCETS 28 gauge Misc 2 (two) times daily.    TRUE METRIX GLUCOSE METER Misc 2 (two) times daily.    [DISCONTINUED] amLODIPine (NORVASC) 10 MG tablet Take 1 tablet (10 mg total) by mouth once daily.    [DISCONTINUED] clopidogreL (PLAVIX) 75 mg tablet Take 75 mg by mouth.    [DISCONTINUED] doxycycline (VIBRAMYCIN) 100 MG Cap Take 100 mg by mouth.    [DISCONTINUED] HYDROcodone-acetaminophen (NORCO)  mg per tablet Take 1 tablet by mouth every 6 (six) hours as needed for Pain.    [DISCONTINUED] ondansetron (ZOFRAN-ODT) 4  MG TbDL Take 1 tablet (4 mg total) by mouth every 8 (eight) hours as needed (nausea, vomiting).    [DISCONTINUED] pantoprazole (PROTONIX) 40 MG tablet Take 40 mg by mouth.     Family History       Problem Relation (Age of Onset)    Heart disease Father, Brother, Maternal Grandmother    Hypertension Father          Tobacco Use    Smoking status: Former     Current packs/day: 0.25     Average packs/day: 0.3 packs/day for 19.9 years (5.0 ttl pk-yrs)     Types: Cigarettes, Cigars     Start date: 2004    Smokeless tobacco: Never   Substance and Sexual Activity    Alcohol use: Not Currently     Comment: occasional drink previous heavy drinker quit heavily in 2001    Drug use: Never    Sexual activity: Not Currently     Review of Systems   Constitutional:  Negative for appetite change, fatigue and fever.   HENT:  Negative for congestion, sinus pressure, sore throat and trouble swallowing.    Eyes:  Negative for photophobia and pain.   Respiratory:  Negative for cough and shortness of breath.         Reports having sob if he exerts himself, states would be able to walk 100 feet before having SOB    Denies orthopnea      Denies son on admission    Cardiovascular:  Negative for chest pain and palpitations.   Gastrointestinal:  Positive for abdominal pain and nausea. Negative for abdominal distention, constipation, diarrhea and vomiting.        Reports dry heaves that started Monday afternoon- no vomiting  No nausea on admission     Abdominal pain with radiation to back - states pain is at level 4 on admission    Genitourinary:  Negative for difficulty urinating and dysuria.   Musculoskeletal:  Positive for back pain.   Skin:  Negative for wound.        Hx of reactive perforating collagenosis and hidradenitis suppurativa  No open areas   Nodule under left breast and left axilla   Neurological:  Positive for light-headedness. Negative for weakness.        Reports having light headedness if he fasts   Psychiatric/Behavioral:   Negative for confusion. The patient is not nervous/anxious.      Objective:     Vital Signs (Most Recent):  Temp: 97.9 °F (36.6 °C) (12/06/23 0931)  Pulse: 93 (12/06/23 0931)  Resp: 18 (12/06/23 0931)  BP: (!) 170/102 (12/06/23 0931)  SpO2: 95 % (12/06/23 0931) Vital Signs (24h Range):  Temp:  [97.9 °F (36.6 °C)-98.5 °F (36.9 °C)] 97.9 °F (36.6 °C)  Pulse:  [] 93  Resp:  [16-22] 18  SpO2:  [95 %-100 %] 95 %  BP: (170-216)/(102-127) 170/102     Weight: 101.5 kg (223 lb 12.8 oz)  Body mass index is 36.12 kg/m².     Physical Exam  HENT:      Head: Normocephalic.      Mouth/Throat:      Mouth: Mucous membranes are moist.   Cardiovascular:      Rate and Rhythm: Normal rate and regular rhythm.      Pulses: Normal pulses.      Heart sounds: Normal heart sounds.   Pulmonary:      Effort: No respiratory distress.      Breath sounds: Normal breath sounds. No stridor. No wheezing or rhonchi.   Abdominal:      General: Bowel sounds are normal. There is no distension.      Palpations: Abdomen is soft. There is no mass.      Tenderness: There is abdominal tenderness. There is no guarding or rebound.      Hernia: No hernia is present.   Musculoskeletal:         General: Normal range of motion.      Cervical back: Normal range of motion.   Skin:     General: Skin is warm and dry.      Comments: Hx of reactive perforating collagenosis and hidradenitis suppurativa  No open areas   Nodule under left breast and left axilla   Neurological:      Mental Status: He is alert and oriented to person, place, and time.   Psychiatric:         Mood and Affect: Mood normal.                Significant Labs: All pertinent labs within the past 24 hours have been reviewed.  Recent Lab Results         12/06/23  0802   12/06/23  0741        Albumin/Globulin Ratio   0.9       Albumin   3.5       ALP   132       ALT   13       Anion Gap   13       Appearance, UA Clear         AST   12       Bacteria, UA None Seen To Occasional         Baso #    0.02       Basophil %   0.2       Bilirubin (UA) Negative         BILIRUBIN TOTAL   1.3       BUN   8       BUN/CREAT RATIO   8       Calcium   9.4       Chloride   95       CO2   27       Color, UA Dark Yellow         Creatinine   0.98       Differential Method   Auto       eGFR   92       Eos #   0.04       Eosinophil %   0.4       Globulin, Total   4.0       Glucose   212       Glucose,          Hematocrit   42.7       Hemoglobin   14.0       Ketones, UA Trace         Leukocytes, UA Negative         Lipase   190       Lymph #   1.39       Lymph %   13.4       MCH   30.6       MCHC   32.8       MCV   93.2       Mono #   0.48       Mono %   4.6       MPV   9.4       Mucus, UA Few         Neutrophils, Abs   8.46       Neutrophils Relative   81.4       NITRITE UA Negative         Occult Blood UA Trace-Lysed         pH, UA 6.5         Platelet Count   225       Potassium   3.2       PROTEIN TOTAL   7.5       Protein, UA >=300         RBC   4.58       RBC, UA 0-3         RDW   15.0       Sodium   132       Specific [place], UA 1.025         Squam Epithel, UA Occasional         Troponin I High Sensitivity   24.6       UROBILINOGEN UA 0.2         WBC, UA 0-5         WBC   10.39               Significant Imaging: I have reviewed all pertinent imaging results/findings within the past 24 hours.

## 2023-12-06 NOTE — ASSESSMENT & PLAN NOTE
Recent Labs   Lab 12/06/23  0741   *   12/06/23 na 132   IV fluids NS with 40 meq kcl   Recheck bmp in am

## 2023-12-06 NOTE — ASSESSMENT & PLAN NOTE
12/06/23 ct abd shows mild-to-moderate edema about the pancreas suspicious for pancreatitis. Correlate with amylase/lipase.  Prior cholecystectomy.   Lipase 190  Pain level on admission to acute care is 4 - has had morphine while in ER  Mild tenderness on abdominal palpation- no rebound   Continue IV fluids, NPO, pain control   Repeat labs in am

## 2023-12-06 NOTE — ASSESSMENT & PLAN NOTE
History of above   Has nodule under left breast and left axilla - non tenderness, no open wound, will monitor these areas

## 2023-12-06 NOTE — ASSESSMENT & PLAN NOTE
Patient with known CAD s/p stent placement x 4. which is controlled Will continue and monitor for S/Sx of angina/ACS. Continue to monitor on telemetry.

## 2023-12-06 NOTE — PLAN OF CARE
[facility] - Medical Surgical Unit  Initial Discharge Assessment       Primary Care Provider: Juan Pablo Ryan MD    Admission Diagnosis: Back pain [M54.9]  Acute pancreatitis without infection or necrosis, unspecified pancreatitis type [K85.90]    Admission Date: 12/6/2023  Expected Discharge Date:     Transition of Care Barriers: (P) No family/friends to help    Payor: MEDICARE / Plan: MEDICARE PART A & B / Product Type: Government /     Extended Emergency Contact Information  Primary Emergency Contact: Vera Mehta  Mobile Phone: [phone]  Relation: Mother  Preferred language: English   needed? No    Discharge Plan A: (P) Home         [facility] [place] - [address]  [address]  [place] [address]  Phone: [phone] Fax: [phone]    [id number] - [place], [place] - [address] [facility] [address]  [address]  [place] [address]  Phone: [phone] Fax: [phone]    [place], [place] - [address]  [address]  [place] [address]  Phone: [phone] Fax: [phone]      Initial Assessment (most recent)       Adult Discharge Assessment - 12/06/23 1252          Discharge Assessment    Assessment Type Discharge Planning Assessment (P)      Confirmed/corrected address, phone number and insurance Yes (P)      Confirmed Demographics Correct on Facesheet (P)      Source of Information patient (P)      Communicated BETY with patient/caregiver Date not available/Unable to determine (P)      Reason For Admission Acute on Chronic Pancreatitis (P)      People in Home alone (P)      Do you expect to return to your current living situation? Yes (P)      Do you have help at home or someone to help you manage your care at home? No (P)      Prior to hospitilization cognitive status: Alert/Oriented (P)      Current cognitive status: Alert/Oriented (P)      Walking or Climbing Stairs ambulation difficulty,  requires equipment (P)      Home Accessibility stairs to enter home (P)      Surface of Stairs, Main Entrance hardwood (P)      Stair Railings, Main Entrance railings safe and in good condition (P)      Landing, Stairs, Main Entrance railings present (P)      Equipment Currently Used at Home blood pressure machine;glucometer (P)      Patient currently being followed by outpatient case management? No (P)      Do you currently have service(s) that help you manage your care at home? No (P)      Do you take prescription medications? Yes (P)      Do you have prescription coverage? Yes (P)      Coverage Medicare A & B (P)      Is the patient taking medications as prescribed? yes (P)      How do you get to doctors appointments? family or friend will provide (P)      Are you on dialysis? No (P)      Do you take coumadin? No (P)      DME Needed Upon Discharge  cane, straight (P)      Discharge Plan discussed with: Patient (P)      Transition of Care Barriers No family/friends to help (P)      SDOH Lack of primary/family support (P)      Discharge Plan A Home (P)                    Mr. Fischer lives alone. He says he doesn't have anyone that physically able to help him r/t his mother being elderly.  He has requested a cane,because he lives upstairs in his apartment and he need the extra support, will check with [facility] to see if Medicare will pay for the purchase.

## 2023-12-06 NOTE — ED PROVIDER NOTES
Encounter Date: 12/6/2023       History     Chief Complaint   Patient presents with    Abdominal Pain     Patient presents to the ED with complaints of abdominal pain that radiates to his back, reports it started 3 days ago after he ate some fatty foods. Patient reports a PMH of alcohol abuse but has not drank in months. Reports a PMH of pancreatitis and patient states the pain feels similar to that. Denies any fever, states he has had N/V, no diarrhea. States he has not been able to take his medicaitons.     The history is provided by the patient.     Review of patient's allergies indicates:   Allergen Reactions    Ticagrelor Shortness Of Breath    Vancomycin analogues Other (See Comments)     Patient experienced adverse effect, and had some renal insufficiency after receiving Vancomycin.     Past Medical History:   Diagnosis Date    Chronic pancreatitis, unspecified pancreatitis type     Coronary artery disease     Diabetes mellitus     Dyslipidemia     Hidradenitis     Hypertension      Past Surgical History:   Procedure Laterality Date    CARDIAC SURGERY      Stents x6    CHOLECYSTECTOMY      UNDESCENDED TESTICLE EXPLORATION N/A      Family History   Problem Relation Age of Onset    Heart disease Father     Hypertension Father     Heart disease Brother     Heart disease Maternal Grandmother      Social History     Tobacco Use    Smoking status: Former     Current packs/day: 0.25     Average packs/day: 0.3 packs/day for 19.9 years (5.0 ttl pk-yrs)     Types: Cigarettes, Cigars     Start date: 2004    Smokeless tobacco: Never   Substance Use Topics    Alcohol use: Not Currently     Comment: occasional drink previous heavy drinker quit heavily in 2001    Drug use: Never     Review of Systems   Constitutional: Negative.    Respiratory: Negative.     Cardiovascular: Negative.    Gastrointestinal:  Positive for abdominal pain, nausea and vomiting. Negative for constipation and diarrhea.   Musculoskeletal: Negative.     Skin: Negative.    Neurological: Negative.    Psychiatric/Behavioral: Negative.     All other systems reviewed and are negative.      Physical Exam     Initial Vitals [12/06/23 0713]   BP Pulse Resp Temp SpO2   (!) 216/127 105 20 98.5 °F (36.9 °C) 97 %      MAP       --         Physical Exam    Vitals reviewed.  Constitutional: He appears well-developed and well-nourished. He appears ill.   Cardiovascular:  Normal rate, regular rhythm, normal heart sounds and intact distal pulses.           Pulmonary/Chest: Breath sounds normal.   Abdominal: Abdomen is soft. Bowel sounds are normal. He exhibits no distension. There is abdominal tenderness (generalized). There is no rebound.   Musculoskeletal:         General: Normal range of motion.     Neurological: He is alert and oriented to person, place, and time. He has normal strength. GCS score is 15. GCS eye subscore is 4. GCS verbal subscore is 5. GCS motor subscore is 6.   Skin: Skin is warm and dry. Capillary refill takes less than 2 seconds.   Psychiatric: He has a normal mood and affect. His behavior is normal. Judgment and thought content normal.         Medical Screening Exam   See Full Note    ED Course   Procedures  Labs Reviewed   COMPREHENSIVE METABOLIC PANEL - Abnormal; Notable for the following components:       Result Value    Sodium 132 (*)     Potassium 3.2 (*)     Chloride 95 (*)     Glucose 212 (*)     Bilirubin, Total 1.3 (*)     Alk Phos 132 (*)     ALT 13 (*)     AST 12 (*)     All other components within normal limits   LIPASE - Abnormal; Notable for the following components:    Lipase 190 (*)     All other components within normal limits    Narrative:     Verified by repeat testing   URINALYSIS, REFLEX TO URINE CULTURE - Abnormal; Notable for the following components:    Protein, UA >=300 (*)     Glucose,  (*)     Blood, UA Trace-Lysed (*)     All other components within normal limits   CBC WITH DIFFERENTIAL - Abnormal; Notable for the  following components:    RBC 4.58 (*)     RDW 15.0 (*)     Neutrophils % 81.4 (*)     Lymphocytes % 13.4 (*)     Neutrophils, Abs 8.46 (*)     Eosinophils % 0.4 (*)     All other components within normal limits   URINALYSIS, MICROSCOPIC - Abnormal; Notable for the following components:    Squamous Epithelial Cells, UA Occasional (*)     Mucus, UA Few (*)     All other components within normal limits   TROPONIN I - Normal   CBC W/ AUTO DIFFERENTIAL    Narrative:     The following orders were created for panel order CBC W/ AUTO DIFFERENTIAL.  Procedure                               Abnormality         Status                     ---------                               -----------         ------                     CBC with Differential[[id number]]       Abnormal            Final result                 Please view results for these tests on the individual orders.          Imaging Results              CT Abdomen Pelvis With IV Contrast NO Oral Contrast (Final result)  Result time 12/06/23 08:54:53      Final result by Reinier Baker DO (12/06/23 08:54:53)                   Impression:      Mild-to-moderate edema about the pancreas suspicious for pancreatitis. Correlate with amylase/lipase.  Prior cholecystectomy.  Other/detailed findings as above.    The CT exam was performed using one or more of the following dose    reduction techniques- Automated exposure control, adjustment of the mA    and/or kV according to patient size, and/or use of iterative    reconstructed technique.    Point of Service: [facility]      Electronically signed by: Reinier Baker  Date:    12/06/2023  Time:    08:54               Narrative:    EXAMINATION:  CT ABDOMEN PELVIS WITH IV CONTRAST    CLINICAL HISTORY:  Abdominal pain, acute, nonlocalized;    COMPARISON:  CT abdomen pelvis August 14, 2023    TECHNIQUE:  Multiple axial tomographic images of the abdomen and pelvis were obtained after administration of 100 cc Isovue 370  intravenous contrast.    FINDINGS:  Mild dependent changes of the lungs noted.    No worrisome focal hepatic abnormality demonstrated on submitted images.  Prior cholecystectomy.  Mild-to-moderate edema about the pancreas suspicious for pancreatitis.  Correlate with amylase/lipase.  No abnormal pancreatic ductal dilatation.  Spleen grossly unremarkable.    Bilateral adrenal glands grossly unremarkable.  Small renal hypodensities noted which are too small to characterize but may reflect cysts.  Consider renal ultrasound.  Urinary bladder incompletely distended.  Prostate and seminal vesicles grossly unremarkable.    No convincing evidence of gastrointestinal obstruction or acute appendicitis.  Atherosclerotic calcification demonstrated.  Scattered skeletal degenerative change.                                       X-Ray Abdomen Flat And Erect (Final result)  Result time 12/06/23 07:54:25      Final result by Sly Garcia MD (12/06/23 07:54:25)                   Impression:      No acute findings.      Electronically signed by: Sly Garcia  Date:    12/06/2023  Time:    07:54               Narrative:    EXAMINATION:  XR ABDOMEN FLAT AND ERECT    CLINICAL HISTORY:  Abdominal Pain;    TECHNIQUE:  Flat and erect AP views of the abdomen were performed.    COMPARISON:  None    FINDINGS:  No bowel obstruction.  No pneumoperitoneum detected.  Surgical clips right upper quadrant.  No abnormal calcifications.                                       Medications   HYDROmorphone (PF) injection 1 mg (has no administration in time range)   ondansetron injection 4 mg (4 mg Intravenous Given 12/6/23 0747)   sodium chloride 0.9% bolus 500 mL 500 mL (0 mLs Intravenous Stopped 12/6/23 0849)   morphine injection 4 mg (4 mg Intravenous Given 12/6/23 0827)   iopamidoL (ISOVUE-370) injection 100 mL (100 mLs Intravenous Given 12/6/23 0847)   promethazine (PHENERGAN) 25 mg in dextrose 5 % (D5W) 50 mL IVPB (25 mg Intravenous New Bag 12/6/23  0859)     Medical Decision Making  [facility]    Patient presents for emergent evaluation of acute generalized abdominal pain, N/V that poses a threat to life and/or bodily function.    In the ED patient found to have acute pancreatitis.    I ordered labs and personally reviewed them.  Labs significant for lipase 190  I ordered EKG and personally reviewed it.  EKG significant for NSR.    I ordered CT scan and personally reviewed it and reviewed the radiologist interpretation.  CT significant for mild to moderate pancreatitis.      Admit MDM  I discussed the patient presentation labs, ekg, X-rays, CT findings with the consultant for [facility] tele-emergency, Dr. Cantor, she agreed with treatment plan and agreed patient will need admission for pain control and fluids. Spoke with Dr. Crews with hospital medicine and he agreed on admission to full inpatient.    Patient was managed in the ED with IV Fluids, morphine, dilaudid, zofran and Phenergan.    The response to treatment was fair.        Amount and/or Complexity of Data Reviewed  Labs: ordered.  Radiology: ordered.    Risk  Prescription drug management.                                      Clinical Impression:   Final diagnoses:  [M54.9] Back pain  [K85.90] Acute pancreatitis without infection or necrosis, unspecified pancreatitis type (Primary)        ED Disposition Condition    Admit Stable                Carmina Fleming, DOC  12/06/23 0952

## 2023-12-06 NOTE — NURSING
Patient admitted to room 1114 from ER per wheel chair - alert and oriented - skin w/d to touch noted rash type bumps noted on patient legs and forearms states was told it's from kidney failure - and two knots one under left arm and under left breast area - INT line noted in right AC intact - voiced no complaints at present - call light in reach

## 2023-12-06 NOTE — ASSESSMENT & PLAN NOTE
Lab Results   Component Value Date    K 3.2 (L) 12/06/2023   . Will continue potassium replacement per protocol and recheck repeat levels after replacement completed.   12/06/23 potassium is 3.2   Will replace with IV fluids NA with 40 me kcl   Recheck potassium in am

## 2023-12-07 LAB
ANION GAP SERPL CALCULATED.3IONS-SCNC: 12 MMOL/L (ref 7–16)
BASOPHILS # BLD AUTO: 0.02 K/UL (ref 0–0.2)
BASOPHILS NFR BLD AUTO: 0.3 % (ref 0–1)
BUN SERPL-MCNC: 10 MG/DL (ref 7–18)
BUN/CREAT SERPL: 11 (ref 6–20)
CALCIUM SERPL-MCNC: 8.6 MG/DL (ref 8.5–10.1)
CHLORIDE SERPL-SCNC: 100 MMOL/L (ref 98–107)
CO2 SERPL-SCNC: 28 MMOL/L (ref 21–32)
CREAT SERPL-MCNC: 0.89 MG/DL (ref 0.7–1.3)
DIFFERENTIAL METHOD BLD: ABNORMAL
EGFR (NO RACE VARIABLE) (RUSH/TITUS): 102 ML/MIN/1.73M2
EOSINOPHIL # BLD AUTO: 0.12 K/UL (ref 0–0.5)
EOSINOPHIL NFR BLD AUTO: 1.6 % (ref 1–4)
ERYTHROCYTE [DISTWIDTH] IN BLOOD BY AUTOMATED COUNT: 15 % (ref 11.5–14.5)
GLUCOSE SERPL-MCNC: 140 MG/DL (ref 74–106)
GLUCOSE SERPL-MCNC: 144 MG/DL (ref 70–105)
GLUCOSE SERPL-MCNC: 159 MG/DL (ref 70–105)
GLUCOSE SERPL-MCNC: 173 MG/DL (ref 70–105)
GLUCOSE SERPL-MCNC: 197 MG/DL (ref 70–105)
HCT VFR BLD AUTO: 38.5 % (ref 40–54)
HGB BLD-MCNC: 12.1 G/DL (ref 13.5–18)
LIPASE SERPL-CCNC: 96 U/L (ref 16–77)
LYMPHOCYTES # BLD AUTO: 1.32 K/UL (ref 1–4.8)
LYMPHOCYTES NFR BLD AUTO: 17.4 % (ref 27–41)
MAGNESIUM SERPL-MCNC: 1.2 MG/DL (ref 1.7–2.3)
MCH RBC QN AUTO: 30.3 PG (ref 27–31)
MCHC RBC AUTO-ENTMCNC: 31.4 G/DL (ref 32–36)
MCV RBC AUTO: 96.5 FL (ref 80–96)
MONOCYTES # BLD AUTO: 0.41 K/UL (ref 0–0.8)
MONOCYTES NFR BLD AUTO: 5.4 % (ref 2–6)
MPC BLD CALC-MCNC: 9.2 FL (ref 9.4–12.4)
NEUTROPHILS # BLD AUTO: 5.71 K/UL (ref 1.8–7.7)
NEUTROPHILS NFR BLD AUTO: 75.3 % (ref 53–65)
PLATELET # BLD AUTO: 156 K/UL (ref 150–400)
POTASSIUM SERPL-SCNC: 3.5 MMOL/L (ref 3.5–5.1)
RBC # BLD AUTO: 3.99 M/UL (ref 4.6–6.2)
SODIUM SERPL-SCNC: 136 MMOL/L (ref 136–145)
WBC # BLD AUTO: 7.58 K/UL (ref 4.5–11)

## 2023-12-07 PROCEDURE — 63600175 PHARM REV CODE 636 W HCPCS: Performed by: NURSE PRACTITIONER

## 2023-12-07 PROCEDURE — 11000001 HC ACUTE MED/SURG PRIVATE ROOM

## 2023-12-07 PROCEDURE — 82962 GLUCOSE BLOOD TEST: CPT

## 2023-12-07 PROCEDURE — 25000003 PHARM REV CODE 250: Performed by: NURSE PRACTITIONER

## 2023-12-07 PROCEDURE — 99222 1ST HOSP IP/OBS MODERATE 55: CPT | Mod: ,,, | Performed by: NURSE PRACTITIONER

## 2023-12-07 PROCEDURE — 27000944

## 2023-12-07 PROCEDURE — 85025 COMPLETE CBC W/AUTO DIFF WBC: CPT | Performed by: NURSE PRACTITIONER

## 2023-12-07 PROCEDURE — 83690 ASSAY OF LIPASE: CPT | Performed by: NURSE PRACTITIONER

## 2023-12-07 PROCEDURE — 27000982 HC MATTRESS, MATRIX LAL RENTAL

## 2023-12-07 PROCEDURE — C9113 INJ PANTOPRAZOLE SODIUM, VIA: HCPCS | Performed by: NURSE PRACTITIONER

## 2023-12-07 PROCEDURE — 83735 ASSAY OF MAGNESIUM: CPT | Performed by: NURSE PRACTITIONER

## 2023-12-07 PROCEDURE — 80048 BASIC METABOLIC PNL TOTAL CA: CPT | Performed by: NURSE PRACTITIONER

## 2023-12-07 PROCEDURE — 99222 PR INITIAL HOSPITAL CARE,LEVL II: ICD-10-PCS | Mod: ,,, | Performed by: NURSE PRACTITIONER

## 2023-12-07 RX ORDER — MAGNESIUM SULFATE 1 G/100ML
1 INJECTION INTRAVENOUS ONCE
Status: COMPLETED | OUTPATIENT
Start: 2023-12-07 | End: 2023-12-07

## 2023-12-07 RX ORDER — PANTOPRAZOLE SODIUM 40 MG/10ML
40 INJECTION, POWDER, LYOPHILIZED, FOR SOLUTION INTRAVENOUS DAILY
Status: DISCONTINUED | OUTPATIENT
Start: 2023-12-07 | End: 2023-12-11 | Stop reason: HOSPADM

## 2023-12-07 RX ORDER — HYDRALAZINE HYDROCHLORIDE 20 MG/ML
10 INJECTION INTRAMUSCULAR; INTRAVENOUS EVERY 6 HOURS PRN
Status: DISCONTINUED | OUTPATIENT
Start: 2023-12-07 | End: 2023-12-11 | Stop reason: HOSPADM

## 2023-12-07 RX ORDER — MAG HYDROX/ALUMINUM HYD/SIMETH 200-200-20
15 SUSPENSION, ORAL (FINAL DOSE FORM) ORAL EVERY 6 HOURS PRN
Status: DISCONTINUED | OUTPATIENT
Start: 2023-12-07 | End: 2023-12-11 | Stop reason: HOSPADM

## 2023-12-07 RX ORDER — MAGNESIUM SULFATE HEPTAHYDRATE 40 MG/ML
2 INJECTION, SOLUTION INTRAVENOUS ONCE
Status: COMPLETED | OUTPATIENT
Start: 2023-12-07 | End: 2023-12-07

## 2023-12-07 RX ADMIN — ENOXAPARIN SODIUM 40 MG: 40 INJECTION SUBCUTANEOUS at 04:12

## 2023-12-07 RX ADMIN — MAGNESIUM SULFATE 1 G: 1 INJECTION INTRAVENOUS at 08:12

## 2023-12-07 RX ADMIN — ISOSORBIDE MONONITRATE 30 MG: 30 TABLET, EXTENDED RELEASE ORAL at 09:12

## 2023-12-07 RX ADMIN — MAGNESIUM SULFATE 2 G: 2 INJECTION INTRAVENOUS at 09:12

## 2023-12-07 RX ADMIN — ALUMINUM HYDROXIDE, MAGNESIUM HYDROXIDE, AND SIMETHICONE 15 ML: 1200; 120; 1200 SUSPENSION ORAL at 10:12

## 2023-12-07 RX ADMIN — HYDRALAZINE HYDROCHLORIDE 10 MG: 20 INJECTION INTRAMUSCULAR; INTRAVENOUS at 11:12

## 2023-12-07 RX ADMIN — HYDROMORPHONE HYDROCHLORIDE 1 MG: 2 INJECTION INTRAMUSCULAR; INTRAVENOUS; SUBCUTANEOUS at 11:12

## 2023-12-07 RX ADMIN — CARVEDILOL 12.5 MG: 12.5 TABLET, FILM COATED ORAL at 06:12

## 2023-12-07 RX ADMIN — PANTOPRAZOLE SODIUM 40 MG: 40 INJECTION, POWDER, LYOPHILIZED, FOR SOLUTION INTRAVENOUS at 10:12

## 2023-12-07 RX ADMIN — LOSARTAN POTASSIUM 50 MG: 50 TABLET, FILM COATED ORAL at 09:12

## 2023-12-07 RX ADMIN — SODIUM CHLORIDE: 9 INJECTION, SOLUTION INTRAVENOUS at 12:12

## 2023-12-07 RX ADMIN — SODIUM CHLORIDE: 9 INJECTION, SOLUTION INTRAVENOUS at 02:12

## 2023-12-07 RX ADMIN — HYDROMORPHONE HYDROCHLORIDE 1 MG: 2 INJECTION INTRAMUSCULAR; INTRAVENOUS; SUBCUTANEOUS at 04:12

## 2023-12-07 RX ADMIN — ASPIRIN 81 MG 81 MG: 81 TABLET ORAL at 09:12

## 2023-12-07 NOTE — HOSPITAL COURSE
12/07/23 Resting in bed. No nausea or vomiting overnight. Did report increase in pain requiring dilaudid. Pain level this morning is at level 2. States he continues to have some tenderness but pain no longer radiates to back. Magnesium is low at 1.2. Will replace IV.  Continue IV fluids and pain control. Lipase pending.  Lipase is 96.  Tolerated CL breakfast without any nausea, vomiting or pain. Increased diet to FL.  Tolerated FL diet well. States he does not need any pain med, he feels fine now.  Increased to diabetic diet for dinner.    12/0823 Sitting up on the side of the bed , reports having a horrible night. States he had dinner without problems. States he began to have abdominal pain with radiation to back around midnight. States pain was sad bad that he was crying. Had dilaudid around midnight. Complains of abdominal pain this morning with radiation to back at level 4. WBC 7.06 Lipase is normal at 67, amylase 62. Bowel sounds sluggish. States he has not had BM since Monday. Will check KUB.   KUB shows Gaseous distension of the small bowel and colon.  The largest gaseous distended small bowel loop located within the left upper quadrant measuring up to 6 cm. Will check for fecal impaction and give simethicone TID.     1038 reports having small bm and passing gas. Denies pain. States he has abdominal tenderness but really no pain.      1110 reports continues to pass gas. Denies abdominal pain. /110. Will increase losartan to 100 mg po daily.     1227 /103. Denies pain. Reports another small BM.     Discharge note:  Mr. Fischer was admitted on 12/6/23 for treatment of acute on chronic pancreatitis. Initial labs showed a WBC 18214, H&H 14 and 42.7, platemet 509251, BUN 8, creatinine 0.98, ALT and AST WNL, lipase 190, sodium 132 and potassium 3.2. He was treated with IVF and held NPO initially. Pain and nausea were treated with hydromorphone and ondansetron. His n/v and abd pain improved, and he has  been able to tolerate a regular diet for the last 2 days. Yesterday, he was noted to have some gaseous distention, but it improved with use of simethicone and after having 2 Bms. During his hospital stay, he was noted to have elevated BP that continued after pain was controlled. His losartan was increased from 50mg to 100mg daily. His BP has improved today, 146/91. He is afebrile. Labs today show WBC 6000, H&H 12.1 and 37.5, platelet [id number], sodium 136, potassium 3..5, Bun 8, creatinine 0.92. Amylase and lipase normalized yesterday. Upon evaluation this morning, Mr. Fischer was sitting on the sofa at bedside. He was pleasant and talkative. He reports that he is feeling much better. He denies abd pain or n/v/d. He report that his appetite is better. He notes that he feels ready for discharge home today. His case was discussed with Dr. Ross, Hospitalist. He agreed with patient's discharge home. Patient is to follow-up with his PCP this week.

## 2023-12-07 NOTE — PROGRESS NOTES
[facility] - Medical Surgical Unit  Hospital Medicine  Progress Note    Patient Name: Faustino Fischer  MRN: [id number]  Patient Class: IP- Inpatient   Admission Date: 12/6/2023  Length of Stay: 1 days  Attending Physician: Jean Crews IV, DO  Primary Care Provider: Juan Pablo Ryan MD        Subjective:     Principal Problem:Acute on chronic pancreatitis        HPI:  Mr. Amado Harmon is a 53 year old male with pmh of CAD, hypertension, NIDDM, Hidradenitis suppurativa,dyslipidemia, HAKEEM, pancreatitis, macrocytic anemia and obesity. He presented to ER with complaints of abdominal pain with radiation to back. States pain started Monday afternoon. States he has had dry heaving but no vomiting. Denies diarrhea. Last BM his morning. Denies alcohol use. States when pain started he stopped eating and just sipped liquids. States pain worsened to level 6 and he came to ER this morning. WBC 10.39 H&H 14/42.7, lipase 190 CT abd shows mld-to-moderate edema about the pancreas suspicious for pancreatitis. Correlate with amylase/lipase.  Prior cholecystectomy. Potassium is 3.2 . Will place in patient, NPO, continue IV fluids, control pain with IV morphine, Will replace potassium and na. Talked with Mr. Fischer re code status and he chose Full code.    Overview/Hospital Course:  12/07/23 Resting in bed. No nausea or vomiting overnight. Did report increase in pain requiring dilaudid. Pain level this morning is at level 2. States he continues to have some tenderness but pain no longer radiates to back. Magnesium is low at 1.2. Will replace IV.  Continue IV fluids and pain control. Lipase pending.    Interval History: pancreatitis, hypomagnesemia    Review of Systems   Constitutional:  Negative for appetite change.   Respiratory:  Negative for cough and shortness of breath.    Cardiovascular:  Negative for chest pain.   Gastrointestinal:  Positive for abdominal pain. Negative for abdominal distention,  constipation, diarrhea, nausea and vomiting.   Genitourinary:  Negative for difficulty urinating and dysuria.   Musculoskeletal:  Negative for arthralgias.   Neurological:  Negative for dizziness, light-headedness and headaches.   Psychiatric/Behavioral:  Negative for confusion.      Objective:     Vital Signs (Most Recent):  Temp: 98 °F (36.7 °C) (12/07/23 0714)  Pulse: 80 (12/07/23 0714)  Resp: 20 (12/07/23 0714)  BP: 133/88 (12/07/23 0714)  SpO2: 98 % (12/07/23 0714) Vital Signs (24h Range):  Temp:  [97.5 °F (36.4 °C)-98.1 °F (36.7 °C)] 98 °F (36.7 °C)  Pulse:  [] 80  Resp:  [16-22] 20  SpO2:  [93 %-100 %] 98 %  BP: (129-204)/() 133/88     Weight: 101.5 kg (223 lb 12.8 oz)  Body mass index is 36.12 kg/m².    Intake/Output Summary (Last 24 hours) at 12/7/2023 0820  Last data filed at 12/7/2023 0400  Gross per 24 hour   Intake 2017.92 ml   Output 450 ml   Net 1567.92 ml         Physical Exam  HENT:      Head: Normocephalic.      Mouth/Throat:      Mouth: Mucous membranes are moist.   Cardiovascular:      Rate and Rhythm: Normal rate and regular rhythm.      Pulses: Normal pulses.      Heart sounds: Normal heart sounds.   Pulmonary:      Effort: Pulmonary effort is normal.      Breath sounds: Normal breath sounds.   Abdominal:      General: Bowel sounds are normal. There is no distension.      Palpations: Abdomen is soft. There is no mass.      Tenderness: There is abdominal tenderness. There is no guarding or rebound.      Hernia: No hernia is present.   Musculoskeletal:         General: Normal range of motion.      Cervical back: Normal range of motion.   Skin:     General: Skin is warm and dry.   Neurological:      Mental Status: He is alert and oriented to person, place, and time.      Motor: No weakness.   Psychiatric:         Mood and Affect: Mood normal.             Significant Labs: All pertinent labs within the past 24 hours have been reviewed.  Recent Lab Results  (Last 5 results in the past 24  hours)        12/07/23  0602   12/07/23  0518   12/06/23  2010   12/06/23  1631   12/06/23  1134        Anion Gap   12             Baso #   0.02             Basophil %   0.3             BUN   10             BUN/CREAT RATIO   11             Calcium   8.6             Chloride   100             CO2   28             Creatinine   0.89             Differential Method   Auto             eGFR   102             Eos #   0.12             Eosinophil %   1.6             Glucose   140             Hematocrit   38.5             Hemoglobin   12.1             Lipase   96             Lymph #   1.32             Lymph %   17.4             Magnesium    1.2             MCH   30.3             MCHC   31.4             MCV   96.5             Mono #   0.41             Mono %   5.4             MPV   9.2             Neutrophils, Abs   5.71             Neutrophils Relative   75.3             Platelet Count   156             POC Glucose 144     169   179   199       Potassium   3.5             RBC   3.99             RDW   15.0             Sodium   136             WBC   7.58                                    Significant Imaging: I have reviewed all pertinent imaging results/findings within the past 24 hours.    Assessment/Plan:      * Acute on chronic pancreatitis    12/06/23 ct abd shows mild-to-moderate edema about the pancreas suspicious for pancreatitis. Correlate with amylase/lipase.  Prior cholecystectomy.   Lipase 190  Pain level on admission to acute care is 4 - has had morphine while in ER  Mild tenderness on abdominal palpation- no rebound   Continue IV fluids, NPO, pain control   Repeat labs in am    12/07/23 complained of pain requiring dilaudid overnight. States pain is much better this morning. Pain level is 2.   Lipase is 96.   Will start CL diet       Hypertension  Chronic, uncontrolled. Latest blood pressure and vitals reviewed-     Temp:  [97.5 °F (36.4 °C)-98.1 °F (36.7 °C)]   Pulse:  []   Resp:  [16-20]   BP:  (129-191)/()   SpO2:  [93 %-98 %] .   Home meds for hypertension were reviewed and noted below.   Hypertension Medications               carvediloL (COREG) 12.5 MG tablet Take 1 tablet (12.5 mg total) by mouth before breakfast.         isosorbide mononitrate (IMDUR) 30 MG 24 hr tablet Take 1 tablet (30 mg total) by mouth once daily. Take 1/2 tablet with breakfast    losartan (COZAAR) 50 MG tablet Take 50 mg by mouth once daily.    NITROGLYCERIN SL Place under the tongue.            While in the hospital, will manage blood pressure as follows; Continue home antihypertensive regimen    Will utilize p.r.n. blood pressure medication only if patient's blood pressure greater than 180/110 and he develops symptoms such as worsening chest pain or shortness of breath.      12/07/23 continue coreg and losartan    CAD (coronary artery disease)  Patient with known CAD s/p stent placement x 4. which is controlled Will continue and monitor for S/Sx of angina/ACS. Continue to monitor on telemetry.         Type 2 diabetes mellitus with hyperglycemia, without long-term current use of insulin  12/06/23 states diabetes is now diet controlled  POCT glucose checks   Last A1c is 6.3 on 06/23 12/07/23 stable       Dyslipidemia    12/06/23 home med of tricor and atorvastatin - hold    Hypomagnesemia  Patient has Abnormal Magnesium: hypomagnesemia. Will continue to monitor electrolytes closely. Will replace the affected electrolytes and repeat labs to be done after interventions completed. The patient's magnesium results have been reviewed and are listed below.  Recent Labs   Lab 12/07/23  0518   MG 1.2*        12/07/23 magnesium sulfate 3 gms IV     Hypokalemia     Lab Results   Component Value Date    K 3.5 12/07/2023   . Will continue potassium replacement per protocol and recheck repeat levels after replacement completed.   12/06/23 potassium is 3.2   Will replace with IV fluids NA with 40 me kcl   Recheck potassium in  am      12/07/23 potassium is 3.5    Hidradenitis suppurativa  History of above   Has nodule under left breast and left axilla - non tenderness, no open wound, will monitor these areas      Hyponatremia    Recent Labs   Lab 12/07/23  0518        12/06/23 na 132   IV fluids NS with 40 meq kcl   Recheck bmp in am      12/07/23 na is 136      VTE Risk Mitigation (From admission, onward)           Ordered     enoxaparin injection 40 mg  Daily         12/06/23 1023     IP VTE HIGH RISK PATIENT  Once         12/06/23 1023     Place sequential compression device  Until discontinued         12/06/23 1023                    Discharge Planning   BETY:      Code Status: Full Code   Is the patient medically ready for discharge?:     Reason for patient still in hospital (select all that apply): Treatment  Discharge Plan A: Home                  DOC Thomas  Department of Hospital Medicine   [facility] - Medical Surgical Unit

## 2023-12-07 NOTE — ASSESSMENT & PLAN NOTE
Patient has Abnormal Magnesium: hypomagnesemia. Will continue to monitor electrolytes closely. Will replace the affected electrolytes and repeat labs to be done after interventions completed. The patient's magnesium results have been reviewed and are listed below.  Recent Labs   Lab 12/07/23  0518   MG 1.2*        12/07/23 magnesium sulfate 3 gms IV

## 2023-12-07 NOTE — PLAN OF CARE
Problem: Adult Inpatient Plan of Care  Goal: Plan of Care Review  Outcome: Ongoing, Progressing  Goal: Patient-Specific Goal (Individualized)  Outcome: Ongoing, Progressing  Goal: Absence of Hospital-Acquired Illness or Injury  Outcome: Ongoing, Progressing  Goal: Optimal Comfort and Wellbeing  Outcome: Ongoing, Progressing  Goal: Readiness for Transition of Care  Outcome: Ongoing, Progressing     Problem: Diabetes Comorbidity  Goal: Blood Glucose Level Within Targeted Range  Outcome: Ongoing, Progressing     Problem: Fall Injury Risk  Goal: Absence of Fall and Fall-Related Injury  Outcome: Ongoing, Progressing     Problem: Pain Acute  Goal: Acceptable Pain Control and Functional Ability  Outcome: Ongoing, Progressing

## 2023-12-07 NOTE — ASSESSMENT & PLAN NOTE
12/06/23 states diabetes is now diet controlled  POCT glucose checks   Last A1c is 6.3 on 06/23 12/07/23 stable

## 2023-12-07 NOTE — ASSESSMENT & PLAN NOTE
Lab Results   Component Value Date    K 3.5 12/07/2023   . Will continue potassium replacement per protocol and recheck repeat levels after replacement completed.   12/06/23 potassium is 3.2   Will replace with IV fluids NA with 40 me kcl   Recheck potassium in am      12/07/23 potassium is 3.5

## 2023-12-07 NOTE — SUBJECTIVE & OBJECTIVE
Interval History: pancreatitis, hypomagnesemia    Review of Systems   Constitutional:  Negative for appetite change.   Respiratory:  Negative for cough and shortness of breath.    Cardiovascular:  Negative for chest pain.   Gastrointestinal:  Positive for abdominal pain. Negative for abdominal distention, constipation, diarrhea, nausea and vomiting.   Genitourinary:  Negative for difficulty urinating and dysuria.   Musculoskeletal:  Negative for arthralgias.   Neurological:  Negative for dizziness, light-headedness and headaches.   Psychiatric/Behavioral:  Negative for confusion.      Objective:     Vital Signs (Most Recent):  Temp: 98 °F (36.7 °C) (12/07/23 0714)  Pulse: 80 (12/07/23 0714)  Resp: 20 (12/07/23 0714)  BP: 133/88 (12/07/23 0714)  SpO2: 98 % (12/07/23 0714) Vital Signs (24h Range):  Temp:  [97.5 °F (36.4 °C)-98.1 °F (36.7 °C)] 98 °F (36.7 °C)  Pulse:  [] 80  Resp:  [16-22] 20  SpO2:  [93 %-100 %] 98 %  BP: (129-204)/() 133/88     Weight: 101.5 kg (223 lb 12.8 oz)  Body mass index is 36.12 kg/m².    Intake/Output Summary (Last 24 hours) at 12/7/2023 0820  Last data filed at 12/7/2023 0400  Gross per 24 hour   Intake 2017.92 ml   Output 450 ml   Net 1567.92 ml         Physical Exam  HENT:      Head: Normocephalic.      Mouth/Throat:      Mouth: Mucous membranes are moist.   Cardiovascular:      Rate and Rhythm: Normal rate and regular rhythm.      Pulses: Normal pulses.      Heart sounds: Normal heart sounds.   Pulmonary:      Effort: Pulmonary effort is normal.      Breath sounds: Normal breath sounds.   Abdominal:      General: Bowel sounds are normal. There is no distension.      Palpations: Abdomen is soft. There is no mass.      Tenderness: There is abdominal tenderness. There is no guarding or rebound.      Hernia: No hernia is present.   Musculoskeletal:         General: Normal range of motion.      Cervical back: Normal range of motion.   Skin:     General: Skin is warm and dry.    Neurological:      Mental Status: He is alert and oriented to person, place, and time.      Motor: No weakness.   Psychiatric:         Mood and Affect: Mood normal.             Significant Labs: All pertinent labs within the past 24 hours have been reviewed.  Recent Lab Results  (Last 5 results in the past 24 hours)        12/07/23  0602   12/07/23  0518   12/06/23 2010 12/06/23  1631   12/06/23  1134        Anion Gap   12             Baso #   0.02             Basophil %   0.3             BUN   10             BUN/CREAT RATIO   11             Calcium   8.6             Chloride   100             CO2   28             Creatinine   0.89             Differential Method   Auto             eGFR   102             Eos #   0.12             Eosinophil %   1.6             Glucose   140             Hematocrit   38.5             Hemoglobin   12.1             Lipase   96             Lymph #   1.32             Lymph %   17.4             Magnesium    1.2             MCH   30.3             MCHC   31.4             MCV   96.5             Mono #   0.41             Mono %   5.4             MPV   9.2             Neutrophils, Abs   5.71             Neutrophils Relative   75.3             Platelet Count   156             POC Glucose 144     169   179   199       Potassium   3.5             RBC   3.99             RDW   15.0             Sodium   136             WBC   7.58                                    Significant Imaging: I have reviewed all pertinent imaging results/findings within the past 24 hours.

## 2023-12-07 NOTE — PLAN OF CARE
Problem: Adult Inpatient Plan of Care  Goal: Plan of Care Review  Outcome: Ongoing, Progressing  Goal: Patient-Specific Goal (Individualized)  Outcome: Ongoing, Progressing  Goal: Absence of Hospital-Acquired Illness or Injury  Outcome: Ongoing, Progressing  Goal: Optimal Comfort and Wellbeing  Outcome: Ongoing, Progressing  Goal: Readiness for Transition of Care  Outcome: Ongoing, Progressing  Intervention: Mutually Develop Transition Plan  Flowsheets (Taken 12/7/2023 1107)  Transportation Anticipated: family or friend will provide     Problem: Diabetes Comorbidity  Goal: Blood Glucose Level Within Targeted Range  Outcome: Ongoing, Progressing  Intervention: Monitor and Manage Glycemia  Flowsheets (Taken 12/7/2023 1107)  Glycemic Management: blood glucose monitored     Problem: Fall Injury Risk  Goal: Absence of Fall and Fall-Related Injury  Outcome: Ongoing, Progressing  Intervention: Identify and Manage Contributors  Flowsheets (Taken 12/7/2023 1107)  Self-Care Promotion: independence encouraged  Medication Review/Management: medications reviewed     Problem: Pain Acute  Goal: Acceptable Pain Control and Functional Ability  Outcome: Ongoing, Progressing  Intervention: Prevent or Manage Pain  Flowsheets (Taken 12/7/2023 1107)  Sleep/Rest Enhancement: awakenings minimized  Medication Review/Management: medications reviewed  Intervention: Optimize Psychosocial Wellbeing  Flowsheets (Taken 12/7/2023 1107)  Supportive Measures: relaxation techniques promoted

## 2023-12-07 NOTE — ASSESSMENT & PLAN NOTE
Chronic, uncontrolled. Latest blood pressure and vitals reviewed-     Temp:  [97.5 °F (36.4 °C)-98.1 °F (36.7 °C)]   Pulse:  []   Resp:  [16-20]   BP: (129-191)/()   SpO2:  [93 %-98 %] .   Home meds for hypertension were reviewed and noted below.   Hypertension Medications               carvediloL (COREG) 12.5 MG tablet Take 1 tablet (12.5 mg total) by mouth before breakfast.         isosorbide mononitrate (IMDUR) 30 MG 24 hr tablet Take 1 tablet (30 mg total) by mouth once daily. Take 1/2 tablet with breakfast    losartan (COZAAR) 50 MG tablet Take 50 mg by mouth once daily.    NITROGLYCERIN SL Place under the tongue.            While in the hospital, will manage blood pressure as follows; Continue home antihypertensive regimen    Will utilize p.r.n. blood pressure medication only if patient's blood pressure greater than 180/110 and he develops symptoms such as worsening chest pain or shortness of breath.      12/07/23 continue coreg and losartan

## 2023-12-07 NOTE — ASSESSMENT & PLAN NOTE
Recent Labs   Lab 12/07/23  0518        12/06/23 na 132   IV fluids NS with 40 meq kcl   Recheck bmp in am      12/07/23 na is 136

## 2023-12-07 NOTE — ASSESSMENT & PLAN NOTE
12/06/23 ct abd shows mild-to-moderate edema about the pancreas suspicious for pancreatitis. Correlate with amylase/lipase.  Prior cholecystectomy.   Lipase 190  Pain level on admission to acute care is 4 - has had morphine while in ER  Mild tenderness on abdominal palpation- no rebound   Continue IV fluids, NPO, pain control   Repeat labs in am    12/07/23 complained of pain requiring dilaudid overnight. States pain is much better this morning. Pain level is 2.   Lipase is 96.   Will start CL diet

## 2023-12-08 PROBLEM — R14.0 GASEOUS ABDOMINAL DISTENTION: Status: ACTIVE | Noted: 2023-12-08

## 2023-12-08 LAB
AMYLASE SERPL-CCNC: 62 U/L (ref 25–115)
ANION GAP SERPL CALCULATED.3IONS-SCNC: 12 MMOL/L (ref 7–16)
ANISOCYTOSIS BLD QL SMEAR: ABNORMAL
BASOPHILS # BLD AUTO: 0 K/UL (ref 0–0.2)
BASOPHILS NFR BLD AUTO: 0 % (ref 0–1)
BUN SERPL-MCNC: 9 MG/DL (ref 7–18)
BUN/CREAT SERPL: 12 (ref 6–20)
CALCIUM SERPL-MCNC: 9.2 MG/DL (ref 8.5–10.1)
CHLORIDE SERPL-SCNC: 98 MMOL/L (ref 98–107)
CO2 SERPL-SCNC: 26 MMOL/L (ref 21–32)
CREAT SERPL-MCNC: 0.76 MG/DL (ref 0.7–1.3)
DIFFERENTIAL METHOD BLD: ABNORMAL
EGFR (NO RACE VARIABLE) (RUSH/TITUS): 107 ML/MIN/1.73M2
EOSINOPHIL # BLD AUTO: 0.28 K/UL (ref 0–0.5)
EOSINOPHIL NFR BLD AUTO: 4 % (ref 1–4)
EOSINOPHIL NFR BLD MANUAL: 3 % (ref 1–4)
ERYTHROCYTE [DISTWIDTH] IN BLOOD BY AUTOMATED COUNT: 14.9 % (ref 11.5–14.5)
GLUCOSE SERPL-MCNC: 135 MG/DL (ref 74–106)
GLUCOSE SERPL-MCNC: 139 MG/DL (ref 70–105)
GLUCOSE SERPL-MCNC: 140 MG/DL (ref 70–105)
GLUCOSE SERPL-MCNC: 197 MG/DL (ref 70–105)
GLUCOSE SERPL-MCNC: 204 MG/DL (ref 70–105)
HCT VFR BLD AUTO: 42.4 % (ref 40–54)
HGB BLD-MCNC: 13.5 G/DL (ref 13.5–18)
LIPASE SERPL-CCNC: 67 U/L (ref 16–77)
LYMPHOCYTES # BLD AUTO: 1.38 K/UL (ref 1–4.8)
LYMPHOCYTES NFR BLD AUTO: 19.5 % (ref 27–41)
LYMPHOCYTES NFR BLD MANUAL: 13 % (ref 27–41)
MAGNESIUM SERPL-MCNC: 1.7 MG/DL (ref 1.7–2.3)
MCH RBC QN AUTO: 30.7 PG (ref 27–31)
MCHC RBC AUTO-ENTMCNC: 31.8 G/DL (ref 32–36)
MCV RBC AUTO: 96.4 FL (ref 80–96)
MONOCYTES # BLD AUTO: 0.38 K/UL (ref 0–0.8)
MONOCYTES NFR BLD AUTO: 5.4 % (ref 2–6)
MONOCYTES NFR BLD MANUAL: 4 % (ref 2–6)
MPC BLD CALC-MCNC: 10.3 FL (ref 9.4–12.4)
NEUTROPHILS # BLD AUTO: 5.02 K/UL (ref 1.8–7.7)
NEUTROPHILS NFR BLD AUTO: 71.1 % (ref 53–65)
NEUTS BAND NFR BLD MANUAL: 2 % (ref 1–5)
NEUTS SEG NFR BLD MANUAL: 78 % (ref 50–62)
NRBC BLD MANUAL-RTO: ABNORMAL %
PLATELET # BLD AUTO: 139 K/UL (ref 150–400)
PLATELET MORPHOLOGY: NORMAL
POTASSIUM SERPL-SCNC: 4.8 MMOL/L (ref 3.5–5.1)
RBC # BLD AUTO: 4.4 M/UL (ref 4.6–6.2)
SODIUM SERPL-SCNC: 131 MMOL/L (ref 136–145)
WBC # BLD AUTO: 7.06 K/UL (ref 4.5–11)

## 2023-12-08 PROCEDURE — 25000003 PHARM REV CODE 250: Performed by: NURSE PRACTITIONER

## 2023-12-08 PROCEDURE — 82962 GLUCOSE BLOOD TEST: CPT

## 2023-12-08 PROCEDURE — 80048 BASIC METABOLIC PNL TOTAL CA: CPT | Performed by: NURSE PRACTITIONER

## 2023-12-08 PROCEDURE — 99232 SBSQ HOSP IP/OBS MODERATE 35: CPT | Mod: ,,, | Performed by: NURSE PRACTITIONER

## 2023-12-08 PROCEDURE — 83690 ASSAY OF LIPASE: CPT | Performed by: NURSE PRACTITIONER

## 2023-12-08 PROCEDURE — 11000001 HC ACUTE MED/SURG PRIVATE ROOM

## 2023-12-08 PROCEDURE — 85025 COMPLETE CBC W/AUTO DIFF WBC: CPT | Performed by: NURSE PRACTITIONER

## 2023-12-08 PROCEDURE — 63600175 PHARM REV CODE 636 W HCPCS: Performed by: NURSE PRACTITIONER

## 2023-12-08 PROCEDURE — C9113 INJ PANTOPRAZOLE SODIUM, VIA: HCPCS | Performed by: NURSE PRACTITIONER

## 2023-12-08 PROCEDURE — 27000944

## 2023-12-08 PROCEDURE — 99232 PR SUBSEQUENT HOSPITAL CARE,LEVL II: ICD-10-PCS | Mod: ,,, | Performed by: NURSE PRACTITIONER

## 2023-12-08 PROCEDURE — 82150 ASSAY OF AMYLASE: CPT | Performed by: NURSE PRACTITIONER

## 2023-12-08 PROCEDURE — 83735 ASSAY OF MAGNESIUM: CPT | Performed by: NURSE PRACTITIONER

## 2023-12-08 PROCEDURE — 27000982 HC MATTRESS, MATRIX LAL RENTAL

## 2023-12-08 RX ORDER — SIMETHICONE 80 MG
1 TABLET,CHEWABLE ORAL
Status: DISCONTINUED | OUTPATIENT
Start: 2023-12-08 | End: 2023-12-11 | Stop reason: HOSPADM

## 2023-12-08 RX ORDER — LOSARTAN POTASSIUM 50 MG/1
50 TABLET ORAL ONCE
Status: COMPLETED | OUTPATIENT
Start: 2023-12-08 | End: 2023-12-08

## 2023-12-08 RX ORDER — LOSARTAN POTASSIUM 50 MG/1
100 TABLET ORAL DAILY
Status: DISCONTINUED | OUTPATIENT
Start: 2023-12-09 | End: 2023-12-11 | Stop reason: HOSPADM

## 2023-12-08 RX ORDER — LOSARTAN POTASSIUM 50 MG/1
50 TABLET ORAL DAILY
Status: DISCONTINUED | OUTPATIENT
Start: 2023-12-08 | End: 2023-12-08

## 2023-12-08 RX ORDER — HYDROMORPHONE HYDROCHLORIDE 2 MG/ML
1 INJECTION, SOLUTION INTRAMUSCULAR; INTRAVENOUS; SUBCUTANEOUS EVERY 6 HOURS PRN
Status: DISCONTINUED | OUTPATIENT
Start: 2023-12-08 | End: 2023-12-08

## 2023-12-08 RX ORDER — GENTAMICIN SULFATE 40 MG/ML
2.5 INJECTION, SOLUTION INTRAMUSCULAR; INTRAVENOUS
Status: CANCELLED | OUTPATIENT
Start: 2023-12-08

## 2023-12-08 RX ORDER — BISACODYL 5 MG
10 TABLET, DELAYED RELEASE (ENTERIC COATED) ORAL DAILY PRN
Status: DISCONTINUED | OUTPATIENT
Start: 2023-12-08 | End: 2023-12-11 | Stop reason: HOSPADM

## 2023-12-08 RX ORDER — HYDROMORPHONE HYDROCHLORIDE 2 MG/ML
1 INJECTION, SOLUTION INTRAMUSCULAR; INTRAVENOUS; SUBCUTANEOUS ONCE
Status: COMPLETED | OUTPATIENT
Start: 2023-12-08 | End: 2023-12-08

## 2023-12-08 RX ADMIN — ONDANSETRON 4 MG: 2 INJECTION INTRAMUSCULAR; INTRAVENOUS at 05:12

## 2023-12-08 RX ADMIN — ASPIRIN 81 MG 81 MG: 81 TABLET ORAL at 08:12

## 2023-12-08 RX ADMIN — ISOSORBIDE MONONITRATE 30 MG: 30 TABLET, EXTENDED RELEASE ORAL at 08:12

## 2023-12-08 RX ADMIN — ALUMINUM HYDROXIDE, MAGNESIUM HYDROXIDE, AND SIMETHICONE 15 ML: 1200; 120; 1200 SUSPENSION ORAL at 05:12

## 2023-12-08 RX ADMIN — LOSARTAN POTASSIUM 50 MG: 50 TABLET, FILM COATED ORAL at 11:12

## 2023-12-08 RX ADMIN — SIMETHICONE 80 MG: 80 TABLET, CHEWABLE ORAL at 08:12

## 2023-12-08 RX ADMIN — HYDROMORPHONE HYDROCHLORIDE 1 MG: 2 INJECTION INTRAMUSCULAR; INTRAVENOUS; SUBCUTANEOUS at 12:12

## 2023-12-08 RX ADMIN — ENOXAPARIN SODIUM 40 MG: 40 INJECTION SUBCUTANEOUS at 04:12

## 2023-12-08 RX ADMIN — ALUMINUM HYDROXIDE, MAGNESIUM HYDROXIDE, AND SIMETHICONE 15 ML: 1200; 120; 1200 SUSPENSION ORAL at 08:12

## 2023-12-08 RX ADMIN — PANTOPRAZOLE SODIUM 40 MG: 40 INJECTION, POWDER, LYOPHILIZED, FOR SOLUTION INTRAVENOUS at 08:12

## 2023-12-08 RX ADMIN — SIMETHICONE 80 MG: 80 TABLET, CHEWABLE ORAL at 02:12

## 2023-12-08 RX ADMIN — BISACODYL 10 MG: 5 TABLET, COATED ORAL at 09:12

## 2023-12-08 RX ADMIN — SIMETHICONE 80 MG: 80 TABLET, CHEWABLE ORAL at 07:12

## 2023-12-08 RX ADMIN — CARVEDILOL 12.5 MG: 12.5 TABLET, FILM COATED ORAL at 05:12

## 2023-12-08 RX ADMIN — LOSARTAN POTASSIUM 50 MG: 50 TABLET, FILM COATED ORAL at 08:12

## 2023-12-08 NOTE — SUBJECTIVE & OBJECTIVE
Interval History: abdominal pain, belching    Review of Systems   Constitutional:  Negative for appetite change, fatigue and fever.   Respiratory:  Negative for cough, chest tightness and shortness of breath.    Gastrointestinal:  Positive for abdominal distention, abdominal pain and constipation. Negative for diarrhea, nausea and vomiting.   Genitourinary:  Negative for difficulty urinating and dysuria.     Objective:     Vital Signs (Most Recent):  Temp: 97.6 °F (36.4 °C) (12/08/23 0720)  Pulse: 78 (12/08/23 0720)  Resp: 20 (12/08/23 0720)  BP: (!) 188/107 (12/08/23 0720)  SpO2: 98 % (12/08/23 0720) Vital Signs (24h Range):  Temp:  [97.6 °F (36.4 °C)-98.6 °F (37 °C)] 97.6 °F (36.4 °C)  Pulse:  [66-86] 78  Resp:  [18-20] 20  SpO2:  [93 %-99 %] 98 %  BP: (102-211)/() 188/107     Weight: 101.5 kg (223 lb 12.8 oz)  Body mass index is 36.12 kg/m².    Intake/Output Summary (Last 24 hours) at 12/8/2023 0832  Last data filed at 12/8/2023 0400  Gross per 24 hour   Intake 1200 ml   Output --   Net 1200 ml         Physical Exam  HENT:      Mouth/Throat:      Mouth: Mucous membranes are moist.   Cardiovascular:      Rate and Rhythm: Normal rate and regular rhythm.      Pulses: Normal pulses.      Heart sounds: Normal heart sounds.   Pulmonary:      Effort: Pulmonary effort is normal.      Breath sounds: Normal breath sounds.   Abdominal:      General: There is distension.      Palpations: There is no mass.      Tenderness: There is abdominal tenderness. There is no guarding or rebound.      Hernia: No hernia is present.      Comments: Sluggish bowel sounds  KUB done- shows gaseous distension of the small bowel and colon.  The largest gaseous distended small bowel loop located within the left upper quadrant measuring up to 6 cm.     Reports occasional belching  No nausea or vomiting reported  No bm since Monday  Amylase and lipase wnl  Dilaudid has been discontinued   Check for fecal impaction    Musculoskeletal:          General: Normal range of motion.      Cervical back: Normal range of motion.   Skin:     General: Skin is warm and dry.   Neurological:      Mental Status: He is alert and oriented to person, place, and time.   Psychiatric:         Mood and Affect: Mood normal.             Significant Labs: All pertinent labs within the past 24 hours have been reviewed.    Significant Imaging: I have reviewed all pertinent imaging results/findings within the past 24 hours.

## 2023-12-08 NOTE — PLAN OF CARE
Problem: Adult Inpatient Plan of Care  Goal: Plan of Care Review  12/8/2023 0103 by Funmilayo Avila RN  Outcome: Ongoing, Progressing  12/7/2023 2345 by Funmilayo Avila RN  Outcome: Ongoing, Progressing  Flowsheets (Taken 12/7/2023 2345)  Plan of Care Reviewed With: patient  Goal: Patient-Specific Goal (Individualized)  12/8/2023 0103 by Funmilayo Avila RN  Outcome: Ongoing, Progressing  12/7/2023 2345 by Funmilayo Avila RN  Outcome: Ongoing, Progressing  Flowsheets (Taken 12/7/2023 2345)  Anxieties, Fears or Concerns: uncontrolled pain  Individualized Care Needs: verbalize pain improvement within one hour of intervention  Goal: Absence of Hospital-Acquired Illness or Injury  12/8/2023 0103 by Funmilayo Avila RN  Outcome: Ongoing, Progressing  12/7/2023 2345 by Funmilayo Avila RN  Outcome: Ongoing, Progressing  Intervention: Identify and Manage Fall Risk  Flowsheets (Taken 12/7/2023 2345)  Safety Promotion/Fall Prevention:   assistive device/personal item within reach   commode/urinal/bedpan at bedside   diversional activities provided   Fall Risk reviewed with patient/family   lighting adjusted   medications reviewed   side rails raised x 2   instructed to call staff for mobility  Goal: Optimal Comfort and Wellbeing  12/8/2023 0103 by Funmilayo Avila RN  Outcome: Ongoing, Progressing  12/7/2023 2345 by Funmilayo Avila RN  Outcome: Ongoing, Progressing  Goal: Readiness for Transition of Care  12/8/2023 0103 by Funmilayo Avila RN  Outcome: Ongoing, Progressing  12/7/2023 2345 by Funmilayo Avila RN  Outcome: Ongoing, Progressing     Problem: Diabetes Comorbidity  Goal: Blood Glucose Level Within Targeted Range  12/8/2023 0103 by Funmilayo Avila RN  Outcome: Ongoing, Progressing  12/7/2023 2345 by Funmilayo Avila RN  Outcome: Ongoing, Progressing     Problem: Fall Injury Risk  Goal: Absence of Fall and Fall-Related Injury  12/8/2023 0103 by  Funmilayo Avila RN  Outcome: Ongoing, Progressing  12/7/2023 2345 by Funmilayo Avila RN  Outcome: Ongoing, Progressing     Problem: Pain Acute  Goal: Acceptable Pain Control and Functional Ability  12/8/2023 0103 by Funmilayo Avila RN  Outcome: Ongoing, Progressing  12/7/2023 2345 by Funmilayo Avila RN  Outcome: Ongoing, Progressing

## 2023-12-08 NOTE — ASSESSMENT & PLAN NOTE
12/08/23 KUB shows gaseous distension of the small bowel and colon.  The largest gaseous distended small bowel loop located within the left upper quadrant measuring up to 6 cm.  No nausea or vomiting  Reports belching   No BM since Monday  Checking for fecal impaction   Simethicone TID

## 2023-12-08 NOTE — ASSESSMENT & PLAN NOTE
Patient has Abnormal Magnesium: hypomagnesemia. Will continue to monitor electrolytes closely. Will replace the affected electrolytes and repeat labs to be done after interventions completed. The patient's magnesium results have been reviewed and are listed below.  Recent Labs   Lab 12/08/23  0530   MG 1.7          12/07/23 magnesium sulfate 3 gms IV

## 2023-12-08 NOTE — PROGRESS NOTES
[facility] - [facility] Surgical Unit  Hospital Medicine  Progress Note    Patient Name: Faustino Fischer  MRN: [id number]  Patient Class: IP- Inpatient   Admission Date: 12/6/2023  Length of Stay: 2 days  Attending Physician: Jean Crews IV, DO  Primary Care Provider: Juan Pablo Ryan MD        Subjective:     Principal Problem:Acute on chronic pancreatitis        HPI:  Mr. Amado Harmon is a 53 year old male with pmh of CAD, hypertension, NIDDM, Hidradenitis suppurativa,dyslipidemia, HAKEEM, pancreatitis, macrocytic anemia and obesity. He presented to ER with complaints of abdominal pain with radiation to back. States pain started Monday afternoon. States he has had dry heaving but no vomiting. Denies diarrhea. Last BM his morning. Denies alcohol use. States when pain started he stopped eating and just sipped liquids. States pain worsened to level 6 and he came to ER this morning. WBC 10.39 H&H 14/42.7, lipase 190 CT abd shows mld-to-moderate edema about the pancreas suspicious for pancreatitis. Correlate with amylase/lipase.  Prior cholecystectomy. Potassium is 3.2 . Will place in patient, NPO, continue IV fluids, control pain with IV morphine, Will replace potassium and na. Talked with Mr. Fischer re code status and he chose Full code.    Overview/Hospital Course:  12/07/23 Resting in bed. No nausea or vomiting overnight. Did report increase in pain requiring dilaudid. Pain level this morning is at level 2. States he continues to have some tenderness but pain no longer radiates to back. Magnesium is low at 1.2. Will replace IV.  Continue IV fluids and pain control. Lipase pending.  Lipase is 96.  Tolerated CL breakfast without any nausea, vomiting or pain. Increased diet to FL.  Tolerated FL diet well. States he does not need any pain med, he feels fine now.  Increased to diabetic diet for dinner.    12/0823 Sitting up on the side of the bed , reports having a horrible night. States he had  dinner without problems. States he began to have abdominal pain with radiation to back around midnight. States pain was sad bad that he was crying. Had dilaudid around midnight. Complains of abdominal pain this morning with radiation to back at level 4. WBC 7.06 Lipase is normal at 67, amylase 62. Bowel sounds sluggish. States he has not had BM since Monday. Will check KUB.   KUB shows Gaseous distension of the small bowel and colon.  The largest gaseous distended small bowel loop located within the left upper quadrant measuring up to 6 cm. Will check for fecal impaction and give simethicone TID.       Interval History: abdominal pain, belching    Review of Systems   Constitutional:  Negative for appetite change, fatigue and fever.   Respiratory:  Negative for cough, chest tightness and shortness of breath.    Gastrointestinal:  Positive for abdominal distention, abdominal pain and constipation. Negative for diarrhea, nausea and vomiting.   Genitourinary:  Negative for difficulty urinating and dysuria.     Objective:     Vital Signs (Most Recent):  Temp: 97.6 °F (36.4 °C) (12/08/23 0720)  Pulse: 78 (12/08/23 0720)  Resp: 20 (12/08/23 0720)  BP: (!) 188/107 (12/08/23 0720)  SpO2: 98 % (12/08/23 0720) Vital Signs (24h Range):  Temp:  [97.6 °F (36.4 °C)-98.6 °F (37 °C)] 97.6 °F (36.4 °C)  Pulse:  [66-86] 78  Resp:  [18-20] 20  SpO2:  [93 %-99 %] 98 %  BP: (102-211)/() 188/107     Weight: 101.5 kg (223 lb 12.8 oz)  Body mass index is 36.12 kg/m².    Intake/Output Summary (Last 24 hours) at 12/8/2023 0832  Last data filed at 12/8/2023 0400  Gross per 24 hour   Intake 1200 ml   Output --   Net 1200 ml         Physical Exam  HENT:      Mouth/Throat:      Mouth: Mucous membranes are moist.   Cardiovascular:      Rate and Rhythm: Normal rate and regular rhythm.      Pulses: Normal pulses.      Heart sounds: Normal heart sounds.   Pulmonary:      Effort: Pulmonary effort is normal.      Breath sounds: Normal breath  sounds.   Abdominal:      General: There is distension.      Palpations: There is no mass.      Tenderness: There is abdominal tenderness. There is no guarding or rebound.      Hernia: No hernia is present.      Comments: Sluggish bowel sounds  KUB done- shows gaseous distension of the small bowel and colon.  The largest gaseous distended small bowel loop located within the left upper quadrant measuring up to 6 cm.     Reports occasional belching  No nausea or vomiting reported  No bm since Monday  Amylase and lipase wnl  Dilaudid has been discontinued   Check for fecal impaction    Musculoskeletal:         General: Normal range of motion.      Cervical back: Normal range of motion.   Skin:     General: Skin is warm and dry.   Neurological:      Mental Status: He is alert and oriented to person, place, and time.   Psychiatric:         Mood and Affect: Mood normal.             Significant Labs: All pertinent labs within the past 24 hours have been reviewed.    Significant Imaging: I have reviewed all pertinent imaging results/findings within the past 24 hours.    Assessment/Plan:      * Acute on chronic pancreatitis    12/06/23 ct abd shows mild-to-moderate edema about the pancreas suspicious for pancreatitis. Correlate with amylase/lipase.  Prior cholecystectomy.   Lipase 190  Pain level on admission to acute care is 4 - has had morphine while in ER  Mild tenderness on abdominal palpation- no rebound   Continue IV fluids, NPO, pain control   Repeat labs in am    12/07/23 complained of pain requiring dilaudid overnight. States pain is much better this morning. Pain level is 2.   Lipase is 96.   Will start CL diet     12/08/23 resolved. Amylase and lipase wnl      Hypertension  Chronic, uncontrolled. Latest blood pressure and vitals reviewed-     Temp:  [97.5 °F (36.4 °C)-98.1 °F (36.7 °C)]   Pulse:  []   Resp:  [16-20]   BP: (129-191)/()   SpO2:  [93 %-98 %] .   Home meds for hypertension were reviewed  and noted below.   Hypertension Medications               carvediloL (COREG) 12.5 MG tablet Take 1 tablet (12.5 mg total) by mouth before breakfast.         isosorbide mononitrate (IMDUR) 30 MG 24 hr tablet Take 1 tablet (30 mg total) by mouth once daily. Take 1/2 tablet with breakfast    losartan (COZAAR) 50 MG tablet Take 50 mg by mouth once daily.    NITROGLYCERIN SL Place under the tongue.            While in the hospital, will manage blood pressure as follows; Continue home antihypertensive regimen    Will utilize p.r.n. blood pressure medication only if patient's blood pressure greater than 180/110 and he develops symptoms such as worsening chest pain or shortness of breath.      12/07/23 continue coreg and losartan    CAD (coronary artery disease)  Patient with known CAD s/p stent placement x 4. which is controlled Will continue and monitor for S/Sx of angina/ACS. Continue to monitor on telemetry.         Type 2 diabetes mellitus with hyperglycemia, without long-term current use of insulin  12/06/23 states diabetes is now diet controlled  POCT glucose checks   Last A1c is 6.3 on 06/23 12/07/23 stable       12/08/23 stable    Dyslipidemia    12/06/23 home med of tricor and atorvastatin - hold    Hypomagnesemia  Patient has Abnormal Magnesium: hypomagnesemia. Will continue to monitor electrolytes closely. Will replace the affected electrolytes and repeat labs to be done after interventions completed. The patient's magnesium results have been reviewed and are listed below.  Recent Labs   Lab 12/08/23  0530   MG 1.7          12/07/23 magnesium sulfate 3 gms IV     Hypokalemia     Lab Results   Component Value Date    K 4.8 12/08/2023   . Will continue potassium replacement per protocol and recheck repeat levels after replacement completed.   12/06/23 potassium is 3.2   Will replace with IV fluids NA with 40 me kcl   Recheck potassium in am      12/07/23 potassium is 3.5    Hidradenitis suppurativa  History  of above   Has nodule under left breast and left axilla - non tenderness, no open wound, will monitor these areas      Hyponatremia    Recent Labs   Lab 12/08/23  0530   *     12/06/23 na 132   IV fluids NS with 40 meq kcl   Recheck bmp in am      12/07/23 na is 136      VTE Risk Mitigation (From admission, onward)           Ordered     enoxaparin injection 40 mg  Daily         12/06/23 1023     IP VTE HIGH RISK PATIENT  Once         12/06/23 1023     Place sequential compression device  Until discontinued         12/06/23 1023                    Discharge Planning   BETY:      Code Status: Full Code   Is the patient medically ready for discharge?:     Reason for patient still in hospital (select all that apply): Treatment  Discharge Plan A: Home                  DOC Thomas  Department of Hospital Medicine   [facility]

## 2023-12-08 NOTE — ASSESSMENT & PLAN NOTE
Lab Results   Component Value Date    K 4.8 12/08/2023   . Will continue potassium replacement per protocol and recheck repeat levels after replacement completed.   12/06/23 potassium is 3.2   Will replace with IV fluids NA with 40 me kcl   Recheck potassium in am      12/07/23 potassium is 3.5

## 2023-12-08 NOTE — ASSESSMENT & PLAN NOTE
12/06/23 states diabetes is now diet controlled  POCT glucose checks   Last A1c is 6.3 on 06/23 12/07/23 stable       12/08/23 stable

## 2023-12-08 NOTE — ASSESSMENT & PLAN NOTE
Recent Labs   Lab 12/08/23  0530   *     12/06/23 na 132   IV fluids NS with 40 meq kcl   Recheck bmp in am      12/07/23 na is 136

## 2023-12-08 NOTE — ASSESSMENT & PLAN NOTE
12/06/23 ct abd shows mild-to-moderate edema about the pancreas suspicious for pancreatitis. Correlate with amylase/lipase.  Prior cholecystectomy.   Lipase 190  Pain level on admission to acute care is 4 - has had morphine while in ER  Mild tenderness on abdominal palpation- no rebound   Continue IV fluids, NPO, pain control   Repeat labs in am    12/07/23 complained of pain requiring dilaudid overnight. States pain is much better this morning. Pain level is 2.   Lipase is 96.   Will start CL diet     12/08/23 resolved. Amylase and lipase wnl

## 2023-12-09 VITALS
HEART RATE: 69 BPM | WEIGHT: 223.81 LBS | DIASTOLIC BLOOD PRESSURE: 91 MMHG | RESPIRATION RATE: 20 BRPM | OXYGEN SATURATION: 98 % | SYSTOLIC BLOOD PRESSURE: 146 MMHG | HEIGHT: 66 IN | TEMPERATURE: 99 F | BODY MASS INDEX: 35.97 KG/M2

## 2023-12-09 PROBLEM — R14.0 GASEOUS ABDOMINAL DISTENTION: Status: RESOLVED | Noted: 2023-12-08 | Resolved: 2023-12-09

## 2023-12-09 PROBLEM — E83.42 HYPOMAGNESEMIA: Status: RESOLVED | Noted: 2021-06-26 | Resolved: 2023-12-09

## 2023-12-09 PROBLEM — E87.6 HYPOKALEMIA: Status: RESOLVED | Noted: 2023-12-06 | Resolved: 2023-12-09

## 2023-12-09 PROBLEM — K85.90 ACUTE ON CHRONIC PANCREATITIS: Status: RESOLVED | Noted: 2021-06-26 | Resolved: 2023-12-09

## 2023-12-09 PROBLEM — K86.1 ACUTE ON CHRONIC PANCREATITIS: Status: RESOLVED | Noted: 2021-06-26 | Resolved: 2023-12-09

## 2023-12-09 LAB
ANION GAP SERPL CALCULATED.3IONS-SCNC: 10 MMOL/L (ref 7–16)
BASOPHILS # BLD AUTO: 0.03 K/UL (ref 0–0.2)
BASOPHILS NFR BLD AUTO: 0.5 % (ref 0–1)
BUN SERPL-MCNC: 8 MG/DL (ref 7–18)
BUN/CREAT SERPL: 9 (ref 6–20)
CALCIUM SERPL-MCNC: 9.1 MG/DL (ref 8.5–10.1)
CHLORIDE SERPL-SCNC: 100 MMOL/L (ref 98–107)
CO2 SERPL-SCNC: 30 MMOL/L (ref 21–32)
CREAT SERPL-MCNC: 0.92 MG/DL (ref 0.7–1.3)
DIFFERENTIAL METHOD BLD: ABNORMAL
EGFR (NO RACE VARIABLE) (RUSH/TITUS): 99 ML/MIN/1.73M2
EOSINOPHIL # BLD AUTO: 0.08 K/UL (ref 0–0.5)
EOSINOPHIL NFR BLD AUTO: 1.3 % (ref 1–4)
ERYTHROCYTE [DISTWIDTH] IN BLOOD BY AUTOMATED COUNT: 14.6 % (ref 11.5–14.5)
GLUCOSE SERPL-MCNC: 127 MG/DL (ref 74–106)
GLUCOSE SERPL-MCNC: 168 MG/DL (ref 70–105)
HCT VFR BLD AUTO: 37.5 % (ref 40–54)
HGB BLD-MCNC: 12.1 G/DL (ref 13.5–18)
LYMPHOCYTES # BLD AUTO: 1.84 K/UL (ref 1–4.8)
LYMPHOCYTES NFR BLD AUTO: 30.7 % (ref 27–41)
MAGNESIUM SERPL-MCNC: 1.5 MG/DL (ref 1.7–2.3)
MCH RBC QN AUTO: 30.8 PG (ref 27–31)
MCHC RBC AUTO-ENTMCNC: 32.3 G/DL (ref 32–36)
MCV RBC AUTO: 95.4 FL (ref 80–96)
MONOCYTES # BLD AUTO: 0.41 K/UL (ref 0–0.8)
MONOCYTES NFR BLD AUTO: 6.8 % (ref 2–6)
MPC BLD CALC-MCNC: 10.4 FL (ref 9.4–12.4)
NEUTROPHILS # BLD AUTO: 3.64 K/UL (ref 1.8–7.7)
NEUTROPHILS NFR BLD AUTO: 60.7 % (ref 53–65)
PLATELET # BLD AUTO: 173 K/UL (ref 150–400)
POTASSIUM SERPL-SCNC: 3.5 MMOL/L (ref 3.5–5.1)
RBC # BLD AUTO: 3.93 M/UL (ref 4.6–6.2)
SODIUM SERPL-SCNC: 136 MMOL/L (ref 136–145)
WBC # BLD AUTO: 6 K/UL (ref 4.5–11)

## 2023-12-09 PROCEDURE — 27000982 HC MATTRESS, MATRIX LAL RENTAL

## 2023-12-09 PROCEDURE — 85025 COMPLETE CBC W/AUTO DIFF WBC: CPT | Performed by: NURSE PRACTITIONER

## 2023-12-09 PROCEDURE — 80048 BASIC METABOLIC PNL TOTAL CA: CPT | Performed by: NURSE PRACTITIONER

## 2023-12-09 PROCEDURE — 27000944

## 2023-12-09 PROCEDURE — 25000003 PHARM REV CODE 250: Performed by: NURSE PRACTITIONER

## 2023-12-09 PROCEDURE — 11000001 HC ACUTE MED/SURG PRIVATE ROOM

## 2023-12-09 PROCEDURE — 82962 GLUCOSE BLOOD TEST: CPT

## 2023-12-09 PROCEDURE — 83735 ASSAY OF MAGNESIUM: CPT | Performed by: NURSE PRACTITIONER

## 2023-12-09 RX ORDER — SIMETHICONE 80 MG
80 TABLET,CHEWABLE ORAL EVERY 6 HOURS PRN
Refills: 0
Start: 2023-12-09

## 2023-12-09 RX ORDER — LOSARTAN POTASSIUM 100 MG/1
100 TABLET ORAL DAILY
Qty: 30 TABLET | Refills: 0 | Status: SHIPPED | OUTPATIENT
Start: 2023-12-10 | End: 2024-02-27

## 2023-12-09 RX ORDER — MAG HYDROX/ALUMINUM HYD/SIMETH 200-200-20
15 SUSPENSION, ORAL (FINAL DOSE FORM) ORAL EVERY 6 HOURS PRN
Start: 2023-12-09 | End: 2024-12-08

## 2023-12-09 RX ORDER — BISACODYL 5 MG
10 TABLET, DELAYED RELEASE (ENTERIC COATED) ORAL DAILY PRN
Refills: 0
Start: 2023-12-09

## 2023-12-09 RX ORDER — CLONIDINE HYDROCHLORIDE 0.1 MG/1
0.1 TABLET ORAL ONCE
Status: COMPLETED | OUTPATIENT
Start: 2023-12-09 | End: 2023-12-09

## 2023-12-09 RX ADMIN — SIMETHICONE 80 MG: 80 TABLET, CHEWABLE ORAL at 08:12

## 2023-12-09 RX ADMIN — ASPIRIN 81 MG 81 MG: 81 TABLET ORAL at 08:12

## 2023-12-09 RX ADMIN — ISOSORBIDE MONONITRATE 30 MG: 30 TABLET, EXTENDED RELEASE ORAL at 08:12

## 2023-12-09 RX ADMIN — LOSARTAN POTASSIUM 100 MG: 50 TABLET, FILM COATED ORAL at 08:12

## 2023-12-09 RX ADMIN — CARVEDILOL 12.5 MG: 12.5 TABLET, FILM COATED ORAL at 06:12

## 2023-12-09 RX ADMIN — CLONIDINE HYDROCHLORIDE 0.1 MG: 0.1 TABLET ORAL at 12:12

## 2023-12-09 NOTE — DISCHARGE SUMMARY
[facility]  Hospital Medicine  Discharge Summary      Patient Name: Faustino Fischer  MRN: [id number]  ELÍAS: [id number]  Patient Class: IP- Inpatient  Admission Date: 12/6/2023  Hospital Length of Stay: 3 days  Discharge Date and Time:  12/09/2023 1:01 PM  Attending Physician: Jean Crews IV, DO   Discharging Provider: Sadia Mcpherson NP  Primary Care Provider: Juan Pablo Ryan MD    Primary Care Team: Networked reference to record PCT     HPI:   Mr. Amado Harmon is a 53 year old male with pmh of CAD, hypertension, NIDDM, Hidradenitis suppurativa,dyslipidemia, HAKEEM, pancreatitis, macrocytic anemia and obesity. He presented to ER with complaints of abdominal pain with radiation to back. States pain started Monday afternoon. States he has had dry heaving but no vomiting. Denies diarrhea. Last BM his morning. Denies alcohol use. States when pain started he stopped eating and just sipped liquids. States pain worsened to level 6 and he came to ER this morning. WBC 10.39 H&H 14/42.7, lipase 190 CT abd shows mld-to-moderate edema about the pancreas suspicious for pancreatitis. Correlate with amylase/lipase.  Prior cholecystectomy. Potassium is 3.2 . Will place in patient, NPO, continue IV fluids, control pain with IV morphine, Will replace potassium and na. Talked with Mr. Fischer re code status and he chose Full code.    * No surgery found *      Hospital Course:   12/07/23 Resting in bed. No nausea or vomiting overnight. Did report increase in pain requiring dilaudid. Pain level this morning is at level 2. States he continues to have some tenderness but pain no longer radiates to back. Magnesium is low at 1.2. Will replace IV.  Continue IV fluids and pain control. Lipase pending.  Lipase is 96.  Tolerated CL breakfast without any nausea, vomiting or pain. Increased diet to FL.  Tolerated FL diet well. States he does not need any pain med, he feels fine now.  Increased to  diabetic diet for dinner.    12/0823 Sitting up on the side of the bed , reports having a horrible night. States he had dinner without problems. States he began to have abdominal pain with radiation to back around midnight. States pain was sad bad that he was crying. Had dilaudid around midnight. Complains of abdominal pain this morning with radiation to back at level 4. WBC 7.06 Lipase is normal at 67, amylase 62. Bowel sounds sluggish. States he has not had BM since Monday. Will check KUB.   KUB shows Gaseous distension of the small bowel and colon.  The largest gaseous distended small bowel loop located within the left upper quadrant measuring up to 6 cm. Will check for fecal impaction and give simethicone TID.     1038 reports having small bm and passing gas. Denies pain. States he has abdominal tenderness but really no pain.      1110 reports continues to pass gas. Denies abdominal pain. /110. Will increase losartan to 100 mg po daily.     1227 /103. Denies pain. Reports another small BM.     Discharge note:  Mr. Fischer was admitted on 12/6/23 for treatment of acute on chronic pancreatitis. Initial labs showed a WBC 59314, H&H 14 and 42.7, platemet 416726, BUN 8, creatinine 0.98, ALT and AST WNL, lipase 190, sodium 132 and potassium 3.2. He was treated with IVF and held NPO initially. Pain and nausea were treated with hydromorphone and ondansetron. His n/v and abd pain improved, and he has been able to tolerate a regular diet for the last 2 days. Yesterday, he was noted to have some gaseous distention, but it improved with use of simethicone and after having 2 Bms. During his hospital stay, he was noted to have elevated BP that continued after pain was controlled. His losartan was increased from 50mg to 100mg daily. His BP has improved today, 146/91. He is afebrile. Labs today show WBC 6000, H&H 12.1 and 37.5, platelet 316277, sodium 136, potassium 3..5, Bun 8, creatinine 0.92. Amylase and lipase  normalized yesterday. Upon evaluation this morning, Mr. Fischer was sitting on the sofa at bedside. He was pleasant and talkative. He reports that he is feeling much better. He denies abd pain or n/v/d. He report that his appetite is better. He notes that he feels ready for discharge home today. His case was discussed with Dr. Ross, Hospitalist. He agreed with patient's discharge home. Patient is to follow-up with his PCP this week.     Goals of Care Treatment Preferences:  Code Status: Full Code      Consults:     Cardiac/Vascular  Hypertension  Chronic, uncontrolled. Latest blood pressure and vitals reviewed-     Temp:  [97.5 °F (36.4 °C)-98.5 °F (36.9 °C)]   Pulse:  [68-75]   Resp:  [20]   BP: (146-188)/()   SpO2:  [96 %-99 %] .   Home meds for hypertension were reviewed and noted below.   Hypertension Medications               carvediloL (COREG) 12.5 MG tablet Take 1 tablet (12.5 mg total) by mouth before breakfast.         isosorbide mononitrate (IMDUR) 30 MG 24 hr tablet Take 1 tablet (30 mg total) by mouth once daily. Take 1/2 tablet with breakfast    losartan (COZAAR) 50 MG tablet Take 50 mg by mouth once daily.    NITROGLYCERIN SL Place under the tongue.            While in the hospital, will manage blood pressure as follows; Continue home antihypertensive regimen    Will utilize p.r.n. blood pressure medication only if patient's blood pressure greater than 180/110 and he develops symptoms such as worsening chest pain or shortness of breath.      12/07/23 continue coreg and losartan    Discharged home today.  /91  CONtinue Coreg dose and Losartan 100mg daily    Dyslipidemia    12/06/23 home med of tricor and atorvastatin - hold    Discharged home.  To continue to hold Fenofibrate and atorvastatin. To follow-up with PCP this week.    CAD (coronary artery disease)  Patient with known CAD s/p stent placement x 4. which is controlled Will continue and monitor for S/Sx of angina/ACS. Continue to  monitor on telemetry.         Renal/  Hyponatremia    Recent Labs   Lab 12/09/23  0600        12/06/23 na 132   IV fluids NS with 40 meq kcl   Recheck bmp in am      12/07/23 na is 136    Discharged home today.      Endocrine  Type 2 diabetes mellitus with hyperglycemia, without long-term current use of insulin  12/06/23 states diabetes is now diet controlled  POCT glucose checks   Last A1c is 6.3 on 06/23 12/07/23 stable       12/08/23 stable    Glucose 127 today. Diabetic diet. Discharged home to follow-up with PCP.      Final Active Diagnoses:    Diagnosis Date Noted POA    Hyponatremia [E87.1] 12/06/2023 Yes    Hypertension [I10] 06/14/2023 Yes    Type 2 diabetes mellitus with hyperglycemia, without long-term current use of insulin [E11.65] 06/14/2023 Yes    Dyslipidemia [E78.5] 06/14/2023 Yes    CAD (coronary artery disease) [I25.10] 06/27/2021 Yes     Chronic      Problems Resolved During this Admission:    Diagnosis Date Noted Date Resolved POA    PRINCIPAL PROBLEM:  Acute on chronic pancreatitis [K85.90, K86.1] 06/26/2021 12/09/2023 Yes    Gaseous abdominal distention [R14.0] 12/08/2023 12/09/2023 No    Hypokalemia [E87.6] 12/06/2023 12/09/2023 Yes    Hypomagnesemia [E83.42] 06/26/2021 12/09/2023 Yes       Discharged Condition: good    Disposition: Home or Self Care    Follow Up:   Follow-up Information       Juan Pablo Ryan MD Follow up in 3 day(s).    Specialty: Family Medicine  Why: Call for an appointment on Monday  Contact information:  [address] [facility]  [facility] [place] [address]  [phone]                           Patient Instructions:      Diet diabetic     Diet Cardiac     Notify your health care provider if you experience any of the following:  temperature >100.4     Notify your health care provider if you experience any of the following:  persistent nausea and vomiting or diarrhea     Notify your health care provider if you experience any of  the following:  severe uncontrolled pain     Notify your health care provider if you experience any of the following:  difficulty breathing or increased cough     Notify your health care provider if you experience any of the following:  persistent dizziness, light-headedness, or visual disturbances     Activity as tolerated       Significant Diagnostic Studies: Labs: BMP:   Recent Labs   Lab 12/08/23  0530 12/09/23  0600   * 127*   * 136   K 4.8 3.5   CL 98 100   CO2 26 30   BUN 9 8   CREATININE 0.76 0.92   CALCIUM 9.2 9.1   MG 1.7 1.5*   , CMP   Recent Labs   Lab 12/08/23  0530 12/09/23  0600   * 136   K 4.8 3.5   CL 98 100   CO2 26 30   * 127*   BUN 9 8   CREATININE 0.76 0.92   CALCIUM 9.2 9.1   ANIONGAP 12 10   , and CBC   Recent Labs   Lab 12/08/23  0530 12/09/23  0600   WBC 7.06 6.00   HGB 13.5 12.1*   HCT 42.4 37.5*   * 173       Pending Diagnostic Studies:       None           Medications:  Reconciled Home Medications:      Medication List        START taking these medications      aluminum-magnesium hydroxide-simethicone 200-200-20 mg/5 mL Susp  Commonly known as: MAALOX  Take 15 mLs by mouth every 6 (six) hours as needed.     bisacodyL 5 mg EC tablet  Commonly known as: DULCOLAX  Take 2 tablets (10 mg total) by mouth daily as needed for Constipation.     simethicone 80 MG chewable tablet  Commonly known as: MYLICON  Take 1 tablet (80 mg total) by mouth every 6 (six) hours as needed for Flatulence.            CHANGE how you take these medications      losartan 100 MG tablet  Commonly known as: COZAAR  Take 1 tablet (100 mg total) by mouth once daily.  Start taking on: December 10, 2023  What changed:   medication strength  how much to take            CONTINUE taking these medications      aspirin 81 MG Chew  Take 1 tablet (81 mg total) by mouth once daily.     carvediloL 12.5 MG tablet  Commonly known as: COREG  Take 1 tablet (12.5 mg total) by mouth before breakfast.      furosemide 20 MG tablet  Commonly known as: LASIX  Take 20 mg by mouth once daily.     isosorbide mononitrate 30 MG 24 hr tablet  Commonly known as: IMDUR  Take 1 tablet (30 mg total) by mouth once daily. Take 1/2 tablet with breakfast     NITROGLYCERIN SL  Place under the tongue.            STOP taking these medications      atorvastatin 80 MG tablet  Commonly known as: LIPITOR     fenofibrate 145 MG tablet  Commonly known as: TRICOR     SUPER THIN LANCETS 28 gauge Misc  Generic drug: lancets     TRUE METRIX GLUCOSE METER Misc  Generic drug: blood-glucose meter              Indwelling Lines/Drains at time of discharge:   Lines/Drains/Airways       None                   Time spent on the discharge of patient: greater than 30 minutes         Sadia Mcpherson NP  Department of Hospital Medicine  [facility]

## 2023-12-09 NOTE — NURSING
Patient's blood pressure 188/112. Called nurse practitioner, orders will follow. Will monitor patient closely.

## 2023-12-09 NOTE — PLAN OF CARE
Problem: Adult Inpatient Plan of Care  Goal: Plan of Care Review  12/9/2023 1031 by Yaima Mackey RN  Outcome: Met  12/9/2023 0744 by Yaima Mackey RN  Outcome: Ongoing, Progressing  Goal: Patient-Specific Goal (Individualized)  12/9/2023 1031 by Yaima Mackey RN  Outcome: Met  12/9/2023 0744 by Yaima Mackey RN  Outcome: Ongoing, Progressing  Goal: Absence of Hospital-Acquired Illness or Injury  12/9/2023 1031 by Yaima Mackey RN  Outcome: Met  12/9/2023 0744 by Yaima Mackey RN  Outcome: Ongoing, Progressing  Goal: Optimal Comfort and Wellbeing  12/9/2023 1031 by Yaima Mackey RN  Outcome: Met  12/9/2023 0744 by Yaima Mackey RN  Outcome: Ongoing, Progressing  Goal: Readiness for Transition of Care  12/9/2023 1031 by Yaima Mackey RN  Outcome: Met  12/9/2023 0744 by Yaima Mackey RN  Outcome: Ongoing, Progressing     Problem: Diabetes Comorbidity  Goal: Blood Glucose Level Within Targeted Range  12/9/2023 1031 by Yaima Mackey RN  Outcome: Met  12/9/2023 0744 by Yaima Mackey RN  Outcome: Ongoing, Progressing     Problem: Fall Injury Risk  Goal: Absence of Fall and Fall-Related Injury  12/9/2023 1031 by Yaima Mackey RN  Outcome: Met  12/9/2023 0744 by Yaima Mackey RN  Outcome: Ongoing, Progressing     Problem: Pain Acute  Goal: Acceptable Pain Control and Functional Ability  12/9/2023 1031 by Yaima Mackey RN  Outcome: Met  12/9/2023 0744 by Yaima Mackey RN  Outcome: Ongoing, Progressing

## 2023-12-09 NOTE — ASSESSMENT & PLAN NOTE
12/06/23 home med of tricor and atorvastatin - hold    Discharged home.  To continue to hold Fenofibrate and atorvastatin. To follow-up with PCP this week.

## 2023-12-09 NOTE — DISCHARGE INSTRUCTIONS
Do not restart your atorvastatin or fenofibrate until instructed to do so by your PCP.    Your blood pressure medication dose has been increased. Continue the new dose of Losartan 100mg daily. Monitor your BP at home.    Follow-up with Dr. Ryan this following week for recheck.

## 2023-12-09 NOTE — NURSING
Discharge papers given and explain. Instructed to to call [facility] on Monday December 11th, 2023 for follow up appointment in 3 days. Inform pt to  new prescription that was sent to [facility]. PT voiced understanding. PT transported via wheelchair to automobile at this time via staff.

## 2023-12-09 NOTE — ASSESSMENT & PLAN NOTE
12/06/23 states diabetes is now diet controlled  POCT glucose checks   Last A1c is 6.3 on 06/23 12/07/23 stable       12/08/23 stable    Glucose 127 today. Diabetic diet. Discharged home to follow-up with PCP.

## 2023-12-09 NOTE — ASSESSMENT & PLAN NOTE
Recent Labs   Lab 12/09/23  0600        12/06/23 na 132   IV fluids NS with 40 meq kcl   Recheck bmp in am      12/07/23 na is 136    Discharged home today.

## 2023-12-09 NOTE — ASSESSMENT & PLAN NOTE
Chronic, uncontrolled. Latest blood pressure and vitals reviewed-     Temp:  [97.5 °F (36.4 °C)-98.5 °F (36.9 °C)]   Pulse:  [68-75]   Resp:  [20]   BP: (146-188)/()   SpO2:  [96 %-99 %] .   Home meds for hypertension were reviewed and noted below.   Hypertension Medications               carvediloL (COREG) 12.5 MG tablet Take 1 tablet (12.5 mg total) by mouth before breakfast.         isosorbide mononitrate (IMDUR) 30 MG 24 hr tablet Take 1 tablet (30 mg total) by mouth once daily. Take 1/2 tablet with breakfast    losartan (COZAAR) 50 MG tablet Take 50 mg by mouth once daily.    NITROGLYCERIN SL Place under the tongue.            While in the hospital, will manage blood pressure as follows; Continue home antihypertensive regimen    Will utilize p.r.n. blood pressure medication only if patient's blood pressure greater than 180/110 and he develops symptoms such as worsening chest pain or shortness of breath.      12/07/23 continue coreg and losartan    Discharged home today.  /91  CONtinue Coreg dose and Losartan 100mg daily

## 2023-12-10 PROCEDURE — 11000001 HC ACUTE MED/SURG PRIVATE ROOM

## 2023-12-11 ENCOUNTER — TELEPHONE (OUTPATIENT)
Dept: MEDSURG UNIT | Facility: HOSPITAL | Age: 53
End: 2023-12-11
Payer: MEDICARE

## 2023-12-11 NOTE — TELEPHONE ENCOUNTER
Spoke with Mr. Desiran he says everything is going well and was very thankful for all the staff and physicians that took care of him.

## 2023-12-11 NOTE — PLAN OF CARE
[facility]  Discharge Final Note    Primary Care Provider: Juan Pablo Ryan MD    Expected Discharge Date: 12/9/2023    Final Discharge Note (most recent)       Final Note - 12/11/23 0803          Final Note    Assessment Type Final Discharge Note (P)      Anticipated Discharge Disposition Home or Self Care (P)      What phone number can be called within the next 1-3 days to see how you are doing after discharge? [phone] (P)      Hospital Resources/Appts/Education Provided Provided patient/caregiver with written discharge plan information (P)         Post-Acute Status    Coverage Medicare A & B (P)      Discharge Delays None known at this time (P)                      Important Message from Medicare  Important Message from Medicare regarding Discharge Appeal Rights: Given to patient/caregiver, Explained to patient/caregiver, Signed/date by patient/caregiver     Date IMM was signed: 12/08/23  Time IMM was signed: 1117    Contact Info       Juan Pablo Ryan MD   Specialty: Family Medicine   Relationship: PCP - General    [address] [place] [address]  [facility] [place] [address]   Phone: [phone]       Next Steps: Follow up in 3 day(s)    Instructions: Call for an appointment on Monday          Mr. Fischer discharged home to self care on 12/09/2023.

## 2024-01-04 ENCOUNTER — HOSPITAL ENCOUNTER (INPATIENT)
Facility: HOSPITAL | Age: 54
LOS: 3 days | Discharge: HOME | DRG: 440 | End: 2024-01-07
Attending: FAMILY MEDICINE | Admitting: FAMILY MEDICINE
Payer: MEDICARE

## 2024-01-04 DIAGNOSIS — R10.13 EPIGASTRIC PAIN: ICD-10-CM

## 2024-01-04 DIAGNOSIS — K85.20 ALCOHOL-INDUCED ACUTE PANCREATITIS, UNSPECIFIED COMPLICATION STATUS: ICD-10-CM

## 2024-01-04 DIAGNOSIS — E83.42 HYPOMAGNESEMIA: ICD-10-CM

## 2024-01-04 DIAGNOSIS — I10 HYPERTENSION, UNSPECIFIED TYPE: ICD-10-CM

## 2024-01-04 DIAGNOSIS — N17.9 AKI (ACUTE KIDNEY INJURY): ICD-10-CM

## 2024-01-04 DIAGNOSIS — E11.65 TYPE 2 DIABETES MELLITUS WITH HYPERGLYCEMIA, WITHOUT LONG-TERM CURRENT USE OF INSULIN: ICD-10-CM

## 2024-01-04 DIAGNOSIS — K85.90 ACUTE PANCREATITIS: Primary | ICD-10-CM

## 2024-01-04 LAB
ALBUMIN SERPL BCP-MCNC: 3.6 G/DL (ref 3.5–5)
ALBUMIN/GLOB SERPL: 0.9 {RATIO}
ALP SERPL-CCNC: 141 U/L (ref 45–115)
ALT SERPL W P-5'-P-CCNC: 21 U/L (ref 16–61)
AMYLASE SERPL-CCNC: 109 U/L (ref 25–115)
ANION GAP SERPL CALCULATED.3IONS-SCNC: 14 MMOL/L (ref 7–16)
AST SERPL W P-5'-P-CCNC: 21 U/L (ref 15–37)
BASOPHILS # BLD AUTO: 0.02 K/UL (ref 0–0.2)
BASOPHILS NFR BLD AUTO: 0.2 % (ref 0–1)
BILIRUB SERPL-MCNC: 2.5 MG/DL (ref ?–1.2)
BILIRUB UR QL STRIP: NEGATIVE
BUN SERPL-MCNC: 9 MG/DL (ref 7–18)
BUN/CREAT SERPL: 8 (ref 6–20)
CALCIUM SERPL-MCNC: 9 MG/DL (ref 8.5–10.1)
CHLORIDE SERPL-SCNC: 97 MMOL/L (ref 98–107)
CLARITY UR: CLEAR
CO2 SERPL-SCNC: 26 MMOL/L (ref 21–32)
COLOR UR: YELLOW
CREAT SERPL-MCNC: 1.08 MG/DL (ref 0.7–1.3)
DIFFERENTIAL METHOD BLD: ABNORMAL
EGFR (NO RACE VARIABLE) (RUSH/TITUS): 82 ML/MIN/1.73M2
EOSINOPHIL # BLD AUTO: 0.01 K/UL (ref 0–0.5)
EOSINOPHIL NFR BLD AUTO: 0.1 % (ref 1–4)
ERYTHROCYTE [DISTWIDTH] IN BLOOD BY AUTOMATED COUNT: 15.6 % (ref 11.5–14.5)
ETHANOL, BLOOD (CATEGORY): NOT DETECTED
GLOBULIN SER-MCNC: 4.2 G/DL (ref 2–4)
GLUCOSE SERPL-MCNC: 274 MG/DL (ref 74–106)
GLUCOSE UR STRIP-MCNC: 250 MG/DL
HCT VFR BLD AUTO: 47.4 % (ref 40–54)
HGB BLD-MCNC: 15.8 G/DL (ref 13.5–18)
KETONES UR STRIP-SCNC: 15 MG/DL
LEUKOCYTE ESTERASE UR QL STRIP: NEGATIVE
LIPASE SERPL-CCNC: 371 U/L (ref 16–77)
LYMPHOCYTES # BLD AUTO: 1.32 K/UL (ref 1–4.8)
LYMPHOCYTES NFR BLD AUTO: 14.3 % (ref 27–41)
MAGNESIUM SERPL-MCNC: 1.2 MG/DL (ref 1.7–2.3)
MCH RBC QN AUTO: 31.7 PG (ref 27–31)
MCHC RBC AUTO-ENTMCNC: 33.3 G/DL (ref 32–36)
MCV RBC AUTO: 95 FL (ref 80–96)
MONOCYTES # BLD AUTO: 0.52 K/UL (ref 0–0.8)
MONOCYTES NFR BLD AUTO: 5.6 % (ref 2–6)
MPC BLD CALC-MCNC: 9.8 FL (ref 9.4–12.4)
NEUTROPHILS # BLD AUTO: 7.36 K/UL (ref 1.8–7.7)
NEUTROPHILS NFR BLD AUTO: 79.8 % (ref 53–65)
NITRITE UR QL STRIP: NEGATIVE
PH UR STRIP: 7 PH UNITS
PLATELET # BLD AUTO: 249 K/UL (ref 150–400)
POTASSIUM SERPL-SCNC: 3.5 MMOL/L (ref 3.5–5.1)
PROT SERPL-MCNC: 7.8 G/DL (ref 6.4–8.2)
PROT UR QL STRIP: >=300
RBC # BLD AUTO: 4.99 M/UL (ref 4.6–6.2)
RBC # UR STRIP: ABNORMAL /UL
RBC #/AREA URNS HPF: NORMAL /HPF
SODIUM SERPL-SCNC: 133 MMOL/L (ref 136–145)
SP GR UR STRIP: 1.02
SQUAMOUS #/AREA URNS LPF: NORMAL /LPF
TROPONIN I SERPL DL<=0.01 NG/ML-MCNC: 24.9 PG/ML
UROBILINOGEN UR STRIP-ACNC: 0.2 MG/DL
WBC # BLD AUTO: 9.23 K/UL (ref 4.5–11)
WBC #/AREA URNS HPF: NORMAL /HPF

## 2024-01-04 PROCEDURE — G0378 HOSPITAL OBSERVATION PER HR: HCPCS

## 2024-01-04 PROCEDURE — 25000003 PHARM REV CODE 250: Performed by: NURSE PRACTITIONER

## 2024-01-04 PROCEDURE — 96376 TX/PRO/DX INJ SAME DRUG ADON: CPT

## 2024-01-04 PROCEDURE — 84484 ASSAY OF TROPONIN QUANT: CPT | Performed by: NURSE PRACTITIONER

## 2024-01-04 PROCEDURE — 96366 THER/PROPH/DIAG IV INF ADDON: CPT

## 2024-01-04 PROCEDURE — 27000982 HC MATTRESS, MATRIX LAL RENTAL

## 2024-01-04 PROCEDURE — 83690 ASSAY OF LIPASE: CPT | Performed by: NURSE PRACTITIONER

## 2024-01-04 PROCEDURE — 82150 ASSAY OF AMYLASE: CPT | Performed by: NURSE PRACTITIONER

## 2024-01-04 PROCEDURE — 63600175 PHARM REV CODE 636 W HCPCS: Performed by: NURSE PRACTITIONER

## 2024-01-04 PROCEDURE — 94761 N-INVAS EAR/PLS OXIMETRY MLT: CPT

## 2024-01-04 PROCEDURE — 80053 COMPREHEN METABOLIC PANEL: CPT | Performed by: NURSE PRACTITIONER

## 2024-01-04 PROCEDURE — 11000001 HC ACUTE MED/SURG PRIVATE ROOM

## 2024-01-04 PROCEDURE — 99285 EMERGENCY DEPT VISIT HI MDM: CPT | Mod: GF

## 2024-01-04 PROCEDURE — 25500020 PHARM REV CODE 255: Performed by: NURSE PRACTITIONER

## 2024-01-04 PROCEDURE — 83735 ASSAY OF MAGNESIUM: CPT | Performed by: NURSE PRACTITIONER

## 2024-01-04 PROCEDURE — 99900035 HC TECH TIME PER 15 MIN (STAT)

## 2024-01-04 PROCEDURE — 99222 1ST HOSP IP/OBS MODERATE 55: CPT | Mod: FS,AI,, | Performed by: FAMILY MEDICINE

## 2024-01-04 PROCEDURE — 93010 ELECTROCARDIOGRAM REPORT: CPT | Performed by: HOSPITALIST

## 2024-01-04 PROCEDURE — 85025 COMPLETE CBC W/AUTO DIFF WBC: CPT | Performed by: NURSE PRACTITIONER

## 2024-01-04 PROCEDURE — 81003 URINALYSIS AUTO W/O SCOPE: CPT | Performed by: NURSE PRACTITIONER

## 2024-01-04 PROCEDURE — 27000944

## 2024-01-04 PROCEDURE — 96361 HYDRATE IV INFUSION ADD-ON: CPT

## 2024-01-04 PROCEDURE — 82077 ASSAY SPEC XCP UR&BREATH IA: CPT | Performed by: NURSE PRACTITIONER

## 2024-01-04 PROCEDURE — 93005 ELECTROCARDIOGRAM TRACING: CPT

## 2024-01-04 PROCEDURE — C9113 INJ PANTOPRAZOLE SODIUM, VIA: HCPCS | Performed by: NURSE PRACTITIONER

## 2024-01-04 PROCEDURE — 96365 THER/PROPH/DIAG IV INF INIT: CPT

## 2024-01-04 PROCEDURE — 96375 TX/PRO/DX INJ NEW DRUG ADDON: CPT

## 2024-01-04 PROCEDURE — 99285 EMERGENCY DEPT VISIT HI MDM: CPT | Mod: 25

## 2024-01-04 RX ORDER — KETOROLAC TROMETHAMINE 15 MG/ML
15 INJECTION, SOLUTION INTRAMUSCULAR; INTRAVENOUS 3 TIMES DAILY PRN
Status: DISPENSED | OUTPATIENT
Start: 2024-01-04 | End: 2024-01-06

## 2024-01-04 RX ORDER — HYDROMORPHONE HYDROCHLORIDE 2 MG/ML
1 INJECTION, SOLUTION INTRAMUSCULAR; INTRAVENOUS; SUBCUTANEOUS
Status: COMPLETED | OUTPATIENT
Start: 2024-01-04 | End: 2024-01-04

## 2024-01-04 RX ORDER — ISOSORBIDE MONONITRATE 30 MG/1
30 TABLET, EXTENDED RELEASE ORAL DAILY
Status: DISCONTINUED | OUTPATIENT
Start: 2024-01-04 | End: 2024-01-07 | Stop reason: HOSPADM

## 2024-01-04 RX ORDER — MORPHINE SULFATE 4 MG/ML
4 INJECTION, SOLUTION INTRAMUSCULAR; INTRAVENOUS EVERY 4 HOURS PRN
Status: DISCONTINUED | OUTPATIENT
Start: 2024-01-04 | End: 2024-01-04

## 2024-01-04 RX ORDER — ONDANSETRON 2 MG/ML
4 INJECTION INTRAMUSCULAR; INTRAVENOUS
Status: COMPLETED | OUTPATIENT
Start: 2024-01-04 | End: 2024-01-04

## 2024-01-04 RX ORDER — SODIUM CHLORIDE 0.9 % (FLUSH) 0.9 %
10 SYRINGE (ML) INJECTION
Status: DISCONTINUED | OUTPATIENT
Start: 2024-01-04 | End: 2024-01-07 | Stop reason: HOSPADM

## 2024-01-04 RX ORDER — HYDROMORPHONE HYDROCHLORIDE 2 MG/ML
2 INJECTION, SOLUTION INTRAMUSCULAR; INTRAVENOUS; SUBCUTANEOUS EVERY 4 HOURS PRN
Status: DISCONTINUED | OUTPATIENT
Start: 2024-01-04 | End: 2024-01-04

## 2024-01-04 RX ORDER — PROMETHAZINE HYDROCHLORIDE 25 MG/1
25 TABLET ORAL EVERY 6 HOURS PRN
Status: DISCONTINUED | OUTPATIENT
Start: 2024-01-04 | End: 2024-01-07 | Stop reason: HOSPADM

## 2024-01-04 RX ORDER — HYDROMORPHONE HYDROCHLORIDE 2 MG/ML
1 INJECTION, SOLUTION INTRAMUSCULAR; INTRAVENOUS; SUBCUTANEOUS EVERY 4 HOURS PRN
Status: DISCONTINUED | OUTPATIENT
Start: 2024-01-04 | End: 2024-01-04

## 2024-01-04 RX ORDER — HYDROMORPHONE HYDROCHLORIDE 2 MG/ML
2 INJECTION, SOLUTION INTRAMUSCULAR; INTRAVENOUS; SUBCUTANEOUS EVERY 4 HOURS PRN
Status: DISCONTINUED | OUTPATIENT
Start: 2024-01-04 | End: 2024-01-07 | Stop reason: HOSPADM

## 2024-01-04 RX ORDER — POLYETHYLENE GLYCOL 3350 17 G/17G
17 POWDER, FOR SOLUTION ORAL DAILY PRN
Status: DISCONTINUED | OUTPATIENT
Start: 2024-01-04 | End: 2024-01-07 | Stop reason: HOSPADM

## 2024-01-04 RX ORDER — HYDRALAZINE HYDROCHLORIDE 20 MG/ML
10 INJECTION INTRAMUSCULAR; INTRAVENOUS
Status: COMPLETED | OUTPATIENT
Start: 2024-01-04 | End: 2024-01-04

## 2024-01-04 RX ORDER — HYDROMORPHONE HYDROCHLORIDE 2 MG/ML
1 INJECTION, SOLUTION INTRAMUSCULAR; INTRAVENOUS; SUBCUTANEOUS ONCE
Status: COMPLETED | OUTPATIENT
Start: 2024-01-04 | End: 2024-01-04

## 2024-01-04 RX ORDER — KETOROLAC TROMETHAMINE 15 MG/ML
15 INJECTION, SOLUTION INTRAMUSCULAR; INTRAVENOUS 3 TIMES DAILY PRN
Status: DISCONTINUED | OUTPATIENT
Start: 2024-01-04 | End: 2024-01-04

## 2024-01-04 RX ORDER — PANTOPRAZOLE SODIUM 40 MG/10ML
40 INJECTION, POWDER, LYOPHILIZED, FOR SOLUTION INTRAVENOUS DAILY
Status: DISCONTINUED | OUTPATIENT
Start: 2024-01-04 | End: 2024-01-07 | Stop reason: HOSPADM

## 2024-01-04 RX ORDER — CLOPIDOGREL BISULFATE 75 MG/1
75 TABLET ORAL DAILY
Status: DISCONTINUED | OUTPATIENT
Start: 2024-01-04 | End: 2024-01-07 | Stop reason: HOSPADM

## 2024-01-04 RX ORDER — PANTOPRAZOLE SODIUM 40 MG/10ML
40 INJECTION, POWDER, LYOPHILIZED, FOR SOLUTION INTRAVENOUS
Status: COMPLETED | OUTPATIENT
Start: 2024-01-04 | End: 2024-01-04

## 2024-01-04 RX ORDER — MAGNESIUM SULFATE 1 G/100ML
1 INJECTION INTRAVENOUS ONCE
Status: COMPLETED | OUTPATIENT
Start: 2024-01-04 | End: 2024-01-04

## 2024-01-04 RX ORDER — MAGNESIUM SULFATE HEPTAHYDRATE 40 MG/ML
2 INJECTION, SOLUTION INTRAVENOUS
Status: COMPLETED | OUTPATIENT
Start: 2024-01-04 | End: 2024-01-04

## 2024-01-04 RX ORDER — SODIUM CHLORIDE 9 MG/ML
INJECTION, SOLUTION INTRAVENOUS CONTINUOUS
Status: DISCONTINUED | OUTPATIENT
Start: 2024-01-04 | End: 2024-01-07 | Stop reason: HOSPADM

## 2024-01-04 RX ORDER — SODIUM CHLORIDE 9 MG/ML
1000 INJECTION, SOLUTION INTRAVENOUS
Status: COMPLETED | OUTPATIENT
Start: 2024-01-04 | End: 2024-01-04

## 2024-01-04 RX ORDER — TALC
6 POWDER (GRAM) TOPICAL NIGHTLY PRN
Status: DISCONTINUED | OUTPATIENT
Start: 2024-01-04 | End: 2024-01-07 | Stop reason: HOSPADM

## 2024-01-04 RX ORDER — CARVEDILOL 12.5 MG/1
12.5 TABLET ORAL
Status: DISCONTINUED | OUTPATIENT
Start: 2024-01-05 | End: 2024-01-07 | Stop reason: HOSPADM

## 2024-01-04 RX ORDER — LOSARTAN POTASSIUM 50 MG/1
100 TABLET ORAL DAILY
Status: DISCONTINUED | OUTPATIENT
Start: 2024-01-04 | End: 2024-01-07 | Stop reason: HOSPADM

## 2024-01-04 RX ORDER — ONDANSETRON 2 MG/ML
4 INJECTION INTRAMUSCULAR; INTRAVENOUS EVERY 8 HOURS PRN
Status: DISCONTINUED | OUTPATIENT
Start: 2024-01-04 | End: 2024-01-07 | Stop reason: HOSPADM

## 2024-01-04 RX ORDER — GUAIFENESIN 100 MG/5ML
81 LIQUID (ML) ORAL DAILY
Status: DISCONTINUED | OUTPATIENT
Start: 2024-01-04 | End: 2024-01-07 | Stop reason: HOSPADM

## 2024-01-04 RX ORDER — ENOXAPARIN SODIUM 100 MG/ML
40 INJECTION SUBCUTANEOUS EVERY 24 HOURS
Status: DISCONTINUED | OUTPATIENT
Start: 2024-01-04 | End: 2024-01-07 | Stop reason: HOSPADM

## 2024-01-04 RX ADMIN — SODIUM CHLORIDE: 9 INJECTION, SOLUTION INTRAVENOUS at 09:01

## 2024-01-04 RX ADMIN — SODIUM CHLORIDE: 9 INJECTION, SOLUTION INTRAVENOUS at 03:01

## 2024-01-04 RX ADMIN — ISOSORBIDE MONONITRATE 30 MG: 30 TABLET, EXTENDED RELEASE ORAL at 11:01

## 2024-01-04 RX ADMIN — HYDROMORPHONE HYDROCHLORIDE 1 MG: 2 INJECTION INTRAMUSCULAR; INTRAVENOUS; SUBCUTANEOUS at 04:01

## 2024-01-04 RX ADMIN — ONDANSETRON 4 MG: 2 INJECTION INTRAMUSCULAR; INTRAVENOUS at 08:01

## 2024-01-04 RX ADMIN — SODIUM CHLORIDE 1000 ML: 9 INJECTION, SOLUTION INTRAVENOUS at 05:01

## 2024-01-04 RX ADMIN — IOPAMIDOL 100 ML: 755 INJECTION, SOLUTION INTRAVENOUS at 04:01

## 2024-01-04 RX ADMIN — MAGNESIUM SULFATE 1 G: 1 INJECTION INTRAVENOUS at 11:01

## 2024-01-04 RX ADMIN — KETOROLAC TROMETHAMINE 15 MG: 15 INJECTION, SOLUTION INTRAMUSCULAR; INTRAVENOUS at 10:01

## 2024-01-04 RX ADMIN — HYDROMORPHONE HYDROCHLORIDE 2 MG: 2 INJECTION INTRAMUSCULAR; INTRAVENOUS; SUBCUTANEOUS at 07:01

## 2024-01-04 RX ADMIN — ENOXAPARIN SODIUM 40 MG: 40 INJECTION SUBCUTANEOUS at 04:01

## 2024-01-04 RX ADMIN — HYDROMORPHONE HYDROCHLORIDE 1 MG: 2 INJECTION INTRAMUSCULAR; INTRAVENOUS; SUBCUTANEOUS at 09:01

## 2024-01-04 RX ADMIN — PROMETHAZINE HYDROCHLORIDE 25 MG: 25 TABLET ORAL at 10:01

## 2024-01-04 RX ADMIN — ONDANSETRON 4 MG: 2 INJECTION INTRAMUSCULAR; INTRAVENOUS at 03:01

## 2024-01-04 RX ADMIN — HYDRALAZINE HYDROCHLORIDE 10 MG: 20 INJECTION INTRAMUSCULAR; INTRAVENOUS at 05:01

## 2024-01-04 RX ADMIN — ASPIRIN 81 MG 81 MG: 81 TABLET ORAL at 11:01

## 2024-01-04 RX ADMIN — CLOPIDOGREL BISULFATE 75 MG: 75 TABLET ORAL at 11:01

## 2024-01-04 RX ADMIN — HYDRALAZINE HYDROCHLORIDE 10 MG: 20 INJECTION INTRAMUSCULAR; INTRAVENOUS at 08:01

## 2024-01-04 RX ADMIN — HYDROMORPHONE HYDROCHLORIDE 1 MG: 2 INJECTION INTRAMUSCULAR; INTRAVENOUS; SUBCUTANEOUS at 03:01

## 2024-01-04 RX ADMIN — HYDROMORPHONE HYDROCHLORIDE 2 MG: 2 INJECTION INTRAMUSCULAR; INTRAVENOUS; SUBCUTANEOUS at 03:01

## 2024-01-04 RX ADMIN — ONDANSETRON 4 MG: 2 INJECTION INTRAMUSCULAR; INTRAVENOUS at 09:01

## 2024-01-04 RX ADMIN — HYDROMORPHONE HYDROCHLORIDE 2 MG: 2 INJECTION INTRAMUSCULAR; INTRAVENOUS; SUBCUTANEOUS at 11:01

## 2024-01-04 RX ADMIN — SODIUM CHLORIDE 1000 ML: 9 INJECTION, SOLUTION INTRAVENOUS at 03:01

## 2024-01-04 RX ADMIN — LOSARTAN POTASSIUM 100 MG: 50 TABLET, FILM COATED ORAL at 11:01

## 2024-01-04 RX ADMIN — HYDROMORPHONE HYDROCHLORIDE 1 MG: 2 INJECTION INTRAMUSCULAR; INTRAVENOUS; SUBCUTANEOUS at 08:01

## 2024-01-04 RX ADMIN — PANTOPRAZOLE SODIUM 40 MG: 40 INJECTION, POWDER, LYOPHILIZED, FOR SOLUTION INTRAVENOUS at 04:01

## 2024-01-04 RX ADMIN — MAGNESIUM SULFATE 2 G: 2 INJECTION INTRAVENOUS at 04:01

## 2024-01-04 NOTE — Clinical Note
Diagnosis: Acute pancreatitis [577.0.ICD-9-CM]   Future Attending Provider: PEPITO KUMAR IV [[id number]]   Admitting Provider:: PEPITO KUMAR IV [[id number]]

## 2024-01-04 NOTE — SUBJECTIVE & OBJECTIVE
Past Medical History:   Diagnosis Date    Chronic pancreatitis, unspecified pancreatitis type     Coronary artery disease     Diabetes mellitus     Dyslipidemia     Hidradenitis     Hypertension        Past Surgical History:   Procedure Laterality Date    CARDIAC SURGERY      Stents x6    CHOLECYSTECTOMY      UNDESCENDED TESTICLE EXPLORATION N/A        Review of patient's allergies indicates:   Allergen Reactions    Ticagrelor Shortness Of Breath    Vancomycin analogues Other (See Comments)     Patient experienced adverse effect, and had some renal insufficiency after receiving Vancomycin.       No current facility-administered medications on file prior to encounter.     Current Outpatient Medications on File Prior to Encounter   Medication Sig    aspirin 81 MG Chew Take 1 tablet (81 mg total) by mouth once daily.    carvediloL (COREG) 12.5 MG tablet Take 1 tablet (12.5 mg total) by mouth before breakfast.    furosemide (LASIX) 20 MG tablet Take 20 mg by mouth once daily.    isosorbide mononitrate (IMDUR) 30 MG 24 hr tablet Take 1 tablet (30 mg total) by mouth once daily. Take 1/2 tablet with breakfast    losartan (COZAAR) 100 MG tablet Take 1 tablet (100 mg total) by mouth once daily.    NITROGLYCERIN SL Place under the tongue.    aluminum-magnesium hydroxide-simethicone (MAALOX) 200-200-20 mg/5 mL Susp Take 15 mLs by mouth every 6 (six) hours as needed.    bisacodyL (DULCOLAX) 5 mg EC tablet Take 2 tablets (10 mg total) by mouth daily as needed for Constipation.    simethicone (MYLICON) 80 MG chewable tablet Take 1 tablet (80 mg total) by mouth every 6 (six) hours as needed for Flatulence.     Family History       Problem Relation (Age of Onset)    Heart disease Father, Brother, Maternal Grandmother    Hypertension Father          Tobacco Use    Smoking status: Former     Current packs/day: 0.25     Average packs/day: 0.3 packs/day for 20.0 years (5.0 ttl pk-yrs)     Types: Cigarettes, Cigars     Start date: 2004     Smokeless tobacco: Never   Substance and Sexual Activity    Alcohol use: Not Currently     Comment: occasional drink previous heavy drinker quit heavily in 2001    Drug use: Never    Sexual activity: Not Currently     Review of Systems   Constitutional:  Negative for diaphoresis and fever.        He appears to be in acute pain   States pain level is 10+   HENT:  Negative for trouble swallowing.    Respiratory:  Negative for shortness of breath.    Cardiovascular:  Negative for chest pain.   Gastrointestinal:  Positive for abdominal pain and nausea.        Epigastric pain with radiation to back - states started yesterday around noon  States pain level is 10+   Endocrine: Negative for polydipsia and polyuria.   Genitourinary:  Negative for difficulty urinating.   Skin:         Hx of hidradenitis   Neurological:  Negative for weakness, light-headedness and headaches.     Objective:     Vital Signs (Most Recent):  Temp: 97.8 °F (36.6 °C) (01/04/24 0258)  Pulse: 103 (01/04/24 0801)  Resp: 20 (01/04/24 0913)  BP: (!) 185/119 (01/04/24 0821)  SpO2: 98 % (01/04/24 0801) Vital Signs (24h Range):  Temp:  [97.8 °F (36.6 °C)] 97.8 °F (36.6 °C)  Pulse:  [100-110] 103  Resp:  [18-22] 20  SpO2:  [95 %-98 %] 98 %  BP: (164-250)/(104-151) 185/119     Weight: 97.5 kg (215 lb)  Body mass index is 34.7 kg/m².     Physical Exam  Constitutional:       Appearance: He is obese.   HENT:      Head: Normocephalic.      Mouth/Throat:      Mouth: Mucous membranes are dry.   Cardiovascular:      Rate and Rhythm: Tachycardia present.      Pulses: Normal pulses.      Heart sounds: Normal heart sounds.   Pulmonary:      Effort: Pulmonary effort is normal. No respiratory distress.      Breath sounds: Normal breath sounds. No wheezing or rhonchi.   Abdominal:      General: There is distension.      Tenderness: There is abdominal tenderness. There is guarding.   Musculoskeletal:      Cervical back: Normal range of motion.   Skin:     General: Skin  is warm and dry.      Comments: Hx of hidradenitis   Neurological:      Mental Status: He is alert and oriented to person, place, and time.   Psychiatric:         Mood and Affect: Mood normal.                Significant Labs: All pertinent labs within the past 24 hours have been reviewed.  Recent Lab Results         01/04/24  0353   01/04/24  0324        Albumin/Globulin Ratio   0.9       Albumin   3.6       Alcohol, Serum   Not Detected       ALP   141       ALT   21       Amylase   109       Anion Gap   14       Appearance, UA Clear         AST   21       Baso #   0.02       Basophil %   0.2       Bilirubin (UA) Negative         BILIRUBIN TOTAL   2.5       BUN   9       BUN/CREAT RATIO   8       Calcium   9.0       Chloride   97       CO2   26       Color, UA Yellow         Creatinine   1.08       Differential Method   Auto       eGFR   82       Eos #   0.01       Eosinophil %   0.1       Globulin, Total   4.2       Glucose   274       Glucose,          Hematocrit   47.4       Hemoglobin   15.8       Ketones, UA 15         Leukocytes, UA Negative         Lipase   371       Lymph #   1.32       Lymph %   14.3       Magnesium    1.2       MCH   31.7       MCHC   33.3       MCV   95.0       Mono #   0.52       Mono %   5.6       MPV   9.8       Neutrophils, Abs   7.36       Neutrophils Relative   79.8       NITRITE UA Negative         Occult Blood UA Small         pH, UA 7.0         Platelet Count   249       Potassium   3.5       PROTEIN TOTAL   7.8       Protein, UA >=300         RBC   4.99       RBC, UA 0-3         RDW   15.6       Sodium   133       Specific [place], UA 1.025         Squam Epithel, UA Rare         Troponin I High Sensitivity   24.9       UROBILINOGEN UA 0.2         WBC, UA 0-5         WBC   9.23               Significant Imaging: I have reviewed all pertinent imaging results/findings within the past 24 hours.

## 2024-01-04 NOTE — ASSESSMENT & PLAN NOTE
Chronic, controlled. Latest blood pressure and vitals reviewed-     Temp:  [97.7 °F (36.5 °C)-97.8 °F (36.6 °C)]   Pulse:  []   Resp:  [18-22]   BP: (156-250)/()   SpO2:  [93 %-98 %] .   Home meds for hypertension were reviewed and noted below.   Hypertension Medications      While in the hospital, will manage blood pressure as follows; Continue home antihypertensive regimen    Will utilize p.r.n. blood pressure medication only if patient's blood pressure greater than 180/110 and he develops symptoms such as worsening chest pain or shortness of breath.    Continue coreg 12.5 mg po bid  Continue losartan 100 mg po daily

## 2024-01-04 NOTE — ED NOTES
Transferred to [facility] per wheelchair. Richard Cantu FNP in room. IV to left hand with 20 g intact, NS at 150 cc/hr, no redness or edema at site.

## 2024-01-04 NOTE — ASSESSMENT & PLAN NOTE
Patient has Abnormal Magnesium: hypomagnesemia. Will continue to monitor electrolytes closely. Will replace the affected electrolytes and repeat labs to be done after interventions completed. The patient's magnesium results have been reviewed and are listed below.  Recent Labs   Lab 01/04/24  0324   MG 1.2*   01/04/24 has had 2 gms magnesium sulfate while in ER  Will give and additional gram

## 2024-01-04 NOTE — ASSESSMENT & PLAN NOTE
Patient with known CAD s/p stent placement, which is controlled Will continue ASA, Plavix, and Statin and monitor for S/Sx of angina/ACS. Continue to monitor on telemetry.

## 2024-01-04 NOTE — NURSING
Admitted to room 1112 via wheel chair from ER - patient alert and oriented - skin w/d to touch - resp regular and unlabored - states still hurting - made comfortable in bed call light in reach

## 2024-01-04 NOTE — H&P
[facility]  Hospital Medicine  History & Physical    Patient Name: Faustino Fischer  MRN: [id number]  Patient Class: OP- Observation  Admission Date: 1/4/2024  Attending Physician: Jean Crews IV, DO   Primary Care Provider: Juan Pablo Ryan MD         Patient information was obtained from patient, past medical records, and ER records.     Subjective:     Principal Problem:Acute pancreatitis    Chief Complaint:   Chief Complaint   Patient presents with    Abdominal Pain     Pt c/o mid abd pain that started yesterday afternoon. Hx of pancreatitis.        HPI: Mr. Amado Harmon is a 53 year old male with pmh of coronary artery disease, hypertension, Insulin dependent diabetes mellitus, dyslipidemia, acute kidney injury, pancreatitis, macrocytic anemia and obesity.  He presented to ER this morning with complaints of epigastric pain with n/v that started yesterday. States PMH chronic pancreatitis and notes symptoms now are typical of his acute flare-ups. States pain is a severe constant pain. Reports has vomited enough to have the brown emesis at this point. Denies any ETOH use. Denies chest pain or dyspnea.Labs in ER include  WBC 9230 with H&H 15.8 and 47.4, plt [id number], neutrophils 79.8%, BUN 9, creatinine 1.08, ALT 21, AST 21, lipase 371, K+ 3.5, Mg 1.2, Na 133. Troponin 24.9. CT showed moderate fat stranding and trace fluid about the pancreas suspicious for pancreatitis. Correlate with amylase/lipase. No abnormal pancreatic ductal dilatation.  Prior cholecystectomy . He has recd NS bolus, hydralazine ,magnesium sulfate 2 gms , dilaudid  1 mg IVP x 2 , zofran 4 mg  and protonix 40 mg while in ER. On admission to acute care, Mr. Fischer is sitting up on the side of his bed rocking with complaints of epigastric pain radiating to his back . States pain is 10+. -/102 with room air sat of 94%. Diluadid 1 mg IVP ordered now.    Past Medical History:   Diagnosis Date     Chronic pancreatitis, unspecified pancreatitis type     Coronary artery disease     Diabetes mellitus     Dyslipidemia     Hidradenitis     Hypertension        Past Surgical History:   Procedure Laterality Date    CARDIAC SURGERY      Stents x6    CHOLECYSTECTOMY      UNDESCENDED TESTICLE EXPLORATION N/A        Review of patient's allergies indicates:   Allergen Reactions    Ticagrelor Shortness Of Breath    Vancomycin analogues Other (See Comments)     Patient experienced adverse effect, and had some renal insufficiency after receiving Vancomycin.       No current facility-administered medications on file prior to encounter.     Current Outpatient Medications on File Prior to Encounter   Medication Sig    aspirin 81 MG Chew Take 1 tablet (81 mg total) by mouth once daily.    carvediloL (COREG) 12.5 MG tablet Take 1 tablet (12.5 mg total) by mouth before breakfast.    furosemide (LASIX) 20 MG tablet Take 20 mg by mouth once daily.    isosorbide mononitrate (IMDUR) 30 MG 24 hr tablet Take 1 tablet (30 mg total) by mouth once daily. Take 1/2 tablet with breakfast    losartan (COZAAR) 100 MG tablet Take 1 tablet (100 mg total) by mouth once daily.    NITROGLYCERIN SL Place under the tongue.    aluminum-magnesium hydroxide-simethicone (MAALOX) 200-200-20 mg/5 mL Susp Take 15 mLs by mouth every 6 (six) hours as needed.    bisacodyL (DULCOLAX) 5 mg EC tablet Take 2 tablets (10 mg total) by mouth daily as needed for Constipation.    simethicone (MYLICON) 80 MG chewable tablet Take 1 tablet (80 mg total) by mouth every 6 (six) hours as needed for Flatulence.     Family History       Problem Relation (Age of Onset)    Heart disease Father, Brother, Maternal Grandmother    Hypertension Father          Tobacco Use    Smoking status: Former     Current packs/day: 0.25     Average packs/day: 0.3 packs/day for 20.0 years (5.0 ttl pk-yrs)     Types: Cigarettes, Cigars     Start date: 2004    Smokeless tobacco: Never   Substance  and Sexual Activity    Alcohol use: Not Currently     Comment: occasional drink previous heavy drinker quit heavily in 2001    Drug use: Never    Sexual activity: Not Currently     Review of Systems   Constitutional:  Negative for diaphoresis and fever.        He appears to be in acute pain   States pain level is 10+   HENT:  Negative for trouble swallowing.    Respiratory:  Negative for shortness of breath.    Cardiovascular:  Negative for chest pain.   Gastrointestinal:  Positive for abdominal pain and nausea.        Epigastric pain with radiation to back - states started yesterday around noon  States pain level is 10+   Endocrine: Negative for polydipsia and polyuria.   Genitourinary:  Negative for difficulty urinating.   Skin:         Hx of hidradenitis   Neurological:  Negative for weakness, light-headedness and headaches.     Objective:     Vital Signs (Most Recent):  Temp: 97.8 °F (36.6 °C) (01/04/24 0258)  Pulse: 103 (01/04/24 0801)  Resp: 20 (01/04/24 0913)  BP: (!) 185/119 (01/04/24 0821)  SpO2: 98 % (01/04/24 0801) Vital Signs (24h Range):  Temp:  [97.8 °F (36.6 °C)] 97.8 °F (36.6 °C)  Pulse:  [100-110] 103  Resp:  [18-22] 20  SpO2:  [95 %-98 %] 98 %  BP: (164-250)/(104-151) 185/119     Weight: 97.5 kg (215 lb)  Body mass index is 34.7 kg/m².     Physical Exam  Constitutional:       Appearance: He is obese.   HENT:      Head: Normocephalic.      Mouth/Throat:      Mouth: Mucous membranes are dry.   Cardiovascular:      Rate and Rhythm: Tachycardia present.      Pulses: Normal pulses.      Heart sounds: Normal heart sounds.   Pulmonary:      Effort: Pulmonary effort is normal. No respiratory distress.      Breath sounds: Normal breath sounds. No wheezing or rhonchi.   Abdominal:      General: There is distension.      Tenderness: There is abdominal tenderness. There is guarding.   Musculoskeletal:      Cervical back: Normal range of motion.   Skin:     General: Skin is warm and dry.      Comments: Hx of  hidradenitis   Neurological:      Mental Status: He is alert and oriented to person, place, and time.   Psychiatric:         Mood and Affect: Mood normal.                Significant Labs: All pertinent labs within the past 24 hours have been reviewed.  Recent Lab Results         01/04/24  0353   01/04/24  0324        Albumin/Globulin Ratio   0.9       Albumin   3.6       Alcohol, Serum   Not Detected       ALP   141       ALT   21       Amylase   109       Anion Gap   14       Appearance, UA Clear         AST   21       Baso #   0.02       Basophil %   0.2       Bilirubin (UA) Negative         BILIRUBIN TOTAL   2.5       BUN   9       BUN/CREAT RATIO   8       Calcium   9.0       Chloride   97       CO2   26       Color, UA Yellow         Creatinine   1.08       Differential Method   Auto       eGFR   82       Eos #   0.01       Eosinophil %   0.1       Globulin, Total   4.2       Glucose   274       Glucose,          Hematocrit   47.4       Hemoglobin   15.8       Ketones, UA 15         Leukocytes, UA Negative         Lipase   371       Lymph #   1.32       Lymph %   14.3       Magnesium    1.2       MCH   31.7       MCHC   33.3       MCV   95.0       Mono #   0.52       Mono %   5.6       MPV   9.8       Neutrophils, Abs   7.36       Neutrophils Relative   79.8       NITRITE UA Negative         Occult Blood UA Small         pH, UA 7.0         Platelet Count   249       Potassium   3.5       PROTEIN TOTAL   7.8       Protein, UA >=300         RBC   4.99       RBC, UA 0-3         RDW   15.6       Sodium   133       Specific [place], UA 1.025         Squam Epithel, UA Rare         Troponin I High Sensitivity   24.9       UROBILINOGEN UA 0.2         WBC, UA 0-5         WBC   9.23               Significant Imaging: I have reviewed all pertinent imaging results/findings within the past 24 hours.  Assessment/Plan:     * Acute pancreatitis  01/04/24 CT shows moderate fat stranding and trace fluid about the pancreas  suspicious for pancreatitis. Correlate with amylase/lipase. No abnormal pancreatic ductal dilatation.  Prior cholecystectomy. Amylase is 109  Lipase is 371.  NPO   Continue iv fluids and pain control       Hypertension    Chronic, controlled. Latest blood pressure and vitals reviewed-     Temp:  [97.7 °F (36.5 °C)-97.8 °F (36.6 °C)]   Pulse:  []   Resp:  [18-22]   BP: (156-250)/()   SpO2:  [93 %-98 %] .   Home meds for hypertension were reviewed and noted below.   Hypertension Medications      While in the hospital, will manage blood pressure as follows; Continue home antihypertensive regimen    Will utilize p.r.n. blood pressure medication only if patient's blood pressure greater than 180/110 and he develops symptoms such as worsening chest pain or shortness of breath.    Continue coreg 12.5 mg po bid  Continue losartan 100 mg po daily     CAD (coronary artery disease)  Patient with known CAD s/p stent placement, which is controlled Will continue ASA, Plavix, and Statin and monitor for S/Sx of angina/ACS. Continue to monitor on telemetry.       Type 2 diabetes mellitus with hyperglycemia, without long-term current use of insulin  01/04/24 POCT glucose achs       Dyslipidemia    01/04/24 continue statin    Hypomagnesemia  Patient has Abnormal Magnesium: hypomagnesemia. Will continue to monitor electrolytes closely. Will replace the affected electrolytes and repeat labs to be done after interventions completed. The patient's magnesium results have been reviewed and are listed below.  Recent Labs   Lab 01/04/24  0324   MG 1.2*   01/04/24 has had 2 gms magnesium sulfate while in ER  Will give and additional gram      VTE Risk Mitigation (From admission, onward)           Ordered     enoxaparin injection 40 mg  Daily         01/04/24 0859     IP VTE HIGH RISK PATIENT  Once         01/04/24 0859     Place sequential compression device  Until discontinued         01/04/24 0859                                   Emma Cantu, AMISHP  Department of Hospital Medicine  [facility]

## 2024-01-04 NOTE — ASSESSMENT & PLAN NOTE
01/04/24 CT shows moderate fat stranding and trace fluid about the pancreas suspicious for pancreatitis. Correlate with amylase/lipase. No abnormal pancreatic ductal dilatation.  Prior cholecystectomy. Amylase is 109  Lipase is 371.  NPO   Continue iv fluids and pain control

## 2024-01-04 NOTE — ED PROVIDER NOTES
Encounter Date: 1/4/2024       History     Chief Complaint   Patient presents with    Abdominal Pain     Pt c/o mid abd pain that started yesterday afternoon. Hx of pancreatitis.     Presented with c/o epigastric pain with n/v that started yesterday. States PMH chronic pancreatitis and notes symptoms now are typical of his acute flare-ups. States pain is a severe constant pain. Reports has vomited enough to have the brown emesis at this point. Denies any ETOH use. Denies chest pain or dyspnea.        Review of patient's allergies indicates:   Allergen Reactions    Ticagrelor Shortness Of Breath    Vancomycin analogues Other (See Comments)     Patient experienced adverse effect, and had some renal insufficiency after receiving Vancomycin.     Past Medical History:   Diagnosis Date    Chronic pancreatitis, unspecified pancreatitis type     Coronary artery disease     Diabetes mellitus     Dyslipidemia     Hidradenitis     Hypertension      Past Surgical History:   Procedure Laterality Date    CARDIAC SURGERY      Stents x6    CHOLECYSTECTOMY      UNDESCENDED TESTICLE EXPLORATION N/A      Family History   Problem Relation Age of Onset    Heart disease Father     Hypertension Father     Heart disease Brother     Heart disease Maternal Grandmother      Social History     Tobacco Use    Smoking status: Former     Current packs/day: 0.25     Average packs/day: 0.3 packs/day for 20.0 years (5.0 ttl pk-yrs)     Types: Cigarettes, Cigars     Start date: 2004    Smokeless tobacco: Never   Substance Use Topics    Alcohol use: Not Currently     Comment: occasional drink previous heavy drinker quit heavily in 2001    Drug use: Never     Review of Systems   Constitutional:  Positive for activity change, appetite change and fatigue. Negative for fever.   HENT:  Negative for congestion.    Respiratory:  Negative for cough and shortness of breath.    Cardiovascular:  Negative for chest pain.   Gastrointestinal:  Positive for  abdominal pain, nausea and vomiting. Negative for constipation and diarrhea.   Genitourinary:  Negative for difficulty urinating.   Skin:  Negative for color change.   Neurological:  Positive for weakness (generalized) and headaches. Negative for dizziness.       Physical Exam     Initial Vitals [01/04/24 0258]   BP Pulse Resp Temp SpO2   (!) 250/151 110 (!) 22 97.8 °F (36.6 °C) 96 %      MAP       --         Physical Exam    Nursing note and vitals reviewed.  Constitutional: He appears well-developed and well-nourished. He is not diaphoretic. He appears distressed (with pain).   HENT:   Head: Normocephalic.   Right Ear: External ear normal.   Left Ear: External ear normal.   Nose: Nose normal.   Mouth/Throat: Mucous membranes are dry.   Eyes: Conjunctivae and EOM are normal. Pupils are equal, round, and reactive to light.   Neck: Neck supple.   Normal range of motion.  Cardiovascular:  Regular rhythm, normal heart sounds and intact distal pulses.           No murmur heard.  Pulmonary/Chest: Breath sounds normal. He has no wheezes. He has no rhonchi. He has no rales. He exhibits no tenderness.   Abdominal: Abdomen is soft. Bowel sounds are normal. He exhibits distension. There is abdominal tenderness. There is guarding.   Musculoskeletal:         General: No tenderness or edema. Normal range of motion.      Cervical back: Normal range of motion and neck supple.     Neurological: He is alert and oriented to person, place, and time. He has normal strength. GCS score is 15. GCS eye subscore is 4. GCS verbal subscore is 5. GCS motor subscore is 6.   Skin: Skin is warm and dry. Capillary refill takes less than 2 seconds.   Psychiatric: He has a normal mood and affect.         Medical Screening Exam   See Full Note    ED Course   Procedures  Labs Reviewed   COMPREHENSIVE METABOLIC PANEL - Abnormal; Notable for the following components:       Result Value    Sodium 133 (*)     Chloride 97 (*)     Glucose 274 (*)      Globulin 4.2 (*)     Bilirubin, Total 2.5 (*)     Alk Phos 141 (*)     All other components within normal limits   URINALYSIS, REFLEX TO URINE CULTURE - Abnormal; Notable for the following components:    Protein, UA >=300 (*)     Glucose,  (*)     Ketones, UA 15 (*)     Blood, UA Small (*)     All other components within normal limits   LIPASE - Abnormal; Notable for the following components:    Lipase 371 (*)     All other components within normal limits   MAGNESIUM - Abnormal; Notable for the following components:    Magnesium 1.2 (*)     All other components within normal limits   CBC WITH DIFFERENTIAL - Abnormal; Notable for the following components:    MCH 31.7 (*)     RDW 15.6 (*)     Neutrophils % 79.8 (*)     Lymphocytes % 14.3 (*)     Eosinophils % 0.1 (*)     All other components within normal limits   TROPONIN I - Normal   AMYLASE - Normal   URINALYSIS, MICROSCOPIC - Normal   CBC W/ AUTO DIFFERENTIAL    Narrative:     The following orders were created for panel order CBC auto differential.  Procedure                               Abnormality         Status                     ---------                               -----------         ------                     CBC with Differential[[id number]]       Abnormal            Final result                 Please view results for these tests on the individual orders.   ALCOHOL,MEDICAL (ETHANOL)     EKG Readings: (Independently Interpreted)   Initial Reading: No STEMI. Previous EKG: Compared with most recent EKG Previous EKG Date: 12/6/23. Rhythm: Normal Sinus Rhythm. Heart Rate: 101.   Reviewed at 0349. No changes from previous EKG on 12/6/23       Imaging Results              CT Abdomen Pelvis With IV Contrast NO Oral Contrast (Final result)  Result time 01/04/24 07:40:23      Final result by Reinier Baker DO (01/04/24 07:40:23)                   Impression:      Moderate fat stranding and trace fluid about the pancreas suspicious for pancreatitis.  Correlate with amylase/lipase. No abnormal pancreatic ductal dilatation.  Prior cholecystectomy.  Other/detailed findings as above.    Preliminary report was issued by [facility].    The CT exam was performed using one or more of the following dose    reduction techniques- Automated exposure control, adjustment of the mA    and/or kV according to patient size, and/or use of iterative    reconstructed technique.    Point of Service: [facility]      Electronically signed by: Reinier Baker  Date:    01/04/2024  Time:    07:40               Narrative:    EXAMINATION:  CT ABDOMEN PELVIS WITH IV CONTRAST    CLINICAL HISTORY:  Abdominal abscess/infection suspected;Pancreatitis, acute, severe;    COMPARISON:  CT abdomen pelvis December 6, 2023    TECHNIQUE:  Multiple axial tomographic images of the abdomen and pelvis were obtained after administration of 100 cc Isovue 370 intravenous contrast.    FINDINGS:  Mild dependent changes of the lungs noted.    No worrisome focal hepatic abnormality demonstrated on submitted images.  Prior cholecystectomy.  Moderate fat stranding and trace fluid about the pancreas suspicious for pancreatitis.  Correlate with amylase/lipase.  No abnormal pancreatic ductal dilatation.  Spleen grossly unremarkable.    Bilateral adrenal glands grossly unremarkable.  No evidence of hydronephrosis.  Small renal hypodensities noted which are too small to characterize but may reflect cysts.  No specific imaging follow-up recommended for this/these cyst/cysts.  Urinary bladder incompletely distended.  Prostate and seminal vesicles grossly unremarkable.    No convincing evidence of gastrointestinal obstruction or acute appendicitis.  Atherosclerotic calcification demonstrated.  Visualized osseous and surrounding soft tissue structures demonstrate no acute abnormality.  Scattered skeletal degenerative change.                                       Medications   hydrALAZINE injection 10  mg (has no administration in time range)   sodium chloride 0.9% bolus 1,000 mL 1,000 mL (0 mLs Intravenous Stopped 1/4/24 0437)   ondansetron injection 4 mg (4 mg Intravenous Given 1/4/24 0336)   HYDROmorphone (PF) injection 1 mg (1 mg Intravenous Given 1/4/24 0336)   magnesium sulfate 2g in water 50mL IVPB (premix) (0 g Intravenous Stopped 1/4/24 0626)   iopamidoL (ISOVUE-370) injection 100 mL (100 mLs Intravenous Given 1/4/24 0432)   HYDROmorphone (PF) injection 1 mg (1 mg Intravenous Given 1/4/24 0437)   pantoprazole injection 40 mg (40 mg Intravenous Given 1/4/24 0438)   hydrALAZINE injection 10 mg (10 mg Intravenous Given 1/4/24 0512)   0.9%  NaCl infusion (1,000 mLs Intravenous New Bag 1/4/24 0532)   HYDROmorphone (PF) injection 1 mg (1 mg Intravenous Given 1/4/24 0800)     Medical Decision Making  Presented with c/o epigastric pain with n/v that started yesterday. States PMH chronic pancreatitis and notes symptoms now are typical of his acute flare-ups. States pain is a severe constant pain. Reports has vomited enough to have the brown emesis at this point. Denies any ETOH use. Denies chest pain or dyspnea.     Amount and/or Complexity of Data Reviewed  Labs: ordered. Decision-making details documented in ED Course.     Details: WBC 9230 with H&H 15.8 and 47.4, plt 592255, neutrophils 79.8%, BUN 9, creatinine 1.08, ALT 21, AST 21, lipase 371, K+ 3.5, Mg 1.2, Na 133. Troponin 24.9.  Radiology: ordered. Decision-making details documented in ED Course.  ECG/medicine tests: ordered. Decision-making details documented in ED Course.     Details: Sinus tach, Rate 101. No changes from previous.    Risk  Prescription drug management.  Risk Details: NS bolus x 1 liter. Dilaudid 1 mg IVP x 2 doses, ondansetron 4 mg IVP, Protonix 40 mg IVP.      Additional MDM:   Abdominal Pain: Medication(s) given for abdominal pain: Dilaudid. Medication(s) given for nausea: Zofran.   CT scan done: Abd/Pelvis /w contrast.           ED  Course as of 01/04/24 0808   Thu Jan 04, 2024   0333 WBC: 9.23 [DP]   0333 Hemoglobin: 15.8 [DP]   0333 Hematocrit: 47.4 [DP]   0334 Platelet Count: 249 [DP]   0334 Neutrophils Relative(!): 79.8 [DP]   0350 Troponin I High Sensitivity: 24.9 [DP]   0350 Magnesium (!): 1.2 [DP]   0350 Amylase: 109 [DP]   0350 Sodium(!): 133 [DP]   0350 Potassium: 3.5 [DP]   0350 CO2: 26 [DP]   0350 Glucose(!): 274 [DP]   0350 BUN: 9 [DP]   0350 Creatinine: 1.08 [DP]   0350 ALT: 21 [DP]   0350 AST: 21 [DP]   0350 BILIRUBIN TOTAL(!): 2.5 [DP]   0353 Alcohol, Serum: Not Detected [DP]   0407 Lipase(!): 371 [DP]   0430 Pt returned from CT. He reports that when he flexed to get up from the CT, his pain worsened again. Reports some nausea/no vomiting. Dilaudid and Protonix ordered. [DP]   0453 Pt reports pain down to 6/10. /110. [DP]   0509 Pt reports pain is 5/10 now. Denies nausea. /113. Hydralazine 10 mg IVP ordered. [DP]   0531 /104. Reports pain is still 5/10 and describes it as a soreness. [DP]   0759 Pt reports his pain is an 8/10.  Dilaudid ordered.  I spoke to the patient and he reports this is the worst episode he had had, when it comes to his pain.  I discussed OBS admission, which he has agreed. I spoke with Dr Crews who also agrees. Pt has requested to be a full code.   [AG]      ED Course User Index  [AG] Holly Moses, DOC  [DP] Sadia Mcpherson NP                           Clinical Impression:   Final diagnoses:  [R10.13] Epigastric pain  [E83.42] Hypomagnesemia  [I10] Hypertension, unspecified type  [K85.90] Acute pancreatitis (Primary)        ED Disposition Condition    Observation Stable                Holly Moses FNP  01/04/24 0808

## 2024-01-04 NOTE — PLAN OF CARE
Problem: Adult Inpatient Plan of Care  Goal: Plan of Care Review  Outcome: Ongoing, Progressing  Flowsheets (Taken 1/4/2024 0939)  Plan of Care Reviewed With: patient  Goal: Patient-Specific Goal (Individualized)  Outcome: Ongoing, Progressing  Goal: Absence of Hospital-Acquired Illness or Injury  Outcome: Ongoing, Progressing  Intervention: Prevent Skin Injury  Flowsheets (Taken 1/4/2024 0939)  Body Position: position changed independently  Skin Protection: adhesive use limited  Intervention: Prevent Infection  Flowsheets (Taken 1/4/2024 0939)  Infection Prevention:   hand hygiene promoted   equipment surfaces disinfected  Goal: Optimal Comfort and Wellbeing  Outcome: Ongoing, Progressing  Intervention: Provide Person-Centered Care  Flowsheets (Taken 1/4/2024 0939)  Trust Relationship/Rapport: care explained  Goal: Readiness for Transition of Care  Outcome: Ongoing, Progressing  Intervention: Mutually Develop Transition Plan  Flowsheets (Taken 1/4/2024 0939)  Transportation Anticipated: family or friend will provide     Problem: Diabetes Comorbidity  Goal: Blood Glucose Level Within Targeted Range  Outcome: Ongoing, Progressing     Problem: Fall Injury Risk  Goal: Absence of Fall and Fall-Related Injury  Outcome: Ongoing, Progressing  Intervention: Identify and Manage Contributors  Flowsheets (Taken 1/4/2024 0939)  Self-Care Promotion: independence encouraged  Medication Review/Management: medications reviewed

## 2024-01-04 NOTE — HPI
Mr. Amado Harmon is a 53 year old male with pmh of coronary artery disease, hypertension, Insulin dependent diabetes mellitus, dyslipidemia, acute kidney injury, pancreatitis, macrocytic anemia and obesity.  He presented to ER this morning with complaints of epigastric pain with n/v that started yesterday. States PMH chronic pancreatitis and notes symptoms now are typical of his acute flare-ups. States pain is a severe constant pain. Reports has vomited enough to have the brown emesis at this point. Denies any ETOH use. Denies chest pain or dyspnea.Labs in ER include  WBC 9230 with H&H 15.8 and 47.4, plt [id number], neutrophils 79.8%, BUN 9, creatinine 1.08, ALT 21, AST 21, lipase 371, K+ 3.5, Mg 1.2, Na 133. Troponin 24.9. CT showed moderate fat stranding and trace fluid about the pancreas suspicious for pancreatitis. Correlate with amylase/lipase. No abnormal pancreatic ductal dilatation.  Prior cholecystectomy . He has recd NS bolus, hydralazine ,magnesium sulfate 2 gms , dilaudid  1 mg IVP x 2 , zofran 4 mg  and protonix 40 mg while in ER. On admission to acute care, Mr. Fischer is sitting up on the side of his bed rocking with complaints of epigastric pain radiating to his back . States pain is 10+. -/102 with room air sat of 94%. Diluadid 1 mg IVP ordered now.    1030 Pain level down to 4 now.

## 2024-01-05 LAB
ANION GAP SERPL CALCULATED.3IONS-SCNC: 11 MMOL/L (ref 7–16)
BASOPHILS # BLD AUTO: 0.01 K/UL (ref 0–0.2)
BASOPHILS NFR BLD AUTO: 0.1 % (ref 0–1)
BUN SERPL-MCNC: 16 MG/DL (ref 7–18)
BUN/CREAT SERPL: 11 (ref 6–20)
CALCIUM SERPL-MCNC: 8.4 MG/DL (ref 8.5–10.1)
CHLORIDE SERPL-SCNC: 103 MMOL/L (ref 98–107)
CO2 SERPL-SCNC: 27 MMOL/L (ref 21–32)
CREAT SERPL-MCNC: 1.47 MG/DL (ref 0.7–1.3)
DIFFERENTIAL METHOD BLD: ABNORMAL
EGFR (NO RACE VARIABLE) (RUSH/TITUS): 56 ML/MIN/1.73M2
EOSINOPHIL # BLD AUTO: 0.11 K/UL (ref 0–0.5)
EOSINOPHIL NFR BLD AUTO: 1.4 % (ref 1–4)
ERYTHROCYTE [DISTWIDTH] IN BLOOD BY AUTOMATED COUNT: 15.6 % (ref 11.5–14.5)
GLUCOSE SERPL-MCNC: 177 MG/DL (ref 74–106)
HCT VFR BLD AUTO: 40.8 % (ref 40–54)
HGB BLD-MCNC: 13.3 G/DL (ref 13.5–18)
LIPASE SERPL-CCNC: 125 U/L (ref 16–77)
LYMPHOCYTES # BLD AUTO: 1.38 K/UL (ref 1–4.8)
LYMPHOCYTES NFR BLD AUTO: 18 % (ref 27–41)
MAGNESIUM SERPL-MCNC: 2 MG/DL (ref 1.7–2.3)
MCH RBC QN AUTO: 32 PG (ref 27–31)
MCHC RBC AUTO-ENTMCNC: 32.6 G/DL (ref 32–36)
MCV RBC AUTO: 98.3 FL (ref 80–96)
MONOCYTES # BLD AUTO: 0.5 K/UL (ref 0–0.8)
MONOCYTES NFR BLD AUTO: 6.5 % (ref 2–6)
MPC BLD CALC-MCNC: 9.7 FL (ref 9.4–12.4)
NEUTROPHILS # BLD AUTO: 5.67 K/UL (ref 1.8–7.7)
NEUTROPHILS NFR BLD AUTO: 74 % (ref 53–65)
PLATELET # BLD AUTO: 193 K/UL (ref 150–400)
POTASSIUM SERPL-SCNC: 3.7 MMOL/L (ref 3.5–5.1)
RBC # BLD AUTO: 4.15 M/UL (ref 4.6–6.2)
SODIUM SERPL-SCNC: 137 MMOL/L (ref 136–145)
WBC # BLD AUTO: 7.67 K/UL (ref 4.5–11)

## 2024-01-05 PROCEDURE — 25000003 PHARM REV CODE 250: Performed by: NURSE PRACTITIONER

## 2024-01-05 PROCEDURE — 63600175 PHARM REV CODE 636 W HCPCS: Performed by: NURSE PRACTITIONER

## 2024-01-05 PROCEDURE — 27000944

## 2024-01-05 PROCEDURE — 83735 ASSAY OF MAGNESIUM: CPT | Performed by: NURSE PRACTITIONER

## 2024-01-05 PROCEDURE — 94761 N-INVAS EAR/PLS OXIMETRY MLT: CPT

## 2024-01-05 PROCEDURE — 27000982 HC MATTRESS, MATRIX LAL RENTAL

## 2024-01-05 PROCEDURE — C9113 INJ PANTOPRAZOLE SODIUM, VIA: HCPCS | Performed by: NURSE PRACTITIONER

## 2024-01-05 PROCEDURE — 85025 COMPLETE CBC W/AUTO DIFF WBC: CPT | Performed by: NURSE PRACTITIONER

## 2024-01-05 PROCEDURE — 83690 ASSAY OF LIPASE: CPT | Performed by: NURSE PRACTITIONER

## 2024-01-05 PROCEDURE — 11000001 HC ACUTE MED/SURG PRIVATE ROOM

## 2024-01-05 PROCEDURE — 80048 BASIC METABOLIC PNL TOTAL CA: CPT | Performed by: NURSE PRACTITIONER

## 2024-01-05 RX ORDER — SIMETHICONE 80 MG
1 TABLET,CHEWABLE ORAL 3 TIMES DAILY PRN
Status: DISCONTINUED | OUTPATIENT
Start: 2024-01-05 | End: 2024-01-07 | Stop reason: HOSPADM

## 2024-01-05 RX ADMIN — PANTOPRAZOLE SODIUM 40 MG: 40 INJECTION, POWDER, LYOPHILIZED, FOR SOLUTION INTRAVENOUS at 09:01

## 2024-01-05 RX ADMIN — HYDROMORPHONE HYDROCHLORIDE 2 MG: 2 INJECTION INTRAMUSCULAR; INTRAVENOUS; SUBCUTANEOUS at 01:01

## 2024-01-05 RX ADMIN — CARVEDILOL 12.5 MG: 12.5 TABLET, FILM COATED ORAL at 05:01

## 2024-01-05 RX ADMIN — ENOXAPARIN SODIUM 40 MG: 40 INJECTION SUBCUTANEOUS at 04:01

## 2024-01-05 RX ADMIN — KETOROLAC TROMETHAMINE 15 MG: 15 INJECTION, SOLUTION INTRAMUSCULAR; INTRAVENOUS at 07:01

## 2024-01-05 RX ADMIN — ISOSORBIDE MONONITRATE 30 MG: 30 TABLET, EXTENDED RELEASE ORAL at 09:01

## 2024-01-05 RX ADMIN — HYDROMORPHONE HYDROCHLORIDE 2 MG: 2 INJECTION INTRAMUSCULAR; INTRAVENOUS; SUBCUTANEOUS at 09:01

## 2024-01-05 RX ADMIN — KETOROLAC TROMETHAMINE 15 MG: 15 INJECTION, SOLUTION INTRAMUSCULAR; INTRAVENOUS at 09:01

## 2024-01-05 RX ADMIN — ONDANSETRON 4 MG: 2 INJECTION INTRAMUSCULAR; INTRAVENOUS at 07:01

## 2024-01-05 RX ADMIN — SODIUM CHLORIDE: 9 INJECTION, SOLUTION INTRAVENOUS at 05:01

## 2024-01-05 RX ADMIN — ASPIRIN 81 MG 81 MG: 81 TABLET ORAL at 09:01

## 2024-01-05 RX ADMIN — SODIUM CHLORIDE: 9 INJECTION, SOLUTION INTRAVENOUS at 10:01

## 2024-01-05 RX ADMIN — HYDROMORPHONE HYDROCHLORIDE 2 MG: 2 INJECTION INTRAMUSCULAR; INTRAVENOUS; SUBCUTANEOUS at 05:01

## 2024-01-05 RX ADMIN — CLOPIDOGREL BISULFATE 75 MG: 75 TABLET ORAL at 09:01

## 2024-01-05 RX ADMIN — SODIUM CHLORIDE: 9 INJECTION, SOLUTION INTRAVENOUS at 12:01

## 2024-01-05 RX ADMIN — LOSARTAN POTASSIUM 100 MG: 50 TABLET, FILM COATED ORAL at 09:01

## 2024-01-05 RX ADMIN — HYDROMORPHONE HYDROCHLORIDE 2 MG: 2 INJECTION INTRAMUSCULAR; INTRAVENOUS; SUBCUTANEOUS at 10:01

## 2024-01-05 NOTE — PLAN OF CARE
[facility] - Medical Surgical Unit  Initial Discharge Assessment       Primary Care Provider: Juan Pablo Ryan MD    Admission Diagnosis: Acute pancreatitis [K85.90]  Hypomagnesemia [E83.42]  Epigastric pain [R10.13]  Hypertension, unspecified type [I10]    Admission Date: 1/4/2024  Expected Discharge Date:     Transition of Care Barriers: (P) None    Payor: MEDICARE / Plan: MEDICARE PART A & B / Product Type: Government /     Extended Emergency Contact Information  Primary Emergency Contact: Vera Mehta  Mobile Phone: [phone]  Relation: Mother  Preferred language: English   needed? No    Discharge Plan A: (P) Home         [facility] [place] - [address]  [address]  [place] [address]  Phone: [phone] Fax: [phone]    [id number] [place], [place] - [address] AT [facility] [address]  [address]  [place] [address]  Phone: [phone] Fax: [phone]    [place], [place] - [address]  [address]  [place] [address]  Phone: [phone] Fax: [phone]      Initial Assessment (most recent)       Adult Discharge Assessment - 01/05/24 0933          Discharge Assessment    Assessment Type Discharge Planning Assessment (P)      Confirmed/corrected address, phone number and insurance Yes (P)      Confirmed Demographics Correct on Facesheet (P)      Source of Information patient (P)      Does patient/caregiver understand observation status Yes (P)      Communicated BETY with patient/caregiver Yes (P)      People in Home alone (P)      Do you expect to return to your current living situation? Yes (P)      Do you have help at home or someone to help you manage your care at home? No (P)      Prior to hospitilization cognitive status: Alert/Oriented (P)      Current cognitive status: Alert/Oriented (P)      Walking or Climbing Stairs Difficulty yes (P)      Walking or Climbing Stairs ambulation  difficulty, requires equipment (P)      Dressing/Bathing Difficulty no (P)      Home Accessibility stairs to enter home (P)      Number of Stairs, Main Entrance other (see comments) (P)    approximately 12 stairs    Stair Railings, Main Entrance railings safe and in good condition (P)      Equipment Currently Used at Home blood pressure machine;glucometer (P)      Patient currently being followed by outpatient case management? No (P)      Do you currently have service(s) that help you manage your care at home? No (P)      Do you take prescription medications? Yes (P)      Do you have prescription coverage? Yes (P)      Coverage Medicare A & B (P)      Do you have any problems affording any of your prescribed medications? No (P)      Is the patient taking medications as prescribed? yes (P)      Who is going to help you get home at discharge? Self (P)      How do you get to doctors appointments? car, drives self (P)      Are you on dialysis? No (P)      Do you take coumadin? No (P)      Discharge Plan A Home (P)      DME Needed Upon Discharge  none (P)      Discharge Plan discussed with: Patient (P)      Transition of Care Barriers None (P)         Physical Activity    On average, how many days per week do you engage in moderate to strenuous exercise (like a brisk walk)? 0 days (P)      On average, how many minutes do you engage in exercise at this level? 0 min (P)         Financial Resource Strain    How hard is it for you to pay for the very basics like food, housing, medical care, and heating? Not hard at all (P)         Housing Stability    In the last 12 months, was there a time when you were not able to pay the mortgage or rent on time? No (P)      In the last 12 months, how many places have you lived? 1 (P)      In the last 12 months, was there a time when you did not have a steady place to sleep or slept in a shelter (including now)? No (P)         Transportation Needs    In the past 12 months, has lack of  transportation kept you from medical appointments or from getting medications? No (P)      In the past 12 months, has lack of transportation kept you from meetings, work, or from getting things needed for daily living? No (P)         Food Insecurity    Within the past 12 months, you worried that your food would run out before you got the money to buy more. Never true (P)      Within the past 12 months, the food you bought just didn't last and you didn't have money to get more. Never true (P)         Stress    Do you feel stress - tense, restless, nervous, or anxious, or unable to sleep at night because your mind is troubled all the time - these days? Only a little (P)         Social Connections    In a typical week, how many times do you talk on the phone with family, friends, or neighbors? More than three times a week (P)      How often do you get together with friends or relatives? Once a week (P)      How often do you attend Synagogue or Judaism services? Never (P)      Do you belong to any clubs or organizations such as Synagogue groups, unions, fraternal or athletic groups, or school groups? No (P)      How often do you attend meetings of the clubs or organizations you belong to? Never (P)         Alcohol Use    Q1: How often do you have a drink containing alcohol? Never (P)      Q2: How many drinks containing alcohol do you have on a typical day when you are drinking? Patient does not drink (P)      Q3: How often do you have six or more drinks on one occasion? Never (P)                       Mr. Fischer lives home alone.  He doesn't require any DME or home health services at this time.  He lives in an upstairs apartment that does have a hand rail. He intends to return home alone at discharge.

## 2024-01-05 NOTE — SUBJECTIVE & OBJECTIVE
Interval History: pancreatitis     Review of Systems   Constitutional:  Negative for diaphoresis and fever.        He appears to be in acute pain   States pain level is 10+   HENT:  Negative for trouble swallowing.    Respiratory:  Negative for shortness of breath.    Cardiovascular:  Negative for chest pain.   Gastrointestinal:  Positive for abdominal pain. Negative for nausea.        Epigastric pain with radiation to back -states comes and goes with pain being level 7-8   Endocrine: Negative for polydipsia and polyuria.   Genitourinary:  Negative for difficulty urinating.   Skin:         Hx of hidradenitis   Neurological:  Negative for weakness, light-headedness and headaches.     Objective:     Vital Signs (Most Recent):  Temp: 97.5 °F (36.4 °C) (01/04/24 1902)  Pulse: 87 (01/05/24 0505)  Resp: 18 (01/05/24 0505)  BP: 101/71 (01/05/24 0553)  SpO2: (!) 93 % (01/05/24 0505) Vital Signs (24h Range):  Temp:  [97.5 °F (36.4 °C)-98 °F (36.7 °C)] 97.5 °F (36.4 °C)  Pulse:  [] 87  Resp:  [18-20] 18  SpO2:  [93 %-98 %] 93 %  BP: ()/() 101/71     Weight: 97.1 kg (214 lb)  Body mass index is 34.54 kg/m².    Intake/Output Summary (Last 24 hours) at 1/5/2024 0752  Last data filed at 1/5/2024 0055  Gross per 24 hour   Intake 2200.42 ml   Output --   Net 2200.42 ml         Physical Exam  Constitutional:       Appearance: He is obese.   HENT:      Head: Normocephalic.      Mouth/Throat:      Mouth: Mucous membranes are dry.   Cardiovascular:      Rate and Rhythm: Normal rate and regular rhythm.      Pulses: Normal pulses.      Heart sounds: Normal heart sounds.   Pulmonary:      Effort: Pulmonary effort is normal. No respiratory distress.      Breath sounds: Normal breath sounds. No wheezing or rhonchi.   Abdominal:      General: There is distension.      Tenderness: There is abdominal tenderness. There is guarding.   Musculoskeletal:      Cervical back: Normal range of motion.   Skin:     General: Skin is warm  and dry.      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   Psychiatric:         Mood and Affect: Mood normal.             Significant Labs: All pertinent labs within the past 24 hours have been reviewed.  Recent Lab Results         01/05/24  0600        Anion Gap 11       Baso # 0.01       Basophil % 0.1       BUN 16       BUN/CREAT RATIO 11       Calcium 8.4       Chloride 103       CO2 27       Creatinine 1.47       Differential Method Auto       eGFR 56       Eos # 0.11       Eosinophil % 1.4       Glucose 177       Hematocrit 40.8       Hemoglobin 13.3       Lipase 125       Lymph # 1.38       Lymph % 18.0       MCH 32.0       MCHC 32.6       MCV 98.3       Mono # 0.50       Mono % 6.5       MPV 9.7       Neutrophils, Abs 5.67       Neutrophils Relative 74.0       Platelet Count 193       Potassium 3.7       RBC 4.15       RDW 15.6       Sodium 137       WBC 7.67               Significant Imaging: I have reviewed all pertinent imaging results/findings within the past 24 hours.

## 2024-01-05 NOTE — PROGRESS NOTES
[facility]  Hospital Medicine  Progress Note    Patient Name: Faustino Fischer  MRN: [id number]  Patient Class: IP- Inpatient   Admission Date: 1/4/2024  Length of Stay: 1 days  Attending Physician: Jean Crews IV, DO  Primary Care Provider: Juan Pablo Ryan MD        Subjective:     Principal Problem:Acute pancreatitis        HPI:  Mr. Amado Harmon is a 53 year old male with pmh of coronary artery disease, hypertension, Insulin dependent diabetes mellitus, dyslipidemia, acute kidney injury, pancreatitis, macrocytic anemia and obesity.  He presented to ER this morning with complaints of epigastric pain with n/v that started yesterday. States PMH chronic pancreatitis and notes symptoms now are typical of his acute flare-ups. States pain is a severe constant pain. Reports has vomited enough to have the brown emesis at this point. Denies any ETOH use. Denies chest pain or dyspnea.Labs in ER include  WBC 9230 with H&H 15.8 and 47.4, plt [id number], neutrophils 79.8%, BUN 9, creatinine 1.08, ALT 21, AST 21, lipase 371, K+ 3.5, Mg 1.2, Na 133. Troponin 24.9. CT showed moderate fat stranding and trace fluid about the pancreas suspicious for pancreatitis. Correlate with amylase/lipase. No abnormal pancreatic ductal dilatation.  Prior cholecystectomy . He has recd NS bolus, hydralazine ,magnesium sulfate 2 gms , dilaudid  1 mg IVP x 2 , zofran 4 mg  and protonix 40 mg while in ER. On admission to acute care, Mr. Fischer is sitting up on the side of his bed rocking with complaints of epigastric pain radiating to his back . States pain is 10+. -/102 with room air sat of 94%. Diluadid 1 mg IVP ordered now.    1030 Pain level down to 4 now.    Overview/Hospital Course:  01/05/24 Awake and sitting up in bed. Reports continuing to have abdominal pain with radiation to back, states comes and goes but when it hits it is at level 7-8. Denies vomiting. Cr bumped up to 1.45 from 1.08.  will increase fluids to 175 cc/hr and recheck creatinine in am.    Interval History: pancreatitis     Review of Systems   Constitutional:  Negative for diaphoresis and fever.        He appears to be in acute pain   States pain level is 10+   HENT:  Negative for trouble swallowing.    Respiratory:  Negative for shortness of breath.    Cardiovascular:  Negative for chest pain.   Gastrointestinal:  Positive for abdominal pain. Negative for nausea.        Epigastric pain with radiation to back -states comes and goes with pain being level 7-8   Endocrine: Negative for polydipsia and polyuria.   Genitourinary:  Negative for difficulty urinating.   Skin:         Hx of hidradenitis   Neurological:  Negative for weakness, light-headedness and headaches.     Objective:     Vital Signs (Most Recent):  Temp: 97.5 °F (36.4 °C) (01/04/24 1902)  Pulse: 87 (01/05/24 0505)  Resp: 18 (01/05/24 0505)  BP: 101/71 (01/05/24 0553)  SpO2: (!) 93 % (01/05/24 0505) Vital Signs (24h Range):  Temp:  [97.5 °F (36.4 °C)-98 °F (36.7 °C)] 97.5 °F (36.4 °C)  Pulse:  [] 87  Resp:  [18-20] 18  SpO2:  [93 %-98 %] 93 %  BP: ()/() 101/71     Weight: 97.1 kg (214 lb)  Body mass index is 34.54 kg/m².    Intake/Output Summary (Last 24 hours) at 1/5/2024 0752  Last data filed at 1/5/2024 0055  Gross per 24 hour   Intake 2200.42 ml   Output --   Net 2200.42 ml         Physical Exam  Constitutional:       Appearance: He is obese.   HENT:      Head: Normocephalic.      Mouth/Throat:      Mouth: Mucous membranes are dry.   Cardiovascular:      Rate and Rhythm: Normal rate and regular rhythm.      Pulses: Normal pulses.      Heart sounds: Normal heart sounds.   Pulmonary:      Effort: Pulmonary effort is normal. No respiratory distress.      Breath sounds: Normal breath sounds. No wheezing or rhonchi.   Abdominal:      General: There is distension.      Tenderness: There is abdominal tenderness. There is guarding.   Musculoskeletal:       Cervical back: Normal range of motion.   Skin:     General: Skin is warm and dry.      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   Psychiatric:         Mood and Affect: Mood normal.             Significant Labs: All pertinent labs within the past 24 hours have been reviewed.  Recent Lab Results         01/05/24  0600        Anion Gap 11       Baso # 0.01       Basophil % 0.1       BUN 16       BUN/CREAT RATIO 11       Calcium 8.4       Chloride 103       CO2 27       Creatinine 1.47       Differential Method Auto       eGFR 56       Eos # 0.11       Eosinophil % 1.4       Glucose 177       Hematocrit 40.8       Hemoglobin 13.3       Lipase 125       Lymph # 1.38       Lymph % 18.0       MCH 32.0       MCHC 32.6       MCV 98.3       Mono # 0.50       Mono % 6.5       MPV 9.7       Neutrophils, Abs 5.67       Neutrophils Relative 74.0       Platelet Count 193       Potassium 3.7       RBC 4.15       RDW 15.6       Sodium 137       WBC 7.67               Significant Imaging: I have reviewed all pertinent imaging results/findings within the past 24 hours.    Assessment/Plan:      * Acute pancreatitis  01/04/24 CT shows moderate fat stranding and trace fluid about the pancreas suspicious for pancreatitis. Correlate with amylase/lipase. No abnormal pancreatic ductal dilatation.  Prior cholecystectomy. Amylase is 109  Lipase is 371.  NPO   Continue iv fluids and pain control     01/05/24 continue iv fluids and pain contol  Lipase down to 125   NPO     Hypertension    Chronic, controlled. Latest blood pressure and vitals reviewed-     Temp:  [97.7 °F (36.5 °C)-97.8 °F (36.6 °C)]   Pulse:  []   Resp:  [18-22]   BP: (156-250)/()   SpO2:  [93 %-98 %] .   Home meds for hypertension were reviewed and noted below.   Hypertension Medications      While in the  "hospital, will manage blood pressure as follows; Continue home antihypertensive regimen    Will utilize p.r.n. blood pressure medication only if patient's blood pressure greater than 180/110 and he develops symptoms such as worsening chest pain or shortness of breath.    Continue coreg 12.5 mg po bid  Continue losartan 100 mg po daily     CAD (coronary artery disease)  Patient with known CAD s/p stent placement, which is controlled Will continue ASA, Plavix, and Statin and monitor for S/Sx of angina/ACS. Continue to monitor on telemetry.       Type 2 diabetes mellitus with hyperglycemia, without long-term current use of insulin  01/04/24 POCT glucose achs       01/05/24 stable    Dyslipidemia    01/04/24 continue statin    Hypomagnesemia  Patient has Abnormal Magnesium: hypomagnesemia. Will continue to monitor electrolytes closely. Will replace the affected electrolytes and repeat labs to be done after interventions completed. The patient's magnesium results have been reviewed and are listed below.  No results for input(s): "MG" in the last 24 hours.  01/04/24 has had 2 gms magnesium sulfate while in ER  Will give and additional gram    01/05 will recheck magnesium    HAKEEM (acute kidney injury)  Patient with acute kidney injury/acute renal failure likely due to pre-renal azotemia due to dehydration HAKEEM is currently worsening. Baseline creatinine  0.98  - Labs reviewed- Renal function/electrolytes with Estimated Creatinine Clearance: 62.6 mL/min (A) (based on SCr of 1.47 mg/dL (H)). according to latest data. Monitor urine output and serial BMP and adjust therapy as needed. Avoid nephrotoxins and renally dose meds for GFR listed above.      VTE Risk Mitigation (From admission, onward)           Ordered     enoxaparin injection 40 mg  Daily         01/04/24 0859     IP VTE HIGH RISK PATIENT  Once         01/04/24 0859     Place sequential compression device  Until discontinued         01/04/24 0859              "       Discharge Planning   BETY:      Code Status: Full Code   Is the patient medically ready for discharge?:     Reason for patient still in hospital (select all that apply): Treatment                     DOC Thomas  Department of Hospital Medicine   [facility]

## 2024-01-05 NOTE — PLAN OF CARE
Problem: Adult Inpatient Plan of Care  Goal: Plan of Care Review  Outcome: Ongoing, Progressing  Goal: Patient-Specific Goal (Individualized)  Outcome: Ongoing, Progressing  Goal: Absence of Hospital-Acquired Illness or Injury  Outcome: Ongoing, Progressing  Goal: Optimal Comfort and Wellbeing  Outcome: Ongoing, Progressing  Goal: Readiness for Transition of Care  Outcome: Ongoing, Progressing     Problem: Diabetes Comorbidity  Goal: Blood Glucose Level Within Targeted Range  Outcome: Ongoing, Progressing     Problem: Fall Injury Risk  Goal: Absence of Fall and Fall-Related Injury  Outcome: Ongoing, Progressing     Problem: Fluid Imbalance (Pancreatitis)  Goal: Fluid Balance  Outcome: Ongoing, Progressing     Problem: Infection (Pancreatitis)  Goal: Infection Symptom Resolution  Outcome: Ongoing, Progressing     Problem: Nutrition Impaired (Pancreatitis)  Goal: Optimal Nutrition Intake  Outcome: Ongoing, Progressing     Problem: Pain (Pancreatitis)  Goal: Acceptable Pain Control  Outcome: Ongoing, Progressing     Problem: Respiratory Compromise (Pancreatitis)  Goal: Effective Oxygenation and Ventilation  Outcome: Ongoing, Progressing

## 2024-01-05 NOTE — ASSESSMENT & PLAN NOTE
Patient with acute kidney injury/acute renal failure likely due to pre-renal azotemia due to dehydration HAKEEM is currently worsening. Baseline creatinine  0.98  - Labs reviewed- Renal function/electrolytes with Estimated Creatinine Clearance: 62.6 mL/min (A) (based on SCr of 1.47 mg/dL (H)). according to latest data. Monitor urine output and serial BMP and adjust therapy as needed. Avoid nephrotoxins and renally dose meds for GFR listed above.

## 2024-01-05 NOTE — ASSESSMENT & PLAN NOTE
01/04/24 CT shows moderate fat stranding and trace fluid about the pancreas suspicious for pancreatitis. Correlate with amylase/lipase. No abnormal pancreatic ductal dilatation.  Prior cholecystectomy. Amylase is 109  Lipase is 371.  NPO   Continue iv fluids and pain control     01/05/24 continue iv fluids and pain contol  Lipase down to 125   NPO

## 2024-01-05 NOTE — PLAN OF CARE
Problem: Adult Inpatient Plan of Care  Goal: Plan of Care Review  Outcome: Ongoing, Progressing  Goal: Patient-Specific Goal (Individualized)  Outcome: Ongoing, Progressing  Goal: Absence of Hospital-Acquired Illness or Injury  Outcome: Ongoing, Progressing  Goal: Optimal Comfort and Wellbeing  Outcome: Ongoing, Progressing  Goal: Readiness for Transition of Care  Outcome: Ongoing, Progressing     Problem: Diabetes Comorbidity  Goal: Blood Glucose Level Within Targeted Range  Outcome: Ongoing, Progressing     Problem: Fall Injury Risk  Goal: Absence of Fall and Fall-Related Injury  Outcome: Ongoing, Progressing     Problem: Fluid Imbalance (Pancreatitis)  Goal: Fluid Balance  Outcome: Ongoing, Progressing     Problem: Infection (Pancreatitis)  Goal: Infection Symptom Resolution  Outcome: Ongoing, Progressing     Problem: Nutrition Impaired (Pancreatitis)  Goal: Optimal Nutrition Intake  Outcome: Ongoing, Progressing     Problem: Pain (Pancreatitis)  Goal: Acceptable Pain Control  Outcome: Ongoing, Progressing     Problem: Respiratory Compromise (Pancreatitis)  Goal: Effective Oxygenation and Ventilation  Outcome: Ongoing, Progressing     Problem: Fluid and Electrolyte Imbalance (Acute Kidney Injury/Impairment)  Goal: Fluid and Electrolyte Balance  Outcome: Ongoing, Progressing     Problem: Oral Intake Inadequate (Acute Kidney Injury/Impairment)  Goal: Optimal Nutrition Intake  Outcome: Ongoing, Progressing     Problem: Renal Function Impairment (Acute Kidney Injury/Impairment)  Goal: Effective Renal Function  Outcome: Ongoing, Progressing

## 2024-01-05 NOTE — ASSESSMENT & PLAN NOTE
"Patient has Abnormal Magnesium: hypomagnesemia. Will continue to monitor electrolytes closely. Will replace the affected electrolytes and repeat labs to be done after interventions completed. The patient's magnesium results have been reviewed and are listed below.  No results for input(s): "MG" in the last 24 hours.  01/04/24 has had 2 gms magnesium sulfate while in ER  Will give and additional gram    01/05 will recheck magnesium  "

## 2024-01-05 NOTE — HOSPITAL COURSE
"01/05/24 Awake and sitting up in bed. Reports continuing to have abdominal pain with radiation to back, states comes and goes but when it hits it is at level 7-8. Denies vomiting. Cr bumped up to 1.45 from 1.08. will increase fluids to 175 cc/hr and recheck creatinine in am.    1-6-24 Patient denies abd pain and n/v today.  Labs have improved.  Will advance his diet today.  If he tolerates this well, I am hopefully for him to be dc'd home tomorrow. Lipase 55.      Discharge Note:   Mr. Fischer was admitted for treatment of acute on chronic pancreatitis. He has had several bouts of acute pancreatitis in the past. He denies ETOH use. Upon admission, he was held NPO initially and treated with IV hydration and pain control. As his pain improved, his diet was gradually increased. Today, he is pain free, other than very mild "soreness." He is tolerating a bland diet. Labs have shown improvement. Lipase normalized yesterday falling from 371 upon admission to 47. LFTs have been WNL since admission. His BUN rolf to 1.47 on day 2. Fluids were increased, and his creatinine has been WNL for the last 2 days. Labs today show creatinine 1.0, BUN 15, sodium 137, potassium 3.8, WBC 4270 with H&H 12 and 38.4, and plt [id number]. He reports that he is feeling much better today. He states that he is ready for discharge home. His case was discussed with Dr. Ross, and he agreed with patient's discharge home today.  "

## 2024-01-06 LAB
ANION GAP SERPL CALCULATED.3IONS-SCNC: 15 MMOL/L (ref 7–16)
BASOPHILS # BLD AUTO: 0.02 K/UL (ref 0–0.2)
BASOPHILS NFR BLD AUTO: 0.3 % (ref 0–1)
BUN SERPL-MCNC: 20 MG/DL (ref 7–18)
BUN/CREAT SERPL: 17 (ref 6–20)
CALCIUM SERPL-MCNC: 8.6 MG/DL (ref 8.5–10.1)
CHLORIDE SERPL-SCNC: 104 MMOL/L (ref 98–107)
CO2 SERPL-SCNC: 23 MMOL/L (ref 21–32)
CREAT SERPL-MCNC: 1.19 MG/DL (ref 0.7–1.3)
DIFFERENTIAL METHOD BLD: ABNORMAL
EGFR (NO RACE VARIABLE) (RUSH/TITUS): 73 ML/MIN/1.73M2
EOSINOPHIL # BLD AUTO: 0.14 K/UL (ref 0–0.5)
EOSINOPHIL NFR BLD AUTO: 1.9 % (ref 1–4)
ERYTHROCYTE [DISTWIDTH] IN BLOOD BY AUTOMATED COUNT: 15.4 % (ref 11.5–14.5)
GLUCOSE SERPL-MCNC: 119 MG/DL (ref 74–106)
HCT VFR BLD AUTO: 38.3 % (ref 40–54)
HGB BLD-MCNC: 11.9 G/DL (ref 13.5–18)
LIPASE SERPL-CCNC: 55 U/L (ref 16–77)
LYMPHOCYTES # BLD AUTO: 1.76 K/UL (ref 1–4.8)
LYMPHOCYTES NFR BLD AUTO: 24.4 % (ref 27–41)
MCH RBC QN AUTO: 31.8 PG (ref 27–31)
MCHC RBC AUTO-ENTMCNC: 31.1 G/DL (ref 32–36)
MCV RBC AUTO: 102.4 FL (ref 80–96)
MONOCYTES # BLD AUTO: 0.63 K/UL (ref 0–0.8)
MONOCYTES NFR BLD AUTO: 8.7 % (ref 2–6)
MPC BLD CALC-MCNC: 9.8 FL (ref 9.4–12.4)
NEUTROPHILS # BLD AUTO: 4.67 K/UL (ref 1.8–7.7)
NEUTROPHILS NFR BLD AUTO: 64.7 % (ref 53–65)
PLATELET # BLD AUTO: 175 K/UL (ref 150–400)
POTASSIUM SERPL-SCNC: 3.8 MMOL/L (ref 3.5–5.1)
RBC # BLD AUTO: 3.74 M/UL (ref 4.6–6.2)
SODIUM SERPL-SCNC: 138 MMOL/L (ref 136–145)
WBC # BLD AUTO: 7.22 K/UL (ref 4.5–11)

## 2024-01-06 PROCEDURE — 25000003 PHARM REV CODE 250: Performed by: NURSE PRACTITIONER

## 2024-01-06 PROCEDURE — 83690 ASSAY OF LIPASE: CPT | Performed by: NURSE PRACTITIONER

## 2024-01-06 PROCEDURE — 85025 COMPLETE CBC W/AUTO DIFF WBC: CPT | Performed by: NURSE PRACTITIONER

## 2024-01-06 PROCEDURE — 27000944

## 2024-01-06 PROCEDURE — 80048 BASIC METABOLIC PNL TOTAL CA: CPT | Performed by: NURSE PRACTITIONER

## 2024-01-06 PROCEDURE — 63600175 PHARM REV CODE 636 W HCPCS: Performed by: NURSE PRACTITIONER

## 2024-01-06 PROCEDURE — 11000001 HC ACUTE MED/SURG PRIVATE ROOM

## 2024-01-06 PROCEDURE — 94761 N-INVAS EAR/PLS OXIMETRY MLT: CPT

## 2024-01-06 PROCEDURE — C9113 INJ PANTOPRAZOLE SODIUM, VIA: HCPCS | Performed by: NURSE PRACTITIONER

## 2024-01-06 PROCEDURE — 27000982 HC MATTRESS, MATRIX LAL RENTAL

## 2024-01-06 RX ADMIN — SIMETHICONE 80 MG: 80 TABLET, CHEWABLE ORAL at 01:01

## 2024-01-06 RX ADMIN — SODIUM CHLORIDE: 9 INJECTION, SOLUTION INTRAVENOUS at 01:01

## 2024-01-06 RX ADMIN — ENOXAPARIN SODIUM 40 MG: 40 INJECTION SUBCUTANEOUS at 04:01

## 2024-01-06 RX ADMIN — LOSARTAN POTASSIUM 100 MG: 50 TABLET, FILM COATED ORAL at 08:01

## 2024-01-06 RX ADMIN — CLOPIDOGREL BISULFATE 75 MG: 75 TABLET ORAL at 08:01

## 2024-01-06 RX ADMIN — PANTOPRAZOLE SODIUM 40 MG: 40 INJECTION, POWDER, LYOPHILIZED, FOR SOLUTION INTRAVENOUS at 08:01

## 2024-01-06 RX ADMIN — ISOSORBIDE MONONITRATE 30 MG: 30 TABLET, EXTENDED RELEASE ORAL at 08:01

## 2024-01-06 RX ADMIN — SODIUM CHLORIDE: 9 INJECTION, SOLUTION INTRAVENOUS at 12:01

## 2024-01-06 RX ADMIN — SIMETHICONE 80 MG: 80 TABLET, CHEWABLE ORAL at 08:01

## 2024-01-06 RX ADMIN — KETOROLAC TROMETHAMINE 15 MG: 15 INJECTION, SOLUTION INTRAMUSCULAR; INTRAVENOUS at 08:01

## 2024-01-06 RX ADMIN — CARVEDILOL 12.5 MG: 12.5 TABLET, FILM COATED ORAL at 06:01

## 2024-01-06 RX ADMIN — HYDROMORPHONE HYDROCHLORIDE 2 MG: 2 INJECTION INTRAMUSCULAR; INTRAVENOUS; SUBCUTANEOUS at 07:01

## 2024-01-06 RX ADMIN — ASPIRIN 81 MG 81 MG: 81 TABLET ORAL at 08:01

## 2024-01-06 RX ADMIN — SODIUM CHLORIDE: 9 INJECTION, SOLUTION INTRAVENOUS at 07:01

## 2024-01-06 NOTE — PROGRESS NOTES
[facility]  Hospital Medicine  Progress Note    Patient Name: Faustino Fischer  MRN: [id number]  Patient Class: IP- Inpatient   Admission Date: 1/4/2024  Length of Stay: 2 days  Attending Physician: Jean Crews IV, DO  Primary Care Provider: Juan Pablo Ryan MD        Subjective:     Principal Problem:Acute pancreatitis        HPI:  Mr. Amado Harmon is a 53 year old male with pmh of coronary artery disease, hypertension, Insulin dependent diabetes mellitus, dyslipidemia, acute kidney injury, pancreatitis, macrocytic anemia and obesity.  He presented to ER this morning with complaints of epigastric pain with n/v that started yesterday. States PMH chronic pancreatitis and notes symptoms now are typical of his acute flare-ups. States pain is a severe constant pain. Reports has vomited enough to have the brown emesis at this point. Denies any ETOH use. Denies chest pain or dyspnea.Labs in ER include  WBC 9230 with H&H 15.8 and 47.4, plt [id number], neutrophils 79.8%, BUN 9, creatinine 1.08, ALT 21, AST 21, lipase 371, K+ 3.5, Mg 1.2, Na 133. Troponin 24.9. CT showed moderate fat stranding and trace fluid about the pancreas suspicious for pancreatitis. Correlate with amylase/lipase. No abnormal pancreatic ductal dilatation.  Prior cholecystectomy . He has recd NS bolus, hydralazine ,magnesium sulfate 2 gms , dilaudid  1 mg IVP x 2 , zofran 4 mg  and protonix 40 mg while in ER. On admission to acute care, Mr. Fischer is sitting up on the side of his bed rocking with complaints of epigastric pain radiating to his back . States pain is 10+. -/102 with room air sat of 94%. Diluadid 1 mg IVP ordered now.    1030 Pain level down to 4 now.    Overview/Hospital Course:  01/05/24 Awake and sitting up in bed. Reports continuing to have abdominal pain with radiation to back, states comes and goes but when it hits it is at level 7-8. Denies vomiting. Cr bumped up to 1.45 from 1.08.  will increase fluids to 175 cc/hr and recheck creatinine in am.    1-6-24 Patient denies abd pain and n/v today.  Labs have improved.  Will advance his diet today.  If he tolerates this well, I am hopefully for him to be dc'd home tomorrow. Lipase 55.    Interval History: Patient denies abd pain and n/v today.  Lipase is down to 55.  Will advance diet today.     Review of Systems   Constitutional:  Negative for fever.   Respiratory:  Negative for cough and shortness of breath.    Cardiovascular:  Negative for chest pain.   Gastrointestinal:  Negative for abdominal pain, diarrhea, nausea and vomiting.     Objective:     Vital Signs (Most Recent):  Temp: 97.6 °F (36.4 °C) (01/06/24 0715)  Pulse: 80 (01/06/24 0715)  Resp: 18 (01/06/24 0715)  BP: (!) 175/106 (01/06/24 0715)  SpO2: 96 % (01/06/24 0715) Vital Signs (24h Range):  Temp:  [97.2 °F (36.2 °C)-97.6 °F (36.4 °C)] 97.6 °F (36.4 °C)  Pulse:  [71-80] 80  Resp:  [18-20] 18  SpO2:  [93 %-96 %] 96 %  BP: (157-175)/() 175/106     Weight: 97.1 kg (214 lb)  Body mass index is 34.54 kg/m².  No intake or output data in the 24 hours ending 01/06/24 1053      Physical Exam  Constitutional:       General: He is awake. He is not in acute distress.     Appearance: Normal appearance. He is well-developed and well-groomed. He is obese. He is not ill-appearing or toxic-appearing.   HENT:      Head: Normocephalic and atraumatic.   Cardiovascular:      Rate and Rhythm: Normal rate and regular rhythm.      Pulses: Normal pulses.      Heart sounds: Normal heart sounds. No murmur heard.  Pulmonary:      Effort: Pulmonary effort is normal. No respiratory distress.      Breath sounds: Normal breath sounds. No stridor. No wheezing, rhonchi or rales.   Abdominal:      General: Bowel sounds are normal.      Palpations: Abdomen is soft.      Tenderness: There is no abdominal tenderness.   Musculoskeletal:      Cervical back: Normal range of motion. No rigidity.   Skin:     General:  Skin is warm and dry.      Capillary Refill: Capillary refill takes less than 2 seconds.   Neurological:      General: No focal deficit present.      Mental Status: He is alert and oriented to person, place, and time.   Psychiatric:         Mood and Affect: Mood normal.         Behavior: Behavior is cooperative.             Significant Labs: All pertinent labs within the past 24 hours have been reviewed.    Significant Imaging: I have reviewed all pertinent imaging results/findings within the past 24 hours.    Assessment/Plan:      * Acute pancreatitis  01/04/24 CT shows moderate fat stranding and trace fluid about the pancreas suspicious for pancreatitis. Correlate with amylase/lipase. No abnormal pancreatic ductal dilatation.  Prior cholecystectomy. Amylase is 109  Lipase is 371.  NPO   Continue iv fluids and pain control     01/05/24 continue iv fluids and pain contol  Lipase down to 125   NPO     Hypertension    Chronic, controlled. Latest blood pressure and vitals reviewed-     Temp:  [97.7 °F (36.5 °C)-97.8 °F (36.6 °C)]   Pulse:  []   Resp:  [18-22]   BP: (156-250)/()   SpO2:  [93 %-98 %] .   Home meds for hypertension were reviewed and noted below.   Hypertension Medications      While in the hospital, will manage blood pressure as follows; Continue home antihypertensive regimen    Will utilize p.r.n. blood pressure medication only if patient's blood pressure greater than 180/110 and he develops symptoms such as worsening chest pain or shortness of breath.    Continue coreg 12.5 mg po bid  Continue losartan 100 mg po daily     Dyslipidemia    01/04/24 continue statin    Type 2 diabetes mellitus with hyperglycemia, without long-term current use of insulin  01/04/24 POCT glucose achs       01/05/24 stable    HAKEEM (acute kidney injury)  Patient with acute kidney injury/acute renal failure likely due to pre-renal azotemia due to dehydration HAKEEM is currently worsening. Baseline creatinine  0.98  - Labs  "reviewed- Renal function/electrolytes with Estimated Creatinine Clearance: 62.6 mL/min (A) (based on SCr of 1.47 mg/dL (H)). according to latest data. Monitor urine output and serial BMP and adjust therapy as needed. Avoid nephrotoxins and renally dose meds for GFR listed above.    CAD (coronary artery disease)  Patient with known CAD s/p stent placement, which is controlled Will continue ASA, Plavix, and Statin and monitor for S/Sx of angina/ACS. Continue to monitor on telemetry.       Hypomagnesemia  Patient has Abnormal Magnesium: hypomagnesemia. Will continue to monitor electrolytes closely. Will replace the affected electrolytes and repeat labs to be done after interventions completed. The patient's magnesium results have been reviewed and are listed below.  No results for input(s): "MG" in the last 24 hours.  01/04/24 has had 2 gms magnesium sulfate while in ER  Will give and additional gram    01/05 will recheck magnesium      VTE Risk Mitigation (From admission, onward)           Ordered     enoxaparin injection 40 mg  Daily         01/04/24 0859     IP VTE HIGH RISK PATIENT  Once         01/04/24 0859     Place sequential compression device  Until discontinued         01/04/24 0859                    Discharge Planning   BETY:      Code Status: Full Code   Is the patient medically ready for discharge?:     Reason for patient still in hospital (select all that apply): Patient trending condition  Discharge Plan A: Home                  DOC Chapman  Department of Hospital Medicine   [facility]    "

## 2024-01-06 NOTE — PLAN OF CARE
Problem: Diabetes Comorbidity  Goal: Blood Glucose Level Within Targeted Range  Outcome: Ongoing, Progressing  Intervention: Monitor and Manage Glycemia  Flowsheets (Taken 1/6/2024 9666)  Glycemic Management:   blood glucose monitored   supplemental insulin given     Problem: Fall Injury Risk  Goal: Absence of Fall and Fall-Related Injury  Outcome: Ongoing, Progressing     Problem: Adult Inpatient Plan of Care  Goal: Patient-Specific Goal (Individualized)  Outcome: Ongoing, Not Progressing

## 2024-01-06 NOTE — SUBJECTIVE & OBJECTIVE
Interval History: Patient denies abd pain and n/v today.  Lipase is down to 55.  Will advance diet today.     Review of Systems   Constitutional:  Negative for fever.   Respiratory:  Negative for cough and shortness of breath.    Cardiovascular:  Negative for chest pain.   Gastrointestinal:  Negative for abdominal pain, diarrhea, nausea and vomiting.     Objective:     Vital Signs (Most Recent):  Temp: 97.6 °F (36.4 °C) (01/06/24 0715)  Pulse: 80 (01/06/24 0715)  Resp: 18 (01/06/24 0715)  BP: (!) 175/106 (01/06/24 0715)  SpO2: 96 % (01/06/24 0715) Vital Signs (24h Range):  Temp:  [97.2 °F (36.2 °C)-97.6 °F (36.4 °C)] 97.6 °F (36.4 °C)  Pulse:  [71-80] 80  Resp:  [18-20] 18  SpO2:  [93 %-96 %] 96 %  BP: (157-175)/() 175/106     Weight: 97.1 kg (214 lb)  Body mass index is 34.54 kg/m².  No intake or output data in the 24 hours ending 01/06/24 1053      Physical Exam  Constitutional:       General: He is awake. He is not in acute distress.     Appearance: Normal appearance. He is well-developed and well-groomed. He is obese. He is not ill-appearing or toxic-appearing.   HENT:      Head: Normocephalic and atraumatic.   Cardiovascular:      Rate and Rhythm: Normal rate and regular rhythm.      Pulses: Normal pulses.      Heart sounds: Normal heart sounds. No murmur heard.  Pulmonary:      Effort: Pulmonary effort is normal. No respiratory distress.      Breath sounds: Normal breath sounds. No stridor. No wheezing, rhonchi or rales.   Abdominal:      General: Bowel sounds are normal.      Palpations: Abdomen is soft.      Tenderness: There is no abdominal tenderness.   Musculoskeletal:      Cervical back: Normal range of motion. No rigidity.   Skin:     General: Skin is warm and dry.      Capillary Refill: Capillary refill takes less than 2 seconds.   Neurological:      General: No focal deficit present.      Mental Status: He is alert and oriented to person, place, and time.   Psychiatric:         Mood and Affect:  Mood normal.         Behavior: Behavior is cooperative.             Significant Labs: All pertinent labs within the past 24 hours have been reviewed.    Significant Imaging: I have reviewed all pertinent imaging results/findings within the past 24 hours.

## 2024-01-07 VITALS
HEIGHT: 66 IN | BODY MASS INDEX: 34.39 KG/M2 | TEMPERATURE: 98 F | RESPIRATION RATE: 20 BRPM | OXYGEN SATURATION: 94 % | HEART RATE: 62 BPM | SYSTOLIC BLOOD PRESSURE: 147 MMHG | DIASTOLIC BLOOD PRESSURE: 90 MMHG | WEIGHT: 214 LBS

## 2024-01-07 PROBLEM — N17.9 AKI (ACUTE KIDNEY INJURY): Status: RESOLVED | Noted: 2023-06-14 | Resolved: 2024-01-07

## 2024-01-07 PROBLEM — K85.90 ACUTE PANCREATITIS: Status: RESOLVED | Noted: 2021-06-26 | Resolved: 2024-01-07

## 2024-01-07 PROBLEM — E83.42 HYPOMAGNESEMIA: Status: RESOLVED | Noted: 2021-06-26 | Resolved: 2024-01-07

## 2024-01-07 LAB
ANION GAP SERPL CALCULATED.3IONS-SCNC: 16 MMOL/L (ref 7–16)
BASOPHILS # BLD AUTO: 0.02 K/UL (ref 0–0.2)
BASOPHILS NFR BLD AUTO: 0.5 % (ref 0–1)
BUN SERPL-MCNC: 15 MG/DL (ref 7–18)
BUN/CREAT SERPL: 15 (ref 6–20)
CALCIUM SERPL-MCNC: 8.8 MG/DL (ref 8.5–10.1)
CHLORIDE SERPL-SCNC: 103 MMOL/L (ref 98–107)
CO2 SERPL-SCNC: 22 MMOL/L (ref 21–32)
CREAT SERPL-MCNC: 1 MG/DL (ref 0.7–1.3)
DIFFERENTIAL METHOD BLD: ABNORMAL
EGFR (NO RACE VARIABLE) (RUSH/TITUS): 89 ML/MIN/1.73M2
EOSINOPHIL # BLD AUTO: 0.08 K/UL (ref 0–0.5)
EOSINOPHIL NFR BLD AUTO: 1.9 % (ref 1–4)
ERYTHROCYTE [DISTWIDTH] IN BLOOD BY AUTOMATED COUNT: 15.2 % (ref 11.5–14.5)
GLUCOSE SERPL-MCNC: 170 MG/DL (ref 74–106)
HCT VFR BLD AUTO: 38.4 % (ref 40–54)
HGB BLD-MCNC: 12 G/DL (ref 13.5–18)
LIPASE SERPL-CCNC: 49 U/L (ref 16–77)
LYMPHOCYTES # BLD AUTO: 1.21 K/UL (ref 1–4.8)
LYMPHOCYTES NFR BLD AUTO: 28.3 % (ref 27–41)
MCH RBC QN AUTO: 31.7 PG (ref 27–31)
MCHC RBC AUTO-ENTMCNC: 31.3 G/DL (ref 32–36)
MCV RBC AUTO: 101.3 FL (ref 80–96)
MONOCYTES # BLD AUTO: 0.34 K/UL (ref 0–0.8)
MONOCYTES NFR BLD AUTO: 8 % (ref 2–6)
MPC BLD CALC-MCNC: 9.8 FL (ref 9.4–12.4)
NEUTROPHILS # BLD AUTO: 2.62 K/UL (ref 1.8–7.7)
NEUTROPHILS NFR BLD AUTO: 61.3 % (ref 53–65)
PLATELET # BLD AUTO: 185 K/UL (ref 150–400)
POTASSIUM SERPL-SCNC: 3.8 MMOL/L (ref 3.5–5.1)
RBC # BLD AUTO: 3.79 M/UL (ref 4.6–6.2)
SODIUM SERPL-SCNC: 137 MMOL/L (ref 136–145)
WBC # BLD AUTO: 4.27 K/UL (ref 4.5–11)

## 2024-01-07 PROCEDURE — 63600175 PHARM REV CODE 636 W HCPCS: Performed by: NURSE PRACTITIONER

## 2024-01-07 PROCEDURE — 94761 N-INVAS EAR/PLS OXIMETRY MLT: CPT

## 2024-01-07 PROCEDURE — 27000982 HC MATTRESS, MATRIX LAL RENTAL

## 2024-01-07 PROCEDURE — 80048 BASIC METABOLIC PNL TOTAL CA: CPT | Performed by: NURSE PRACTITIONER

## 2024-01-07 PROCEDURE — 25000003 PHARM REV CODE 250: Performed by: NURSE PRACTITIONER

## 2024-01-07 PROCEDURE — C9113 INJ PANTOPRAZOLE SODIUM, VIA: HCPCS | Performed by: NURSE PRACTITIONER

## 2024-01-07 PROCEDURE — 83690 ASSAY OF LIPASE: CPT | Performed by: NURSE PRACTITIONER

## 2024-01-07 PROCEDURE — 27000944

## 2024-01-07 PROCEDURE — 85025 COMPLETE CBC W/AUTO DIFF WBC: CPT | Performed by: NURSE PRACTITIONER

## 2024-01-07 RX ADMIN — ISOSORBIDE MONONITRATE 30 MG: 30 TABLET, EXTENDED RELEASE ORAL at 08:01

## 2024-01-07 RX ADMIN — LOSARTAN POTASSIUM 100 MG: 50 TABLET, FILM COATED ORAL at 08:01

## 2024-01-07 RX ADMIN — CLOPIDOGREL BISULFATE 75 MG: 75 TABLET ORAL at 08:01

## 2024-01-07 RX ADMIN — SIMETHICONE 80 MG: 80 TABLET, CHEWABLE ORAL at 05:01

## 2024-01-07 RX ADMIN — ASPIRIN 81 MG 81 MG: 81 TABLET ORAL at 08:01

## 2024-01-07 RX ADMIN — HYDROMORPHONE HYDROCHLORIDE 2 MG: 2 INJECTION INTRAMUSCULAR; INTRAVENOUS; SUBCUTANEOUS at 05:01

## 2024-01-07 RX ADMIN — CARVEDILOL 12.5 MG: 12.5 TABLET, FILM COATED ORAL at 05:01

## 2024-01-07 RX ADMIN — PANTOPRAZOLE SODIUM 40 MG: 40 INJECTION, POWDER, LYOPHILIZED, FOR SOLUTION INTRAVENOUS at 08:01

## 2024-01-07 RX ADMIN — SODIUM CHLORIDE: 9 INJECTION, SOLUTION INTRAVENOUS at 08:01

## 2024-01-07 NOTE — PLAN OF CARE
Problem: Adult Inpatient Plan of Care  Goal: Plan of Care Review  1/7/2024 1227 by Duncan Norris RN  Outcome: Met  1/7/2024 1015 by Duncan Norris RN  Outcome: Ongoing, Progressing  Goal: Patient-Specific Goal (Individualized)  1/7/2024 1227 by Duncan Norris RN  Outcome: Met  1/7/2024 1015 by Duncan Norris RN  Outcome: Ongoing, Progressing  Goal: Absence of Hospital-Acquired Illness or Injury  1/7/2024 1227 by Duncan Norris RN  Outcome: Met  1/7/2024 1015 by Duncan Norris RN  Outcome: Ongoing, Progressing  Goal: Optimal Comfort and Wellbeing  1/7/2024 1227 by Duncan Norris RN  Outcome: Met  1/7/2024 1015 by Duncan Norris RN  Outcome: Ongoing, Progressing  Goal: Readiness for Transition of Care  1/7/2024 1227 by Duncan Norris RN  Outcome: Met  1/7/2024 1015 by Duncan Norris RN  Outcome: Ongoing, Progressing     Problem: Diabetes Comorbidity  Goal: Blood Glucose Level Within Targeted Range  1/7/2024 1227 by Duncan Norris RN  Outcome: Met  1/7/2024 1015 by Duncan Norris RN  Outcome: Ongoing, Progressing     Problem: Fall Injury Risk  Goal: Absence of Fall and Fall-Related Injury  1/7/2024 1227 by Duncan Norris RN  Outcome: Met  1/7/2024 1015 by Duncan Norris RN  Outcome: Ongoing, Progressing     Problem: Fluid Imbalance (Pancreatitis)  Goal: Fluid Balance  1/7/2024 1227 by Duncan Norris RN  Outcome: Met  1/7/2024 1015 by Duncan Norris RN  Outcome: Ongoing, Progressing     Problem: Infection (Pancreatitis)  Goal: Infection Symptom Resolution  1/7/2024 1227 by Duncan Norris RN  Outcome: Met  1/7/2024 1015 by Duncan Norris RN  Outcome: Ongoing, Progressing     Problem: Nutrition Impaired (Pancreatitis)  Goal: Optimal Nutrition Intake  1/7/2024 1227 by Duncan Norris, RN  Outcome: Met  1/7/2024 1015 by Duncan Norris, RN  Outcome: Ongoing, Progressing     Problem: Pain (Pancreatitis)  Goal: Acceptable Pain Control  1/7/2024  1227 by Duncan Norris RN  Outcome: Met  1/7/2024 1015 by Duncan Norris RN  Outcome: Ongoing, Progressing     Problem: Respiratory Compromise (Pancreatitis)  Goal: Effective Oxygenation and Ventilation  1/7/2024 1227 by Duncan Norris RN  Outcome: Met  1/7/2024 1015 by Duncan Norris RN  Outcome: Ongoing, Progressing     Problem: Fluid and Electrolyte Imbalance (Acute Kidney Injury/Impairment)  Goal: Fluid and Electrolyte Balance  1/7/2024 1227 by Duncan Norris RN  Outcome: Met  1/7/2024 1015 by Duncan Norris RN  Outcome: Ongoing, Progressing     Problem: Oral Intake Inadequate (Acute Kidney Injury/Impairment)  Goal: Optimal Nutrition Intake  1/7/2024 1227 by Duncan Norris RN  Outcome: Met  1/7/2024 1015 by Duncan Norris RN  Outcome: Ongoing, Progressing     Problem: Renal Function Impairment (Acute Kidney Injury/Impairment)  Goal: Effective Renal Function  1/7/2024 1227 by Duncan Norris RN  Outcome: Met  1/7/2024 1015 by Duncan Norris RN  Outcome: Ongoing, Progressing

## 2024-01-07 NOTE — NURSING
D/C instructions given to patient, patient voiced understanding, INT removed, patient transported to private vehicle via w/c, patient d/c'd from [facility] to home at 12:40pm

## 2024-01-07 NOTE — PLAN OF CARE
Problem: Adult Inpatient Plan of Care  Goal: Patient-Specific Goal (Individualized)  Outcome: Ongoing, Progressing     Problem: Fall Injury Risk  Goal: Absence of Fall and Fall-Related Injury  Outcome: Ongoing, Progressing  Intervention: Promote Injury-Free Environment  Flowsheets (Taken 1/7/2024 5386)  Safety Promotion/Fall Prevention:   assistive device/personal item within reach   nonskid shoes/socks when out of bed   side rails raised x 2   instructed to call staff for mobility

## 2024-01-07 NOTE — DISCHARGE INSTRUCTIONS
Rest at home.    Maintain a soft bland diet for with no fried, fatty or greasy foods for 3-5 days. No alcohol.    Follow-up Dr. Ryan this week for re-evaluation.

## 2024-01-08 ENCOUNTER — TELEPHONE (OUTPATIENT)
Dept: MEDSURG UNIT | Facility: HOSPITAL | Age: 54
End: 2024-01-08
Payer: MEDICARE

## 2024-01-08 NOTE — ASSESSMENT & PLAN NOTE
01/04/24 POCT glucose achs       01/05/24 stable    01/07/24 Glucose 170  Discharge home with follow-up with Dr. Ryan this week.

## 2024-01-08 NOTE — ASSESSMENT & PLAN NOTE
Chronic, controlled. Latest blood pressure and vitals reviewed-     Temp:  [97.7 °F (36.5 °C)-97.8 °F (36.6 °C)]   Pulse:  [60-68]   Resp:  [18-20]   BP: (147-201)/()   SpO2:  [94 %-97 %] .   Home meds for hypertension were reviewed and noted below.   Hypertension Medications      While in the hospital, will manage blood pressure as follows; Continue home antihypertensive regimen    Will utilize p.r.n. blood pressure medication only if patient's blood pressure greater than 180/110 and he develops symptoms such as worsening chest pain or shortness of breath.    Continue coreg 12.5 mg po bid  Continue losartan 100 mg po daily     1/7/24 /90, HR 62  Continue home dose of losartan and coreg  Discharge home to follow-up with Dr. Ryan this week

## 2024-01-08 NOTE — ASSESSMENT & PLAN NOTE
01/04/24 continue statin    01/07/24 Continue statin as prior to admission  Discharge home to follow-up with Dr. Ryan this week.

## 2024-01-08 NOTE — DISCHARGE SUMMARY
[facility] - Medical Surgical Unit  Hospital Medicine  Discharge Summary      Patient Name: Faustino Fischer  MRN: [id number]  [facility]: [id number]  Patient Class: IP- Inpatient  Admission Date: 1/4/2024  Hospital Length of Stay: 3 days  Discharge Date and Time:  01/07/2024 11:50 PM  Attending Physician: No att. providers found   Discharging Provider: Sadia Mcpherson NP  Primary Care Provider: Juan Pablo Ryan MD    Primary Care Team: Networked reference to record PCT     HPI:   Mr. Amado Harmon is a 53 year old male with pmh of coronary artery disease, hypertension, Insulin dependent diabetes mellitus, dyslipidemia, acute kidney injury, pancreatitis, macrocytic anemia and obesity.  He presented to ER this morning with complaints of epigastric pain with n/v that started yesterday. States PMH chronic pancreatitis and notes symptoms now are typical of his acute flare-ups. States pain is a severe constant pain. Reports has vomited enough to have the brown emesis at this point. Denies any ETOH use. Denies chest pain or dyspnea.Labs in ER include  WBC 9230 with H&H 15.8 and 47.4, plt [id number], neutrophils 79.8%, BUN 9, creatinine 1.08, ALT 21, AST 21, lipase 371, K+ 3.5, Mg 1.2, Na 133. Troponin 24.9. CT showed moderate fat stranding and trace fluid about the pancreas suspicious for pancreatitis. Correlate with amylase/lipase. No abnormal pancreatic ductal dilatation.  Prior cholecystectomy . He has recd NS bolus, hydralazine ,magnesium sulfate 2 gms , dilaudid  1 mg IVP x 2 , zofran 4 mg  and protonix 40 mg while in ER. On admission to acute care, Mr. Fischer is sitting up on the side of his bed rocking with complaints of epigastric pain radiating to his back . States pain is 10+. -/102 with room air sat of 94%. Diluadid 1 mg IVP ordered now.    1030 Pain level down to 4 now.    * No surgery found *      Hospital Course:   01/05/24 Awake and sitting up in bed. Reports continuing to have abdominal pain  "with radiation to back, states comes and goes but when it hits it is at level 7-8. Denies vomiting. Cr bumped up to 1.45 from 1.08. will increase fluids to 175 cc/hr and recheck creatinine in am.    1-6-24 Patient denies abd pain and n/v today.  Labs have improved.  Will advance his diet today.  If he tolerates this well, I am hopefully for him to be dc'd home tomorrow. Lipase 55.      Discharge Note:   Mr. Fischer was admitted for treatment of acute on chronic pancreatitis. He has had several bouts of acute pancreatitis in the past. He denies ETOH use. Upon admission, he was held NPO initially and treated with IV hydration and pain control. As his pain improved, his diet was gradually increased. Today, he is pain free, other than very mild "soreness." He is tolerating a bland diet. Labs have shown improvement. Lipase normalized yesterday falling from 371 upon admission to 47. LFTs have been WNL since admission. His BUN rolf to 1.47 on day 2. Fluids were increased, and his creatinine has been WNL for the last 2 days. Labs today show creatinine 1.0, BUN 15, sodium 137, potassium 3.8, WBC 4270 with H&H 12 and 38.4, and plt [id number]. He reports that he is feeling much better today. He states that he is ready for discharge home. His case was discussed with Dr. Ross, and he agreed with patient's discharge home today.     Goals of Care Treatment Preferences:  Code Status: Full Code      Consults:     Cardiac/Vascular  Hypertension    Chronic, controlled. Latest blood pressure and vitals reviewed-     Temp:  [97.7 °F (36.5 °C)-97.8 °F (36.6 °C)]   Pulse:  [60-68]   Resp:  [18-20]   BP: (147-201)/()   SpO2:  [94 %-97 %] .   Home meds for hypertension were reviewed and noted below.   Hypertension Medications      While in the hospital, will manage blood pressure as follows; Continue home antihypertensive regimen    Will utilize p.r.n. blood pressure medication only if patient's blood pressure greater than 180/110 and " he develops symptoms such as worsening chest pain or shortness of breath.    Continue coreg 12.5 mg po bid  Continue losartan 100 mg po daily     1/7/24 /90, HR 62  Continue home dose of losartan and coreg  Discharge home to follow-up with Dr. Ryan this week    Dyslipidemia    01/04/24 continue statin    01/07/24 Continue statin as prior to admission  Discharge home to follow-up with Dr. Ryan this week.    CAD (coronary artery disease)  Patient with known CAD s/p stent placement, which is controlled Will continue ASA, Plavix, and Statin and monitor for S/Sx of angina/ACS. Continue to monitor on telemetry.       Endocrine  Type 2 diabetes mellitus with hyperglycemia, without long-term current use of insulin  01/04/24 POCT glucose achs       01/05/24 stable    01/07/24 Glucose 170  Discharge home with follow-up with Dr. Ryan this week.      Final Active Diagnoses:    Diagnosis Date Noted POA    Hypertension [I10] 06/14/2023 Yes    Dyslipidemia [E78.5] 06/14/2023 Yes    Type 2 diabetes mellitus with hyperglycemia, without long-term current use of insulin [E11.65] 06/14/2023 Yes    CAD (coronary artery disease) [I25.10] 06/27/2021 Yes     Chronic      Problems Resolved During this Admission:    Diagnosis Date Noted Date Resolved POA    PRINCIPAL PROBLEM:  Acute pancreatitis [K85.90] 06/26/2021 01/07/2024 Yes    HAKEEM (acute kidney injury) [N17.9] 06/14/2023 01/07/2024 Yes    Hypomagnesemia [E83.42] 06/26/2021 01/07/2024 Yes       Discharged Condition: stable    Disposition: Home or Self Care    Follow Up:   Follow-up Information       Juan Pablo Ryan MD. Schedule an appointment as soon as possible for a visit in 3 day(s).    Specialty: Family Medicine  Contact information:  [address] [facility]  [facility] [place] [address] [phone]                           Patient Instructions:      Diet diabetic     Notify your health care provider if you experience any of the following:   temperature >100.4     Notify your health care provider if you experience any of the following:  persistent nausea and vomiting or diarrhea     Notify your health care provider if you experience any of the following:  severe uncontrolled pain     Notify your health care provider if you experience any of the following:  difficulty breathing or increased cough     Notify your health care provider if you experience any of the following:  persistent dizziness, light-headedness, or visual disturbances     Activity as tolerated       Significant Diagnostic Studies: Labs: BMP:   Recent Labs   Lab 01/06/24  0553 01/07/24  0550   * 170*    137   K 3.8 3.8    103   CO2 23 22   BUN 20* 15   CREATININE 1.19 1.00   CALCIUM 8.6 8.8   , CMP   Recent Labs   Lab 01/06/24  0553 01/07/24  0550    137   K 3.8 3.8    103   CO2 23 22   * 170*   BUN 20* 15   CREATININE 1.19 1.00   CALCIUM 8.6 8.8   ANIONGAP 15 16   , and CBC   Recent Labs   Lab 01/06/24  0553 01/07/24  0550   WBC 7.22 4.27*   HGB 11.9* 12.0*   HCT 38.3* 38.4*    185     Radiology: CT scan: CT ABDOMEN PELVIS WITH CONTRAST:   Results for orders placed or performed during the hospital encounter of 01/04/24   CT Abdomen Pelvis With IV Contrast NO Oral Contrast    Narrative    EXAMINATION:  CT ABDOMEN PELVIS WITH IV CONTRAST    CLINICAL HISTORY:  Abdominal abscess/infection suspected;Pancreatitis, acute, severe;    COMPARISON:  CT abdomen pelvis December 6, 2023    TECHNIQUE:  Multiple axial tomographic images of the abdomen and pelvis were obtained after administration of 100 cc Isovue 370 intravenous contrast.    FINDINGS:  Mild dependent changes of the lungs noted.    No worrisome focal hepatic abnormality demonstrated on submitted images.  Prior cholecystectomy.  Moderate fat stranding and trace fluid about the pancreas suspicious for pancreatitis.  Correlate with amylase/lipase.  No abnormal pancreatic ductal dilatation.   Spleen grossly unremarkable.    Bilateral adrenal glands grossly unremarkable.  No evidence of hydronephrosis.  Small renal hypodensities noted which are too small to characterize but may reflect cysts.  No specific imaging follow-up recommended for this/these cyst/cysts.  Urinary bladder incompletely distended.  Prostate and seminal vesicles grossly unremarkable.    No convincing evidence of gastrointestinal obstruction or acute appendicitis.  Atherosclerotic calcification demonstrated.  Visualized osseous and surrounding soft tissue structures demonstrate no acute abnormality.  Scattered skeletal degenerative change.      Impression    Moderate fat stranding and trace fluid about the pancreas suspicious for pancreatitis. Correlate with amylase/lipase. No abnormal pancreatic ductal dilatation.  Prior cholecystectomy.  Other/detailed findings as above.    Preliminary report was issued by [facility].    The CT exam was performed using one or more of the following dose    reduction techniques- Automated exposure control, adjustment of the mA    and/or kV according to patient size, and/or use of iterative    reconstructed technique.    Point of Service: [facility]      Electronically signed by: Reinier Baker  Date:    01/04/2024  Time:    07:40       Pending Diagnostic Studies:       None           Medications:  Reconciled Home Medications:      Medication List        CONTINUE taking these medications      aluminum-magnesium hydroxide-simethicone 200-200-20 mg/5 mL Susp  Commonly known as: MAALOX  Take 15 mLs by mouth every 6 (six) hours as needed.     aspirin 81 MG Chew  Take 1 tablet (81 mg total) by mouth once daily.     bisacodyL 5 mg EC tablet  Commonly known as: DULCOLAX  Take 2 tablets (10 mg total) by mouth daily as needed for Constipation.     carvediloL 12.5 MG tablet  Commonly known as: COREG  Take 1 tablet (12.5 mg total) by mouth before breakfast.     furosemide 20 MG  tablet  Commonly known as: LASIX  Take 20 mg by mouth once daily.     isosorbide mononitrate 30 MG 24 hr tablet  Commonly known as: IMDUR  Take 1 tablet (30 mg total) by mouth once daily. Take 1/2 tablet with breakfast     losartan 100 MG tablet  Commonly known as: COZAAR  Take 1 tablet (100 mg total) by mouth once daily.     NITROGLYCERIN SL  Place under the tongue.     simethicone 80 MG chewable tablet  Commonly known as: MYLICON  Take 1 tablet (80 mg total) by mouth every 6 (six) hours as needed for Flatulence.              Indwelling Lines/Drains at time of discharge:   Lines/Drains/Airways       None                   Time spent on the discharge of patient: greater than 30 minutes         Sadia Mcpherson NP  Department of Hospital Medicine  [facility] - Medical Surgical Unit

## 2024-02-16 ENCOUNTER — ANESTHESIA EVENT (OUTPATIENT)
Dept: SURGERY | Facility: HOSPITAL | Age: 54
DRG: 349 | End: 2024-02-16
Payer: MEDICARE

## 2024-02-16 ENCOUNTER — HOSPITAL ENCOUNTER (INPATIENT)
Facility: HOSPITAL | Age: 54
LOS: 2 days | Discharge: HOME | DRG: 349 | End: 2024-02-20
Attending: SURGERY | Admitting: FAMILY MEDICINE
Payer: MEDICARE

## 2024-02-16 ENCOUNTER — HOSPITAL ENCOUNTER (EMERGENCY)
Facility: HOSPITAL | Age: 54
Discharge: ANOTHER HOSPITAL | End: 2024-02-16
Payer: MEDICARE

## 2024-02-16 ENCOUNTER — ANESTHESIA (OUTPATIENT)
Dept: SURGERY | Facility: HOSPITAL | Age: 54
DRG: 349 | End: 2024-02-16
Payer: MEDICARE

## 2024-02-16 VITALS
WEIGHT: 215 LBS | BODY MASS INDEX: 34.55 KG/M2 | TEMPERATURE: 98 F | HEART RATE: 104 BPM | RESPIRATION RATE: 18 BRPM | OXYGEN SATURATION: 98 % | HEIGHT: 66 IN | DIASTOLIC BLOOD PRESSURE: 77 MMHG | SYSTOLIC BLOOD PRESSURE: 115 MMHG

## 2024-02-16 VITALS
DIASTOLIC BLOOD PRESSURE: 122 MMHG | SYSTOLIC BLOOD PRESSURE: 197 MMHG | RESPIRATION RATE: 16 BRPM | OXYGEN SATURATION: 95 % | HEART RATE: 107 BPM

## 2024-02-16 DIAGNOSIS — D72.825 BANDEMIA: ICD-10-CM

## 2024-02-16 DIAGNOSIS — L03.317 CELLULITIS, GLUTEAL, LEFT: ICD-10-CM

## 2024-02-16 DIAGNOSIS — L02.91 ABSCESS: ICD-10-CM

## 2024-02-16 DIAGNOSIS — L02.31 ABSCESS, GLUTEAL, LEFT: Primary | ICD-10-CM

## 2024-02-16 DIAGNOSIS — E87.6 HYPOKALEMIA: ICD-10-CM

## 2024-02-16 DIAGNOSIS — I50.22 CHRONIC SYSTOLIC CONGESTIVE HEART FAILURE: Chronic | ICD-10-CM

## 2024-02-16 DIAGNOSIS — R73.9 HYPERGLYCEMIA: ICD-10-CM

## 2024-02-16 DIAGNOSIS — E83.42 HYPOMAGNESEMIA: ICD-10-CM

## 2024-02-16 DIAGNOSIS — K61.0 ABSCESS, PERIANAL: Primary | ICD-10-CM

## 2024-02-16 DIAGNOSIS — K61.0 PERIANAL ABSCESS: ICD-10-CM

## 2024-02-16 DIAGNOSIS — E11.65 TYPE 2 DIABETES MELLITUS WITH HYPERGLYCEMIA, WITHOUT LONG-TERM CURRENT USE OF INSULIN: ICD-10-CM

## 2024-02-16 PROBLEM — E78.1 HYPERGLYCERIDEMIA: Status: ACTIVE | Noted: 2024-02-16

## 2024-02-16 LAB
ACETONE SERPL QL SCN: NEGATIVE
ALBUMIN SERPL BCP-MCNC: 3 G/DL (ref 3.5–5)
ALBUMIN/GLOB SERPL: 0.6 {RATIO}
ALP SERPL-CCNC: 107 U/L (ref 45–115)
ALT SERPL W P-5'-P-CCNC: 7 U/L (ref 16–61)
ANION GAP SERPL CALCULATED.3IONS-SCNC: 19 MMOL/L (ref 7–16)
AST SERPL W P-5'-P-CCNC: 11 U/L (ref 15–37)
BASOPHILS # BLD AUTO: 0.03 K/UL (ref 0–0.2)
BASOPHILS NFR BLD AUTO: 0.2 % (ref 0–1)
BILIRUB SERPL-MCNC: 1.1 MG/DL (ref ?–1.2)
BUN SERPL-MCNC: 11 MG/DL (ref 7–18)
BUN/CREAT SERPL: 9 (ref 6–20)
CALCIUM SERPL-MCNC: 9.6 MG/DL (ref 8.5–10.1)
CHLORIDE SERPL-SCNC: 90 MMOL/L (ref 98–107)
CO2 SERPL-SCNC: 24 MMOL/L (ref 21–32)
CREAT SERPL-MCNC: 1.26 MG/DL (ref 0.7–1.3)
DIFFERENTIAL METHOD BLD: ABNORMAL
EGFR (NO RACE VARIABLE) (RUSH/TITUS): 68 ML/MIN/1.73M2
EOSINOPHIL # BLD AUTO: 0.04 K/UL (ref 0–0.5)
EOSINOPHIL NFR BLD AUTO: 0.3 % (ref 1–4)
EOSINOPHIL NFR BLD MANUAL: 1 % (ref 1–4)
ERYTHROCYTE [DISTWIDTH] IN BLOOD BY AUTOMATED COUNT: 13.1 % (ref 11.5–14.5)
EST. AVERAGE GLUCOSE BLD GHB EST-MCNC: 260 MG/DL
GLOBULIN SER-MCNC: 4.8 G/DL (ref 2–4)
GLUCOSE SERPL-MCNC: 186 MG/DL (ref 70–105)
GLUCOSE SERPL-MCNC: 252 MG/DL (ref 70–105)
GLUCOSE SERPL-MCNC: 268 MG/DL (ref 70–105)
GLUCOSE SERPL-MCNC: 449 MG/DL (ref 74–106)
HBA1C MFR BLD HPLC: 10.7 % (ref 4.5–6.6)
HCO3 UR-SCNC: 22.7 MMOL/L (ref 24–28)
HCT VFR BLD AUTO: 42.4 % (ref 40–54)
HGB BLD-MCNC: 14.3 G/DL (ref 13.5–18)
LYMPHOCYTES # BLD AUTO: 1.41 K/UL (ref 1–4.8)
LYMPHOCYTES NFR BLD AUTO: 11.4 % (ref 27–41)
LYMPHOCYTES NFR BLD MANUAL: 13 % (ref 27–41)
MAGNESIUM SERPL-MCNC: 1.5 MG/DL (ref 1.7–2.3)
MCH RBC QN AUTO: 32.6 PG (ref 27–31)
MCHC RBC AUTO-ENTMCNC: 33.7 G/DL (ref 32–36)
MCV RBC AUTO: 96.6 FL (ref 80–96)
MONOCYTES # BLD AUTO: 1.27 K/UL (ref 0–0.8)
MONOCYTES NFR BLD AUTO: 10.3 % (ref 2–6)
MONOCYTES NFR BLD MANUAL: 9 % (ref 2–6)
MPC BLD CALC-MCNC: 10.6 FL (ref 9.4–12.4)
NEUTROPHILS # BLD AUTO: 9.64 K/UL (ref 1.8–7.7)
NEUTROPHILS NFR BLD AUTO: 77.8 % (ref 53–65)
NEUTS BAND NFR BLD MANUAL: 9 % (ref 1–5)
NEUTS SEG NFR BLD MANUAL: 68 % (ref 50–62)
NRBC BLD MANUAL-RTO: ABNORMAL %
PCO2 BLDA: 35 MMHG (ref 41–51)
PH SMN: 7.42 [PH] (ref 7.32–7.42)
PLATELET # BLD AUTO: 273 K/UL (ref 150–400)
PLATELET MORPHOLOGY: ABNORMAL
PO2 BLDA: 52 MMHG (ref 25–40)
POC BASE EXCESS: -1.3 MMOL/L (ref -2–3)
POC CO2: 23.8 MMOL/L
POC SATURATED O2: 87 %
POTASSIUM SERPL-SCNC: 3.3 MMOL/L (ref 3.5–5.1)
PROT SERPL-MCNC: 7.8 G/DL (ref 6.4–8.2)
RBC # BLD AUTO: 4.39 M/UL (ref 4.6–6.2)
RBC MORPH BLD: NORMAL
SODIUM SERPL-SCNC: 130 MMOL/L (ref 136–145)
WBC # BLD AUTO: 12.39 K/UL (ref 4.5–11)

## 2024-02-16 PROCEDURE — 27000510 HC BLANKET BAIR HUGGER ANY SIZE: Performed by: NURSE ANESTHETIST, CERTIFIED REGISTERED

## 2024-02-16 PROCEDURE — 99285 EMERGENCY DEPT VISIT HI MDM: CPT | Mod: ,,, | Performed by: FAMILY MEDICINE

## 2024-02-16 PROCEDURE — 80053 COMPREHEN METABOLIC PANEL: CPT | Performed by: NURSE PRACTITIONER

## 2024-02-16 PROCEDURE — 25000242 PHARM REV CODE 250 ALT 637 W/ HCPCS: Performed by: ANESTHESIOLOGY

## 2024-02-16 PROCEDURE — 63600175 PHARM REV CODE 636 W HCPCS: Performed by: SURGERY

## 2024-02-16 PROCEDURE — 27000689 HC BLADE LARYNGOSCOPE ANY SIZE: Performed by: NURSE ANESTHETIST, CERTIFIED REGISTERED

## 2024-02-16 PROCEDURE — 37000008 HC ANESTHESIA 1ST 15 MINUTES: Performed by: SURGERY

## 2024-02-16 PROCEDURE — 99285 EMERGENCY DEPT VISIT HI MDM: CPT | Mod: 25

## 2024-02-16 PROCEDURE — 63600175 PHARM REV CODE 636 W HCPCS: Mod: JZ,JG | Performed by: SURGERY

## 2024-02-16 PROCEDURE — 63600175 PHARM REV CODE 636 W HCPCS: Performed by: NURSE PRACTITIONER

## 2024-02-16 PROCEDURE — 96361 HYDRATE IV INFUSION ADD-ON: CPT

## 2024-02-16 PROCEDURE — 25000003 PHARM REV CODE 250: Performed by: SURGERY

## 2024-02-16 PROCEDURE — 96375 TX/PRO/DX INJ NEW DRUG ADDON: CPT

## 2024-02-16 PROCEDURE — D9220A PRA ANESTHESIA: Mod: CRNA,,, | Performed by: NURSE ANESTHETIST, CERTIFIED REGISTERED

## 2024-02-16 PROCEDURE — 96372 THER/PROPH/DIAG INJ SC/IM: CPT | Performed by: FAMILY MEDICINE

## 2024-02-16 PROCEDURE — 71000033 HC RECOVERY, INTIAL HOUR: Performed by: SURGERY

## 2024-02-16 PROCEDURE — D9220A PRA ANESTHESIA: Mod: ANES,,, | Performed by: ANESTHESIOLOGY

## 2024-02-16 PROCEDURE — 10061 I&D ABSCESS COMP/MULTIPLE: CPT | Mod: ,,, | Performed by: SURGERY

## 2024-02-16 PROCEDURE — 37000009 HC ANESTHESIA EA ADD 15 MINS: Performed by: SURGERY

## 2024-02-16 PROCEDURE — 96365 THER/PROPH/DIAG IV INF INIT: CPT

## 2024-02-16 PROCEDURE — 25000003 PHARM REV CODE 250: Performed by: NURSE PRACTITIONER

## 2024-02-16 PROCEDURE — 82803 BLOOD GASES ANY COMBINATION: CPT

## 2024-02-16 PROCEDURE — 63600175 PHARM REV CODE 636 W HCPCS: Performed by: NURSE ANESTHETIST, CERTIFIED REGISTERED

## 2024-02-16 PROCEDURE — 82009 KETONE BODYS QUAL: CPT | Performed by: NURSE PRACTITIONER

## 2024-02-16 PROCEDURE — 0D9Q0ZZ DRAINAGE OF ANUS, OPEN APPROACH: ICD-10-PCS | Performed by: SURGERY

## 2024-02-16 PROCEDURE — 27000716 HC OXISENSOR PROBE, ANY SIZE: Performed by: NURSE ANESTHETIST, CERTIFIED REGISTERED

## 2024-02-16 PROCEDURE — 27000165 HC TUBE, ETT CUFFED: Performed by: NURSE ANESTHETIST, CERTIFIED REGISTERED

## 2024-02-16 PROCEDURE — 25500020 PHARM REV CODE 255: Performed by: NURSE PRACTITIONER

## 2024-02-16 PROCEDURE — 87070 CULTURE OTHR SPECIMN AEROBIC: CPT | Performed by: SURGERY

## 2024-02-16 PROCEDURE — 85025 COMPLETE CBC W/AUTO DIFF WBC: CPT | Performed by: NURSE PRACTITIONER

## 2024-02-16 PROCEDURE — 83036 HEMOGLOBIN GLYCOSYLATED A1C: CPT | Performed by: SURGERY

## 2024-02-16 PROCEDURE — 36000704 HC OR TIME LEV I 1ST 15 MIN: Performed by: SURGERY

## 2024-02-16 PROCEDURE — 25000003 PHARM REV CODE 250: Performed by: NURSE ANESTHETIST, CERTIFIED REGISTERED

## 2024-02-16 PROCEDURE — 87075 CULTR BACTERIA EXCEPT BLOOD: CPT | Performed by: SURGERY

## 2024-02-16 PROCEDURE — 83735 ASSAY OF MAGNESIUM: CPT | Performed by: NURSE PRACTITIONER

## 2024-02-16 PROCEDURE — 82962 GLUCOSE BLOOD TEST: CPT

## 2024-02-16 PROCEDURE — 99285 EMERGENCY DEPT VISIT HI MDM: CPT | Mod: GF

## 2024-02-16 PROCEDURE — 27000655: Performed by: NURSE ANESTHETIST, CERTIFIED REGISTERED

## 2024-02-16 PROCEDURE — 63600175 PHARM REV CODE 636 W HCPCS: Performed by: FAMILY MEDICINE

## 2024-02-16 PROCEDURE — 36000705 HC OR TIME LEV I EA ADD 15 MIN: Performed by: SURGERY

## 2024-02-16 PROCEDURE — 46050 I&D PERIANAL ABSCESS SUPFC: CPT | Mod: ,,, | Performed by: SURGERY

## 2024-02-16 RX ORDER — POTASSIUM CHLORIDE 20 MEQ/1
40 TABLET, EXTENDED RELEASE ORAL
Status: COMPLETED | OUTPATIENT
Start: 2024-02-16 | End: 2024-02-16

## 2024-02-16 RX ORDER — PROPOFOL 10 MG/ML
VIAL (ML) INTRAVENOUS
Status: DISCONTINUED | OUTPATIENT
Start: 2024-02-16 | End: 2024-02-16

## 2024-02-16 RX ORDER — PHENYLEPHRINE HYDROCHLORIDE 10 MG/ML
INJECTION INTRAVENOUS
Status: DISCONTINUED | OUTPATIENT
Start: 2024-02-16 | End: 2024-02-16

## 2024-02-16 RX ORDER — MORPHINE SULFATE 10 MG/ML
4 INJECTION INTRAMUSCULAR; INTRAVENOUS; SUBCUTANEOUS EVERY 4 HOURS PRN
Status: DISCONTINUED | OUTPATIENT
Start: 2024-02-16 | End: 2024-02-17 | Stop reason: SDUPTHER

## 2024-02-16 RX ORDER — GLUCAGON 1 MG
1 KIT INJECTION
Status: DISCONTINUED | OUTPATIENT
Start: 2024-02-16 | End: 2024-02-20 | Stop reason: HOSPADM

## 2024-02-16 RX ORDER — ONDANSETRON HYDROCHLORIDE 2 MG/ML
4 INJECTION, SOLUTION INTRAVENOUS EVERY 8 HOURS PRN
Status: DISCONTINUED | OUTPATIENT
Start: 2024-02-16 | End: 2024-02-20 | Stop reason: HOSPADM

## 2024-02-16 RX ORDER — ACETAMINOPHEN 10 MG/ML
1000 INJECTION, SOLUTION INTRAVENOUS ONCE
Status: COMPLETED | OUTPATIENT
Start: 2024-02-16 | End: 2024-02-16

## 2024-02-16 RX ORDER — CEFAZOLIN SODIUM 1 G/3ML
INJECTION, POWDER, FOR SOLUTION INTRAMUSCULAR; INTRAVENOUS
Status: DISCONTINUED | OUTPATIENT
Start: 2024-02-16 | End: 2024-02-16

## 2024-02-16 RX ORDER — INSULIN ASPART 100 [IU]/ML
0-5 INJECTION, SOLUTION INTRAVENOUS; SUBCUTANEOUS
Status: DISCONTINUED | OUTPATIENT
Start: 2024-02-16 | End: 2024-02-18

## 2024-02-16 RX ORDER — IPRATROPIUM BROMIDE AND ALBUTEROL SULFATE 2.5; .5 MG/3ML; MG/3ML
3 SOLUTION RESPIRATORY (INHALATION) ONCE AS NEEDED
Status: COMPLETED | OUTPATIENT
Start: 2024-02-16 | End: 2024-02-16

## 2024-02-16 RX ORDER — LOSARTAN POTASSIUM 100 MG/1
100 TABLET ORAL DAILY
Status: DISCONTINUED | OUTPATIENT
Start: 2024-02-17 | End: 2024-02-18

## 2024-02-16 RX ORDER — IBUPROFEN 200 MG
24 TABLET ORAL
Status: DISCONTINUED | OUTPATIENT
Start: 2024-02-16 | End: 2024-02-20 | Stop reason: HOSPADM

## 2024-02-16 RX ORDER — ONDANSETRON HYDROCHLORIDE 2 MG/ML
4 INJECTION, SOLUTION INTRAVENOUS DAILY PRN
Status: DISCONTINUED | OUTPATIENT
Start: 2024-02-16 | End: 2024-02-16 | Stop reason: HOSPADM

## 2024-02-16 RX ORDER — MIDAZOLAM HYDROCHLORIDE 1 MG/ML
INJECTION INTRAMUSCULAR; INTRAVENOUS
Status: DISCONTINUED | OUTPATIENT
Start: 2024-02-16 | End: 2024-02-16

## 2024-02-16 RX ORDER — IBUPROFEN 200 MG
16 TABLET ORAL
Status: DISCONTINUED | OUTPATIENT
Start: 2024-02-16 | End: 2024-02-20 | Stop reason: HOSPADM

## 2024-02-16 RX ORDER — CARVEDILOL 12.5 MG/1
12.5 TABLET ORAL
Status: DISCONTINUED | OUTPATIENT
Start: 2024-02-17 | End: 2024-02-18

## 2024-02-16 RX ORDER — CLONIDINE HYDROCHLORIDE 0.1 MG/1
0.1 TABLET ORAL EVERY 6 HOURS PRN
Status: DISCONTINUED | OUTPATIENT
Start: 2024-02-16 | End: 2024-02-20 | Stop reason: HOSPADM

## 2024-02-16 RX ORDER — FUROSEMIDE 20 MG/1
20 TABLET ORAL DAILY
Status: DISCONTINUED | OUTPATIENT
Start: 2024-02-17 | End: 2024-02-20 | Stop reason: HOSPADM

## 2024-02-16 RX ORDER — ONDANSETRON HYDROCHLORIDE 2 MG/ML
INJECTION, SOLUTION INTRAVENOUS
Status: DISCONTINUED | OUTPATIENT
Start: 2024-02-16 | End: 2024-02-16

## 2024-02-16 RX ORDER — HYDROMORPHONE HYDROCHLORIDE 2 MG/ML
INJECTION, SOLUTION INTRAMUSCULAR; INTRAVENOUS; SUBCUTANEOUS
Status: DISCONTINUED | OUTPATIENT
Start: 2024-02-16 | End: 2024-02-16

## 2024-02-16 RX ORDER — ROCURONIUM BROMIDE 10 MG/ML
INJECTION, SOLUTION INTRAVENOUS
Status: DISCONTINUED | OUTPATIENT
Start: 2024-02-16 | End: 2024-02-16

## 2024-02-16 RX ORDER — MEPERIDINE HYDROCHLORIDE 25 MG/ML
25 INJECTION INTRAMUSCULAR; INTRAVENOUS; SUBCUTANEOUS ONCE AS NEEDED
Status: DISCONTINUED | OUTPATIENT
Start: 2024-02-16 | End: 2024-02-16 | Stop reason: HOSPADM

## 2024-02-16 RX ORDER — SODIUM CHLORIDE 450 MG/100ML
INJECTION, SOLUTION INTRAVENOUS CONTINUOUS
Status: DISCONTINUED | OUTPATIENT
Start: 2024-02-16 | End: 2024-02-20 | Stop reason: HOSPADM

## 2024-02-16 RX ORDER — TALC
6 POWDER (GRAM) TOPICAL NIGHTLY PRN
Status: DISCONTINUED | OUTPATIENT
Start: 2024-02-16 | End: 2024-02-20 | Stop reason: HOSPADM

## 2024-02-16 RX ORDER — HYDROCODONE BITARTRATE AND ACETAMINOPHEN 7.5; 325 MG/1; MG/1
1 TABLET ORAL EVERY 4 HOURS PRN
Status: DISCONTINUED | OUTPATIENT
Start: 2024-02-16 | End: 2024-02-20 | Stop reason: HOSPADM

## 2024-02-16 RX ORDER — LIDOCAINE HYDROCHLORIDE 20 MG/ML
INJECTION, SOLUTION EPIDURAL; INFILTRATION; INTRACAUDAL; PERINEURAL
Status: DISCONTINUED | OUTPATIENT
Start: 2024-02-16 | End: 2024-02-16

## 2024-02-16 RX ORDER — ISOSORBIDE MONONITRATE 30 MG/1
30 TABLET, EXTENDED RELEASE ORAL DAILY
Status: DISCONTINUED | OUTPATIENT
Start: 2024-02-17 | End: 2024-02-20 | Stop reason: HOSPADM

## 2024-02-16 RX ORDER — ACETAMINOPHEN 325 MG/1
650 TABLET ORAL EVERY 6 HOURS PRN
Status: DISCONTINUED | OUTPATIENT
Start: 2024-02-16 | End: 2024-02-20 | Stop reason: HOSPADM

## 2024-02-16 RX ORDER — FENTANYL CITRATE 50 UG/ML
INJECTION, SOLUTION INTRAMUSCULAR; INTRAVENOUS
Status: DISCONTINUED | OUTPATIENT
Start: 2024-02-16 | End: 2024-02-16

## 2024-02-16 RX ORDER — MORPHINE SULFATE 10 MG/ML
4 INJECTION INTRAMUSCULAR; INTRAVENOUS; SUBCUTANEOUS EVERY 5 MIN PRN
Status: DISCONTINUED | OUTPATIENT
Start: 2024-02-16 | End: 2024-02-16 | Stop reason: HOSPADM

## 2024-02-16 RX ORDER — HYDROMORPHONE HYDROCHLORIDE 2 MG/ML
0.5 INJECTION, SOLUTION INTRAMUSCULAR; INTRAVENOUS; SUBCUTANEOUS EVERY 5 MIN PRN
Status: DISCONTINUED | OUTPATIENT
Start: 2024-02-16 | End: 2024-02-16 | Stop reason: HOSPADM

## 2024-02-16 RX ORDER — BUPIVACAINE HYDROCHLORIDE 2.5 MG/ML
INJECTION, SOLUTION EPIDURAL; INFILTRATION; INTRACAUDAL
Status: DISCONTINUED | OUTPATIENT
Start: 2024-02-16 | End: 2024-02-16 | Stop reason: HOSPADM

## 2024-02-16 RX ORDER — MAGNESIUM SULFATE HEPTAHYDRATE 40 MG/ML
2 INJECTION, SOLUTION INTRAVENOUS
Status: DISCONTINUED | OUTPATIENT
Start: 2024-02-16 | End: 2024-02-16 | Stop reason: HOSPADM

## 2024-02-16 RX ORDER — DIPHENHYDRAMINE HYDROCHLORIDE 50 MG/ML
25 INJECTION INTRAMUSCULAR; INTRAVENOUS EVERY 6 HOURS PRN
Status: DISCONTINUED | OUTPATIENT
Start: 2024-02-16 | End: 2024-02-16 | Stop reason: HOSPADM

## 2024-02-16 RX ADMIN — MIDAZOLAM 2 MG: 1 INJECTION INTRAMUSCULAR; INTRAVENOUS at 05:02

## 2024-02-16 RX ADMIN — IPRATROPIUM BROMIDE AND ALBUTEROL SULFATE 3 ML: .5; 3 SOLUTION RESPIRATORY (INHALATION) at 06:02

## 2024-02-16 RX ADMIN — FENTANYL CITRATE 50 MCG: 50 INJECTION INTRAMUSCULAR; INTRAVENOUS at 05:02

## 2024-02-16 RX ADMIN — CEFAZOLIN 2 G: 1 INJECTION, POWDER, FOR SOLUTION INTRAMUSCULAR; INTRAVENOUS; PARENTERAL at 05:02

## 2024-02-16 RX ADMIN — POTASSIUM CHLORIDE 40 MEQ: 1500 TABLET, EXTENDED RELEASE ORAL at 01:02

## 2024-02-16 RX ADMIN — ROCURONIUM BROMIDE 50 MG: 10 INJECTION, SOLUTION INTRAVENOUS at 05:02

## 2024-02-16 RX ADMIN — ACETAMINOPHEN 1000 MG: 10 INJECTION, SOLUTION INTRAVENOUS at 07:02

## 2024-02-16 RX ADMIN — ONDANSETRON 4 MG: 2 INJECTION INTRAMUSCULAR; INTRAVENOUS at 05:02

## 2024-02-16 RX ADMIN — HUMAN INSULIN 10 UNITS: 100 INJECTION, SOLUTION SUBCUTANEOUS at 01:02

## 2024-02-16 RX ADMIN — IOPAMIDOL 100 ML: 755 INJECTION, SOLUTION INTRAVENOUS at 01:02

## 2024-02-16 RX ADMIN — SODIUM CHLORIDE: 4.5 INJECTION, SOLUTION INTRAVENOUS at 08:02

## 2024-02-16 RX ADMIN — PHENYLEPHRINE HYDROCHLORIDE 100 MCG: 10 INJECTION INTRAVENOUS at 05:02

## 2024-02-16 RX ADMIN — HYDROMORPHONE HYDROCHLORIDE 1 MG: 2 INJECTION, SOLUTION INTRAMUSCULAR; INTRAVENOUS; SUBCUTANEOUS at 05:02

## 2024-02-16 RX ADMIN — SUGAMMADEX 100 MG: 100 INJECTION, SOLUTION INTRAVENOUS at 06:02

## 2024-02-16 RX ADMIN — PROPOFOL 50 MG: 10 INJECTION, EMULSION INTRAVENOUS at 05:02

## 2024-02-16 RX ADMIN — SODIUM CHLORIDE: 9 INJECTION, SOLUTION INTRAVENOUS at 05:02

## 2024-02-16 RX ADMIN — MAGNESIUM SULFATE 2 G: 2 INJECTION INTRAVENOUS at 02:02

## 2024-02-16 RX ADMIN — HYDROCODONE BITARTRATE AND ACETAMINOPHEN 1 TABLET: 7.5; 325 TABLET ORAL at 08:02

## 2024-02-16 RX ADMIN — Medication 6 MG: at 08:02

## 2024-02-16 RX ADMIN — SUGAMMADEX 200 MG: 100 INJECTION, SOLUTION INTRAVENOUS at 06:02

## 2024-02-16 RX ADMIN — PIPERACILLIN AND TAZOBACTAM 4.5 G: 4; .5 INJECTION, POWDER, LYOPHILIZED, FOR SOLUTION INTRAVENOUS; PARENTERAL at 01:02

## 2024-02-16 RX ADMIN — SODIUM CHLORIDE 1000 ML: 9 INJECTION, SOLUTION INTRAVENOUS at 12:02

## 2024-02-16 RX ADMIN — PROPOFOL 150 MG: 10 INJECTION, EMULSION INTRAVENOUS at 05:02

## 2024-02-16 RX ADMIN — LIDOCAINE HYDROCHLORIDE 70 MG: 20 INJECTION, SOLUTION INTRAVENOUS at 05:02

## 2024-02-16 RX ADMIN — INSULIN HUMAN 6 UNITS: 100 INJECTION, SOLUTION PARENTERAL at 05:02

## 2024-02-16 NOTE — H&P
[facility] - Emergency Department  General Surgery  History & Physical    Patient Name: Faustino Fischer  MRN: [id number]  Admission Date: 2/16/2024  Attending Physician: Fidel Park MD  Primary Care Provider: Juan Pablo Ryan MD    Patient information was obtained from patient and ER records.     Subjective:     Chief Complaint/Reason for Admission: medial buttocks, perianal abscess    History of Present Illness: 53-year-old diabetic male patient has a history of previous abscesses in various anatomical regions, presents with hyperglycemia and pain and tenderness of the bilateral medial buttocks.  The patient states he has had spontaneous drainage of the right side, but continues to experience discomfort.  The left side is severely tender and has not drained as of yet.  The patient presented initially to outside hospital, and was transferred here for surgical management.  A CT scan was performed revealing fluid collection of left perianal soft tissue with surrounding inflammatory changes.     Current Facility-Administered Medications on File Prior to Encounter   Medication    [COMPLETED] insulin regular injection 10 Units 0.1 mL    [COMPLETED] iopamidoL (ISOVUE-370) injection 100 mL    [COMPLETED] piperacillin-tazobactam (ZOSYN) 4.5 g in sodium chloride 0.9 % 100 mL IVPB (MB+)    [COMPLETED] potassium chloride SA CR tablet 40 mEq    [COMPLETED] sodium chloride 0.9% bolus 1,000 mL 1,000 mL    [COMPLETED] magnesium sulfate 2g in water 50mL IVPB (premix)     Current Outpatient Medications on File Prior to Encounter   Medication Sig    aluminum-magnesium hydroxide-simethicone (MAALOX) 200-200-20 mg/5 mL Susp Take 15 mLs by mouth every 6 (six) hours as needed.    aspirin 81 MG Chew Take 1 tablet (81 mg total) by mouth once daily.    bisacodyL (DULCOLAX) 5 mg EC tablet Take 2 tablets (10 mg total) by mouth daily as needed for Constipation.    carvediloL (COREG) 12.5 MG tablet Take 1 tablet (12.5 mg total) by  mouth before breakfast.    furosemide (LASIX) 20 MG tablet Take 20 mg by mouth once daily.    isosorbide mononitrate (IMDUR) 30 MG 24 hr tablet Take 1 tablet (30 mg total) by mouth once daily. Take 1/2 tablet with breakfast    losartan (COZAAR) 100 MG tablet Take 1 tablet (100 mg total) by mouth once daily.    NITROGLYCERIN SL Place under the tongue.    simethicone (MYLICON) 80 MG chewable tablet Take 1 tablet (80 mg total) by mouth every 6 (six) hours as needed for Flatulence.       Review of patient's allergies indicates:   Allergen Reactions    Ticagrelor Shortness Of Breath    Vancomycin analogues Other (See Comments)     Patient experienced adverse effect, and had some renal insufficiency after receiving Vancomycin.       Past Medical History:   Diagnosis Date    Chronic pancreatitis, unspecified pancreatitis type     Coronary artery disease     Diabetes mellitus     Dyslipidemia     Hidradenitis     Hypertension      Past Surgical History:   Procedure Laterality Date    CARDIAC SURGERY      Stents x6    CHOLECYSTECTOMY      UNDESCENDED TESTICLE EXPLORATION N/A      Family History       Problem Relation (Age of Onset)    Heart disease Father, Brother, Maternal Grandmother    Hypertension Father          Tobacco Use    Smoking status: Former     Current packs/day: 0.25     Average packs/day: 0.3 packs/day for 20.1 years (5.0 ttl pk-yrs)     Types: Cigarettes, Cigars     Start date: 2004    Smokeless tobacco: Never   Substance and Sexual Activity    Alcohol use: Not Currently     Comment: occasional drink previous heavy drinker quit heavily in 2001    Drug use: Never    Sexual activity: Not Currently     Review of Systems   All other systems reviewed and are negative.    Objective:     Vital Signs (Most Recent):  Temp: 98.5 °F (36.9 °C) (02/16/24 1638)  Pulse: 99 (02/16/24 1638)  Resp: 17 (02/16/24 1638)  BP: 123/82 (02/16/24 1638)  SpO2: 99 % (02/16/24 1638) Vital Signs (24h Range):  Temp:  [98 °F (36.7 °C)-98.5  °F (36.9 °C)] 98.5 °F (36.9 °C)  Pulse:  [] 99  Resp:  [17-20] 17  SpO2:  [98 %-99 %] 99 %  BP: (115-140)/(77-93) 123/82     Weight: 95.3 kg (210 lb)  Body mass index is 33.89 kg/m².     Physical Exam  Constitutional:       General: He is not in acute distress.     Appearance: He is normal weight.   HENT:      Nose: Nose normal.   Eyes:      General: No scleral icterus.  Cardiovascular:      Rate and Rhythm: Normal rate and regular rhythm.      Pulses: Normal pulses.   Pulmonary:      Breath sounds: Normal breath sounds.   Abdominal:      General: There is no distension.      Palpations: Abdomen is soft. There is no mass.      Hernia: No hernia is present.   Genitourinary:     Comments: Fluctuance and severe tenderness of the medial left buttocks erythema  Fluctuance and moderate tenderness of the medial aspect of the right buttocks.  Musculoskeletal:         General: No swelling or tenderness.   Lymphadenopathy:      Cervical: No cervical adenopathy.   Neurological:      General: No focal deficit present.      Mental Status: He is alert.      Motor: No weakness.   Psychiatric:         Mood and Affect: Mood normal.            I have reviewed all pertinent lab results within the past 24 hours.  CBC:   Recent Labs   Lab 02/16/24  1217   WBC 12.39*   RBC 4.39*   HGB 14.3   HCT 42.4      MCV 96.6*   MCH 32.6*   MCHC 33.7     BMP:   Recent Labs   Lab 02/16/24  1217   *   *   K 3.3*   CL 90*   CO2 24   BUN 11   CREATININE 1.26   CALCIUM 9.6   MG 1.5*       Significant Diagnostics:  I have reviewed all pertinent imaging results/findings within the past 24 hours.  CT: I have reviewed all pertinent results/findings within the past 24 hours and my personal findings are:  per HPI    Assessment/Plan:     Hyperglyceridemia  Sliding scale insulin  Possible medicine consult    Perianal abscess  Medial buttocks/perianal; bilaterally present with spontaneous drainage of the right side    Plan incision and  drainage in OR today.  R/b include bleeding, recurrence, he expresses understanding and wished to proceed.  open packing with possible delayed healing, anesthesia complication and unforeseen.        VTE Risk Mitigation (From admission, onward)      None            Fidel Park MD  General Surgery  [facility] - Emergency Department

## 2024-02-16 NOTE — ASSESSMENT & PLAN NOTE
Plan incision and drainage in OR today.  R/b include bleeding, recurrence, he expresses understanding and wished to proceed.  open packing with possible delayed healing, anesthesia complication and unforeseen.

## 2024-02-16 NOTE — ED PROVIDER NOTES
Encounter Date: 2/16/2024       History     Chief Complaint   Patient presents with    Abscess     Pt reports perineal abscess to left buttocks for a few days.  He reports he had them bilaterally, but the right side had already ruptured. Pt reports the infection is causing the pain to shoot down allison legs. Pt reports that he has hx of these and he is followed by Dr Boyer with derm.     The history is provided by the patient.   Abscess   This is a new problem. The current episode started several days ago. The problem occurs continuously. The problem has been gradually worsening. The abscess is present on the left buttock. The abscess is characterized by redness, painfulness and swelling. Pertinent negatives include no anorexia, no decrease in physical activity, not sleeping less, not drinking less, no fever, no diarrhea, no vomiting, no congestion, no rhinorrhea, no sore throat, no decreased responsiveness and no cough. There were no sick contacts. He has received no recent medical care.     Review of patient's allergies indicates:   Allergen Reactions    Ticagrelor Shortness Of Breath    Vancomycin analogues Other (See Comments)     Patient experienced adverse effect, and had some renal insufficiency after receiving Vancomycin.     Past Medical History:   Diagnosis Date    Chronic pancreatitis, unspecified pancreatitis type     Coronary artery disease     Diabetes mellitus     Dyslipidemia     Hidradenitis     Hypertension      Past Surgical History:   Procedure Laterality Date    CARDIAC SURGERY      Stents x6    CHOLECYSTECTOMY      UNDESCENDED TESTICLE EXPLORATION N/A      Family History   Problem Relation Age of Onset    Heart disease Father     Hypertension Father     Heart disease Brother     Heart disease Maternal Grandmother      Social History     Tobacco Use    Smoking status: Former     Current packs/day: 0.25     Average packs/day: 0.3 packs/day for 20.1 years (5.0 ttl pk-yrs)     Types: Cigarettes, Cigars      Start date: 2004    Smokeless tobacco: Never   Substance Use Topics    Alcohol use: Not Currently     Comment: occasional drink previous heavy drinker quit heavily in 2001    Drug use: Never     Review of Systems   Constitutional:  Negative for decreased responsiveness and fever.   HENT:  Negative for congestion, rhinorrhea and sore throat.    Respiratory:  Negative for cough and shortness of breath.    Cardiovascular:  Negative for chest pain.   Gastrointestinal:  Negative for anorexia, diarrhea, nausea and vomiting.   Genitourinary:  Negative for dysuria.   Musculoskeletal:  Negative for back pain.   Skin:  Positive for wound. Negative for rash.   Neurological:  Negative for weakness.   Hematological:  Does not bruise/bleed easily.       Physical Exam     Initial Vitals [02/16/24 1200]   BP Pulse Resp Temp SpO2   (!) 140/93 (!) 118 20 98 °F (36.7 °C) 98 %      MAP       --         Physical Exam    Nursing note and vitals reviewed.  Constitutional: He appears well-developed and well-nourished. He is not diaphoretic. He is Obese . No distress.   HENT:   Head: Normocephalic and atraumatic.   Eyes: Pupils are equal, round, and reactive to light.   Neck: Neck supple.   Cardiovascular:  Regular rhythm, normal heart sounds and intact distal pulses.   Tachycardia present.   Exam reveals no gallop and no friction rub.       No murmur heard.  Pulmonary/Chest: Breath sounds normal. No respiratory distress. He has no wheezes. He has no rhonchi. He has no rales. He exhibits no tenderness.   Genitourinary: Right testis shows no swelling and no tenderness. Right testis is descended. Left testis shows no swelling and no tenderness. Left testis is descended.    Genitourinary Comments: I do not note any involvement with scrotum.  It seems to be localized to inferior aspect of left buttocks.  Area is approximately 6cm in diameter and raised about 4cm in height.     Venus Harry at bedside for exam.      Musculoskeletal:      Cervical  back: Neck supple.     Neurological: He is alert and oriented to person, place, and time. GCS score is 15. GCS eye subscore is 4. GCS verbal subscore is 5. GCS motor subscore is 6.   Skin: Skin is warm and dry. Capillary refill takes less than 2 seconds.   Psychiatric: He has a normal mood and affect.         Medical Screening Exam   See Full Note    ED Course   Procedures  Labs Reviewed   COMPREHENSIVE METABOLIC PANEL - Abnormal; Notable for the following components:       Result Value    Sodium 130 (*)     Potassium 3.3 (*)     Chloride 90 (*)     Anion Gap 19 (*)     Glucose 449 (*)     Albumin 3.0 (*)     Globulin 4.8 (*)     ALT 7 (*)     AST 11 (*)     All other components within normal limits   CBC WITH DIFFERENTIAL - Abnormal; Notable for the following components:    WBC 12.39 (*)     RBC 4.39 (*)     MCV 96.6 (*)     MCH 32.6 (*)     Neutrophils % 77.8 (*)     Lymphocytes % 11.4 (*)     Neutrophils, Abs 9.64 (*)     Monocytes % 10.3 (*)     Eosinophils % 0.3 (*)     Monocytes, Absolute 1.27 (*)     All other components within normal limits   MANUAL DIFFERENTIAL - Abnormal; Notable for the following components:    Segmented Neutrophils, Man % 68 (*)     Bands, Man % 9 (*)     Lymphocytes, Man % 13 (*)     Monocytes, Man % 9 (*)     Platelet Morphology Large & Giant Platelets (*)     All other components within normal limits   MAGNESIUM - Abnormal; Notable for the following components:    Magnesium 1.5 (*)     All other components within normal limits   CBC W/ AUTO DIFFERENTIAL    Narrative:     The following orders were created for panel order CBC auto differential.  Procedure                               Abnormality         Status                     ---------                               -----------         ------                     CBC with Differential[[id number]]       Abnormal            Final result               Manual Differential[[id number]]         Abnormal            Final result                  Please view results for these tests on the individual orders.   ACETONE          Imaging Results              CT Pelvis With IV Contrast NO Oral Contrast (Final result)  Result time 02/16/24 13:23:07      Final result by Yosef Downey DO (02/16/24 13:23:07)                   Impression:      Fluid collection located within the subcutaneous soft tissues of the left-sided gluteal region measures 8.4 x 2.3 cm, series 2, image 59.  Fat stranding surrounding the fluid collection.  Findings in keeping with provided clinical history.      Electronically signed by: Yosef Downey  Date:    02/16/2024  Time:    13:23               Narrative:    EXAMINATION:  CT PELVIS WITH IV CONTRAST    CLINICAL HISTORY:  perineal abscess    TECHNIQUE:  CT PELVIS WITH IV CONTRAST, 100 cc of Isovue 370.    COMPARISON:  1/4/24    FINDINGS:  Fluid collection located within the subcutaneous soft tissues of the left-sided gluteal region measures 8.4 x 2.3 cm, series 2, image 59.  Fat stranding surrounding the fluid collection.  Findings in keeping with provided clinical history.    The visualized small bowel and colon are normal.  The prostate gland and urinary bladder are normal.  No lymphadenopathy or solid mass.  Calcified vascular disease.  No bony destructive lesion.                                       Medications   magnesium sulfate 2g in water 50mL IVPB (premix) (2 g Intravenous New Bag 2/16/24 1417)   sodium chloride 0.9% bolus 1,000 mL 1,000 mL (0 mLs Intravenous Stopped 2/16/24 1358)   insulin regular injection 10 Units 0.1 mL (10 Units Intravenous Given 2/16/24 1305)   potassium chloride SA CR tablet 40 mEq (40 mEq Oral Given 2/16/24 1323)   piperacillin-tazobactam (ZOSYN) 4.5 g in sodium chloride 0.9 % 100 mL IVPB (MB+) (0 g Intravenous Stopped 2/16/24 1411)   iopamidoL (ISOVUE-370) injection 100 mL (100 mLs Intravenous Given 2/16/24 1319)     Medical Decision Making  With the size of the abscess and location, I feel  surgical consult for debridement would be beneficial.  Patient has hx of recurrent abscesses/cyst.  He also had renal damage from previous treatment with Vancomycin.  He also had hx of DM (currently diet controlled).  However, with the current infection his glucose is elevated with associated abnormal electrolytes.      Problems Addressed:  Abscess, gluteal, left: acute illness or injury     Details: Surgical consult, zosyn  Hyperglycemia: acute illness or injury     Details: IVFs and insulin  Hypokalemia: acute illness or injury     Details: PO KCL  Hypomagnesemia: acute illness or injury     Details: IV mag    Amount and/or Complexity of Data Reviewed  Labs: ordered. Decision-making details documented in ED Course.  Radiology: ordered. Decision-making details documented in ED Course.    Risk  OTC drugs.  Prescription drug management.               ED Course as of 02/16/24 1419   Fri Feb 16, 2024   1222 Pulse(!): 118 [AG]   1249 Sodium(!): 130 [AG]   1250 Potassium(!): 3.3 [AG]   1250 Chloride(!): 90 [AG]   1250 Anion Gap(!): 19 [AG]   1250 Glucose(!): 449 [AG]   1250 WBC(!): 12.39 [AG]   1250 Bands(!): 9 [AG]   1341 Magnesium (!): 1.5 [AG]   1347 PFC ordered placed and waiting to talk to hospitalist and/or general surgery [AG]   1416 Spoke with Dr Gary who has accepted the patient into the ER. Dr Garza is working the ER and made aware of the transfer by Dr gary [AG]      ED Course User Index  [AG] Holly Moses FNP                           Clinical Impression:   Final diagnoses:  [L02.31] Abscess, gluteal, left (Primary)  [L03.317] Cellulitis, gluteal, left  [D72.825] Bandemia  [R73.9] Hyperglycemia  [E87.6] Hypokalemia  [E83.42] Hypomagnesemia        ED Disposition Condition    Transfer to Another Facility Stable                Holly Moses FNP  02/16/24 1414

## 2024-02-16 NOTE — ED PROVIDER NOTES
Encounter Date: 2/16/2024       History     Chief Complaint   Patient presents with    Abscess     Transfer from [facility] ER to Dr Park      Patient in with abscess to the left buttocks.  And peritoneal abscess.  For 2 days        Review of patient's allergies indicates:   Allergen Reactions    Ticagrelor Shortness Of Breath    Vancomycin analogues Other (See Comments)     Patient experienced adverse effect, and had some renal insufficiency after receiving Vancomycin.     Past Medical History:   Diagnosis Date    Chronic pancreatitis, unspecified pancreatitis type     Coronary artery disease     Diabetes mellitus     Dyslipidemia     Hidradenitis     Hypertension      Past Surgical History:   Procedure Laterality Date    CARDIAC SURGERY      Stents x6    CHOLECYSTECTOMY      INCISION OF PERIANAL ABSCESS Bilateral 2/16/2024    Procedure: INCISION, ABSCESS, PERIANAL;  Surgeon: Fidel Park MD;  Location: [facility];  Service: General;  Laterality: Bilateral;    UNDESCENDED TESTICLE EXPLORATION N/A      Family History   Problem Relation Age of Onset    Heart disease Father     Hypertension Father     Heart disease Brother     Heart disease Maternal Grandmother      Social History     Tobacco Use    Smoking status: Former     Current packs/day: 0.25     Average packs/day: 0.3 packs/day for 20.1 years (5.0 ttl pk-yrs)     Types: Cigarettes, Cigars     Start date: 2004    Smokeless tobacco: Never   Substance Use Topics    Alcohol use: Not Currently     Comment: occasional drink previous heavy drinker quit heavily in 2001    Drug use: Never     Review of Systems   Constitutional:  Positive for fatigue. Negative for fever.   HENT: Negative.  Negative for sore throat.    Eyes: Negative.    Respiratory: Negative.  Negative for shortness of breath.    Cardiovascular: Negative.  Negative for chest pain.   Gastrointestinal: Negative.  Negative for nausea.   Endocrine: Negative.    Genitourinary: Negative.  Negative for  dysuria.   Musculoskeletal: Negative.  Negative for back pain.        Peritoneal abscess and buttocks for 2 days on the left   Skin: Negative.  Negative for rash.   Allergic/Immunologic: Negative.    Neurological: Negative.  Negative for weakness.   Hematological: Negative.  Does not bruise/bleed easily.   Psychiatric/Behavioral: Negative.         Physical Exam     Initial Vitals [02/16/24 1638]   BP Pulse Resp Temp SpO2   123/82 99 17 98.5 °F (36.9 °C) 99 %      MAP       --         Physical Exam    Constitutional: He appears well-developed and well-nourished.   HENT:   Head: Normocephalic and atraumatic.   Right Ear: External ear normal.   Left Ear: External ear normal.   Nose: Nose normal.   Mouth/Throat: Oropharynx is clear and moist.   Eyes: Conjunctivae and EOM are normal. Pupils are equal, round, and reactive to light.   Neck: Neck supple.   Normal range of motion.  Cardiovascular:  Normal rate, regular rhythm, normal heart sounds and intact distal pulses.           Pulmonary/Chest: Breath sounds normal.   Abdominal: Abdomen is soft. Bowel sounds are normal.   Perineal and buttocks abscess for 2 days   Genitourinary:    Prostate and penis normal.     Musculoskeletal:         General: Normal range of motion.      Cervical back: Normal range of motion and neck supple.     Neurological: He is alert and oriented to person, place, and time. He has normal strength and normal reflexes.   Skin: Skin is warm and dry.   Psychiatric: He has a normal mood and affect. His behavior is normal. Judgment and thought content normal.         Medical Screening Exam   See Full Note    ED Course   Procedures  Labs Reviewed   POCT GLUCOSE MONITORING CONTINUOUS - Abnormal; Notable for the following components:       Result Value    POC Glucose 268 (*)     All other components within normal limits          Imaging Results    None          Medications   furosemide tablet 20 mg (20 mg Oral Given 2/20/24 8576)   isosorbide mononitrate  24 hr tablet 30 mg (30 mg Oral Given 2/20/24 0859)   ondansetron injection 4 mg (has no administration in time range)   melatonin tablet 6 mg (6 mg Oral Given 2/17/24 2044)   acetaminophen tablet 650 mg (has no administration in time range)   cloNIDine tablet 0.1 mg (has no administration in time range)   0.45% NaCl infusion ( Intravenous New Bag 2/20/24 0519)   glucose chewable tablet 16 g (has no administration in time range)   glucose chewable tablet 24 g (has no administration in time range)   glucagon (human recombinant) injection 1 mg (has no administration in time range)   dextrose 10% bolus 125 mL 125 mL (has no administration in time range)   dextrose 10% bolus 250 mL 250 mL (has no administration in time range)   HYDROcodone-acetaminophen 7.5-325 mg per tablet 1 tablet (1 tablet Oral Given 2/18/24 1150)   insulin aspart U-100 injection 0-10 Units (5 Units Subcutaneous Given 2/19/24 1705)   morphine injection 6 mg (6 mg Intravenous Given 2/19/24 2232)   carvediloL tablet 6.25 mg (6.25 mg Oral Given 2/20/24 0525)   losartan tablet 50 mg (50 mg Oral Given 2/20/24 0859)   potassium chloride SA CR tablet 20 mEq (20 mEq Oral Given 2/20/24 0859)   insulin NPH-insulin regular (70/30) injection 14 Units (14 Units Subcutaneous Given 2/20/24 0612)   insulin regular injection 6 Units 0.06 mL (6 Units Subcutaneous Given 2/16/24 1700)   albuterol-ipratropium 2.5 mg-0.5 mg/3 mL nebulizer solution 3 mL (3 mLs Nebulization Given 2/16/24 1835)   acetaminophen 1,000 mg/100 mL (10 mg/mL) injection 1,000 mg (0 mg Intravenous Stopped 2/16/24 2005)   magnesium sulfate 2g in water 50mL IVPB (premix) (0 g Intravenous Stopped 2/17/24 1720)   potassium chloride SA CR tablet 20 mEq (20 mEq Oral Given 2/18/24 0837)   magnesium sulfate 2g in water 50mL IVPB (premix) (0 g Intravenous Stopped 2/19/24 1000)     Medical Decision Making  Risk  OTC drugs.  Decision regarding hospitalization.                          Medical Decision Making:    Initial Assessment:   Patient comes in with left perineal and buttocks abscess for 2 days;;;  Differential Diagnosis:   Perineal and buttocks abscess on the left  ED Management:  Dr. Park admitting the patient to go to surg             Clinical Impression:   Final diagnoses:  [L02.91] Abscess  [K61.0] Perianal abscess        ED Disposition Condition    Admit Stable                Jean Garza,   02/20/24 0923

## 2024-02-16 NOTE — HPI
53-year-old diabetic male patient has a history of previous abscesses in various anatomical regions, presents with hyperglycemia and pain and tenderness of the bilateral medial buttocks.  The patient states he has had spontaneous drainage of the right side, but continues to experience discomfort.  The left side is severely tender and has not drained as of yet.  The patient presented initially to outside hospital, and was transferred here for surgical management.  A CT scan was performed revealing fluid collection of left perianal soft tissue with surrounding inflammatory changes.

## 2024-02-16 NOTE — ED TRIAGE NOTES
Reports 2 large abscesses to his groin for the past week, one has ruptured while the other has not

## 2024-02-16 NOTE — ANESTHESIA PROCEDURE NOTES
Intubation    Date/Time: 2/16/2024 5:34 PM    Performed by: Jean Vasquez CRNA  Authorized by: Ever Benitez DO    Intubation:     Induction:  Intravenous    Intubated:  Postinduction    Mask Ventilation:  Easy mask    Attempts:  1    Attempted By:  CRNA    Method of Intubation:  Direct    Blade:  Kaykay 3    Laryngeal View Grade: Grade I - full view of cords      Difficult Airway Encountered?: No      Complications:  None    Airway Device:  Oral endotracheal tube    Airway Device Size:  7.5    Style/Cuff Inflation:  Cuffed (inflated to minimal occlusive pressure)    Inflation Amount (mL):  7    Tube secured:  23    Secured at:  The teeth    Placement Verified By:  Capnometry    Complicating Factors:  Obesity    Findings Post-Intubation:  BS equal bilateral and atraumatic/condition of teeth unchanged  Notes:      Smooth, tolerated well

## 2024-02-16 NOTE — ANESTHESIA PREPROCEDURE EVALUATION
02/16/2024  Faustino Fischer is a 53 y.o., male.      Pre-op Assessment    I have reviewed the Patient Summary Reports.     I have reviewed the Nursing Notes. I have reviewed the NPO Status.   I have reviewed the Medications.     Review of Systems  Anesthesia Hx:  No problems with previous Anesthesia             Denies Family Hx of Anesthesia complications.    Denies Personal Hx of Anesthesia complications.                    Social:  Smoker, No Alcohol Use       Cardiovascular:     Hypertension   CAD   CABG/stent        hyperlipidemia   ECG has been reviewed.                          Endocrine:  Diabetes, type 2         Obesity / BMI > 30  Dermatological:  Left gluteal abscess, patient transferred from [facility] after acceptance by on call surgeon for left gluteal abscess in need of urgent I/D       Physical Exam  General: Well nourished, Cooperative and Alert    Airway:  Mallampati: II   Mouth Opening: Normal  TM Distance: Normal  Tongue: Normal  Neck ROM: Normal ROM    Chest/Lungs:  Clear to auscultation, Normal Respiratory Rate    Heart:  Rate: Normal  Rhythm: Regular Rhythm        Chemistry        Component Value Date/Time     (L) 02/16/2024 1217    K 3.3 (L) 02/16/2024 1217    CL 90 (L) 02/16/2024 1217    CO2 24 02/16/2024 1217    BUN 11 02/16/2024 1217    CREATININE 1.26 02/16/2024 1217     (H) 02/16/2024 1217        Component Value Date/Time    CALCIUM 9.6 02/16/2024 1217    ALKPHOS 107 02/16/2024 1217    AST 11 (L) 02/16/2024 1217    ALT 7 (L) 02/16/2024 1217    BILITOT 1.1 02/16/2024 1217    ESTGFRAFRICA 108 06/28/2021 0352    EGFRNONAA 98 05/02/2022 0943        Lab Results   Component Value Date    WBC 12.39 (H) 02/16/2024    HGB 14.3 02/16/2024    HCT 42.4 02/16/2024     02/16/2024     Results for orders placed or performed during the hospital encounter of 01/04/24   EKG 12-lead     Collection Time: 01/04/24  3:47 AM    Narrative    Test Reason : R10.13,    Vent. Rate : 101 BPM     Atrial Rate : 101 BPM     P-R Int : 172 ms          QRS Dur : 088 ms      QT Int : 374 ms       P-R-T Axes : 055 -84 062 degrees     QTc Int : 484 ms    Sinus tachycardia  Incomplete right bundle branch block  Possible Left atrial enlargement  Left axis deviation  Inferior infarct (cited on or before 26-JUN-2021)  Anterolateral infarct (cited on or before 26-JUN-2021)  Abnormal ECG  When compared with ECG of 06-DEC-2023 08:18,  No significant change was found  Confirmed by Jignesh BYRD, Moe WONG ([id number]) on 1/14/2024 9:54:22 PM    Referred By: [email or web address]   SELF           Confirmed By:Moe Egan MD         Anesthesia Plan  Type of Anesthesia, risks & benefits discussed:    Anesthesia Type: Gen ETT, Gen Supraglottic Airway  Intra-op Monitoring Plan: Standard ASA Monitors  Post Op Pain Control Plan: multimodal analgesia  Induction:  IV  Airway Plan: Direct, Post-Induction  Informed Consent: Informed consent signed with the Patient and all parties understand the risks and agree with anesthesia plan.  All questions answered.   ASA Score: 3 Emergent  Day of Surgery Review of History & Physical: H&P Update referred to the surgeon/provider.I have interviewed and examined the patient. I have reviewed the patient's H&P dated: There are no significant changes.     Ready For Surgery From Anesthesia Perspective.     .

## 2024-02-16 NOTE — SUBJECTIVE & OBJECTIVE
Current Facility-Administered Medications on File Prior to Encounter   Medication    [COMPLETED] insulin regular injection 10 Units 0.1 mL    [COMPLETED] iopamidoL (ISOVUE-370) injection 100 mL    [COMPLETED] piperacillin-tazobactam (ZOSYN) 4.5 g in sodium chloride 0.9 % 100 mL IVPB (MB+)    [COMPLETED] potassium chloride SA CR tablet 40 mEq    [COMPLETED] sodium chloride 0.9% bolus 1,000 mL 1,000 mL    [COMPLETED] magnesium sulfate 2g in water 50mL IVPB (premix)     Current Outpatient Medications on File Prior to Encounter   Medication Sig    aluminum-magnesium hydroxide-simethicone (MAALOX) 200-200-20 mg/5 mL Susp Take 15 mLs by mouth every 6 (six) hours as needed.    aspirin 81 MG Chew Take 1 tablet (81 mg total) by mouth once daily.    bisacodyL (DULCOLAX) 5 mg EC tablet Take 2 tablets (10 mg total) by mouth daily as needed for Constipation.    carvediloL (COREG) 12.5 MG tablet Take 1 tablet (12.5 mg total) by mouth before breakfast.    furosemide (LASIX) 20 MG tablet Take 20 mg by mouth once daily.    isosorbide mononitrate (IMDUR) 30 MG 24 hr tablet Take 1 tablet (30 mg total) by mouth once daily. Take 1/2 tablet with breakfast    losartan (COZAAR) 100 MG tablet Take 1 tablet (100 mg total) by mouth once daily.    NITROGLYCERIN SL Place under the tongue.    simethicone (MYLICON) 80 MG chewable tablet Take 1 tablet (80 mg total) by mouth every 6 (six) hours as needed for Flatulence.       Review of patient's allergies indicates:   Allergen Reactions    Ticagrelor Shortness Of Breath    Vancomycin analogues Other (See Comments)     Patient experienced adverse effect, and had some renal insufficiency after receiving Vancomycin.       Past Medical History:   Diagnosis Date    Chronic pancreatitis, unspecified pancreatitis type     Coronary artery disease     Diabetes mellitus     Dyslipidemia     Hidradenitis     Hypertension      Past Surgical History:   Procedure Laterality Date    CARDIAC SURGERY      Stents x6     CHOLECYSTECTOMY      UNDESCENDED TESTICLE EXPLORATION N/A      Family History       Problem Relation (Age of Onset)    Heart disease Father, Brother, Maternal Grandmother    Hypertension Father          Tobacco Use    Smoking status: Former     Current packs/day: 0.25     Average packs/day: 0.3 packs/day for 20.1 years (5.0 ttl pk-yrs)     Types: Cigarettes, Cigars     Start date: 2004    Smokeless tobacco: Never   Substance and Sexual Activity    Alcohol use: Not Currently     Comment: occasional drink previous heavy drinker quit heavily in 2001    Drug use: Never    Sexual activity: Not Currently     Review of Systems   All other systems reviewed and are negative.    Objective:     Vital Signs (Most Recent):  Temp: 98.5 °F (36.9 °C) (02/16/24 1638)  Pulse: 99 (02/16/24 1638)  Resp: 17 (02/16/24 1638)  BP: 123/82 (02/16/24 1638)  SpO2: 99 % (02/16/24 1638) Vital Signs (24h Range):  Temp:  [98 °F (36.7 °C)-98.5 °F (36.9 °C)] 98.5 °F (36.9 °C)  Pulse:  [] 99  Resp:  [17-20] 17  SpO2:  [98 %-99 %] 99 %  BP: (115-140)/(77-93) 123/82     Weight: 95.3 kg (210 lb)  Body mass index is 33.89 kg/m².     Physical Exam  Constitutional:       General: He is not in acute distress.     Appearance: He is normal weight.   HENT:      Nose: Nose normal.   Eyes:      General: No scleral icterus.  Cardiovascular:      Rate and Rhythm: Normal rate and regular rhythm.      Pulses: Normal pulses.   Pulmonary:      Breath sounds: Normal breath sounds.   Abdominal:      General: There is no distension.      Palpations: Abdomen is soft. There is no mass.      Hernia: No hernia is present.   Genitourinary:     Comments: Fluctuance and severe tenderness of the medial left buttocks erythema  Fluctuance and moderate tenderness of the medial aspect of the right buttocks.  Musculoskeletal:         General: No swelling or tenderness.   Lymphadenopathy:      Cervical: No cervical adenopathy.   Neurological:      General: No focal deficit  present.      Mental Status: He is alert.      Motor: No weakness.   Psychiatric:         Mood and Affect: Mood normal.            I have reviewed all pertinent lab results within the past 24 hours.  CBC:   Recent Labs   Lab 02/16/24  1217   WBC 12.39*   RBC 4.39*   HGB 14.3   HCT 42.4      MCV 96.6*   MCH 32.6*   MCHC 33.7     BMP:   Recent Labs   Lab 02/16/24  1217   *   *   K 3.3*   CL 90*   CO2 24   BUN 11   CREATININE 1.26   CALCIUM 9.6   MG 1.5*       Significant Diagnostics:  I have reviewed all pertinent imaging results/findings within the past 24 hours.  CT: I have reviewed all pertinent results/findings within the past 24 hours and my personal findings are:  per HPI

## 2024-02-17 LAB
ANION GAP SERPL CALCULATED.3IONS-SCNC: 12 MMOL/L (ref 7–16)
BASOPHILS # BLD AUTO: 0.05 K/UL (ref 0–0.2)
BASOPHILS NFR BLD AUTO: 0.5 % (ref 0–1)
BUN SERPL-MCNC: 10 MG/DL (ref 7–18)
BUN/CREAT SERPL: 10 (ref 6–20)
CALCIUM SERPL-MCNC: 9.1 MG/DL (ref 8.5–10.1)
CHLORIDE SERPL-SCNC: 100 MMOL/L (ref 98–107)
CO2 SERPL-SCNC: 24 MMOL/L (ref 21–32)
CREAT SERPL-MCNC: 1.01 MG/DL (ref 0.7–1.3)
DIFFERENTIAL METHOD BLD: ABNORMAL
EGFR (NO RACE VARIABLE) (RUSH/TITUS): 88 ML/MIN/1.73M2
EOSINOPHIL # BLD AUTO: 0.07 K/UL (ref 0–0.5)
EOSINOPHIL NFR BLD AUTO: 0.6 % (ref 1–4)
ERYTHROCYTE [DISTWIDTH] IN BLOOD BY AUTOMATED COUNT: 13.7 % (ref 11.5–14.5)
GLUCOSE SERPL-MCNC: 226 MG/DL (ref 74–106)
GLUCOSE SERPL-MCNC: 262 MG/DL (ref 70–105)
GLUCOSE SERPL-MCNC: 314 MG/DL (ref 70–105)
GLUCOSE SERPL-MCNC: 455 MG/DL (ref 70–105)
GLUCOSE SERPL-MCNC: 463 MG/DL (ref 70–105)
HCT VFR BLD AUTO: 36.2 % (ref 40–54)
HGB BLD-MCNC: 12 G/DL (ref 13.5–18)
IMM GRANULOCYTES # BLD AUTO: 0.06 K/UL (ref 0–0.04)
IMM GRANULOCYTES NFR BLD: 0.6 % (ref 0–0.4)
LYMPHOCYTES # BLD AUTO: 1.81 K/UL (ref 1–4.8)
LYMPHOCYTES NFR BLD AUTO: 16.7 % (ref 27–41)
MCH RBC QN AUTO: 32.4 PG (ref 27–31)
MCHC RBC AUTO-ENTMCNC: 33.1 G/DL (ref 32–36)
MCV RBC AUTO: 97.8 FL (ref 80–96)
MONOCYTES # BLD AUTO: 0.9 K/UL (ref 0–0.8)
MONOCYTES NFR BLD AUTO: 8.3 % (ref 2–6)
MPC BLD CALC-MCNC: 10.4 FL (ref 9.4–12.4)
NEUTROPHILS # BLD AUTO: 7.95 K/UL (ref 1.8–7.7)
NEUTROPHILS NFR BLD AUTO: 73.3 % (ref 53–65)
NRBC # BLD AUTO: 0 X10E3/UL
NRBC, AUTO (.00): 0 %
PLATELET # BLD AUTO: 230 K/UL (ref 150–400)
POTASSIUM SERPL-SCNC: 3.3 MMOL/L (ref 3.5–5.1)
RBC # BLD AUTO: 3.7 M/UL (ref 4.6–6.2)
SODIUM SERPL-SCNC: 133 MMOL/L (ref 136–145)
WBC # BLD AUTO: 10.84 K/UL (ref 4.5–11)

## 2024-02-17 PROCEDURE — 63600175 PHARM REV CODE 636 W HCPCS: Performed by: FAMILY MEDICINE

## 2024-02-17 PROCEDURE — 85025 COMPLETE CBC W/AUTO DIFF WBC: CPT | Performed by: SURGERY

## 2024-02-17 PROCEDURE — 25000003 PHARM REV CODE 250: Performed by: FAMILY MEDICINE

## 2024-02-17 PROCEDURE — 63600175 PHARM REV CODE 636 W HCPCS: Performed by: SURGERY

## 2024-02-17 PROCEDURE — 80048 BASIC METABOLIC PNL TOTAL CA: CPT | Performed by: SURGERY

## 2024-02-17 PROCEDURE — 25000003 PHARM REV CODE 250: Performed by: SURGERY

## 2024-02-17 PROCEDURE — 99214 OFFICE O/P EST MOD 30 MIN: CPT | Mod: ,,, | Performed by: FAMILY MEDICINE

## 2024-02-17 PROCEDURE — 82962 GLUCOSE BLOOD TEST: CPT

## 2024-02-17 RX ORDER — MAGNESIUM SULFATE HEPTAHYDRATE 40 MG/ML
2 INJECTION, SOLUTION INTRAVENOUS ONCE
Status: COMPLETED | OUTPATIENT
Start: 2024-02-17 | End: 2024-02-17

## 2024-02-17 RX ORDER — POTASSIUM CHLORIDE 20 MEQ/1
20 TABLET, EXTENDED RELEASE ORAL 3 TIMES DAILY
Status: COMPLETED | OUTPATIENT
Start: 2024-02-17 | End: 2024-02-18

## 2024-02-17 RX ORDER — MORPHINE SULFATE 4 MG/ML
4 INJECTION, SOLUTION INTRAMUSCULAR; INTRAVENOUS EVERY 4 HOURS PRN
Status: DISCONTINUED | OUTPATIENT
Start: 2024-02-17 | End: 2024-02-18

## 2024-02-17 RX ADMIN — FUROSEMIDE 20 MG: 20 TABLET ORAL at 09:02

## 2024-02-17 RX ADMIN — Medication 6 MG: at 08:02

## 2024-02-17 RX ADMIN — LOSARTAN POTASSIUM 100 MG: 100 TABLET, FILM COATED ORAL at 08:02

## 2024-02-17 RX ADMIN — POTASSIUM CHLORIDE 20 MEQ: 1500 TABLET, EXTENDED RELEASE ORAL at 08:02

## 2024-02-17 RX ADMIN — POTASSIUM CHLORIDE 20 MEQ: 1500 TABLET, EXTENDED RELEASE ORAL at 03:02

## 2024-02-17 RX ADMIN — ISOSORBIDE MONONITRATE 30 MG: 30 TABLET, EXTENDED RELEASE ORAL at 08:02

## 2024-02-17 RX ADMIN — MORPHINE SULFATE 4 MG: 4 INJECTION, SOLUTION INTRAMUSCULAR; INTRAVENOUS at 11:02

## 2024-02-17 RX ADMIN — HYDROCODONE BITARTRATE AND ACETAMINOPHEN 1 TABLET: 7.5; 325 TABLET ORAL at 01:02

## 2024-02-17 RX ADMIN — MORPHINE SULFATE 4 MG: 4 INJECTION, SOLUTION INTRAMUSCULAR; INTRAVENOUS at 01:02

## 2024-02-17 RX ADMIN — HYDROCODONE BITARTRATE AND ACETAMINOPHEN 1 TABLET: 7.5; 325 TABLET ORAL at 03:02

## 2024-02-17 RX ADMIN — CARVEDILOL 12.5 MG: 12.5 TABLET, FILM COATED ORAL at 06:02

## 2024-02-17 RX ADMIN — SODIUM CHLORIDE: 4.5 INJECTION, SOLUTION INTRAVENOUS at 09:02

## 2024-02-17 RX ADMIN — INSULIN ASPART 10 UNITS: 100 INJECTION, SOLUTION INTRAVENOUS; SUBCUTANEOUS at 07:02

## 2024-02-17 RX ADMIN — HYDROCODONE BITARTRATE AND ACETAMINOPHEN 1 TABLET: 7.5; 325 TABLET ORAL at 09:02

## 2024-02-17 RX ADMIN — INSULIN ASPART 5 UNITS: 100 INJECTION, SOLUTION INTRAVENOUS; SUBCUTANEOUS at 05:02

## 2024-02-17 RX ADMIN — MAGNESIUM SULFATE HEPTAHYDRATE 2 G: 40 INJECTION, SOLUTION INTRAVENOUS at 03:02

## 2024-02-17 NOTE — HPI
Thank you Dr. Park for consulting on this patient.  He is a 54 y/o AA male well known to me from prior admissions.  He was admitted to [facility] yesterday with a perianal abscess that was drained in the OR yesterday.  He has a hx of DM for which he used to take metformin but he stopped due to diarrhea. He is no longer on any meds.   PCP is Dr. Ryan in [place].  Medicine consulted for DM control.  Patient's A1C this admission is 10.7.  It was 6.3 eight months ago.   Reviewed diabetic diet with patient.  He is well educated on this.

## 2024-02-17 NOTE — ASSESSMENT & PLAN NOTE
Patient has hypokalemia which is Acute on Chronic and currently uncontrolled. Most recent potassium levels reviewed-   Lab Results   Component Value Date    K 3.3 (L) 02/17/2024   . Will continue potassium replacement per protocol and recheck repeat levels after replacement completed.

## 2024-02-17 NOTE — PROGRESS NOTES
Status post incision drainage abscess yesterday.    The patient reports continued tenderness  Patient without fever.  Dressing was changed today, there was minimal purulent fluid remaining.  White count has normalized.    Patient does not feel like he could adequately he do his dressings today if he were to be discharged.  In addition, he has no medications ordered for diabetes management in his hemoglobin A1c was above 10.    Consult Medicine to assist with diabetes management.    Continue wound care.  Attempt to arrange for home health dressing changes.  Continue pain management.  Hopefully discharge in an exudate or 2.

## 2024-02-17 NOTE — CONSULTS
[facility]  [facility] Medicine  Consult Note    Patient Name: Faustino Fischer  MRN: [id number]  Admission Date: 2/16/2024  Hospital Length of Stay: 0 days  Attending Physician: Fidel Park MD   Primary Care Provider: Fidel Park MD           Patient information was obtained from patient and past medical records.     Consults  Subjective:     Principal Problem: Perianal abscess    Chief Complaint:   Chief Complaint   Patient presents with    Abscess     Transfer from [facility] to Dr Park         HPI: Thank you Dr. Park for consulting on this patient.  He is a 54 y/o AA male well known to me from prior admissions.  He was admitted to [facility] yesterday with a perianal abscess that was drained in the OR yesterday.  He has a hx of DM for which he used to take metformin but he stopped due to diarrhea. He is no longer on any meds.   PCP is Dr. Ryan in [place].  Medicine consulted for DM control.  Patient's A1C this admission is 10.7.  It was 6.3 eight months ago.   Reviewed diabetic diet with patient.  He is well educated on this.       Past Medical History:   Diagnosis Date    Chronic pancreatitis, unspecified pancreatitis type     Coronary artery disease     Diabetes mellitus     Dyslipidemia     Hidradenitis     Hypertension        Past Surgical History:   Procedure Laterality Date    CARDIAC SURGERY      Stents x6    CHOLECYSTECTOMY      UNDESCENDED TESTICLE EXPLORATION N/A        Review of patient's allergies indicates:   Allergen Reactions    Ticagrelor Shortness Of Breath    Vancomycin analogues Other (See Comments)     Patient experienced adverse effect, and had some renal insufficiency after receiving Vancomycin.       Current Facility-Administered Medications on File Prior to Encounter   Medication    [COMPLETED] magnesium sulfate 2g in water 50mL IVPB (premix)     Current Outpatient Medications on File Prior to Encounter   Medication Sig    aluminum-magnesium hydroxide-simethicone  (MAALOX) 200-200-20 mg/5 mL Susp Take 15 mLs by mouth every 6 (six) hours as needed.    aspirin 81 MG Chew Take 1 tablet (81 mg total) by mouth once daily.    bisacodyL (DULCOLAX) 5 mg EC tablet Take 2 tablets (10 mg total) by mouth daily as needed for Constipation.    carvediloL (COREG) 12.5 MG tablet Take 1 tablet (12.5 mg total) by mouth before breakfast.    furosemide (LASIX) 20 MG tablet Take 20 mg by mouth once daily.    isosorbide mononitrate (IMDUR) 30 MG 24 hr tablet Take 1 tablet (30 mg total) by mouth once daily. Take 1/2 tablet with breakfast    losartan (COZAAR) 100 MG tablet Take 1 tablet (100 mg total) by mouth once daily.    NITROGLYCERIN SL Place under the tongue.    simethicone (MYLICON) 80 MG chewable tablet Take 1 tablet (80 mg total) by mouth every 6 (six) hours as needed for Flatulence.     Family History       Problem Relation (Age of Onset)    Heart disease Father, Brother, Maternal Grandmother    Hypertension Father          Tobacco Use    Smoking status: Former     Current packs/day: 0.25     Average packs/day: 0.3 packs/day for 20.1 years (5.0 ttl pk-yrs)     Types: Cigarettes, Cigars     Start date: 2004    Smokeless tobacco: Never   Substance and Sexual Activity    Alcohol use: Not Currently     Comment: occasional drink previous heavy drinker quit heavily in 2001    Drug use: Never    Sexual activity: Not Currently     Review of Systems   Constitutional:  Negative for chills and fever.   HENT:  Negative for sinus pressure and sinus pain.    Cardiovascular:  Negative for chest pain and palpitations.        Cardiac hx noted   Gastrointestinal:  Positive for rectal pain.        Appropriate post op pain.   Endocrine: Negative for polydipsia and polyuria.   Genitourinary:  Negative for dysuria and hematuria.   Musculoskeletal: Negative.    Skin:  Positive for wound.   Neurological: Negative.    Psychiatric/Behavioral: Negative.       Objective:     Vital Signs (Most Recent):  Temp: 97.8 °F  (36.6 °C) (02/17/24 1033)  Pulse: 83 (02/17/24 1033)  Resp: 18 (02/17/24 1122)  BP: (!) 91/56 (02/17/24 1033)  SpO2: (!) 94 % (02/17/24 1300) Vital Signs (24h Range):  Temp:  [97.8 °F (36.6 °C)-98.7 °F (37.1 °C)] 97.8 °F (36.6 °C)  Pulse:  [] 83  Resp:  [5-28] 18  SpO2:  [90 %-100 %] 94 %  BP: ()/() 91/56     Weight: 95.3 kg (210 lb)  Body mass index is 33.89 kg/m².     Physical Exam  Vitals reviewed.   Constitutional:       General: He is not in acute distress.     Appearance: He is not ill-appearing.   HENT:      Right Ear: External ear normal.      Left Ear: External ear normal.      Nose: Nose normal.      Mouth/Throat:      Mouth: Mucous membranes are moist.      Pharynx: Oropharynx is clear.   Eyes:      General: No scleral icterus.     Extraocular Movements: Extraocular movements intact.      Pupils: Pupils are equal, round, and reactive to light.   Cardiovascular:      Rate and Rhythm: Normal rate and regular rhythm.      Heart sounds: Normal heart sounds.   Pulmonary:      Effort: Pulmonary effort is normal. No respiratory distress.      Breath sounds: Normal breath sounds. No wheezing.   Abdominal:      General: Abdomen is flat. Bowel sounds are normal. There is no distension.      Palpations: Abdomen is soft.   Skin:     General: Skin is warm and dry.   Neurological:      General: No focal deficit present.      Mental Status: He is alert and oriented to person, place, and time.   Psychiatric:         Mood and Affect: Mood normal.         Behavior: Behavior normal.          Significant Labs: All pertinent labs within the past 24 hours have been reviewed.  Recent Lab Results  (Last 5 results in the past 24 hours)        02/17/24  0713   02/17/24  0330   02/16/24  2239   02/16/24  2041   02/16/24  1825        Anion Gap   12             Baso #   0.05             Basophil %   0.5             BUN   10             BUN/CREAT RATIO   10             Calcium   9.1             Chloride   100              CO2   24             Creatinine   1.01             Culture, Wound/Abscess         No Growth To Date  [P]       Differential Method   Auto             eGFR   88             Eos #   0.07             Eos %   0.6             Estimated Avg Glucose       260         Glucose   226             Hematocrit   36.2             Hemoglobin   12.0             Hemoglobin A1C External       10.7  Comment:   Normal:               <5.7%  Pre-Diabetic:       5.7% to 6.4%  Diabetic:             >6.4%  Diabetic Goal:     <7%         Immature Grans (Abs)   0.06             Immature Granulocytes   0.6             Lymph #   1.81             Lymph %   16.7             MCH   32.4             MCHC   33.1             MCV   97.8             Mono #   0.90             Mono %   8.3             MPV   10.4             Neutrophils, Abs   7.95             Neutrophils Relative   73.3             nRBC   0.0             NUCLEATED RBC ABSOLUTE   0.00             Platelet Count   230             POC Glucose 262     186           Potassium   3.3             RBC   3.70             RDW   13.7             Sodium   133             WBC   10.84                                     [P] - Preliminary Result               Significant Imaging: I have reviewed all pertinent imaging results/findings within the past 24 hours.  Assessment/Plan:     * Perianal abscess    As per general surgery.     Hypokalemia  Patient has hypokalemia which is Acute on Chronic and currently uncontrolled. Most recent potassium levels reviewed-   Lab Results   Component Value Date    K 3.3 (L) 02/17/2024   . Will continue potassium replacement per protocol and recheck repeat levels after replacement completed.     Type 2 diabetes mellitus with hyperglycemia, without long-term current use of insulin    After extensive review of his diet and eating habits, I am starting a low dose of 70/30 insulin in the morning with breakfast only.  Reviewed the medication with patient and his brother.   "Explained the importance of eating lunch on time as that is when the NPH will kick in.   It is possible that he will be able to be transitioned to an oral agent in the future.  Reviewed hypoglycemic symptoms and patient understands.  He is to keep a log of his glucoses and follow up with Dr. Ryan in [place] 2 weeks after discharge.      Hypomagnesemia  Patient has Abnormal Magnesium: hypomagnesemia. Will continue to monitor electrolytes closely. Will replace the affected electrolytes and repeat labs to be done after interventions completed. The patient's magnesium results have been reviewed and are listed below.  No results for input(s): "MG" in the last 24 hours.    Mg 1.5 yesterday.  Will give 2 grams mag sulfate.      VTE Risk Mitigation (From admission, onward)           Ordered     Place sequential compression device  Until discontinued         02/16/24 1822                        Thank you for your consult. I will follow-up with patient. Please contact us if you have any additional questions.    Edson sAtorga Jr, MD  Department of Hospital Medicine   [facility]      "

## 2024-02-17 NOTE — NURSING
Patient blood sugar 491. Gave patient 5 units per sliding scale. Dr. Astorga per verbal ordered said to give patient 5 additional units of Aspart. Given at this time.

## 2024-02-17 NOTE — ASSESSMENT & PLAN NOTE
"Patient has Abnormal Magnesium: hypomagnesemia. Will continue to monitor electrolytes closely. Will replace the affected electrolytes and repeat labs to be done after interventions completed. The patient's magnesium results have been reviewed and are listed below.  No results for input(s): "MG" in the last 24 hours.    Mg 1.5 yesterday.  Will give 2 grams mag sulfate.  "

## 2024-02-17 NOTE — ANESTHESIA POSTPROCEDURE EVALUATION
Anesthesia Post Evaluation    Patient: Faustino Fischer    Procedure(s) Performed: Procedure(s) (LRB):  INCISION, ABSCESS, PERIANAL (Bilateral)    Final Anesthesia Type: general      Patient location during evaluation: PACU  Patient participation: Yes- Able to Participate  Level of consciousness: awake and alert and oriented  Post-procedure vital signs: reviewed and stable  Pain management: adequate  Airway patency: patent  ALEXANDRU mitigation strategies: Multimodal analgesia  PONV status at discharge: No PONV  Anesthetic complications: no      Cardiovascular status: hemodynamically stable  Respiratory status: unassisted and spontaneous ventilation  Hydration status: euvolemic  Follow-up not needed.              Vitals Value Taken Time   /86 02/16/24 1819   Temp 98.7 02/16/24 1825   Pulse 103 02/16/24 1824   Resp 16 02/16/24 1824   SpO2 98 % 02/16/24 1824   Vitals shown include unvalidated device data.      No case tracking events are documented in the log.      Pain/Margaret Score: Margaret Score: 8 (2/16/2024  6:14 PM)

## 2024-02-17 NOTE — SUBJECTIVE & OBJECTIVE
Past Medical History:   Diagnosis Date    Chronic pancreatitis, unspecified pancreatitis type     Coronary artery disease     Diabetes mellitus     Dyslipidemia     Hidradenitis     Hypertension        Past Surgical History:   Procedure Laterality Date    CARDIAC SURGERY      Stents x6    CHOLECYSTECTOMY      UNDESCENDED TESTICLE EXPLORATION N/A        Review of patient's allergies indicates:   Allergen Reactions    Ticagrelor Shortness Of Breath    Vancomycin analogues Other (See Comments)     Patient experienced adverse effect, and had some renal insufficiency after receiving Vancomycin.       Current Facility-Administered Medications on File Prior to Encounter   Medication    [COMPLETED] magnesium sulfate 2g in water 50mL IVPB (premix)     Current Outpatient Medications on File Prior to Encounter   Medication Sig    aluminum-magnesium hydroxide-simethicone (MAALOX) 200-200-20 mg/5 mL Susp Take 15 mLs by mouth every 6 (six) hours as needed.    aspirin 81 MG Chew Take 1 tablet (81 mg total) by mouth once daily.    bisacodyL (DULCOLAX) 5 mg EC tablet Take 2 tablets (10 mg total) by mouth daily as needed for Constipation.    carvediloL (COREG) 12.5 MG tablet Take 1 tablet (12.5 mg total) by mouth before breakfast.    furosemide (LASIX) 20 MG tablet Take 20 mg by mouth once daily.    isosorbide mononitrate (IMDUR) 30 MG 24 hr tablet Take 1 tablet (30 mg total) by mouth once daily. Take 1/2 tablet with breakfast    losartan (COZAAR) 100 MG tablet Take 1 tablet (100 mg total) by mouth once daily.    NITROGLYCERIN SL Place under the tongue.    simethicone (MYLICON) 80 MG chewable tablet Take 1 tablet (80 mg total) by mouth every 6 (six) hours as needed for Flatulence.     Family History       Problem Relation (Age of Onset)    Heart disease Father, Brother, Maternal Grandmother    Hypertension Father          Tobacco Use    Smoking status: Former     Current packs/day: 0.25     Average packs/day: 0.3 packs/day for 20.1  years (5.0 ttl pk-yrs)     Types: Cigarettes, Cigars     Start date: 2004    Smokeless tobacco: Never   Substance and Sexual Activity    Alcohol use: Not Currently     Comment: occasional drink previous heavy drinker quit heavily in 2001    Drug use: Never    Sexual activity: Not Currently     Review of Systems   Constitutional:  Negative for chills and fever.   HENT:  Negative for sinus pressure and sinus pain.    Cardiovascular:  Negative for chest pain and palpitations.        Cardiac hx noted   Gastrointestinal:  Positive for rectal pain.        Appropriate post op pain.   Endocrine: Negative for polydipsia and polyuria.   Genitourinary:  Negative for dysuria and hematuria.   Musculoskeletal: Negative.    Skin:  Positive for wound.   Neurological: Negative.    Psychiatric/Behavioral: Negative.       Objective:     Vital Signs (Most Recent):  Temp: 97.8 °F (36.6 °C) (02/17/24 1033)  Pulse: 83 (02/17/24 1033)  Resp: 18 (02/17/24 1122)  BP: (!) 91/56 (02/17/24 1033)  SpO2: (!) 94 % (02/17/24 1300) Vital Signs (24h Range):  Temp:  [97.8 °F (36.6 °C)-98.7 °F (37.1 °C)] 97.8 °F (36.6 °C)  Pulse:  [] 83  Resp:  [5-28] 18  SpO2:  [90 %-100 %] 94 %  BP: ()/() 91/56     Weight: 95.3 kg (210 lb)  Body mass index is 33.89 kg/m².     Physical Exam  Vitals reviewed.   Constitutional:       General: He is not in acute distress.     Appearance: He is not ill-appearing.   HENT:      Right Ear: External ear normal.      Left Ear: External ear normal.      Nose: Nose normal.      Mouth/Throat:      Mouth: Mucous membranes are moist.      Pharynx: Oropharynx is clear.   Eyes:      General: No scleral icterus.     Extraocular Movements: Extraocular movements intact.      Pupils: Pupils are equal, round, and reactive to light.   Cardiovascular:      Rate and Rhythm: Normal rate and regular rhythm.      Heart sounds: Normal heart sounds.   Pulmonary:      Effort: Pulmonary effort is normal. No respiratory distress.       Breath sounds: Normal breath sounds. No wheezing.   Abdominal:      General: Abdomen is flat. Bowel sounds are normal. There is no distension.      Palpations: Abdomen is soft.   Skin:     General: Skin is warm and dry.   Neurological:      General: No focal deficit present.      Mental Status: He is alert and oriented to person, place, and time.   Psychiatric:         Mood and Affect: Mood normal.         Behavior: Behavior normal.          Significant Labs: All pertinent labs within the past 24 hours have been reviewed.  Recent Lab Results  (Last 5 results in the past 24 hours)        02/17/24  0713   02/17/24  0330   02/16/24  2239   02/16/24  2041   02/16/24  1825        Anion Gap   12             Baso #   0.05             Basophil %   0.5             BUN   10             BUN/CREAT RATIO   10             Calcium   9.1             Chloride   100             CO2   24             Creatinine   1.01             Culture, Wound/Abscess         No Growth To Date  [P]       Differential Method   Auto             eGFR   88             Eos #   0.07             Eos %   0.6             Estimated Avg Glucose       260         Glucose   226             Hematocrit   36.2             Hemoglobin   12.0             Hemoglobin A1C External       10.7  Comment:   Normal:               <5.7%  Pre-Diabetic:       5.7% to 6.4%  Diabetic:             >6.4%  Diabetic Goal:     <7%         Immature Grans (Abs)   0.06             Immature Granulocytes   0.6             Lymph #   1.81             Lymph %   16.7             MCH   32.4             MCHC   33.1             MCV   97.8             Mono #   0.90             Mono %   8.3             MPV   10.4             Neutrophils, Abs   7.95             Neutrophils Relative   73.3             nRBC   0.0             NUCLEATED RBC ABSOLUTE   0.00             Platelet Count   230             POC Glucose 262     186           Potassium   3.3             RBC   3.70             RDW   13.7              Sodium   133             WBC   10.84                                     [P] - Preliminary Result               Significant Imaging: I have reviewed all pertinent imaging results/findings within the past 24 hours.

## 2024-02-17 NOTE — PLAN OF CARE
1814 pt to pacu pt resp reg and non labored pt awake pt sao2 93% on 7l fm pt dsg d/I to sacral area pt pain level 0 at present  pt voices no complaints     1835 pt sao2 94% on 7lfm  duaneb breathing tx started per dr mckeon order pt pain level 0 at present will monitor     1845 nebulizer tx complete pt sao2 94% on 2l nbp waiting for room to be cleaned pt voices no complaints at present     1900 pt room ready pt vs stable orders to release to room per md    1915 pt released to amna mcwilliams rn b/p 149/89 pulse 95 resp 18 sao2 96% on 2l nbp temp 97.9 oral pt awake pt c/o pain in buttock area pain level  5 at present  family at bedside

## 2024-02-17 NOTE — ASSESSMENT & PLAN NOTE
After extensive review of his diet and eating habits, I am starting a low dose of 70/30 insulin in the morning with breakfast only.  Reviewed the medication with patient and his brother.  Explained the importance of eating lunch on time as that is when the NPH will kick in.   It is possible that he will be able to be transitioned to an oral agent in the future.  Reviewed hypoglycemic symptoms and patient understands.  He is to keep a log of his glucoses and follow up with Dr. Ryan in [place] 2 weeks after discharge.

## 2024-02-17 NOTE — OP NOTE
[facility]     Operative Note    SUMMARY     Date of Procedure: 2/16/2024     Procedure: Procedure(s) (LRB):  INCISION, ABSCESS, PERIANAL (Bilateral)     Surgeon(s) and Role:     * Fidel Park MD - Primary    Assisting Surgeon: None    Pre-Operative Diagnosis: Abscess, perianal [K61.0]    Post-Operative Diagnosis: Post-Op Diagnosis Codes:     * Abscess, perianal [K61.0]    Anesthesia: General    Technical Procedures Used:  Patient was taken to the operating room and placed in supine position.  General anesthesia was administered.  Subsequent to this he was placed in stirrups and a sterile prep and drape was performed.  Incision was then performed overlying the left lateral aspect of the perineum.  Purulent fluid flowed freely.  Cultures were obtained.  Irrigation was performed.  Subsequent to this the wound was packed open with moistened gauze.    There was a small skin opening over the right lateral perineum.  This was cannulated with a hemostat and an abscess cavity was identified.  Abscess cavity was opened with scissors, incising the skin to expose the previous abscess cavity.  This area was irrigated and packed with moistened gauze.  Dry gauze was applied followed by tape.  The patient was awakened from anesthesia in stable condition.    Description of the Findings of the Procedure:  A large abscess was identified in the left lateral perineum.  An irrigation and drainage was performed and the wound was packed open.  There was an abscess cavity identified on the right containing minimal purulent fluid.  This was open irrigated and packed.    Assistant(s): None    Complications: No    Estimated Blood Loss (EBL): 25 mL           Implants: * No implants in log *    Specimens:   Specimen (24h ago, onward)      None             Condition: Good      Complications:  None

## 2024-02-17 NOTE — TRANSFER OF CARE
"Anesthesia Transfer of Care Note    Patient: Faustino Fischer    Procedure(s) Performed: Procedure(s) (LRB):  INCISION, ABSCESS, PERIANAL (Bilateral)    Patient location: PACU    Anesthesia Type: general    Transport from OR: Transported from OR on 6-10 L/min O2 by face mask with adequate spontaneous ventilation    Post pain: adequate analgesia    Post assessment: no apparent anesthetic complications    Post vital signs: stable    Level of consciousness: awake, alert and oriented    Nausea/Vomiting: no nausea/vomiting    Complications: none    Transfer of care protocol was followedComments: Good SV continue, NAD noted, VSS, RTRN      Last vitals: Visit Vitals  BP (!) 147/86 (BP Location: Right arm, Patient Position: Lying)   Pulse 102   Temp 36.9 °C (98.5 °F) (Oral)   Resp (!) 28   Ht 5' 6" (1.676 m)   Wt 95.3 kg (210 lb)   SpO2 95%   BMI 33.89 kg/m²     "

## 2024-02-18 LAB
GLUCOSE SERPL-MCNC: 320 MG/DL (ref 70–105)
GLUCOSE SERPL-MCNC: 321 MG/DL (ref 70–105)
GLUCOSE SERPL-MCNC: 419 MG/DL (ref 70–105)
MICROORGANISM SPEC CULT: NORMAL

## 2024-02-18 PROCEDURE — 25000003 PHARM REV CODE 250: Performed by: SURGERY

## 2024-02-18 PROCEDURE — 25000003 PHARM REV CODE 250: Performed by: FAMILY MEDICINE

## 2024-02-18 PROCEDURE — 63600175 PHARM REV CODE 636 W HCPCS

## 2024-02-18 PROCEDURE — 63600175 PHARM REV CODE 636 W HCPCS: Mod: JZ,JG | Performed by: SURGERY

## 2024-02-18 PROCEDURE — 63600175 PHARM REV CODE 636 W HCPCS: Performed by: FAMILY MEDICINE

## 2024-02-18 PROCEDURE — 82962 GLUCOSE BLOOD TEST: CPT

## 2024-02-18 PROCEDURE — 99233 SBSQ HOSP IP/OBS HIGH 50: CPT | Mod: ,,, | Performed by: FAMILY MEDICINE

## 2024-02-18 PROCEDURE — 94761 N-INVAS EAR/PLS OXIMETRY MLT: CPT

## 2024-02-18 PROCEDURE — 11000001 HC ACUTE MED/SURG PRIVATE ROOM

## 2024-02-18 RX ORDER — INSULIN ASPART 100 [IU]/ML
0-10 INJECTION, SOLUTION INTRAVENOUS; SUBCUTANEOUS
Status: DISCONTINUED | OUTPATIENT
Start: 2024-02-18 | End: 2024-02-20 | Stop reason: HOSPADM

## 2024-02-18 RX ORDER — CARVEDILOL 6.25 MG/1
6.25 TABLET ORAL
Status: DISCONTINUED | OUTPATIENT
Start: 2024-02-19 | End: 2024-02-20 | Stop reason: HOSPADM

## 2024-02-18 RX ORDER — LOSARTAN POTASSIUM 50 MG/1
50 TABLET ORAL DAILY
Status: DISCONTINUED | OUTPATIENT
Start: 2024-02-19 | End: 2024-02-20 | Stop reason: HOSPADM

## 2024-02-18 RX ORDER — POTASSIUM CHLORIDE 20 MEQ/1
20 TABLET, EXTENDED RELEASE ORAL DAILY
Status: DISCONTINUED | OUTPATIENT
Start: 2024-02-18 | End: 2024-02-20 | Stop reason: HOSPADM

## 2024-02-18 RX ORDER — MORPHINE SULFATE 2 MG/ML
6 INJECTION, SOLUTION INTRAMUSCULAR; INTRAVENOUS EVERY 4 HOURS PRN
Status: DISCONTINUED | OUTPATIENT
Start: 2024-02-18 | End: 2024-02-20 | Stop reason: HOSPADM

## 2024-02-18 RX ADMIN — SODIUM CHLORIDE: 4.5 INJECTION, SOLUTION INTRAVENOUS at 11:02

## 2024-02-18 RX ADMIN — MORPHINE SULFATE 6 MG: 2 INJECTION, SOLUTION INTRAMUSCULAR; INTRAVENOUS at 04:02

## 2024-02-18 RX ADMIN — CARVEDILOL 12.5 MG: 12.5 TABLET, FILM COATED ORAL at 06:02

## 2024-02-18 RX ADMIN — INSULIN ASPART 10 UNITS: 100 INJECTION, SOLUTION INTRAVENOUS; SUBCUTANEOUS at 11:02

## 2024-02-18 RX ADMIN — POTASSIUM CHLORIDE 20 MEQ: 1500 TABLET, EXTENDED RELEASE ORAL at 03:02

## 2024-02-18 RX ADMIN — HUMAN INSULIN 6 UNITS: 100 INJECTION, SUSPENSION SUBCUTANEOUS at 08:02

## 2024-02-18 RX ADMIN — HYDROCODONE BITARTRATE AND ACETAMINOPHEN 1 TABLET: 7.5; 325 TABLET ORAL at 11:02

## 2024-02-18 RX ADMIN — POTASSIUM CHLORIDE 20 MEQ: 1500 TABLET, EXTENDED RELEASE ORAL at 08:02

## 2024-02-18 RX ADMIN — INSULIN ASPART 5 UNITS: 100 INJECTION, SOLUTION INTRAVENOUS; SUBCUTANEOUS at 04:02

## 2024-02-18 RX ADMIN — FUROSEMIDE 20 MG: 20 TABLET ORAL at 08:02

## 2024-02-18 RX ADMIN — LOSARTAN POTASSIUM 100 MG: 100 TABLET, FILM COATED ORAL at 08:02

## 2024-02-18 RX ADMIN — HUMAN INSULIN 10 UNITS: 100 INJECTION, SUSPENSION SUBCUTANEOUS at 03:02

## 2024-02-18 RX ADMIN — SODIUM CHLORIDE: 4.5 INJECTION, SOLUTION INTRAVENOUS at 02:02

## 2024-02-18 RX ADMIN — SODIUM CHLORIDE: 4.5 INJECTION, SOLUTION INTRAVENOUS at 01:02

## 2024-02-18 RX ADMIN — INSULIN ASPART 3 UNITS: 100 INJECTION, SOLUTION INTRAVENOUS; SUBCUTANEOUS at 09:02

## 2024-02-18 RX ADMIN — MORPHINE SULFATE 4 MG: 4 INJECTION, SOLUTION INTRAMUSCULAR; INTRAVENOUS at 02:02

## 2024-02-18 RX ADMIN — ISOSORBIDE MONONITRATE 30 MG: 30 TABLET, EXTENDED RELEASE ORAL at 08:02

## 2024-02-18 NOTE — NURSING
Patient blood sugar 544, notified MD. Verbal order from  to give patient another 10 units of Aspart at this time.

## 2024-02-18 NOTE — NURSING
1944 Glucose rechecked per patient request after 10 units of Aspart given at 1921 per dayshift. Glucose result 463. Will recheck glucose in approximately 1 hour. Care ongoing.

## 2024-02-18 NOTE — PLAN OF CARE
[facility]  Initial Discharge Assessment       Primary Care Provider: Fidel Park MD    Admission Diagnosis: Abscess, perianal [K61.0]  Abscess of left axilla [L02.412]  Rectal abscess [K61.1]  Abscess [L02.91]    Admission Date: 2/16/2024  Expected Discharge Date:     Transition of Care Barriers: (P) None    Payor: MEDICARE / Plan: MEDICARE PART A & B / Product Type: Government /     Extended Emergency Contact Information  Primary Emergency Contact: Vera Mehta  Mobile Phone: [phone]  Relation: Mother  Preferred language: English   needed? No    Discharge Plan A: (P) Home, Home Health  Discharge Plan B: (P) Home, Home Health      [facility] [address]  [address] [facility] [address]  Phone: [phone] Fax: [phone]    [id number] [address] [facility] [address]  [address]  [facility] [address]  Phone: [phone] Fax: [phone]    [facility] [address]  [address]  [place] [address]  Phone: [phone] Fax: [phone]      Initial Assessment (most recent)       Adult Discharge Assessment - 02/18/24 1439          Discharge Assessment    Assessment Type Discharge Planning Assessment (P)      Confirmed/corrected address, phone number and insurance Yes (P)      Confirmed Demographics Correct on Facesheet (P)      Source of Information patient (P)      Communicated BETY with patient/caregiver Date not available/Unable to determine (P)      People in Home alone (P)      Do you expect to return to your current living situation? Yes (P)      Do you have help at home or someone to help you manage your care at home? No (P)      Prior to hospitilization cognitive status: Unable to Assess (P)      Current cognitive status: Alert/Oriented (P)      Walking or Climbing Stairs Difficulty yes (P)      Walking or Climbing Stairs stair climbing difficulty,  assistance 1 person (P)      Dressing/Bathing Difficulty no (P)      Do you have any problems with: -- (P)    No problems reported by pt    Home Accessibility not wheelchair accessible (P)      Equipment Currently Used at Home none (P)      Readmission within 30 days? No (P)      Patient currently being followed by outpatient case management? No (P)      Do you currently have service(s) that help you manage your care at home? No (P)      Do you take prescription medications? Yes (P)      Do you have prescription coverage? Yes (P)      Coverage Medicare (P)      Do you have any problems affording any of your prescribed medications? No (P)      Is the patient taking medications as prescribed? yes (P)      Who is going to help you get home at discharge? Family (P)      How do you get to doctors appointments? car, drives self (P)      Are you on dialysis? No (P)      Do you take coumadin? No (P)      Discharge Plan A Home;Home Health (P)      Discharge Plan B Home;Home Health (P)      DME Needed Upon Discharge  none (P)      Discharge Plan discussed with: Patient (P)      Transition of Care Barriers None (P)         Physical Activity    On average, how many days per week do you engage in moderate to strenuous exercise (like a brisk walk)? 0 days (P)      On average, how many minutes do you engage in exercise at this level? 0 min (P)         Financial Resource Strain    How hard is it for you to pay for the very basics like food, housing, medical care, and heating? Not very hard (P)         Housing Stability    In the last 12 months, was there a time when you were not able to pay the mortgage or rent on time? No (P)      In the last 12 months, how many places have you lived? 1 (P)      In the last 12 months, was there a time when you did not have a steady place to sleep or slept in a shelter (including now)? No (P)         Transportation Needs    In the past 12 months, has lack of transportation kept you from medical  "appointments or from getting medications? No (P)      In the past 12 months, has lack of transportation kept you from meetings, work, or from getting things needed for daily living? No (P)         Food Insecurity    Within the past 12 months, you worried that your food would run out before you got the money to buy more. Never true (P)      Within the past 12 months, the food you bought just didn't last and you didn't have money to get more. Never true (P)         Stress    Do you feel stress - tense, restless, nervous, or anxious, or unable to sleep at night because your mind is troubled all the time - these days? Only a little (P)         Social Connections    In a typical week, how many times do you talk on the phone with family, friends, or neighbors? -- (P)    "one time a month"    How often do you get together with friends or relatives? More than three times a week (P)      How often do you attend Spiritism or Latter-day services? More than 4 times per year (P)      Do you belong to any clubs or organizations such as Spiritism groups, unions, fraternal or athletic groups, or school groups? No (P)      How often do you attend meetings of the clubs or organizations you belong to? Never (P)      Are you , , , , never , or living with a partner?  (P)         Alcohol Use    Q1: How often do you have a drink containing alcohol? Never (P)      Q2: How many drinks containing alcohol do you have on a typical day when you are drinking? Patient does not drink (P)      Q3: How often do you have six or more drinks on one occasion? Never (P)                  SW consulted for home health.  Spoke with pt who states he lives alone and uses no durable medical equipment.  Choice obtained for [facility] and initial referral made.  Per pt, his discharge plans are to return home with home health.  IM obtained.  SS following for discharge needs.               "

## 2024-02-18 NOTE — PROGRESS NOTES
Status post incision drainage abscess yesterday.    The patient reports continued tenderness  No fever     No expressible purulence   Cultures negative thus far  Hospitalist continues to assist with diabetes management.    Continue wound care.  Attempt to arrange for home health dressing changes.  Continue pain management.  Hopefully discharge tomorrow

## 2024-02-19 LAB
ANION GAP SERPL CALCULATED.3IONS-SCNC: 13 MMOL/L (ref 7–16)
BUN SERPL-MCNC: 15 MG/DL (ref 7–18)
BUN/CREAT SERPL: 15 (ref 6–20)
CALCIUM SERPL-MCNC: 9.1 MG/DL (ref 8.5–10.1)
CHLORIDE SERPL-SCNC: 103 MMOL/L (ref 98–107)
CO2 SERPL-SCNC: 25 MMOL/L (ref 21–32)
CREAT SERPL-MCNC: 1.03 MG/DL (ref 0.7–1.3)
EGFR (NO RACE VARIABLE) (RUSH/TITUS): 86 ML/MIN/1.73M2
GLUCOSE SERPL-MCNC: 180 MG/DL (ref 70–105)
GLUCOSE SERPL-MCNC: 252 MG/DL (ref 70–105)
GLUCOSE SERPL-MCNC: 265 MG/DL (ref 70–105)
GLUCOSE SERPL-MCNC: 282 MG/DL (ref 70–105)
GLUCOSE SERPL-MCNC: 297 MG/DL (ref 74–106)
GLUCOSE SERPL-MCNC: 321 MG/DL (ref 70–105)
MAGNESIUM SERPL-MCNC: 1.6 MG/DL (ref 1.7–2.3)
POTASSIUM SERPL-SCNC: 3.7 MMOL/L (ref 3.5–5.1)
SODIUM SERPL-SCNC: 137 MMOL/L (ref 136–145)

## 2024-02-19 PROCEDURE — 83735 ASSAY OF MAGNESIUM: CPT | Performed by: FAMILY MEDICINE

## 2024-02-19 PROCEDURE — 11000001 HC ACUTE MED/SURG PRIVATE ROOM

## 2024-02-19 PROCEDURE — 94761 N-INVAS EAR/PLS OXIMETRY MLT: CPT

## 2024-02-19 PROCEDURE — 99900035 HC TECH TIME PER 15 MIN (STAT)

## 2024-02-19 PROCEDURE — 63600175 PHARM REV CODE 636 W HCPCS

## 2024-02-19 PROCEDURE — 25000003 PHARM REV CODE 250: Performed by: FAMILY MEDICINE

## 2024-02-19 PROCEDURE — 25000003 PHARM REV CODE 250: Performed by: SURGERY

## 2024-02-19 PROCEDURE — 82962 GLUCOSE BLOOD TEST: CPT

## 2024-02-19 PROCEDURE — 80048 BASIC METABOLIC PNL TOTAL CA: CPT | Performed by: FAMILY MEDICINE

## 2024-02-19 PROCEDURE — 63600175 PHARM REV CODE 636 W HCPCS: Mod: JZ,JG | Performed by: FAMILY MEDICINE

## 2024-02-19 PROCEDURE — 99233 SBSQ HOSP IP/OBS HIGH 50: CPT | Mod: ,,, | Performed by: FAMILY MEDICINE

## 2024-02-19 RX ORDER — MAGNESIUM SULFATE HEPTAHYDRATE 40 MG/ML
2 INJECTION, SOLUTION INTRAVENOUS ONCE
Status: COMPLETED | OUTPATIENT
Start: 2024-02-19 | End: 2024-02-19

## 2024-02-19 RX ADMIN — CARVEDILOL 6.25 MG: 6.25 TABLET, FILM COATED ORAL at 06:02

## 2024-02-19 RX ADMIN — SODIUM CHLORIDE: 4.5 INJECTION, SOLUTION INTRAVENOUS at 07:02

## 2024-02-19 RX ADMIN — INSULIN ASPART 5 UNITS: 100 INJECTION, SOLUTION INTRAVENOUS; SUBCUTANEOUS at 12:02

## 2024-02-19 RX ADMIN — HUMAN INSULIN 10 UNITS: 100 INJECTION, SUSPENSION SUBCUTANEOUS at 06:02

## 2024-02-19 RX ADMIN — ISOSORBIDE MONONITRATE 30 MG: 30 TABLET, EXTENDED RELEASE ORAL at 08:02

## 2024-02-19 RX ADMIN — LOSARTAN POTASSIUM 50 MG: 50 TABLET, FILM COATED ORAL at 08:02

## 2024-02-19 RX ADMIN — FUROSEMIDE 20 MG: 20 TABLET ORAL at 08:02

## 2024-02-19 RX ADMIN — INSULIN ASPART 5 UNITS: 100 INJECTION, SOLUTION INTRAVENOUS; SUBCUTANEOUS at 08:02

## 2024-02-19 RX ADMIN — MORPHINE SULFATE 6 MG: 2 INJECTION, SOLUTION INTRAMUSCULAR; INTRAVENOUS at 01:02

## 2024-02-19 RX ADMIN — POTASSIUM CHLORIDE 20 MEQ: 1500 TABLET, EXTENDED RELEASE ORAL at 08:02

## 2024-02-19 RX ADMIN — MORPHINE SULFATE 6 MG: 2 INJECTION, SOLUTION INTRAMUSCULAR; INTRAVENOUS at 12:02

## 2024-02-19 RX ADMIN — INSULIN ASPART 5 UNITS: 100 INJECTION, SOLUTION INTRAVENOUS; SUBCUTANEOUS at 05:02

## 2024-02-19 RX ADMIN — MAGNESIUM SULFATE HEPTAHYDRATE 2 G: 40 INJECTION, SOLUTION INTRAVENOUS at 08:02

## 2024-02-19 RX ADMIN — MORPHINE SULFATE 6 MG: 2 INJECTION, SOLUTION INTRAMUSCULAR; INTRAVENOUS at 10:02

## 2024-02-19 RX ADMIN — HUMAN INSULIN 14 UNITS: 100 INJECTION, SUSPENSION SUBCUTANEOUS at 05:02

## 2024-02-19 NOTE — ASSESSMENT & PLAN NOTE
Patient has hypokalemia which is Acute on Chronic and currently uncontrolled. Most recent potassium levels reviewed-   Lab Results   Component Value Date    K 3.7 02/19/2024   . Will continue potassium replacement per protocol and recheck repeat levels after replacement completed.     2/18 - added daily KCl

## 2024-02-19 NOTE — ASSESSMENT & PLAN NOTE
"Patient has Abnormal Magnesium: hypomagnesemia. Will continue to monitor electrolytes closely. Will replace the affected electrolytes and repeat labs to be done after interventions completed. The patient's magnesium results have been reviewed and are listed below.  No results for input(s): "MG" in the last 24 hours.    Mg 1.5 yesterday.  Will give 2 grams mag sulfate.    2/18 - treated. Recheck with tomorrow's lab.   "

## 2024-02-19 NOTE — ASSESSMENT & PLAN NOTE
Patient has Abnormal Magnesium: hypomagnesemia. Will continue to monitor electrolytes closely. Will replace the affected electrolytes and repeat labs to be done after interventions completed. The patient's magnesium results have been reviewed and are listed below.  Recent Labs   Lab 02/19/24  0552   MG 1.6*       Mg 1.5 yesterday.  Will give 2 grams mag sulfate.    2/18 - treated. Recheck with tomorrow's lab.   2/19 treated, given another 2 grams today

## 2024-02-19 NOTE — SUBJECTIVE & OBJECTIVE
Interval History: No new complaints.  Glucoses still high but more reasonable. Increasing 70/30 to 14 units BID.. He may need to adjust.  F/u Dr. Ryan in [place] this week with glucose log.     Review of Systems  Objective:     Vital Signs (Most Recent):  Temp: 97.5 °F (36.4 °C) (02/19/24 1454)  Pulse: 62 (02/19/24 1454)  Resp: 16 (02/19/24 1454)  BP: 102/66 (02/19/24 1454)  SpO2: 98 % (02/19/24 1454) Vital Signs (24h Range):  Temp:  [97.4 °F (36.3 °C)-98.1 °F (36.7 °C)] 97.5 °F (36.4 °C)  Pulse:  [62-72] 62  Resp:  [16-22] 16  SpO2:  [93 %-98 %] 98 %  BP: ()/(63-84) 102/66     Weight: 95.3 kg (210 lb)  Body mass index is 33.89 kg/m².    Intake/Output Summary (Last 24 hours) at 2/19/2024 1540  Last data filed at 2/19/2024 1454  Gross per 24 hour   Intake 800 ml   Output 700 ml   Net 100 ml         Physical Exam  Vitals reviewed.   Constitutional:       General: He is not in acute distress.     Appearance: He is not ill-appearing.   HENT:      Right Ear: External ear normal.      Left Ear: External ear normal.      Nose: Nose normal.      Mouth/Throat:      Mouth: Mucous membranes are moist.      Pharynx: Oropharynx is clear.   Eyes:      General: No scleral icterus.     Extraocular Movements: Extraocular movements intact.      Pupils: Pupils are equal, round, and reactive to light.   Cardiovascular:      Rate and Rhythm: Normal rate and regular rhythm.      Heart sounds: Normal heart sounds.   Pulmonary:      Effort: Pulmonary effort is normal. No respiratory distress.      Breath sounds: Normal breath sounds. No wheezing.   Abdominal:      General: Abdomen is flat. Bowel sounds are normal. There is no distension.      Palpations: Abdomen is soft.   Skin:     General: Skin is warm and dry.   Neurological:      General: No focal deficit present.      Mental Status: He is alert and oriented to person, place, and time.   Psychiatric:         Mood and Affect: Mood normal.         Behavior: Behavior normal.  "            Significant Labs: All pertinent labs within the past 24 hours have been reviewed.  BMP:   Recent Labs   Lab 02/19/24  0552   *      K 3.7      CO2 25   BUN 15   CREATININE 1.03   CALCIUM 9.1   MG 1.6*     CBC: No results for input(s): "WBC", "HGB", "HCT", "PLT" in the last 48 hours.    Significant Imaging: I have reviewed all pertinent imaging results/findings within the past 24 hours.  "

## 2024-02-19 NOTE — SUBJECTIVE & OBJECTIVE
Interval History:  No acute events overnight    Medications:  Continuous Infusions:   sodium chloride 0.45% 75 mL/hr at 02/19/24 0759     Scheduled Meds:   carvediloL  6.25 mg Oral Before breakfast    furosemide  20 mg Oral Daily    insulin NPH-insulin regular (70/30)  10 Units Subcutaneous BID AC    isosorbide mononitrate  30 mg Oral Daily    losartan  50 mg Oral Daily    potassium chloride  20 mEq Oral Daily     PRN Meds:acetaminophen, cloNIDine, dextrose 10%, dextrose 10%, glucagon (human recombinant), glucose, glucose, HYDROcodone-acetaminophen, insulin aspart U-100, melatonin, morphine, ondansetron     Review of patient's allergies indicates:   Allergen Reactions    Ticagrelor Shortness Of Breath    Vancomycin analogues Other (See Comments)     Patient experienced adverse effect, and had some renal insufficiency after receiving Vancomycin.     Objective:     Vital Signs (Most Recent):  Temp: 97.4 °F (36.3 °C) (02/19/24 1039)  Pulse: 68 (02/19/24 1039)  Resp: 16 (02/19/24 1039)  BP: 99/63 (02/19/24 1039)  SpO2: 97 % (02/19/24 1039) Vital Signs (24h Range):  Temp:  [97.4 °F (36.3 °C)-98.1 °F (36.7 °C)] 97.4 °F (36.3 °C)  Pulse:  [63-72] 68  Resp:  [16-22] 16  SpO2:  [93 %-97 %] 97 %  BP: ()/(61-84) 99/63     Weight: 95.3 kg (210 lb)  Body mass index is 33.89 kg/m².    Intake/Output - Last 3 Shifts         02/17 0700  02/18 0659 02/18 0700  02/19 0659 02/19 0700  02/20 0659    P.O.  600     IV Piggyback       Total Intake(mL/kg)  600 (6.3)     Urine (mL/kg/hr) 400 (0.2) 120 (0.1)     Blood       Total Output 400 120     Net -400 +480                     Physical Exam  Vitals reviewed.   Constitutional:       General: He is not in acute distress.     Appearance: He is normal weight. He is not ill-appearing.   HENT:      Right Ear: External ear normal.      Left Ear: External ear normal.      Nose: Nose normal.      Mouth/Throat:      Mouth: Mucous membranes are moist.      Pharynx: Oropharynx is clear.    Eyes:      General: No scleral icterus.     Extraocular Movements: Extraocular movements intact.      Pupils: Pupils are equal, round, and reactive to light.   Cardiovascular:      Rate and Rhythm: Normal rate and regular rhythm.      Pulses: Normal pulses.      Heart sounds: Normal heart sounds.   Pulmonary:      Effort: Pulmonary effort is normal. No respiratory distress.      Breath sounds: Normal breath sounds. No wheezing.   Abdominal:      General: Abdomen is flat. Bowel sounds are normal. There is no distension.      Palpations: Abdomen is soft. There is no mass.      Hernia: No hernia is present.   Genitourinary:     Comments: Dressing medial left buttock  Musculoskeletal:         General: No swelling or tenderness.   Lymphadenopathy:      Cervical: No cervical adenopathy.   Skin:     General: Skin is warm and dry.   Neurological:      General: No focal deficit present.      Mental Status: He is alert and oriented to person, place, and time.      Motor: No weakness.   Psychiatric:         Mood and Affect: Mood normal.         Behavior: Behavior normal.          Significant Labs:  I have reviewed all pertinent lab results within the past 24 hours.    Significant Diagnostics:  I have reviewed all pertinent imaging results/findings within the past 24 hours.

## 2024-02-19 NOTE — ASSESSMENT & PLAN NOTE
Plan incision and drainage in OR today.  R/b include bleeding, recurrence, he expresses understanding and wished to proceed.  open packing with possible delayed healing, anesthesia complication and unforeseen.    2/19:  Status post I and D of abscess.  Wound without purulence.  Wound cultures negative at 48 hours.  Okay for discharge from surgical standpoint with home health for dressing changes

## 2024-02-19 NOTE — PROGRESS NOTES
[facility]  General Surgery  Progress Note    Subjective:     History of Present Illness:  53-year-old diabetic male patient has a history of previous abscesses in various anatomical regions, presents with hyperglycemia and pain and tenderness of the bilateral medial buttocks.  The patient states he has had spontaneous drainage of the right side, but continues to experience discomfort.  The left side is severely tender and has not drained as of yet.  The patient presented initially to outside hospital, and was transferred here for surgical management.  A CT scan was performed revealing fluid collection of left perianal soft tissue with surrounding inflammatory changes.     Post-Op Info:  Procedure(s) (LRB):  INCISION, ABSCESS, PERIANAL (Bilateral)   3 Days Post-Op     Interval History:  No acute events overnight    Medications:  Continuous Infusions:   sodium chloride 0.45% 75 mL/hr at 02/19/24 0759     Scheduled Meds:   carvediloL  6.25 mg Oral Before breakfast    furosemide  20 mg Oral Daily    insulin NPH-insulin regular (70/30)  10 Units Subcutaneous BID AC    isosorbide mononitrate  30 mg Oral Daily    losartan  50 mg Oral Daily    potassium chloride  20 mEq Oral Daily     PRN Meds:acetaminophen, cloNIDine, dextrose 10%, dextrose 10%, glucagon (human recombinant), glucose, glucose, HYDROcodone-acetaminophen, insulin aspart U-100, melatonin, morphine, ondansetron     Review of patient's allergies indicates:   Allergen Reactions    Ticagrelor Shortness Of Breath    Vancomycin analogues Other (See Comments)     Patient experienced adverse effect, and had some renal insufficiency after receiving Vancomycin.     Objective:     Vital Signs (Most Recent):  Temp: 97.4 °F (36.3 °C) (02/19/24 1039)  Pulse: 68 (02/19/24 1039)  Resp: 16 (02/19/24 1039)  BP: 99/63 (02/19/24 1039)  SpO2: 97 % (02/19/24 1039) Vital Signs (24h Range):  Temp:  [97.4 °F (36.3 °C)-98.1 °F (36.7 °C)] 97.4 °F (36.3  °C)  Pulse:  [63-72] 68  Resp:  [16-22] 16  SpO2:  [93 %-97 %] 97 %  BP: ()/(61-84) 99/63     Weight: 95.3 kg (210 lb)  Body mass index is 33.89 kg/m².    Intake/Output - Last 3 Shifts         02/17 0700  02/18 0659 02/18 0700  02/19 0659 02/19 0700  02/20 0659    P.O.  600     IV Piggyback       Total Intake(mL/kg)  600 (6.3)     Urine (mL/kg/hr) 400 (0.2) 120 (0.1)     Blood       Total Output 400 120     Net -400 +480                     Physical Exam  Vitals reviewed.   Constitutional:       General: He is not in acute distress.     Appearance: He is normal weight. He is not ill-appearing.   HENT:      Right Ear: External ear normal.      Left Ear: External ear normal.      Nose: Nose normal.      Mouth/Throat:      Mouth: Mucous membranes are moist.      Pharynx: Oropharynx is clear.   Eyes:      General: No scleral icterus.     Extraocular Movements: Extraocular movements intact.      Pupils: Pupils are equal, round, and reactive to light.   Cardiovascular:      Rate and Rhythm: Normal rate and regular rhythm.      Pulses: Normal pulses.      Heart sounds: Normal heart sounds.   Pulmonary:      Effort: Pulmonary effort is normal. No respiratory distress.      Breath sounds: Normal breath sounds. No wheezing.   Abdominal:      General: Abdomen is flat. Bowel sounds are normal. There is no distension.      Palpations: Abdomen is soft. There is no mass.      Hernia: No hernia is present.   Genitourinary:     Comments: Dressing medial left buttock  Musculoskeletal:         General: No swelling or tenderness.   Lymphadenopathy:      Cervical: No cervical adenopathy.   Skin:     General: Skin is warm and dry.   Neurological:      General: No focal deficit present.      Mental Status: He is alert and oriented to person, place, and time.      Motor: No weakness.   Psychiatric:         Mood and Affect: Mood normal.         Behavior: Behavior normal.          Significant Labs:  I have reviewed all pertinent lab  results within the past 24 hours.    Significant Diagnostics:  I have reviewed all pertinent imaging results/findings within the past 24 hours.  Assessment/Plan:     * Perianal abscess  Plan incision and drainage in OR today.  R/b include bleeding, recurrence, he expresses understanding and wished to proceed.  open packing with possible delayed healing, anesthesia complication and unforeseen.    2/19:  Status post I and D of abscess.  Wound without purulence.  Wound cultures negative at 48 hours.  Okay for discharge from surgical standpoint with home health for dressing changes        SHRAVAN Zavala  General Surgery  [facility]

## 2024-02-19 NOTE — SUBJECTIVE & OBJECTIVE
Interval History: Glucose got surprisingly high last night.  Started 70/30 this am and monitored through the day.  Apparent he will need more.  Dose increased to 10 units BID.     Review of Systems  Objective:     Vital Signs (Most Recent):  Temp: 97.4 °F (36.3 °C) (02/18/24 1441)  Pulse: 72 (02/18/24 1740)  Resp: 16 (02/18/24 1740)  BP: 97/61 (02/18/24 1441)  SpO2: 97 % (02/18/24 1740) Vital Signs (24h Range):  Temp:  [97.4 °F (36.3 °C)-98.1 °F (36.7 °C)] 97.4 °F (36.3 °C)  Pulse:  [63-73] 72  Resp:  [16-20] 16  SpO2:  [95 %-98 %] 97 %  BP: ()/(51-71) 97/61     Weight: 95.3 kg (210 lb)  Body mass index is 33.89 kg/m².    Intake/Output Summary (Last 24 hours) at 2/18/2024 1918  Last data filed at 2/18/2024 1441  Gross per 24 hour   Intake 600 ml   Output 520 ml   Net 80 ml         Physical Exam  Vitals reviewed.   Constitutional:       General: He is not in acute distress.     Appearance: He is not ill-appearing.   HENT:      Right Ear: External ear normal.      Left Ear: External ear normal.      Nose: Nose normal.      Mouth/Throat:      Mouth: Mucous membranes are moist.      Pharynx: Oropharynx is clear.   Eyes:      General: No scleral icterus.     Extraocular Movements: Extraocular movements intact.      Pupils: Pupils are equal, round, and reactive to light.   Cardiovascular:      Rate and Rhythm: Normal rate and regular rhythm.      Heart sounds: Normal heart sounds.   Pulmonary:      Effort: Pulmonary effort is normal. No respiratory distress.      Breath sounds: Normal breath sounds. No wheezing.   Abdominal:      General: Abdomen is flat. Bowel sounds are normal. There is no distension.      Palpations: Abdomen is soft.   Skin:     General: Skin is warm and dry.   Neurological:      General: No focal deficit present.      Mental Status: He is alert and oriented to person, place, and time.   Psychiatric:         Mood and Affect: Mood normal.         Behavior: Behavior normal.             Significant  Labs: All pertinent labs within the past 24 hours have been reviewed.  BMP:   Recent Labs   Lab 02/17/24  0330   *   *   K 3.3*      CO2 24   BUN 10   CREATININE 1.01   CALCIUM 9.1     CBC:   Recent Labs   Lab 02/17/24  0330   WBC 10.84   HGB 12.0*   HCT 36.2*          Significant Imaging: I have reviewed all pertinent imaging results/findings within the past 24 hours.

## 2024-02-19 NOTE — PROGRESS NOTES
[facility] Medicine  Progress Note    Patient Name: Faustino Fischer  MRN: [id number]  Patient Class: IP- Inpatient   Admission Date: 2/16/2024  Length of Stay: 1 days  Attending Physician: Edson Astorga Jr., MD  Primary Care Provider: Fidel Park MD        Subjective:     Principal Problem:Perianal abscess        HPI:  Thank you Dr. Park for consulting on this patient.  He is a 54 y/o AA male well known to me from prior admissions.  He was admitted to [facility] yesterday with a perianal abscess that was drained in the OR yesterday.  He has a hx of DM for which he used to take metformin but he stopped due to diarrhea. He is no longer on any meds.   PCP is Dr. Ryan in [place].  Medicine consulted for DM control.  Patient's A1C this admission is 10.7.  It was 6.3 eight months ago.   Reviewed diabetic diet with patient.  He is well educated on this.       Overview/Hospital Course:  No notes on file    Interval History: No new complaints.  Glucoses still high but more reasonable. Increasing 70/30 to 14 units BID.. He may need to adjust.  F/u Dr. Ryan in [place] this week with glucose log.     Review of Systems  Objective:     Vital Signs (Most Recent):  Temp: 97.5 °F (36.4 °C) (02/19/24 1454)  Pulse: 62 (02/19/24 1454)  Resp: 16 (02/19/24 1454)  BP: 102/66 (02/19/24 1454)  SpO2: 98 % (02/19/24 1454) Vital Signs (24h Range):  Temp:  [97.4 °F (36.3 °C)-98.1 °F (36.7 °C)] 97.5 °F (36.4 °C)  Pulse:  [62-72] 62  Resp:  [16-22] 16  SpO2:  [93 %-98 %] 98 %  BP: ()/(63-84) 102/66     Weight: 95.3 kg (210 lb)  Body mass index is 33.89 kg/m².    Intake/Output Summary (Last 24 hours) at 2/19/2024 1540  Last data filed at 2/19/2024 1454  Gross per 24 hour   Intake 800 ml   Output 700 ml   Net 100 ml         Physical Exam  Vitals reviewed.   Constitutional:       General: He is not in acute distress.     Appearance: He is not ill-appearing.   HENT:      Right Ear: External ear normal.       "Left Ear: External ear normal.      Nose: Nose normal.      Mouth/Throat:      Mouth: Mucous membranes are moist.      Pharynx: Oropharynx is clear.   Eyes:      General: No scleral icterus.     Extraocular Movements: Extraocular movements intact.      Pupils: Pupils are equal, round, and reactive to light.   Cardiovascular:      Rate and Rhythm: Normal rate and regular rhythm.      Heart sounds: Normal heart sounds.   Pulmonary:      Effort: Pulmonary effort is normal. No respiratory distress.      Breath sounds: Normal breath sounds. No wheezing.   Abdominal:      General: Abdomen is flat. Bowel sounds are normal. There is no distension.      Palpations: Abdomen is soft.   Skin:     General: Skin is warm and dry.   Neurological:      General: No focal deficit present.      Mental Status: He is alert and oriented to person, place, and time.   Psychiatric:         Mood and Affect: Mood normal.         Behavior: Behavior normal.             Significant Labs: All pertinent labs within the past 24 hours have been reviewed.  BMP:   Recent Labs   Lab 02/19/24  0552   *      K 3.7      CO2 25   BUN 15   CREATININE 1.03   CALCIUM 9.1   MG 1.6*     CBC: No results for input(s): "WBC", "HGB", "HCT", "PLT" in the last 48 hours.    Significant Imaging: I have reviewed all pertinent imaging results/findings within the past 24 hours.    Assessment/Plan:      * Perianal abscess    As per general surgery.     Type 2 diabetes mellitus with hyperglycemia, without long-term current use of insulin    After extensive review of his diet and eating habits, I am starting a low dose of 70/30 insulin in the morning with breakfast only.  Reviewed the medication with patient and his brother.  Explained the importance of eating lunch on time as that is when the NPH will kick in.   It is possible that he will be able to be transitioned to an oral agent in the future.  Reviewed hypoglycemic symptoms and patient understands.  " He is to keep a log of his glucoses and follow up with Dr. Ryan in [place] 2 weeks after discharge.      2/18 - Changed dose 70/30 to 10units with breakfast and supper. Monitor response.     2/19 - Increase 70/30 to 14 units BID. F/u Dr. Ryan later this week.     Hypomagnesemia  Patient has Abnormal Magnesium: hypomagnesemia. Will continue to monitor electrolytes closely. Will replace the affected electrolytes and repeat labs to be done after interventions completed. The patient's magnesium results have been reviewed and are listed below.  Recent Labs   Lab 02/19/24  0552   MG 1.6*       Mg 1.5 yesterday.  Will give 2 grams mag sulfate.    2/18 - treated. Recheck with tomorrow's lab.   2/19 treated, given another 2 grams today    Hypokalemia  Patient has hypokalemia which is Acute on Chronic and currently uncontrolled. Most recent potassium levels reviewed-   Lab Results   Component Value Date    K 3.7 02/19/2024   . Will continue potassium replacement per protocol and recheck repeat levels after replacement completed.     2/18 - added daily KCl      VTE Risk Mitigation (From admission, onward)           Ordered     Place sequential compression device  Until discontinued         02/16/24 1822                    Discharge Planning   BETY:      Code Status: Prior   Is the patient medically ready for discharge?:     Reason for patient still in hospital (select all that apply): Treatment  Discharge Plan A: Home, Home Health                  Edson Astorga Jr, MD  Department of Hospital Medicine   [facility]

## 2024-02-19 NOTE — ASSESSMENT & PLAN NOTE
After extensive review of his diet and eating habits, I am starting a low dose of 70/30 insulin in the morning with breakfast only.  Reviewed the medication with patient and his brother.  Explained the importance of eating lunch on time as that is when the NPH will kick in.   It is possible that he will be able to be transitioned to an oral agent in the future.  Reviewed hypoglycemic symptoms and patient understands.  He is to keep a log of his glucoses and follow up with Dr. Ryan in [place] 2 weeks after discharge.      2/18 - Changed dose 70/30 to 10units with breakfast and supper. Monitor response.     2/19 - Increase 70/30 to 14 units BID. F/u Dr. Ryan later this week.

## 2024-02-19 NOTE — PROGRESS NOTES
[facility]  [facility] Medicine  Progress Note    Patient Name: Faustino Fischer  MRN: [id number]  Patient Class: IP- Inpatient   Admission Date: 2/16/2024  Length of Stay: 0 days  Attending Physician: Edson Astorga Jr., MD  Primary Care Provider: Fidel Park MD        Subjective:     Principal Problem:Perianal abscess        HPI:  Thank you Dr. Park for consulting on this patient.  He is a 54 y/o AA male well known to me from prior admissions.  He was admitted to [facility] yesterday with a perianal abscess that was drained in the OR yesterday.  He has a hx of DM for which he used to take metformin but he stopped due to diarrhea. He is no longer on any meds.   PCP is Dr. Ryan in [place].  Medicine consulted for DM control.  Patient's A1C this admission is 10.7.  It was 6.3 eight months ago.   Reviewed diabetic diet with patient.  He is well educated on this.       Overview/Hospital Course:  No notes on file    Interval History: Glucose got surprisingly high last night.  Started 70/30 this am and monitored through the day.  Apparent he will need more.  Dose increased to 10 units BID.     Review of Systems  Objective:     Vital Signs (Most Recent):  Temp: 97.4 °F (36.3 °C) (02/18/24 1441)  Pulse: 72 (02/18/24 1740)  Resp: 16 (02/18/24 1740)  BP: 97/61 (02/18/24 1441)  SpO2: 97 % (02/18/24 1740) Vital Signs (24h Range):  Temp:  [97.4 °F (36.3 °C)-98.1 °F (36.7 °C)] 97.4 °F (36.3 °C)  Pulse:  [63-73] 72  Resp:  [16-20] 16  SpO2:  [95 %-98 %] 97 %  BP: ()/(51-71) 97/61     Weight: 95.3 kg (210 lb)  Body mass index is 33.89 kg/m².    Intake/Output Summary (Last 24 hours) at 2/18/2024 1918  Last data filed at 2/18/2024 1441  Gross per 24 hour   Intake 600 ml   Output 520 ml   Net 80 ml         Physical Exam  Vitals reviewed.   Constitutional:       General: He is not in acute distress.     Appearance: He is not ill-appearing.   HENT:      Right Ear: External ear normal.      Left Ear: External ear  normal.      Nose: Nose normal.      Mouth/Throat:      Mouth: Mucous membranes are moist.      Pharynx: Oropharynx is clear.   Eyes:      General: No scleral icterus.     Extraocular Movements: Extraocular movements intact.      Pupils: Pupils are equal, round, and reactive to light.   Cardiovascular:      Rate and Rhythm: Normal rate and regular rhythm.      Heart sounds: Normal heart sounds.   Pulmonary:      Effort: Pulmonary effort is normal. No respiratory distress.      Breath sounds: Normal breath sounds. No wheezing.   Abdominal:      General: Abdomen is flat. Bowel sounds are normal. There is no distension.      Palpations: Abdomen is soft.   Skin:     General: Skin is warm and dry.   Neurological:      General: No focal deficit present.      Mental Status: He is alert and oriented to person, place, and time.   Psychiatric:         Mood and Affect: Mood normal.         Behavior: Behavior normal.             Significant Labs: All pertinent labs within the past 24 hours have been reviewed.  BMP:   Recent Labs   Lab 02/17/24  0330   *   *   K 3.3*      CO2 24   BUN 10   CREATININE 1.01   CALCIUM 9.1     CBC:   Recent Labs   Lab 02/17/24  0330   WBC 10.84   HGB 12.0*   HCT 36.2*          Significant Imaging: I have reviewed all pertinent imaging results/findings within the past 24 hours.    Assessment/Plan:      * Perianal abscess    As per general surgery.     Type 2 diabetes mellitus with hyperglycemia, without long-term current use of insulin    After extensive review of his diet and eating habits, I am starting a low dose of 70/30 insulin in the morning with breakfast only.  Reviewed the medication with patient and his brother.  Explained the importance of eating lunch on time as that is when the NPH will kick in.   It is possible that he will be able to be transitioned to an oral agent in the future.  Reviewed hypoglycemic symptoms and patient understands.  He is to keep a log of  "his glucoses and follow up with Dr. Ryan in [place] 2 weeks after discharge.      2/18 - Changed dose 70/30 to 10units with breakfast and supper. Monitor response.     Hypomagnesemia  Patient has Abnormal Magnesium: hypomagnesemia. Will continue to monitor electrolytes closely. Will replace the affected electrolytes and repeat labs to be done after interventions completed. The patient's magnesium results have been reviewed and are listed below.  No results for input(s): "MG" in the last 24 hours.    Mg 1.5 yesterday.  Will give 2 grams mag sulfate.    2/18 - treated. Recheck with tomorrow's lab.     Hypokalemia  Patient has hypokalemia which is Acute on Chronic and currently uncontrolled. Most recent potassium levels reviewed-   Lab Results   Component Value Date    K 3.3 (L) 02/17/2024   . Will continue potassium replacement per protocol and recheck repeat levels after replacement completed.     2/18 - added daily KCl      VTE Risk Mitigation (From admission, onward)           Ordered     Place sequential compression device  Until discontinued         02/16/24 1822                    Discharge Planning   BETY:      Code Status: Prior   Is the patient medically ready for discharge?:     Reason for patient still in hospital (select all that apply): Treatment  Discharge Plan A: Home, Home Health                  Edson Astorga Jr, MD  Department of Hospital Medicine   [facility]    "

## 2024-02-19 NOTE — ASSESSMENT & PLAN NOTE
After extensive review of his diet and eating habits, I am starting a low dose of 70/30 insulin in the morning with breakfast only.  Reviewed the medication with patient and his brother.  Explained the importance of eating lunch on time as that is when the NPH will kick in.   It is possible that he will be able to be transitioned to an oral agent in the future.  Reviewed hypoglycemic symptoms and patient understands.  He is to keep a log of his glucoses and follow up with Dr. Ryan in [place] 2 weeks after discharge.      2/18 - Changed dose 70/30 to 10units with breakfast and supper. Monitor response.

## 2024-02-19 NOTE — ASSESSMENT & PLAN NOTE
Patient has hypokalemia which is Acute on Chronic and currently uncontrolled. Most recent potassium levels reviewed-   Lab Results   Component Value Date    K 3.3 (L) 02/17/2024   . Will continue potassium replacement per protocol and recheck repeat levels after replacement completed.     2/18 - added daily KCl

## 2024-02-20 VITALS
SYSTOLIC BLOOD PRESSURE: 127 MMHG | WEIGHT: 210 LBS | RESPIRATION RATE: 16 BRPM | DIASTOLIC BLOOD PRESSURE: 83 MMHG | OXYGEN SATURATION: 98 % | BODY MASS INDEX: 33.75 KG/M2 | TEMPERATURE: 98 F | HEIGHT: 66 IN | HEART RATE: 64 BPM

## 2024-02-20 PROBLEM — E66.9 OBESITY (BMI 30-39.9): Status: ACTIVE | Noted: 2024-02-20

## 2024-02-20 PROBLEM — E87.6 HYPOKALEMIA: Status: RESOLVED | Noted: 2023-12-06 | Resolved: 2024-02-20

## 2024-02-20 LAB
BACTERIA SPEC ANAEROBE CULT: NORMAL
GLUCOSE SERPL-MCNC: 203 MG/DL (ref 70–105)
GLUCOSE SERPL-MCNC: 226 MG/DL (ref 70–105)

## 2024-02-20 PROCEDURE — 25000003 PHARM REV CODE 250: Performed by: FAMILY MEDICINE

## 2024-02-20 PROCEDURE — 82962 GLUCOSE BLOOD TEST: CPT

## 2024-02-20 PROCEDURE — 63600175 PHARM REV CODE 636 W HCPCS: Performed by: FAMILY MEDICINE

## 2024-02-20 PROCEDURE — 25000003 PHARM REV CODE 250: Performed by: SURGERY

## 2024-02-20 PROCEDURE — 99239 HOSP IP/OBS DSCHRG MGMT >30: CPT | Mod: ,,, | Performed by: HOSPITALIST

## 2024-02-20 PROCEDURE — 63600175 PHARM REV CODE 636 W HCPCS

## 2024-02-20 RX ORDER — SEMAGLUTIDE 0.68 MG/ML
0.5 INJECTION, SOLUTION SUBCUTANEOUS
Qty: 3 ML | Refills: 2 | Status: SHIPPED | OUTPATIENT
Start: 2024-02-20 | End: 2024-05-20

## 2024-02-20 RX ORDER — CARVEDILOL 12.5 MG/1
12.5 TABLET ORAL
Qty: 90 TABLET | Refills: 1 | Status: SHIPPED | OUTPATIENT
Start: 2024-02-20

## 2024-02-20 RX ORDER — INSULIN GLARGINE 100 [IU]/ML
10 INJECTION, SOLUTION SUBCUTANEOUS DAILY
Qty: 9 ML | Refills: 1 | Status: ON HOLD | OUTPATIENT
Start: 2024-02-20 | End: 2024-06-10 | Stop reason: HOSPADM

## 2024-02-20 RX ORDER — METFORMIN HYDROCHLORIDE 1000 MG/1
1000 TABLET ORAL 2 TIMES DAILY WITH MEALS
Qty: 180 TABLET | Refills: 3 | Status: ON HOLD | OUTPATIENT
Start: 2024-02-20 | End: 2024-06-10

## 2024-02-20 RX ADMIN — HUMAN INSULIN 14 UNITS: 100 INJECTION, SUSPENSION SUBCUTANEOUS at 06:02

## 2024-02-20 RX ADMIN — LOSARTAN POTASSIUM 50 MG: 50 TABLET, FILM COATED ORAL at 08:02

## 2024-02-20 RX ADMIN — HUMAN INSULIN 14 UNITS: 100 INJECTION, SUSPENSION SUBCUTANEOUS at 04:02

## 2024-02-20 RX ADMIN — POTASSIUM CHLORIDE 20 MEQ: 1500 TABLET, EXTENDED RELEASE ORAL at 08:02

## 2024-02-20 RX ADMIN — FUROSEMIDE 20 MG: 20 TABLET ORAL at 08:02

## 2024-02-20 RX ADMIN — ISOSORBIDE MONONITRATE 30 MG: 30 TABLET, EXTENDED RELEASE ORAL at 08:02

## 2024-02-20 RX ADMIN — SODIUM CHLORIDE: 4.5 INJECTION, SOLUTION INTRAVENOUS at 05:02

## 2024-02-20 RX ADMIN — CARVEDILOL 6.25 MG: 6.25 TABLET, FILM COATED ORAL at 05:02

## 2024-02-20 NOTE — SUBJECTIVE & OBJECTIVE
Interval History:   No acute events overnight.  Patient without complaints on exam    Medications:  Continuous Infusions:   sodium chloride 0.45% 75 mL/hr at 02/20/24 0519     Scheduled Meds:   carvediloL  6.25 mg Oral Before breakfast    furosemide  20 mg Oral Daily    insulin NPH-insulin regular (70/30)  14 Units Subcutaneous BID AC    isosorbide mononitrate  30 mg Oral Daily    losartan  50 mg Oral Daily    potassium chloride  20 mEq Oral Daily     PRN Meds:acetaminophen, cloNIDine, dextrose 10%, dextrose 10%, glucagon (human recombinant), glucose, glucose, HYDROcodone-acetaminophen, insulin aspart U-100, melatonin, morphine, ondansetron     Review of patient's allergies indicates:   Allergen Reactions    Ticagrelor Shortness Of Breath    Vancomycin analogues Other (See Comments)     Patient experienced adverse effect, and had some renal insufficiency after receiving Vancomycin.     Objective:     Vital Signs (Most Recent):  Temp: 97.6 °F (36.4 °C) (02/20/24 1020)  Pulse: 66 (02/20/24 1020)  Resp: 16 (02/20/24 1020)  BP: 114/75 (02/20/24 1020)  SpO2: 97 % (02/20/24 1020) Vital Signs (24h Range):  Temp:  [97.5 °F (36.4 °C)-98 °F (36.7 °C)] 97.6 °F (36.4 °C)  Pulse:  [62-70] 66  Resp:  [16-18] 16  SpO2:  [97 %-98 %] 97 %  BP: (102-128)/(66-89) 114/75     Weight: 95.3 kg (210 lb)  Body mass index is 33.89 kg/m².    Intake/Output - Last 3 Shifts         02/18 0700  02/19 0659 02/19 0700  02/20 0659 02/20 0700  02/21 0659    P.O. 600 1040 700    Total Intake(mL/kg) 600 (6.3) 1040 (10.9) 700 (7.3)    Urine (mL/kg/hr) 120 (0.1) 1200 (0.5)     Total Output 120 1200     Net +480 -160 +700           Urine Occurrence  2 x              Physical Exam  Vitals reviewed.   Constitutional:       General: He is not in acute distress.     Appearance: He is normal weight. He is not ill-appearing.   HENT:      Right Ear: External ear normal.      Left Ear: External ear normal.      Nose: Nose normal.      Mouth/Throat:      Mouth:  Mucous membranes are moist.      Pharynx: Oropharynx is clear.   Eyes:      General: No scleral icterus.     Extraocular Movements: Extraocular movements intact.      Pupils: Pupils are equal, round, and reactive to light.   Cardiovascular:      Rate and Rhythm: Normal rate and regular rhythm.      Pulses: Normal pulses.      Heart sounds: Normal heart sounds.   Pulmonary:      Effort: Pulmonary effort is normal. No respiratory distress.      Breath sounds: Normal breath sounds. No wheezing.   Abdominal:      General: Abdomen is flat. Bowel sounds are normal. There is no distension.      Palpations: Abdomen is soft. There is no mass.      Hernia: No hernia is present.   Genitourinary:     Comments: Dressing medial left buttock  Musculoskeletal:         General: No swelling or tenderness.   Lymphadenopathy:      Cervical: No cervical adenopathy.   Skin:     General: Skin is warm and dry.   Neurological:      General: No focal deficit present.      Mental Status: He is alert and oriented to person, place, and time.      Motor: No weakness.   Psychiatric:         Mood and Affect: Mood normal.         Behavior: Behavior normal.          Significant Labs:  I have reviewed all pertinent lab results within the past 24 hours.    Significant Diagnostics:  I have reviewed all pertinent imaging results/findings within the past 24 hours.

## 2024-02-20 NOTE — PLAN OF CARE
Problem: Adult Inpatient Plan of Care  Goal: Plan of Care Review  2/20/2024 1652 by Mary Goodrich RN  Outcome: Met  2/20/2024 1553 by Mary Goodrich RN  Outcome: Ongoing, Progressing  Goal: Patient-Specific Goal (Individualized)  2/20/2024 1652 by Mary Goodrich RN  Outcome: Met  2/20/2024 1553 by Mary Goodrich RN  Outcome: Ongoing, Progressing  Goal: Absence of Hospital-Acquired Illness or Injury  2/20/2024 1652 by Mary Goodrich RN  Outcome: Met  2/20/2024 1553 by Mary Goodrich RN  Outcome: Ongoing, Progressing  Goal: Optimal Comfort and Wellbeing  2/20/2024 1652 by Mary Goodrich RN  Outcome: Met  2/20/2024 1553 by Mary Goodrich RN  Outcome: Ongoing, Progressing  Goal: Readiness for Transition of Care  2/20/2024 1652 by Mary Goodrich RN  Outcome: Met  2/20/2024 1553 by Mary Goodrich RN  Outcome: Ongoing, Progressing     Problem: Diabetes Comorbidity  Goal: Blood Glucose Level Within Targeted Range  2/20/2024 1652 by Mary Goodrich RN  Outcome: Met  2/20/2024 1553 by Mary Goodrich RN  Outcome: Ongoing, Progressing     Problem: Fall Injury Risk  Goal: Absence of Fall and Fall-Related Injury  2/20/2024 1652 by Mary Goodrich RN  Outcome: Met  2/20/2024 1553 by Mary Goodrich RN  Outcome: Ongoing, Progressing

## 2024-02-20 NOTE — ASSESSMENT & PLAN NOTE
Plan incision and drainage in OR today.  R/b include bleeding, recurrence, he expresses understanding and wished to proceed.  open packing with possible delayed healing, anesthesia complication and unforeseen.    2/19:  Status post I and D of abscess.  Wound without purulence.  Wound cultures negative at 48 hours.  Okay for discharge from surgical standpoint with home health for dressing changes  2/20:   dressing change wound without purulence or odor. Patient remains afebrile white count 10.  Okay for discharge from surgical perspective

## 2024-02-20 NOTE — ASSESSMENT & PLAN NOTE
Body mass index is 33.89 kg/m². Morbid obesity complicates all aspects of disease management from diagnostic modalities to treatment. Weight loss encouraged and health benefits explained to patient.

## 2024-02-20 NOTE — DISCHARGE SUMMARY
[facility] Hospital Medicine  Discharge Summary      Patient Name: Faustino Fischer  MRN: [id number]  ELÍAS: [id number]  Patient Class: IP- Inpatient  Admission Date: 2/16/2024  Hospital Length of Stay: 2 days  Discharge Date and Time:  02/20/2024 4:19 PM  Attending Physician: Reba Guidry MD   Discharging Provider: Reba Guidry MD  Primary Care Provider: Ajith Park MD    Primary Care Team: Networked reference to record PCT     HPI:   Thank you Dr. Park for consulting on this patient.  He is a 54 y/o AA male well known to me from prior admissions.  He was admitted to [facility] yesterday with a perianal abscess that was drained in the OR yesterday.  He has a hx of DM for which he used to take metformin but he stopped due to diarrhea. He is no longer on any meds.   PCP is Dr. Ryan in [place].  Medicine consulted for DM control.  Patient's A1C this admission is 10.7.  It was 6.3 eight months ago.   Reviewed diabetic diet with patient.  He is well educated on this.       Procedure(s) (LRB):  INCISION, ABSCESS, PERIANAL (Bilateral)      Hospital Course:   No notes on file     Goals of Care Treatment Preferences:  Code Status: Full Code      Consults:   Consults (From admission, onward)          Status Ordering Provider     Inpatient consult to Social Work  Once        Provider:  (Not yet assigned)    Completed AJITH PARK     Inpatient consult to Internal Medicine  Once        Provider:  Pedro Pierce, DO    Acknowledged AJITH PARK            Derm  Hidradenitis suppurativa  F/u with dermatology        Cardiac/Vascular  Hypertension  Chronic, controlled. Latest blood pressure and vitals reviewed-     Temp:  [97.6 °F (36.4 °C)-98.3 °F (36.8 °C)]   Pulse:  [62-70]   Resp:  [16-18]   BP: (104-128)/(68-89)   SpO2:  [97 %-98 %] .   Home meds for hypertension were reviewed and noted below.   Hypertension Medications               carvediloL (COREG) 12.5 MG tablet Take 1 tablet (12.5 mg  "total) by mouth before breakfast.    furosemide (LASIX) 20 MG tablet Take 20 mg by mouth once daily.    isosorbide mononitrate (IMDUR) 30 MG 24 hr tablet Take 1 tablet (30 mg total) by mouth once daily. Take 1/2 tablet with breakfast    losartan (COZAAR) 100 MG tablet Take 1 tablet (100 mg total) by mouth once daily.    NITROGLYCERIN SL Place under the tongue.            While in the hospital, will manage blood pressure as follows; Continue home antihypertensive regimen    Will utilize p.r.n. blood pressure medication only if patient's blood pressure greater than 160/100 and he develops symptoms such as worsening chest pain or shortness of breath.    Dyslipidemia  Continue home statin        Renal/  Hypomagnesemia  Patient has Abnormal Magnesium: hypomagnesemia. Will continue to monitor electrolytes closely. Will replace the affected electrolytes and repeat labs to be done after interventions completed. The patient's magnesium results have been reviewed and are listed below.  No results for input(s): "MG" in the last 24 hours.      Mg 1.5 yesterday.  Will give 2 grams mag sulfate.    2/18 - treated. Recheck with tomorrow's lab.   2/19 treated, given another 2 grams today    Endocrine  Obesity (BMI 30-39.9)  Body mass index is 33.89 kg/m². Morbid obesity complicates all aspects of disease management from diagnostic modalities to treatment. Weight loss encouraged and health benefits explained to patient.         Type 2 diabetes mellitus with hyperglycemia, without long-term current use of insulin    After extensive review of his diet and eating habits, I am starting a low dose of 70/30 insulin in the morning with breakfast only.  Reviewed the medication with patient and his brother.  Explained the importance of eating lunch on time as that is when the NPH will kick in.   It is possible that he will be able to be transitioned to an oral agent in the future.  Reviewed hypoglycemic symptoms and patient understands.  He " is to keep a log of his glucoses and follow up with Dr. Ryan in [place] 2 weeks after discharge.      2/18 - Changed dose 70/30 to 10units with breakfast and supper. Monitor response.     2/19 - Increase 70/30 to 14 units BID. F/u Dr. Ryan later this week.     Discharge on metformin 1g twice a day and lantus 10 units daily.  The lantus can be increased if needed.  F/u with PCP.    Ozempic prescribed but may need prior auth or other discounts.       GI  * Perianal abscess    As per general surgery.     F/u with general surgery, Dr. Park in 2 weeks.   No antibiotics at this time.  Patient also follows with dermatology.     Recommend a chlorhexidine bath.        Final Active Diagnoses:    Diagnosis Date Noted POA    PRINCIPAL PROBLEM:  Perianal abscess [K61.0] 02/16/2024 Yes    Obesity (BMI 30-39.9) [E66.9] 02/20/2024 Yes    Type 2 diabetes mellitus with hyperglycemia, without long-term current use of insulin [E11.65] 06/14/2023 Yes    Dyslipidemia [E78.5] 06/14/2023 Yes    Hypertension [I10] 06/14/2023 Yes    Hidradenitis suppurativa [L73.2] 06/07/2023 Yes    Hypomagnesemia [E83.42] 06/26/2021 Yes      Problems Resolved During this Admission:    Diagnosis Date Noted Date Resolved POA    Hypokalemia [E87.6] 12/06/2023 02/20/2024 Yes       Discharged Condition: fair    Disposition: Home or Self Care    Follow Up:   Follow-up Information       Juan Pablo Ryan MD Follow up in 1 week(s).    Specialty: Family Medicine  Why: discharge follow-up for elevated blood glucose and perirectal abscess  Contact information:  [address] [facility]  [facility] [place] [address]  [phone]               Fidel Park MD Follow up in 2 week(s).    Specialties: General Surgery, Surgery  Why: hospital follow-up for recurrent abscess  Contact information:  [address] [place] [address] [place] [address]  [phone]                           Patient Instructions:      Diet diabetic     Activity as tolerated  "      Significant Diagnostic Studies: Labs: BMP:   Recent Labs   Lab 02/19/24  0552   *      K 3.7      CO2 25   BUN 15   CREATININE 1.03   CALCIUM 9.1   MG 1.6*    and CBC No results for input(s): "WBC", "HGB", "HCT", "PLT" in the last 48 hours.    Pending Diagnostic Studies:       None           Medications:  Reconciled Home Medications:      Medication List        START taking these medications      LANTUS SOLOSTAR U-100 INSULIN glargine 100 units/mL SubQ pen  Generic drug: insulin  Inject 10 Units into the skin once daily.     metFORMIN 1000 MG tablet  Commonly known as: GLUCOPHAGE  Take 1 tablet (1,000 mg total) by mouth 2 (two) times daily with meals.     OZEMPIC 0.25 mg or 0.5 mg (2 mg/3 mL) pen injector  Generic drug: semaglutide  Inject 0.5 mg into the skin every 7 days.            CONTINUE taking these medications      aluminum-magnesium hydroxide-simethicone 200-200-20 mg/5 mL Susp  Commonly known as: MAALOX  Take 15 mLs by mouth every 6 (six) hours as needed.     aspirin 81 MG Chew  Take 1 tablet (81 mg total) by mouth once daily.     bisacodyL 5 mg EC tablet  Commonly known as: DULCOLAX  Take 2 tablets (10 mg total) by mouth daily as needed for Constipation.     carvediloL 12.5 MG tablet  Commonly known as: COREG  Take 1 tablet (12.5 mg total) by mouth before breakfast.     furosemide 20 MG tablet  Commonly known as: LASIX  Take 20 mg by mouth once daily.     isosorbide mononitrate 30 MG 24 hr tablet  Commonly known as: IMDUR  Take 1 tablet (30 mg total) by mouth once daily. Take 1/2 tablet with breakfast     losartan 100 MG tablet  Commonly known as: COZAAR  Take 1 tablet (100 mg total) by mouth once daily.     NITROGLYCERIN SL  Place under the tongue.     simethicone 80 MG chewable tablet  Commonly known as: MYLICON  Take 1 tablet (80 mg total) by mouth every 6 (six) hours as needed for Flatulence.              Indwelling Lines/Drains at time of discharge:   Lines/Drains/Airways  "      None                   Time spent on the discharge of patient: 45 minutes         Reba Guidry MD  Department of Hospital Medicine  [facility]

## 2024-02-20 NOTE — ASSESSMENT & PLAN NOTE
Chronic, controlled. Latest blood pressure and vitals reviewed-     Temp:  [97.6 °F (36.4 °C)-98.3 °F (36.8 °C)]   Pulse:  [62-70]   Resp:  [16-18]   BP: (104-128)/(68-89)   SpO2:  [97 %-98 %] .   Home meds for hypertension were reviewed and noted below.   Hypertension Medications               carvediloL (COREG) 12.5 MG tablet Take 1 tablet (12.5 mg total) by mouth before breakfast.    furosemide (LASIX) 20 MG tablet Take 20 mg by mouth once daily.    isosorbide mononitrate (IMDUR) 30 MG 24 hr tablet Take 1 tablet (30 mg total) by mouth once daily. Take 1/2 tablet with breakfast    losartan (COZAAR) 100 MG tablet Take 1 tablet (100 mg total) by mouth once daily.    NITROGLYCERIN SL Place under the tongue.            While in the hospital, will manage blood pressure as follows; Continue home antihypertensive regimen    Will utilize p.r.n. blood pressure medication only if patient's blood pressure greater than 160/100 and he develops symptoms such as worsening chest pain or shortness of breath.

## 2024-02-20 NOTE — PROGRESS NOTES
[facility]  Progress Note    Subjective:     History of Present Illness:  53-year-old diabetic male patient has a history of previous abscesses in various anatomical regions, presents with hyperglycemia and pain and tenderness of the bilateral medial buttocks.  The patient states he has had spontaneous drainage of the right side, but continues to experience discomfort.  The left side is severely tender and has not drained as of yet.  The patient presented initially to outside hospital, and was transferred here for surgical management.  A CT scan was performed revealing fluid collection of left perianal soft tissue with surrounding inflammatory changes.     Post-Op Info:  Procedure(s) (LRB):  INCISION, ABSCESS, PERIANAL (Bilateral)   4 Days Post-Op     Interval History:   No acute events overnight.  Patient without complaints on exam    Medications:  Continuous Infusions:   sodium chloride 0.45% 75 mL/hr at 02/20/24 0519     Scheduled Meds:   carvediloL  6.25 mg Oral Before breakfast    furosemide  20 mg Oral Daily    insulin NPH-insulin regular (70/30)  14 Units Subcutaneous BID AC    isosorbide mononitrate  30 mg Oral Daily    losartan  50 mg Oral Daily    potassium chloride  20 mEq Oral Daily     PRN Meds:acetaminophen, cloNIDine, dextrose 10%, dextrose 10%, glucagon (human recombinant), glucose, glucose, HYDROcodone-acetaminophen, insulin aspart U-100, melatonin, morphine, ondansetron     Review of patient's allergies indicates:   Allergen Reactions    Ticagrelor Shortness Of Breath    Vancomycin analogues Other (See Comments)     Patient experienced adverse effect, and had some renal insufficiency after receiving Vancomycin.     Objective:     Vital Signs (Most Recent):  Temp: 97.6 °F (36.4 °C) (02/20/24 1020)  Pulse: 66 (02/20/24 1020)  Resp: 16 (02/20/24 1020)  BP: 114/75 (02/20/24 1020)  SpO2: 97 % (02/20/24 1020) Vital Signs (24h Range):  Temp:  [97.5 °F  (36.4 °C)-98 °F (36.7 °C)] 97.6 °F (36.4 °C)  Pulse:  [62-70] 66  Resp:  [16-18] 16  SpO2:  [97 %-98 %] 97 %  BP: (102-128)/(66-89) 114/75     Weight: 95.3 kg (210 lb)  Body mass index is 33.89 kg/m².    Intake/Output - Last 3 Shifts         02/18 0700  02/19 0659 02/19 0700 02/20 0659 02/20 0700 02/21 0659    P.O. 600 1040 700    Total Intake(mL/kg) 600 (6.3) 1040 (10.9) 700 (7.3)    Urine (mL/kg/hr) 120 (0.1) 1200 (0.5)     Total Output 120 1200     Net +480 -160 +700           Urine Occurrence  2 x              Physical Exam  Vitals reviewed.   Constitutional:       General: He is not in acute distress.     Appearance: He is normal weight. He is not ill-appearing.   HENT:      Right Ear: External ear normal.      Left Ear: External ear normal.      Nose: Nose normal.      Mouth/Throat:      Mouth: Mucous membranes are moist.      Pharynx: Oropharynx is clear.   Eyes:      General: No scleral icterus.     Extraocular Movements: Extraocular movements intact.      Pupils: Pupils are equal, round, and reactive to light.   Cardiovascular:      Rate and Rhythm: Normal rate and regular rhythm.      Pulses: Normal pulses.      Heart sounds: Normal heart sounds.   Pulmonary:      Effort: Pulmonary effort is normal. No respiratory distress.      Breath sounds: Normal breath sounds. No wheezing.   Abdominal:      General: Abdomen is flat. Bowel sounds are normal. There is no distension.      Palpations: Abdomen is soft. There is no mass.      Hernia: No hernia is present.   Genitourinary:     Comments: Dressing medial left buttock  Musculoskeletal:         General: No swelling or tenderness.   Lymphadenopathy:      Cervical: No cervical adenopathy.   Skin:     General: Skin is warm and dry.   Neurological:      General: No focal deficit present.      Mental Status: He is alert and oriented to person, place, and time.      Motor: No weakness.   Psychiatric:         Mood and Affect: Mood normal.         Behavior: Behavior  normal.          Significant Labs:  I have reviewed all pertinent lab results within the past 24 hours.    Significant Diagnostics:  I have reviewed all pertinent imaging results/findings within the past 24 hours.  Assessment/Plan:     * Perianal abscess  Plan incision and drainage in OR today.  R/b include bleeding, recurrence, he expresses understanding and wished to proceed.  open packing with possible delayed healing, anesthesia complication and unforeseen.    2/19:  Status post I and D of abscess.  Wound without purulence.  Wound cultures negative at 48 hours.  Okay for discharge from surgical standpoint with home health for dressing changes  2/20:   dressing change wound without purulence or odor. Patient remains afebrile white count 10.  Okay for discharge from surgical perspective        SHRAVAN Zavala  General Surgery  [facility]

## 2024-02-20 NOTE — ASSESSMENT & PLAN NOTE
After extensive review of his diet and eating habits, I am starting a low dose of 70/30 insulin in the morning with breakfast only.  Reviewed the medication with patient and his brother.  Explained the importance of eating lunch on time as that is when the NPH will kick in.   It is possible that he will be able to be transitioned to an oral agent in the future.  Reviewed hypoglycemic symptoms and patient understands.  He is to keep a log of his glucoses and follow up with Dr. Ryan in [place] 2 weeks after discharge.      2/18 - Changed dose 70/30 to 10units with breakfast and supper. Monitor response.     2/19 - Increase 70/30 to 14 units BID. F/u Dr. Ryan later this week.     Discharge on metformin 1g twice a day and lantus 10 units daily.  The lantus can be increased if needed.  F/u with PCP.    Ozempic prescribed but may need prior auth or other discounts.

## 2024-02-20 NOTE — ASSESSMENT & PLAN NOTE
As per general surgery.     F/u with general surgery, Dr. Park in 2 weeks.   No antibiotics at this time.  Patient also follows with dermatology.     Recommend a chlorhexidine bath.

## 2024-02-20 NOTE — ASSESSMENT & PLAN NOTE
"Patient has Abnormal Magnesium: hypomagnesemia. Will continue to monitor electrolytes closely. Will replace the affected electrolytes and repeat labs to be done after interventions completed. The patient's magnesium results have been reviewed and are listed below.  No results for input(s): "MG" in the last 24 hours.      Mg 1.5 yesterday.  Will give 2 grams mag sulfate.    2/18 - treated. Recheck with tomorrow's lab.   2/19 treated, given another 2 grams today  "

## 2024-02-21 ENCOUNTER — PATIENT OUTREACH (OUTPATIENT)
Dept: ADMINISTRATIVE | Facility: CLINIC | Age: 54
End: 2024-02-21

## 2024-02-21 NOTE — PROGRESS NOTES
Secure chat sent to Dr. Guidry regarding Ozempic being too expensive for the patient.  Awaiting response.

## 2024-02-21 NOTE — PROGRESS NOTES
C3 nurse spoke with Faustino Fischer  for a TCC post hospital discharge follow up call. The patient has a scheduled HOSFU appointment with Dr. Juan Pablo Ryan  on 2/27/2024 @ 115.

## 2024-02-21 NOTE — PLAN OF CARE
[facility]  Discharge Final Note    Primary Care Provider: Fidel Park MD    Expected Discharge Date: 2/20/2024    Final Discharge Note (most recent)       Final Note - 02/21/24 0825          Final Note    Assessment Type Final Discharge Note     Anticipated Discharge Disposition Home or Self Care                     Important Message from Medicare  Important Message from Medicare regarding Discharge Appeal Rights: Explained to patient/caregiver, Signed/date by patient/caregiver     Date IMM was signed: 02/18/24  Time IMM was signed: 1130    Contact Info       Juan Pablo Ryan MD   Specialty: Family Medicine    [address] [facility]  [facility] [address]   Phone: [phone]       Next Steps: Follow up in 1 week(s)    Instructions: discharge follow-up for elevated blood glucose and perirectal abscess   Please follow up on February 27 at 1:15    Fidel Park MD   Specialty: General Surgery, Surgery    [address] [place] [address]   Phone: [phone]       Next Steps: Follow up in 2 week(s)    Instructions: hospital follow-up for recurrent abscess  Please follow up on March 5 at 10:30          Pt discharged home with no needs.

## 2024-02-22 ENCOUNTER — TELEPHONE (OUTPATIENT)
Dept: PHARMACY | Facility: CLINIC | Age: 54
End: 2024-02-22

## 2024-02-22 NOTE — PROGRESS NOTES
Contacted patient,  Discussed with patient per Dr. Guidry's secure chat  Ozempic  was prescribed to assist with weight loss and that Dr. Guidry was unsure if his insurance would pay for the medication.  However, informed patient the pharmacy assistance program was notified of his need for assistance with this medication.  Instructed patient to continue taking all other medication as directed.  Voiced understanding.

## 2024-02-22 NOTE — LETTER
February 26, 2024    Faustino Fischer  [address]  [place] [address]              Dear Mr. Faustino Fischer     It was a pleasure speaking with you. To follow up on our conversation on 2/26/2024, the Pharmacy Patient Assistance Program needs more information from you before we can submit your Ozempic application to the [employer] Program. Please return the following documents to the Pharmacy Patient Assistance office ASAP.  Fax requested documentation to [phone] or email [email or web address]. Documentation can also be mailed to address listed below       Proof of household Income( such as social security statement, 1099 form, pension statement or 3 consecutive pay stubs, Copy of all Insurance cards( front and back), and Signed and dated HIPAA /Patient Information Forms              Whats Next:     Once I receive your documentation and authorization from your Provider, your application will be submitted to the [employer] Assistance Program for review. Please be advised it will take 2 to 4 weeks for your application to be processed so you may have to purchase a month's supply of medication from your pharmacy to hold you over during the waiting period. You will be notified of approval or denial by The Program(mail) or myself.      If you have any questions or concerns, please give me a call         Sincerely   Sherman Wong   Pharmacy Patient Assistance  [address] [facility] [address]  [place] [address]  Phone: [phone]  Fax: [phone]  Email: [email or web address]

## 2024-02-22 NOTE — PROGRESS NOTES
Rec'd message by secure chat from Dr. Guidry stating Ozempic was prescribed for a weight loss aide and he was aware that the patient might not be able to afford this medication.    Attempted to notify the patient with no answer

## 2024-02-26 NOTE — TELEPHONE ENCOUNTER
I have spoken with Faustino Fischer and informed him of the [employer] application process for Ozempic and what's required to apply.  Faustino Fischer will provide the following documents: Proof of household Income( such as social security statement, 1099 form, pension statement or 3 consecutive pay stubs, Copy of all Insurance cards( front and back), and Signed and dated HIPAA /Patient Information Forms        I will follow up with the patient in 5 business days.

## 2024-02-27 ENCOUNTER — OFFICE VISIT (OUTPATIENT)
Dept: FAMILY MEDICINE | Facility: CLINIC | Age: 54
End: 2024-02-27
Payer: MEDICARE

## 2024-02-27 VITALS
WEIGHT: 213.81 LBS | HEIGHT: 66 IN | HEART RATE: 111 BPM | DIASTOLIC BLOOD PRESSURE: 68 MMHG | BODY MASS INDEX: 34.36 KG/M2 | SYSTOLIC BLOOD PRESSURE: 92 MMHG

## 2024-02-27 DIAGNOSIS — K61.0 PERIANAL ABSCESS: Primary | ICD-10-CM

## 2024-02-27 DIAGNOSIS — I10 PRIMARY HYPERTENSION: ICD-10-CM

## 2024-02-27 DIAGNOSIS — L73.2 HIDRADENITIS SUPPURATIVA: ICD-10-CM

## 2024-02-27 DIAGNOSIS — E83.42 HYPOMAGNESEMIA: ICD-10-CM

## 2024-02-27 DIAGNOSIS — E66.9 OBESITY (BMI 30-39.9): ICD-10-CM

## 2024-02-27 DIAGNOSIS — E78.5 DYSLIPIDEMIA: ICD-10-CM

## 2024-02-27 DIAGNOSIS — E11.65 TYPE 2 DIABETES MELLITUS WITH HYPERGLYCEMIA, WITHOUT LONG-TERM CURRENT USE OF INSULIN: ICD-10-CM

## 2024-02-27 PROCEDURE — 99495 TRANSJ CARE MGMT MOD F2F 14D: CPT | Mod: ,,, | Performed by: FAMILY MEDICINE

## 2024-02-27 NOTE — PROGRESS NOTES
Transitional Care Note  Subjective:       Patient ID: Faustino Fischer is a 53 y.o. male.  Chief Complaint: Hospital Follow Up (Hospital follow-up status post hospitalization 02/16-02/20 at [facility] for perianal abscess and Type II DM. )    Family and/or Caretaker present at visit?  No.  Diagnostic tests reviewed/disposition: No diagnosic tests pending after this hospitalization.  Disease/illness education: given at discharge  Home health/community services discussion/referrals: Patient has home health established.   Establishment or re-establishment of referral orders for community resources: No other necessary community resources.   Discussion with other health care providers: No discussion with other health care providers necessary.   54 yo AAM who was recently admitted for perianal abscess.  He says that he is recovering well from his surgery.  He has home health seeing him twice per week and they are providing wound care.  Is now on insulin and taking metformin.  A1C was 10.7 during admission, which was up from previous of 6.3.  He had not been taking his metformin for several months.  He was educated on DM while in hospital, including diet.                          Review of Systems   Constitutional:  Negative for chills and fever.   Respiratory:  Negative for shortness of breath.    Cardiovascular:  Negative for chest pain and leg swelling.   Gastrointestinal:  Negative for abdominal distention.   Neurological:  Negative for dizziness and syncope.       Objective:      Physical Exam  Vitals reviewed.   Constitutional:       Appearance: Normal appearance.   HENT:      Head: Normocephalic and atraumatic.   Eyes:      Extraocular Movements: Extraocular movements intact.      Conjunctiva/sclera: Conjunctivae normal.      Pupils: Pupils are equal, round, and reactive to light.   Cardiovascular:      Rate and Rhythm: Regular rhythm.   Pulmonary:      Effort: Pulmonary effort is normal.      Breath sounds: Normal  breath sounds.   Musculoskeletal:         General: Normal range of motion.   Skin:     General: Skin is warm and dry.   Neurological:      General: No focal deficit present.      Mental Status: He is alert and oriented to person, place, and time.   Psychiatric:         Mood and Affect: Mood normal.         Behavior: Behavior normal.         Assessment:       1. Perianal abscess    2. Hidradenitis suppurativa    3. Primary hypertension    4. Dyslipidemia    5. Obesity (BMI 30-39.9)    6. Hypomagnesemia    7. Type 2 diabetes mellitus with hyperglycemia, without long-term current use of insulin        Plan:       Discharged on metformin 1g twice a day and lantus 10 units daily.  The lantus can be increased if needed.  F/u with PCP.    Ozempic prescribed but may need prior auth or other discounts.     He will monitor glucose and maintain log which he will bring to follow up in 2-3 weeks.

## 2024-02-27 NOTE — TELEPHONE ENCOUNTER
We have reviewed Mr. Fischer current medication list and/or insurance status. Unfortunately, The Pharmacy Patient Assistance Team is unable to assist at this time due to the following reasons      Is Mr. Fischer and the provider are out of the PAP geographical scope.                Pharmacy Patient Assistance Team

## 2024-03-05 ENCOUNTER — OFFICE VISIT (OUTPATIENT)
Dept: DERMATOLOGY | Facility: CLINIC | Age: 54
End: 2024-03-05
Payer: MEDICARE

## 2024-03-05 DIAGNOSIS — L73.2 HIDRADENITIS SUPPURATIVA: Primary | ICD-10-CM

## 2024-03-05 DIAGNOSIS — Z79.899 HIGH RISK MEDICATION USE: ICD-10-CM

## 2024-03-05 DIAGNOSIS — L87.1 REACTIVE PERFORATING COLLAGENOSIS: ICD-10-CM

## 2024-03-05 PROCEDURE — G2211 COMPLEX E/M VISIT ADD ON: HCPCS | Mod: ,,, | Performed by: STUDENT IN AN ORGANIZED HEALTH CARE EDUCATION/TRAINING PROGRAM

## 2024-03-05 PROCEDURE — 99214 OFFICE O/P EST MOD 30 MIN: CPT | Mod: ,,, | Performed by: STUDENT IN AN ORGANIZED HEALTH CARE EDUCATION/TRAINING PROGRAM

## 2024-03-05 RX ORDER — DOXYCYCLINE HYCLATE 100 MG
100 TABLET ORAL 2 TIMES DAILY
Qty: 180 EACH | Refills: 0 | Status: SHIPPED | OUTPATIENT
Start: 2024-03-05 | End: 2024-06-03

## 2024-03-05 NOTE — PROGRESS NOTES
[facility]  Mo Boyer MD    [address] [place] [address]  [phone]    Fax: [phone]    Patient Name: Faustino Fischer  Medical Record Number: [id number]  PCP: Fidel Park MD  Age: 53 y.o. : 1970  Contact: [phone] (home)     History of Present Illness:     Faustino Fischer is a 53 y.o.  male here for follow up of HS and reactive perforating collagenosis (biopsy confirmed). Dr. Park has recently surgically removed a hidradenitis cyst. Reactive perforating collagenosis has become more tender.    The patient has no other concerns today.    Review of Systems:     Unremarkable other than mentioned above.     Physical Exam:     General: Relaxed, oriented, alert    Skin examination of the scalp, face, neck, chest, back, abdomen, upper extremities and lower extremities were normal except for as listed below      Assessment and Plan:       1. Hidradenitis Suppurativa  - Fistula formation and draining sinuses, weeping acneiform pustules and papules, scarring, and acneiform nodules  Jade Stage: 2    Plan:   - doxycycline 100 mg bid x 3 months       Counseling.  Cleanse acneiform lesions and sinus tracts with anti-bacterial washes. Oral antibiotics can help reduce inflammation.  Discussed importance of not smoking and weight loss     2. Reactive perforating collagenosis   - excoriated papules on bilateral forearms     -  doxycycline 100 mg bid x 3 months    3. High Risk Medication Monitoring : The risks and benefits of the medication were reviewed in full with the patient. Should any side effects occur, the patient will stop the medication and contact me immediately.    Doxycycline Counseling:     Please be aware of the side effects of doxycycline:   - photosensitivity and increased risk for sunburn. When exposed to sunlight, patients should wear protective clothing, sunglasses, and sunscreen.   - birth defects: avoid pregnancy while on therapy due to  potential birth defects.  - headaches or vision changes if taking with accutane   - throat irritation: stay upright for at least 30 minutes after taking and drink with a large glass of water   - GI disturbance: take with food to help prevent upset stomach   - Do not take at the same time as dairy or calcium containing supplements, as they reduce absorption. You can continue to consume these, but make sure it is not within an hour of taking the doxycycline     Please call our office with any extreme side effects.     Return to clinic in 3 months      AVS printed with patient instructions     Mo Boyer MD   Mohs Surgery/Dermatologic Oncology  Dermatology

## 2024-03-12 ENCOUNTER — EXTERNAL HOME HEALTH (OUTPATIENT)
Dept: HOME HEALTH SERVICES | Facility: HOSPITAL | Age: 54
End: 2024-03-12
Payer: MEDICARE

## 2024-03-19 ENCOUNTER — OFFICE VISIT (OUTPATIENT)
Dept: FAMILY MEDICINE | Facility: CLINIC | Age: 54
End: 2024-03-19
Payer: MEDICARE

## 2024-03-19 VITALS
BODY MASS INDEX: 35.15 KG/M2 | DIASTOLIC BLOOD PRESSURE: 83 MMHG | HEART RATE: 81 BPM | HEIGHT: 66 IN | WEIGHT: 218.69 LBS | SYSTOLIC BLOOD PRESSURE: 126 MMHG

## 2024-03-19 DIAGNOSIS — L87.1 REACTIVE PERFORATING COLLAGENOSIS: Chronic | ICD-10-CM

## 2024-03-19 DIAGNOSIS — L73.2 HIDRADENITIS SUPPURATIVA: Primary | Chronic | ICD-10-CM

## 2024-03-19 DIAGNOSIS — K61.0 PERIANAL ABSCESS: ICD-10-CM

## 2024-03-19 PROCEDURE — 99214 OFFICE O/P EST MOD 30 MIN: CPT | Mod: ,,, | Performed by: FAMILY MEDICINE

## 2024-03-19 RX ORDER — MUPIROCIN 20 MG/G
OINTMENT TOPICAL 2 TIMES DAILY
Qty: 22 G | Refills: 0 | Status: SHIPPED | OUTPATIENT
Start: 2024-03-19

## 2024-03-19 NOTE — PROGRESS NOTES
Juan Pablo Ryan MD   [facility]  MEDICAL GROUP [facility] FAMILY [address] [place] [address]  [phone]      PATIENT NAME: Faustino Fischer  : 1970  DATE: 3/19/24  MRN: [id number]      Billing Provider: Juan Pablo Ryan MD  Level of Service:   Patient PCP Information       Provider PCP Type    Juan Pablo Ryan MD General            Reason for Visit / Chief Complaint: Skin Problem       Update PCP  Update Chief Complaint         History of Present Illness / Problem Focused Workflow     Faustino Fischer presents to the clinic with Skin Problem       Since here last he has seen dermatology for HS and sores on arms.  Is currently on doxycycline 100 BID x 3 months.  Had HS cyst removed recently and site is healing well.  Would like rx for topical antibiotic.  Also, he missed recent surgery appointment to follow up with Dr. Park on 24 and needs new appointment time.  Says that he is starting to have irritation in left axilla from HS.      Diabetes  Pertinent negatives for hypoglycemia include no confusion, dizziness or headaches. Pertinent negatives for diabetes include no chest pain, no fatigue and no weakness.       Review of Systems     Review of Systems   Constitutional:  Negative for chills, fatigue and fever.   HENT:  Negative for sinus pressure/congestion, sore throat and trouble swallowing.    Eyes:  Negative for pain, itching and visual disturbance.   Respiratory:  Negative for cough, shortness of breath and wheezing.    Cardiovascular:  Negative for chest pain, palpitations and leg swelling.   Gastrointestinal:  Negative for abdominal pain, change in bowel habit, nausea and vomiting.   Musculoskeletal:  Negative for arthralgias, back pain, joint swelling and myalgias.   Integumentary:  Positive for mole/lesion. Negative for rash.   Neurological:  Negative for dizziness, syncope, weakness, light-headedness, numbness and headaches.    Psychiatric/Behavioral:  Negative for confusion.         Medical / Social / Family History     Past Medical History:   Diagnosis Date    Chronic pancreatitis, unspecified pancreatitis type     Coronary artery disease     Diabetes mellitus     Dyslipidemia     Hidradenitis     Hypertension        Past Surgical History:   Procedure Laterality Date    CARDIAC SURGERY      Stents x6    CHOLECYSTECTOMY      INCISION OF PERIANAL ABSCESS Bilateral 2/16/2024    Procedure: INCISION, ABSCESS, PERIANAL;  Surgeon: Fidel Park MD;  Location: [facility];  Service: General;  Laterality: Bilateral;    UNDESCENDED TESTICLE EXPLORATION N/A        Social History    reports that he has quit smoking. His smoking use included cigarettes and cigars. He started smoking about 20 years ago. He has a 5.1 pack-year smoking history. He has never been exposed to tobacco smoke. He has never used smokeless tobacco. He reports that he does not currently use alcohol. He reports that he does not use drugs.   Social History     Tobacco Use    Smoking status: Former     Current packs/day: 0.25     Average packs/day: 0.3 packs/day for 20.2 years (5.1 ttl pk-yrs)     Types: Cigarettes, Cigars     Start date: 2004     Passive exposure: Never    Smokeless tobacco: Never   Substance Use Topics    Alcohol use: Not Currently     Comment: occasional drink previous heavy drinker quit heavily in 2001    Drug use: Never       Family History  Family History   Problem Relation Age of Onset    Heart disease Father     Hypertension Father     Heart disease Brother     Heart disease Maternal Grandmother        Medications and Allergies     Medications  No outpatient medications have been marked as taking for the 3/19/24 encounter (Office Visit) with Juan Pablo Ryan MD.       Allergies  Review of patient's allergies indicates:   Allergen Reactions    Ticagrelor Shortness Of Breath    Vancomycin analogues Other (See Comments)     Patient experienced adverse  effect, and had some renal insufficiency after receiving Vancomycin.       Physical Examination     Vitals:    03/19/24 1345   BP: 126/83   Pulse: 81     Physical Exam  Vitals reviewed.   Constitutional:       Appearance: Normal appearance.   HENT:      Head: Normocephalic and atraumatic.   Eyes:      Extraocular Movements: Extraocular movements intact.      Conjunctiva/sclera: Conjunctivae normal.      Pupils: Pupils are equal, round, and reactive to light.   Cardiovascular:      Rate and Rhythm: Normal rate and regular rhythm.   Pulmonary:      Effort: Pulmonary effort is normal.      Breath sounds: Normal breath sounds.   Musculoskeletal:         General: Normal range of motion.   Skin:     General: Skin is warm and dry.   Neurological:      General: No focal deficit present.      Mental Status: He is alert and oriented to person, place, and time.   Psychiatric:         Mood and Affect: Mood normal.         Behavior: Behavior normal.          Assessment and Plan (including Health Maintenance)      Problem List  Smart Sets  Document Outside HM   :    Plan: referral made to general surgery        Health Maintenance Due   Topic Date Due    Pneumococcal Vaccines (Age 0-64) (1 of 2 - PCV) Never done    Foot Exam  Never done    Eye Exam  Never done    TETANUS VACCINE  Never done    High Dose Statin  Never done    Colorectal Cancer Screening  Never done    Shingles Vaccine (1 of 2) Never done    Diabetes Urine Screening  04/15/2023    Influenza Vaccine (1) 09/01/2023    COVID-19 Vaccine (4 - 2023-24 season) 09/01/2023       Problem List Items Addressed This Visit          Derm    Hidradenitis suppurativa - Primary    Relevant Orders    Ambulatory referral/consult to General Surgery       GI    Perianal abscess    Overview        Relevant Medications    mupirocin (BACTROBAN) 2 % ointment     Other Visit Diagnoses       Reactive perforating collagenosis  (Chronic)   - is on doxycycline 100 BID x 3 months            Health  Maintenance Topics with due status: Not Due       Topic Last Completion Date    Lipid Panel 06/08/2023    Hemoglobin A1c 02/16/2024       Future Appointments   Date Time Provider Department Center   4/10/2024  1:00 PM AWV NURSE, [facility]   6/6/2024  1:45 PM Maty Boyer MD [facility]            Signature:  MD JOSHUA Jackson LUPIS [facility]  MEDICAL GROUP [facility] - FAMILY MEDICINE  [address] [place] [id number]  [phone]    Date of encounter: 3/19/24

## 2024-03-26 ENCOUNTER — CLINICAL SUPPORT (OUTPATIENT)
Dept: CARDIOLOGY | Facility: CLINIC | Age: 54
End: 2024-03-26
Attending: SURGERY
Payer: MEDICARE

## 2024-03-26 ENCOUNTER — OFFICE VISIT (OUTPATIENT)
Dept: SURGERY | Facility: CLINIC | Age: 54
End: 2024-03-26
Attending: SURGERY
Payer: MEDICARE

## 2024-03-26 VITALS — OXYGEN SATURATION: 100 % | DIASTOLIC BLOOD PRESSURE: 122 MMHG | HEART RATE: 87 BPM | SYSTOLIC BLOOD PRESSURE: 202 MMHG

## 2024-03-26 DIAGNOSIS — L73.2 HIDRADENITIS SUPPURATIVA: Chronic | ICD-10-CM

## 2024-03-26 DIAGNOSIS — L73.2 HIDRADENITIS SUPPURATIVA: Primary | Chronic | ICD-10-CM

## 2024-03-26 PROCEDURE — 99215 OFFICE O/P EST HI 40 MIN: CPT | Mod: 57,S$PBB,, | Performed by: SURGERY

## 2024-03-26 PROCEDURE — 99212 OFFICE O/P EST SF 10 MIN: CPT | Mod: PBBFAC

## 2024-03-26 PROCEDURE — 99215 OFFICE O/P EST HI 40 MIN: CPT | Mod: PBBFAC | Performed by: SURGERY

## 2024-03-26 PROCEDURE — 93005 ELECTROCARDIOGRAM TRACING: CPT | Mod: PBBFAC | Performed by: STUDENT IN AN ORGANIZED HEALTH CARE EDUCATION/TRAINING PROGRAM

## 2024-03-26 PROCEDURE — 93010 ELECTROCARDIOGRAM REPORT: CPT | Mod: S$PBB,,, | Performed by: STUDENT IN AN ORGANIZED HEALTH CARE EDUCATION/TRAINING PROGRAM

## 2024-03-26 NOTE — PATIENT INSTRUCTIONS
[facility]      Your surgery is scheduled for 3/27/24 at [facility] on the ground floor of the Ambulatory building. You should arrive at 0600 at the Ambulatory Care Center located at [address].                                                                                                                                                                                                                                                                                                                                                           Preoperative Instructions        Your pre-op lab work will be on today on the 1st floor of the [facility] building.  EKG is located on 2nd floor of [facility].                                                                                                                                             Day of Surgery Instructions      Bring a list of all your medications with you the day of your surgery. You can also give the list to your doctor or nurse during your final clinic appointment before surgery.      Do not eat any solid foods or drink any liquids after 12:00 AM (midnight). This includes gum, hard candy, mints, and chewing tobacco.  Medications: Take any medications specified with a small sip of water the morning of your surgery.  Brush your teeth: You may brush your teeth and rinse your mouth. Do not swallow any water or toothpaste.  Clothing: A button front shirt and loose-fitting clothes are the most comfortable before and after surgery. We also recommend low-heeled shoes.  Hair: Avoid buns, ponytails, or hairpieces at the back of the head. Remove or avoid any clips, pins or bands that bind hair. Do not use hairspray. Before going to surgery, you will need to remove any wigs or hairpieces.  We will cover your hair during surgery. Your privacy regarding personal appearance will be respected.  Fingernails: Please be sure to  remove all nail polish before you arrive for surgery. We understand that tips, wraps, gels, etc., are expensive; however, we ask these products to be removed from at least one finger on each hand. Your fingertips are used to accurately monitor your oxygen level during surgery by a device called an oximeter.  Glasses and Contact Lenses: Wear glasses when possible. If contact lenses must be worn, bring a lens case and solution. If glasses are worn, bring a case for them.  Hearing Aids: If you rely on a hearing aid, wear it to the hospital on the day of surgery. This will ensure you can hear and understand everything we need to communicate with you.  Valuables: Jewelry, including body piercings, Dentures, money, and credit cards should be left at home. [facility] is not responsible for valuables that are not secured in our surgery center.  Makeup, Perfume, Creams, Lotions and Deodorants: Do not use any of these products on the day of surgery. Remove false eyelashes prior to surgery.  Implanted Medical Devices: If you have an implanted device, such as a pacemaker or AICD, bring the device information card (if you have it) with you.  Medical Equipment: If you have been fitted for a brace to wear after surgery or you have been given crutches, bring those with you to the surgery center.  Shower: Take a shower with Hibiclens® (chlorhexidine) (available over the counter). This reduces the chance of infection. PLEASE USE CHLORHEXIDINE WASH THE NIGHT BEFORE SURGERY AND THE MORNING OF SURGERY.      Medication instructions:  You may take blood pressure medication with a small drink of water the morning of surgery.        IF YOU ARE ON ANY OF THESE BLOOD THINNERS, MAKE SURE YOUR PHYSICIAN IS AWARE.  Eliquis/Apixaban            Wafarin/Coumadin,Jantoven  Xarelto/Rivaroxaban      Pletal/Cilostazol  Plavix/Clopidogrel          Pradaxa/Dibigatran      If you are diabetic      Follow the diabetic medicine instructions you received during  your pre-operative visit.  DO NOT take your insulin or diabetic medications the morning of surgery.  When you arrive at the surgical center, be sure to tell the nurse you are diabetic.    The following blood sugar medications have to be stopped prior to surgery:    Hold 24 hours prior to surgery:    Libraglutide - Saxenda, Victoza  Lixisenatide --Adlxyin  Exenatide  --  Byetta  Empaglifozin--Jardiance  Sitaglitin--Januvia    Hold 1 week prior to surgery:    Semaglutide - Ozempic, Wegouy, Rybelsus  Dulaglutide - Trulicity  Tirzepatide - Mounjaro  Exenatide (extended release inj)-- Bydureon BCise      Hold 48 hours prior to surgery:    Metformin, Glucovance, Metaglip, Fortamet, Glucophage, Riomet, Avandamet, Glimepiride            Other Items to bring with you and know      Insurance card  Identification card such as 's license, passport, or other picture ID  Copy of your advance directives  List of medications and allergies, if not already provided  Name and phone number of person to contact if your condition changes significantly. YOU CANNOT DRIVE YOURSELF HOME FROM THE HOSPITAL THE DAY OF SURGERY.  PLEASE UNDERSTAND THAT OUR OFFICE DOES NOT GIVE PATHOLOGY RESULTS OR TEST RESULTS OVER THE PHONE. THIS WILL BE DISCUSSED WITH YOU ON YOUR FOLLOW UP APPOINTMENT.          Alcohol and Surgery  We want to help you prepare for and recover from surgery as quickly and safely as possible. Be open and honest with your provider about how many drinks you have per day. Excessive alcohol use is defined as drinking more than three drinks per day. It can affect the outcome of your surgery. Binge drinking (consuming large amounts of alcohol infrequently, such as on weekends) can also affect the outcome of your surgery.  Alcohol withdrawal  If you drink more than three drinks a day, you could have a complication, called alcohol withdrawal, after surgery.  Alcohol withdrawal is a set of symptoms that people have when they suddenly  stop drinking after using alcohol  for a long time. During withdrawal, a person's central nervous system overreacts. This can cause mild symptoms such as shakiness, sweating or hallucinating. It can also cause other more serious side effects. If not treated properly, alcohol withdrawal can cause potentially life-threatening complications after surgery. This can include tremors, seizures, hallucinations, delirium tremors, and even death. Untreated alcohol withdrawal often leads to a longer stay in the hospital, potentially in the Intensive Care Unit.  Chronic heavy drinking also can interfere with several organ systems and biochemical processes in the body.  This interference can cause serious, even life-threatening complications.  Your care team can offer alcohol withdrawal treatment to help:  Decrease the risk of seizures and delirium tremors after surgery  Decrease the risk we will need to restrain you for your own safety or the safety of others  Decrease your risk of falling after surgery  Reduce the use of potent sedative medications  Reduce the time you stay in the hospital after surgery  Reduce the time you might spend on a mechanical ventilator to help you breathe  Lower incidence of organ failure and biochemical complications  Talk to a member of your care team or your primary care physician about your alcohol use if you feel you may be at risk of any of these complications.        Smoking and Surgery  Quitting smoking is extremely important for a successful surgery and recovery. Cigarette smoking compromises your immune system. This increases your risk of an infection after surgery. Quitting the habit before surgery will decrease the surgical risks associated with smoking.

## 2024-03-27 ENCOUNTER — ANESTHESIA EVENT (OUTPATIENT)
Dept: SURGERY | Facility: HOSPITAL | Age: 54
End: 2024-03-27
Payer: MEDICARE

## 2024-03-27 ENCOUNTER — HOSPITAL ENCOUNTER (OUTPATIENT)
Facility: HOSPITAL | Age: 54
Discharge: HOME | End: 2024-03-27
Attending: SURGERY | Admitting: SURGERY
Payer: MEDICARE

## 2024-03-27 ENCOUNTER — ANESTHESIA (OUTPATIENT)
Dept: SURGERY | Facility: HOSPITAL | Age: 54
End: 2024-03-27
Payer: MEDICARE

## 2024-03-27 VITALS
WEIGHT: 210 LBS | SYSTOLIC BLOOD PRESSURE: 145 MMHG | BODY MASS INDEX: 33.75 KG/M2 | OXYGEN SATURATION: 91 % | TEMPERATURE: 98 F | RESPIRATION RATE: 15 BRPM | DIASTOLIC BLOOD PRESSURE: 77 MMHG | HEIGHT: 66 IN | HEART RATE: 73 BPM

## 2024-03-27 DIAGNOSIS — L73.2 HIDRADENITIS SUPPURATIVA: Primary | ICD-10-CM

## 2024-03-27 LAB
GLUCOSE SERPL-MCNC: 166 MG/DL (ref 70–105)
GLUCOSE SERPL-MCNC: 176 MG/DL (ref 70–105)
GLUCOSE SERPL-MCNC: 196 MG/DL (ref 70–105)
GLUCOSE SERPL-MCNC: 282 MG/DL (ref 70–105)

## 2024-03-27 PROCEDURE — 27000177 HC AIRWAY, LARYNGEAL MASK: Performed by: ANESTHESIOLOGY

## 2024-03-27 PROCEDURE — 27000510 HC BLANKET BAIR HUGGER ANY SIZE: Performed by: ANESTHESIOLOGY

## 2024-03-27 PROCEDURE — 71000033 HC RECOVERY, INTIAL HOUR: Performed by: SURGERY

## 2024-03-27 PROCEDURE — 63600175 PHARM REV CODE 636 W HCPCS: Mod: GZ | Performed by: ANESTHESIOLOGY

## 2024-03-27 PROCEDURE — 36000706: Performed by: SURGERY

## 2024-03-27 PROCEDURE — 27000284 HC CANNULA NASAL: Performed by: ANESTHESIOLOGY

## 2024-03-27 PROCEDURE — 63600175 PHARM REV CODE 636 W HCPCS: Performed by: NURSE ANESTHETIST, CERTIFIED REGISTERED

## 2024-03-27 PROCEDURE — 36000707: Performed by: SURGERY

## 2024-03-27 PROCEDURE — 11450 EXC SKN HDRDNT AX SMPL/NTRM: CPT | Mod: 50,,, | Performed by: SURGERY

## 2024-03-27 PROCEDURE — 25000003 PHARM REV CODE 250: Performed by: SURGERY

## 2024-03-27 PROCEDURE — D9220A PRA ANESTHESIA: Mod: ANES,,, | Performed by: ANESTHESIOLOGY

## 2024-03-27 PROCEDURE — 63600175 PHARM REV CODE 636 W HCPCS: Performed by: ANESTHESIOLOGY

## 2024-03-27 PROCEDURE — 27202344 HC EYESHIELD: Performed by: ANESTHESIOLOGY

## 2024-03-27 PROCEDURE — 27000655: Performed by: ANESTHESIOLOGY

## 2024-03-27 PROCEDURE — 87075 CULTR BACTERIA EXCEPT BLOOD: CPT | Performed by: SURGERY

## 2024-03-27 PROCEDURE — 87070 CULTURE OTHR SPECIMN AEROBIC: CPT | Performed by: SURGERY

## 2024-03-27 PROCEDURE — 88304 TISSUE EXAM BY PATHOLOGIST: CPT | Mod: TC,59,SUR | Performed by: SURGERY

## 2024-03-27 PROCEDURE — 88304 TISSUE EXAM BY PATHOLOGIST: CPT | Mod: 26,,, | Performed by: PATHOLOGY

## 2024-03-27 PROCEDURE — 25000003 PHARM REV CODE 250: Performed by: NURSE ANESTHETIST, CERTIFIED REGISTERED

## 2024-03-27 PROCEDURE — 37000009 HC ANESTHESIA EA ADD 15 MINS: Performed by: SURGERY

## 2024-03-27 PROCEDURE — 27000716 HC OXISENSOR PROBE, ANY SIZE: Performed by: ANESTHESIOLOGY

## 2024-03-27 PROCEDURE — 82962 GLUCOSE BLOOD TEST: CPT

## 2024-03-27 PROCEDURE — 71000015 HC POSTOP RECOV 1ST HR: Performed by: SURGERY

## 2024-03-27 PROCEDURE — D9220A PRA ANESTHESIA: Mod: CRNA,,, | Performed by: NURSE ANESTHETIST, CERTIFIED REGISTERED

## 2024-03-27 PROCEDURE — 37000008 HC ANESTHESIA 1ST 15 MINUTES: Performed by: SURGERY

## 2024-03-27 RX ORDER — SULFAMETHOXAZOLE AND TRIMETHOPRIM 800; 160 MG/1; MG/1
1 TABLET ORAL DAILY
Qty: 14 TABLET | Refills: 0 | Status: SHIPPED | OUTPATIENT
Start: 2024-03-27 | End: 2024-04-09 | Stop reason: SDUPTHER

## 2024-03-27 RX ORDER — HYDROMORPHONE HYDROCHLORIDE 2 MG/ML
0.5 INJECTION, SOLUTION INTRAMUSCULAR; INTRAVENOUS; SUBCUTANEOUS EVERY 5 MIN PRN
Status: DISCONTINUED | OUTPATIENT
Start: 2024-03-27 | End: 2024-03-27 | Stop reason: HOSPADM

## 2024-03-27 RX ORDER — MIDAZOLAM HYDROCHLORIDE 1 MG/ML
INJECTION INTRAMUSCULAR; INTRAVENOUS
Status: DISCONTINUED | OUTPATIENT
Start: 2024-03-27 | End: 2024-03-27

## 2024-03-27 RX ORDER — FENTANYL CITRATE 50 UG/ML
INJECTION, SOLUTION INTRAMUSCULAR; INTRAVENOUS
Status: DISCONTINUED | OUTPATIENT
Start: 2024-03-27 | End: 2024-03-27

## 2024-03-27 RX ORDER — HYDROMORPHONE HYDROCHLORIDE 2 MG/ML
INJECTION, SOLUTION INTRAMUSCULAR; INTRAVENOUS; SUBCUTANEOUS
Status: DISCONTINUED | OUTPATIENT
Start: 2024-03-27 | End: 2024-03-27

## 2024-03-27 RX ORDER — DIPHENHYDRAMINE HYDROCHLORIDE 50 MG/ML
25 INJECTION INTRAMUSCULAR; INTRAVENOUS EVERY 6 HOURS PRN
Status: DISCONTINUED | OUTPATIENT
Start: 2024-03-27 | End: 2024-03-27 | Stop reason: HOSPADM

## 2024-03-27 RX ORDER — MEPERIDINE HYDROCHLORIDE 25 MG/ML
25 INJECTION INTRAMUSCULAR; INTRAVENOUS; SUBCUTANEOUS EVERY 10 MIN PRN
Status: DISCONTINUED | OUTPATIENT
Start: 2024-03-27 | End: 2024-03-27 | Stop reason: HOSPADM

## 2024-03-27 RX ORDER — ONDANSETRON 4 MG/1
8 TABLET, ORALLY DISINTEGRATING ORAL EVERY 8 HOURS PRN
Status: DISCONTINUED | OUTPATIENT
Start: 2024-03-27 | End: 2024-03-27 | Stop reason: HOSPADM

## 2024-03-27 RX ORDER — ONDANSETRON HYDROCHLORIDE 2 MG/ML
INJECTION, SOLUTION INTRAVENOUS
Status: DISCONTINUED | OUTPATIENT
Start: 2024-03-27 | End: 2024-03-27

## 2024-03-27 RX ORDER — ACETAMINOPHEN 10 MG/ML
1000 INJECTION, SOLUTION INTRAVENOUS ONCE
Status: COMPLETED | OUTPATIENT
Start: 2024-03-27 | End: 2024-03-27

## 2024-03-27 RX ORDER — SODIUM CHLORIDE 9 MG/ML
INJECTION, SOLUTION INTRAVENOUS CONTINUOUS
Status: DISCONTINUED | OUTPATIENT
Start: 2024-03-27 | End: 2024-03-27 | Stop reason: HOSPADM

## 2024-03-27 RX ORDER — PHENYLEPHRINE HYDROCHLORIDE 10 MG/ML
INJECTION INTRAVENOUS
Status: DISCONTINUED | OUTPATIENT
Start: 2024-03-27 | End: 2024-03-27

## 2024-03-27 RX ORDER — LIDOCAINE HYDROCHLORIDE 20 MG/ML
INJECTION, SOLUTION EPIDURAL; INFILTRATION; INTRACAUDAL; PERINEURAL
Status: DISCONTINUED | OUTPATIENT
Start: 2024-03-27 | End: 2024-03-27

## 2024-03-27 RX ORDER — MORPHINE SULFATE 10 MG/ML
4 INJECTION INTRAMUSCULAR; INTRAVENOUS; SUBCUTANEOUS ONCE
Status: DISCONTINUED | OUTPATIENT
Start: 2024-03-27 | End: 2024-03-27 | Stop reason: HOSPADM

## 2024-03-27 RX ORDER — HYDROCODONE BITARTRATE AND ACETAMINOPHEN 10; 325 MG/1; MG/1
1 TABLET ORAL EVERY 4 HOURS PRN
Status: DISCONTINUED | OUTPATIENT
Start: 2024-03-27 | End: 2024-03-27 | Stop reason: HOSPADM

## 2024-03-27 RX ORDER — IBUPROFEN 600 MG/1
600 TABLET ORAL EVERY 6 HOURS PRN
Status: DISCONTINUED | OUTPATIENT
Start: 2024-03-27 | End: 2024-03-27 | Stop reason: HOSPADM

## 2024-03-27 RX ORDER — MORPHINE SULFATE 10 MG/ML
4 INJECTION INTRAMUSCULAR; INTRAVENOUS; SUBCUTANEOUS EVERY 5 MIN PRN
Status: DISCONTINUED | OUTPATIENT
Start: 2024-03-27 | End: 2024-03-27 | Stop reason: HOSPADM

## 2024-03-27 RX ORDER — PROPOFOL 10 MG/ML
INJECTION, EMULSION INTRAVENOUS
Status: DISCONTINUED | OUTPATIENT
Start: 2024-03-27 | End: 2024-03-27

## 2024-03-27 RX ORDER — KETOROLAC TROMETHAMINE 30 MG/ML
30 INJECTION, SOLUTION INTRAMUSCULAR; INTRAVENOUS ONCE
Status: COMPLETED | OUTPATIENT
Start: 2024-03-27 | End: 2024-03-27

## 2024-03-27 RX ORDER — HYDROCODONE BITARTRATE AND ACETAMINOPHEN 7.5; 325 MG/1; MG/1
1 TABLET ORAL EVERY 6 HOURS PRN
Qty: 24 TABLET | Refills: 0 | Status: ON HOLD | OUTPATIENT
Start: 2024-03-27 | End: 2024-06-08 | Stop reason: ALTCHOICE

## 2024-03-27 RX ORDER — ONDANSETRON HYDROCHLORIDE 2 MG/ML
4 INJECTION, SOLUTION INTRAVENOUS DAILY PRN
Status: DISCONTINUED | OUTPATIENT
Start: 2024-03-27 | End: 2024-03-27 | Stop reason: HOSPADM

## 2024-03-27 RX ADMIN — ACETAMINOPHEN 1000 MG: 10 INJECTION, SOLUTION INTRAVENOUS at 06:03

## 2024-03-27 RX ADMIN — KETOROLAC TROMETHAMINE 30 MG: 30 INJECTION, SOLUTION INTRAMUSCULAR; INTRAVENOUS at 06:03

## 2024-03-27 RX ADMIN — SODIUM CHLORIDE 2 G: 9 INJECTION, SOLUTION INTRAVENOUS at 04:03

## 2024-03-27 RX ADMIN — PHENYLEPHRINE HYDROCHLORIDE 150 MCG: 10 INJECTION INTRAVENOUS at 05:03

## 2024-03-27 RX ADMIN — FENTANYL CITRATE 100 MCG: 50 INJECTION INTRAMUSCULAR; INTRAVENOUS at 04:03

## 2024-03-27 RX ADMIN — SODIUM CHLORIDE: 9 INJECTION, SOLUTION INTRAVENOUS at 07:03

## 2024-03-27 RX ADMIN — LIDOCAINE HYDROCHLORIDE 60 MG: 20 INJECTION, SOLUTION INTRAVENOUS at 04:03

## 2024-03-27 RX ADMIN — HYDROMORPHONE HYDROCHLORIDE 0.5 MG: 2 INJECTION, SOLUTION INTRAMUSCULAR; INTRAVENOUS; SUBCUTANEOUS at 05:03

## 2024-03-27 RX ADMIN — ONDANSETRON 8 MG: 2 INJECTION INTRAMUSCULAR; INTRAVENOUS at 04:03

## 2024-03-27 RX ADMIN — PHENYLEPHRINE HYDROCHLORIDE 200 MCG: 10 INJECTION INTRAVENOUS at 05:03

## 2024-03-27 RX ADMIN — MIDAZOLAM HYDROCHLORIDE 2 MG: 1 INJECTION, SOLUTION INTRAMUSCULAR; INTRAVENOUS at 04:03

## 2024-03-27 RX ADMIN — HYDROCODONE BITARTRATE AND ACETAMINOPHEN 1 TABLET: 10; 325 TABLET ORAL at 06:03

## 2024-03-27 RX ADMIN — PROPOFOL 170 MG: 10 INJECTION, EMULSION INTRAVENOUS at 04:03

## 2024-03-27 RX ADMIN — HUMAN INSULIN 6 UNITS: 100 INJECTION, SOLUTION SUBCUTANEOUS at 08:03

## 2024-03-27 NOTE — ANESTHESIA PREPROCEDURE EVALUATION
03/27/2024  Faustino iFscher is a 54 y.o., male.      Pre-op Assessment    I have reviewed the Patient Summary Reports.    I have reviewed the NPO Status.   I have reviewed the Medications.     Review of Systems         Anesthesia Plan  Type of Anesthesia, risks & benefits discussed:    Anesthesia Type: Gen Supraglottic Airway  Intra-op Monitoring Plan: Standard ASA Monitors  Post Op Pain Control Plan: IV/PO Opioids PRN  Induction:  IV  Informed Consent: Informed consent signed with the Patient and all parties understand the risks and agree with anesthesia plan.  All questions answered.   ASA Score: 3    Ready For Surgery From Anesthesia Perspective.     .  NPO greater than 8 hours  No anesthetic complications  Allergies      Ticagrelor Shortness Of Breath High  11/23/2022      Adverse Reactions/Drug Intolerances      Vancomycin Analogues         Hct 44  1/4/24 EKG: Sinus tachycardia   Incomplete right bundle branch block   Inferior infarct (cited on or before 26-JUN-2021)   Anterolateral infarct (cited on or before 26-JUN-2021)     Medical History   Hypertension Diabetes mellitus   Coronary artery disease Dyslipidemia   Hidradenitis Chronic pancreatitis, unspecified pancreatitis type   H/o MI x2    Airway exam deferred (COVID precautions); adequate ROM at neck.

## 2024-03-27 NOTE — TRANSFER OF CARE
"Anesthesia Transfer of Care Note    Patient: Faustino Fischer    Procedure(s) Performed: Procedure(s) (LRB):  EXCISION, HIDRADENITIS (N/A)    Patient location: PACU    Anesthesia Type: general    Transport from OR: Transported from OR on 2-3 L/min O2 by NC with adequate spontaneous ventilation    Post pain: adequate analgesia    Post assessment: no apparent anesthetic complications and tolerated procedure well    Post vital signs: stable    Level of consciousness: alert, responds to stimulation and awake    Nausea/Vomiting: no nausea/vomiting    Complications: none    Transfer of care protocol was followed      Last vitals: Visit Vitals  BP (!) 193/98   Pulse 80   Temp 36.7 °C (98 °F) (Oral)   Resp (!) 24   Ht 5' 6" (1.676 m)   Wt 95.3 kg (210 lb)   SpO2 (!) 93%   BMI 33.89 kg/m²     "

## 2024-03-27 NOTE — ANESTHESIA PROCEDURE NOTES
Intubation    Date/Time: 3/27/2024 4:39 PM    Performed by: Sherman Dave CRNA  Authorized by: Yoan Devi MD    Intubation:     Induction:  Intravenous    Intubated:  Postinduction    Mask Ventilation:  Easy mask    Attempts:  1    Attempted By:  CRNA    Difficult Airway Encountered?: No      Complications:  None    Airway Device:  Supraglottic airway/LMA    Airway Device Size:  4.0    Style/Cuff Inflation:  Cuffed (inflated to minimal occlusive pressure)    Placement Verified By:  Capnometry    Complicating Factors:  None    Findings Post-Intubation:  BS equal bilateral and atraumatic/condition of teeth unchanged

## 2024-03-27 NOTE — OP NOTE
[facility]     Operative Note    SUMMARY     Date of Procedure: 3/27/2024     Procedure: Procedure(s) (LRB):  EXCISION, HIDRADENITIS (N/A)     Surgeon(s) and Role:     * Fidel Park MD - Primary    Assisting Surgeon: None    Pre-Operative Diagnosis: Hidradenitis suppurativa [L73.2]    Post-Operative Diagnosis: Post-Op Diagnosis Codes:     * Hidradenitis suppurativa [L73.2]    Anesthesia: Local MAC    Technical Procedures Used:  Patient was brought to the operating room placed supine position.  General anesthesia was administered.  The patient bilateral axilla was prepped and draped standard sterile fashion.  Incision was then performed overlying the fluctuant area in purulent fluid was drained.  Cultures were obtained.  Subsequent to this a wide excision of inflamed tissue was performed excising an ellipse of skin with a 15 blade.  Cautery was then used to perform the resection coming deep to the inflamed tissue and abscess cavity.  There was good hemostasis.  The wound was irrigated and pressure was held for additional hemostasis while the left side was addressed.  An ellipse of skin and inflamed tissue was then excised with a 15 blade followed by cautery for hemostasis and further dissection.  Pressure was held for hemostasis.  Irrigation was performed.  Both wounds were then closed using interrupted horizontal mattress 3-0 nylon followed by continuous running 3-0 Prolene.  Dry dressings were applied.  The patient was awakened from anesthesia in stable condition.  Needle sponge instrument counts were reported as correct at the end of the case.    Description of the Findings of the Procedure:  There was abscess in the right axilla, fairly superficial but with a thick inflammatory rind which was excised after draining the abscess.  Cultures were obtained.  Excised tissue measured 7 cm by 3 cm x 2 cm.  Within the left axilla was an area of hidradenitis tissue without active  infection identified.  An excision of skin and soft tissue was performed measuring 5 cm x 2 cm x 1 cm.  Closure was achieved following irrigation.    Assistant(s):      Complications: No    Estimated Blood Loss (EBL): 50           Implants: * No implants in log *    Specimens:   Specimen (24h ago, onward)       Start     Ordered    03/27/24 1711  Surgical Pathology  RELEASE UPON ORDERING         03/27/24 1711                     Condition: Good      Complications:  None

## 2024-03-27 NOTE — PROGRESS NOTES
1758 RECEIVED TO RR AWAKE, ALERT. ORIENTATION GIVEN. HOB ELEVATED. O2 SAT LOW ON ROOM. O2 APPLIED VIA NC. DRESSINGS DIANNE. AXILLAS D/I RIGHT WITH SCANT DRAINAGE NOTED. LEFT D/I. IV INFUSING WELL RIGHT HAND 22G. CATH. BOTH ARMS ELEVATED ON PILLOWS. BLOOD SUGAR 166. ENCOURAGED COUGH DEEP BREATHS. SEE FLOW SHEET.    1815 OFIRMEV AND TORADOL GIVEN PER ORDERS DR. BOYER FOR PAIN. O2 VIA NC.    1835 TRANSFERRED TO ROOM. INTERMITTENT PAIN AT OP-SITE. SCANT DRAINAGE RIGHT AXILLA DRESSING. DRESSING LEFT AXILLA D/I.  PACU UNEVENTFUL.

## 2024-03-27 NOTE — PROGRESS NOTES
Subjective:       Patient ID: Faustino Fischer is a 54 y.o. male.    Chief Complaint: Consult    54-year-old male patient whom I have seen previously for hidradenitis of the left buttocks and thigh area, presents with abscess in the right axilla.  He has an enlarged area which has been very tender.  He denies spontaneous drainage.  He has, however, had intermittent drainage beneath the left axilla, and would like for me to evaluate area as well.        family history includes Heart disease in his brother, father, and maternal grandmother; Hypertension in his father.  Past Medical History:   Diagnosis Date    Chronic pancreatitis, unspecified pancreatitis type     Coronary artery disease     Diabetes mellitus     Dyslipidemia     Hidradenitis     Hypertension       Past Surgical History:   Procedure Laterality Date    CARDIAC SURGERY      Stents x6    CHOLECYSTECTOMY      INCISION OF PERIANAL ABSCESS Bilateral 2/16/2024    Procedure: INCISION, ABSCESS, PERIANAL;  Surgeon: Fidel Park MD;  Location: [facility];  Service: General;  Laterality: Bilateral;    UNDESCENDED TESTICLE EXPLORATION N/A        reports that he has quit smoking. His smoking use included cigarettes and cigars. He started smoking about 20 years ago. He has a 5.1 pack-year smoking history. He has never been exposed to tobacco smoke. He has never used smokeless tobacco. He reports that he does not currently use alcohol. He reports that he does not use drugs.     Review of Systems   All other systems reviewed and are negative.         Objective:      BP (!) 202/122 (BP Location: Left arm, Patient Position: Sitting, BP Method: Large (Manual))   Pulse 87   SpO2 100%    Physical Exam  Cardiovascular:      Rate and Rhythm: Normal rate.   Pulmonary:      Effort: Pulmonary effort is normal.   Abdominal:      General: Abdomen is flat.      Palpations: Abdomen is soft.   Skin:     Comments: Large area of swelling right axilla, roughly 6 cm in greatest  dimension with no evidence of skin opening for drainage.  Small area of open skin left axilla with multiple palpable nodular areas of inflammation deep to this.   Neurological:      General: No focal deficit present.      Mental Status: He is alert.   Psychiatric:         Mood and Affect: Mood normal.           Assessment/Plan:         Hidradenitis suppurativa  -     Ambulatory referral/consult to General Surgery  -     CBC Auto Differential; Future; Expected date: 04/26/2024  -     Comprehensive Metabolic Panel; Future; Expected date: 04/26/2024  -     EKG 12-lead; Future; Expected date: 04/26/2024  -     Case Request Operating Room: EXCISION, HIDRADENITIS         Problem List Items Addressed This Visit          Derm    Hidradenitis suppurativa - Primary    Current Assessment & Plan     Abscess right axilla; plan drainage, with excision of hidradenitis tissue    Inflamed left axilla tissue; plan excision of tissue         Relevant Orders    CBC Auto Differential (Completed)    Comprehensive Metabolic Panel (Completed)    EKG 12-lead    Case Request Operating Room: EXCISION, HIDRADENITIS (Completed)

## 2024-03-27 NOTE — H&P (VIEW-ONLY)
Subjective:       Patient ID: Faustino Fischer is a 54 y.o. male.    Chief Complaint: Consult    54-year-old male patient whom I have seen previously for hidradenitis of the left buttocks and thigh area, presents with abscess in the right axilla.  He has an enlarged area which has been very tender.  He denies spontaneous drainage.  He has, however, had intermittent drainage beneath the left axilla, and would like for me to evaluate area as well.        family history includes Heart disease in his brother, father, and maternal grandmother; Hypertension in his father.  Past Medical History:   Diagnosis Date    Chronic pancreatitis, unspecified pancreatitis type     Coronary artery disease     Diabetes mellitus     Dyslipidemia     Hidradenitis     Hypertension       Past Surgical History:   Procedure Laterality Date    CARDIAC SURGERY      Stents x6    CHOLECYSTECTOMY      INCISION OF PERIANAL ABSCESS Bilateral 2/16/2024    Procedure: INCISION, ABSCESS, PERIANAL;  Surgeon: Fidel Park MD;  Location: [facility];  Service: General;  Laterality: Bilateral;    UNDESCENDED TESTICLE EXPLORATION N/A        reports that he has quit smoking. His smoking use included cigarettes and cigars. He started smoking about 20 years ago. He has a 5.1 pack-year smoking history. He has never been exposed to tobacco smoke. He has never used smokeless tobacco. He reports that he does not currently use alcohol. He reports that he does not use drugs.     Review of Systems   All other systems reviewed and are negative.         Objective:      BP (!) 202/122 (BP Location: Left arm, Patient Position: Sitting, BP Method: Large (Manual))   Pulse 87   SpO2 100%    Physical Exam  Cardiovascular:      Rate and Rhythm: Normal rate.   Pulmonary:      Effort: Pulmonary effort is normal.   Abdominal:      General: Abdomen is flat.      Palpations: Abdomen is soft.   Skin:     Comments: Large area of swelling right axilla, roughly 6 cm in greatest  dimension with no evidence of skin opening for drainage.  Small area of open skin left axilla with multiple palpable nodular areas of inflammation deep to this.   Neurological:      General: No focal deficit present.      Mental Status: He is alert.   Psychiatric:         Mood and Affect: Mood normal.           Assessment/Plan:         Hidradenitis suppurativa  -     Ambulatory referral/consult to General Surgery  -     CBC Auto Differential; Future; Expected date: 04/26/2024  -     Comprehensive Metabolic Panel; Future; Expected date: 04/26/2024  -     EKG 12-lead; Future; Expected date: 04/26/2024  -     Case Request Operating Room: EXCISION, HIDRADENITIS         Problem List Items Addressed This Visit          Derm    Hidradenitis suppurativa - Primary    Current Assessment & Plan     Abscess right axilla; plan drainage, with excision of hidradenitis tissue    Inflamed left axilla tissue; plan excision of tissue         Relevant Orders    CBC Auto Differential (Completed)    Comprehensive Metabolic Panel (Completed)    EKG 12-lead    Case Request Operating Room: EXCISION, HIDRADENITIS (Completed)

## 2024-03-28 NOTE — ANESTHESIA POSTPROCEDURE EVALUATION
Anesthesia Post Evaluation    Patient: Faustino Fischer    Procedure(s) Performed: Procedure(s) (LRB):  EXCISION, HIDRADENITIS (N/A)    Final Anesthesia Type: general      Patient location during evaluation: PACU  Post-procedure vital signs: reviewed and stable  Airway patency: patent    PONV status at discharge: No PONV  Anesthetic complications: no      Cardiovascular status: hemodynamically stable  Respiratory status: unassisted  Hydration status: euvolemic  Follow-up not needed.              Vitals Value Taken Time   /77 03/27/24 1916   Temp 36.7 °C (98 °F) 03/27/24 1801   Pulse 73 03/27/24 1927   Resp 15 03/27/24 1854   SpO2 91 % 03/27/24 1927   Vitals shown include unvalidated device data.      Event Time   Out of Recovery 18:35:00         Pain/Margaret Score: Pain Rating Prior to Med Admin: 7 (3/27/2024  6:54 PM)  Pain Rating Post Med Admin: 8 (3/27/2024  6:30 PM)  Margaret Score: 9 (3/27/2024  6:45 PM)

## 2024-03-29 LAB — MICROORGANISM SPEC CULT: NORMAL

## 2024-03-30 ENCOUNTER — HOSPITAL ENCOUNTER (EMERGENCY)
Facility: HOSPITAL | Age: 54
Discharge: HOME | End: 2024-03-30
Payer: MEDICARE

## 2024-03-30 VITALS
BODY MASS INDEX: 33.75 KG/M2 | RESPIRATION RATE: 18 BRPM | TEMPERATURE: 99 F | SYSTOLIC BLOOD PRESSURE: 161 MMHG | DIASTOLIC BLOOD PRESSURE: 93 MMHG | HEART RATE: 88 BPM | OXYGEN SATURATION: 98 % | HEIGHT: 66 IN | WEIGHT: 210 LBS

## 2024-03-30 DIAGNOSIS — Z51.89 ENCOUNTER FOR WOUND RE-CHECK: Primary | ICD-10-CM

## 2024-03-30 LAB
ANION GAP SERPL CALCULATED.3IONS-SCNC: 17 MMOL/L (ref 7–16)
APTT PPP: 54.7 SECONDS (ref 25.2–37.3)
BASOPHILS # BLD AUTO: 0.02 K/UL (ref 0–0.2)
BASOPHILS NFR BLD AUTO: 0.3 % (ref 0–1)
BUN SERPL-MCNC: 10 MG/DL (ref 7–18)
BUN/CREAT SERPL: 11 (ref 6–20)
CALCIUM SERPL-MCNC: 8.7 MG/DL (ref 8.5–10.1)
CHLORIDE SERPL-SCNC: 102 MMOL/L (ref 98–107)
CO2 SERPL-SCNC: 25 MMOL/L (ref 21–32)
CREAT SERPL-MCNC: 0.94 MG/DL (ref 0.7–1.3)
DIFFERENTIAL METHOD BLD: ABNORMAL
EGFR (NO RACE VARIABLE) (RUSH/TITUS): 96 ML/MIN/1.73M2
EOSINOPHIL # BLD AUTO: 0.09 K/UL (ref 0–0.5)
EOSINOPHIL NFR BLD AUTO: 1.5 % (ref 1–4)
ERYTHROCYTE [DISTWIDTH] IN BLOOD BY AUTOMATED COUNT: 14.3 % (ref 11.5–14.5)
GLUCOSE SERPL-MCNC: 239 MG/DL (ref 74–106)
HCT VFR BLD AUTO: 37.2 % (ref 40–54)
HGB BLD-MCNC: 12.1 G/DL (ref 13.5–18)
INR BLD: 1.52
LYMPHOCYTES # BLD AUTO: 1.88 K/UL (ref 1–4.8)
LYMPHOCYTES NFR BLD AUTO: 31.7 % (ref 27–41)
MCH RBC QN AUTO: 32.7 PG (ref 27–31)
MCHC RBC AUTO-ENTMCNC: 32.5 G/DL (ref 32–36)
MCV RBC AUTO: 100.5 FL (ref 80–96)
MONOCYTES # BLD AUTO: 0.4 K/UL (ref 0–0.8)
MONOCYTES NFR BLD AUTO: 6.7 % (ref 2–6)
MPC BLD CALC-MCNC: 9.9 FL (ref 9.4–12.4)
NEUTROPHILS # BLD AUTO: 3.54 K/UL (ref 1.8–7.7)
NEUTROPHILS NFR BLD AUTO: 59.8 % (ref 53–65)
PLATELET # BLD AUTO: 223 K/UL (ref 150–400)
POTASSIUM SERPL-SCNC: 3.8 MMOL/L (ref 3.5–5.1)
PROTHROMBIN TIME: 19 SECONDS (ref 11.7–14.7)
RBC # BLD AUTO: 3.7 M/UL (ref 4.6–6.2)
SODIUM SERPL-SCNC: 140 MMOL/L (ref 136–145)
WBC # BLD AUTO: 5.93 K/UL (ref 4.5–11)

## 2024-03-30 PROCEDURE — 85730 THROMBOPLASTIN TIME PARTIAL: CPT | Performed by: NURSE PRACTITIONER

## 2024-03-30 PROCEDURE — 80048 BASIC METABOLIC PNL TOTAL CA: CPT | Performed by: NURSE PRACTITIONER

## 2024-03-30 PROCEDURE — 85610 PROTHROMBIN TIME: CPT | Performed by: NURSE PRACTITIONER

## 2024-03-30 PROCEDURE — 85025 COMPLETE CBC W/AUTO DIFF WBC: CPT | Performed by: NURSE PRACTITIONER

## 2024-03-30 PROCEDURE — 99284 EMERGENCY DEPT VISIT MOD MDM: CPT | Mod: GF | Performed by: NURSE PRACTITIONER

## 2024-03-30 PROCEDURE — 99283 EMERGENCY DEPT VISIT LOW MDM: CPT

## 2024-03-30 NOTE — DISCHARGE INSTRUCTIONS
Continue to keep wound clean and dry. Continue to cleanse at least twice a day with an antibacterial soap and water. Monitor for infection until healed completely. Return to the ED if symptoms worsen. Follow-up with Dr. Park for recheck on Monday.

## 2024-03-30 NOTE — ED PROVIDER NOTES
Encounter Date: 3/30/2024       History     Chief Complaint   Patient presents with    Wound Check     Presented with c/o drng and bleeding from wound after excision of axilla due to hidradenitis on 3/27/24. States just removed with surgical drsgs today and noted the drng and blood. Denies any weakness or dizziness. Reports localized pain. Denies fever or chills, n/v, abd pain. States taking Bactrim as prescribed.      Review of patient's allergies indicates:   Allergen Reactions    Ticagrelor Shortness Of Breath    Vancomycin analogues Other (See Comments)     Patient experienced adverse effect, and had some renal insufficiency after receiving Vancomycin.     Past Medical History:   Diagnosis Date    Chronic pancreatitis, unspecified pancreatitis type     Coronary artery disease     Diabetes mellitus     Dyslipidemia     Hidradenitis     Hypertension      Past Surgical History:   Procedure Laterality Date    CARDIAC SURGERY      Stents x6    CHOLECYSTECTOMY      EXCISION OF HIDRADENITIS N/A 3/27/2024    Procedure: EXCISION, HIDRADENITIS;  Surgeon: Fidel Park MD;  Location: [facility];  Service: General;  Laterality: N/A;    INCISION OF PERIANAL ABSCESS Bilateral 2/16/2024    Procedure: INCISION, ABSCESS, PERIANAL;  Surgeon: Fidel Park MD;  Location: [facility] OR;  Service: General;  Laterality: Bilateral;    UNDESCENDED TESTICLE EXPLORATION N/A      Family History   Problem Relation Age of Onset    Heart disease Father     Hypertension Father     Heart disease Brother     Heart disease Maternal Grandmother      Social History     Tobacco Use    Smoking status: Former     Current packs/day: 0.25     Average packs/day: 0.3 packs/day for 20.2 years (5.1 ttl pk-yrs)     Types: Cigarettes, Cigars     Start date: 2004     Passive exposure: Never    Smokeless tobacco: Never   Substance Use Topics    Alcohol use: Not Currently     Comment: occasional drink previous heavy drinker quit heavily in 2001    Drug use:  Never     Review of Systems   Constitutional:  Negative for activity change, chills and fever.   Respiratory:  Negative for cough and shortness of breath.    Cardiovascular:  Negative for chest pain.   Gastrointestinal:  Negative for abdominal pain, nausea and vomiting.   Genitourinary:  Negative for difficulty urinating.   Skin:  Positive for wound.   Neurological:  Negative for dizziness, weakness and light-headedness.   Psychiatric/Behavioral: Negative.         Physical Exam     Initial Vitals   BP Pulse Resp Temp SpO2   03/30/24 0920 03/30/24 0920 03/30/24 0920 03/30/24 0920 03/30/24 0928   (!) 159/98 90 18 98.5 °F (36.9 °C) 97 %      MAP       --                Physical Exam    Nursing note and vitals reviewed.  Constitutional: He appears well-developed and well-nourished. No distress.   HENT:   Head: Normocephalic.   Right Ear: External ear normal.   Left Ear: External ear normal.   Nose: Nose normal.   Mouth/Throat: Oropharynx is clear and moist.   Eyes: Conjunctivae and EOM are normal. Pupils are equal, round, and reactive to light.   Neck: Neck supple.   Normal range of motion.  Cardiovascular:  Normal rate, regular rhythm, normal heart sounds and intact distal pulses.           No murmur heard.  Pulmonary/Chest: Breath sounds normal.   Abdominal: Abdomen is soft. Bowel sounds are normal.   Musculoskeletal:         General: Normal range of motion.      Cervical back: Normal range of motion and neck supple.     Neurological: He is alert and oriented to person, place, and time. He has normal strength. GCS score is 15. GCS eye subscore is 4. GCS verbal subscore is 5. GCS motor subscore is 6.   Skin: Skin is warm and dry. Capillary refill takes less than 2 seconds.   Psychiatric: He has a normal mood and affect.               Medical Screening Exam   See Full Note    ED Course   Procedures  Labs Reviewed   BASIC METABOLIC PANEL - Abnormal; Notable for the following components:       Result Value    Anion Gap 17  (*)     Glucose 239 (*)     All other components within normal limits   PROTIME-INR - Abnormal; Notable for the following components:    PT 19.0 (*)     All other components within normal limits   APTT - Abnormal; Notable for the following components:    PTT 54.7 (*)     All other components within normal limits   CBC WITH DIFFERENTIAL - Abnormal; Notable for the following components:    RBC 3.70 (*)     Hemoglobin 12.1 (*)     Hematocrit 37.2 (*)     .5 (*)     MCH 32.7 (*)     Monocytes % 6.7 (*)     All other components within normal limits   CBC W/ AUTO DIFFERENTIAL    Narrative:     The following orders were created for panel order CBC Auto Differential.  Procedure                               Abnormality         Status                     ---------                               -----------         ------                     CBC with Differential[[id number]]       Abnormal            Final result                 Please view results for these tests on the individual orders.          Imaging Results    None          Medications - No data to display  Medical Decision Making  Presented with c/o drng and bleeding from wound after excision of axilla due to hidradenitis on 3/27/24. States just removed with surgical drsgs today and noted the drng and blood. Denies any weakness or dizziness. Reports localized pain. Denies fever or chills, n/v, abd pain. States taking Bactrim as prescribed.    Amount and/or Complexity of Data Reviewed  Labs: ordered. Decision-making details documented in ED Course.     Details: WBC 5930 with H&H 12.1 and 37.2, plt [id number], BUN 10, creatinine 0.94, INR 1.5.    Risk  Risk Details: Explained to pt that drng is serosanguinous drng that has collected in the wound at this time and not hemorrhage.Wound care per ED staff. Ace wrap applied. No active bleeding. Pt is stable.  Instructed on home care, wound care, follow-up and return precautions. Discharged home with detailed written  instructions provided.                 ED Course as of 03/30/24 1028   Sat Mar 30, 2024   1016 PTT(!): 54.7 [DP]   1016 INR: 1.52 [DP]   1016 WBC: 5.93 [DP]   1016 Hemoglobin(!): 12.1 [DP]   1016 Hematocrit(!): 37.2 [DP]   1016 Platelet Count: 223 [DP]   1016 Creatinine: 0.94 [DP]   1016 BUN: 10 [DP]   1016 Sodium: 140 [DP]   1016 Potassium: 3.8 [DP]      ED Course User Index  [DP] Sadia Mcpherson NP                           Clinical Impression:   Final diagnoses:  [Z51.89] Encounter for wound re-check (Primary)        ED Disposition Condition    Discharge Stable          ED Prescriptions    None       Follow-up Information       Follow up With Specialties Details Why Contact Info    Fidel Park MD  In 2 days  [address] [address] [place] [id number]  [phone]      [facility] - Emergency Department Emergency Medicine  If symptoms worsen [address] [place] [id number] [phone]             Sadia Mcpherson NP  03/30/24 1028

## 2024-03-31 LAB
BACTERIA SPEC ANAEROBE CULT: ABNORMAL
BACTERIA SPEC ANAEROBE CULT: ABNORMAL

## 2024-04-01 LAB
ESTROGEN SERPL-MCNC: NORMAL PG/ML
INSULIN SERPL-ACNC: NORMAL U[IU]/ML
LAB AP GROSS DESCRIPTION: NORMAL
LAB AP LABORATORY NOTES: NORMAL
T3RU NFR SERPL: NORMAL %

## 2024-04-09 ENCOUNTER — OFFICE VISIT (OUTPATIENT)
Dept: SURGERY | Facility: CLINIC | Age: 54
End: 2024-04-09
Attending: SURGERY
Payer: MEDICARE

## 2024-04-09 DIAGNOSIS — L73.2 HIDRADENITIS SUPPURATIVA: Primary | ICD-10-CM

## 2024-04-09 DIAGNOSIS — Z09 POSTOP CHECK: ICD-10-CM

## 2024-04-09 PROCEDURE — 99213 OFFICE O/P EST LOW 20 MIN: CPT | Mod: PBBFAC | Performed by: SURGERY

## 2024-04-09 PROCEDURE — 99024 POSTOP FOLLOW-UP VISIT: CPT | Mod: ,,, | Performed by: SURGERY

## 2024-04-09 RX ORDER — SULFAMETHOXAZOLE AND TRIMETHOPRIM 800; 160 MG/1; MG/1
1 TABLET ORAL DAILY
Qty: 14 TABLET | Refills: 0 | Status: SHIPPED | OUTPATIENT
Start: 2024-04-09 | End: 2024-06-08 | Stop reason: ALTCHOICE

## 2024-04-09 NOTE — PROGRESS NOTES
Patient returned to the clinic complaining of a new cyst under the left axilla.  He still has sutures in place from his bilateral axillary hidradenitis excision 2 weeks ago.  He reports drainage from both as well as a musty odor.  Sutures were removed today.  A mild amount of fluid was expressible from the right axilla with a moderate amount of serous fluid expressible from the left.  Dry dressings were applied.  The abscess over the proximal arm, inner aspect within the axilla was aspirated after cleansing the skin with Betadine.  Roughly 2 to 3 cc of fluid was evacuated.    Patient returned for follow-up wound check in 2-3 weeks.  Prescription for Bactrim was sent in electronically.

## 2024-04-16 ENCOUNTER — OFFICE VISIT (OUTPATIENT)
Dept: SURGERY | Facility: CLINIC | Age: 54
End: 2024-04-16
Attending: SURGERY
Payer: MEDICARE

## 2024-04-16 VITALS — TEMPERATURE: 97 F | DIASTOLIC BLOOD PRESSURE: 89 MMHG | HEART RATE: 84 BPM | SYSTOLIC BLOOD PRESSURE: 153 MMHG

## 2024-04-16 DIAGNOSIS — Z09 POSTOP CHECK: Primary | ICD-10-CM

## 2024-04-16 PROCEDURE — 99214 OFFICE O/P EST MOD 30 MIN: CPT | Mod: PBBFAC | Performed by: SURGERY

## 2024-04-16 PROCEDURE — 99024 POSTOP FOLLOW-UP VISIT: CPT | Mod: ,,, | Performed by: SURGERY

## 2024-04-23 ENCOUNTER — DOCUMENT SCAN (OUTPATIENT)
Dept: HOME HEALTH SERVICES | Facility: HOSPITAL | Age: 54
End: 2024-04-23
Payer: MEDICARE

## 2024-04-23 PROBLEM — Z09 POSTOP CHECK: Status: ACTIVE | Noted: 2024-04-23

## 2024-04-23 NOTE — PROGRESS NOTES
Subjective:       Patient ID: Faustino Fischer is a 54 y.o. male.    Chief Complaint: Post-op Evaluation    54-year-old male patient whom I have seen previously for hidradenitis of the left buttocks and thigh area, presents with abscess in the right axilla.  He has an enlarged area which has been very tender.  He denies spontaneous drainage.  He has, however, had intermittent drainage beneath the left axilla, and would like for me to evaluate area as well.    4/16  Returns for f/u visit;  sutures were not removed at last visit  Patient continues to have serous drainage from bilateral axilla wounds.  Also at last visit, had aspiration of small left axilla abscess, small amount purulent fluid.    No purulent drainage or fever        family history includes Heart disease in his brother, father, and maternal grandmother; Hypertension in his father.  Past Medical History:   Diagnosis Date    Chronic pancreatitis, unspecified pancreatitis type     Coronary artery disease     Diabetes mellitus     Dyslipidemia     Hidradenitis     Hypertension       Past Surgical History:   Procedure Laterality Date    CARDIAC SURGERY      Stents x6    CHOLECYSTECTOMY      EXCISION OF HIDRADENITIS N/A 3/27/2024    Procedure: EXCISION, HIDRADENITIS;  Surgeon: Fidel Park MD;  Location: [facility] OR;  Service: General;  Laterality: N/A;    INCISION OF PERIANAL ABSCESS Bilateral 2/16/2024    Procedure: INCISION, ABSCESS, PERIANAL;  Surgeon: Fidel Park MD;  Location: [facility] OR;  Service: General;  Laterality: Bilateral;    UNDESCENDED TESTICLE EXPLORATION N/A        reports that he has quit smoking. His smoking use included cigarettes and cigars. He started smoking about 20 years ago. He has a 5.1 pack-year smoking history. He has never been exposed to tobacco smoke. He has never used smokeless tobacco. He reports that he does not currently use alcohol. He reports that he does not use drugs.     Review of Systems   All other systems  reviewed and are negative.         Objective:      BP (!) 153/89 (BP Location: Right arm, Patient Position: Sitting, BP Method: Large (Automatic))   Pulse 84   Temp 97.1 °F (36.2 °C)    Physical Exam  Cardiovascular:      Rate and Rhythm: Normal rate.   Pulmonary:      Effort: Pulmonary effort is normal.   Musculoskeletal:         General: No swelling.   Skin:     Coloration: Skin is not jaundiced.      Comments: Removed sutures; Expressible serous fluid from small openings, bilateral axilla wounds; attempted reaspiration of small abscess left axilla   Neurological:      Mental Status: He is alert.           Assessment/Plan:         Postop check         Problem List Items Addressed This Visit          Other    Postop check - Primary    Current Assessment & Plan     Continue postop care  Dressing changes as needed    Follow abscess left arm, appears to be resolving

## 2024-04-23 NOTE — ASSESSMENT & PLAN NOTE
Continue postop care  Dressing changes as needed    Follow abscess left arm, appears to be resolving

## 2024-04-26 NOTE — DISCHARGE SUMMARY
[facility] - Periop Services  Discharge Note  Short Stay    Procedure(s) (LRB):  EXCISION, HIDRADENITIS (N/A)      OUTCOME: Patient tolerated treatment/procedure well without complication and is now ready for discharge.    DISPOSITION: Home or Self Care    FINAL DIAGNOSIS:  <principal problem not specified>    FOLLOWUP: In clinic    DISCHARGE INSTRUCTIONS:    Discharge Procedure Orders   Diet general     Lifting restrictions   Order Comments: Limited to 5-10 pounds until followup visit     Remove dressing in 48 hours   Order Comments: Leave steristrips on     Call MD for:  temperature >100.4     Call MD for:  persistent nausea and vomiting     Call MD for:  severe uncontrolled pain     Call MD for:  difficulty breathing, headache or visual disturbances     Shower on day dressing removed (No bath)         Clinical Reference Documents Added to Patient Instructions         Document    HIDRADENITIS SUPPURATIVA (ENGLISH)    [facility]ID AVS FACTSHEET            TIME SPENT ON DISCHARGE: 5 minutes

## 2024-05-17 LAB
OHS QRS DURATION: 90 MS
OHS QTC CALCULATION: 446 MS

## 2024-06-06 ENCOUNTER — OFFICE VISIT (OUTPATIENT)
Dept: DERMATOLOGY | Facility: CLINIC | Age: 54
End: 2024-06-06
Payer: MEDICARE

## 2024-06-06 DIAGNOSIS — Z79.899 HIGH RISK MEDICATION USE: ICD-10-CM

## 2024-06-06 DIAGNOSIS — K58.9 IRRITABLE BOWEL SYNDROME, UNSPECIFIED TYPE: ICD-10-CM

## 2024-06-06 DIAGNOSIS — L73.2 HIDRADENITIS SUPPURATIVA: Primary | ICD-10-CM

## 2024-06-06 PROCEDURE — 99214 OFFICE O/P EST MOD 30 MIN: CPT | Mod: ,,, | Performed by: STUDENT IN AN ORGANIZED HEALTH CARE EDUCATION/TRAINING PROGRAM

## 2024-06-06 NOTE — PROGRESS NOTES
Renee from [facility] called and notification of Blood glucose of 648. Called, and notified patient educated on importance of taking schedule diabetic medication and establishing care with a PCP. Also recommended patient go to ED to evaluate for possibility of DKA given the critical hyperglycemia. Patient verbalized understanding.     - Jayne'On Aleksandr BRANDT/IVC

## 2024-06-06 NOTE — PROGRESS NOTES
[facility]  Mo Boyer MD    [facility], [place] [address]  [phone]    Fax: [phone]    Patient Name: Faustino Fischer  Medical Record Number: [id number]  PCP: Fidel Park MD  Age: 54 y.o. : 1970  Contact: [phone] (home)     History of Present Illness:     Faustino Fischer is a 54 y.o.  male here for follow up of HS treated with doxycycline x 3 months and axillary excision with Dr. Park that has not improved and continues to have drainage and pain.     The patient has no other concerns today.    Review of Systems:     Unremarkable other than mentioned above.     Physical Exam:     General: Relaxed, oriented, alert    Skin examination of the scalp, face, neck, chest, back, abdomen, upper extremities and lower extremities were normal except for as listed below      Assessment and Plan:     1. Hidradenitis Suppurativa  - Fistula formation and draining sinuses, weeping acneiform pustules and papules, scarring, and acneiform nodules  Jade Stage: 3    Plan:   - patient reports history of heart failure so will avoid humira   - no history of ulcerative colitis, but has occasional diarrhea,  - will refer to GI as he has never had a screening colonoscopy    - will start Cosentyx for severe hidradenitis     Counseling.  Cleanse acneiform lesions and sinus tracts with anti-bacterial washes. Oral antibiotics can help reduce inflammation.  Discussed importance of not smoking and weight loss     2. High Risk Medication Monitoring : The risks and benefits of the medication were reviewed in full with the patient. Should any side effects occur, the patient will stop the medication and contact me immediately.      Mo Boyer MD   Mohs Surgery/Dermatologic Oncology  Dermatology

## 2024-06-08 ENCOUNTER — HOSPITAL ENCOUNTER (INPATIENT)
Facility: HOSPITAL | Age: 54
LOS: 2 days | Discharge: HOME | DRG: 637 | End: 2024-06-10
Attending: EMERGENCY MEDICINE | Admitting: HOSPITALIST
Payer: MEDICARE

## 2024-06-08 DIAGNOSIS — E11.10 DIABETIC KETOACIDOSIS WITHOUT COMA ASSOCIATED WITH TYPE 2 DIABETES MELLITUS: ICD-10-CM

## 2024-06-08 DIAGNOSIS — R79.89 ELEVATED BRAIN NATRIURETIC PEPTIDE (BNP) LEVEL: ICD-10-CM

## 2024-06-08 DIAGNOSIS — K86.3 PANCREATIC PSEUDOCYST: ICD-10-CM

## 2024-06-08 DIAGNOSIS — K86.1 CHRONIC PANCREATITIS, UNSPECIFIED PANCREATITIS TYPE: Primary | ICD-10-CM

## 2024-06-08 DIAGNOSIS — R07.9 CHEST PAIN: ICD-10-CM

## 2024-06-08 DIAGNOSIS — R73.9 HYPERGLYCEMIA: ICD-10-CM

## 2024-06-08 DIAGNOSIS — E88.89 KETOSIS: ICD-10-CM

## 2024-06-08 DIAGNOSIS — K85.90 ACUTE PANCREATITIS, UNSPECIFIED COMPLICATION STATUS, UNSPECIFIED PANCREATITIS TYPE: ICD-10-CM

## 2024-06-08 PROBLEM — I50.9 CONGESTIVE HEART FAILURE (CHF): Status: RESOLVED | Noted: 2022-09-19 | Resolved: 2024-06-08

## 2024-06-08 PROBLEM — I10 HYPERTENSION: Status: ACTIVE | Noted: 2024-06-08

## 2024-06-08 PROBLEM — I25.10 CAD (CORONARY ARTERY DISEASE): Status: ACTIVE | Noted: 2024-06-08

## 2024-06-08 PROBLEM — E66.9 OBESITY: Status: ACTIVE | Noted: 2024-06-08

## 2024-06-08 PROBLEM — I16.0 HYPERTENSIVE URGENCY: Status: ACTIVE | Noted: 2024-06-08

## 2024-06-08 PROBLEM — E78.5 HYPERLIPIDEMIA: Status: ACTIVE | Noted: 2022-09-19

## 2024-06-08 PROBLEM — I50.9 CONGESTIVE HEART FAILURE (CHF): Status: ACTIVE | Noted: 2022-09-19

## 2024-06-08 LAB
ACETONE SERPL QL SCN: NORMAL
ALBUMIN SERPL BCP-MCNC: 3.5 G/DL (ref 3.5–5)
ALBUMIN/GLOB SERPL: 0.7 {RATIO}
ALP SERPL-CCNC: 124 U/L (ref 45–115)
ALT SERPL W P-5'-P-CCNC: 19 U/L (ref 16–61)
ANION GAP SERPL CALCULATED.3IONS-SCNC: 14 MMOL/L (ref 7–16)
ANION GAP SERPL CALCULATED.3IONS-SCNC: 15 MMOL/L (ref 7–16)
ANION GAP SERPL CALCULATED.3IONS-SCNC: 24 MMOL/L (ref 7–16)
APTT PPP: 26.4 SECONDS (ref 25.2–37.3)
AST SERPL W P-5'-P-CCNC: 23 U/L (ref 15–37)
BASOPHILS # BLD AUTO: 0.06 K/UL (ref 0–0.2)
BASOPHILS NFR BLD AUTO: 0.6 % (ref 0–1)
BILIRUB SERPL-MCNC: 1.3 MG/DL (ref ?–1.2)
BILIRUB UR QL STRIP: NEGATIVE
BUN SERPL-MCNC: 11 MG/DL (ref 7–18)
BUN SERPL-MCNC: 13 MG/DL (ref 7–18)
BUN SERPL-MCNC: 13 MG/DL (ref 7–18)
BUN/CREAT SERPL: 10 (ref 6–20)
BUN/CREAT SERPL: 12 (ref 6–20)
BUN/CREAT SERPL: 12 (ref 6–20)
CALCIUM SERPL-MCNC: 8.9 MG/DL (ref 8.5–10.1)
CALCIUM SERPL-MCNC: 9.3 MG/DL (ref 8.5–10.1)
CALCIUM SERPL-MCNC: 9.9 MG/DL (ref 8.5–10.1)
CHLORIDE SERPL-SCNC: 100 MMOL/L (ref 98–107)
CHLORIDE SERPL-SCNC: 96 MMOL/L (ref 98–107)
CHLORIDE SERPL-SCNC: 98 MMOL/L (ref 98–107)
CHOLEST SERPL-MCNC: 253 MG/DL (ref 0–200)
CHOLEST/HDLC SERPL: 6.8 {RATIO}
CLARITY UR: CLEAR
CO2 SERPL-SCNC: 15 MMOL/L (ref 21–32)
CO2 SERPL-SCNC: 20 MMOL/L (ref 21–32)
CO2 SERPL-SCNC: 23 MMOL/L (ref 21–32)
COLOR UR: ABNORMAL
CREAT SERPL-MCNC: 1.09 MG/DL (ref 0.7–1.3)
CREAT SERPL-MCNC: 1.11 MG/DL (ref 0.7–1.3)
CREAT SERPL-MCNC: 1.12 MG/DL (ref 0.7–1.3)
DIFFERENTIAL METHOD BLD: ABNORMAL
EGFR (NO RACE VARIABLE) (RUSH/TITUS): 78 ML/MIN/1.73M2
EGFR (NO RACE VARIABLE) (RUSH/TITUS): 78 ML/MIN/1.73M2
EGFR (NO RACE VARIABLE) (RUSH/TITUS): 80 ML/MIN/1.73M2
EOSINOPHIL # BLD AUTO: 0.02 K/UL (ref 0–0.5)
EOSINOPHIL NFR BLD AUTO: 0.2 % (ref 1–4)
ERYTHROCYTE [DISTWIDTH] IN BLOOD BY AUTOMATED COUNT: 12.7 % (ref 11.5–14.5)
GLOBULIN SER-MCNC: 5 G/DL (ref 2–4)
GLUCOSE SERPL-MCNC: 177 MG/DL (ref 70–105)
GLUCOSE SERPL-MCNC: 179 MG/DL (ref 70–105)
GLUCOSE SERPL-MCNC: 265 MG/DL (ref 74–106)
GLUCOSE SERPL-MCNC: 284 MG/DL (ref 70–105)
GLUCOSE SERPL-MCNC: 313 MG/DL (ref 74–106)
GLUCOSE SERPL-MCNC: 316 MG/DL (ref 70–105)
GLUCOSE SERPL-MCNC: 371 MG/DL (ref 74–106)
GLUCOSE UR STRIP-MCNC: >1000 MG/DL
HCO3 UR-SCNC: 18.1 MMOL/L (ref 24–28)
HCT VFR BLD AUTO: 45.6 % (ref 40–54)
HCT VFR BLD CALC: 49 % (ref 35–51)
HDLC SERPL-MCNC: 37 MG/DL (ref 40–60)
HGB BLD-MCNC: 14.8 G/DL (ref 13.5–18)
IMM GRANULOCYTES # BLD AUTO: 0.09 K/UL (ref 0–0.04)
IMM GRANULOCYTES NFR BLD: 1 % (ref 0–0.4)
INR BLD: 0.91
KETONES UR STRIP-SCNC: >=150 MG/DL
LDH SERPL L TO P-CCNC: 1.2 MMOL/L (ref 0.3–1.2)
LDLC SERPL CALC-MCNC: 161 MG/DL
LDLC/HDLC SERPL: 4.4 {RATIO}
LEUKOCYTE ESTERASE UR QL STRIP: NEGATIVE
LIPASE SERPL-CCNC: 148 U/L (ref 16–77)
LYMPHOCYTES # BLD AUTO: 1.32 K/UL (ref 1–4.8)
LYMPHOCYTES NFR BLD AUTO: 14 % (ref 27–41)
MAGNESIUM SERPL-MCNC: 1.6 MG/DL (ref 1.7–2.3)
MCH RBC QN AUTO: 31.3 PG (ref 27–31)
MCHC RBC AUTO-ENTMCNC: 32.5 G/DL (ref 32–36)
MCV RBC AUTO: 96.4 FL (ref 80–96)
MONOCYTES # BLD AUTO: 0.47 K/UL (ref 0–0.8)
MONOCYTES NFR BLD AUTO: 5 % (ref 2–6)
MPC BLD CALC-MCNC: 10.6 FL (ref 9.4–12.4)
MUCOUS, UA: ABNORMAL /LPF
NEUTROPHILS # BLD AUTO: 7.44 K/UL (ref 1.8–7.7)
NEUTROPHILS NFR BLD AUTO: 79.2 % (ref 53–65)
NITRITE UR QL STRIP: NEGATIVE
NONHDLC SERPL-MCNC: 216 MG/DL
NRBC # BLD AUTO: 0 X10E3/UL
NRBC, AUTO (.00): 0 %
NT-PROBNP SERPL-MCNC: 2646 PG/ML (ref 1–125)
PCO2 BLDA: 32 MMHG (ref 41–51)
PH SMN: 7.36 [PH] (ref 7.32–7.42)
PH UR STRIP: 6 PH UNITS
PHOSPHATE SERPL-MCNC: 3.5 MG/DL (ref 2.5–4.5)
PLATELET # BLD AUTO: 264 K/UL (ref 150–400)
PO2 BLDA: 71 MMHG (ref 25–40)
POC BASE EXCESS: -6.2 MMOL/L (ref -2–3)
POC CO2: 19.1 MMOL/L
POC IONIZED CALCIUM: 1.19 MMOL/L (ref 1.15–1.35)
POC SATURATED O2: 93 % (ref 40–70)
POCT GLUCOSE: 408 MG/DL (ref 60–95)
POTASSIUM BLD-SCNC: 4.8 MMOL/L (ref 3.4–4.5)
POTASSIUM SERPL-SCNC: 3.8 MMOL/L (ref 3.5–5.1)
POTASSIUM SERPL-SCNC: 4.2 MMOL/L (ref 3.5–5.1)
POTASSIUM SERPL-SCNC: 4.7 MMOL/L (ref 3.5–5.1)
PROT SERPL-MCNC: 8.5 G/DL (ref 6.4–8.2)
PROT UR QL STRIP: 200
PROTHROMBIN TIME: 12.2 SECONDS (ref 11.7–14.7)
RBC # BLD AUTO: 4.73 M/UL (ref 4.6–6.2)
RBC # UR STRIP: ABNORMAL /UL
RBC #/AREA URNS HPF: 1 /HPF
SODIUM BLD-SCNC: 128 MMOL/L (ref 136–145)
SODIUM SERPL-SCNC: 130 MMOL/L (ref 136–145)
SODIUM SERPL-SCNC: 131 MMOL/L (ref 136–145)
SODIUM SERPL-SCNC: 131 MMOL/L (ref 136–145)
SP GR UR STRIP: 1.02
SQUAMOUS #/AREA URNS LPF: ABNORMAL /HPF
TRIGL SERPL-MCNC: 277 MG/DL (ref 35–150)
TROPONIN I SERPL DL<=0.01 NG/ML-MCNC: 36.2 PG/ML
TROPONIN I SERPL DL<=0.01 NG/ML-MCNC: 41.5 PG/ML
TSH SERPL DL<=0.005 MIU/L-ACNC: 1.44 UIU/ML (ref 0.36–3.74)
UROBILINOGEN UR STRIP-ACNC: NORMAL MG/DL
VLDLC SERPL-MCNC: 55 MG/DL
WBC # BLD AUTO: 9.4 K/UL (ref 4.5–11)
WBC #/AREA URNS HPF: 5 /HPF

## 2024-06-08 PROCEDURE — 84100 ASSAY OF PHOSPHORUS: CPT | Performed by: GENERAL PRACTICE

## 2024-06-08 PROCEDURE — 96361 HYDRATE IV INFUSION ADD-ON: CPT

## 2024-06-08 PROCEDURE — 82962 GLUCOSE BLOOD TEST: CPT

## 2024-06-08 PROCEDURE — 96376 TX/PRO/DX INJ SAME DRUG ADON: CPT

## 2024-06-08 PROCEDURE — 83880 ASSAY OF NATRIURETIC PEPTIDE: CPT | Performed by: GENERAL PRACTICE

## 2024-06-08 PROCEDURE — 82803 BLOOD GASES ANY COMBINATION: CPT

## 2024-06-08 PROCEDURE — 63600175 PHARM REV CODE 636 W HCPCS: Performed by: EMERGENCY MEDICINE

## 2024-06-08 PROCEDURE — 96365 THER/PROPH/DIAG IV INF INIT: CPT

## 2024-06-08 PROCEDURE — 82330 ASSAY OF CALCIUM: CPT

## 2024-06-08 PROCEDURE — 84295 ASSAY OF SERUM SODIUM: CPT

## 2024-06-08 PROCEDURE — 63600175 PHARM REV CODE 636 W HCPCS: Performed by: GENERAL PRACTICE

## 2024-06-08 PROCEDURE — 63600175 PHARM REV CODE 636 W HCPCS: Performed by: HOSPITALIST

## 2024-06-08 PROCEDURE — 80053 COMPREHEN METABOLIC PANEL: CPT | Performed by: EMERGENCY MEDICINE

## 2024-06-08 PROCEDURE — 83690 ASSAY OF LIPASE: CPT | Performed by: EMERGENCY MEDICINE

## 2024-06-08 PROCEDURE — 25000003 PHARM REV CODE 250: Performed by: GENERAL PRACTICE

## 2024-06-08 PROCEDURE — 36415 COLL VENOUS BLD VENIPUNCTURE: CPT | Performed by: EMERGENCY MEDICINE

## 2024-06-08 PROCEDURE — 84443 ASSAY THYROID STIM HORMONE: CPT | Performed by: GENERAL PRACTICE

## 2024-06-08 PROCEDURE — 99223 1ST HOSP IP/OBS HIGH 75: CPT | Mod: AI,GC,, | Performed by: HOSPITALIST

## 2024-06-08 PROCEDURE — 85610 PROTHROMBIN TIME: CPT | Performed by: GENERAL PRACTICE

## 2024-06-08 PROCEDURE — 11000001 HC ACUTE MED/SURG PRIVATE ROOM

## 2024-06-08 PROCEDURE — 82947 ASSAY GLUCOSE BLOOD QUANT: CPT

## 2024-06-08 PROCEDURE — 84484 ASSAY OF TROPONIN QUANT: CPT | Performed by: GENERAL PRACTICE

## 2024-06-08 PROCEDURE — 85730 THROMBOPLASTIN TIME PARTIAL: CPT | Performed by: GENERAL PRACTICE

## 2024-06-08 PROCEDURE — 85025 COMPLETE CBC W/AUTO DIFF WBC: CPT | Performed by: EMERGENCY MEDICINE

## 2024-06-08 PROCEDURE — 85014 HEMATOCRIT: CPT

## 2024-06-08 PROCEDURE — 99285 EMERGENCY DEPT VISIT HI MDM: CPT | Mod: 25

## 2024-06-08 PROCEDURE — 81001 URINALYSIS AUTO W/SCOPE: CPT | Performed by: EMERGENCY MEDICINE

## 2024-06-08 PROCEDURE — 84132 ASSAY OF SERUM POTASSIUM: CPT

## 2024-06-08 PROCEDURE — 25500020 PHARM REV CODE 255: Performed by: EMERGENCY MEDICINE

## 2024-06-08 PROCEDURE — 83605 ASSAY OF LACTIC ACID: CPT

## 2024-06-08 PROCEDURE — 80061 LIPID PANEL: CPT | Performed by: GENERAL PRACTICE

## 2024-06-08 PROCEDURE — 96375 TX/PRO/DX INJ NEW DRUG ADDON: CPT

## 2024-06-08 PROCEDURE — 83735 ASSAY OF MAGNESIUM: CPT | Performed by: GENERAL PRACTICE

## 2024-06-08 PROCEDURE — 36415 COLL VENOUS BLD VENIPUNCTURE: CPT | Performed by: GENERAL PRACTICE

## 2024-06-08 PROCEDURE — 25000003 PHARM REV CODE 250: Performed by: EMERGENCY MEDICINE

## 2024-06-08 PROCEDURE — 82009 KETONE BODYS QUAL: CPT | Performed by: EMERGENCY MEDICINE

## 2024-06-08 RX ORDER — HYDROCODONE BITARTRATE AND ACETAMINOPHEN 7.5; 325 MG/1; MG/1
1 TABLET ORAL EVERY 6 HOURS PRN
Status: DISCONTINUED | OUTPATIENT
Start: 2024-06-08 | End: 2024-06-10 | Stop reason: HOSPADM

## 2024-06-08 RX ORDER — TALC
6 POWDER (GRAM) TOPICAL NIGHTLY PRN
Status: DISCONTINUED | OUTPATIENT
Start: 2024-06-08 | End: 2024-06-10 | Stop reason: HOSPADM

## 2024-06-08 RX ORDER — INSULIN GLARGINE 100 [IU]/ML
13 INJECTION, SOLUTION SUBCUTANEOUS DAILY
Status: DISCONTINUED | OUTPATIENT
Start: 2024-06-08 | End: 2024-06-08

## 2024-06-08 RX ORDER — MAGNESIUM SULFATE HEPTAHYDRATE 40 MG/ML
2 INJECTION, SOLUTION INTRAVENOUS ONCE
Status: COMPLETED | OUTPATIENT
Start: 2024-06-08 | End: 2024-06-08

## 2024-06-08 RX ORDER — HYDROMORPHONE HYDROCHLORIDE 2 MG/ML
1 INJECTION, SOLUTION INTRAMUSCULAR; INTRAVENOUS; SUBCUTANEOUS
Status: COMPLETED | OUTPATIENT
Start: 2024-06-08 | End: 2024-06-08

## 2024-06-08 RX ORDER — SODIUM CHLORIDE 0.9 % (FLUSH) 0.9 %
10 SYRINGE (ML) INJECTION EVERY 12 HOURS PRN
Status: DISCONTINUED | OUTPATIENT
Start: 2024-06-08 | End: 2024-06-10 | Stop reason: HOSPADM

## 2024-06-08 RX ORDER — SODIUM CHLORIDE 9 MG/ML
INJECTION, SOLUTION INTRAVENOUS CONTINUOUS
Status: DISCONTINUED | OUTPATIENT
Start: 2024-06-08 | End: 2024-06-10 | Stop reason: HOSPADM

## 2024-06-08 RX ORDER — HYDROMORPHONE HYDROCHLORIDE 2 MG/ML
1 INJECTION, SOLUTION INTRAMUSCULAR; INTRAVENOUS; SUBCUTANEOUS EVERY 6 HOURS PRN
Status: DISCONTINUED | OUTPATIENT
Start: 2024-06-08 | End: 2024-06-08

## 2024-06-08 RX ORDER — IBUPROFEN 200 MG
24 TABLET ORAL
Status: DISCONTINUED | OUTPATIENT
Start: 2024-06-08 | End: 2024-06-10 | Stop reason: HOSPADM

## 2024-06-08 RX ORDER — HYDROMORPHONE HYDROCHLORIDE 2 MG/ML
1 INJECTION, SOLUTION INTRAMUSCULAR; INTRAVENOUS; SUBCUTANEOUS EVERY 6 HOURS PRN
Status: DISCONTINUED | OUTPATIENT
Start: 2024-06-08 | End: 2024-06-09

## 2024-06-08 RX ORDER — CARVEDILOL 12.5 MG/1
12.5 TABLET ORAL
Status: DISCONTINUED | OUTPATIENT
Start: 2024-06-09 | End: 2024-06-10 | Stop reason: HOSPADM

## 2024-06-08 RX ORDER — GLUCAGON 1 MG
1 KIT INJECTION
Status: DISCONTINUED | OUTPATIENT
Start: 2024-06-08 | End: 2024-06-10 | Stop reason: HOSPADM

## 2024-06-08 RX ORDER — ISOSORBIDE MONONITRATE 30 MG/1
30 TABLET, EXTENDED RELEASE ORAL DAILY
Status: DISCONTINUED | OUTPATIENT
Start: 2024-06-08 | End: 2024-06-10 | Stop reason: HOSPADM

## 2024-06-08 RX ORDER — FUROSEMIDE 20 MG/1
20 TABLET ORAL DAILY
Status: DISCONTINUED | OUTPATIENT
Start: 2024-06-08 | End: 2024-06-08

## 2024-06-08 RX ORDER — ACETAMINOPHEN 325 MG/1
650 TABLET ORAL EVERY 4 HOURS PRN
Status: DISCONTINUED | OUTPATIENT
Start: 2024-06-08 | End: 2024-06-10 | Stop reason: HOSPADM

## 2024-06-08 RX ORDER — IBUPROFEN 200 MG
16 TABLET ORAL
Status: DISCONTINUED | OUTPATIENT
Start: 2024-06-08 | End: 2024-06-10 | Stop reason: HOSPADM

## 2024-06-08 RX ORDER — INSULIN ASPART 100 [IU]/ML
0-10 INJECTION, SOLUTION INTRAVENOUS; SUBCUTANEOUS
Status: DISCONTINUED | OUTPATIENT
Start: 2024-06-08 | End: 2024-06-10

## 2024-06-08 RX ORDER — ATORVASTATIN CALCIUM 80 MG/1
80 TABLET, FILM COATED ORAL DAILY
Status: DISCONTINUED | OUTPATIENT
Start: 2024-06-09 | End: 2024-06-10 | Stop reason: HOSPADM

## 2024-06-08 RX ORDER — NAPROXEN SODIUM 220 MG/1
81 TABLET, FILM COATED ORAL DAILY
Status: DISCONTINUED | OUTPATIENT
Start: 2024-06-08 | End: 2024-06-10 | Stop reason: HOSPADM

## 2024-06-08 RX ORDER — ENOXAPARIN SODIUM 100 MG/ML
40 INJECTION SUBCUTANEOUS EVERY 24 HOURS
Status: DISCONTINUED | OUTPATIENT
Start: 2024-06-08 | End: 2024-06-10 | Stop reason: HOSPADM

## 2024-06-08 RX ORDER — SIMETHICONE 80 MG
1 TABLET,CHEWABLE ORAL 4 TIMES DAILY PRN
Status: DISCONTINUED | OUTPATIENT
Start: 2024-06-08 | End: 2024-06-10 | Stop reason: HOSPADM

## 2024-06-08 RX ORDER — NALOXONE HCL 0.4 MG/ML
0.02 VIAL (ML) INJECTION
Status: DISCONTINUED | OUTPATIENT
Start: 2024-06-08 | End: 2024-06-10 | Stop reason: HOSPADM

## 2024-06-08 RX ORDER — HYDRALAZINE HYDROCHLORIDE 20 MG/ML
10 INJECTION INTRAMUSCULAR; INTRAVENOUS EVERY 8 HOURS PRN
Status: DISCONTINUED | OUTPATIENT
Start: 2024-06-08 | End: 2024-06-10 | Stop reason: HOSPADM

## 2024-06-08 RX ORDER — HYDROMORPHONE HYDROCHLORIDE 2 MG/ML
0.5 INJECTION, SOLUTION INTRAMUSCULAR; INTRAVENOUS; SUBCUTANEOUS
Status: DISCONTINUED | OUTPATIENT
Start: 2024-06-08 | End: 2024-06-08

## 2024-06-08 RX ORDER — ONDANSETRON HYDROCHLORIDE 2 MG/ML
4 INJECTION, SOLUTION INTRAVENOUS EVERY 8 HOURS PRN
Status: DISCONTINUED | OUTPATIENT
Start: 2024-06-08 | End: 2024-06-10 | Stop reason: HOSPADM

## 2024-06-08 RX ORDER — POLYETHYLENE GLYCOL 3350 17 G/17G
17 POWDER, FOR SOLUTION ORAL 2 TIMES DAILY PRN
Status: DISCONTINUED | OUTPATIENT
Start: 2024-06-08 | End: 2024-06-10 | Stop reason: HOSPADM

## 2024-06-08 RX ORDER — HYDROCODONE BITARTRATE AND ACETAMINOPHEN 5; 325 MG/1; MG/1
1 TABLET ORAL EVERY 6 HOURS PRN
Status: DISCONTINUED | OUTPATIENT
Start: 2024-06-08 | End: 2024-06-08

## 2024-06-08 RX ADMIN — SODIUM CHLORIDE: 9 INJECTION, SOLUTION INTRAVENOUS at 08:06

## 2024-06-08 RX ADMIN — HYDROCODONE BITARTRATE AND ACETAMINOPHEN 1 TABLET: 5; 325 TABLET ORAL at 01:06

## 2024-06-08 RX ADMIN — ASPIRIN 81 MG CHEWABLE TABLET 81 MG: 81 TABLET CHEWABLE at 01:06

## 2024-06-08 RX ADMIN — MAGNESIUM SULFATE HEPTAHYDRATE 2 G: 40 INJECTION, SOLUTION INTRAVENOUS at 08:06

## 2024-06-08 RX ADMIN — IOPAMIDOL 100 ML: 755 INJECTION, SOLUTION INTRAVENOUS at 07:06

## 2024-06-08 RX ADMIN — HYDROMORPHONE HYDROCHLORIDE 1 MG: 2 INJECTION INTRAMUSCULAR; INTRAVENOUS; SUBCUTANEOUS at 06:06

## 2024-06-08 RX ADMIN — ISOSORBIDE MONONITRATE 30 MG: 30 TABLET, EXTENDED RELEASE ORAL at 01:06

## 2024-06-08 RX ADMIN — PROMETHAZINE HYDROCHLORIDE 25 MG: 25 INJECTION INTRAMUSCULAR; INTRAVENOUS at 03:06

## 2024-06-08 RX ADMIN — SODIUM CHLORIDE: 9 INJECTION, SOLUTION INTRAVENOUS at 01:06

## 2024-06-08 RX ADMIN — INSULIN DETEMIR 13 UNITS: 100 INJECTION, SOLUTION SUBCUTANEOUS at 01:06

## 2024-06-08 RX ADMIN — HYDROMORPHONE HYDROCHLORIDE 1 MG: 2 INJECTION INTRAMUSCULAR; INTRAVENOUS; SUBCUTANEOUS at 09:06

## 2024-06-08 RX ADMIN — INSULIN ASPART 8 UNITS: 100 INJECTION, SOLUTION INTRAVENOUS; SUBCUTANEOUS at 05:06

## 2024-06-08 RX ADMIN — INSULIN ASPART 2 UNITS: 100 INJECTION, SOLUTION INTRAVENOUS; SUBCUTANEOUS at 09:06

## 2024-06-08 RX ADMIN — HYDROMORPHONE HYDROCHLORIDE 1 MG: 2 INJECTION INTRAMUSCULAR; INTRAVENOUS; SUBCUTANEOUS at 03:06

## 2024-06-08 RX ADMIN — ENOXAPARIN SODIUM 40 MG: 40 INJECTION SUBCUTANEOUS at 05:06

## 2024-06-08 RX ADMIN — SODIUM CHLORIDE 1000 ML: 9 INJECTION, SOLUTION INTRAVENOUS at 05:06

## 2024-06-08 RX ADMIN — HUMAN INSULIN 10 UNITS: 100 INJECTION, SOLUTION SUBCUTANEOUS at 05:06

## 2024-06-08 NOTE — ED PROVIDER NOTES
Encounter Date: 6/8/2024       History     Chief Complaint   Patient presents with    Abdominal Pain    Nausea    Vomiting     Pt states hx of pancreatitis - states he was seen in the er this week for same complaints - pt denies any otc meds for pain    Hypertension     Patient is a 54-year-old male who presents to the emergency department complaining of epigastric pain, nausea and vomiting.  Patient states this is like the pancreatitis he has had multiple times in the past.  Patient is a previous drinker but does not drink anymore.  No fever, no cough, no diarrhea or constipation, no other acute problems or complaints at this time.  Pain does radiate into his back.        Review of patient's allergies indicates:   Allergen Reactions    Ticagrelor Shortness Of Breath    Vancomycin analogues Other (See Comments)     Patient experienced adverse effect, and had some renal insufficiency after receiving Vancomycin.     Past Medical History:   Diagnosis Date    Chronic pancreatitis, unspecified pancreatitis type     Coronary artery disease     Essential (primary) hypertension     Hidradenitis     Mixed hyperlipidemia     Type 2 diabetes mellitus      Past Surgical History:   Procedure Laterality Date    CARDIAC SURGERY      Stents x6    CHOLECYSTECTOMY      EXCISION OF HIDRADENITIS N/A 3/27/2024    Procedure: EXCISION, HIDRADENITIS;  Surgeon: Fidel Park MD;  Location: [facility] OR;  Service: General;  Laterality: N/A;    INCISION OF PERIANAL ABSCESS Bilateral 2/16/2024    Procedure: INCISION, ABSCESS, PERIANAL;  Surgeon: Fidel Park MD;  Location: [facility] OR;  Service: General;  Laterality: Bilateral;    UNDESCENDED TESTICLE EXPLORATION N/A      Family History   Problem Relation Name Age of Onset    Heart disease Father      Hypertension Father      Heart disease Brother      Heart disease Maternal Grandmother       Social History     Tobacco Use    Smoking status: Some Days     Current packs/day: 0.25      Average packs/day: 0.3 packs/day for 20.4 years (5.1 ttl pk-yrs)     Types: Cigarettes, Cigars     Start date: 2004     Passive exposure: Never    Smokeless tobacco: Never   Substance Use Topics    Alcohol use: Not Currently     Comment: occasional drink previous heavy drinker quit heavily in 2001    Drug use: Never     Review of Systems   Gastrointestinal:  Positive for abdominal pain, nausea and vomiting.   All other systems reviewed and are negative.      Physical Exam     Initial Vitals [06/08/24 0309]   BP Pulse Resp Temp SpO2   (!) 206/119 (!) 112 20 98 °F (36.7 °C) 96 %      MAP       --         Physical Exam    Nursing note and vitals reviewed.  Constitutional: He appears well-developed and well-nourished.   HENT:   Head: Normocephalic and atraumatic.   Mouth/Throat: Oropharynx is clear and moist.   Eyes: Pupils are equal, round, and reactive to light.   Neck: Neck supple.   Normal range of motion.  Cardiovascular:  Normal rate and regular rhythm.           Pulmonary/Chest: Effort normal and breath sounds normal.   Abdominal: Abdomen is soft. He exhibits no distension.   There is epigastric tenderness present.   Musculoskeletal:         General: Normal range of motion.      Cervical back: Normal range of motion and neck supple.     Neurological: He is alert.   Skin: Skin is warm. Capillary refill takes less than 2 seconds.   Psychiatric: He has a normal mood and affect.         Medical Screening Exam   See Full Note    ED Course   Procedures  Labs Reviewed   COMPREHENSIVE METABOLIC PANEL - Abnormal; Notable for the following components:       Result Value    Sodium 130 (*)     Chloride 96 (*)     CO2 15 (*)     Anion Gap 24 (*)     Glucose 371 (*)     Total Protein 8.5 (*)     Globulin 5.0 (*)     Bilirubin, Total 1.3 (*)     Alk Phos 124 (*)     All other components within normal limits   LIPASE - Abnormal; Notable for the following components:    Lipase 148 (*)     All other components within normal limits    URINALYSIS, REFLEX TO URINE CULTURE - Abnormal; Notable for the following components:    Protein,  (*)     Glucose, UA >1000 (*)     Ketones, UA >=150 (*)     Blood, UA Trace (*)     All other components within normal limits   CBC WITH DIFFERENTIAL - Abnormal; Notable for the following components:    MCV 96.4 (*)     MCH 31.3 (*)     Neutrophils % 79.2 (*)     Lymphocytes % 14.0 (*)     Eosinophils % 0.2 (*)     Immature Granulocytes % 1.0 (*)     Immature Granulocytes, Absolute 0.09 (*)     All other components within normal limits   URINALYSIS, MICROSCOPIC - Abnormal; Notable for the following components:    Squamous Epithelial Cells, UA Occasional (*)     Mucous Occasional (*)     All other components within normal limits   BASIC METABOLIC PANEL - Abnormal; Notable for the following components:    Sodium 131 (*)     CO2 20 (*)     Glucose 265 (*)     All other components within normal limits   CBC W/ AUTO DIFFERENTIAL    Narrative:     The following orders were created for panel order CBC W/ AUTO DIFFERENTIAL.  Procedure                               Abnormality         Status                     ---------                               -----------         ------                     CBC with Differential[[id number]]       Abnormal            Final result                 Please view results for these tests on the individual orders.   ACETONE          Imaging Results              CT Abdomen Pelvis With IV Contrast NO Oral Contrast (Final result)  Result time 06/08/24 08:19:16      Final result by Naveen Torre II, MD (06/08/24 08:19:16)                   Impression:      Cystic area head of the pancreas could indicate infection or pseudocyst.  A cystic malignancy cannot definitely be excluded.  No other definite acute findings.      Electronically signed by: Naveen Torre  Date:    06/08/2024  Time:    08:19               Narrative:    EXAMINATION:  CT ABDOMEN PELVIS WITH IV CONTRAST    CLINICAL  HISTORY:  Abdominal abscess/infection suspected;    TECHNIQUE:  Axial CT imaging of the abdomen and pelvis is performed with intravenous contrast. Contrast dose is 100 cc Isovue 370.    CT dose reduction technique used - Dose Rite and tube current modulation.    COMPARISON:  None available    FINDINGS:  Cardiac and lung bases are within normal limits    CT abdomen: There is a cystic area in the head of the pancreas that was not present on the previous study measuring up to 3.6 cm.  The stranding around the head of the pancreas has improved.  Small amount of calcification present.    The liver has a faint area of hypodensity near the hilum of the liver.  Remainder of the liver spleen and adrenal glands are normal in size and enhancement.  No evidence of focal lesion is demonstrated in these solid organs.    Kidneys are stable in size and enhancement.  No evidence of hydronephrosis or nephrolithiasis is seen.    The bowel caliber is normal and no wall thickening or adjacent inflammatory change is seen.  No evidence of free fluid or free air is present.  Appendix appears normal.    CT pelvis: The pelvic bowel appears within normal limits.  Bladder shows no evidence of abnormality.  The pelvic organs show no evidence of abnormality.                                       Medications   HYDROmorphone (PF) injection 1 mg (1 mg Intravenous Given 6/8/24 0335)   promethazine (PHENERGAN) 25 mg in dextrose 5 % (D5W) 50 mL IVPB (0 mg Intravenous Stopped 6/8/24 0356)   sodium chloride 0.9% bolus 1,000 mL 1,000 mL (0 mLs Intravenous Stopped 6/8/24 0605)   insulin regular injection 10 Units 0.1 mL (10 Units Intravenous Given 6/8/24 0534)   HYDROmorphone (PF) injection 1 mg (1 mg Intravenous Given 6/8/24 0636)   iopamidoL (ISOVUE-370) injection 100 mL (100 mLs Intravenous Given 6/8/24 0700)   HYDROmorphone (PF) injection 1 mg (1 mg Intravenous Given 6/8/24 0955)     Medical Decision Making  MDM    Patient presents for emergent  evaluation of acute epigastric abdominal pain that poses a threat to life and/or bodily function.    In the ED patient found to have acute pancreatitis starvation ketosis.  Pancreatic pseudocyst.    I ordered labs and personally reviewed them.  Labs significant for lipase elevated.  Patient's bicarb was 15 improved to 20 after IV fluids.  Patient's anion gap closed.  PH was greater than 7.3 on the venous blood gas.  Glucose moderately elevated.  I ordered CT scan and personally reviewed it and reviewed the radiologist interpretation.  CT significant for pancreatic pseudocyst pancreatic inflammation.      Admission MDM  I discussed the patient presentation and findings with the consultant for Hospital Medicine (speciality).    Patient was managed in the ED with IV normal saline Dilaudid.    The response to treatment was improved.    Patient required emergent consultation to Hospital Medicine (admitting physician) for admission.     Amount and/or Complexity of Data Reviewed  Labs: ordered.  Radiology: ordered.    Risk  OTC drugs.  Prescription drug management.  Decision regarding hospitalization.               ED Course as of 06/08/24 1004   Sat Jun 08, 2024   0319 Medical decision-making:  Differential diagnosis includes abdominal pain, pancreatitis, small-bowel obstruction.  All testing ordered and interpreted by me. [BB]   0429 Lipase is mildly elevated at 148.  CBC is normal. [BB]   0458 Urinalysis is normal.  CMP is normal except hyperglycemia with glucose of 371.  Anion gap is elevated at 24.  Bicarb is low at 15. [BB]   0519 Venous blood gas shows normal pH, glucose of 408. [BB]   0555 Sign out.  Diabetes.  Recurrent pancreatitis.  Epigastric pain radiating to back.  Glucose 371.  Anion gap elevated.  Getting IV fluids and insulin.   [PK]   0633 Mild epigastric tenderness.  Patient is still in pain.  Decreased p.o. intake.  It was only been able to eat some crackers and drank a small amount of fluids since his  1st discharge yesterday evening [PK]   0920 admit this patient to [facility] med surge for abdominal pain acute pancreatitis.  He also had starvation ketosis.  His pH was normal but he had an anion gap.  Was given some IV fluids and the gap is closed.  Bicarb has come up from 15 during the night shift up to 20 this morning.  Patient has had decreased p.o. intake over the past couple days and epigastric abdominal pain.  CT abdomen was done showing a pseudocyst but likely in my opinion will not require emergent surgical management.  Is glucose also was elevated hoping to defer to Hospital Medicine on treatment.  Of note patient was seen in the ED in the past 48 hours and bounced back during the night shift last night due to continued vomiting decreased p.o. intake and epigastric pain [PK]      ED Course User Index  [BB] Jason Flores MD  [PK] Mike Nielson MD                             Clinical Impression:   Final diagnoses:  [K86.1] Chronic pancreatitis, unspecified pancreatitis type (Primary)  [R73.9] Hyperglycemia  [E88.89] Ketosis  [K85.90] Acute pancreatitis, unspecified complication status, unspecified pancreatitis type  [K86.3] Pancreatic pseudocyst        ED Disposition Condition    Admit Stable                Mike Nielson MD  06/08/24 1004

## 2024-06-08 NOTE — H&P
[facility] Medicine  History & Physical    Patient Name: Faustino Fischer  MRN: [id number]  Patient Class: IP- Inpatient  Admission Date: 6/8/2024  Attending Physician: Reba Guidry MD   Primary Care Provider: Fidel Park MD         Patient information was obtained from patient, past medical records, and ER records.     Subjective:     Principal Problem:DKA (diabetic ketoacidosis)    Chief Complaint:   Chief Complaint   Patient presents with    Abdominal Pain    Nausea    Vomiting     Pt states hx of pancreatitis - states he was seen in the er this week for same complaints - pt denies any otc meds for pain    Hypertension        HPI: Patient is a 55 y/o male with PMHx of DM (A1C 10.7), chronic pancreatitis, CAD s/p 6 stents, HTN, HLD, obesity, hydradenitis (s/p multiple I&D), prior cholecystectomy who presents with complaint of nausea, vomiting and epigastric pain. Patient was seen in the ED 2 days ago after found to have blood glucose 648. He reported skipping metformin doses due to GI distress. He has history of multiple episodes of pancreatitis and reports that he quit drinking over 10 years ago. Patient denies fever, chills, chest pain, cough, SOB or urinary habit changes.      In the ED vital signs:  Initial Vitals [06/08/24 0309]   BP Pulse Resp Temp SpO2   (!) 206/119 (!) 112 20 98 °F (36.7 °C) 96 %   Blood work showed glucose 371, anion gap 24. Patient was treated with IVF and IV insulin and repeated BMP showed closed anion gap 15 and glucose 265. Lipase 148. CT scan abdomen showed cystic area on the head of the pancreas measuring 3.6 cm.  No acute straining around the head of the pancreas.     Patient will be admitted to the hospital for further management.    Past Medical History:   Diagnosis Date    Chronic pancreatitis, unspecified pancreatitis type     Coronary artery disease     Essential (primary) hypertension     Hidradenitis     Mixed hyperlipidemia     Type  2 diabetes mellitus        Past Surgical History:   Procedure Laterality Date    CARDIAC SURGERY      Stents x6    CHOLECYSTECTOMY      EXCISION OF HIDRADENITIS N/A 3/27/2024    Procedure: EXCISION, HIDRADENITIS;  Surgeon: Fidel Park MD;  Location: [facility];  Service: General;  Laterality: N/A;    INCISION OF PERIANAL ABSCESS Bilateral 2/16/2024    Procedure: INCISION, ABSCESS, PERIANAL;  Surgeon: Fidel Park MD;  Location: [facility] OR;  Service: General;  Laterality: Bilateral;    UNDESCENDED TESTICLE EXPLORATION N/A        Review of patient's allergies indicates:   Allergen Reactions    Ticagrelor Shortness Of Breath    Vancomycin analogues Other (See Comments)     Patient experienced adverse effect, and had some renal insufficiency after receiving Vancomycin.       No current facility-administered medications on file prior to encounter.     Current Outpatient Medications on File Prior to Encounter   Medication Sig    aspirin 81 MG Chew Take 1 tablet (81 mg total) by mouth once daily.    carvediloL (COREG) 12.5 MG tablet Take 1 tablet (12.5 mg total) by mouth before breakfast.    furosemide (LASIX) 20 MG tablet Take 20 mg by mouth once daily.    insulin (LANTUS SOLOSTAR U-100 INSULIN) glargine 100 units/mL SubQ pen Inject 10 Units into the skin once daily.    isosorbide mononitrate (IMDUR) 30 MG 24 hr tablet Take 1 tablet (30 mg total) by mouth once daily. Take 1/2 tablet with breakfast    losartan (COZAAR) 100 MG tablet Take 1 tablet (100 mg total) by mouth once daily.    metFORMIN (GLUCOPHAGE) 1000 MG tablet Take 1 tablet (1,000 mg total) by mouth 2 (two) times daily with meals.    mupirocin (BACTROBAN) 2 % ointment Apply topically 2 (two) times daily.    NITROGLYCERIN SL Place under the tongue.    [DISCONTINUED] aluminum-magnesium hydroxide-simethicone (MAALOX) 200-200-20 mg/5 mL Susp Take 15 mLs by mouth every 6 (six) hours as needed.    [DISCONTINUED] bisacodyL (DULCOLAX) 5 mg EC tablet Take 2  tablets (10 mg total) by mouth daily as needed for Constipation.    [DISCONTINUED] HYDROcodone-acetaminophen (NORCO) 7.5-325 mg per tablet Take 1 tablet by mouth every 6 (six) hours as needed for Pain.    [DISCONTINUED] simethicone (MYLICON) 80 MG chewable tablet Take 1 tablet (80 mg total) by mouth every 6 (six) hours as needed for Flatulence.    [DISCONTINUED] sulfamethoxazole-trimethoprim 800-160mg (BACTRIM DS) 800-160 mg Tab Take 1 tablet by mouth once daily.     Family History       Problem Relation (Age of Onset)    Heart disease Father, Brother, Maternal Grandmother    Hypertension Father          Tobacco Use    Smoking status: Some Days     Current packs/day: 0.25     Average packs/day: 0.3 packs/day for 20.4 years (5.1 ttl pk-yrs)     Types: Cigarettes, Cigars     Start date: 2004     Passive exposure: Never    Smokeless tobacco: Never   Substance and Sexual Activity    Alcohol use: Not Currently     Comment: occasional drink previous heavy drinker quit heavily in 2001    Drug use: Never    Sexual activity: Not Currently     Review of Systems   Constitutional:  Negative for chills and fever.   HENT:  Negative for congestion and rhinorrhea.    Eyes:  Negative for visual disturbance.   Respiratory:  Negative for shortness of breath.    Cardiovascular:  Negative for chest pain.   Gastrointestinal:  Positive for abdominal pain, nausea and vomiting. Negative for constipation and diarrhea.   Genitourinary:  Negative for difficulty urinating and dysuria.   Musculoskeletal:  Negative for myalgias.   Skin:  Negative for wound.   Neurological:  Negative for syncope and headaches.   Psychiatric/Behavioral:  The patient is not nervous/anxious.      Objective:     Vital Signs (Most Recent):  Temp: 98 °F (36.7 °C) (06/08/24 0309)  Pulse: 84 (06/08/24 0943)  Resp: (!) 22 (06/08/24 1310)  BP: (!) 166/103 (06/08/24 0943)  SpO2: 95 % (06/08/24 0943) Vital Signs (24h Range):  Temp:  [98 °F (36.7 °C)] 98 °F (36.7 °C)  Pulse:   [] 84  Resp:  [18-22] 22  SpO2:  [94 %-98 %] 95 %  BP: ()/() 166/103     Weight: 91.6 kg (202 lb)  Body mass index is 32.6 kg/m².     Physical Exam  Vitals and nursing note reviewed.   Constitutional:       General: He is not in acute distress.     Appearance: Normal appearance. He is normal weight. He is not ill-appearing, toxic-appearing or diaphoretic.   HENT:      Head: Normocephalic and atraumatic.      Right Ear: External ear normal.      Left Ear: External ear normal.      Nose: Nose normal. No congestion or rhinorrhea.      Mouth/Throat:      Mouth: Mucous membranes are moist.      Pharynx: Oropharynx is clear. No oropharyngeal exudate or posterior oropharyngeal erythema.   Eyes:      General: No scleral icterus.     Extraocular Movements: Extraocular movements intact.      Conjunctiva/sclera: Conjunctivae normal.      Pupils: Pupils are equal, round, and reactive to light.   Cardiovascular:      Rate and Rhythm: Normal rate and regular rhythm.      Pulses: Normal pulses.      Heart sounds: Normal heart sounds. No murmur heard.  Pulmonary:      Effort: Pulmonary effort is normal. No respiratory distress.      Breath sounds: No wheezing, rhonchi or rales.   Abdominal:      General: Abdomen is flat. Bowel sounds are normal. There is no distension.      Palpations: Abdomen is soft.      Tenderness: There is abdominal tenderness (RLQ and epigatric area). There is no right CVA tenderness, left CVA tenderness, guarding or rebound.   Musculoskeletal:         General: Normal range of motion.      Cervical back: Neck supple. No rigidity or tenderness.      Right lower leg: No edema.      Left lower leg: No edema.   Skin:     General: Skin is warm and dry.      Capillary Refill: Capillary refill takes less than 2 seconds.      Coloration: Skin is not jaundiced.      Findings: No lesion or rash.   Neurological:      General: No focal deficit present.      Mental Status: He is alert and oriented to  person, place, and time.      Cranial Nerves: No cranial nerve deficit.      Sensory: No sensory deficit.      Motor: No weakness.   Psychiatric:         Mood and Affect: Mood normal.         Behavior: Behavior normal.         Thought Content: Thought content normal.              CRANIAL NERVES     CN III, IV, VI   Pupils are equal, round, and reactive to light.       Significant Labs: All pertinent labs within the past 24 hours have been reviewed.    Significant Imaging: I have reviewed all pertinent imaging results/findings within the past 24 hours.  Assessment/Plan:     * DKA (diabetic ketoacidosis)  Initial blood glucose 371, anion gap 24. Patient was treated with IVF and IV insulin. Repeated BMP showed closed anion gap 15 and glucose 265. Blood glucose was 648 two days ago.    Last A1c reviewed-   Lab Results   Component Value Date    HGBA1C 10.7 (H) 02/16/2024     Most recent fingerstick glucose reviewed-   Recent Labs   Lab 06/08/24  0506   POCTGLUCOSE 408*     Current correctional scale  Medium  Maintain anti-hyperglycemic dose as follows-   Antihyperglycemics (From admission, onward)      Start     Stop Route Frequency Ordered    06/08/24 1230  insulin detemir U-100 injection 13 Units         -- SubQ Daily 06/08/24 1116    06/08/24 1155  insulin aspart U-100 injection 0-10 Units         -- SubQ Before meals & nightly PRN 06/08/24 1101          IV NS @125cc/h  Hold Oral hypoglycemics while patient is in the hospital.  POCT glucose Q4h.  Monitor BMP, magnesium and phosphorus Q8h  Monitor patient closely.      Acute on chronic pancreatitis  Patient with history of multiple episodes of pancreatitis presenting with nausea, vomiting and abdominal pain. Lipase 148. CT scan abdomen showed cystic area on the head of the pancreas measuring 3.6 cm. No acute straining around the head of the pancreas.   IV fluids  Clear liquid diet and advance as tolerated  Pain control  Monitor vital signs and physical  exam      Hypertensive urgency  Patient has a current diagnosis of hypertensive urgency (without evidence of end organ damage) which is uncontrolled.  Latest blood pressure and vitals reviewed-   Temp:  [98 °F (36.7 °C)]   Pulse:  []   Resp:  [18-22]   BP: ()/()   SpO2:  [94 %-98 %] .   Home meds for hypertension were reviewed and noted below.   Hypertension Medications               carvediloL (COREG) 12.5 MG tablet Take 1 tablet (12.5 mg total) by mouth before breakfast.         isosorbide mononitrate (IMDUR) 30 MG 24 hr tablet Take 1 tablet (30 mg total) by mouth once daily. Take 1/2 tablet with breakfast         NITROGLYCERIN SL Place under the tongue.          Medication adjustment for hospital antihypertensives is as follows- Hold home lasix. Patient has losartan listed as his home meds but reports not taking.   IV Hydralazine 10mg Q8H PRN with parameters.  Will aim for controlled BP reduction by medications noted above. Monitor and mitigate end organ damage as indicated.    CAD (coronary artery disease)  Patient with known CAD s/p stent placement, which is controlled Will continue home and add high intensity statin. Monitor for S/Sx of angina/ACS. Continue to monitor on telemetry.     Obesity  Body mass index is 32.6 kg/m². Morbid obesity complicates all aspects of disease management from diagnostic modalities to treatment. Weight loss encouraged and health benefits explained to patient.         .   Home meds for hypertension were reviewed and noted below.   Hypertension Medications               carvediloL (COREG) 12.5 MG tablet Take 1 tablet (12.5 mg total) by mouth before breakfast.         isosorbide mononitrate (IMDUR) 30 MG 24 hr tablet Take 1 tablet (30 mg total) by mouth once daily. Take 1/2 tablet with breakfast         NITROGLYCERIN SL Place under the tongue.          Medication adjustment for hospital antihypertensives is as follows- Hold home lasix. Patient has losartan listed as  his home meds but reports not taking.   IV Hydralazine 10mg Q8H PRN with parameters.  Will aim for controlled BP reduction by medications noted above. Monitor and mitigate end organ damage as indicated.      VTE Risk Mitigation (From admission, onward)           Ordered     enoxaparin injection 40 mg  Daily         06/08/24 1101     IP VTE HIGH RISK PATIENT  Once         06/08/24 1101     Place sequential compression device  Until discontinued         06/08/24 1101                                    Wyatt Bernard MD  Department of Hospital Medicine  [facility]

## 2024-06-08 NOTE — HPI
Patient is a 53 y/o male with PMHx of DM (A1C 10.7), chronic pancreatitis, CAD s/p 6 stents, HTN, HLD, obesity, hydradenitis (s/p multiple I&D), prior cholecystectomy who presents with complaint of nausea, vomiting and epigastric pain. Patient was seen in the ED 2 days ago after found to have blood glucose 648. He reported skipping metformin doses due to GI distress. He has history of multiple episodes of pancreatitis and reports that he quit drinking over 10 years ago. Patient denies fever, chills, chest pain, cough, SOB or urinary habit changes.      In the ED vital signs:  Initial Vitals [06/08/24 0309]   BP Pulse Resp Temp SpO2   (!) 206/119 (!) 112 20 98 °F (36.7 °C) 96 %   Blood work showed glucose 371, anion gap 24. Patient was treated with IVF and IV insulin and repeated BMP showed closed anion gap 15 and glucose 265. Lipase 148. CT scan abdomen showed cystic area on the head of the pancreas measuring 3.6 cm.  No acute straining around the head of the pancreas.     Patient will be admitted to the hospital for further management.

## 2024-06-08 NOTE — SUBJECTIVE & OBJECTIVE
Past Medical History:   Diagnosis Date    Chronic pancreatitis, unspecified pancreatitis type     Coronary artery disease     Essential (primary) hypertension     Hidradenitis     Mixed hyperlipidemia     Type 2 diabetes mellitus        Past Surgical History:   Procedure Laterality Date    CARDIAC SURGERY      Stents x6    CHOLECYSTECTOMY      EXCISION OF HIDRADENITIS N/A 3/27/2024    Procedure: EXCISION, HIDRADENITIS;  Surgeon: Fidel Park MD;  Location: [facility];  Service: General;  Laterality: N/A;    INCISION OF PERIANAL ABSCESS Bilateral 2/16/2024    Procedure: INCISION, ABSCESS, PERIANAL;  Surgeon: Fidel Park MD;  Location: [facility] OR;  Service: General;  Laterality: Bilateral;    UNDESCENDED TESTICLE EXPLORATION N/A        Review of patient's allergies indicates:   Allergen Reactions    Ticagrelor Shortness Of Breath    Vancomycin analogues Other (See Comments)     Patient experienced adverse effect, and had some renal insufficiency after receiving Vancomycin.       No current facility-administered medications on file prior to encounter.     Current Outpatient Medications on File Prior to Encounter   Medication Sig    aspirin 81 MG Chew Take 1 tablet (81 mg total) by mouth once daily.    carvediloL (COREG) 12.5 MG tablet Take 1 tablet (12.5 mg total) by mouth before breakfast.    furosemide (LASIX) 20 MG tablet Take 20 mg by mouth once daily.    insulin (LANTUS SOLOSTAR U-100 INSULIN) glargine 100 units/mL SubQ pen Inject 10 Units into the skin once daily.    isosorbide mononitrate (IMDUR) 30 MG 24 hr tablet Take 1 tablet (30 mg total) by mouth once daily. Take 1/2 tablet with breakfast    losartan (COZAAR) 100 MG tablet Take 1 tablet (100 mg total) by mouth once daily.    metFORMIN (GLUCOPHAGE) 1000 MG tablet Take 1 tablet (1,000 mg total) by mouth 2 (two) times daily with meals.    mupirocin (BACTROBAN) 2 % ointment Apply topically 2 (two) times daily.    NITROGLYCERIN SL Place under the  tongue.    [DISCONTINUED] aluminum-magnesium hydroxide-simethicone (MAALOX) 200-200-20 mg/5 mL Susp Take 15 mLs by mouth every 6 (six) hours as needed.    [DISCONTINUED] bisacodyL (DULCOLAX) 5 mg EC tablet Take 2 tablets (10 mg total) by mouth daily as needed for Constipation.    [DISCONTINUED] HYDROcodone-acetaminophen (NORCO) 7.5-325 mg per tablet Take 1 tablet by mouth every 6 (six) hours as needed for Pain.    [DISCONTINUED] simethicone (MYLICON) 80 MG chewable tablet Take 1 tablet (80 mg total) by mouth every 6 (six) hours as needed for Flatulence.    [DISCONTINUED] sulfamethoxazole-trimethoprim 800-160mg (BACTRIM DS) 800-160 mg Tab Take 1 tablet by mouth once daily.     Family History       Problem Relation (Age of Onset)    Heart disease Father, Brother, Maternal Grandmother    Hypertension Father          Tobacco Use    Smoking status: Some Days     Current packs/day: 0.25     Average packs/day: 0.3 packs/day for 20.4 years (5.1 ttl pk-yrs)     Types: Cigarettes, Cigars     Start date: 2004     Passive exposure: Never    Smokeless tobacco: Never   Substance and Sexual Activity    Alcohol use: Not Currently     Comment: occasional drink previous heavy drinker quit heavily in 2001    Drug use: Never    Sexual activity: Not Currently     Review of Systems   Constitutional:  Negative for chills and fever.   HENT:  Negative for congestion and rhinorrhea.    Eyes:  Negative for visual disturbance.   Respiratory:  Negative for shortness of breath.    Cardiovascular:  Negative for chest pain.   Gastrointestinal:  Positive for abdominal pain, nausea and vomiting. Negative for constipation and diarrhea.   Genitourinary:  Negative for difficulty urinating and dysuria.   Musculoskeletal:  Negative for myalgias.   Skin:  Negative for wound.   Neurological:  Negative for syncope and headaches.   Psychiatric/Behavioral:  The patient is not nervous/anxious.      Objective:     Vital Signs (Most Recent):  Temp: 98 °F (36.7  °C) (06/08/24 0309)  Pulse: 84 (06/08/24 0943)  Resp: (!) 22 (06/08/24 1310)  BP: (!) 166/103 (06/08/24 0943)  SpO2: 95 % (06/08/24 0943) Vital Signs (24h Range):  Temp:  [98 °F (36.7 °C)] 98 °F (36.7 °C)  Pulse:  [] 84  Resp:  [18-22] 22  SpO2:  [94 %-98 %] 95 %  BP: ()/() 166/103     Weight: 91.6 kg (202 lb)  Body mass index is 32.6 kg/m².     Physical Exam  Vitals and nursing note reviewed.   Constitutional:       General: He is not in acute distress.     Appearance: Normal appearance. He is normal weight. He is not ill-appearing, toxic-appearing or diaphoretic.   HENT:      Head: Normocephalic and atraumatic.      Right Ear: External ear normal.      Left Ear: External ear normal.      Nose: Nose normal. No congestion or rhinorrhea.      Mouth/Throat:      Mouth: Mucous membranes are moist.      Pharynx: Oropharynx is clear. No oropharyngeal exudate or posterior oropharyngeal erythema.   Eyes:      General: No scleral icterus.     Extraocular Movements: Extraocular movements intact.      Conjunctiva/sclera: Conjunctivae normal.      Pupils: Pupils are equal, round, and reactive to light.   Cardiovascular:      Rate and Rhythm: Normal rate and regular rhythm.      Pulses: Normal pulses.      Heart sounds: Normal heart sounds. No murmur heard.  Pulmonary:      Effort: Pulmonary effort is normal. No respiratory distress.      Breath sounds: No wheezing, rhonchi or rales.   Abdominal:      General: Abdomen is flat. Bowel sounds are normal. There is no distension.      Palpations: Abdomen is soft.      Tenderness: There is abdominal tenderness (RLQ and epigatric area). There is no right CVA tenderness, left CVA tenderness, guarding or rebound.   Musculoskeletal:         General: Normal range of motion.      Cervical back: Neck supple. No rigidity or tenderness.      Right lower leg: No edema.      Left lower leg: No edema.   Skin:     General: Skin is warm and dry.      Capillary Refill: Capillary  refill takes less than 2 seconds.      Coloration: Skin is not jaundiced.      Findings: No lesion or rash.   Neurological:      General: No focal deficit present.      Mental Status: He is alert and oriented to person, place, and time.      Cranial Nerves: No cranial nerve deficit.      Sensory: No sensory deficit.      Motor: No weakness.   Psychiatric:         Mood and Affect: Mood normal.         Behavior: Behavior normal.         Thought Content: Thought content normal.              CRANIAL NERVES     CN III, IV, VI   Pupils are equal, round, and reactive to light.       Significant Labs: All pertinent labs within the past 24 hours have been reviewed.    Significant Imaging: I have reviewed all pertinent imaging results/findings within the past 24 hours.

## 2024-06-08 NOTE — ASSESSMENT & PLAN NOTE
Body mass index is 32.6 kg/m². Morbid obesity complicates all aspects of disease management from diagnostic modalities to treatment. Weight loss encouraged and health benefits explained to patient.

## 2024-06-08 NOTE — PHARMACY MED REC
"Admission Medication History     The home medication history was taken by Jessica Morgan.    You may go to "Admission" then "Reconcile Home Medications" tabs to review and/or act upon these items.     The home medication list has been updated by the Pharmacy department.   Please read ALL comments highlighted in yellow.   Please address this information as you see fit.    Feel free to contact us if you have any questions or require assistance.    I spoke with patient's pharmacies and I could not find where he has picked up any of the medications on this list, this year, except the Lantus on [id number].      The medications listed below were removed from the home medication list. Please reorder if appropriate:  Bisacodyl 5 mg  Norco 7.5-325 mg  Maalox 200-200-20 mg/5 mL  Simethicone 80 mg  Bactrim -160 mg    Medications listed below were obtained from: Analytic software- [email or web address], Medical records, and Patient's pharmacy  PTA Medications   Medication Sig    aspirin 81 MG Chew Take 1 tablet (81 mg total) by mouth once daily.    carvediloL (COREG) 12.5 MG tablet Take 1 tablet (12.5 mg total) by mouth before breakfast.    furosemide (LASIX) 20 MG tablet Take 20 mg by mouth once daily.    insulin (LANTUS SOLOSTAR U-100 INSULIN) glargine 100 units/mL SubQ pen Inject 10 Units into the skin once daily.    isosorbide mononitrate (IMDUR) 30 MG 24 hr tablet Take 1 tablet (30 mg total) by mouth once daily. Take 1/2 tablet with breakfast    losartan (COZAAR) 100 MG tablet Take 1 tablet (100 mg total) by mouth once daily.    metFORMIN (GLUCOPHAGE) 1000 MG tablet Take 1 tablet (1,000 mg total) by mouth 2 (two) times daily with meals.    mupirocin (BACTROBAN) 2 % ointment Apply topically 2 (two) times daily.    NITROGLYCERIN SL Place under the tongue.         Current Outpatient Medications on File Prior to Encounter   Medication Sig Dispense Refill Last Dose    aspirin 81 MG Chew Take 1 tablet (81 mg total) by mouth once daily. 90 " tablet 1 Unknown    carvediloL (COREG) 12.5 MG tablet Take 1 tablet (12.5 mg total) by mouth before breakfast. 90 tablet 1 Unknown    furosemide (LASIX) 20 MG tablet Take 20 mg by mouth once daily.   Unknown    insulin (LANTUS SOLOSTAR U-100 INSULIN) glargine 100 units/mL SubQ pen Inject 10 Units into the skin once daily. 9 mL 1 Unknown    isosorbide mononitrate (IMDUR) 30 MG 24 hr tablet Take 1 tablet (30 mg total) by mouth once daily. Take 1/2 tablet with breakfast 90 tablet 1 Unknown    losartan (COZAAR) 100 MG tablet Take 1 tablet (100 mg total) by mouth once daily. 30 tablet 0 Unknown    metFORMIN (GLUCOPHAGE) 1000 MG tablet Take 1 tablet (1,000 mg total) by mouth 2 (two) times daily with meals. 180 tablet 3 Unknown    mupirocin (BACTROBAN) 2 % ointment Apply topically 2 (two) times daily. 22 g 0 Unknown    NITROGLYCERIN SL Place under the tongue.   Unknown    [DISCONTINUED] aluminum-magnesium hydroxide-simethicone (MAALOX) 200-200-20 mg/5 mL Susp Take 15 mLs by mouth every 6 (six) hours as needed.       [DISCONTINUED] bisacodyL (DULCOLAX) 5 mg EC tablet Take 2 tablets (10 mg total) by mouth daily as needed for Constipation.  0     [DISCONTINUED] HYDROcodone-acetaminophen (NORCO) 7.5-325 mg per tablet Take 1 tablet by mouth every 6 (six) hours as needed for Pain. 24 tablet 0     [DISCONTINUED] simethicone (MYLICON) 80 MG chewable tablet Take 1 tablet (80 mg total) by mouth every 6 (six) hours as needed for Flatulence.  0     [DISCONTINUED] sulfamethoxazole-trimethoprim 800-160mg (BACTRIM DS) 800-160 mg Tab Take 1 tablet by mouth once daily. 14 tablet 0        Potential issues to be addressed PRIOR TO DISCHARGE  Please discuss with the patient barriers to adherence with medication treatment plans  Patient requires education regarding drug therapies       Jessica Morgan  EXT [phone]                 .

## 2024-06-08 NOTE — ASSESSMENT & PLAN NOTE
Patient with known CAD s/p stent placement, which is controlled Will continue home and add high intensity statin. Monitor for S/Sx of angina/ACS. Continue to monitor on telemetry.

## 2024-06-08 NOTE — ASSESSMENT & PLAN NOTE
Patient with history of multiple episodes of pancreatitis presenting with nausea, vomiting and abdominal pain. Lipase 148. CT scan abdomen showed cystic area on the head of the pancreas measuring 3.6 cm. No acute straining around the head of the pancreas.   IV fluids  Clear liquid diet and advance as tolerated  Pain control  Monitor vital signs and physical exam

## 2024-06-08 NOTE — ASSESSMENT & PLAN NOTE
Patient has a current diagnosis of hypertensive urgency (without evidence of end organ damage) which is uncontrolled.  Latest blood pressure and vitals reviewed-   Temp:  [98 °F (36.7 °C)]   Pulse:  []   Resp:  [18-22]   BP: ()/()   SpO2:  [94 %-98 %] .   Home meds for hypertension were reviewed and noted below.   Hypertension Medications               carvediloL (COREG) 12.5 MG tablet Take 1 tablet (12.5 mg total) by mouth before breakfast.         isosorbide mononitrate (IMDUR) 30 MG 24 hr tablet Take 1 tablet (30 mg total) by mouth once daily. Take 1/2 tablet with breakfast         NITROGLYCERIN SL Place under the tongue.          Medication adjustment for hospital antihypertensives is as follows- Hold home lasix. Patient has losartan listed as his home meds but reports not taking.   IV Hydralazine 10mg Q8H PRN with parameters.  Will aim for controlled BP reduction by medications noted above. Monitor and mitigate end organ damage as indicated.

## 2024-06-08 NOTE — ASSESSMENT & PLAN NOTE
Initial blood glucose 371, anion gap 24. Patient was treated with IVF and IV insulin. Repeated BMP showed closed anion gap 15 and glucose 265. Blood glucose was 648 two days ago.    Last A1c reviewed-   Lab Results   Component Value Date    HGBA1C 10.7 (H) 02/16/2024     Most recent fingerstick glucose reviewed-   Recent Labs   Lab 06/08/24  0506   POCTGLUCOSE 408*     Current correctional scale  Medium  Maintain anti-hyperglycemic dose as follows-   Antihyperglycemics (From admission, onward)      Start     Stop Route Frequency Ordered    06/08/24 1230  insulin detemir U-100 injection 13 Units         -- SubQ Daily 06/08/24 1116    06/08/24 1155  insulin aspart U-100 injection 0-10 Units         -- SubQ Before meals & nightly PRN 06/08/24 1101          IV NS @125cc/h  Hold Oral hypoglycemics while patient is in the hospital.  POCT glucose Q4h.  Monitor BMP, magnesium and phosphorus Q8h  Monitor patient closely.

## 2024-06-09 LAB
ANION GAP SERPL CALCULATED.3IONS-SCNC: 13 MMOL/L (ref 7–16)
ANION GAP SERPL CALCULATED.3IONS-SCNC: 13 MMOL/L (ref 7–16)
BASOPHILS # BLD AUTO: 0.05 K/UL (ref 0–0.2)
BASOPHILS NFR BLD AUTO: 0.8 % (ref 0–1)
BUN SERPL-MCNC: 13 MG/DL (ref 7–18)
BUN SERPL-MCNC: 14 MG/DL (ref 7–18)
BUN/CREAT SERPL: 14 (ref 6–20)
BUN/CREAT SERPL: 14 (ref 6–20)
CALCIUM SERPL-MCNC: 8.8 MG/DL (ref 8.5–10.1)
CALCIUM SERPL-MCNC: 9.1 MG/DL (ref 8.5–10.1)
CANCER AG19-9 SERPL-ACNC: 42.1 U/ML (ref 0–35)
CHLORIDE SERPL-SCNC: 101 MMOL/L (ref 98–107)
CHLORIDE SERPL-SCNC: 99 MMOL/L (ref 98–107)
CO2 SERPL-SCNC: 23 MMOL/L (ref 21–32)
CO2 SERPL-SCNC: 25 MMOL/L (ref 21–32)
CREAT SERPL-MCNC: 0.93 MG/DL (ref 0.7–1.3)
CREAT SERPL-MCNC: 1.02 MG/DL (ref 0.7–1.3)
DIFFERENTIAL METHOD BLD: ABNORMAL
EGFR (NO RACE VARIABLE) (RUSH/TITUS): 87 ML/MIN/1.73M2
EGFR (NO RACE VARIABLE) (RUSH/TITUS): 97 ML/MIN/1.73M2
EOSINOPHIL # BLD AUTO: 0.09 K/UL (ref 0–0.5)
EOSINOPHIL NFR BLD AUTO: 1.5 % (ref 1–4)
ERYTHROCYTE [DISTWIDTH] IN BLOOD BY AUTOMATED COUNT: 12.4 % (ref 11.5–14.5)
GLUCOSE SERPL-MCNC: 143 MG/DL (ref 70–105)
GLUCOSE SERPL-MCNC: 182 MG/DL (ref 74–106)
GLUCOSE SERPL-MCNC: 183 MG/DL (ref 70–105)
GLUCOSE SERPL-MCNC: 191 MG/DL (ref 74–106)
GLUCOSE SERPL-MCNC: 202 MG/DL (ref 70–105)
GLUCOSE SERPL-MCNC: 220 MG/DL (ref 70–105)
GLUCOSE SERPL-MCNC: 244 MG/DL (ref 70–105)
GLUCOSE SERPL-MCNC: 269 MG/DL (ref 70–105)
HCT VFR BLD AUTO: 38.2 % (ref 40–54)
HGB BLD-MCNC: 12.3 G/DL (ref 13.5–18)
IMM GRANULOCYTES # BLD AUTO: 0.07 K/UL (ref 0–0.04)
IMM GRANULOCYTES NFR BLD: 1.1 % (ref 0–0.4)
LYMPHOCYTES # BLD AUTO: 2.47 K/UL (ref 1–4.8)
LYMPHOCYTES NFR BLD AUTO: 40.2 % (ref 27–41)
MAGNESIUM SERPL-MCNC: 1.9 MG/DL (ref 1.7–2.3)
MAGNESIUM SERPL-MCNC: 2.2 MG/DL (ref 1.7–2.3)
MCH RBC QN AUTO: 31.3 PG (ref 27–31)
MCHC RBC AUTO-ENTMCNC: 32.2 G/DL (ref 32–36)
MCV RBC AUTO: 97.2 FL (ref 80–96)
MONOCYTES # BLD AUTO: 0.38 K/UL (ref 0–0.8)
MONOCYTES NFR BLD AUTO: 6.2 % (ref 2–6)
MPC BLD CALC-MCNC: 10.6 FL (ref 9.4–12.4)
NEUTROPHILS # BLD AUTO: 3.09 K/UL (ref 1.8–7.7)
NEUTROPHILS NFR BLD AUTO: 50.2 % (ref 53–65)
NRBC # BLD AUTO: 0 X10E3/UL
NRBC, AUTO (.00): 0 %
PHOSPHATE SERPL-MCNC: 2.9 MG/DL (ref 2.5–4.5)
PHOSPHATE SERPL-MCNC: 3.4 MG/DL (ref 2.5–4.5)
PLATELET # BLD AUTO: 206 K/UL (ref 150–400)
POTASSIUM SERPL-SCNC: 3.6 MMOL/L (ref 3.5–5.1)
POTASSIUM SERPL-SCNC: 3.8 MMOL/L (ref 3.5–5.1)
RBC # BLD AUTO: 3.93 M/UL (ref 4.6–6.2)
SODIUM SERPL-SCNC: 133 MMOL/L (ref 136–145)
SODIUM SERPL-SCNC: 133 MMOL/L (ref 136–145)
WBC # BLD AUTO: 6.15 K/UL (ref 4.5–11)

## 2024-06-09 PROCEDURE — 84100 ASSAY OF PHOSPHORUS: CPT | Performed by: GENERAL PRACTICE

## 2024-06-09 PROCEDURE — 36415 COLL VENOUS BLD VENIPUNCTURE: CPT | Performed by: GENERAL PRACTICE

## 2024-06-09 PROCEDURE — 83735 ASSAY OF MAGNESIUM: CPT | Performed by: GENERAL PRACTICE

## 2024-06-09 PROCEDURE — 86301 IMMUNOASSAY TUMOR CA 19-9: CPT | Performed by: HOSPITALIST

## 2024-06-09 PROCEDURE — 63600175 PHARM REV CODE 636 W HCPCS: Performed by: HOSPITALIST

## 2024-06-09 PROCEDURE — 25000003 PHARM REV CODE 250: Performed by: INTERNAL MEDICINE

## 2024-06-09 PROCEDURE — 11000001 HC ACUTE MED/SURG PRIVATE ROOM

## 2024-06-09 PROCEDURE — 80048 BASIC METABOLIC PNL TOTAL CA: CPT | Performed by: GENERAL PRACTICE

## 2024-06-09 PROCEDURE — 99233 SBSQ HOSP IP/OBS HIGH 50: CPT | Mod: ,,, | Performed by: HOSPITALIST

## 2024-06-09 PROCEDURE — 85025 COMPLETE CBC W/AUTO DIFF WBC: CPT | Performed by: GENERAL PRACTICE

## 2024-06-09 PROCEDURE — 25000003 PHARM REV CODE 250: Performed by: GENERAL PRACTICE

## 2024-06-09 PROCEDURE — 82962 GLUCOSE BLOOD TEST: CPT

## 2024-06-09 PROCEDURE — 63600175 PHARM REV CODE 636 W HCPCS: Performed by: GENERAL PRACTICE

## 2024-06-09 RX ORDER — DOCUSATE SODIUM 100 MG/1
100 CAPSULE, LIQUID FILLED ORAL DAILY
Status: DISCONTINUED | OUTPATIENT
Start: 2024-06-09 | End: 2024-06-10 | Stop reason: HOSPADM

## 2024-06-09 RX ORDER — METFORMIN HYDROCHLORIDE 500 MG/1
1000 TABLET ORAL 2 TIMES DAILY WITH MEALS
Status: DISCONTINUED | OUTPATIENT
Start: 2024-06-09 | End: 2024-06-10 | Stop reason: HOSPADM

## 2024-06-09 RX ADMIN — SODIUM CHLORIDE: 9 INJECTION, SOLUTION INTRAVENOUS at 04:06

## 2024-06-09 RX ADMIN — ONDANSETRON 4 MG: 2 INJECTION INTRAMUSCULAR; INTRAVENOUS at 12:06

## 2024-06-09 RX ADMIN — INSULIN ASPART 2 UNITS: 100 INJECTION, SOLUTION INTRAVENOUS; SUBCUTANEOUS at 12:06

## 2024-06-09 RX ADMIN — INSULIN DETEMIR 13 UNITS: 100 INJECTION, SOLUTION SUBCUTANEOUS at 08:06

## 2024-06-09 RX ADMIN — HYDROCODONE BITARTRATE AND ACETAMINOPHEN 1 TABLET: 7.5; 325 TABLET ORAL at 12:06

## 2024-06-09 RX ADMIN — ISOSORBIDE MONONITRATE 30 MG: 30 TABLET, EXTENDED RELEASE ORAL at 08:06

## 2024-06-09 RX ADMIN — HYDROCODONE BITARTRATE AND ACETAMINOPHEN 1 TABLET: 7.5; 325 TABLET ORAL at 11:06

## 2024-06-09 RX ADMIN — CARVEDILOL 12.5 MG: 12.5 TABLET, FILM COATED ORAL at 05:06

## 2024-06-09 RX ADMIN — INSULIN ASPART 4 UNITS: 100 INJECTION, SOLUTION INTRAVENOUS; SUBCUTANEOUS at 05:06

## 2024-06-09 RX ADMIN — HYDROCODONE BITARTRATE AND ACETAMINOPHEN 1 TABLET: 7.5; 325 TABLET ORAL at 08:06

## 2024-06-09 RX ADMIN — HYDROMORPHONE HYDROCHLORIDE 1 MG: 2 INJECTION INTRAMUSCULAR; INTRAVENOUS; SUBCUTANEOUS at 02:06

## 2024-06-09 RX ADMIN — SIMETHICONE 80 MG: 80 TABLET, CHEWABLE ORAL at 08:06

## 2024-06-09 RX ADMIN — INSULIN ASPART 2 UNITS: 100 INJECTION, SOLUTION INTRAVENOUS; SUBCUTANEOUS at 09:06

## 2024-06-09 RX ADMIN — ATORVASTATIN CALCIUM 80 MG: 80 TABLET, FILM COATED ORAL at 08:06

## 2024-06-09 RX ADMIN — INSULIN ASPART 6 UNITS: 100 INJECTION, SOLUTION INTRAVENOUS; SUBCUTANEOUS at 05:06

## 2024-06-09 RX ADMIN — ASPIRIN 81 MG CHEWABLE TABLET 81 MG: 81 TABLET CHEWABLE at 08:06

## 2024-06-09 RX ADMIN — INSULIN ASPART 2 UNITS: 100 INJECTION, SOLUTION INTRAVENOUS; SUBCUTANEOUS at 01:06

## 2024-06-09 RX ADMIN — DOCUSATE SODIUM 100 MG: 100 CAPSULE, LIQUID FILLED ORAL at 11:06

## 2024-06-09 RX ADMIN — ENOXAPARIN SODIUM 40 MG: 40 INJECTION SUBCUTANEOUS at 05:06

## 2024-06-09 RX ADMIN — SODIUM CHLORIDE: 9 INJECTION, SOLUTION INTRAVENOUS at 08:06

## 2024-06-09 NOTE — ASSESSMENT & PLAN NOTE
Patient has a current diagnosis of hypertensive urgency (without evidence of end organ damage) which is uncontrolled.  Latest blood pressure and vitals reviewed-   Temp:  [97.2 °F (36.2 °C)-97.6 °F (36.4 °C)]   Pulse:  [73-92]   Resp:  [18-20]   BP: ()/()   SpO2:  [95 %-99 %] .   Home meds for hypertension were reviewed and noted below.   Hypertension Medications               carvediloL (COREG) 12.5 MG tablet Take 1 tablet (12.5 mg total) by mouth before breakfast.         isosorbide mononitrate (IMDUR) 30 MG 24 hr tablet Take 1 tablet (30 mg total) by mouth once daily. Take 1/2 tablet with breakfast         NITROGLYCERIN SL Place under the tongue.          Medication adjustment for hospital antihypertensives is as follows- Hold home lasix. Patient has losartan listed as his home meds but reports not taking.   IV Hydralazine 10mg Q8H PRN with parameters.  Will aim for controlled BP reduction by medications noted above. Monitor and mitigate end organ damage as indicated.

## 2024-06-09 NOTE — ASSESSMENT & PLAN NOTE
Patient with history of multiple episodes of pancreatitis presenting with nausea, vomiting and abdominal pain. Lipase 148. CT scan abdomen showed cystic area on the head of the pancreas measuring 3.6 cm. No acute straining around the head of the pancreas.   IV fluids  Clear liquid diet and advance as tolerated  Pain control  Monitor vital signs and physical exam    6/9  Abdominal pain has resolved. Tolerating liquid diet. Will advance to regular diet and monitor.

## 2024-06-09 NOTE — ASSESSMENT & PLAN NOTE
Patient with history of multiple episodes of pancreatitis presenting with nausea, vomiting and abdominal pain. Lipase 148. CT scan abdomen showed cystic area on the head of the pancreas measuring 3.6 cm. No acute straining around the head of the pancreas.   IV fluids  Clear liquid diet and advance as tolerated  Pain control  Monitor vital signs and physical exam    6/9  Abdominal pain has resolved. Tolerating liquid diet. Will advance to diabetic diet and monitor.  6/10  - Patient is medically stable to be discharged home and has maximally benefited from this hospital stay. Follow up appointments with the PCP and specialists were secured at discharge.  Patient is being discharged home

## 2024-06-09 NOTE — SUBJECTIVE & OBJECTIVE
Interval History: Patient doing better today. Abdominal pain has resolved. Blood glucose improving. Start metformin and continue aggressive diabetes management. Anticipate discharge tomorrow if patient continues improving.    Review of Systems   Constitutional:  Negative for chills and fever.   HENT:  Negative for congestion and rhinorrhea.    Eyes:  Negative for visual disturbance.   Respiratory:  Negative for shortness of breath.    Cardiovascular:  Negative for chest pain.   Gastrointestinal:  Positive for abdominal pain, nausea and vomiting. Negative for constipation and diarrhea.   Genitourinary:  Negative for difficulty urinating and dysuria.   Musculoskeletal:  Negative for myalgias.   Skin:  Negative for wound.   Neurological:  Negative for syncope and headaches.   Psychiatric/Behavioral:  The patient is not nervous/anxious.      Objective:     Vital Signs (Most Recent):  Temp: 97.2 °F (36.2 °C) (06/09/24 1151)  Pulse: 73 (06/09/24 1151)  Resp: 18 (06/09/24 1151)  BP: 105/63 (06/09/24 1151)  SpO2: 98 % (06/09/24 1151) Vital Signs (24h Range):  Temp:  [97.2 °F (36.2 °C)-97.6 °F (36.4 °C)] 97.2 °F (36.2 °C)  Pulse:  [73-92] 73  Resp:  [18-20] 18  SpO2:  [95 %-99 %] 98 %  BP: ()/() 105/63     Weight: 91.6 kg (202 lb)  Body mass index is 32.6 kg/m².    Intake/Output Summary (Last 24 hours) at 6/9/2024 1540  Last data filed at 6/9/2024 0414  Gross per 24 hour   Intake --   Output 900 ml   Net -900 ml         Physical Exam  Vitals and nursing note reviewed.   Constitutional:       General: He is not in acute distress.     Appearance: Normal appearance. He is normal weight. He is not ill-appearing, toxic-appearing or diaphoretic.   HENT:      Head: Normocephalic and atraumatic.      Right Ear: External ear normal.      Left Ear: External ear normal.      Nose: Nose normal. No congestion or rhinorrhea.      Mouth/Throat:      Mouth: Mucous membranes are moist.      Pharynx: Oropharynx is clear. No  oropharyngeal exudate or posterior oropharyngeal erythema.   Eyes:      General: No scleral icterus.     Extraocular Movements: Extraocular movements intact.      Conjunctiva/sclera: Conjunctivae normal.      Pupils: Pupils are equal, round, and reactive to light.   Cardiovascular:      Rate and Rhythm: Normal rate and regular rhythm.      Pulses: Normal pulses.      Heart sounds: Normal heart sounds. No murmur heard.  Pulmonary:      Effort: Pulmonary effort is normal. No respiratory distress.      Breath sounds: No wheezing, rhonchi or rales.   Abdominal:      General: Abdomen is flat. Bowel sounds are normal. There is no distension.      Palpations: Abdomen is soft.      Tenderness: There is abdominal tenderness (RLQ and epigatric area). There is no right CVA tenderness, left CVA tenderness, guarding or rebound.   Musculoskeletal:         General: Normal range of motion.      Cervical back: Neck supple. No rigidity or tenderness.      Right lower leg: No edema.      Left lower leg: No edema.   Skin:     General: Skin is warm and dry.      Capillary Refill: Capillary refill takes less than 2 seconds.      Coloration: Skin is not jaundiced.      Findings: No lesion or rash.   Neurological:      General: No focal deficit present.      Mental Status: He is alert and oriented to person, place, and time.      Cranial Nerves: No cranial nerve deficit.      Sensory: No sensory deficit.      Motor: No weakness.   Psychiatric:         Mood and Affect: Mood normal.         Behavior: Behavior normal.         Thought Content: Thought content normal.             Significant Labs: All pertinent labs within the past 24 hours have been reviewed.  Recent Lab Results  (Last 5 results in the past 24 hours)        06/09/24  1149   06/09/24  0758   06/09/24  0520   06/09/24  0327   06/09/24  0026        Anion Gap       13         Baso #       0.05         Basophil %       0.8         BUN       13         BUN/CREAT RATIO       14          CA 19-9       42.1  Comment:   Performed by Siemens Chemiluminometric [place] Immunoassay (monoclonal antibody [id number])         Calcium       8.8         Chloride       101         CO2       23         Creatinine       0.93         Differential Method       Auto         eGFR       97         Eos #       0.09         Eos %       1.5         Glucose       191         Hematocrit       38.2         Hemoglobin       12.3         Immature Grans (Abs)       0.07         Immature Granulocytes       1.1         Lymph #       2.47         Lymph %       40.2         Magnesium        1.9         MCH       31.3         MCHC       32.2         MCV       97.2         Mono #       0.38         Mono %       6.2         MPV       10.6         Neutrophils, Abs       3.09         Neutrophils Relative       50.2         nRBC       0.0         NUCLEATED RBC ABSOLUTE       0.00         Phosphorus Level       3.4         Platelet Count       206         POC Glucose 183   143   202     244       Potassium       3.6         RBC       3.93         RDW       12.4         Sodium       133         WBC       6.15                                Significant Imaging: I have reviewed all pertinent imaging results/findings within the past 24 hours.  I have reviewed and interpreted all pertinent imaging results/findings within the past 24 hours.

## 2024-06-09 NOTE — ASSESSMENT & PLAN NOTE
"Initial blood glucose 371, anion gap 24. Patient was treated with IVF and IV insulin. Repeated BMP showed closed anion gap 15 and glucose 265. Blood glucose was 648 two days ago.    Last A1c reviewed-   Lab Results   Component Value Date    HGBA1C 10.7 (H) 02/16/2024     Most recent fingerstick glucose reviewed-   No results for input(s): "POCTGLUCOSE" in the last 24 hours.    Current correctional scale  Medium  Maintain anti-hyperglycemic dose as follows-   Antihyperglycemics (From admission, onward)      Start     Stop Route Frequency Ordered    06/09/24 0900  metFORMIN tablet 1,000 mg         -- Oral 2 times daily with meals 06/09/24 0754    06/08/24 1230  insulin detemir U-100 injection 13 Units         -- SubQ Daily 06/08/24 1116    06/08/24 1155  insulin aspart U-100 injection 0-10 Units         -- SubQ Before meals & nightly PRN 06/08/24 1101          IV NS @125cc/h  Hold Oral hypoglycemics while patient is in the hospital.  POCT glucose Q4h.  Monitor BMP, magnesium and phosphorus Q8h  Monitor patient closely.    6/9  Blood glucose 191 today. Start metformin and continue insulin as above. Anticipate discharge tomorrow if patient continues to improve.    "

## 2024-06-09 NOTE — PLAN OF CARE
[facility]  Initial Discharge Assessment       Primary Care Provider: Fidel Park MD    Admission Diagnosis: Ketosis [E88.89]  Pancreatic pseudocyst [K86.3]  Hyperglycemia [R73.9]  Chest pain [R07.9]  Chronic pancreatitis, unspecified pancreatitis type [K86.1]  Acute pancreatitis, unspecified complication status, unspecified pancreatitis type [K85.90]    Admission Date: 6/8/2024  Expected Discharge Date:     Transition of Care Barriers: (P) None    Payor: MEDICARE / Plan: MEDICARE PART A & B / Product Type: Government /     Extended Emergency Contact Information  Primary Emergency Contact: Vera Mehta  Mobile Phone: [phone]  Relation: Mother  Preferred language: English   needed? No    Discharge Plan A: (P) Home  Discharge Plan B: (P) Home      [facility]  [address] [place] [id number]  Phone: [phone] Fax: [phone]    [id number] [address] [facility] [address]  [address]  [facility] [address]  Phone: [phone] Fax: [phone]    [facility] [address]  [address]  [place] [address]  Phone: [phone] Fax: [phone]      Initial Assessment (most recent)       Adult Discharge Assessment - 06/09/24 1348          Discharge Assessment    Assessment Type Discharge Planning Assessment (P)      Confirmed/corrected address, phone number and insurance Yes (P)      Source of Information patient (P)      Communicated BETY with patient/caregiver Date not available/Unable to determine (P)      People in Home alone (P)      Do you expect to return to your current living situation? Yes (P)      Do you have help at home or someone to help you manage your care at home? No (P)      Prior to hospitilization cognitive status: Alert/Oriented (P)      Current cognitive status: Alert/Oriented (P)      Walking or Climbing Stairs Difficulty no (P)      Dressing/Bathing  Difficulty no (P)      Home Layout Able to live on 1st floor (P)      Equipment Currently Used at Home none (P)      Readmission within 30 days? No (P)      Patient currently being followed by outpatient case management? No (P)      Do you currently have service(s) that help you manage your care at home? No (P)      Do you take prescription medications? Yes (P)      Do you have prescription coverage? Yes (P)      Coverage Medicare A & B (P)      Do you have any problems affording any of your prescribed medications? No (P)      Is the patient taking medications as prescribed? yes (P)      Who is going to help you get home at discharge? Family (P)      How do you get to doctors appointments? car, drives self;family or friend will provide (P)      Are you on dialysis? No (P)      Do you take coumadin? No (P)    Pt reports to taking aspirins daily.    Discharge Plan A Home (P)      Discharge Plan B Home (P)      Discharge Plan discussed with: Patient (P)      Transition of Care Barriers None (P)         Physical Activity    On average, how many days per week do you engage in moderate to strenuous exercise (like a brisk walk)? 5 days (P)      On average, how many minutes do you engage in exercise at this level? 10 min (P)         Financial Resource Strain    How hard is it for you to pay for the very basics like food, housing, medical care, and heating? Not hard at all (P)         Housing Stability    At any time in the past 12 months, were you homeless or living in a shelter (including now)? No (P)         Transportation Needs    Has the lack of transportation kept you from medical appointments, meetings, work or from getting things needed for daily living? No (P)         Food Insecurity    Within the past 12 months, you worried that your food would run out before you got the money to buy more. Never true (P)      Within the past 12 months, the food you bought just didn't last and you didn't have money to get more.  Never true (P)         Stress    Do you feel stress - tense, restless, nervous, or anxious, or unable to sleep at night because your mind is troubled all the time - these days? To some extent (P)         Social Isolation    How often do you feel lonely or isolated from those around you?  Never (P)         Alcohol Use    Q1: How often do you have a drink containing alcohol? Never (P)      Q2: How many drinks containing alcohol do you have on a typical day when you are drinking? Patient does not drink (P)      Q3: How often do you have six or more drinks on one occasion? Never (P)         Utilities    In the past 12 months has the electric, gas, oil, or water company threatened to shut off services in your home? No (P)         Health Literacy    How often do you need to have someone help you when you read instructions, pamphlets, or other written material from your doctor or pharmacy? Never (P)                  Pt was seen by SW on today for initial assessment. Pt was observed lying in bed. Pt was alert and oriented x3. Pt stated that he lives alone and drives himself to his appointments. Pt ambulates without equipment/assistance. Pt's mother, Vera Mehta [phone] is his . Pt has Medicare A & B insurance. IM was obtained and SS will follow up with discharge planning.

## 2024-06-09 NOTE — PROGRESS NOTES
[facility] Hospital Medicine  Progress Note    Patient Name: Faustino Fischer  MRN: [id number]  Patient Class: IP- Inpatient   Admission Date: 6/8/2024  Length of Stay: 1 days  Attending Physician: Reba Guidry MD  Primary Care Provider: Fidel Park MD        Subjective:     Principal Problem:DKA (diabetic ketoacidosis)        HPI:  Patient is a 53 y/o male with PMHx of DM (A1C 10.7), chronic pancreatitis, CAD s/p 6 stents, HTN, HLD, obesity, hydradenitis (s/p multiple I&D), prior cholecystectomy who presents with complaint of nausea, vomiting and epigastric pain. Patient was seen in the ED 2 days ago after found to have blood glucose 648. He reported skipping metformin doses due to GI distress. He has history of multiple episodes of pancreatitis and reports that he quit drinking over 10 years ago. Patient denies fever, chills, chest pain, cough, SOB or urinary habit changes.      In the ED vital signs:  Initial Vitals [06/08/24 0309]   BP Pulse Resp Temp SpO2   (!) 206/119 (!) 112 20 98 °F (36.7 °C) 96 %   Blood work showed glucose 371, anion gap 24. Patient was treated with IVF and IV insulin and repeated BMP showed closed anion gap 15 and glucose 265. Lipase 148. CT scan abdomen showed cystic area on the head of the pancreas measuring 3.6 cm.  No acute straining around the head of the pancreas.     Patient will be admitted to the hospital for further management.    Interval History: Patient doing better today. Abdominal pain has resolved. Blood glucose improving. Start metformin and continue aggressive diabetes management. Anticipate discharge tomorrow if patient continues improving.    Review of Systems   Constitutional:  Negative for chills and fever.   HENT:  Negative for congestion and rhinorrhea.    Eyes:  Negative for visual disturbance.   Respiratory:  Negative for shortness of breath.    Cardiovascular:  Negative for chest pain.   Gastrointestinal:  Positive for abdominal  pain, nausea and vomiting. Negative for constipation and diarrhea.   Genitourinary:  Negative for difficulty urinating and dysuria.   Musculoskeletal:  Negative for myalgias.   Skin:  Negative for wound.   Neurological:  Negative for syncope and headaches.   Psychiatric/Behavioral:  The patient is not nervous/anxious.      Objective:     Vital Signs (Most Recent):  Temp: 97.2 °F (36.2 °C) (06/09/24 1151)  Pulse: 73 (06/09/24 1151)  Resp: 18 (06/09/24 1151)  BP: 105/63 (06/09/24 1151)  SpO2: 98 % (06/09/24 1151) Vital Signs (24h Range):  Temp:  [97.2 °F (36.2 °C)-97.6 °F (36.4 °C)] 97.2 °F (36.2 °C)  Pulse:  [73-92] 73  Resp:  [18-20] 18  SpO2:  [95 %-99 %] 98 %  BP: ()/() 105/63     Weight: 91.6 kg (202 lb)  Body mass index is 32.6 kg/m².    Intake/Output Summary (Last 24 hours) at 6/9/2024 1543  Last data filed at 6/9/2024 0414  Gross per 24 hour   Intake --   Output 900 ml   Net -900 ml         Physical Exam  Vitals and nursing note reviewed.   Constitutional:       General: He is not in acute distress.     Appearance: Normal appearance. He is normal weight. He is not ill-appearing, toxic-appearing or diaphoretic.   HENT:      Head: Normocephalic and atraumatic.      Right Ear: External ear normal.      Left Ear: External ear normal.      Nose: Nose normal. No congestion or rhinorrhea.      Mouth/Throat:      Mouth: Mucous membranes are moist.      Pharynx: Oropharynx is clear. No oropharyngeal exudate or posterior oropharyngeal erythema.   Eyes:      General: No scleral icterus.     Extraocular Movements: Extraocular movements intact.      Conjunctiva/sclera: Conjunctivae normal.      Pupils: Pupils are equal, round, and reactive to light.   Cardiovascular:      Rate and Rhythm: Normal rate and regular rhythm.      Pulses: Normal pulses.      Heart sounds: Normal heart sounds. No murmur heard.  Pulmonary:      Effort: Pulmonary effort is normal. No respiratory distress.      Breath sounds: No  wheezing, rhonchi or rales.   Abdominal:      General: Abdomen is flat. Bowel sounds are normal. There is no distension.      Palpations: Abdomen is soft.      Tenderness: There is abdominal tenderness (RLQ and epigatric area). There is no right CVA tenderness, left CVA tenderness, guarding or rebound.   Musculoskeletal:         General: Normal range of motion.      Cervical back: Neck supple. No rigidity or tenderness.      Right lower leg: No edema.      Left lower leg: No edema.   Skin:     General: Skin is warm and dry.      Capillary Refill: Capillary refill takes less than 2 seconds.      Coloration: Skin is not jaundiced.      Findings: No lesion or rash.   Neurological:      General: No focal deficit present.      Mental Status: He is alert and oriented to person, place, and time.      Cranial Nerves: No cranial nerve deficit.      Sensory: No sensory deficit.      Motor: No weakness.   Psychiatric:         Mood and Affect: Mood normal.         Behavior: Behavior normal.         Thought Content: Thought content normal.             Significant Labs: All pertinent labs within the past 24 hours have been reviewed.  Recent Lab Results  (Last 5 results in the past 24 hours)        06/09/24  1149   06/09/24  0758   06/09/24  0520   06/09/24  0327   06/09/24  0026        Anion Gap       13         Baso #       0.05         Basophil %       0.8         BUN       13         BUN/CREAT RATIO       14         CA 19-9       42.1  Comment:   Performed by Siemens Chemiluminometric [place] Immunoassay (monoclonal antibody [id number])         Calcium       8.8         Chloride       101         CO2       23         Creatinine       0.93         Differential Method       Auto         eGFR       97         Eos #       0.09         Eos %       1.5         Glucose       191         Hematocrit       38.2         Hemoglobin       12.3         Immature Grans (Abs)       0.07         Immature Granulocytes       1.1         Lymph  "#       2.47         Lymph %       40.2         Magnesium        1.9         MCH       31.3         MCHC       32.2         MCV       97.2         Mono #       0.38         Mono %       6.2         MPV       10.6         Neutrophils, Abs       3.09         Neutrophils Relative       50.2         nRBC       0.0         NUCLEATED RBC ABSOLUTE       0.00         Phosphorus Level       3.4         Platelet Count       206         POC Glucose 183   143   202     244       Potassium       3.6         RBC       3.93         RDW       12.4         Sodium       133         WBC       6.15                                Significant Imaging: I have reviewed all pertinent imaging results/findings within the past 24 hours.  I have reviewed and interpreted all pertinent imaging results/findings within the past 24 hours.    Assessment/Plan:      * DKA (diabetic ketoacidosis)  Initial blood glucose 371, anion gap 24. Patient was treated with IVF and IV insulin. Repeated BMP showed closed anion gap 15 and glucose 265. Blood glucose was 648 two days ago.    Last A1c reviewed-   Lab Results   Component Value Date    HGBA1C 10.7 (H) 02/16/2024     Most recent fingerstick glucose reviewed-   No results for input(s): "POCTGLUCOSE" in the last 24 hours.    Current correctional scale  Medium  Maintain anti-hyperglycemic dose as follows-   Antihyperglycemics (From admission, onward)      Start     Stop Route Frequency Ordered    06/09/24 0900  metFORMIN tablet 1,000 mg         -- Oral 2 times daily with meals 06/09/24 0754    06/08/24 1230  insulin detemir U-100 injection 13 Units         -- SubQ Daily 06/08/24 1116    06/08/24 1155  insulin aspart U-100 injection 0-10 Units         -- SubQ Before meals & nightly PRN 06/08/24 1101          IV NS @125cc/h  Hold Oral hypoglycemics while patient is in the hospital.  POCT glucose Q4h.  Monitor BMP, magnesium and phosphorus Q8h  Monitor patient closely.    6/9  Blood glucose 191 today. Start " metformin and continue insulin as above. Anticipate discharge tomorrow if patient continues to improve.      Acute on chronic pancreatitis  Patient with history of multiple episodes of pancreatitis presenting with nausea, vomiting and abdominal pain. Lipase 148. CT scan abdomen showed cystic area on the head of the pancreas measuring 3.6 cm. No acute straining around the head of the pancreas.   IV fluids  Clear liquid diet and advance as tolerated  Pain control  Monitor vital signs and physical exam    6/9  Abdominal pain has resolved. Tolerating liquid diet. Will advance to diabetic diet and monitor.      Hypertensive urgency  Patient has a current diagnosis of hypertensive urgency (without evidence of end organ damage) which is uncontrolled.  Latest blood pressure and vitals reviewed-   Temp:  [97.2 °F (36.2 °C)-97.6 °F (36.4 °C)]   Pulse:  [73-92]   Resp:  [18-20]   BP: ()/()   SpO2:  [95 %-99 %] .   Home meds for hypertension were reviewed and noted below.   Hypertension Medications               carvediloL (COREG) 12.5 MG tablet Take 1 tablet (12.5 mg total) by mouth before breakfast.         isosorbide mononitrate (IMDUR) 30 MG 24 hr tablet Take 1 tablet (30 mg total) by mouth once daily. Take 1/2 tablet with breakfast         NITROGLYCERIN SL Place under the tongue.          Medication adjustment for hospital antihypertensives is as follows- Hold home lasix. Patient has losartan listed as his home meds but reports not taking.   IV Hydralazine 10mg Q8H PRN with parameters.  Will aim for controlled BP reduction by medications noted above. Monitor and mitigate end organ damage as indicated.    CAD (coronary artery disease)  Patient with known CAD s/p stent placement, which is controlled Will continue home and add high intensity statin. Monitor for S/Sx of angina/ACS. Continue to monitor on telemetry.     Obesity  Body mass index is 32.6 kg/m². Morbid obesity complicates all aspects of disease  management from diagnostic modalities to treatment. Weight loss encouraged and health benefits explained to patient.           VTE Risk Mitigation (From admission, onward)           Ordered     enoxaparin injection 40 mg  Daily         06/08/24 1101     IP VTE HIGH RISK PATIENT  Once         06/08/24 1101     Place sequential compression device  Until discontinued         06/08/24 1101                    Discharge Planning   BETY:      Code Status: Full Code   Is the patient medically ready for discharge?:     Reason for patient still in hospital (select all that apply): Treatment  Discharge Plan A: Home                  Wyatt Bernard MD  Department of Hospital Medicine   [facility]

## 2024-06-10 VITALS
DIASTOLIC BLOOD PRESSURE: 68 MMHG | OXYGEN SATURATION: 98 % | BODY MASS INDEX: 32.47 KG/M2 | RESPIRATION RATE: 16 BRPM | HEIGHT: 66 IN | HEART RATE: 74 BPM | WEIGHT: 202 LBS | SYSTOLIC BLOOD PRESSURE: 111 MMHG | TEMPERATURE: 98 F

## 2024-06-10 PROBLEM — L73.2 HIDRADENITIS SUPPURATIVA: Status: ACTIVE | Noted: 2024-06-10

## 2024-06-10 LAB
ANION GAP SERPL CALCULATED.3IONS-SCNC: 12 MMOL/L (ref 7–16)
AORTIC ROOT ANNULUS: 3.48 CM
AORTIC VALVE CUSP SEPERATION: 2.7 CM
AV INDEX (PROSTH): 0.49
AV MEAN GRADIENT: 9 MMHG
AV PEAK GRADIENT: 18 MMHG
AV VALVE AREA BY VELOCITY RATIO: 2.65 CM²
AV VALVE AREA: 2.16 CM²
AV VELOCITY RATIO: 0.61
BASOPHILS # BLD AUTO: 0.04 K/UL (ref 0–0.2)
BASOPHILS NFR BLD AUTO: 0.6 % (ref 0–1)
BSA FOR ECHO PROCEDURE: 2.07 M2
BUN SERPL-MCNC: 13 MG/DL (ref 7–18)
BUN/CREAT SERPL: 14 (ref 6–20)
CALCIUM SERPL-MCNC: 9 MG/DL (ref 8.5–10.1)
CHLORIDE SERPL-SCNC: 102 MMOL/L (ref 98–107)
CO2 SERPL-SCNC: 23 MMOL/L (ref 21–32)
CREAT SERPL-MCNC: 0.93 MG/DL (ref 0.7–1.3)
CV ECHO LV RWT: 1.31 CM
DIFFERENTIAL METHOD BLD: ABNORMAL
DOP CALC AO PEAK VEL: 2.11 M/S
DOP CALC AO VTI: 40.3 CM
DOP CALC LVOT AREA: 4.4 CM2
DOP CALC LVOT DIAMETER: 2.36 CM
DOP CALC LVOT PEAK VEL: 1.28 M/S
DOP CALC LVOT STROKE VOLUME: 87.01 CM3
DOP CALC MV VTI: 47.6 CM
DOP CALCLVOT PEAK VEL VTI: 19.9 CM
E WAVE DECELERATION TIME: 273.29 MSEC
E/A RATIO: 0.62
E/E' RATIO: 14.8 M/S
ECHO LV POSTERIOR WALL: 2.15 CM (ref 0.6–1.1)
EGFR (NO RACE VARIABLE) (RUSH/TITUS): 97 ML/MIN/1.73M2
EJECTION FRACTION: 65 %
EOSINOPHIL # BLD AUTO: 0.08 K/UL (ref 0–0.5)
EOSINOPHIL NFR BLD AUTO: 1.2 % (ref 1–4)
ERYTHROCYTE [DISTWIDTH] IN BLOOD BY AUTOMATED COUNT: 12.8 % (ref 11.5–14.5)
FERRITIN SERPL-MCNC: 719 NG/ML (ref 26–388)
FOLATE SERPL-MCNC: 5.4 NG/ML (ref 3.1–17.5)
FRACTIONAL SHORTENING: 32 % (ref 28–44)
GLUCOSE SERPL-MCNC: 173 MG/DL (ref 74–106)
GLUCOSE SERPL-MCNC: 177 MG/DL (ref 70–105)
GLUCOSE SERPL-MCNC: 212 MG/DL (ref 70–105)
GLUCOSE SERPL-MCNC: 234 MG/DL (ref 70–105)
GLUCOSE SERPL-MCNC: 284 MG/DL (ref 70–105)
HCT VFR BLD AUTO: 37.2 % (ref 40–54)
HGB BLD-MCNC: 12 G/DL (ref 13.5–18)
IMM GRANULOCYTES # BLD AUTO: 0.06 K/UL (ref 0–0.04)
IMM GRANULOCYTES NFR BLD: 0.9 % (ref 0–0.4)
INTERVENTRICULAR SEPTUM: 1.73 CM (ref 0.6–1.1)
IRON SATN MFR SERPL: 16 % (ref 14–50)
IRON SERPL-MCNC: 37 ΜG/DL (ref 65–175)
IVC DIAMETER: 1.92 CM
LA MAJOR: 3.04 CM
LEFT ATRIUM SIZE: 2.73 CM
LEFT INTERNAL DIMENSION IN SYSTOLE: 2.23 CM (ref 2.1–4)
LEFT VENTRICLE DIASTOLIC VOLUME INDEX: 21.8 ML/M2
LEFT VENTRICLE DIASTOLIC VOLUME: 43.82 ML
LEFT VENTRICLE MASS INDEX: 138 G/M2
LEFT VENTRICLE MID-CAVITY PEAK GRADIENT VALSALVA: 65 MMHG
LEFT VENTRICLE SYSTOLIC VOLUME INDEX: 8.3 ML/M2
LEFT VENTRICLE SYSTOLIC VOLUME: 16.69 ML
LEFT VENTRICULAR INTERNAL DIMENSION IN DIASTOLE: 3.29 CM (ref 3.5–6)
LEFT VENTRICULAR MASS: 277.65 G
LV LATERAL E/E' RATIO: 14.8 M/S
LV SEPTAL E/E' RATIO: 14.8 M/S
LVOT MG: 3.19 MMHG
LVOT MV: 0.79 CM/S
LYMPHOCYTES # BLD AUTO: 2.23 K/UL (ref 1–4.8)
LYMPHOCYTES NFR BLD AUTO: 34.7 % (ref 27–41)
MAGNESIUM SERPL-MCNC: 1.6 MG/DL (ref 1.7–2.3)
MCH RBC QN AUTO: 31.7 PG (ref 27–31)
MCHC RBC AUTO-ENTMCNC: 32.3 G/DL (ref 32–36)
MCV RBC AUTO: 98.4 FL (ref 80–96)
MONOCYTES # BLD AUTO: 0.38 K/UL (ref 0–0.8)
MONOCYTES NFR BLD AUTO: 5.9 % (ref 2–6)
MPC BLD CALC-MCNC: 10.7 FL (ref 9.4–12.4)
MV MEAN GRADIENT: 4 MMHG
MV PEAK A VEL: 1.19 M/S
MV PEAK E VEL: 0.74 M/S
MV PEAK GRADIENT: 8 MMHG
MV STENOSIS PRESSURE HALF TIME: 75.41 MS
MV VALVE AREA BY CONTINUITY EQUATION: 1.83 CM2
MV VALVE AREA P 1/2 METHOD: 2.92 CM2
NEUTROPHILS # BLD AUTO: 3.64 K/UL (ref 1.8–7.7)
NEUTROPHILS NFR BLD AUTO: 56.7 % (ref 53–65)
NRBC # BLD AUTO: 0 X10E3/UL
NRBC, AUTO (.00): 0 %
OHS CV RV/LV RATIO: 0.89 CM
PHOSPHATE SERPL-MCNC: 3.6 MG/DL (ref 2.5–4.5)
PLATELET # BLD AUTO: 192 K/UL (ref 150–400)
POTASSIUM SERPL-SCNC: 3.8 MMOL/L (ref 3.5–5.1)
PV PEAK GRADIENT: 3 MMHG
PV PEAK VELOCITY: 0.87 M/S
RA MAJOR: 3.26 CM
RA PRESSURE ESTIMATED: 8 MMHG
RBC # BLD AUTO: 3.78 M/UL (ref 4.6–6.2)
RIGHT VENTRICULAR END-DIASTOLIC DIMENSION: 2.94 CM
SODIUM SERPL-SCNC: 133 MMOL/L (ref 136–145)
TDI LATERAL: 0.05 M/S
TDI SEPTAL: 0.05 M/S
TDI: 0.05 M/S
TIBC SERPL-MCNC: 227 ΜG/DL (ref 250–450)
TRICUSPID ANNULAR PLANE SYSTOLIC EXCURSION: 2.16 CM
VIT B12 SERPL-MCNC: 163 PG/ML (ref 193–986)
WBC # BLD AUTO: 6.43 K/UL (ref 4.5–11)
Z-SCORE OF LEFT VENTRICULAR DIMENSION IN END DIASTOLE: -5.82
Z-SCORE OF LEFT VENTRICULAR DIMENSION IN END SYSTOLE: -3.82

## 2024-06-10 PROCEDURE — 80048 BASIC METABOLIC PNL TOTAL CA: CPT | Performed by: GENERAL PRACTICE

## 2024-06-10 PROCEDURE — 25000003 PHARM REV CODE 250

## 2024-06-10 PROCEDURE — 82962 GLUCOSE BLOOD TEST: CPT

## 2024-06-10 PROCEDURE — 63600175 PHARM REV CODE 636 W HCPCS

## 2024-06-10 PROCEDURE — 25000003 PHARM REV CODE 250: Performed by: GENERAL PRACTICE

## 2024-06-10 PROCEDURE — 36415 COLL VENOUS BLD VENIPUNCTURE: CPT | Performed by: GENERAL PRACTICE

## 2024-06-10 PROCEDURE — 82728 ASSAY OF FERRITIN: CPT

## 2024-06-10 PROCEDURE — 82607 VITAMIN B-12: CPT

## 2024-06-10 PROCEDURE — 84100 ASSAY OF PHOSPHORUS: CPT | Performed by: GENERAL PRACTICE

## 2024-06-10 PROCEDURE — 83540 ASSAY OF IRON: CPT

## 2024-06-10 PROCEDURE — 99239 HOSP IP/OBS DSCHRG MGMT >30: CPT | Mod: GC,,, | Performed by: HOSPITALIST

## 2024-06-10 PROCEDURE — 25000003 PHARM REV CODE 250: Performed by: HOSPITALIST

## 2024-06-10 PROCEDURE — 25000003 PHARM REV CODE 250: Performed by: INTERNAL MEDICINE

## 2024-06-10 PROCEDURE — 63600175 PHARM REV CODE 636 W HCPCS: Performed by: HOSPITALIST

## 2024-06-10 PROCEDURE — 85025 COMPLETE CBC W/AUTO DIFF WBC: CPT | Performed by: GENERAL PRACTICE

## 2024-06-10 PROCEDURE — 83735 ASSAY OF MAGNESIUM: CPT | Performed by: GENERAL PRACTICE

## 2024-06-10 RX ORDER — LOSARTAN POTASSIUM 25 MG/1
25 TABLET ORAL DAILY
Status: DISCONTINUED | OUTPATIENT
Start: 2024-06-10 | End: 2024-06-10 | Stop reason: HOSPADM

## 2024-06-10 RX ORDER — LOSARTAN POTASSIUM 100 MG/1
50 TABLET ORAL DAILY
Qty: 30 TABLET | Refills: 0 | Status: SHIPPED | OUTPATIENT
Start: 2024-06-10 | End: 2024-08-09

## 2024-06-10 RX ORDER — METFORMIN HYDROCHLORIDE 1000 MG/1
500 TABLET ORAL 2 TIMES DAILY WITH MEALS
Qty: 90 TABLET | Refills: 0 | Status: SHIPPED | OUTPATIENT
Start: 2024-06-10 | End: 2024-09-08

## 2024-06-10 RX ORDER — MAGNESIUM SULFATE HEPTAHYDRATE 40 MG/ML
2 INJECTION, SOLUTION INTRAVENOUS ONCE
Status: COMPLETED | OUTPATIENT
Start: 2024-06-10 | End: 2024-06-10

## 2024-06-10 RX ORDER — MORPHINE SULFATE 4 MG/ML
3 INJECTION, SOLUTION INTRAMUSCULAR; INTRAVENOUS ONCE
Status: COMPLETED | OUTPATIENT
Start: 2024-06-10 | End: 2024-06-10

## 2024-06-10 RX ORDER — INSULIN ASPART 100 [IU]/ML
0-15 INJECTION, SOLUTION INTRAVENOUS; SUBCUTANEOUS
Status: DISCONTINUED | OUTPATIENT
Start: 2024-06-10 | End: 2024-06-10 | Stop reason: HOSPADM

## 2024-06-10 RX ORDER — LOSARTAN POTASSIUM 25 MG/1
25 TABLET ORAL DAILY
Qty: 90 TABLET | Refills: 3 | Status: SHIPPED | OUTPATIENT
Start: 2024-06-10 | End: 2024-06-10 | Stop reason: HOSPADM

## 2024-06-10 RX ORDER — INSULIN GLARGINE 100 [IU]/ML
15 INJECTION, SOLUTION SUBCUTANEOUS NIGHTLY
Qty: 15 ML | Refills: 0 | Status: SHIPPED | OUTPATIENT
Start: 2024-06-10 | End: 2024-09-08

## 2024-06-10 RX ORDER — ATORVASTATIN CALCIUM 80 MG/1
80 TABLET, FILM COATED ORAL DAILY
Qty: 90 TABLET | Refills: 0 | Status: SHIPPED | OUTPATIENT
Start: 2024-06-11 | End: 2024-09-09

## 2024-06-10 RX ORDER — LOSARTAN POTASSIUM 100 MG/1
50 TABLET ORAL DAILY
Qty: 30 TABLET | Refills: 0 | Status: CANCELLED | OUTPATIENT
Start: 2024-06-10 | End: 2024-08-09

## 2024-06-10 RX ADMIN — INSULIN DETEMIR 13 UNITS: 100 INJECTION, SOLUTION SUBCUTANEOUS at 09:06

## 2024-06-10 RX ADMIN — MORPHINE SULFATE 3 MG: 4 INJECTION INTRAVENOUS at 06:06

## 2024-06-10 RX ADMIN — DOCUSATE SODIUM 100 MG: 100 CAPSULE, LIQUID FILLED ORAL at 08:06

## 2024-06-10 RX ADMIN — CARVEDILOL 12.5 MG: 12.5 TABLET, FILM COATED ORAL at 05:06

## 2024-06-10 RX ADMIN — ISOSORBIDE MONONITRATE 30 MG: 30 TABLET, EXTENDED RELEASE ORAL at 08:06

## 2024-06-10 RX ADMIN — SODIUM CHLORIDE: 9 INJECTION, SOLUTION INTRAVENOUS at 01:06

## 2024-06-10 RX ADMIN — LOSARTAN POTASSIUM 25 MG: 25 TABLET, FILM COATED ORAL at 06:06

## 2024-06-10 RX ADMIN — INSULIN ASPART 4 UNITS: 100 INJECTION, SOLUTION INTRAVENOUS; SUBCUTANEOUS at 08:06

## 2024-06-10 RX ADMIN — ATORVASTATIN CALCIUM 80 MG: 80 TABLET, FILM COATED ORAL at 08:06

## 2024-06-10 RX ADMIN — ASPIRIN 81 MG CHEWABLE TABLET 81 MG: 81 TABLET CHEWABLE at 08:06

## 2024-06-10 RX ADMIN — MAGNESIUM SULFATE HEPTAHYDRATE 2 G: 40 INJECTION, SOLUTION INTRAVENOUS at 06:06

## 2024-06-10 RX ADMIN — METFORMIN HYDROCHLORIDE 1000 MG: 500 TABLET ORAL at 10:06

## 2024-06-10 RX ADMIN — INSULIN ASPART 6 UNITS: 100 INJECTION, SOLUTION INTRAVENOUS; SUBCUTANEOUS at 12:06

## 2024-06-10 NOTE — HOSPITAL COURSE
Patient came to the ED with hyperglycemia and increased anion gap. He was managed for hyperglycemia in the ED and transferred to the floor.  His blood glucose was managed throughout the stay.  He was managed for Hidradenitis Suppurativa.  He had elevated pro BNP at presentation.  Echo Saline Bubble? Yes    Interpretation Summary  · The left ventricle is normal in size with mild concentric hypertrophy and normal systolic function.  · The estimated ejection fraction is 65%.  · Normal right ventricular size with normal right ventricular systolic function.  · Mild mitral regurgitation.  · Mild tricuspid regurgitation.  · Normal central venous pressure (3 mmHg).  · The sinuses of Valsalva is mildly dilated. Consider CTA or MRA of thoracic aorta to evaluate fo ascending aortic aneurysm

## 2024-06-10 NOTE — ASSESSMENT & PLAN NOTE
"Initial blood glucose 371, anion gap 24. Patient was treated with IVF and IV insulin. Repeated BMP showed closed anion gap 15 and glucose 265. Blood glucose was 648 two days ago.    Last A1c reviewed-   Lab Results   Component Value Date    HGBA1C 10.7 (H) 02/16/2024     Most recent fingerstick glucose reviewed-   No results for input(s): "POCTGLUCOSE" in the last 24 hours.    Current correctional scale  Medium  Maintain anti-hyperglycemic dose as follows-   Antihyperglycemics (From admission, onward)      Start     Stop Route Frequency Ordered    06/10/24 0742  insulin aspart U-100 injection 0-15 Units         -- SubQ Before meals & nightly PRN 06/10/24 0742    06/09/24 0900  metFORMIN tablet 1,000 mg         -- Oral 2 times daily with meals 06/09/24 0754    06/08/24 1230  insulin detemir U-100 injection 13 Units         -- SubQ Daily 06/08/24 1116          IV NS @125cc/h  Hold Oral hypoglycemics while patient is in the hospital.  POCT glucose Q4h.  Monitor BMP, magnesium and phosphorus Q8h  Monitor patient closely.    6/9  Blood glucose 191 today. Start metformin and continue insulin as above. Anticipate discharge tomorrow if patient continues to improve.  6/10  - patient's Long acting insulin dosage was increased at discharge  - his Metformin was modified to 500 mg BID for better tolerance.  - Patient is medically stable to be discharged home and has maximally benefited from this hospital stay. Follow up appointments with the PCP and specialists were secured at discharge.  Patient is being discharged home      "

## 2024-06-10 NOTE — ASSESSMENT & PLAN NOTE
Patient has recurring hidradenitis suppurativa and follows up with Dr Mo Boyer and Dr Fidel Park.  -Patient is medically stable to be discharged home and has maximally benefited from this hospital stay. Follow up appointments with the PCP and specialists were secured at discharge.  - appointments with Dr Boyer and Dr Park were set up  - patient is on long term Doxycycline prescribed by Dr Boyer, he will continue the meds after discharge.

## 2024-06-10 NOTE — DISCHARGE SUMMARY
[facility] Hospital Medicine  Discharge Summary      Patient Name: Faustino Fischer  MRN: [id number]  ELÍAS: [id number]  Patient Class: IP- Inpatient  Admission Date: 6/8/2024  Hospital Length of Stay: 2 days  Discharge Date and Time:  06/10/2024 5:25 PM  Attending Physician: Monique att. providers found   Discharging Provider: Ainsley Beebe MD  Primary Care Provider: Fidel Park MD    Primary Care Team: Networked reference to record PCT     HPI:   Patient is a 55 y/o male with PMHx of DM (A1C 10.7), chronic pancreatitis, CAD s/p 6 stents, HTN, HLD, obesity, hydradenitis (s/p multiple I&D), prior cholecystectomy who presents with complaint of nausea, vomiting and epigastric pain. Patient was seen in the ED 2 days ago after found to have blood glucose 648. He reported skipping metformin doses due to GI distress. He has history of multiple episodes of pancreatitis and reports that he quit drinking over 10 years ago. Patient denies fever, chills, chest pain, cough, SOB or urinary habit changes.      In the ED vital signs:  Initial Vitals [06/08/24 0309]   BP Pulse Resp Temp SpO2   (!) 206/119 (!) 112 20 98 °F (36.7 °C) 96 %   Blood work showed glucose 371, anion gap 24. Patient was treated with IVF and IV insulin and repeated BMP showed closed anion gap 15 and glucose 265. Lipase 148. CT scan abdomen showed cystic area on the head of the pancreas measuring 3.6 cm.  No acute straining around the head of the pancreas.     Patient will be admitted to the hospital for further management.    * No surgery found *      Hospital Course:   Patient came to the ED with hyperglycemia and increased anion gap. He was managed for hyperglycemia in the ED and transferred to the floor.  His blood glucose was managed throughout the stay.  He was managed for Hidradenitis Suppurativa.  He had elevated pro BNP at presentation.  Echo Saline Bubble? Yes    Interpretation Summary  · The left ventricle is normal in size with  mild concentric hypertrophy and normal systolic function.  · The estimated ejection fraction is 65%.  · Normal right ventricular size with normal right ventricular systolic function.  · Mild mitral regurgitation.  · Mild tricuspid regurgitation.  · Normal central venous pressure (3 mmHg).  · The sinuses of Valsalva is mildly dilated. Consider CTA or MRA of thoracic aorta to evaluate fo ascending aortic aneurysm       Goals of Care Treatment Preferences:  Code Status: Full Code      Consults:     Derm  Hidradenitis suppurativa  Patient has recurring hidradenitis suppurativa and follows up with Dr Mo Boyer and Dr Fidel Park.  -Patient is medically stable to be discharged home and has maximally benefited from this hospital stay. Follow up appointments with the PCP and specialists were secured at discharge.  - appointments with Dr Boyer and Dr Park were set up  - patient is on long term Doxycycline prescribed by Dr Boyer, he will continue the meds after discharge.       Cardiac/Vascular  Hypertensive urgency  Patient has a current diagnosis of hypertensive urgency (without evidence of end organ damage) which is uncontrolled.  Latest blood pressure and vitals reviewed-   Temp:  [97.8 °F (36.6 °C)-98.3 °F (36.8 °C)]   Pulse:  [69-87]   Resp:  [16-20]   BP: (111-180)/()   SpO2:  [96 %-99 %] .   Home meds for hypertension were reviewed and noted below.   Hypertension Medications               carvediloL (COREG) 12.5 MG tablet Take 1 tablet (12.5 mg total) by mouth before breakfast.         isosorbide mononitrate (IMDUR) 30 MG 24 hr tablet Take 1 tablet (30 mg total) by mouth once daily. Take 1/2 tablet with breakfast         NITROGLYCERIN SL Place under the tongue.          Medication adjustment for hospital antihypertensives is as follows- Hold home lasix. Patient has losartan listed as his home meds but reports not taking.   IV Hydralazine 10mg Q8H PRN with parameters.  Will aim for controlled BP reduction by  "medications noted above. Monitor and mitigate end organ damage as indicated.  6/10  - patient's Losartan was reduced to 50 mg from 100 mg at discharge  - Patient is medically stable to be discharged home and has maximally benefited from this hospital stay. Follow up appointments with the PCP and specialists were secured at discharge.  Patient is being discharged      Endocrine  * DKA (diabetic ketoacidosis)  Initial blood glucose 371, anion gap 24. Patient was treated with IVF and IV insulin. Repeated BMP showed closed anion gap 15 and glucose 265. Blood glucose was 648 two days ago.    Last A1c reviewed-   Lab Results   Component Value Date    HGBA1C 10.7 (H) 02/16/2024     Most recent fingerstick glucose reviewed-   No results for input(s): "POCTGLUCOSE" in the last 24 hours.    Current correctional scale  Medium  Maintain anti-hyperglycemic dose as follows-   Antihyperglycemics (From admission, onward)      Start     Stop Route Frequency Ordered    06/10/24 0742  insulin aspart U-100 injection 0-15 Units         -- SubQ Before meals & nightly PRN 06/10/24 0742    06/09/24 0900  metFORMIN tablet 1,000 mg         -- Oral 2 times daily with meals 06/09/24 0754    06/08/24 1230  insulin detemir U-100 injection 13 Units         -- SubQ Daily 06/08/24 1116          IV NS @125cc/h  Hold Oral hypoglycemics while patient is in the hospital.  POCT glucose Q4h.  Monitor BMP, magnesium and phosphorus Q8h  Monitor patient closely.    6/9  Blood glucose 191 today. Start metformin and continue insulin as above. Anticipate discharge tomorrow if patient continues to improve.  6/10  - patient's Long acting insulin dosage was increased at discharge  - his Metformin was modified to 500 mg BID for better tolerance.  - Patient is medically stable to be discharged home and has maximally benefited from this hospital stay. Follow up appointments with the PCP and specialists were secured at discharge.  Patient is being discharged " home        GI  Acute on chronic pancreatitis  Patient with history of multiple episodes of pancreatitis presenting with nausea, vomiting and abdominal pain. Lipase 148. CT scan abdomen showed cystic area on the head of the pancreas measuring 3.6 cm. No acute straining around the head of the pancreas.   IV fluids  Clear liquid diet and advance as tolerated  Pain control  Monitor vital signs and physical exam    6/9  Abdominal pain has resolved. Tolerating liquid diet. Will advance to diabetic diet and monitor.  6/10  - Patient is medically stable to be discharged home and has maximally benefited from this hospital stay. Follow up appointments with the PCP and specialists were secured at discharge.  Patient is being discharged home        Final Active Diagnoses:    Diagnosis Date Noted POA    PRINCIPAL PROBLEM:  DKA (diabetic ketoacidosis) [E11.10] 06/08/2024 Yes    Acute on chronic pancreatitis [K85.90, K86.1] 06/08/2024 Yes    Hypertensive urgency [I16.0] 06/08/2024 Yes    Hidradenitis suppurativa [L73.2] 06/10/2024 Yes    Obesity [E66.9] 06/08/2024 Yes    CAD (coronary artery disease) [I25.10] 06/08/2024 Yes      Problems Resolved During this Admission:       Discharged Condition: stable    Disposition: Home or Self Care    Follow Up:   Follow-up Information       Deidra Welch FNP Follow up in 1 week(s).    Specialty: Family Medicine  Why: Please follow up on June 14 at 1:00  Contact information:  [address] [facility] [address]  [phone]               Maty Boyer MD Follow up in 2 week(s).    Specialty: Dermatology  Why: Hidradenitis Suppurativa follow up  Contact information:  [address] [address]  [phone]               Fidel Park MD Follow up in 2 week(s).    Specialties: General Surgery, Surgery  Why: pancreatitis with a pseudocyst  Please follow up on June 24 at 1:30  Contact information:  [address] [place] [address]  [phone]                            Patient Instructions:      ACCEPT - Ambulatory referral/consult to Cardiology   Standing Status: Future   Referral Priority: Routine Referral Type: Consultation   Referral Reason: Specialty Services Required   Referred to Provider: DOMINIC HAYES Specialty: Cardiology   Number of Visits Requested: 1     Diet diabetic       Significant Diagnostic Studies: Labs: All labs within the past 24 hours have been reviewed    Pending Diagnostic Studies:       Procedure Component Value Units Date/Time    Echo [[id number]]  (Abnormal) Resulted: 06/10/24 1109    Order Status: Sent Lab Status: In process Updated: 06/10/24 1113     BSA 2.07 m2      LVOT stroke volume 87.01 cm3      LVIDd 3.29 cm      LV Systolic Volume 16.69 mL      LV Systolic Volume Index 8.3 mL/m2      LVIDs 2.23 cm      LV Diastolic Volume 43.82 mL      LV Diastolic Volume Index 21.80 mL/m2      IVS 1.73 cm      LVOT diameter 2.36 cm      LVOT area 4.4 cm2      FS 32 %      Left Ventricle Relative Wall Thickness 1.31 cm      Posterior Wall 2.15 cm      LV mass 277.65 g      LV Mass Index 138 g/m2      MV Peak E Rafat 0.74 m/s      TDI LATERAL 0.05 m/s      TDI SEPTAL 0.05 m/s      E/E' ratio 14.80 m/s      MV Peak A Rafat 1.19 m/s      E/A ratio 0.62     E wave deceleration time 273.29 msec      LV SEPTAL E/E' RATIO 14.80 m/s      LV LATERAL E/E' RATIO 14.80 m/s      LVOT peak rafat 1.28 m/s      Left Ventricular Outflow Tract Mean Velocity 0.79 cm/s      Left Ventricular Outflow Tract Mean Gradient 3.19 mmHg      RVDD 2.94 cm      TAPSE 2.16 cm      RV/LV Ratio 0.89 cm      LA size 2.73 cm      Left Atrium Major Axis 3.04 cm      RA Major Axis 3.26 cm      AV mean gradient 9 mmHg      AV peak gradient 18 mmHg      Ao peak rafat 2.11 m/s      Ao VTI 40.30 cm      LVOT peak VTI 19.90 cm      AV valve area 2.16 cm²      AV Velocity Ratio 0.61     AV index (prosthetic) 0.49     JOSE by Velocity Ratio 2.65 cm²      MV mean gradient 4 mmHg      MV  peak gradient 8 mmHg      MV stenosis pressure 1/2 time 75.41 ms      MV valve area p 1/2 method 2.92 cm2      MV valve area by continuity eq 1.83 cm2      MV VTI 47.6 cm      PV PEAK VELOCITY 0.87 m/s      PV peak gradient 3 mmHg      Ao root annulus 3.48 cm      IVC diameter 1.92 cm      Mean e' 0.05 m/s      ZLVIDS -3.82     ZLVIDD -5.82     AORTIC VALVE CUSP SEPERATION 2.70 cm      Mid-LV cavitary pk grad Valsalva 65 mmHg      Est. RA pres 8 mmHg      EF 65 %     Narrative:        Left Ventricle: The left ventricle is smaller than normal. Severely   increased wall thickness. Moderate LV mid-cavity obstruction at rest.    Right Ventricle: Normal right ventricular cavity size.    Aortic Valve: The aortic valve is a trileaflet valve.    Mitral Valve: There is no stenosis. The mean pressure gradient across   the mitral valve is 4 mmHg at a heart rate of  bpm. There is mild to   moderate regurgitation with an eccentric jet.    IVC/SVC: Intermediate venous pressure at 8 mmHg.    Pericardium: There is a trivial effusion.      Impression:                 Medications:  Reconciled Home Medications:      Medication List        START taking these medications      atorvastatin 80 MG tablet  Commonly known as: LIPITOR  Take 1 tablet (80 mg total) by mouth once daily.  Start taking on: June 11, 2024            CHANGE how you take these medications      BASAGLAR KWIKPEN U-100 INSULIN 100 unit/mL (3 mL) Inpn pen  Generic drug: insulin glargine U-100 (Lantus)  Inject 15 Units into the skin every evening.  What changed:   how much to take  when to take this     losartan 100 MG tablet  Commonly known as: COZAAR  Take 0.5 tablets (50 mg total) by mouth once daily.  What changed: how much to take     metFORMIN 1000 MG tablet  Commonly known as: GLUCOPHAGE  Take 0.5 tablets (500 mg total) by mouth 2 (two) times daily with meals.  What changed: how much to take            CONTINUE taking these medications      aspirin 81 MG Chew  Take  1 tablet (81 mg total) by mouth once daily.     carvediloL 12.5 MG tablet  Commonly known as: COREG  Take 1 tablet (12.5 mg total) by mouth before breakfast.     furosemide 20 MG tablet  Commonly known as: LASIX  Take 20 mg by mouth once daily.     isosorbide mononitrate 30 MG 24 hr tablet  Commonly known as: IMDUR  Take 1 tablet (30 mg total) by mouth once daily. Take 1/2 tablet with breakfast     mupirocin 2 % ointment  Commonly known as: BACTROBAN  Apply topically 2 (two) times daily.     NITROGLYCERIN SL  Place under the tongue.              Indwelling Lines/Drains at time of discharge:   Lines/Drains/Airways       None                   Time spent on the discharge of patient:   60 minutes         Ainsley Beebe MD  Department of Hospital Medicine  [facility]

## 2024-06-10 NOTE — ASSESSMENT & PLAN NOTE
Patient has a current diagnosis of hypertensive urgency (without evidence of end organ damage) which is uncontrolled.  Latest blood pressure and vitals reviewed-   Temp:  [97.8 °F (36.6 °C)-98.3 °F (36.8 °C)]   Pulse:  [69-87]   Resp:  [16-20]   BP: (111-180)/()   SpO2:  [96 %-99 %] .   Home meds for hypertension were reviewed and noted below.   Hypertension Medications               carvediloL (COREG) 12.5 MG tablet Take 1 tablet (12.5 mg total) by mouth before breakfast.         isosorbide mononitrate (IMDUR) 30 MG 24 hr tablet Take 1 tablet (30 mg total) by mouth once daily. Take 1/2 tablet with breakfast         NITROGLYCERIN SL Place under the tongue.          Medication adjustment for hospital antihypertensives is as follows- Hold home lasix. Patient has losartan listed as his home meds but reports not taking.   IV Hydralazine 10mg Q8H PRN with parameters.  Will aim for controlled BP reduction by medications noted above. Monitor and mitigate end organ damage as indicated.  6/10  - patient's Losartan was reduced to 50 mg from 100 mg at discharge  - Patient is medically stable to be discharged home and has maximally benefited from this hospital stay. Follow up appointments with the PCP and specialists were secured at discharge.  Patient is being discharged

## 2024-06-11 NOTE — PLAN OF CARE
[facility]  Discharge Final Note    Primary Care Provider: Fidel Park MD    Expected Discharge Date: 6/10/2024    Final Discharge Note (most recent)       Final Note - 06/11/24 1005          Final Note    Assessment Type Final Discharge Note     Anticipated Discharge Disposition Home or Self Care        Post-Acute Status    Discharge Delays None known at this time                     Important Message from Medicare  Important Message from Medicare regarding Discharge Appeal Rights: Explained to patient/caregiver, Signed/date by patient/caregiver     Date IMM was signed: 06/09/24  Time IMM was signed: 1309    Contact Info       Deidra Welch, DOC   Specialty: Family Medicine    [address] [facility] [address]   Phone: [phone]       Next Steps: Follow up in 1 week(s)    Instructions: Please follow up on June 14 at 1:00    Maty Boyer MD   Specialty: Dermatology    [address] [address]   Phone: [phone]       Next Steps: Follow up in 2 week(s)    Instructions: Hidradenitis Suppurativa follow up    Fidel Park MD   Specialty: General Surgery, Surgery    [address] [place] [address]   Phone: [phone]       Next Steps: Follow up in 2 week(s)    Instructions: pancreatitis with a pseudocyst  Please follow up on June 24 at 1:30          Pt dc home 0 dc needs

## 2024-06-12 ENCOUNTER — PATIENT OUTREACH (OUTPATIENT)
Dept: ADMINISTRATIVE | Facility: CLINIC | Age: 54
End: 2024-06-12

## 2024-06-12 NOTE — PROGRESS NOTES
C3 nurse attempted to contact Faustino Fischer  for a TCC post hospital discharge follow up call. No answer. No voicemail. Successful contact to mother Vera Mehta. Mother stated she will give patient a message to return my call. Callback information provided.  The patient has a scheduled HOSFU appointment with DOC Lomeli on 6/14/24 @ 100.

## 2024-06-13 NOTE — PROGRESS NOTES
C3 nurse spoke with Faustino Fischer  for a TCC post hospital discharge follow up call. The patient has a scheduled HOSFU appointment with DOC Lomeli on 6/14/24 @ 100.

## 2024-06-14 ENCOUNTER — OFFICE VISIT (OUTPATIENT)
Dept: FAMILY MEDICINE | Facility: CLINIC | Age: 54
End: 2024-06-14
Payer: MEDICARE

## 2024-06-14 VITALS
SYSTOLIC BLOOD PRESSURE: 133 MMHG | RESPIRATION RATE: 18 BRPM | WEIGHT: 213 LBS | OXYGEN SATURATION: 98 % | DIASTOLIC BLOOD PRESSURE: 87 MMHG | BODY MASS INDEX: 34.23 KG/M2 | HEART RATE: 69 BPM | TEMPERATURE: 98 F | HEIGHT: 66 IN

## 2024-06-14 DIAGNOSIS — E11.65 TYPE 2 DIABETES MELLITUS WITH HYPERGLYCEMIA, WITHOUT LONG-TERM CURRENT USE OF INSULIN: ICD-10-CM

## 2024-06-14 DIAGNOSIS — K86.1 ACUTE ON CHRONIC PANCREATITIS: ICD-10-CM

## 2024-06-14 DIAGNOSIS — I10 PRIMARY HYPERTENSION: ICD-10-CM

## 2024-06-14 DIAGNOSIS — Z12.11 ENCOUNTER FOR SCREENING FOR MALIGNANT NEOPLASM OF COLON: ICD-10-CM

## 2024-06-14 DIAGNOSIS — K85.90 ACUTE ON CHRONIC PANCREATITIS: ICD-10-CM

## 2024-06-14 DIAGNOSIS — Z09 HOSPITAL DISCHARGE FOLLOW-UP: Primary | ICD-10-CM

## 2024-06-14 LAB
ALBUMIN SERPL BCP-MCNC: 3.2 G/DL (ref 3.5–5)
ALBUMIN/GLOB SERPL: 1 {RATIO}
ALP SERPL-CCNC: 86 U/L (ref 45–115)
ALT SERPL W P-5'-P-CCNC: 19 U/L (ref 16–61)
ANION GAP SERPL CALCULATED.3IONS-SCNC: 13 MMOL/L (ref 7–16)
AST SERPL W P-5'-P-CCNC: 12 U/L (ref 15–37)
BASOPHILS # BLD AUTO: 0.04 K/UL (ref 0–0.2)
BASOPHILS NFR BLD AUTO: 0.6 % (ref 0–1)
BILIRUB SERPL-MCNC: 0.5 MG/DL (ref ?–1.2)
BUN SERPL-MCNC: 8 MG/DL (ref 7–18)
BUN/CREAT SERPL: 8 (ref 6–20)
CALCIUM SERPL-MCNC: 8.8 MG/DL (ref 8.5–10.1)
CHLORIDE SERPL-SCNC: 102 MMOL/L (ref 98–107)
CO2 SERPL-SCNC: 25 MMOL/L (ref 21–32)
CREAT SERPL-MCNC: 1.04 MG/DL (ref 0.7–1.3)
CREAT UR-MCNC: 279 MG/DL (ref 39–259)
DIFFERENTIAL METHOD BLD: ABNORMAL
EGFR (NO RACE VARIABLE) (RUSH/TITUS): 85 ML/MIN/1.73M2
EOSINOPHIL # BLD AUTO: 0.07 K/UL (ref 0–0.5)
EOSINOPHIL NFR BLD AUTO: 1.1 % (ref 1–4)
ERYTHROCYTE [DISTWIDTH] IN BLOOD BY AUTOMATED COUNT: 13.1 % (ref 11.5–14.5)
EST. AVERAGE GLUCOSE BLD GHB EST-MCNC: 301 MG/DL
GLOBULIN SER-MCNC: 3.1 G/DL (ref 2–4)
GLUCOSE SERPL-MCNC: 316 MG/DL (ref 74–106)
HBA1C MFR BLD HPLC: 12.1 % (ref 4.5–6.6)
HCT VFR BLD AUTO: 40.2 % (ref 40–54)
HGB BLD-MCNC: 12.4 G/DL (ref 13.5–18)
IMM GRANULOCYTES # BLD AUTO: 0.03 K/UL (ref 0–0.04)
IMM GRANULOCYTES NFR BLD: 0.5 % (ref 0–0.4)
LYMPHOCYTES # BLD AUTO: 1.92 K/UL (ref 1–4.8)
LYMPHOCYTES NFR BLD AUTO: 30.4 % (ref 27–41)
MCH RBC QN AUTO: 31.3 PG (ref 27–31)
MCHC RBC AUTO-ENTMCNC: 30.8 G/DL (ref 32–36)
MCV RBC AUTO: 101.5 FL (ref 80–96)
MICROALBUMIN UR-MCNC: 10 MG/DL (ref 0–2.8)
MICROALBUMIN/CREAT RATIO PNL UR: 35.8 MG/G (ref 0–30)
MONOCYTES # BLD AUTO: 0.58 K/UL (ref 0–0.8)
MONOCYTES NFR BLD AUTO: 9.2 % (ref 2–6)
MPC BLD CALC-MCNC: 11 FL (ref 9.4–12.4)
NEUTROPHILS # BLD AUTO: 3.67 K/UL (ref 1.8–7.7)
NEUTROPHILS NFR BLD AUTO: 58.2 % (ref 53–65)
NRBC # BLD AUTO: 0 X10E3/UL
NRBC, AUTO (.00): 0 %
PLATELET # BLD AUTO: 296 K/UL (ref 150–400)
POTASSIUM SERPL-SCNC: 4.1 MMOL/L (ref 3.5–5.1)
PROT SERPL-MCNC: 6.3 G/DL (ref 6.4–8.2)
RBC # BLD AUTO: 3.96 M/UL (ref 4.6–6.2)
SODIUM SERPL-SCNC: 136 MMOL/L (ref 136–145)
WBC # BLD AUTO: 6.31 K/UL (ref 4.5–11)

## 2024-06-14 PROCEDURE — 82570 ASSAY OF URINE CREATININE: CPT | Mod: ,,, | Performed by: CLINICAL MEDICAL LABORATORY

## 2024-06-14 PROCEDURE — 82043 UR ALBUMIN QUANTITATIVE: CPT | Mod: ,,, | Performed by: CLINICAL MEDICAL LABORATORY

## 2024-06-14 PROCEDURE — 80053 COMPREHEN METABOLIC PANEL: CPT | Mod: ,,, | Performed by: CLINICAL MEDICAL LABORATORY

## 2024-06-14 PROCEDURE — 83036 HEMOGLOBIN GLYCOSYLATED A1C: CPT | Mod: ,,, | Performed by: CLINICAL MEDICAL LABORATORY

## 2024-06-14 PROCEDURE — 99214 OFFICE O/P EST MOD 30 MIN: CPT | Mod: ,,, | Performed by: NURSE PRACTITIONER

## 2024-06-14 PROCEDURE — 85025 COMPLETE CBC W/AUTO DIFF WBC: CPT | Mod: ,,, | Performed by: CLINICAL MEDICAL LABORATORY

## 2024-06-14 NOTE — PROGRESS NOTES
"[facility] Medicine    Chief Complaint      Chief Complaint   Patient presents with    Hospital Follow Up     D/C: 06/10/2024- hyperglycemia/dka; pt reports he believes he has "figured out what happened" states he does not eat every day, does not experience the "hunger pains". Reports his mother bought him some meal replacement drinks out of concern for him, pt states when he was thinking back about how/why his blood sugar increased and/or started increasing was approximately the same time he started drinking those meal replacements.    Hypertension     Pt states was "a little late taking his bp med today"    Health Maintenance     Discussed care gaps with pt. Pt is agreeable to colonoscopy. Pt defers other care gaps at this time       History of Present Illness      Faustino Fischer is a 54 y.o. male. He  has a past medical history of Chronic pancreatitis, unspecified pancreatitis type, Coronary artery disease, Essential (primary) hypertension, Hidradenitis, Mixed hyperlipidemia, and Type 2 diabetes mellitus., who presents today for Hospital Follow up- was admitted  from 6/8/24- 6/10/24.  Went to the ER due to his blood sugar being 648.  He was admitted and diagnosed with HTN, DKA, and Pancreatitis.  Has a f/u with Dr. Park on 6/24/24 for the pancreatitis/pseudocyst.  Patient needs referral to Endo.  States he was seeing Dr. Anthony at [facility] for Cardiology. Has switched to Dr. Dawkins but does not see him until the end of July.  Medication list reconciled.     Past Medical History:  Past Medical History:   Diagnosis Date    Chronic pancreatitis, unspecified pancreatitis type     Coronary artery disease     Essential (primary) hypertension     Hidradenitis     Mixed hyperlipidemia     Type 2 diabetes mellitus        Past Surgical History:   has a past surgical history that includes Cardiac surgery; Undescended testicle exploration (N/A); Cholecystectomy; Incision of perianal abscess (Bilateral, 2/16/2024); and " Excision of hidradenitis (N/A, 3/27/2024).    Social History:  Social History     Tobacco Use    Smoking status: Some Days     Current packs/day: 0.25     Average packs/day: 0.3 packs/day for 20.5 years (5.1 ttl pk-yrs)     Types: Cigarettes, Cigars     Start date: 2004     Passive exposure: Never    Smokeless tobacco: Never   Substance Use Topics    Alcohol use: Not Currently     Comment: occasional drink previous heavy drinker quit heavily in 2001    Drug use: Never       I personally reviewed all past medical, surgical, and social.     Review of Systems   Constitutional:  Negative for fatigue and unexpected weight change.   HENT:  Negative for trouble swallowing.    Eyes:  Negative for visual disturbance.   Respiratory:  Negative for shortness of breath.    Cardiovascular: Negative.    Gastrointestinal:  Positive for abdominal pain and nausea. Negative for constipation, diarrhea and vomiting.   Genitourinary:  Negative for difficulty urinating.   Musculoskeletal:  Negative for gait problem.   Skin: Negative.    Neurological:  Negative for dizziness, light-headedness and headaches.        Medications:  Outpatient Encounter Medications as of 6/14/2024   Medication Sig Note Dispense Refill    aspirin 81 MG Chew Take 1 tablet (81 mg total) by mouth once daily.  90 tablet 1    atorvastatin (LIPITOR) 80 MG tablet Take 1 tablet (80 mg total) by mouth once daily.  90 tablet 0    carvediloL (COREG) 12.5 MG tablet Take 1 tablet (12.5 mg total) by mouth before breakfast.  90 tablet 1    furosemide (LASIX) 20 MG tablet Take 20 mg by mouth once daily.       isosorbide mononitrate (IMDUR) 30 MG 24 hr tablet Take 1 tablet (30 mg total) by mouth once daily. Take 1/2 tablet with breakfast  90 tablet 1    losartan (COZAAR) 100 MG tablet Take 0.5 tablets (50 mg total) by mouth once daily.  30 tablet 0    metFORMIN (GLUCOPHAGE) 1000 MG tablet Take 0.5 tablets (500 mg total) by mouth 2 (two) times daily with meals.  90 tablet 0     "NITROGLYCERIN SL Place under the tongue.       insulin glargine U-100, Lantus, (BASAGLAR KWIKPEN U-100 INSULIN) 100 unit/mL (3 mL) InPn pen Inject 15 Units into the skin every evening. (Patient not taking: Reported on 6/13/2024) 6/14/2024: Pt reports continued with previous diabetic insulin but is unable to recall the name, states will call clinic back and provide brand 15 mL 0    mupirocin (BACTROBAN) 2 % ointment Apply topically 2 (two) times daily. (Patient not taking: Reported on 6/13/2024)  22 g 0     No facility-administered encounter medications on file as of 6/14/2024.       Allergies:  Review of patient's allergies indicates:   Allergen Reactions    Ticagrelor Shortness Of Breath    Vancomycin analogues Other (See Comments)     Patient experienced adverse effect, and had some renal insufficiency after receiving Vancomycin.       Health Maintenance:  Immunization History   Administered Date(s) Administered    COVID-19 MRNA, LN-S PF (MODERNA HALF 0.25 ML DOSE) 03/17/2022    COVID-19, MRNA, LN-S, PF (Pfizer) (Purple Cap) 01/26/2021, 02/19/2021    Influenza - Quadrivalent - PF *Preferred* (6 months and older) 03/17/2022      Health Maintenance   Topic Date Due    Foot Exam  Never done    Eye Exam  Never done    TETANUS VACCINE  Never done    Colorectal Cancer Screening  Never done    Shingles Vaccine (1 of 2) Never done    Hemoglobin A1c  09/14/2024    Lipid Panel  06/08/2025    High Dose Statin  06/14/2025    Hepatitis C Screening  Completed        Physical Exam      Vital Signs  Temp: 98.4 °F (36.9 °C)  Temp Source: Oral  Pulse: 69  Resp: 18  SpO2: 98 %  BP: 133/87  BP Location: Left arm  Patient Position: Sitting  Pain Score:   2  Height and Weight  Height: 5' 6" (167.6 cm)  Weight: 96.6 kg (213 lb)  BSA (Calculated - sq m): 2.12 sq meters  BMI (Calculated): 34.4  Weight in (lb) to have BMI = 25: 154.6]    Physical Exam  Vitals and nursing note reviewed.   Constitutional:       Appearance: Normal appearance. "   HENT:      Head: Normocephalic.      Right Ear: Hearing normal.      Left Ear: Hearing normal.      Nose: Nose normal.   Eyes:      General: Lids are normal.      Extraocular Movements: Extraocular movements intact.      Conjunctiva/sclera: Conjunctivae normal.      Pupils: Pupils are equal, round, and reactive to light.   Neck:      Thyroid: No thyromegaly.   Cardiovascular:      Rate and Rhythm: Normal rate and regular rhythm.      Heart sounds: Normal heart sounds.   Pulmonary:      Effort: Pulmonary effort is normal.      Breath sounds: Normal breath sounds.   Musculoskeletal:         General: Normal range of motion.      Cervical back: Normal range of motion and neck supple.      Right lower leg: No edema.      Left lower leg: No edema.   Skin:     General: Skin is warm and dry.      Findings: No rash.   Neurological:      General: No focal deficit present.      Mental Status: He is alert and oriented to person, place, and time.      Gait: Gait is intact.          Laboratory:  CBC:  Recent Labs   Lab 06/09/24  0327 06/10/24  0338 06/14/24  1353   WBC 6.15 6.43 6.31   RBC 3.93 L 3.78 L 3.96 L   Hemoglobin 12.3 L 12.0 L 12.4 L   Hematocrit 38.2 L 37.2 L 40.2   Platelet Count 206 192 296   MCV 97.2 H 98.4 H 101.5 H   MCH 31.3 H 31.7 H 31.3 H   MCHC 32.2 32.3 30.8 L     CMP:  Recent Labs   Lab 06/06/24  1740 06/08/24  0328 06/08/24  0745 06/14/24  1353   Glucose 567  H   < > 316 H   Calcium 9.7 9.9   < > 8.8   Albumin 3.6 3.5  --  3.2 L   Total Protein 8.6 H 8.5 H  --  6.3 L   Sodium 129 L 130 L   < > 136   Potassium 4.8 4.7   < > 4.1   CO2 21 15 L   < > 25   Chloride 96 L 96 L   < > 102   BUN 21 H 11   < > 8   Alk Phos 129 H 124 H  --  86   ALT 24 19  --  19   AST 28 23  --  12 L   Bilirubin, Total 0.7 1.3 H  --  0.5    < > = values in this interval not displayed.     LIPIDS:  Recent Labs   Lab 03/17/22  0949 06/08/23  0531 06/08/24  1106   TSH  --   --  1.440   HDL Cholesterol 36 L 46 37 L   Cholesterol  165 142 253 H   Triglycerides 138 148 277 H   LDL Calculated 101 66 161   Cholesterol/HDL Ratio (Risk Factor) 4.6 3.1 6.8   Non- 96 216     TSH:  Recent Labs   Lab 06/08/24  1106   TSH 1.440     A1C:  Recent Labs   Lab 03/17/22  0949 06/08/23  0531 02/16/24  2041 06/14/24  1353   Hemoglobin A1C 6.4 6.3 10.7 H 12.1 H       Assessment/Plan     Faustino Fischer is a 54 y.o.male with:     1. Hospital discharge follow-up    2. Encounter for screening for malignant neoplasm of colon  -     Colonoscopy; Future; Expected date: 06/14/2024    3. Type 2 diabetes mellitus with hyperglycemia, without long-term current use of insulin  -     Comprehensive Metabolic Panel; Future; Expected date: 06/14/2024  -     Hemoglobin A1C; Future; Expected date: 06/14/2024  -     Microalbumin/Creatinine Ratio, Urine    4. Acute on chronic pancreatitis    5. Primary hypertension  -     Comprehensive Metabolic Panel; Future; Expected date: 06/14/2024  -     CBC Auto Differential; Future; Expected date: 06/14/2024       Keep f/u appt with Dr. Park on 6/24/24  Encouraged compliance with diabetic diet and medication      Total time spent face-to-face and non-face-to-face coordinating care for this encounter was: 30 minutes     Chronic conditions status updated as per HPI.  Other than changes above, cont current medications and maintain follow up with specialists.  Return to clinic in 1 month(s).    Deidra Welch, AMISHP  [facility]

## 2024-06-24 ENCOUNTER — OFFICE VISIT (OUTPATIENT)
Dept: SURGERY | Facility: CLINIC | Age: 54
End: 2024-06-24
Attending: SURGERY
Payer: MEDICARE

## 2024-06-24 DIAGNOSIS — K86.2 PANCREATIC CYST: Primary | ICD-10-CM

## 2024-06-24 DIAGNOSIS — Z09 POSTOP CHECK: ICD-10-CM

## 2024-06-24 PROCEDURE — 99213 OFFICE O/P EST LOW 20 MIN: CPT | Mod: PBBFAC | Performed by: SURGERY

## 2024-06-24 PROCEDURE — 99999 PR PBB SHADOW E&M-EST. PATIENT-LVL III: CPT | Mod: PBBFAC,,, | Performed by: SURGERY

## 2024-06-26 PROBLEM — K86.2 PANCREATIC CYST: Status: ACTIVE | Noted: 2024-06-26

## 2024-06-26 PROBLEM — Z09 POSTOP CHECK: Status: ACTIVE | Noted: 2024-06-26

## 2024-06-26 NOTE — ASSESSMENT & PLAN NOTE
Continue with treatments per Dr. Mo Boyer  Notify me if drainage becomes purulent and steady or  if pain worsens

## 2024-06-26 NOTE — ASSESSMENT & PLAN NOTE
Likely related to chronic pancreatitis, but will need aspiration to r/o malignancy.  Send to Dr. Reese, at [facility] for management.

## 2024-06-26 NOTE — PROGRESS NOTES
Subjective:       Patient ID: Faustino Fischer is a 54 y.o. male.    Chief Complaint: No chief complaint on file.    54-year-old male patient whom I have followed for hidradenitis has now been sent for management of a pancreas cyst.  He states that he has a history of chronic pancreatitis, and recently has had worsening of blood sugars.  In a workup, a CT was done to review his pancreatitis and it was determined that he had a cystic mass in the head of the pancreas that was a new finding since a previous CT.  Comparison CT was from earlier this year.  In regard cyst hidradenitis suppurativa, he reports that he was having occasional drainage of bloody fluid.  Continues to have this problem intermittently.  He is seeing dermatology, but the patient was unaware of being on any biologics.        family history includes Heart disease in his brother, father, and maternal grandmother; Hypertension in his father.  Past Medical History:   Diagnosis Date    Chronic pancreatitis, unspecified pancreatitis type     Coronary artery disease     Essential (primary) hypertension     Hidradenitis     Mixed hyperlipidemia     Type 2 diabetes mellitus       Past Surgical History:   Procedure Laterality Date    CARDIAC SURGERY      Stents x6    CHOLECYSTECTOMY      EXCISION OF HIDRADENITIS N/A 3/27/2024    Procedure: EXCISION, HIDRADENITIS;  Surgeon: Fidel Park MD;  Location: [facility] OR;  Service: General;  Laterality: N/A;    INCISION OF PERIANAL ABSCESS Bilateral 2/16/2024    Procedure: INCISION, ABSCESS, PERIANAL;  Surgeon: Fidel Park MD;  Location: [facility] OR;  Service: General;  Laterality: Bilateral;    UNDESCENDED TESTICLE EXPLORATION N/A        reports that he has been smoking cigarettes and cigars. He started smoking about 20 years ago. He has a 5.1 pack-year smoking history. He has never been exposed to tobacco smoke. He has never used smokeless tobacco. He reports that he does not currently use alcohol. He reports  that he does not use drugs.     Review of Systems   All other systems reviewed and are negative.         Objective:      There were no vitals taken for this visit.   Physical Exam  Cardiovascular:      Rate and Rhythm: Regular rhythm.      Heart sounds: Normal heart sounds.   Abdominal:      Palpations: Abdomen is soft.   Skin:     Comments: Inflammation within the left and right axilla, mildly tender but no drainage   Neurological:      General: No focal deficit present.      Mental Status: He is alert.   Psychiatric:         Mood and Affect: Mood normal.           Assessment/Plan:     Problem List Items Addressed This Visit          GI    Pancreatic cyst - Primary    Overview     Rapid appearance and in head of pancreas.          Current Assessment & Plan     Likely related to chronic pancreatitis, but will need aspiration to r/o malignancy.  Send to Dr. Reese, at [facility] for management.          Relevant Orders    Ambulatory referral/consult to Gastroenterology       Other    Postop check    Overview     Still with serous and bloody drainage, on occasion         Current Assessment & Plan     Continue with treatments per Dr. Mo Boyer  Notify me if drainage becomes purulent and steady or  if pain worsens

## 2024-06-28 ENCOUNTER — TELEPHONE (OUTPATIENT)
Dept: FAMILY MEDICINE | Facility: CLINIC | Age: 54
End: 2024-06-28
Payer: MEDICARE

## 2024-06-28 NOTE — TELEPHONE ENCOUNTER
----- Message from DOC Vasquez sent at 6/27/2024  5:23 PM CDT -----  Chronic anemia is stable; A1c is 12.1% he needs an OV to discuss his diabetes and his microalbumin.

## 2024-06-28 NOTE — TELEPHONE ENCOUNTER
Pt notified that A1c is 12.1 and that Deidra advised ov to discuss diabetes and microalbumin and he agreed, notified him that his anemia is chronic and stable and he voiced understanding.

## 2024-07-02 ENCOUNTER — OFFICE VISIT (OUTPATIENT)
Dept: FAMILY MEDICINE | Facility: CLINIC | Age: 54
End: 2024-07-02
Payer: MEDICARE

## 2024-07-02 VITALS
SYSTOLIC BLOOD PRESSURE: 138 MMHG | HEIGHT: 66 IN | OXYGEN SATURATION: 97 % | BODY MASS INDEX: 34.07 KG/M2 | TEMPERATURE: 98 F | WEIGHT: 212 LBS | HEART RATE: 74 BPM | DIASTOLIC BLOOD PRESSURE: 85 MMHG

## 2024-07-02 DIAGNOSIS — Z79.4 TYPE 2 DIABETES MELLITUS WITH HYPERGLYCEMIA, WITH LONG-TERM CURRENT USE OF INSULIN: Primary | ICD-10-CM

## 2024-07-02 DIAGNOSIS — K86.0 ALCOHOL-INDUCED CHRONIC PANCREATITIS: ICD-10-CM

## 2024-07-02 DIAGNOSIS — E11.65 TYPE 2 DIABETES MELLITUS WITH HYPERGLYCEMIA, WITH LONG-TERM CURRENT USE OF INSULIN: Primary | ICD-10-CM

## 2024-07-02 PROCEDURE — 99214 OFFICE O/P EST MOD 30 MIN: CPT | Mod: ,,, | Performed by: NURSE PRACTITIONER

## 2024-07-02 NOTE — PROGRESS NOTES
[facility] Medicine    Chief Complaint      Chief Complaint   Patient presents with    consulation       History of Present Illness      Faustino Fischer is a 54 y.o. male. He  has a past medical history of Chronic pancreatitis, unspecified pancreatitis type, Coronary artery disease, Essential (primary) hypertension, Hidradenitis, Mixed hyperlipidemia, and Type 2 diabetes mellitus., who presents today for review of his recent lab work- A1c up to 12.1. Patient states he is having a biopsy next week on his pancreas because they believe he may have cancer- states if it is cancerous they are going to refer him to [facility].  Believe this may be the cause of his uncontrolled diabetes.  He states he has been strict with his diet.     Past Medical History:  Past Medical History:   Diagnosis Date    Chronic pancreatitis, unspecified pancreatitis type     Coronary artery disease     Essential (primary) hypertension     Hidradenitis     Mixed hyperlipidemia     Type 2 diabetes mellitus        Past Surgical History:   has a past surgical history that includes Cardiac surgery; Undescended testicle exploration (N/A); Cholecystectomy; Incision of perianal abscess (Bilateral, 2/16/2024); and Excision of hidradenitis (N/A, 3/27/2024).    Social History:  Social History     Tobacco Use    Smoking status: Some Days     Current packs/day: 0.25     Average packs/day: 0.3 packs/day for 20.5 years (5.1 ttl pk-yrs)     Types: Cigarettes, Cigars     Start date: 2004     Passive exposure: Never    Smokeless tobacco: Never   Substance Use Topics    Alcohol use: Not Currently     Comment: occasional drink previous heavy drinker quit heavily in 2001    Drug use: Never       I personally reviewed all past medical, surgical, and social.     Review of Systems   Constitutional:  Positive for fatigue. Negative for chills, fever and unexpected weight change.   Eyes:  Negative for visual disturbance.   Respiratory:  Negative for cough and shortness of  breath.    Cardiovascular: Negative.    Gastrointestinal:  Positive for nausea. Negative for abdominal pain, diarrhea and vomiting.   Endocrine: Negative for polydipsia, polyphagia and polyuria.   Musculoskeletal:  Negative for gait problem and joint swelling.   Skin: Negative.         Medications:  Outpatient Encounter Medications as of 7/2/2024   Medication Sig Note Dispense Refill    aspirin 81 MG Chew Take 1 tablet (81 mg total) by mouth once daily.  90 tablet 1    atorvastatin (LIPITOR) 80 MG tablet Take 1 tablet (80 mg total) by mouth once daily.  90 tablet 0    carvediloL (COREG) 12.5 MG tablet Take 1 tablet (12.5 mg total) by mouth before breakfast.  90 tablet 1    furosemide (LASIX) 20 MG tablet Take 20 mg by mouth once daily.       insulin glargine U-100, Lantus, (BASAGLAR KWIKPEN U-100 INSULIN) 100 unit/mL (3 mL) InPn pen Inject 15 Units into the skin every evening. 6/14/2024: Pt reports continued with previous diabetic insulin but is unable to recall the name, states will call clinic back and provide brand 15 mL 0    isosorbide mononitrate (IMDUR) 30 MG 24 hr tablet Take 1 tablet (30 mg total) by mouth once daily. Take 1/2 tablet with breakfast  90 tablet 1    losartan (COZAAR) 100 MG tablet Take 0.5 tablets (50 mg total) by mouth once daily.  30 tablet 0    metFORMIN (GLUCOPHAGE) 1000 MG tablet Take 0.5 tablets (500 mg total) by mouth 2 (two) times daily with meals.  90 tablet 0    mupirocin (BACTROBAN) 2 % ointment Apply topically 2 (two) times daily.  22 g 0    NITROGLYCERIN SL Place under the tongue.        No facility-administered encounter medications on file as of 7/2/2024.       Allergies:  Review of patient's allergies indicates:   Allergen Reactions    Ticagrelor Shortness Of Breath    Vancomycin analogues Other (See Comments)     Patient experienced adverse effect, and had some renal insufficiency after receiving Vancomycin.       Health Maintenance:  Immunization History   Administered  "Date(s) Administered    COVID-19 MRNA, LN-S PF (MODERNA HALF 0.25 ML DOSE) 03/17/2022    COVID-19, MRNA, LN-S, PF (Pfizer) (Purple Cap) 01/26/2021, 02/19/2021    Influenza - Quadrivalent - PF *Preferred* (6 months and older) 03/17/2022      Health Maintenance   Topic Date Due    Foot Exam  Never done    Eye Exam  Never done    TETANUS VACCINE  Never done    Colorectal Cancer Screening  Never done    Shingles Vaccine (1 of 2) Never done    Hemoglobin A1c  09/14/2024    Lipid Panel  06/08/2025    High Dose Statin  07/02/2025    Hepatitis C Screening  Completed        Physical Exam      Vital Signs  Temp: 97.8 °F (36.6 °C)  Temp Source: Oral  Pulse: 74  SpO2: 97 %  BP: 138/85  BP Location: Left arm  Patient Position: Sitting  Height and Weight  Height: 5' 6" (167.6 cm)  Weight: 96.2 kg (212 lb)  BSA (Calculated - sq m): 2.12 sq meters  BMI (Calculated): 34.2  Weight in (lb) to have BMI = 25: 154.6]    Physical Exam  Vitals and nursing note reviewed.   Constitutional:       Appearance: Normal appearance. He is obese.   HENT:      Head: Normocephalic.      Right Ear: Hearing normal.      Left Ear: Hearing normal.      Nose: Nose normal.   Eyes:      General: Lids are normal.      Comments: Sclera mildly yellow   Neck:      Thyroid: No thyromegaly.   Cardiovascular:      Rate and Rhythm: Normal rate and regular rhythm.      Heart sounds: Normal heart sounds.   Pulmonary:      Effort: Pulmonary effort is normal.      Breath sounds: Normal breath sounds.   Musculoskeletal:         General: Normal range of motion.      Cervical back: Normal range of motion and neck supple.      Right lower leg: No edema.      Left lower leg: No edema.   Skin:     General: Skin is warm and dry.      Findings: No rash.   Neurological:      General: No focal deficit present.      Mental Status: He is alert and oriented to person, place, and time.      Gait: Gait is intact.          Laboratory:  CBC:  Recent Labs   Lab 06/09/24  0327 " 06/10/24  0338 06/14/24  1353   WBC 6.15 6.43 6.31   RBC 3.93 L 3.78 L 3.96 L   Hemoglobin 12.3 L 12.0 L 12.4 L   Hematocrit 38.2 L 37.2 L 40.2   Platelet Count 206 192 296   MCV 97.2 H 98.4 H 101.5 H   MCH 31.3 H 31.7 H 31.3 H   MCHC 32.2 32.3 30.8 L     CMP:  Recent Labs   Lab 06/06/24  1740 06/08/24  0328 06/08/24  0745 06/14/24  1353   Glucose 567  H   < > 316 H   Calcium 9.7 9.9   < > 8.8   Albumin 3.6 3.5  --  3.2 L   Total Protein 8.6 H 8.5 H  --  6.3 L   Sodium 129 L 130 L   < > 136   Potassium 4.8 4.7   < > 4.1   CO2 21 15 L   < > 25   Chloride 96 L 96 L   < > 102   BUN 21 H 11   < > 8   Alk Phos 129 H 124 H  --  86   ALT 24 19  --  19   AST 28 23  --  12 L   Bilirubin, Total 0.7 1.3 H  --  0.5    < > = values in this interval not displayed.     LIPIDS:  Recent Labs   Lab 03/17/22  0949 06/08/23  0531 06/08/24  1106   TSH  --   --  1.440   HDL Cholesterol 36 L 46 37 L   Cholesterol 165 142 253 H   Triglycerides 138 148 277 H   LDL Calculated 101 66 161   Cholesterol/HDL Ratio (Risk Factor) 4.6 3.1 6.8   Non- 96 216     TSH:  Recent Labs   Lab 06/08/24  1106   TSH 1.440     A1C:  Recent Labs   Lab 03/17/22  0949 06/08/23  0531 02/16/24  2041 06/14/24  1353   Hemoglobin A1C 6.4 6.3 10.7 H 12.1 H       Assessment/Plan     Faustino Fischer is a 54 y.o.male with:     1. Type 2 diabetes mellitus with hyperglycemia, with long-term current use of insulin    2. Alcohol-induced chronic pancreatitis       Reviewed patient's recent lab work- A1c and microalbumin- A1c is 12.1%- patient states he has also only been taking the 10 units of his insulin at night instead of the 15 units and he also did not  the Lantus due to cost so he has been taking his previous prescription.  Advised patient he needs to at least be taking 15 units nightly of his Basaglar   Will awaiting his biopsy results and plan    Total time spent face-to-face and non-face-to-face coordinating care for this encounter was: 30 min      Chronic conditions status updated as per HPI.  Other than changes above, cont current medications and maintain follow up with specialists.  Return to clinic in 3 month(s).    Deidra Welch, AMISHP  [facility]

## 2024-07-09 DIAGNOSIS — Z71.89 COMPLEX CARE COORDINATION: ICD-10-CM

## 2024-07-29 ENCOUNTER — OFFICE VISIT (OUTPATIENT)
Dept: CARDIOLOGY | Facility: CLINIC | Age: 54
End: 2024-07-29
Payer: MEDICARE

## 2024-07-29 VITALS
DIASTOLIC BLOOD PRESSURE: 78 MMHG | OXYGEN SATURATION: 98 % | HEIGHT: 66 IN | BODY MASS INDEX: 33.49 KG/M2 | HEART RATE: 71 BPM | WEIGHT: 208.38 LBS | SYSTOLIC BLOOD PRESSURE: 126 MMHG

## 2024-07-29 DIAGNOSIS — E11.65 TYPE 2 DIABETES MELLITUS WITH HYPERGLYCEMIA, WITHOUT LONG-TERM CURRENT USE OF INSULIN: Chronic | ICD-10-CM

## 2024-07-29 DIAGNOSIS — I25.10 CORONARY ARTERY DISEASE, UNSPECIFIED VESSEL OR LESION TYPE, UNSPECIFIED WHETHER ANGINA PRESENT, UNSPECIFIED WHETHER NATIVE OR TRANSPLANTED HEART: Chronic | ICD-10-CM

## 2024-07-29 DIAGNOSIS — I11.9 HYPERTENSIVE HEART DISEASE WITHOUT HEART FAILURE: Primary | Chronic | ICD-10-CM

## 2024-07-29 DIAGNOSIS — K86.1 CHRONIC PANCREATITIS, UNSPECIFIED PANCREATITIS TYPE: Chronic | ICD-10-CM

## 2024-07-29 DIAGNOSIS — I10 HYPERTENSION, ESSENTIAL: Chronic | ICD-10-CM

## 2024-07-29 DIAGNOSIS — K86.1 CHRONIC PANCREATITIS, UNSPECIFIED PANCREATITIS TYPE: ICD-10-CM

## 2024-07-29 DIAGNOSIS — I25.10 CORONARY ARTERY DISEASE, UNSPECIFIED VESSEL OR LESION TYPE, UNSPECIFIED WHETHER ANGINA PRESENT, UNSPECIFIED WHETHER NATIVE OR TRANSPLANTED HEART: Primary | ICD-10-CM

## 2024-07-29 PROCEDURE — 93005 ELECTROCARDIOGRAM TRACING: CPT | Mod: PBBFAC | Performed by: INTERNAL MEDICINE

## 2024-07-29 PROCEDURE — 99214 OFFICE O/P EST MOD 30 MIN: CPT | Mod: PBBFAC,25 | Performed by: INTERNAL MEDICINE

## 2024-07-29 PROCEDURE — 99203 OFFICE O/P NEW LOW 30 MIN: CPT | Mod: S$PBB,,, | Performed by: INTERNAL MEDICINE

## 2024-07-29 PROCEDURE — 93010 ELECTROCARDIOGRAM REPORT: CPT | Mod: S$PBB,,, | Performed by: INTERNAL MEDICINE

## 2024-07-29 PROCEDURE — 99999 PR PBB SHADOW E&M-EST. PATIENT-LVL IV: CPT | Mod: PBBFAC,,, | Performed by: INTERNAL MEDICINE

## 2024-07-29 RX ORDER — FENOFIBRATE 145 MG/1
145 TABLET, FILM COATED ORAL DAILY
COMMUNITY

## 2024-07-30 LAB
OHS QRS DURATION: 84 MS
OHS QTC CALCULATION: 434 MS

## 2024-08-08 ENCOUNTER — HOSPITAL ENCOUNTER (EMERGENCY)
Facility: HOSPITAL | Age: 54
Discharge: HOME | End: 2024-08-08
Payer: MEDICARE

## 2024-08-08 VITALS
RESPIRATION RATE: 16 BRPM | WEIGHT: 205 LBS | BODY MASS INDEX: 32.95 KG/M2 | DIASTOLIC BLOOD PRESSURE: 74 MMHG | HEIGHT: 66 IN | HEART RATE: 84 BPM | OXYGEN SATURATION: 96 % | SYSTOLIC BLOOD PRESSURE: 105 MMHG | TEMPERATURE: 98 F

## 2024-08-08 DIAGNOSIS — E11.10 DIABETIC KETOACIDOSIS WITHOUT COMA ASSOCIATED WITH TYPE 2 DIABETES MELLITUS: ICD-10-CM

## 2024-08-08 DIAGNOSIS — K85.90 ACUTE PANCREATITIS WITHOUT INFECTION OR NECROSIS, UNSPECIFIED PANCREATITIS TYPE: Primary | ICD-10-CM

## 2024-08-08 LAB
ALBUMIN SERPL BCP-MCNC: 4.1 G/DL (ref 3.5–5)
ALBUMIN/GLOB SERPL: 0.9 {RATIO}
ALP SERPL-CCNC: 148 U/L (ref 45–115)
ALT SERPL W P-5'-P-CCNC: 20 U/L (ref 16–61)
ANION GAP SERPL CALCULATED.3IONS-SCNC: 19 MMOL/L (ref 7–16)
ANION GAP SERPL CALCULATED.3IONS-SCNC: 23 MMOL/L (ref 7–16)
AST SERPL W P-5'-P-CCNC: 14 U/L (ref 15–37)
BASOPHILS # BLD AUTO: 0.07 K/UL (ref 0–0.2)
BASOPHILS NFR BLD AUTO: 0.7 % (ref 0–1)
BILIRUB SERPL-MCNC: 2.4 MG/DL (ref ?–1.2)
BUN SERPL-MCNC: 15 MG/DL (ref 7–18)
BUN SERPL-MCNC: 19 MG/DL (ref 7–18)
BUN/CREAT SERPL: 12 (ref 6–20)
BUN/CREAT SERPL: 15 (ref 6–20)
CALCIUM SERPL-MCNC: 9.4 MG/DL (ref 8.5–10.1)
CALCIUM SERPL-MCNC: 9.8 MG/DL (ref 8.5–10.1)
CHLORIDE SERPL-SCNC: 90 MMOL/L (ref 98–107)
CHLORIDE SERPL-SCNC: 95 MMOL/L (ref 98–107)
CO2 SERPL-SCNC: 19 MMOL/L (ref 21–32)
CO2 SERPL-SCNC: 24 MMOL/L (ref 21–32)
CREAT SERPL-MCNC: 1.28 MG/DL (ref 0.7–1.3)
CREAT SERPL-MCNC: 1.3 MG/DL (ref 0.7–1.3)
DIFFERENTIAL METHOD BLD: ABNORMAL
EGFR (NO RACE VARIABLE) (RUSH/TITUS): 65 ML/MIN/1.73M2
EGFR (NO RACE VARIABLE) (RUSH/TITUS): 66 ML/MIN/1.73M2
EOSINOPHIL # BLD AUTO: 0.04 K/UL (ref 0–0.5)
EOSINOPHIL NFR BLD AUTO: 0.4 % (ref 1–4)
ERYTHROCYTE [DISTWIDTH] IN BLOOD BY AUTOMATED COUNT: 13.2 % (ref 11.5–14.5)
GLOBULIN SER-MCNC: 4.4 G/DL (ref 2–4)
GLUCOSE SERPL-MCNC: 213 MG/DL (ref 70–105)
GLUCOSE SERPL-MCNC: 215 MG/DL (ref 74–106)
GLUCOSE SERPL-MCNC: 223 MG/DL (ref 70–105)
GLUCOSE SERPL-MCNC: 238 MG/DL (ref 70–105)
GLUCOSE SERPL-MCNC: 407 MG/DL (ref 74–106)
HCT VFR BLD AUTO: 48.5 % (ref 40–54)
HGB BLD-MCNC: 15.8 G/DL (ref 13.5–18)
IMM GRANULOCYTES # BLD AUTO: 0.06 K/UL (ref 0–0.04)
IMM GRANULOCYTES NFR BLD: 0.6 % (ref 0–0.4)
LIPASE SERPL-CCNC: 148 U/L (ref 16–77)
LYMPHOCYTES # BLD AUTO: 1.39 K/UL (ref 1–4.8)
LYMPHOCYTES NFR BLD AUTO: 13.5 % (ref 27–41)
MCH RBC QN AUTO: 30.9 PG (ref 27–31)
MCHC RBC AUTO-ENTMCNC: 32.6 G/DL (ref 32–36)
MCV RBC AUTO: 94.9 FL (ref 80–96)
MONOCYTES # BLD AUTO: 0.46 K/UL (ref 0–0.8)
MONOCYTES NFR BLD AUTO: 4.5 % (ref 2–6)
MPC BLD CALC-MCNC: 10.3 FL (ref 9.4–12.4)
NEUTROPHILS # BLD AUTO: 8.29 K/UL (ref 1.8–7.7)
NEUTROPHILS NFR BLD AUTO: 80.3 % (ref 53–65)
NRBC # BLD AUTO: 0 X10E3/UL
NRBC, AUTO (.00): 0 %
PLATELET # BLD AUTO: 310 K/UL (ref 150–400)
POTASSIUM SERPL-SCNC: 4.2 MMOL/L (ref 3.5–5.1)
POTASSIUM SERPL-SCNC: 4.4 MMOL/L (ref 3.5–5.1)
PROT SERPL-MCNC: 8.5 G/DL (ref 6.4–8.2)
RBC # BLD AUTO: 5.11 M/UL (ref 4.6–6.2)
SODIUM SERPL-SCNC: 128 MMOL/L (ref 136–145)
SODIUM SERPL-SCNC: 134 MMOL/L (ref 136–145)
WBC # BLD AUTO: 10.31 K/UL (ref 4.5–11)

## 2024-08-08 PROCEDURE — 25500020 PHARM REV CODE 255: Performed by: NURSE PRACTITIONER

## 2024-08-08 PROCEDURE — 25000003 PHARM REV CODE 250: Performed by: NURSE PRACTITIONER

## 2024-08-08 PROCEDURE — 82962 GLUCOSE BLOOD TEST: CPT

## 2024-08-08 PROCEDURE — 80053 COMPREHEN METABOLIC PANEL: CPT | Performed by: NURSE PRACTITIONER

## 2024-08-08 PROCEDURE — 96375 TX/PRO/DX INJ NEW DRUG ADDON: CPT

## 2024-08-08 PROCEDURE — 96376 TX/PRO/DX INJ SAME DRUG ADON: CPT

## 2024-08-08 PROCEDURE — 96374 THER/PROPH/DIAG INJ IV PUSH: CPT

## 2024-08-08 PROCEDURE — 63600175 PHARM REV CODE 636 W HCPCS: Performed by: NURSE PRACTITIONER

## 2024-08-08 PROCEDURE — 83690 ASSAY OF LIPASE: CPT | Performed by: NURSE PRACTITIONER

## 2024-08-08 PROCEDURE — 99285 EMERGENCY DEPT VISIT HI MDM: CPT | Mod: 25

## 2024-08-08 PROCEDURE — 96361 HYDRATE IV INFUSION ADD-ON: CPT

## 2024-08-08 PROCEDURE — 96372 THER/PROPH/DIAG INJ SC/IM: CPT | Performed by: NURSE PRACTITIONER

## 2024-08-08 PROCEDURE — 85025 COMPLETE CBC W/AUTO DIFF WBC: CPT | Performed by: NURSE PRACTITIONER

## 2024-08-08 PROCEDURE — 36415 COLL VENOUS BLD VENIPUNCTURE: CPT | Performed by: NURSE PRACTITIONER

## 2024-08-08 RX ORDER — PROMETHAZINE HYDROCHLORIDE 25 MG/1
25 TABLET ORAL EVERY 6 HOURS PRN
Qty: 15 TABLET | Refills: 0 | Status: SHIPPED | OUTPATIENT
Start: 2024-08-08 | End: 2024-08-08

## 2024-08-08 RX ORDER — ONDANSETRON HYDROCHLORIDE 2 MG/ML
4 INJECTION, SOLUTION INTRAVENOUS
Status: COMPLETED | OUTPATIENT
Start: 2024-08-08 | End: 2024-08-08

## 2024-08-08 RX ORDER — HYDROCODONE BITARTRATE AND ACETAMINOPHEN 7.5; 325 MG/1; MG/1
1 TABLET ORAL EVERY 6 HOURS PRN
Qty: 12 TABLET | Refills: 0 | Status: SHIPPED | OUTPATIENT
Start: 2024-08-08 | End: 2024-08-08

## 2024-08-08 RX ORDER — HYDROMORPHONE HYDROCHLORIDE 2 MG/ML
1 INJECTION, SOLUTION INTRAMUSCULAR; INTRAVENOUS; SUBCUTANEOUS
Status: COMPLETED | OUTPATIENT
Start: 2024-08-08 | End: 2024-08-08

## 2024-08-08 RX ORDER — PROMETHAZINE HYDROCHLORIDE 25 MG/1
25 SUPPOSITORY RECTAL EVERY 6 HOURS PRN
Qty: 10 SUPPOSITORY | Refills: 0 | Status: SHIPPED | OUTPATIENT
Start: 2024-08-08 | End: 2024-08-08

## 2024-08-08 RX ORDER — ONDANSETRON 4 MG/1
4 TABLET, ORALLY DISINTEGRATING ORAL EVERY 8 HOURS PRN
Qty: 15 TABLET | Refills: 0 | Status: SHIPPED | OUTPATIENT
Start: 2024-08-08 | End: 2024-08-08

## 2024-08-08 RX ORDER — IOPAMIDOL 755 MG/ML
100 INJECTION, SOLUTION INTRAVASCULAR
Status: COMPLETED | OUTPATIENT
Start: 2024-08-08 | End: 2024-08-08

## 2024-08-08 RX ORDER — PROMETHAZINE HYDROCHLORIDE 25 MG/1
25 SUPPOSITORY RECTAL EVERY 6 HOURS PRN
Qty: 10 SUPPOSITORY | Refills: 0 | Status: SHIPPED | OUTPATIENT
Start: 2024-08-08

## 2024-08-08 RX ORDER — HYDROCODONE BITARTRATE AND ACETAMINOPHEN 7.5; 325 MG/1; MG/1
1 TABLET ORAL EVERY 6 HOURS PRN
Qty: 12 TABLET | Refills: 0 | Status: SHIPPED | OUTPATIENT
Start: 2024-08-08

## 2024-08-08 RX ORDER — PROMETHAZINE HYDROCHLORIDE 25 MG/1
25 TABLET ORAL EVERY 6 HOURS PRN
Qty: 15 TABLET | Refills: 0 | Status: SHIPPED | OUTPATIENT
Start: 2024-08-08

## 2024-08-08 RX ORDER — ONDANSETRON 4 MG/1
4 TABLET, ORALLY DISINTEGRATING ORAL EVERY 8 HOURS PRN
Qty: 15 TABLET | Refills: 0 | Status: SHIPPED | OUTPATIENT
Start: 2024-08-08

## 2024-08-08 RX ADMIN — IOPAMIDOL 100 ML: 755 INJECTION, SOLUTION INTRAVENOUS at 11:08

## 2024-08-08 RX ADMIN — HUMAN INSULIN 4 UNITS: 100 INJECTION, SOLUTION SUBCUTANEOUS at 12:08

## 2024-08-08 RX ADMIN — HYDROMORPHONE HYDROCHLORIDE 1 MG: 2 INJECTION INTRAMUSCULAR; INTRAVENOUS; SUBCUTANEOUS at 05:08

## 2024-08-08 RX ADMIN — ONDANSETRON 4 MG: 2 INJECTION INTRAMUSCULAR; INTRAVENOUS at 09:08

## 2024-08-08 RX ADMIN — ONDANSETRON 4 MG: 2 INJECTION INTRAMUSCULAR; INTRAVENOUS at 05:08

## 2024-08-08 RX ADMIN — SODIUM CHLORIDE 1000 ML: 9 INJECTION, SOLUTION INTRAVENOUS at 10:08

## 2024-08-08 RX ADMIN — SODIUM CHLORIDE 1000 ML: 9 INJECTION, SOLUTION INTRAVENOUS at 12:08

## 2024-08-08 RX ADMIN — HYDROMORPHONE HYDROCHLORIDE 1 MG: 2 INJECTION INTRAMUSCULAR; INTRAVENOUS; SUBCUTANEOUS at 11:08

## 2024-08-08 RX ADMIN — INSULIN HUMAN 9 UNITS: 100 INJECTION, SOLUTION PARENTERAL at 10:08

## 2024-08-08 NOTE — ED TRIAGE NOTES
Patient arrives to ED with complaint of abdominal pain, nausea, and fatigue x 2-3 days. Patient states that the pain is generalized and seems to start in the bottom and work its way up. Patient has a hx of pancreatitis but states that the pain does not feel the same.

## 2024-08-08 NOTE — PHARMACY MED REC
"Admission Medication History     The home medication history was taken by Jessica Morgan.    You may go to "Admission" then "Reconcile Home Medications" tabs to review and/or act upon these items.     The home medication list has been updated by the Pharmacy department.   Please read ALL comments highlighted in yellow.   Please address this information as you see fit.    Feel free to contact us if you have any questions or require assistance.    The patient requested refills on home medications and that they be sent to [facility] ([place])         Medications listed below were obtained from: Patient/family, Analytic software- [employer], and Medical records  (Not in a hospital admission)        Current Outpatient Medications on File Prior to Encounter   Medication Sig Dispense Refill Last Dose    aspirin 81 MG Chew Take 1 tablet (81 mg total) by mouth once daily. 90 tablet 1 8/7/2024    atorvastatin (LIPITOR) 80 MG tablet Take 1 tablet (80 mg total) by mouth once daily. 90 tablet 0 8/7/2024    carvediloL (COREG) 12.5 MG tablet Take 1 tablet (12.5 mg total) by mouth before breakfast. 90 tablet 1 8/7/2024    fenofibrate (TRICOR) 145 MG tablet Take 145 mg by mouth once daily.   8/7/2024    furosemide (LASIX) 20 MG tablet Take 20 mg by mouth daily as needed.   Past Week    insulin glargine U-100, Lantus, (BASAGLAR KWIKPEN U-100 INSULIN) 100 unit/mL (3 mL) InPn pen Inject 15 Units into the skin every evening. 15 mL 0 8/7/2024    isosorbide mononitrate (IMDUR) 30 MG 24 hr tablet Take 1 tablet (30 mg total) by mouth once daily. Take 1/2 tablet with breakfast 90 tablet 1 8/7/2024    losartan (COZAAR) 100 MG tablet Take 0.5 tablets (50 mg total) by mouth once daily. 30 tablet 0 8/7/2024    metFORMIN (GLUCOPHAGE) 1000 MG tablet Take 0.5 tablets (500 mg total) by mouth 2 (two) times daily with meals. 90 tablet 0 8/7/2024    NITROGLYCERIN SL Place under the tongue.   Past Month    [DISCONTINUED] " mupirocin (BACTROBAN) 2 % ointment Apply topically 2 (two) times daily. (Patient not taking: Reported on 7/29/2024) 22 g 0        Potential issues to be addressed PRIOR TO DISCHARGE  Patient requested refills for the following medications: (ALL - to be sent to [facility])    Jessica Morgan  EXT [id number]                 .

## 2024-08-08 NOTE — ED PROVIDER NOTES
Encounter Date: 8/8/2024       History     Chief Complaint   Patient presents with    Abdominal Pain    Nausea    Fatigue     54-year-old male presents to the emergency department to be evaluated for abdominal pain.  He began having abdominal pain 2 days ago.  He has also had some nausea and vomiting.  He has a history of pancreatitis.  Denies any recent alcohol intake.  Denies any constipation or diarrhea.    The history is provided by the patient.   Abdominal Pain  The other symptoms of the illness include nausea and vomiting. The other symptoms of the illness do not include fever, fatigue, jaundice, melena, diarrhea, dysuria, hematemesis or hematochezia.     Review of patient's allergies indicates:   Allergen Reactions    Ticagrelor Shortness Of Breath    Vancomycin analogues Other (See Comments)     Patient experienced adverse effect, and had some renal insufficiency after receiving Vancomycin.     Past Medical History:   Diagnosis Date    Chronic pancreatitis, unspecified pancreatitis type     Coronary artery disease     Essential (primary) hypertension     Hidradenitis     Mixed hyperlipidemia     Type 2 diabetes mellitus      Past Surgical History:   Procedure Laterality Date    CARDIAC SURGERY      Stents x6    CHOLECYSTECTOMY      EXCISION OF HIDRADENITIS N/A 3/27/2024    Procedure: EXCISION, HIDRADENITIS;  Surgeon: Fidel Park MD;  Location: [facility] OR;  Service: General;  Laterality: N/A;    INCISION OF PERIANAL ABSCESS Bilateral 2/16/2024    Procedure: INCISION, ABSCESS, PERIANAL;  Surgeon: Fidel Park MD;  Location: [facility] OR;  Service: General;  Laterality: Bilateral;    UNDESCENDED TESTICLE EXPLORATION N/A      Family History   Problem Relation Name Age of Onset    Heart disease Father      Hypertension Father      Heart disease Brother      Heart disease Maternal Grandmother       Social History     Tobacco Use    Smoking status: Some Days     Current packs/day: 0.25     Average packs/day:  0.3 packs/day for 20.6 years (5.2 ttl pk-yrs)     Types: Cigarettes, Cigars     Start date: 2004     Passive exposure: Never    Smokeless tobacco: Never   Substance Use Topics    Alcohol use: Not Currently     Comment: occasional drink previous heavy drinker quit heavily in 2001    Drug use: Never     Review of Systems   Constitutional:  Negative for fatigue and fever.   Gastrointestinal:  Positive for nausea and vomiting. Negative for diarrhea, hematemesis, hematochezia, jaundice and melena.   Genitourinary:  Negative for dysuria.   All other systems reviewed and are negative.      Physical Exam     Initial Vitals [08/08/24 0906]   BP Pulse Resp Temp SpO2   (!) 164/100 94 20 97.6 °F (36.4 °C) 99 %      MAP       --         Physical Exam    Vitals reviewed.  Constitutional: He appears well-developed and well-nourished.   Neck: Neck supple.   Cardiovascular:  Normal rate and regular rhythm.           Pulmonary/Chest: Breath sounds normal.   Abdominal: Abdomen is soft. Bowel sounds are normal. He exhibits no distension and no mass. There is abdominal tenderness (Moderate diffuse tenderness).   Musculoskeletal:         General: Normal range of motion.      Cervical back: Neck supple.     Neurological: He is alert and oriented to person, place, and time. He has normal strength. GCS score is 15. GCS eye subscore is 4. GCS verbal subscore is 5. GCS motor subscore is 6.   Skin: Skin is warm and dry. Capillary refill takes less than 2 seconds.   Psychiatric: He has a normal mood and affect.         Medical Screening Exam   See Full Note    ED Course   Procedures  Labs Reviewed   COMPREHENSIVE METABOLIC PANEL - Abnormal       Result Value    Sodium 128 (*)     Potassium 4.2      Chloride 90 (*)     CO2 19 (*)     Anion Gap 23 (*)     Glucose 407 (*)     BUN 15      Creatinine 1.30      BUN/Creatinine Ratio 12      Calcium 9.8      Total Protein 8.5 (*)     Albumin 4.1      Globulin 4.4 (*)     A/G Ratio 0.9      Bilirubin,  Total 2.4 (*)     Alk Phos 148 (*)     ALT 20      AST 14 (*)     eGFR 65     LIPASE - Abnormal    Lipase 148 (*)    CBC WITH DIFFERENTIAL - Abnormal    WBC 10.31      RBC 5.11      Hemoglobin 15.8      Hematocrit 48.5      MCV 94.9      MCH 30.9      MCHC 32.6      RDW 13.2      Platelet Count 310      MPV 10.3      Neutrophils % 80.3 (*)     Lymphocytes % 13.5 (*)     Monocytes % 4.5      Eosinophils % 0.4 (*)     Basophils % 0.7      Immature Granulocytes % 0.6 (*)     nRBC, Auto 0.0      Neutrophils, Abs 8.29 (*)     Lymphocytes, Absolute 1.39      Monocytes, Absolute 0.46      Eosinophils, Absolute 0.04      Basophils, Absolute 0.07      Immature Granulocytes, Absolute 0.06 (*)     nRBC, Absolute 0.00      Diff Type Auto     BASIC METABOLIC PANEL - Abnormal    Sodium 134 (*)     Potassium 4.4      Chloride 95 (*)     CO2 24      Anion Gap 19 (*)     Glucose 215 (*)     BUN 19 (*)     Creatinine 1.28      BUN/Creatinine Ratio 15      Calcium 9.4      eGFR 66     POCT GLUCOSE MONITORING CONTINUOUS - Abnormal    POC Glucose 238 (*)    POCT GLUCOSE MONITORING CONTINUOUS - Abnormal    POC Glucose 223 (*)    POCT GLUCOSE MONITORING CONTINUOUS - Abnormal    POC Glucose 213 (*)    CBC W/ AUTO DIFFERENTIAL    Narrative:     The following orders were created for panel order CBC auto differential.  Procedure                               Abnormality         Status                     ---------                               -----------         ------                     CBC with Differential[[id number]]       Abnormal            Final result                 Please view results for these tests on the individual orders.   POCT GLUCOSE MONITORING CONTINUOUS          Imaging Results              CT Abdomen Pelvis With IV Contrast NO Oral Contrast (Final result)  Result time 08/08/24 11:51:15      Final result by Sly Garcia MD (08/08/24 11:51:15)                   Impression:      Inflammatory changes surrounding the  proximal pancreas as detailed suggestive of acute interstitial edematous pancreatitis.  The previously seen pseudo cyst is less well distinct on this exam.  No new fluid collection.      Electronically signed by: Sly Garcia  Date:    08/08/2024  Time:    11:51               Narrative:    EXAMINATION:  CT ABDOMEN PELVIS WITH IV CONTRAST    CLINICAL HISTORY:  Abdominal pain, acute, nonlocalized;    TECHNIQUE:  Low dose axial images, sagittal and coronal reformations were obtained from the lung bases to the pubic symphysis following the IV administration of 100 mL of Isovue 370 .  Oral contrast was not given.    The CT examination was performed using one or more of the following dose reduction techniques: Automated exposure control, adjustment of the mA and kV according to patient's size, use of acute or iterative reconstruction techniques.    COMPARISON:  Most recent CT 06/08/2024    FINDINGS:  The lung bases are clear.    Fatty infiltration of the liver.  No focal hepatic lesion.  Gallbladder is absent.  The adrenals appear normal.  Simple cyst superior pole right kidney is again seen.  No solid renal lesion or hydronephrosis.  Spleen unremarkable.    Inflammatory changes are seen that involve the head and neck of the pancreas as well as the uncinate process.  The previously seen cystic region is not well depicted possibly indicative of a resolved/resolving pseudocysts.  There is no new organized fluid collection.  Numerous calcifications seen in the area of inflammatory change as before.    No pneumoperitoneum or ascites.  No bowel obstruction.  No acute bowel abnormality detected.  Aortic and iliac calcifications.  Urinary bladder decompressed.  No adenopathy.    No acute fracture.                                       XR ABDOMEN, ACUTE 2 OR MORE VIEWS WITH CHEST (Final result)  Result time 08/08/24 09:41:24      Final result by Reinier Baker DO (08/08/24 09:41:24)                   Impression:      No acute  findings.    Point of Service: [facility]      Electronically signed by: Reinier Baker  Date:    08/08/2024  Time:    09:41               Narrative:    EXAMINATION:  XR ABDOMEN ACUTE 2 OR MORE VIEWS WITH CHEST    CLINICAL HISTORY:  abdominal pain;    COMPARISON:  [facility] December 8, 2023    TECHNIQUE:  Single frontal view of the chest as well as frontal views of the abdomen in the supine and upright  position.    FINDINGS:  Heart size appears within normal limits.  Chronic change of the lungs without focal consolidation, pleural effusion, or pneumothorax.  Scattered skeletal degenerative change.  Surgical clips project over the right upper quadrant of the abdomen.  Atherosclerotic calcification demonstrated.  Nonspecific nonobstructive bowel gas pattern.  No free intraperitoneal air demonstrated.                                       Medications   HYDROmorphone (PF) injection 1 mg (has no administration in time range)   ondansetron injection 4 mg (has no administration in time range)   ondansetron injection 4 mg (4 mg Intramuscular Given 8/8/24 0918)   sodium chloride 0.9% bolus 1,000 mL 1,000 mL (0 mLs Intravenous Stopped 8/8/24 1239)   insulin regular injection 9 Units 0.09 mL (9 Units Intravenous Given 8/8/24 1048)   HYDROmorphone (PF) injection 1 mg (1 mg Intravenous Given 8/8/24 1121)   iopamidoL (ISOVUE-370) injection 100 mL (100 mLs Intravenous Given 8/8/24 1119)   sodium chloride 0.9% bolus 1,000 mL 1,000 mL (0 mLs Intravenous Stopped 8/8/24 1513)   insulin regular injection 4 Units 0.04 mL (4 Units Intravenous Given 8/8/24 1255)     Medical Decision Making  54-year-old male presents to the emergency department to be evaluated for abdominal pain.  He began having abdominal pain 2 days ago.  He has also had some nausea and vomiting.  He has a history of pancreatitis.  Denies any recent alcohol intake.  Denies any constipation or diarrhea.    Problems Addressed:  Acute pancreatitis without infection  or necrosis, unspecified pancreatitis type:     Details: Lipase 148,  CT does demonstrate pancreatitis. Pt afebrile, non toxic appearing. Pain improving with analgesics/antiemetics. Lipase not 5x upper limit of normal. Feel with supportive management, analgesics hopefully symptoms will continue to improve. Strict f/u instructions given. Warning s/s discussed and return precautions given; the patient has v/u.    Diabetic ketoacidosis without coma associated with type 2 diabetes mellitus:     Details: Glucose 407, Gap 23, Bicarb 19. Pt given total of 13u insulin and 2L IVF. Repeat BMP with glucose 215 Gap, 19, Bicarb 24. Pt has had no further n/v. States pain is some better but persists. Repeat analgesics given. Counseled on supportive measures. Strict f/u instructions given. Warning s/s discussed and return precautions given; the patient has v/u.      Amount and/or Complexity of Data Reviewed  Labs: ordered. Decision-making details documented in ED Course.  Radiology: ordered.    Risk  OTC drugs.  Prescription drug management.               ED Course as of 08/08/24 1709   Thu Aug 08, 2024   1146 BUN/CREAT RATIO: 12 []      ED Course User Index  [] Ingrdi Calvillo FNP                           Clinical Impression:   Final diagnoses:  [K85.90] Acute pancreatitis without infection or necrosis, unspecified pancreatitis type (Primary)  [E11.10] Diabetic ketoacidosis without coma associated with type 2 diabetes mellitus        ED Disposition Condition    Discharge Stable          ED Prescriptions       Medication Sig Dispense Start Date End Date Auth. Provider    HYDROcodone-acetaminophen (NORCO) 7.5-325 mg per tablet Take 1 tablet by mouth every 6 (six) hours as needed for Pain. 12 tablet 8/8/2024 -- Sonali Ny FNP    ondansetron (ZOFRAN-ODT) 4 MG TbDL Take 1 tablet (4 mg total) by mouth every 8 (eight) hours as needed (nausea, vomiting). 15 tablet 8/8/2024 -- Sonali Ny FNP    promethazine (PHENERGAN)  25 MG tablet Take 1 tablet (25 mg total) by mouth every 6 (six) hours as needed for Nausea. 15 tablet 8/8/2024 -- Sonali Ny FNP    promethazine (PHENERGAN) 25 MG suppository Place 1 suppository (25 mg total) rectally every 6 (six) hours as needed for Nausea. 10 suppository 8/8/2024 -- Sonali Ny FNP          Follow-up Information       Follow up With Specialties Details Why Contact Info    Deidra Welch, [facility] In 3 days  [address] [facility] [place] [address] [phone]               Sonali Ny FNP  08/08/24 2226

## 2024-08-08 NOTE — DISCHARGE INSTRUCTIONS
Use prescriptions as directed. Follow a clear liquid diet until your symptoms improve. Be sure to keep a close check on your glucose and keep a log to take with you to your follow up appointment. Follow up with your primary care provider within the next 3 days for recheck and continued care and management. Return to the ED for worsening signs and symptoms or otherwise as needed.

## 2024-08-09 ENCOUNTER — TELEPHONE (OUTPATIENT)
Dept: EMERGENCY MEDICINE | Facility: HOSPITAL | Age: 54
End: 2024-08-09
Payer: MEDICARE

## 2024-08-23 NOTE — PROGRESS NOTES
PCP: Deidra Welch FNP    Referring Provider:     Subjective:   Faustino Fischer is a 54 y.o. male with hx of HTN, HLD, CAD, chronic pancreatitis, and type II IDDM who presents for CHF and HTN.  Referred by Dr. Beebe.     Fhx:  Family History   Problem Relation Name Age of Onset    Heart disease Father      Hypertension Father      Heart disease Brother      Heart disease Maternal Grandmother       Shx:   Social History     Socioeconomic History    Marital status:    Tobacco Use    Smoking status: Some Days     Current packs/day: 0.25     Average packs/day: 0.3 packs/day for 20.7 years (5.2 ttl pk-yrs)     Types: Cigarettes, Cigars     Start date: 2004     Passive exposure: Never    Smokeless tobacco: Never   Substance and Sexual Activity    Alcohol use: Not Currently     Comment: occasional drink previous heavy drinker quit heavily in 2001    Drug use: Never    Sexual activity: Not Currently     Social Determinants of Health     Financial Resource Strain: Low Risk  (6/9/2024)    Overall Financial Resource Strain (CARDIA)     Difficulty of Paying Living Expenses: Not hard at all   Food Insecurity: No Food Insecurity (6/9/2024)    Hunger Vital Sign     Worried About Running Out of Food in the Last Year: Never true     Ran Out of Food in the Last Year: Never true   Transportation Needs: No Transportation Needs (6/9/2024)    TRANSPORTATION NEEDS     Transportation : No   Physical Activity: Insufficiently Active (6/9/2024)    Exercise Vital Sign     Days of Exercise per Week: 5 days     Minutes of Exercise per Session: 10 min   Stress: Stress Concern Present (6/9/2024)    [employer] - Occupational Stress Questionnaire     Feeling of Stress : To some extent   Housing Stability: Low Risk  (6/9/2024)    Housing Stability Vital Sign     Unable to Pay for Housing in the Last Year: No     Homeless in the Last Year: No       EKG   7/29/24--NSR, inferior infarct-age undetermined,  anterolateral infarct- age undetermined, 70 bpm    ECHO Results for orders placed during the hospital encounter of 06/08/24    Echo    Interpretation Summary    Left Ventricle: The left ventricle is smaller than normal. Severely increased wall thickness. There is normal systolic function. Ejection fraction by visual approximation is 65%. There is indeterminate diastolic function. Moderate LV mid-cavity obstruction at rest. Severe LV apical cavity obstruction at rest. Complete obliteration of the cavity.    Right Ventricle: Normal right ventricular cavity size. Systolic function is hyperdynamic.    Aortic Valve: The aortic valve is a trileaflet valve.    Mitral Valve: There is no stenosis. The mean pressure gradient across the mitral valve is 4 mmHg at a heart rate of  bpm. There is mild to moderate regurgitation with an eccentric jet.    IVC/SVC: Intermediate venous pressure at 8 mmHg.    Pericardium: There is a trivial effusion. No indication of cardiac tamponade.    6/28/21--The left ventricle is normal in size with mild concentric hypertrophy and normal systolic function.  The estimated ejection fraction is 65%.  Normal right ventricular size with normal right ventricular systolic function.  Mild mitral regurgitation.  Mild tricuspid regurgitation.  Normal central venous pressure (3 mmHg).  The sinuses of Valsalva is mildly dilated. Consider CTA or MRA of thoracic aorta to evaluate fo ascending aortic aneurysm       LHC:  No records available.         Lab Results   Component Value Date     (L) 08/08/2024    K 4.4 08/08/2024    CL 95 (L) 08/08/2024    CO2 24 08/08/2024    BUN 19 (H) 08/08/2024    CREATININE 1.28 08/08/2024    CALCIUM 9.4 08/08/2024    ANIONGAP 19 (H) 08/08/2024    ESTGFRAFRICA 108 06/28/2021    EGFRNONAA 98 05/02/2022       Lab Results   Component Value Date    CHOL 253 (H) 06/08/2024    CHOL 142 06/08/2023    CHOL 165 03/17/2022     Lab Results   Component Value Date    HDL 37 (L)  06/08/2024    HDL 46 06/08/2023    HDL 36 (L) 03/17/2022     Lab Results   Component Value Date    LDLCALC 161 06/08/2024    LDLCALC 66 06/08/2023    LDLCALC 101 03/17/2022     Lab Results   Component Value Date    TRIG 277 (H) 06/08/2024    TRIG 148 06/08/2023    TRIG 138 03/17/2022     Lab Results   Component Value Date    CHOLHDL 6.8 06/08/2024    CHOLHDL 3.1 06/08/2023    CHOLHDL 4.6 03/17/2022       Lab Results   Component Value Date    WBC 10.31 08/08/2024    HGB 15.8 08/08/2024    HCT 48.5 08/08/2024    MCV 94.9 08/08/2024     08/08/2024           Current Outpatient Medications:     aspirin 81 MG Chew, Take 1 tablet (81 mg total) by mouth once daily., Disp: 90 tablet, Rfl: 1    atorvastatin (LIPITOR) 80 MG tablet, Take 1 tablet (80 mg total) by mouth once daily., Disp: 90 tablet, Rfl: 0    carvediloL (COREG) 12.5 MG tablet, Take 1 tablet (12.5 mg total) by mouth before breakfast., Disp: 90 tablet, Rfl: 1    fenofibrate (TRICOR) 145 MG tablet, Take 145 mg by mouth once daily., Disp: , Rfl:     furosemide (LASIX) 20 MG tablet, Take 20 mg by mouth daily as needed., Disp: , Rfl:     insulin glargine U-100, Lantus, (BASAGLAR KWIKPEN U-100 INSULIN) 100 unit/mL (3 mL) InPn pen, Inject 15 Units into the skin every evening., Disp: 15 mL, Rfl: 0    isosorbide mononitrate (IMDUR) 30 MG 24 hr tablet, Take 1 tablet (30 mg total) by mouth once daily. Take 1/2 tablet with breakfast, Disp: 90 tablet, Rfl: 1    losartan (COZAAR) 100 MG tablet, Take 0.5 tablets (50 mg total) by mouth once daily., Disp: 30 tablet, Rfl: 0    metFORMIN (GLUCOPHAGE) 1000 MG tablet, Take 0.5 tablets (500 mg total) by mouth 2 (two) times daily with meals., Disp: 90 tablet, Rfl: 0    NITROGLYCERIN SL, Place under the tongue., Disp: , Rfl:     HYDROcodone-acetaminophen (NORCO) 7.5-325 mg per tablet, Take 1 tablet by mouth every 6 (six) hours as needed for Pain., Disp: 12 tablet, Rfl: 0    ondansetron (ZOFRAN-ODT) 4 MG TbDL, Take 1 tablet (4 mg  "total) by mouth every 8 (eight) hours as needed (nausea, vomiting)., Disp: 15 tablet, Rfl: 0    promethazine (PHENERGAN) 25 MG suppository, Place 1 suppository (25 mg total) rectally every 6 (six) hours as needed for Nausea., Disp: 10 suppository, Rfl: 0    promethazine (PHENERGAN) 25 MG tablet, Take 1 tablet (25 mg total) by mouth every 6 (six) hours as needed for Nausea., Disp: 15 tablet, Rfl: 0  Did not bring medications.     Review of Systems   Respiratory:  Positive for shortness of breath.    Cardiovascular:  Positive for palpitations and leg swelling. Negative for chest pain.   Neurological:  Positive for dizziness.           Objective:   /78 (BP Location: Left arm, Patient Position: Sitting)   Pulse 71   Ht 5' 6" (1.676 m)   Wt 94.5 kg (208 lb 6.4 oz)   SpO2 98%   BMI 33.64 kg/m²     Physical Exam  Vitals reviewed.   Constitutional:       General: He is not in acute distress.     Appearance: Normal appearance.   HENT:      Head: Normocephalic and atraumatic.   Neck:      Vascular: No carotid bruit or JVD.   Cardiovascular:      Rate and Rhythm: Normal rate and regular rhythm.      Pulses: Normal pulses.           Radial pulses are 2+ on the right side and 2+ on the left side.        Dorsalis pedis pulses are 2+ on the right side and 2+ on the left side.      Heart sounds: Normal heart sounds. No murmur heard.  Pulmonary:      Effort: Pulmonary effort is normal.      Breath sounds: Normal breath sounds.   Musculoskeletal:      Right lower leg: No edema.      Left lower leg: No edema.   Skin:     General: Skin is warm and dry.   Neurological:      Mental Status: He is alert.           Assessment:     1. Hypertensive heart disease without heart failure        2. Hypertension, essential        3. Type 2 diabetes mellitus with hyperglycemia, without long-term current use of insulin        4. Coronary artery disease, unspecified vessel or lesion type, unspecified whether angina present, unspecified " whether native or transplanted heart  EKG 12-lead    EKG 12-lead      5. Chronic pancreatitis, unspecified pancreatitis type  ACCEPT - Ambulatory referral/consult to Cardiology            Plan:   FLP/ ALT in 6-8 weeks  Stop smoking- strategy discussed  Continue current medications  F/u in 6 months.

## 2024-08-26 PROBLEM — I11.9 HYPERTENSIVE HEART DISEASE WITHOUT HEART FAILURE: Chronic | Status: ACTIVE | Noted: 2024-08-26

## 2024-08-26 PROBLEM — I10 HYPERTENSION, ESSENTIAL: Chronic | Status: ACTIVE | Noted: 2024-08-26

## 2024-09-09 DIAGNOSIS — Z79.899 ENCOUNTER FOR LONG-TERM (CURRENT) USE OF OTHER MEDICATIONS: ICD-10-CM

## 2024-09-09 DIAGNOSIS — E78.5 HYPERLIPIDEMIA, UNSPECIFIED HYPERLIPIDEMIA TYPE: Primary | ICD-10-CM

## 2024-09-09 PROBLEM — E11.10 DKA (DIABETIC KETOACIDOSIS): Status: RESOLVED | Noted: 2024-06-08 | Resolved: 2024-09-09

## 2024-09-23 ENCOUNTER — HOSPITAL ENCOUNTER (EMERGENCY)
Facility: HOSPITAL | Age: 54
Discharge: HOME | End: 2024-09-23
Payer: MEDICARE

## 2024-09-23 VITALS
TEMPERATURE: 98 F | OXYGEN SATURATION: 99 % | SYSTOLIC BLOOD PRESSURE: 163 MMHG | BODY MASS INDEX: 32.02 KG/M2 | WEIGHT: 204 LBS | HEART RATE: 93 BPM | RESPIRATION RATE: 14 BRPM | DIASTOLIC BLOOD PRESSURE: 100 MMHG | HEIGHT: 67 IN

## 2024-09-23 DIAGNOSIS — K85.90 ACUTE PANCREATITIS WITHOUT INFECTION OR NECROSIS, UNSPECIFIED PANCREATITIS TYPE: ICD-10-CM

## 2024-09-23 DIAGNOSIS — K86.1 CHRONIC PANCREATITIS, UNSPECIFIED PANCREATITIS TYPE: Primary | ICD-10-CM

## 2024-09-23 DIAGNOSIS — R07.9 CHEST PAIN: ICD-10-CM

## 2024-09-23 LAB
ALBUMIN SERPL BCP-MCNC: 3.7 G/DL (ref 3.5–5)
ALBUMIN/GLOB SERPL: 0.9 {RATIO}
ALP SERPL-CCNC: 114 U/L (ref 45–115)
ALT SERPL W P-5'-P-CCNC: 14 U/L (ref 16–61)
ANION GAP SERPL CALCULATED.3IONS-SCNC: 17 MMOL/L (ref 7–16)
AST SERPL W P-5'-P-CCNC: 10 U/L (ref 15–37)
BACTERIA #/AREA URNS HPF: ABNORMAL /HPF
BASOPHILS # BLD AUTO: 0.06 K/UL (ref 0–0.2)
BASOPHILS NFR BLD AUTO: 0.8 % (ref 0–1)
BILIRUB SERPL-MCNC: 1.2 MG/DL (ref ?–1.2)
BILIRUB UR QL STRIP: NEGATIVE
BUN SERPL-MCNC: 6 MG/DL (ref 7–18)
BUN/CREAT SERPL: 6 (ref 6–20)
CALCIUM SERPL-MCNC: 10.2 MG/DL (ref 8.5–10.1)
CHLORIDE SERPL-SCNC: 96 MMOL/L (ref 98–107)
CLARITY UR: CLEAR
CO2 SERPL-SCNC: 23 MMOL/L (ref 21–32)
COLOR UR: ABNORMAL
CREAT SERPL-MCNC: 0.98 MG/DL (ref 0.7–1.3)
DIFFERENTIAL METHOD BLD: ABNORMAL
EGFR (NO RACE VARIABLE) (RUSH/TITUS): 91 ML/MIN/1.73M2
EOSINOPHIL # BLD AUTO: 0.04 K/UL (ref 0–0.5)
EOSINOPHIL NFR BLD AUTO: 0.5 % (ref 1–4)
ERYTHROCYTE [DISTWIDTH] IN BLOOD BY AUTOMATED COUNT: 13.2 % (ref 11.5–14.5)
GLOBULIN SER-MCNC: 4.1 G/DL (ref 2–4)
GLUCOSE SERPL-MCNC: 325 MG/DL (ref 70–105)
GLUCOSE SERPL-MCNC: 331 MG/DL (ref 74–106)
GLUCOSE UR STRIP-MCNC: >1000 MG/DL
HCT VFR BLD AUTO: 45.5 % (ref 40–54)
HGB BLD-MCNC: 14.8 G/DL (ref 13.5–18)
IMM GRANULOCYTES # BLD AUTO: 0.03 K/UL (ref 0–0.04)
IMM GRANULOCYTES NFR BLD: 0.4 % (ref 0–0.4)
KETONES UR STRIP-SCNC: 60 MG/DL
LEUKOCYTE ESTERASE UR QL STRIP: NEGATIVE
LIPASE SERPL-CCNC: 89 U/L (ref 16–77)
LYMPHOCYTES # BLD AUTO: 1.46 K/UL (ref 1–4.8)
LYMPHOCYTES NFR BLD AUTO: 18.3 % (ref 27–41)
MCH RBC QN AUTO: 30.5 PG (ref 27–31)
MCHC RBC AUTO-ENTMCNC: 32.5 G/DL (ref 32–36)
MCV RBC AUTO: 93.6 FL (ref 80–96)
MONOCYTES # BLD AUTO: 0.42 K/UL (ref 0–0.8)
MONOCYTES NFR BLD AUTO: 5.3 % (ref 2–6)
MPC BLD CALC-MCNC: 11 FL (ref 9.4–12.4)
NEUTROPHILS # BLD AUTO: 5.96 K/UL (ref 1.8–7.7)
NEUTROPHILS NFR BLD AUTO: 74.7 % (ref 53–65)
NITRITE UR QL STRIP: NEGATIVE
NRBC # BLD AUTO: 0 X10E3/UL
NRBC, AUTO (.00): 0 %
PH UR STRIP: 6.5 PH UNITS
PLATELET # BLD AUTO: 290 K/UL (ref 150–400)
POTASSIUM SERPL-SCNC: 4 MMOL/L (ref 3.5–5.1)
PROT SERPL-MCNC: 7.8 G/DL (ref 6.4–8.2)
PROT UR QL STRIP: 50
RBC # BLD AUTO: 4.86 M/UL (ref 4.6–6.2)
RBC # UR STRIP: NEGATIVE /UL
RBC #/AREA URNS HPF: 1 /HPF
SODIUM SERPL-SCNC: 132 MMOL/L (ref 136–145)
SP GR UR STRIP: 1.02
SQUAMOUS #/AREA URNS LPF: ABNORMAL /HPF
TROPONIN I SERPL DL<=0.01 NG/ML-MCNC: 28.3 PG/ML
TROPONIN I SERPL DL<=0.01 NG/ML-MCNC: 30.1 PG/ML
UROBILINOGEN UR STRIP-ACNC: 2 MG/DL
WBC # BLD AUTO: 7.97 K/UL (ref 4.5–11)
WBC #/AREA URNS HPF: 2 /HPF

## 2024-09-23 PROCEDURE — 82962 GLUCOSE BLOOD TEST: CPT

## 2024-09-23 PROCEDURE — 96374 THER/PROPH/DIAG INJ IV PUSH: CPT

## 2024-09-23 PROCEDURE — 36415 COLL VENOUS BLD VENIPUNCTURE: CPT | Performed by: NURSE PRACTITIONER

## 2024-09-23 PROCEDURE — 99285 EMERGENCY DEPT VISIT HI MDM: CPT | Mod: 25

## 2024-09-23 PROCEDURE — 25000003 PHARM REV CODE 250: Performed by: NURSE PRACTITIONER

## 2024-09-23 PROCEDURE — 85025 COMPLETE CBC W/AUTO DIFF WBC: CPT | Performed by: NURSE PRACTITIONER

## 2024-09-23 PROCEDURE — 80053 COMPREHEN METABOLIC PANEL: CPT | Performed by: NURSE PRACTITIONER

## 2024-09-23 PROCEDURE — 96375 TX/PRO/DX INJ NEW DRUG ADDON: CPT

## 2024-09-23 PROCEDURE — 81001 URINALYSIS AUTO W/SCOPE: CPT | Performed by: NURSE PRACTITIONER

## 2024-09-23 PROCEDURE — 81003 URINALYSIS AUTO W/O SCOPE: CPT | Performed by: NURSE PRACTITIONER

## 2024-09-23 PROCEDURE — 63600175 PHARM REV CODE 636 W HCPCS: Performed by: NURSE PRACTITIONER

## 2024-09-23 PROCEDURE — 83690 ASSAY OF LIPASE: CPT | Performed by: NURSE PRACTITIONER

## 2024-09-23 PROCEDURE — 84484 ASSAY OF TROPONIN QUANT: CPT | Performed by: NURSE PRACTITIONER

## 2024-09-23 PROCEDURE — 93005 ELECTROCARDIOGRAM TRACING: CPT

## 2024-09-23 PROCEDURE — 96361 HYDRATE IV INFUSION ADD-ON: CPT

## 2024-09-23 RX ORDER — HYDRALAZINE HYDROCHLORIDE 20 MG/ML
20 INJECTION INTRAMUSCULAR; INTRAVENOUS
Status: COMPLETED | OUTPATIENT
Start: 2024-09-23 | End: 2024-09-23

## 2024-09-23 RX ORDER — ONDANSETRON HYDROCHLORIDE 2 MG/ML
4 INJECTION, SOLUTION INTRAVENOUS
Status: COMPLETED | OUTPATIENT
Start: 2024-09-23 | End: 2024-09-23

## 2024-09-23 RX ORDER — PROMETHAZINE HYDROCHLORIDE 25 MG/1
25 TABLET ORAL
Status: COMPLETED | OUTPATIENT
Start: 2024-09-23 | End: 2024-09-23

## 2024-09-23 RX ORDER — HYDROMORPHONE HYDROCHLORIDE 2 MG/ML
1 INJECTION, SOLUTION INTRAMUSCULAR; INTRAVENOUS; SUBCUTANEOUS
Status: COMPLETED | OUTPATIENT
Start: 2024-09-23 | End: 2024-09-23

## 2024-09-23 RX ORDER — MORPHINE SULFATE 4 MG/ML
4 INJECTION, SOLUTION INTRAMUSCULAR; INTRAVENOUS
Status: COMPLETED | OUTPATIENT
Start: 2024-09-23 | End: 2024-09-23

## 2024-09-23 RX ORDER — HYDROCODONE BITARTRATE AND ACETAMINOPHEN 10; 325 MG/1; MG/1
1 TABLET ORAL
Status: COMPLETED | OUTPATIENT
Start: 2024-09-23 | End: 2024-09-23

## 2024-09-23 RX ORDER — SODIUM CHLORIDE 9 MG/ML
1000 INJECTION, SOLUTION INTRAVENOUS
Status: COMPLETED | OUTPATIENT
Start: 2024-09-23 | End: 2024-09-23

## 2024-09-23 RX ADMIN — HYDRALAZINE HYDROCHLORIDE 20 MG: 20 INJECTION INTRAMUSCULAR; INTRAVENOUS at 08:09

## 2024-09-23 RX ADMIN — ONDANSETRON 4 MG: 2 INJECTION INTRAMUSCULAR; INTRAVENOUS at 08:09

## 2024-09-23 RX ADMIN — HYDROMORPHONE HYDROCHLORIDE 1 MG: 2 INJECTION INTRAMUSCULAR; INTRAVENOUS; SUBCUTANEOUS at 08:09

## 2024-09-23 RX ADMIN — MORPHINE SULFATE 4 MG: 4 INJECTION, SOLUTION INTRAMUSCULAR; INTRAVENOUS at 08:09

## 2024-09-23 RX ADMIN — HYDROCODONE BITARTRATE AND ACETAMINOPHEN 1 TABLET: 10; 325 TABLET ORAL at 11:09

## 2024-09-23 RX ADMIN — SODIUM CHLORIDE 1000 ML: 9 INJECTION, SOLUTION INTRAVENOUS at 08:09

## 2024-09-23 RX ADMIN — SODIUM CHLORIDE 1000 ML: 9 INJECTION, SOLUTION INTRAVENOUS at 10:09

## 2024-09-23 RX ADMIN — PROMETHAZINE HYDROCHLORIDE 25 MG: 25 TABLET ORAL at 11:09

## 2024-09-24 ENCOUNTER — HOSPITAL ENCOUNTER (EMERGENCY)
Facility: HOSPITAL | Age: 54
Discharge: HOME | End: 2024-09-24
Payer: MEDICARE

## 2024-09-24 ENCOUNTER — TELEPHONE (OUTPATIENT)
Dept: EMERGENCY MEDICINE | Facility: HOSPITAL | Age: 54
End: 2024-09-24
Payer: MEDICARE

## 2024-09-24 VITALS
DIASTOLIC BLOOD PRESSURE: 74 MMHG | TEMPERATURE: 97 F | BODY MASS INDEX: 31.08 KG/M2 | SYSTOLIC BLOOD PRESSURE: 144 MMHG | WEIGHT: 198 LBS | HEART RATE: 88 BPM | HEIGHT: 67 IN | OXYGEN SATURATION: 98 % | RESPIRATION RATE: 18 BRPM

## 2024-09-24 DIAGNOSIS — K86.1 CHRONIC PANCREATITIS, UNSPECIFIED PANCREATITIS TYPE: ICD-10-CM

## 2024-09-24 DIAGNOSIS — R10.9 ABDOMINAL PAIN, UNSPECIFIED ABDOMINAL LOCATION: Primary | ICD-10-CM

## 2024-09-24 PROCEDURE — 96361 HYDRATE IV INFUSION ADD-ON: CPT

## 2024-09-24 PROCEDURE — 25500020 PHARM REV CODE 255

## 2024-09-24 PROCEDURE — 96365 THER/PROPH/DIAG IV INF INIT: CPT | Mod: 59

## 2024-09-24 PROCEDURE — 99285 EMERGENCY DEPT VISIT HI MDM: CPT | Mod: 25

## 2024-09-24 PROCEDURE — 63600175 PHARM REV CODE 636 W HCPCS

## 2024-09-24 PROCEDURE — 25000003 PHARM REV CODE 250

## 2024-09-24 PROCEDURE — 96375 TX/PRO/DX INJ NEW DRUG ADDON: CPT

## 2024-09-24 RX ORDER — MORPHINE SULFATE 4 MG/ML
4 INJECTION, SOLUTION INTRAMUSCULAR; INTRAVENOUS
Status: COMPLETED | OUTPATIENT
Start: 2024-09-24 | End: 2024-09-24

## 2024-09-24 RX ORDER — KETOROLAC TROMETHAMINE 30 MG/ML
30 INJECTION, SOLUTION INTRAMUSCULAR; INTRAVENOUS
Status: COMPLETED | OUTPATIENT
Start: 2024-09-24 | End: 2024-09-24

## 2024-09-24 RX ORDER — ONDANSETRON HYDROCHLORIDE 2 MG/ML
4 INJECTION, SOLUTION INTRAVENOUS
Status: DISCONTINUED | OUTPATIENT
Start: 2024-09-24 | End: 2024-09-24

## 2024-09-24 RX ORDER — MORPHINE SULFATE 4 MG/ML
4 INJECTION, SOLUTION INTRAMUSCULAR; INTRAVENOUS
Status: DISCONTINUED | OUTPATIENT
Start: 2024-09-24 | End: 2024-09-24

## 2024-09-24 RX ORDER — KETOROLAC TROMETHAMINE 10 MG/1
10 TABLET, FILM COATED ORAL EVERY 6 HOURS
Qty: 40 TABLET | Refills: 0 | Status: SHIPPED | OUTPATIENT
Start: 2024-09-24 | End: 2024-10-05

## 2024-09-24 RX ORDER — IOPAMIDOL 755 MG/ML
100 INJECTION, SOLUTION INTRAVASCULAR
Status: COMPLETED | OUTPATIENT
Start: 2024-09-24 | End: 2024-09-24

## 2024-09-24 RX ADMIN — KETOROLAC TROMETHAMINE 30 MG: 30 INJECTION, SOLUTION INTRAMUSCULAR at 08:09

## 2024-09-24 RX ADMIN — SODIUM CHLORIDE 1000 ML: 9 INJECTION, SOLUTION INTRAVENOUS at 08:09

## 2024-09-24 RX ADMIN — PROMETHAZINE HYDROCHLORIDE 25 MG: 25 INJECTION INTRAMUSCULAR; INTRAVENOUS at 08:09

## 2024-09-24 RX ADMIN — MORPHINE SULFATE 4 MG: 4 INJECTION, SOLUTION INTRAMUSCULAR; INTRAVENOUS at 08:09

## 2024-09-24 RX ADMIN — IOPAMIDOL 100 ML: 755 INJECTION, SOLUTION INTRAVENOUS at 08:09

## 2024-09-24 NOTE — DISCHARGE INSTRUCTIONS
Alternate Tylenol and Ibuprofen as needed for pain. Drink plenty of fluids. Follow a clear liquid diet for the next 24 hours and advance as tolerated starting with soft/bland foods. Follow up with your primary care provider for recheck and continued care and management. Return to the ED for worsening signs and symptoms or otherwise as needed.

## 2024-09-24 NOTE — ED PROVIDER NOTES
"Encounter Date: 9/23/2024       History     Chief Complaint   Patient presents with    Chest Pain     55 y/o AAM presents to the emergency department with c/o abd pain, nausea, vomiting and chest pain. He states that he began having abd pain earlier today with nausea and vomiting. He states that he vomited "one long time" and has just been very nauseated since. He states he has not tried to eat or drink for fear of being sick. He states that this evening he began having pain in his chest and back. He has history of pancreatitis which is what he believes the initial problem is but unsure if the chest and back pain is related. He has had nothing for his symptoms. He denies alcohol use. He does smokes about a 1/4 pack of cigarettes a day. He denies illicit drug use. He states his glucose was in the 300s around noon today.    The history is provided by the patient.     Review of patient's allergies indicates:   Allergen Reactions    Ticagrelor Shortness Of Breath    Vancomycin analogues Other (See Comments)     Patient experienced adverse effect, and had some renal insufficiency after receiving Vancomycin.     Past Medical History:   Diagnosis Date    Chronic pancreatitis, unspecified pancreatitis type     Coronary artery disease     Essential (primary) hypertension     Hidradenitis     Mixed hyperlipidemia     Type 2 diabetes mellitus      Past Surgical History:   Procedure Laterality Date    CARDIAC SURGERY      Stents x6    CHOLECYSTECTOMY      EXCISION OF HIDRADENITIS N/A 3/27/2024    Procedure: EXCISION, HIDRADENITIS;  Surgeon: Fidel Park MD;  Location: [facility] OR;  Service: General;  Laterality: N/A;    INCISION OF PERIANAL ABSCESS Bilateral 2/16/2024    Procedure: INCISION, ABSCESS, PERIANAL;  Surgeon: Fidel Park MD;  Location: [facility] OR;  Service: General;  Laterality: Bilateral;    UNDESCENDED TESTICLE EXPLORATION N/A      Family History   Problem Relation Name Age of Onset    Heart disease Father  "     Hypertension Father      Heart disease Brother      Heart disease Maternal Grandmother       Social History     Tobacco Use    Smoking status: Some Days     Current packs/day: 0.25     Average packs/day: 0.3 packs/day for 20.7 years (5.2 ttl pk-yrs)     Types: Cigarettes, Cigars     Start date: 2004     Passive exposure: Never    Smokeless tobacco: Never   Substance Use Topics    Alcohol use: Not Currently     Comment: occasional drink previous heavy drinker quit heavily in 2001    Drug use: Never     Review of Systems   All other systems reviewed and are negative.      Physical Exam     Initial Vitals   BP Pulse Resp Temp SpO2   09/23/24 1946 09/23/24 1941 09/23/24 1941 09/23/24 1941 09/23/24 1941   (!) 184/124 99 18 98.4 °F (36.9 °C) 99 %      MAP       --                Physical Exam    Constitutional: He appears well-developed and well-nourished. He is cooperative.  Non-toxic appearance.   Appears uncomfortable   Cardiovascular:  Normal rate, regular rhythm and normal heart sounds.           Abdominal: Abdomen is soft. Bowel sounds are normal. There is abdominal tenderness in the epigastric area and left upper quadrant.     Neurological: He is alert and oriented to person, place, and time.   Skin: Skin is warm, dry and intact. Capillary refill takes less than 2 seconds.         Medical Screening Exam   See Full Note    ED Course   Procedures  Labs Reviewed   COMPREHENSIVE METABOLIC PANEL - Abnormal       Result Value    Sodium 132 (*)     Potassium 4.0      Chloride 96 (*)     CO2 23      Anion Gap 17 (*)     Glucose 331 (*)     BUN 6 (*)     Creatinine 0.98      BUN/Creatinine Ratio 6      Calcium 10.2 (*)     Total Protein 7.8      Albumin 3.7      Globulin 4.1 (*)     A/G Ratio 0.9      Bilirubin, Total 1.2      Alk Phos 114      ALT 14 (*)     AST 10 (*)     eGFR 91     URINALYSIS, REFLEX TO URINE CULTURE - Abnormal    Color, UA Light-Yellow      Clarity, UA Clear      pH, UA 6.5      Leukocytes, UA  Negative      Nitrites, UA Negative      Protein, UA 50 (*)     Glucose, UA >1000 (*)     Ketones, UA 60 (*)     Urobilinogen, UA 2 (*)     Bilirubin, UA Negative      Blood, UA Negative      Specific [place], UA 1.021     LIPASE - Abnormal    Lipase 89 (*)    CBC WITH DIFFERENTIAL - Abnormal    WBC 7.97      RBC 4.86      Hemoglobin 14.8      Hematocrit 45.5      MCV 93.6      MCH 30.5      MCHC 32.5      RDW 13.2      Platelet Count 290      MPV 11.0      Neutrophils % 74.7 (*)     Lymphocytes % 18.3 (*)     Monocytes % 5.3      Eosinophils % 0.5 (*)     Basophils % 0.8      Immature Granulocytes % 0.4      nRBC, Auto 0.0      Neutrophils, Abs 5.96      Lymphocytes, Absolute 1.46      Monocytes, Absolute 0.42      Eosinophils, Absolute 0.04      Basophils, Absolute 0.06      Immature Granulocytes, Absolute 0.03      nRBC, Absolute 0.00      Diff Type Auto     URINALYSIS, MICROSCOPIC - Abnormal    WBC, UA 2      RBC, UA 1      Bacteria, UA Occasional (*)     Squamous Epithelial Cells, UA Occasional (*)    POCT GLUCOSE MONITORING CONTINUOUS - Abnormal    POC Glucose 325 (*)    TROPONIN I - Normal    Troponin I High Sensitivity 30.1     TROPONIN I - Normal    Troponin I High Sensitivity 28.3     CBC W/ AUTO DIFFERENTIAL    Narrative:     The following orders were created for panel order CBC auto differential.  Procedure                               Abnormality         Status                     ---------                               -----------         ------                     CBC with Differential[[id number]]       Abnormal            Final result                 Please view results for these tests on the individual orders.          Imaging Results              XR ABDOMEN, ACUTE 2 OR MORE VIEWS WITH CHEST (Final result)  Result time 09/23/24 21:30:02      Final result by Rui Higginbotham MD (09/23/24 21:30:02)                   Impression:      No acute radiographic abnormality.      Electronically signed  "by: Rui Sharplevi  Date:    09/23/2024  Time:    21:30               Narrative:    EXAMINATION:  XR ABDOMEN ACUTE 2 OR MORE VIEWS WITH CHEST    CLINICAL HISTORY:  abd pain, n/v, chest pain;    TECHNIQUE:  Single-view chest and four views of the abdomen flat erect.  Five images    COMPARISON:  08/08/2024    FINDINGS:  Cardiac silhouette is normal size.  Lungs are clear.  No effusion or pneumothorax.    No free air is identified.  Nonspecific nonobstructing pattern of bowel gas.  Moderate retained feces in the colon.  Surgical clips suggesting prior cholecystectomy.  No acute osseous abnormality.                                       Medications   HYDROcodone-acetaminophen  mg per tablet 1 tablet (has no administration in time range)   promethazine tablet 25 mg (has no administration in time range)   sodium chloride 0.9% bolus 1,000 mL 1,000 mL (0 mLs Intravenous Stopped 9/23/24 2109)   morphine injection 4 mg (4 mg Intravenous Given 9/23/24 2008)   ondansetron injection 4 mg (4 mg Intravenous Given 9/23/24 2008)   hydrALAZINE injection 20 mg (20 mg Intravenous Given 9/23/24 2039)   HYDROmorphone (PF) injection 1 mg (1 mg Intravenous Given 9/23/24 2047)   0.9%  NaCl infusion (1,000 mLs Intravenous New Bag 9/23/24 2202)     Medical Decision Making  55 y/o AAM presents to the emergency department with c/o abd pain, nausea, vomiting and chest pain. He states that he began having abd pain earlier today with nausea and vomiting. He states that he vomited "one long time" and has just been very nauseated since. He states he has not tried to eat or drink for fear of being sick. He states that this evening he began having pain in his chest and back. He has history of pancreatitis which is what he believes the initial problem is but unsure if the chest and back pain is related. He has had nothing for his symptoms. He denies alcohol use. He does smokes about a 1/4 pack of cigarettes a day. He denies illicit drug use. He " states his glucose was in the 300s around noon today.    Problems Addressed:  Chronic pancreatitis, unspecified pancreatitis type:     Details: Pt treated with IVF, analgesics and antiemetics. Pain improving. Pt afebrile and otherwise non toxic appearing. Normal WBC count and H/H. Lipase mildly elevated 89. EKG done and reviewed by me and ED attending. Serial troponins negative. Xray negative, liver enzymes ok. Glucose 300s, given IVFs and instructed to take home meds upon arriving home. Counseled on supportive measures. Strict f/u instructions given. Warning s/s discussed and return precautions given; the patient has v/u.      Amount and/or Complexity of Data Reviewed  Labs: ordered.  Radiology: ordered.    Risk  OTC drugs.  Prescription drug management.                                      Clinical Impression:   Final diagnoses:  [R07.9] Chest pain  [K86.1] Chronic pancreatitis, unspecified pancreatitis type (Primary)        ED Disposition Condition    Discharge Stable          ED Prescriptions    None       Follow-up Information       Follow up With Specialties Details Why Contact Info    Deidra Welch FNP Family Medicine  As [place] [address] [facility] [place] [address] [phone]               Sonali Ny FNP  09/23/24 1614

## 2024-09-24 NOTE — ED TRIAGE NOTES
Pt presents cc of pancreatitis flare up that started his hypertension and chest pain (about two hours ago.) Pt states he has been vomitting and tried to take meds this morning but could not keep them down. Pt also states he has uncontrolled diabetes and last checked his sugar around noon where it was in the 300's.

## 2024-09-24 NOTE — Clinical Note
"Faustino Ma" Amado was seen and treated in our emergency department on 9/24/2024.  He may return to work on 09/26/2024.       If you have any questions or concerns, please don't hesitate to call.      Pradip Arredondo, NP"

## 2024-09-25 ENCOUNTER — TELEPHONE (OUTPATIENT)
Dept: EMERGENCY MEDICINE | Facility: HOSPITAL | Age: 54
End: 2024-09-25
Payer: MEDICARE

## 2024-09-25 LAB
OHS QRS DURATION: 88 MS
OHS QTC CALCULATION: 430 MS

## 2024-09-25 NOTE — ED PROVIDER NOTES
Encounter Date: 9/24/2024       History     Chief Complaint   Patient presents with    Abdominal Pain     Pt presents to ed with c/o having pancreatitis and his pain has become unbearable. States was not given any prescriptions and can not hold anything down     54-year-old male presents to the emergency department for evaluation of abdominal pain.  Patient reports he was seen in the ED yesterday for the same thing but is continuing to have pain.  Patient has a history of chronic pancreatitis and further states he is not able to  his prescriptions or follow up with primary care provider.  Denies chest pain, shortness of breath, nausea, vomiting, constipation.    The history is provided by the patient. No  was used.   Abdominal Pain  The current episode started several days ago. The problem has not changed since onset.The abdominal pain is generalized. The abdominal pain does not radiate. The severity of the abdominal pain is 9/10. The abdominal pain is relieved by nothing. The other symptoms of the illness do not include fever, nausea, vomiting, diarrhea or dysuria.   The patient has not had a change in bowel habit. Symptoms associated with the illness do not include chills.     Review of patient's allergies indicates:   Allergen Reactions    Ticagrelor Shortness Of Breath    Vancomycin analogues Other (See Comments)     Patient experienced adverse effect, and had some renal insufficiency after receiving Vancomycin.     Past Medical History:   Diagnosis Date    Chronic pancreatitis, unspecified pancreatitis type     Coronary artery disease     Essential (primary) hypertension     Hidradenitis     Mixed hyperlipidemia     Type 2 diabetes mellitus      Past Surgical History:   Procedure Laterality Date    CARDIAC SURGERY      Stents x6    CHOLECYSTECTOMY      EXCISION OF HIDRADENITIS N/A 3/27/2024    Procedure: EXCISION, HIDRADENITIS;  Surgeon: Fidel Prak MD;  Location: [facility] OR;   Service: General;  Laterality: N/A;    INCISION OF PERIANAL ABSCESS Bilateral 2/16/2024    Procedure: INCISION, ABSCESS, PERIANAL;  Surgeon: Fidel Park MD;  Location: [facility];  Service: General;  Laterality: Bilateral;    UNDESCENDED TESTICLE EXPLORATION N/A      Family History   Problem Relation Name Age of Onset    Heart disease Father      Hypertension Father      Heart disease Brother      Heart disease Maternal Grandmother       Social History     Tobacco Use    Smoking status: Some Days     Current packs/day: 0.25     Average packs/day: 0.3 packs/day for 20.7 years (5.2 ttl pk-yrs)     Types: Cigarettes, Cigars     Start date: 2004     Passive exposure: Never    Smokeless tobacco: Never   Substance Use Topics    Alcohol use: Not Currently     Comment: occasional drink previous heavy drinker quit heavily in 2001    Drug use: Never     Review of Systems   Constitutional:  Negative for chills and fever.   Eyes:  Negative for photophobia.   Gastrointestinal:  Positive for abdominal pain. Negative for diarrhea, nausea and vomiting.   Genitourinary:  Negative for dysuria.   Psychiatric/Behavioral:  Negative for confusion.    All other systems reviewed and are negative.      Physical Exam     Initial Vitals [09/24/24 1850]   BP Pulse Resp Temp SpO2   (!) 188/116 94 16 97.4 °F (36.3 °C) 98 %      MAP       --         Physical Exam    Vitals reviewed.  Constitutional: He appears well-nourished. No distress.   HENT:   Head: Normocephalic.   Eyes: Conjunctivae and EOM are normal. Pupils are equal, round, and reactive to light. Right eye exhibits no discharge. Left eye exhibits no discharge.   Neck: Neck supple.   Normal range of motion.  Cardiovascular:  Normal rate, regular rhythm and normal heart sounds.           Pulmonary/Chest: Breath sounds normal. No respiratory distress. He has no wheezes. He has no rhonchi.   Abdominal: Abdomen is soft and protuberant. Bowel sounds are normal. He exhibits no distension.  There is no abdominal tenderness (Midepigastric pain upon palpation).   No right CVA tenderness.  No left CVA tenderness. There is no guarding.   Musculoskeletal:         General: Normal range of motion.      Cervical back: Normal range of motion and neck supple.     Lymphadenopathy:     He has no cervical adenopathy.   Neurological: He is alert and oriented to person, place, and time. He has normal strength. No sensory deficit. GCS score is 15. GCS eye subscore is 4. GCS verbal subscore is 5. GCS motor subscore is 6.   Skin: Skin is warm and dry. Capillary refill takes less than 2 seconds.   Psychiatric: He has a normal mood and affect. His behavior is normal.         Medical Screening Exam   See Full Note    ED Course   Procedures  Labs Reviewed - No data to display       Imaging Results              CT Abdomen Pelvis With IV Contrast NO Oral Contrast (In process)                      Medications   sodium chloride 0.9% bolus 1,000 mL 1,000 mL (0 mLs Intravenous Stopped 9/24/24 2147)   promethazine (PHENERGAN) 25 mg in 0.9% NaCl 50 mL IVPB (0 mg Intravenous Stopped 9/24/24 2116)   ketorolac injection 30 mg (30 mg Intravenous Given 9/24/24 2042)   morphine injection 4 mg (4 mg Intravenous Given 9/24/24 2045)   iopamidoL (ISOVUE-370) injection 100 mL (100 mLs Intravenous Given 9/24/24 2045)     Medical Decision Making  54-year-old male presents to the emergency department for evaluation of abdominal pain.  Patient reports he was seen in the ED yesterday for the same thing but is continuing to have pain.  Patient has a history of chronic pancreatitis and further states that he is not able to  his prescriptions or follow up with primary care provider.  Denies chest pain, shortness of breath, nausea, vomiting, constipation.  Ordered and reviewed labs  Ordered and reviewed CT abdomen with contrast as well as radiologist's interpretation with results significant for meme-pancreatic edema is present consistent with  acute pancreatitis. Calcifications within the pancreatic head ad duct which are most compatible with sequela of chronic pancreatitis, less likely neoplasm. Normal caliber bowel. No obstruction. No free air. No significant fluid collection. An indeterminate 1.6cm hypodensity in the right hepatic lobe. Recommend follow-up with dedicated multiphase CT liver or MRI for further characterization. Spleen unremarkable. Status post cholecystectomy. No hydronephrosis or urolithiasis. Atherosclerosis of the aorta and its branches.  Medications ordered in ED  Discussed patient with Dr. Weiner who recommended symptomatic treatment and follow up with primary care provider  Prescriptions provided  Diagnosis: Abdominal pain, chronic pancreatitis    Amount and/or Complexity of Data Reviewed  Radiology: ordered.    Risk  Prescription drug management.                                      Clinical Impression:   Final diagnoses:  [R10.9] Abdominal pain, unspecified abdominal location (Primary)  [K86.1] Chronic pancreatitis, unspecified pancreatitis type        ED Disposition Condition    Discharge Stable          ED Prescriptions       Medication Sig Dispense Start Date End Date Auth. Provider    ketorolac (TORADOL) 10 mg tablet Take 1 tablet (10 mg total) by mouth every 6 (six) hours. for 10 days 40 tablet 9/24/2024 10/4/2024 Pradip Arredondo NP          Follow-up Information    None          Pradip Arredondo NP  09/24/24 [id number]

## 2024-09-25 NOTE — TELEPHONE ENCOUNTER
----- Message from DOC Leger sent at 9/25/2024  9:28 AM CDT -----  Please have him follow-up with his primary care provider regarding the abnormal CT

## 2024-09-25 NOTE — DISCHARGE INSTRUCTIONS
Take medication as ordered. Follow up with primary care provider in the morning. Return to the emergency department for new or worsening symptoms.

## 2024-09-26 ENCOUNTER — OFFICE VISIT (OUTPATIENT)
Dept: FAMILY MEDICINE | Facility: CLINIC | Age: 54
End: 2024-09-26
Payer: MEDICARE

## 2024-09-26 VITALS
HEART RATE: 96 BPM | HEIGHT: 67 IN | DIASTOLIC BLOOD PRESSURE: 88 MMHG | OXYGEN SATURATION: 98 % | SYSTOLIC BLOOD PRESSURE: 147 MMHG | WEIGHT: 200.38 LBS | TEMPERATURE: 99 F | BODY MASS INDEX: 31.45 KG/M2

## 2024-09-26 DIAGNOSIS — K85.90 ACUTE ON CHRONIC PANCREATITIS: ICD-10-CM

## 2024-09-26 DIAGNOSIS — E11.65 TYPE 2 DIABETES MELLITUS WITH HYPERGLYCEMIA, WITHOUT LONG-TERM CURRENT USE OF INSULIN: ICD-10-CM

## 2024-09-26 DIAGNOSIS — K86.1 ACUTE ON CHRONIC PANCREATITIS: ICD-10-CM

## 2024-09-26 DIAGNOSIS — Z09 FOLLOW-UP EXAM: Primary | ICD-10-CM

## 2024-09-26 PROCEDURE — 99214 OFFICE O/P EST MOD 30 MIN: CPT | Mod: ,,, | Performed by: NURSE PRACTITIONER

## 2024-09-26 NOTE — PROGRESS NOTES
[facility]    Chief Complaint      Chief Complaint   Patient presents with    Follow-up     Pt here for Er  follow up [facility] on 09/24/2024. Pt states having right flank pain.       History of Present Illness      Faustino Fischer is a 54 y.o. male. He  has a past medical history of Chronic pancreatitis, unspecified pancreatitis type, Coronary artery disease, Essential (primary) hypertension, Hidradenitis, Mixed hyperlipidemia, and Type 2 diabetes mellitus., who presents today for ER f/u visit from 9/23/24 and 9/24/24 for Acute on Chronic pancreatitis with continued complaints of R sided upper abdominal/side/chest wall pain.  Patient states he has not had any ETOH to drink in several months.    Patient has abnormal CT scan with the following findings:  Impression:     1. Mild hepatic steatosis with mild periportal edema.  2. Findings consistent with acute on chronic pancreatitis.  Correlate clinically with pancreatic enzymes.  This should be followed until clear to exclude an underlying suspicious mass of the pancreatic head and uncinate process.  3. Mild prostatic hypertrophy.  4. Shotty mesenteric and retroperitoneal lymph nodes likely reactive in etiology.  Close interval follow-up is recommended.  This report was flagged in Epic as abnormal.    Past Medical History:  Past Medical History:   Diagnosis Date    Chronic pancreatitis, unspecified pancreatitis type     Coronary artery disease     Essential (primary) hypertension     Hidradenitis     Mixed hyperlipidemia     Type 2 diabetes mellitus        Past Surgical History:   has a past surgical history that includes Cardiac surgery; Undescended testicle exploration (N/A); Cholecystectomy; Incision of perianal abscess (Bilateral, 2/16/2024); and Excision of hidradenitis (N/A, 3/27/2024).    Social History:  Social History     Tobacco Use    Smoking status: Some Days     Current packs/day: 0.25     Average packs/day: 0.3 packs/day for 20.7 years (5.2 ttl  pk-yrs)     Types: Cigarettes, Cigars     Start date: 2004     Passive exposure: Never    Smokeless tobacco: Never   Substance Use Topics    Alcohol use: Not Currently     Comment: occasional drink previous heavy drinker quit heavily in 2001    Drug use: Never       I personally reviewed all past medical, surgical, and social.     Review of Systems   Constitutional:  Positive for appetite change and fatigue. Negative for chills and fever.   Respiratory:  Negative for shortness of breath.    Cardiovascular: Negative.    Gastrointestinal:  Positive for abdominal pain and nausea. Negative for diarrhea and vomiting.   Skin: Negative.    Neurological:  Negative for headaches.        Medications:  Outpatient Encounter Medications as of 9/26/2024   Medication Sig Dispense Refill    aspirin 81 MG Chew Take 1 tablet (81 mg total) by mouth once daily. 90 tablet 1    carvediloL (COREG) 12.5 MG tablet Take 1 tablet (12.5 mg total) by mouth before breakfast. 90 tablet 1    fenofibrate (TRICOR) 145 MG tablet Take 145 mg by mouth once daily.      furosemide (LASIX) 20 MG tablet Take 20 mg by mouth daily as needed.      HYDROcodone-acetaminophen (NORCO) 7.5-325 mg per tablet Take 1 tablet by mouth every 6 (six) hours as needed for Pain. 12 tablet 0    isosorbide mononitrate (IMDUR) 30 MG 24 hr tablet Take 1 tablet (30 mg total) by mouth once daily. Take 1/2 tablet with breakfast 90 tablet 1    ketorolac (TORADOL) 10 mg tablet Take 1 tablet (10 mg total) by mouth every 6 (six) hours. for 10 days.. take with food 40 tablet 0    NITROGLYCERIN SL Place under the tongue.      ondansetron (ZOFRAN-ODT) 4 MG TbDL Take 1 tablet (4 mg total) by mouth every 8 (eight) hours as needed (nausea, vomiting). 15 tablet 0    promethazine (PHENERGAN) 25 MG suppository Place 1 suppository (25 mg total) rectally every 6 (six) hours as needed for Nausea. 10 suppository 0    promethazine (PHENERGAN) 25 MG tablet Take 1 tablet (25 mg total) by mouth every  "6 (six) hours as needed for Nausea. 15 tablet 0    atorvastatin (LIPITOR) 80 MG tablet Take 1 tablet (80 mg total) by mouth once daily. 90 tablet 0    losartan (COZAAR) 100 MG tablet Take 0.5 tablets (50 mg total) by mouth once daily. 30 tablet 0    metFORMIN (GLUCOPHAGE) 1000 MG tablet Take 0.5 tablets (500 mg total) by mouth 2 (two) times daily with meals. 90 tablet 0     No facility-administered encounter medications on file as of 9/26/2024.       Allergies:  Review of patient's allergies indicates:   Allergen Reactions    Ticagrelor Shortness Of Breath    Vancomycin analogues Other (See Comments)     Patient experienced adverse effect, and had some renal insufficiency after receiving Vancomycin.       Health Maintenance:  Immunization History   Administered Date(s) Administered    COVID-19 MRNA, LN-S PF (MODERNA HALF 0.25 ML DOSE) 03/17/2022    COVID-19, MRNA, LN-S, PF (Pfizer) (Purple Cap) 01/26/2021, 02/19/2021    Influenza - Quadrivalent - PF *Preferred* (6 months and older) 03/17/2022      Health Maintenance   Topic Date Due    Foot Exam  Never done    Eye Exam  Never done    TETANUS VACCINE  Never done    High Dose Statin  Never done    Colorectal Cancer Screening  Never done    Shingles Vaccine (1 of 2) Never done    Hemoglobin A1c  09/14/2024    Lipid Panel  06/08/2025    Hepatitis C Screening  Completed        Physical Exam      Vital Signs  Temp: 98.5 °F (36.9 °C)  Temp Source: Oral  Pulse: 96  SpO2: 98 %  BP: (!) 147/88  BP Location: Left arm  Patient Position: Sitting  Pain Score:   6  Pain Loc: Abdomen  Height and Weight  Height: 5' 7" (170.2 cm)  Weight: 90.9 kg (200 lb 6 oz)  BSA (Calculated - sq m): 2.07 sq meters  BMI (Calculated): 31.4  Weight in (lb) to have BMI = 25: 159.3]    Physical Exam  Vitals and nursing note reviewed.   Constitutional:       Appearance: Normal appearance.   HENT:      Head: Normocephalic.      Right Ear: Hearing normal.      Left Ear: Hearing normal.      Nose: Nose " normal.   Eyes:      General: Lids are normal.      Conjunctiva/sclera: Conjunctivae normal.   Neck:      Thyroid: No thyromegaly.   Cardiovascular:      Rate and Rhythm: Normal rate and regular rhythm.      Heart sounds: Normal heart sounds.   Pulmonary:      Effort: Pulmonary effort is normal.      Breath sounds: Normal breath sounds.   Abdominal:       Musculoskeletal:         General: Normal range of motion.      Cervical back: Normal range of motion and neck supple.   Skin:     General: Skin is warm and dry.   Neurological:      General: No focal deficit present.      Mental Status: He is alert and oriented to person, place, and time.      Gait: Gait is intact.          Laboratory:  CBC:  Recent Labs   Lab 06/14/24  1353 08/08/24  0928 09/23/24 2000   WBC 6.31 10.31 7.97   RBC 3.96 L 5.11 4.86   Hemoglobin 12.4 L 15.8 14.8   Hematocrit 40.2 48.5 45.5   Platelet Count 296 310 290   .5 H 94.9 93.6   MCH 31.3 H 30.9 30.5   MCHC 30.8 L 32.6 32.5     CMP:  Recent Labs   Lab 06/14/24  1353 08/08/24  0928 08/08/24  1526 09/23/24 2000   Glucose 316 H 407 H   < > 331 H   Calcium 8.8 9.8   < > 10.2 H   Albumin 3.2 L 4.1  --  3.7   Total Protein 6.3 L 8.5 H  --  7.8   Sodium 136 128 L   < > 132 L   Potassium 4.1 4.2   < > 4.0   CO2 25 19 L   < > 23   Chloride 102 90 L   < > 96 L   BUN 8 15   < > 6 L   Alk Phos 86 148 H  --  114   ALT 19 20  --  14 L   AST 12 L 14 L  --  10 L   Bilirubin, Total 0.5 2.4 H  --  1.2    < > = values in this interval not displayed.     LIPIDS:  Recent Labs   Lab 03/17/22  0949 06/08/23  0531 06/08/24  1106   TSH  --   --  1.440   HDL Cholesterol 36 L 46 37 L   Cholesterol 165 142 253 H   Triglycerides 138 148 277 H   LDL Calculated 101 66 161   Cholesterol/HDL Ratio (Risk Factor) 4.6 3.1 6.8   Non- 96 216     TSH:  Recent Labs   Lab 06/08/24  1106   TSH 1.440     A1C:  Recent Labs   Lab 03/17/22  0949 06/08/23  0531 02/16/24  2041 06/14/24  1353   Hemoglobin A1C 6.4 6.3 10.7 H  12.1 H       Assessment/Plan     Faustino Fischer is a 54 y.o.male with:     1. Follow-up exam    2. Acute on chronic pancreatitis  -     Ambulatory referral/consult to Endocrinology; Future; Expected date: 10/03/2024  -     Ambulatory referral/consult to Gastroenterology; Future; Expected date: 10/03/2024    3. Type 2 diabetes mellitus with hyperglycemia, without long-term current use of insulin  -     Ambulatory referral/consult to Endocrinology; Future; Expected date: 10/03/2024       Reviewed ER record; CT results from ER discussed and explained to patient  Urgent referrals placed to GI and Endo related to his Pancreatitis and DM- last A1c was 12.1%      Total time spent face-to-face and non-face-to-face coordinating care for this encounter was: 30 minutes     Chronic conditions status updated as per HPI.  Other than changes above, cont current medications and maintain follow up with specialists.  Return to clinic in 1 month(s).    Deidra Welch, DOC  [facility]  Reviewed by Nelson Birmingham MD on 09/30/2024

## 2024-09-30 PROBLEM — Z09 POSTOP CHECK: Status: RESOLVED | Noted: 2024-06-26 | Resolved: 2024-09-30

## 2024-11-05 ENCOUNTER — PATIENT OUTREACH (OUTPATIENT)
Dept: ADMINISTRATIVE | Facility: CLINIC | Age: 54
End: 2024-11-05

## 2024-11-05 ENCOUNTER — EXTERNAL HOSPITAL ADMISSION (OUTPATIENT)
Dept: ADMINISTRATIVE | Facility: CLINIC | Age: 54
End: 2024-11-05

## 2024-11-05 NOTE — PROGRESS NOTES
C3 nurse spoke with Faustino Fischer  for a [facility]CC post hospital discharge follow up call. Pt discharged from outside facility [facility]. Pt reports he is establishing care with a provider at [facility] and will not be seeing Deidra Welch NP.

## 2025-02-09 ENCOUNTER — HOSPITAL ENCOUNTER (EMERGENCY)
Facility: HOSPITAL | Age: 55
Discharge: HOME | End: 2025-02-09
Attending: EMERGENCY MEDICINE
Payer: MEDICARE

## 2025-02-09 VITALS
BODY MASS INDEX: 31.39 KG/M2 | HEART RATE: 78 BPM | DIASTOLIC BLOOD PRESSURE: 73 MMHG | HEIGHT: 67 IN | RESPIRATION RATE: 15 BRPM | WEIGHT: 200 LBS | SYSTOLIC BLOOD PRESSURE: 113 MMHG | TEMPERATURE: 98 F | OXYGEN SATURATION: 98 %

## 2025-02-09 DIAGNOSIS — K85.90 ACUTE ON CHRONIC PANCREATITIS: ICD-10-CM

## 2025-02-09 DIAGNOSIS — R10.13 EPIGASTRIC PAIN: Primary | ICD-10-CM

## 2025-02-09 DIAGNOSIS — R07.9 CHEST PAIN: ICD-10-CM

## 2025-02-09 DIAGNOSIS — K86.1 ACUTE ON CHRONIC PANCREATITIS: ICD-10-CM

## 2025-02-09 LAB
ALBUMIN SERPL BCP-MCNC: 3.7 G/DL (ref 3.5–5)
ALBUMIN/GLOB SERPL: 1 {RATIO}
ALP SERPL-CCNC: 118 U/L (ref 40–150)
ALT SERPL W P-5'-P-CCNC: 8 U/L
ANION GAP SERPL CALCULATED.3IONS-SCNC: 19 MMOL/L (ref 7–16)
AST SERPL W P-5'-P-CCNC: 15 U/L (ref 5–34)
BASOPHILS # BLD AUTO: 0.06 K/UL (ref 0–0.2)
BASOPHILS NFR BLD AUTO: 0.7 % (ref 0–1)
BILIRUB SERPL-MCNC: 1.9 MG/DL
BUN SERPL-MCNC: 11 MG/DL (ref 8–26)
BUN/CREAT SERPL: 12 (ref 6–20)
CALCIUM SERPL-MCNC: 9.5 MG/DL (ref 8.4–10.2)
CHLORIDE SERPL-SCNC: 93 MMOL/L (ref 98–107)
CO2 SERPL-SCNC: 22 MMOL/L (ref 22–29)
CREAT SERPL-MCNC: 0.95 MG/DL (ref 0.72–1.25)
DIFFERENTIAL METHOD BLD: ABNORMAL
EGFR (NO RACE VARIABLE) (RUSH/TITUS): 95 ML/MIN/1.73M2
EOSINOPHIL # BLD AUTO: 0.09 K/UL (ref 0–0.5)
EOSINOPHIL NFR BLD AUTO: 1.1 % (ref 1–4)
ERYTHROCYTE [DISTWIDTH] IN BLOOD BY AUTOMATED COUNT: 12.6 % (ref 11.5–14.5)
GLOBULIN SER-MCNC: 3.7 G/DL (ref 2–4)
GLUCOSE SERPL-MCNC: 309 MG/DL (ref 74–100)
HCO3 UR-SCNC: 28.5 MMOL/L (ref 24–28)
HCT VFR BLD AUTO: 43.9 % (ref 40–54)
HCT VFR BLD CALC: 52 % (ref 35–51)
HGB BLD-MCNC: 14.1 G/DL (ref 13.5–18)
IMM GRANULOCYTES # BLD AUTO: 0.03 K/UL (ref 0–0.04)
IMM GRANULOCYTES NFR BLD: 0.4 % (ref 0–0.4)
INR BLD: 0.93
LDH SERPL L TO P-CCNC: 1.4 MMOL/L (ref 0.3–1.2)
LIPASE SERPL-CCNC: 156 U/L
LYMPHOCYTES # BLD AUTO: 1.61 K/UL (ref 1–4.8)
LYMPHOCYTES NFR BLD AUTO: 19.2 % (ref 27–41)
MCH RBC QN AUTO: 28.4 PG (ref 27–31)
MCHC RBC AUTO-ENTMCNC: 32.1 G/DL (ref 32–36)
MCV RBC AUTO: 88.5 FL (ref 80–96)
MONOCYTES # BLD AUTO: 0.35 K/UL (ref 0–0.8)
MONOCYTES NFR BLD AUTO: 4.2 % (ref 2–6)
MPC BLD CALC-MCNC: 9.8 FL (ref 9.4–12.4)
NEUTROPHILS # BLD AUTO: 6.26 K/UL (ref 1.8–7.7)
NEUTROPHILS NFR BLD AUTO: 74.4 % (ref 53–65)
NRBC # BLD AUTO: 0 X10E3/UL
NRBC, AUTO (.00): 0 %
NT-PROBNP SERPL-MCNC: 788 PG/ML (ref 1–125)
OHS QRS DURATION: 86 MS
OHS QTC CALCULATION: 426 MS
PCO2 BLDA: 42 MMHG (ref 41–51)
PH SMN: 7.44 [PH] (ref 7.32–7.42)
PLATELET # BLD AUTO: 290 K/UL (ref 150–400)
PO2 BLDA: 26 MMHG (ref 25–40)
POC BASE EXCESS: 3.8 MMOL/L (ref -2–3)
POC CO2: 29.8 MMOL/L
POC IONIZED CALCIUM: 1.13 MMOL/L (ref 1.15–1.35)
POC SATURATED O2: 51 % (ref 40–70)
POCT GLUCOSE: 294 MG/DL (ref 60–95)
POTASSIUM BLD-SCNC: 4 MMOL/L (ref 3.4–4.5)
POTASSIUM SERPL-SCNC: 4.1 MMOL/L (ref 3.5–5.1)
PROT SERPL-MCNC: 7.4 G/DL (ref 6.4–8.3)
PROTHROMBIN TIME: 12.4 SECONDS (ref 11.7–14.7)
RBC # BLD AUTO: 4.96 M/UL (ref 4.6–6.2)
SODIUM BLD-SCNC: 129 MMOL/L (ref 136–145)
SODIUM SERPL-SCNC: 130 MMOL/L (ref 136–145)
TROPONIN I SERPL HS-MCNC: 9.2 NG/L
WBC # BLD AUTO: 8.4 K/UL (ref 4.5–11)

## 2025-02-09 PROCEDURE — 82330 ASSAY OF CALCIUM: CPT

## 2025-02-09 PROCEDURE — 82947 ASSAY GLUCOSE BLOOD QUANT: CPT

## 2025-02-09 PROCEDURE — 85014 HEMATOCRIT: CPT

## 2025-02-09 PROCEDURE — 84295 ASSAY OF SERUM SODIUM: CPT

## 2025-02-09 PROCEDURE — 82803 BLOOD GASES ANY COMBINATION: CPT

## 2025-02-09 PROCEDURE — 83605 ASSAY OF LACTIC ACID: CPT

## 2025-02-09 PROCEDURE — 93005 ELECTROCARDIOGRAM TRACING: CPT

## 2025-02-09 PROCEDURE — 96375 TX/PRO/DX INJ NEW DRUG ADDON: CPT

## 2025-02-09 PROCEDURE — 25500020 PHARM REV CODE 255: Performed by: EMERGENCY MEDICINE

## 2025-02-09 PROCEDURE — 96376 TX/PRO/DX INJ SAME DRUG ADON: CPT

## 2025-02-09 PROCEDURE — 36415 COLL VENOUS BLD VENIPUNCTURE: CPT | Performed by: EMERGENCY MEDICINE

## 2025-02-09 PROCEDURE — 85610 PROTHROMBIN TIME: CPT | Performed by: EMERGENCY MEDICINE

## 2025-02-09 PROCEDURE — 84484 ASSAY OF TROPONIN QUANT: CPT | Performed by: EMERGENCY MEDICINE

## 2025-02-09 PROCEDURE — 63600175 PHARM REV CODE 636 W HCPCS: Performed by: EMERGENCY MEDICINE

## 2025-02-09 PROCEDURE — 85025 COMPLETE CBC W/AUTO DIFF WBC: CPT | Performed by: EMERGENCY MEDICINE

## 2025-02-09 PROCEDURE — 84132 ASSAY OF SERUM POTASSIUM: CPT

## 2025-02-09 PROCEDURE — 83690 ASSAY OF LIPASE: CPT | Performed by: EMERGENCY MEDICINE

## 2025-02-09 PROCEDURE — 99285 EMERGENCY DEPT VISIT HI MDM: CPT | Mod: 25

## 2025-02-09 PROCEDURE — 83880 ASSAY OF NATRIURETIC PEPTIDE: CPT | Performed by: EMERGENCY MEDICINE

## 2025-02-09 PROCEDURE — 96374 THER/PROPH/DIAG INJ IV PUSH: CPT

## 2025-02-09 PROCEDURE — 80053 COMPREHEN METABOLIC PANEL: CPT | Performed by: EMERGENCY MEDICINE

## 2025-02-09 PROCEDURE — 93010 ELECTROCARDIOGRAM REPORT: CPT | Mod: ,,, | Performed by: INTERNAL MEDICINE

## 2025-02-09 RX ORDER — MORPHINE SULFATE 4 MG/ML
4 INJECTION, SOLUTION INTRAMUSCULAR; INTRAVENOUS
Status: COMPLETED | OUTPATIENT
Start: 2025-02-09 | End: 2025-02-09

## 2025-02-09 RX ORDER — ONDANSETRON HYDROCHLORIDE 2 MG/ML
4 INJECTION, SOLUTION INTRAVENOUS
Status: COMPLETED | OUTPATIENT
Start: 2025-02-09 | End: 2025-02-09

## 2025-02-09 RX ORDER — PROMETHAZINE HYDROCHLORIDE 25 MG/1
25 SUPPOSITORY RECTAL EVERY 6 HOURS PRN
Qty: 12 SUPPOSITORY | Refills: 0 | Status: SHIPPED | OUTPATIENT
Start: 2025-02-09

## 2025-02-09 RX ORDER — IOPAMIDOL 755 MG/ML
100 INJECTION, SOLUTION INTRAVASCULAR
Status: COMPLETED | OUTPATIENT
Start: 2025-02-09 | End: 2025-02-09

## 2025-02-09 RX ORDER — HYDROCODONE BITARTRATE AND ACETAMINOPHEN 10; 325 MG/1; MG/1
1 TABLET ORAL EVERY 6 HOURS PRN
Qty: 16 TABLET | Refills: 0 | Status: SHIPPED | OUTPATIENT
Start: 2025-02-09

## 2025-02-09 RX ORDER — ONDANSETRON 4 MG/1
4 TABLET, ORALLY DISINTEGRATING ORAL EVERY 6 HOURS PRN
Qty: 20 TABLET | Refills: 1 | Status: SHIPPED | OUTPATIENT
Start: 2025-02-09

## 2025-02-09 RX ADMIN — ONDANSETRON 4 MG: 2 INJECTION INTRAMUSCULAR; INTRAVENOUS at 02:02

## 2025-02-09 RX ADMIN — MORPHINE SULFATE 4 MG: 4 INJECTION, SOLUTION INTRAMUSCULAR; INTRAVENOUS at 06:02

## 2025-02-09 RX ADMIN — IOPAMIDOL 100 ML: 755 INJECTION, SOLUTION INTRAVENOUS at 04:02

## 2025-02-09 RX ADMIN — MORPHINE SULFATE 4 MG: 4 INJECTION, SOLUTION INTRAMUSCULAR; INTRAVENOUS at 02:02

## 2025-02-09 NOTE — ED PROVIDER NOTES
Encounter Date: 2/9/2025       History     Chief Complaint   Patient presents with    Chest Pain    Abdominal Pain     A 54-year-old male patient came to the emergency department complaining of chest pain and abdominal pain.  The patient is known to have history of chronic pancreatitis.  His symptoms started yesterday and gradually increased.  The patient feels that these are the same symptoms of his acute episode of his chronic pancreatitis.    The history is provided by the patient. No  was used.     Review of patient's allergies indicates:   Allergen Reactions    Ticagrelor Shortness Of Breath    Vancomycin analogues Other (See Comments)     Patient experienced adverse effect, and had some renal insufficiency after receiving Vancomycin.     Past Medical History:   Diagnosis Date    Chronic pancreatitis, unspecified pancreatitis type     Essential (primary) hypertension     Hidradenitis suppurativa 06/10/2024    Hypertensive heart disease without heart failure 08/26/2024    Mixed hyperlipidemia     Multiple vessel coronary artery disease     Pancreatic cyst 06/26/2024    Rapid appearance and in head of pancreas.       Type 2 diabetes mellitus treated with insulin      Past Surgical History:   Procedure Laterality Date    CARDIAC SURGERY      Stents x6    CHOLECYSTECTOMY      EXCISION OF HIDRADENITIS N/A 3/27/2024    Procedure: EXCISION, HIDRADENITIS;  Surgeon: Fidel Park MD;  Location: [facility] OR;  Service: General;  Laterality: N/A;    INCISION OF PERIANAL ABSCESS Bilateral 2/16/2024    Procedure: INCISION, ABSCESS, PERIANAL;  Surgeon: Fidel Park MD;  Location: [facility] OR;  Service: General;  Laterality: Bilateral;    UNDESCENDED TESTICLE EXPLORATION N/A      Family History   Problem Relation Name Age of Onset    Heart disease Father      Hypertension Father      Heart disease Brother      Heart disease Maternal Grandmother       Social History     Tobacco Use    Smoking status:  Some Days     Current packs/day: 0.25     Average packs/day: 0.3 packs/day for 21.1 years (5.3 ttl pk-yrs)     Types: Cigarettes, Cigars     Start date: 2004     Passive exposure: Never    Smokeless tobacco: Never   Substance Use Topics    Alcohol use: Not Currently     Comment: occasional drink previous heavy drinker quit heavily in 2001    Drug use: Never     Review of Systems   Constitutional:  Negative for fever.   HENT:  Negative for sore throat.    Respiratory:  Negative for shortness of breath.    Cardiovascular:  Negative for chest pain.   Gastrointestinal:  Positive for abdominal pain and nausea.   Genitourinary:  Negative for dysuria.   Musculoskeletal:  Negative for back pain.   Skin:  Negative for rash.   Neurological:  Negative for weakness.   Hematological:  Does not bruise/bleed easily.   All other systems reviewed and are negative.      Physical Exam     Initial Vitals   BP Pulse Resp Temp SpO2   02/09/25 0217 02/09/25 0217 02/09/25 0217 02/09/25 0216 02/09/25 0217   (!) 147/108 101 16 97.8 °F (36.6 °C) 100 %      MAP       --                Physical Exam    Nursing note and vitals reviewed.  Constitutional: He appears well-developed and well-nourished.   HENT:   Head: Normocephalic and atraumatic.   Eyes: EOM are normal. Pupils are equal, round, and reactive to light.   Neck: Neck supple.   Normal range of motion.  Cardiovascular:  Normal rate, regular rhythm, normal heart sounds and intact distal pulses.           No murmur heard.  Pulmonary/Chest: Breath sounds normal. No respiratory distress. He has no wheezes.   Abdominal: Abdomen is soft. Bowel sounds are normal. There is abdominal tenderness (Epigastric).   Musculoskeletal:         General: No tenderness or edema. Normal range of motion.      Cervical back: Normal range of motion and neck supple.     Neurological: He is alert and oriented to person, place, and time. He has normal strength and normal reflexes. No cranial nerve deficit or  sensory deficit. GCS score is 15. GCS eye subscore is 4. GCS verbal subscore is 5. GCS motor subscore is 6.   Skin: Skin is warm and dry. Capillary refill takes less than 2 seconds. No rash noted.   Psychiatric: He has a normal mood and affect.         Medical Screening Exam   See Full Note    ED Course   Procedures  Labs Reviewed   COMPREHENSIVE METABOLIC PANEL - Abnormal       Result Value    Sodium 130 (*)     Potassium 4.1      Chloride 93 (*)     CO2 22      Anion Gap 19 (*)     Glucose 309 (*)     BUN 11      Creatinine 0.95      BUN/Creatinine Ratio 12      Calcium 9.5      Total Protein 7.4      Albumin 3.7      Globulin 3.7      A/G Ratio 1.0      Bilirubin, Total 1.9 (*)     Alk Phos 118      ALT 8      AST 15      eGFR 95     NT-PRO NATRIURETIC PEPTIDE - Abnormal    ProBNP 788 (*)    LIPASE - Abnormal    Lipase 156 (*)    CBC WITH DIFFERENTIAL - Abnormal    WBC 8.40      RBC 4.96      Hemoglobin 14.1      Hematocrit 43.9      MCV 88.5      MCH 28.4      MCHC 32.1      RDW 12.6      Platelet Count 290      MPV 9.8      Neutrophils % 74.4 (*)     Lymphocytes % 19.2 (*)     Monocytes % 4.2      Eosinophils % 1.1      Basophils % 0.7      Immature Granulocytes % 0.4      nRBC, Auto 0.0      Neutrophils, Abs 6.26      Lymphocytes, Absolute 1.61      Monocytes, Absolute 0.35      Eosinophils, Absolute 0.09      Basophils, Absolute 0.06      Immature Granulocytes, Absolute 0.03      nRBC, Absolute 0.00      Diff Type Auto     TROPONIN I - Normal    Troponin I High Sensitivity 9.2     PROTIME-INR - Normal    PT 12.4      INR 0.93     CBC W/ AUTO DIFFERENTIAL    Narrative:     The following orders were created for panel order CBC auto differential.  Procedure                               Abnormality         Status                     ---------                               -----------         ------                     CBC with Differential[[id number]]       Abnormal            Final result                 Please  view results for these tests on the individual orders.          Imaging Results               CT Abdomen Pelvis With IV Contrast NO Oral Contrast (Final result)  Result time 02/09/25 07:39:15      Final result by Barbara Mcclellan MD (02/09/25 07:39:15)                   Impression:      Acute on chronic pancreatitis, likely necrotizing, with adjacent fluid collections measuring up to 2 cm.    Reactive changes of the stomach and duodenum.    Stable, indeterminate right renal 2.5 cm lesion.  Recommend nonemergent renal ultrasound for further characterization.    This report was flagged in Epic as abnormal.  Please note a preliminary report was not available at the time of this dictation.      Electronically signed by: Barbara Mcclellan  Date:    02/09/2025  Time:    07:39               Narrative:    EXAMINATION:  CT ABDOMEN PELVIS WITH IV CONTRAST    CLINICAL HISTORY:  Pancreatitis, acute, severe;    TECHNIQUE:  Low dose axial images, sagittal and coronal reformations were obtained from the lung bases to the pubic symphysis following the IV administration of 100 mL of Isovue 370 .  Oral contrast was not given.    COMPARISON:  Multiple priors, most recent 09/24/2024    FINDINGS:  Mild cardiomegaly.  Lung bases are clear.    Liver is normal in morphology and with smooth contour.  No focal hepatic lesion.  The portal, splenic and superior mesenteric veins are patent.    The spleen and adrenal glands are normal.  Right renal cysts.  Stable right superior pole 2.5 renal lesion measuring intermediate attenuation.  No hydronephrosis.  Mild intrahepatic biliary ductal dilatation.    Sequelae of chronic pancreatitis.  Peripancreatic fluid predominantly about the pancreatic head and proximal body.  Multiple fluid collections adjacent to the pancreas, largest as follows: Anterior to the pancreatic there is a 17 fluid collection (series 2, image 57) and inferior to the pancreas there is a fluid collection (series 2, image 60).  Edema  and hypoenhancement of the pancreatic head.    Distal stomach and duodenal mucosal enhancement and surrounding edema.    Abdominal aorta normal in caliber with atherosclerotic calcification.  No enlarged retroperitoneal lymph nodes.  Multiple prominent peripancreatic lymph nodes no bowel obstruction no free air.    Bladder is smooth walled.  Prostate size is normal.  No enlarged pelvic nodes.  Stable ectasia mm.    No acute or aggressive osseous abnormality.                                       X-Ray Chest AP Portable (Final result)  Result time 02/09/25 06:29:03      Final result by Barbara Mcclellan MD (02/09/25 06:29:03)                   Impression:      No acute cardiopulmonary abnormality.      Electronically signed by: Barbara Mcclellan  Date:    02/09/2025  Time:    06:29               Narrative:    EXAMINATION:  XR CHEST AP PORTABLE    CLINICAL HISTORY:  Chest Pain;    TECHNIQUE:  Single view of the chest was obtained.    COMPARISON:  Multiple priors, most recent 06/06/2024    FINDINGS:  Normal cardiomediastinal contour. No focal consolidation, pleural effusion or pneumothorax.                                       Medications   morphine injection 4 mg (4 mg Intravenous Given 2/9/25 0248)   ondansetron injection 4 mg (4 mg Intravenous Given 2/9/25 0248)   iopamidoL (ISOVUE-370) injection 100 mL (100 mLs Intravenous Given 2/9/25 0459)   morphine injection 4 mg (4 mg Intravenous Given 2/9/25 0609)     Medical Decision Making  Amount and/or Complexity of Data Reviewed  Labs: ordered.  Radiology: ordered.    Risk  Prescription drug management.               ED Course as of 02/09/25 0843   Sun Feb 09, 2025   0637 Pancreatitis, awaiting CT report. [BB]   0641 Medical decision-making:  Differential diagnosis includes abdominal pain, pancreatitis, gastroenteritis, intra-abdominal abscess, pancreatic pseudocyst.  All testing ordered and interpreted by me. [BB]   0641 Awaiting CT report [BB]   0744 CT abdomen shows acute  on chronic pancreatitis with adjacent fluid collections.  Possibly necrotic pancreatitis. [BB]   0744 Venous blood gas shows no acidosis.  Lipase is mildly elevated at 156. [BB]   0745 Troponin is normal.  Pro BNP is mildly elevated.  CBC is normal.  CMP is normal except mild hyponatremia and hyperglycemia. [BB]   0840 On my exam patient states he is feeling much better.  Ready for discharge home now. [BB]      ED Course User Index  [BB] Jason Flores MD                           Clinical Impression:   Final diagnoses:  [R07.9] Chest pain  [R10.13] Epigastric pain (Primary)  [K85.90, K86.1] Acute on chronic pancreatitis        ED Disposition Condition    Discharge Stable          ED Prescriptions       Medication Sig Dispense Start Date End Date Auth. Provider    ondansetron (ZOFRAN-ODT) 4 MG TbDL Take 1 tablet (4 mg total) by mouth every 6 (six) hours as needed (P.r.n. nausea or vomiting). 20 tablet 2/9/2025 -- Jason Flores MD    promethazine (PHENERGAN) 25 MG suppository Place 1 suppository (25 mg total) rectally every 6 (six) hours as needed for Nausea (P.r.n. vomiting if unable to tolerate oral medication). 12 suppository 2/9/2025 -- Jason Flores MD    HYDROcodone-acetaminophen (NORCO)  mg per tablet Take 1 tablet by mouth every 6 (six) hours as needed. 16 tablet 2/9/2025 -- Jason Flores MD          Follow-up Information    None          Jason Flores MD  02/09/25 1589

## 2025-02-09 NOTE — DISCHARGE INSTRUCTIONS
Take medication as prescribed.  Clear liquid diet until symptoms improve.  Return to emergency department for any worsening or further problems.

## 2025-02-10 ENCOUNTER — TELEPHONE (OUTPATIENT)
Dept: EMERGENCY MEDICINE | Facility: HOSPITAL | Age: 55
End: 2025-02-10
Payer: MEDICARE

## 2025-05-01 ENCOUNTER — HOSPITAL ENCOUNTER (EMERGENCY)
Facility: HOSPITAL | Age: 55
Discharge: HOME | End: 2025-05-02
Attending: EMERGENCY MEDICINE
Payer: MEDICARE

## 2025-05-01 DIAGNOSIS — R07.9 CHEST PAIN: ICD-10-CM

## 2025-05-01 DIAGNOSIS — R10.9 ABDOMINAL PAIN, UNSPECIFIED ABDOMINAL LOCATION: Primary | ICD-10-CM

## 2025-05-01 PROCEDURE — 93010 ELECTROCARDIOGRAM REPORT: CPT | Mod: ,,, | Performed by: HOSPITALIST

## 2025-05-01 PROCEDURE — 99284 EMERGENCY DEPT VISIT MOD MDM: CPT | Mod: 25

## 2025-05-01 PROCEDURE — 83690 ASSAY OF LIPASE: CPT | Performed by: EMERGENCY MEDICINE

## 2025-05-01 PROCEDURE — 80053 COMPREHEN METABOLIC PANEL: CPT | Performed by: EMERGENCY MEDICINE

## 2025-05-01 PROCEDURE — 85025 COMPLETE CBC W/AUTO DIFF WBC: CPT | Performed by: EMERGENCY MEDICINE

## 2025-05-01 PROCEDURE — 93005 ELECTROCARDIOGRAM TRACING: CPT

## 2025-05-01 RX ORDER — ONDANSETRON HYDROCHLORIDE 2 MG/ML
4 INJECTION, SOLUTION INTRAVENOUS
Status: COMPLETED | OUTPATIENT
Start: 2025-05-01 | End: 2025-05-02

## 2025-05-01 RX ORDER — HYDROMORPHONE HYDROCHLORIDE 2 MG/ML
1 INJECTION, SOLUTION INTRAMUSCULAR; INTRAVENOUS; SUBCUTANEOUS
Refills: 0 | Status: COMPLETED | OUTPATIENT
Start: 2025-05-01 | End: 2025-05-02

## 2025-05-01 RX ORDER — FAMOTIDINE 10 MG/ML
40 INJECTION, SOLUTION INTRAVENOUS
Status: COMPLETED | OUTPATIENT
Start: 2025-05-01 | End: 2025-05-02

## 2025-05-02 ENCOUNTER — TELEPHONE (OUTPATIENT)
Dept: EMERGENCY MEDICINE | Facility: HOSPITAL | Age: 55
End: 2025-05-02
Payer: MEDICARE

## 2025-05-02 VITALS
SYSTOLIC BLOOD PRESSURE: 174 MMHG | HEART RATE: 90 BPM | TEMPERATURE: 99 F | WEIGHT: 200 LBS | OXYGEN SATURATION: 96 % | RESPIRATION RATE: 18 BRPM | HEIGHT: 67 IN | BODY MASS INDEX: 31.39 KG/M2 | DIASTOLIC BLOOD PRESSURE: 105 MMHG

## 2025-05-02 LAB
ALBUMIN SERPL BCP-MCNC: 4 G/DL (ref 3.5–5)
ALBUMIN/GLOB SERPL: 1.1 {RATIO}
ALP SERPL-CCNC: 97 U/L (ref 40–150)
ALT SERPL W P-5'-P-CCNC: 15 U/L
ANION GAP SERPL CALCULATED.3IONS-SCNC: 22 MMOL/L (ref 7–16)
AST SERPL W P-5'-P-CCNC: 20 U/L (ref 11–45)
BACTERIA #/AREA URNS HPF: ABNORMAL /HPF
BASOPHILS # BLD AUTO: 0.06 K/UL (ref 0–0.2)
BASOPHILS NFR BLD AUTO: 0.7 % (ref 0–1)
BILIRUB SERPL-MCNC: 1 MG/DL
BILIRUB UR QL STRIP: NEGATIVE
BUN SERPL-MCNC: 13 MG/DL (ref 8–26)
BUN/CREAT SERPL: 12 (ref 6–20)
CALCIUM SERPL-MCNC: 9 MG/DL (ref 8.4–10.2)
CHLORIDE SERPL-SCNC: 102 MMOL/L (ref 98–107)
CLARITY UR: CLEAR
CO2 SERPL-SCNC: 20 MMOL/L (ref 22–29)
COLOR UR: ABNORMAL
CREAT SERPL-MCNC: 1.05 MG/DL (ref 0.72–1.25)
DIFFERENTIAL METHOD BLD: ABNORMAL
EGFR (NO RACE VARIABLE) (RUSH/TITUS): 83 ML/MIN/1.73M2
EOSINOPHIL # BLD AUTO: 0.06 K/UL (ref 0–0.5)
EOSINOPHIL NFR BLD AUTO: 0.7 % (ref 1–4)
ERYTHROCYTE [DISTWIDTH] IN BLOOD BY AUTOMATED COUNT: 13.2 % (ref 11.5–14.5)
GLOBULIN SER-MCNC: 3.8 G/DL (ref 2–4)
GLUCOSE SERPL-MCNC: 218 MG/DL (ref 74–100)
GLUCOSE UR STRIP-MCNC: 150 MG/DL
HCT VFR BLD AUTO: 43.1 % (ref 40–54)
HGB BLD-MCNC: 14.2 G/DL (ref 13.5–18)
IMM GRANULOCYTES # BLD AUTO: 0.02 K/UL (ref 0–0.04)
IMM GRANULOCYTES NFR BLD: 0.2 % (ref 0–0.4)
KETONES UR STRIP-SCNC: 10 MG/DL
LEUKOCYTE ESTERASE UR QL STRIP: NEGATIVE
LIPASE SERPL-CCNC: 54 U/L
LYMPHOCYTES # BLD AUTO: 1.74 K/UL (ref 1–4.8)
LYMPHOCYTES NFR BLD AUTO: 19.2 % (ref 27–41)
MCH RBC QN AUTO: 28.6 PG (ref 27–31)
MCHC RBC AUTO-ENTMCNC: 32.9 G/DL (ref 32–36)
MCV RBC AUTO: 86.9 FL (ref 80–96)
MONOCYTES # BLD AUTO: 0.4 K/UL (ref 0–0.8)
MONOCYTES NFR BLD AUTO: 4.4 % (ref 2–6)
MPC BLD CALC-MCNC: 10.1 FL (ref 9.4–12.4)
MUCOUS, UA: ABNORMAL /LPF
NEUTROPHILS # BLD AUTO: 6.77 K/UL (ref 1.8–7.7)
NEUTROPHILS NFR BLD AUTO: 74.8 % (ref 53–65)
NITRITE UR QL STRIP: NEGATIVE
NRBC # BLD AUTO: 0 X10E3/UL
NRBC, AUTO (.00): 0 %
PH UR STRIP: 6 PH UNITS
PLATELET # BLD AUTO: 311 K/UL (ref 150–400)
POTASSIUM SERPL-SCNC: 4 MMOL/L (ref 3.5–5.1)
PROT SERPL-MCNC: 7.8 G/DL (ref 6.4–8.3)
PROT UR QL STRIP: 300
RBC # BLD AUTO: 4.96 M/UL (ref 4.6–6.2)
RBC # UR STRIP: ABNORMAL /UL
RBC #/AREA URNS HPF: 1 /HPF
SODIUM SERPL-SCNC: 140 MMOL/L (ref 136–145)
SP GR UR STRIP: 1.03
SQUAMOUS #/AREA URNS LPF: ABNORMAL /HPF
UROBILINOGEN UR STRIP-ACNC: NORMAL MG/DL
WBC # BLD AUTO: 9.05 K/UL (ref 4.5–11)
WBC #/AREA URNS HPF: 2 /HPF

## 2025-05-02 PROCEDURE — 96372 THER/PROPH/DIAG INJ SC/IM: CPT | Performed by: EMERGENCY MEDICINE

## 2025-05-02 PROCEDURE — 96374 THER/PROPH/DIAG INJ IV PUSH: CPT

## 2025-05-02 PROCEDURE — 81003 URINALYSIS AUTO W/O SCOPE: CPT | Performed by: EMERGENCY MEDICINE

## 2025-05-02 PROCEDURE — 96375 TX/PRO/DX INJ NEW DRUG ADDON: CPT

## 2025-05-02 PROCEDURE — 63600175 PHARM REV CODE 636 W HCPCS: Mod: JZ,TB | Performed by: EMERGENCY MEDICINE

## 2025-05-02 RX ORDER — DICYCLOMINE HYDROCHLORIDE 20 MG/1
20 TABLET ORAL 2 TIMES DAILY
Qty: 20 TABLET | Refills: 0 | Status: SHIPPED | OUTPATIENT
Start: 2025-05-02 | End: 2025-05-02

## 2025-05-02 RX ORDER — PROCHLORPERAZINE 25 MG/1
25 SUPPOSITORY RECTAL EVERY 12 HOURS PRN
Qty: 10 SUPPOSITORY | Refills: 0 | Status: SHIPPED | OUTPATIENT
Start: 2025-05-02

## 2025-05-02 RX ORDER — KETOROLAC TROMETHAMINE 30 MG/ML
30 INJECTION, SOLUTION INTRAMUSCULAR; INTRAVENOUS
Status: COMPLETED | OUTPATIENT
Start: 2025-05-02 | End: 2025-05-02

## 2025-05-02 RX ORDER — DICYCLOMINE HYDROCHLORIDE 20 MG/1
20 TABLET ORAL 2 TIMES DAILY
Qty: 20 TABLET | Refills: 0 | Status: SHIPPED | OUTPATIENT
Start: 2025-05-02 | End: 2025-06-01

## 2025-05-02 RX ORDER — DICYCLOMINE HYDROCHLORIDE 10 MG/ML
20 INJECTION INTRAMUSCULAR
Status: COMPLETED | OUTPATIENT
Start: 2025-05-02 | End: 2025-05-02

## 2025-05-02 RX ORDER — PROCHLORPERAZINE 25 MG/1
25 SUPPOSITORY RECTAL EVERY 12 HOURS PRN
Qty: 10 SUPPOSITORY | Refills: 0 | Status: SHIPPED | OUTPATIENT
Start: 2025-05-02 | End: 2025-05-02

## 2025-05-02 RX ADMIN — KETOROLAC TROMETHAMINE 30 MG: 30 INJECTION, SOLUTION INTRAMUSCULAR at 02:05

## 2025-05-02 RX ADMIN — DICYCLOMINE HYDROCHLORIDE 20 MG: 10 INJECTION, SOLUTION INTRAMUSCULAR at 03:05

## 2025-05-02 RX ADMIN — FAMOTIDINE 40 MG: 10 INJECTION, SOLUTION INTRAVENOUS at 12:05

## 2025-05-02 RX ADMIN — HYDROMORPHONE HYDROCHLORIDE 1 MG: 2 INJECTION, SOLUTION INTRAMUSCULAR; INTRAVENOUS; SUBCUTANEOUS at 12:05

## 2025-05-02 RX ADMIN — ONDANSETRON 4 MG: 2 INJECTION INTRAMUSCULAR; INTRAVENOUS at 12:05

## 2025-05-02 NOTE — ED TRIAGE NOTES
Patient presents to ed via pov with cc of abdominal pain that radiates to his back and chest. States it started yesterday and feels like a pancreatitis flare up. Patient has chronic pancreatitis.

## 2025-05-02 NOTE — ED PROVIDER NOTES
Encounter Date: 5/1/2025    SCRIBE #1 NOTE: I, Lorena Alberts, am scribing for, and in the presence of,  Oziel Weiner MD. I have scribed the entire note.       History     Chief Complaint   Patient presents with    Abdominal Pain    Nausea     This is a 56 y/o male,who presents to the ED with complaints of a pancreatitis flare up. He has a known hx of chronic pancreatitis. He reports he has been having abdominal pain which radiates into his chest and back. He reports he has been nauseated but he has not vomited. He states he has not had any alcohol recently. He had labs drawn 2 weeks ago which were normal There are no other complaints/pain in the ED at this time. He has a known hx of HS, HTN, diabetes and Hyperlipidemia. He has had a cardiac surgery with 6 stents. He is a former smoker.     The history is provided by the patient and the spouse.     Review of patient's allergies indicates:   Allergen Reactions    Ticagrelor Shortness Of Breath    Vancomycin analogues Other (See Comments)     Patient experienced adverse effect, and had some renal insufficiency after receiving Vancomycin.     Past Medical History:   Diagnosis Date    Chronic pancreatitis, unspecified pancreatitis type     Essential (primary) hypertension     Hidradenitis suppurativa 06/10/2024    Hypertensive heart disease 06/14/2024    per echo  severe LVH    Mixed hyperlipidemia     Multiple vessel coronary artery disease     Pancreatic pseudocyst     Type 2 diabetes mellitus treated with insulin      Past Surgical History:   Procedure Laterality Date    CARDIAC SURGERY      Stents x6    CHOLECYSTECTOMY      EXCISION OF HIDRADENITIS N/A 3/27/2024    Procedure: EXCISION, HIDRADENITIS;  Surgeon: Fidel Park MD;  Location: [facility] OR;  Service: General;  Laterality: N/A;    INCISION OF PERIANAL ABSCESS Bilateral 2/16/2024    Procedure: INCISION, ABSCESS, PERIANAL;  Surgeon: Fidel Park MD;  Location: [facility] OR;  Service: General;   Laterality: Bilateral;    UNDESCENDED TESTICLE EXPLORATION N/A      Family History   Problem Relation Name Age of Onset    Heart disease Father      Hypertension Father      Heart disease Brother      Heart disease Maternal Grandmother       Social History[1]  Review of Systems   Cardiovascular:  Positive for chest pain.   Gastrointestinal:  Positive for abdominal pain, nausea and vomiting. Negative for diarrhea.   All other systems reviewed and are negative.      Physical Exam     Initial Vitals   BP Pulse Resp Temp SpO2   05/01/25 2322 05/01/25 2318 05/01/25 2318 05/01/25 2317 05/01/25 2318   (!) 197/117 103 16 99.3 °F (37.4 °C) 100 %      MAP       --                Physical Exam    Nursing note and vitals reviewed.  Constitutional: He appears well-developed and well-nourished.   HENT:   Head: Normocephalic and atraumatic.   Eyes: EOM are normal. Pupils are equal, round, and reactive to light.   Neck: Neck supple. No thyromegaly present.   Normal range of motion.  Cardiovascular:  Regular rhythm, normal heart sounds and intact distal pulses.           No murmur heard.  Pt is tachycardia.    Pulmonary/Chest: Breath sounds normal. No respiratory distress. He has no wheezes.   Abdominal: Abdomen is soft. Bowel sounds are normal. There is abdominal tenderness (Epigastric tenderness.).   Musculoskeletal:         General: No tenderness or edema. Normal range of motion.      Cervical back: Normal range of motion and neck supple.     Lymphadenopathy:     He has no cervical adenopathy.   Neurological: He is alert and oriented to person, place, and time. He has normal strength and normal reflexes. No cranial nerve deficit or sensory deficit. GCS score is 15. GCS eye subscore is 4. GCS verbal subscore is 5. GCS motor subscore is 6.   Skin: Skin is warm and dry. Capillary refill takes less than 2 seconds. No rash noted.   Psychiatric: He has a normal mood and affect.         ED Course   Procedures  Labs Reviewed    COMPREHENSIVE METABOLIC PANEL - Abnormal       Result Value    Sodium 140      Potassium 4.0      Chloride 102      CO2 20 (*)     Anion Gap 22 (*)     Glucose 218 (*)     BUN 13      Creatinine 1.05      BUN/Creatinine Ratio 12      Calcium 9.0      Total Protein 7.8      Albumin 4.0      Globulin 3.8      A/G Ratio 1.1      Bilirubin, Total 1.0      Alk Phos 97      ALT 15      AST 20      eGFR 83     URINALYSIS, REFLEX TO URINE CULTURE - Abnormal    Color, UA Light Yellow      Clarity, UA Clear      pH, UA 6.0      Leukocytes, UA Negative      Nitrites, UA Negative      Protein,  (*)     Glucose,  (*)     Ketones, UA 10 (*)     Urobilinogen, UA Normal      Bilirubin, UA Negative      Blood, UA Trace (*)     Specific Gravity, UA 1.026     CBC WITH DIFFERENTIAL - Abnormal    WBC 9.05      RBC 4.96      Hemoglobin 14.2      Hematocrit 43.1      MCV 86.9      MCH 28.6      MCHC 32.9      RDW 13.2      Platelet Count 311      MPV 10.1      Neutrophils % 74.8 (*)     Lymphocytes % 19.2 (*)     Monocytes % 4.4      Eosinophils % 0.7 (*)     Basophils % 0.7      Immature Granulocytes % 0.2      nRBC, Auto 0.0      Neutrophils, Abs 6.77      Lymphocytes, Absolute 1.74      Monocytes, Absolute 0.40      Eosinophils, Absolute 0.06      Basophils, Absolute 0.06      Immature Granulocytes, Absolute 0.02      nRBC, Absolute 0.00      Diff Type Auto     URINALYSIS, MICROSCOPIC - Abnormal    WBC, UA 2      RBC, UA 1      Bacteria, UA Occasional (*)     Squamous Epithelial Cells, UA Occasional (*)     Mucous Occasional (*)    LIPASE - Normal    Lipase 54     CBC W/ AUTO DIFFERENTIAL    Narrative:     The following orders were created for panel order CBC W/ AUTO DIFFERENTIAL.  Procedure                               Abnormality         Status                     ---------                               -----------         ------                     CBC with Differential[[id number]]       Abnormal            Final  result                 Please view results for these tests on the individual orders.          Imaging Results    None          Medications   HYDROmorphone (PF) injection 1 mg (1 mg Intravenous Given 5/2/25 0001)   ondansetron injection 4 mg (4 mg Intravenous Given 5/2/25 0002)   famotidine (PF) injection 40 mg (40 mg Intravenous Given 5/2/25 0002)   ketorolac injection 30 mg (30 mg Intravenous Given 5/2/25 0205)   dicyclomine injection 20 mg (20 mg Intramuscular Given 5/2/25 0310)     Medical Decision Making  56 y/o male,who presents to the ED with complaints of a pancreatitis flare up. He has a known hx of chronic pancreatitis .  Physical examination showed epigastric tenderness          Attending Attestation:           Physician Attestation for Scribe:  Physician Attestation Statement for Scribe #1: I, Oziel Weiner MD, reviewed documentation, as scribed by Lorena Alberts in my presence, and it is both accurate and complete.                                    Clinical Impression:  Final diagnoses:  [R10.9] Abdominal pain, unspecified abdominal location - History of pancreatitis (Primary)                     [1]   Social History  Tobacco Use    Smoking status: Some Days     Current packs/day: 0.25     Average packs/day: 0.3 packs/day for 21.3 years (5.3 ttl pk-yrs)     Types: Cigarettes, Cigars     Start date: 2004     Passive exposure: Never    Smokeless tobacco: Never   Substance Use Topics    Alcohol use: Not Currently     Comment: occasional drink previous heavy drinker quit heavily in 2001    Drug use: Never        Oziel Weiner MD  05/02/25 6124

## 2025-05-02 NOTE — ED NOTES
Pt stated that he has difficulty with the contrast when he gets CT scans with contrast. He said that he will have a detailed MRI in a few days and the ct scan may not be necessary. Md made aware, Md wants to wait and see how ketorolac does for patient. Call bell within pt reach. Instructed pt to press button for any needs.

## 2025-05-03 LAB
OHS QRS DURATION: 88 MS
OHS QTC CALCULATION: 431 MS

## 2025-06-03 ENCOUNTER — HOSPITAL ENCOUNTER (EMERGENCY)
Facility: HOSPITAL | Age: 55
Discharge: HOME | End: 2025-06-03
Attending: EMERGENCY MEDICINE
Payer: MEDICARE

## 2025-06-03 VITALS
TEMPERATURE: 98 F | RESPIRATION RATE: 18 BRPM | HEIGHT: 67 IN | SYSTOLIC BLOOD PRESSURE: 170 MMHG | DIASTOLIC BLOOD PRESSURE: 112 MMHG | BODY MASS INDEX: 27.47 KG/M2 | OXYGEN SATURATION: 98 % | HEART RATE: 81 BPM | WEIGHT: 175 LBS

## 2025-06-03 DIAGNOSIS — R11.2 NAUSEA VOMITING AND DIARRHEA: Primary | ICD-10-CM

## 2025-06-03 DIAGNOSIS — R19.7 NAUSEA VOMITING AND DIARRHEA: Primary | ICD-10-CM

## 2025-06-03 LAB
ALBUMIN SERPL BCP-MCNC: 3.7 G/DL (ref 3.5–5)
ALBUMIN/GLOB SERPL: 1 {RATIO}
ALP SERPL-CCNC: 94 U/L (ref 40–150)
ALT SERPL W P-5'-P-CCNC: 10 U/L
ANION GAP SERPL CALCULATED.3IONS-SCNC: 14 MMOL/L (ref 7–16)
AST SERPL W P-5'-P-CCNC: 14 U/L (ref 11–45)
BASOPHILS # BLD AUTO: 0.04 K/UL (ref 0–0.2)
BASOPHILS NFR BLD AUTO: 0.5 % (ref 0–1)
BILIRUB SERPL-MCNC: 0.4 MG/DL
BILIRUB UR QL STRIP: NEGATIVE
BUN SERPL-MCNC: 11 MG/DL (ref 8–26)
BUN/CREAT SERPL: 9 (ref 6–20)
CALCIUM SERPL-MCNC: 9.3 MG/DL (ref 8.4–10.2)
CHLORIDE SERPL-SCNC: 106 MMOL/L (ref 98–107)
CLARITY UR: CLEAR
CO2 SERPL-SCNC: 24 MMOL/L (ref 22–29)
COLOR UR: ABNORMAL
CREAT SERPL-MCNC: 1.2 MG/DL (ref 0.72–1.25)
DIFFERENTIAL METHOD BLD: NORMAL
EGFR (NO RACE VARIABLE) (RUSH/TITUS): 71 ML/MIN/1.73M2
EOSINOPHIL # BLD AUTO: 0.14 K/UL (ref 0–0.5)
EOSINOPHIL NFR BLD AUTO: 1.9 % (ref 1–4)
ERYTHROCYTE [DISTWIDTH] IN BLOOD BY AUTOMATED COUNT: 13.1 % (ref 11.5–14.5)
GLOBULIN SER-MCNC: 3.8 G/DL (ref 2–4)
GLUCOSE SERPL-MCNC: 200 MG/DL (ref 74–100)
GLUCOSE UR STRIP-MCNC: >1000 MG/DL
HCT VFR BLD AUTO: 41.8 % (ref 40–54)
HGB BLD-MCNC: 13.7 G/DL (ref 13.5–18)
IMM GRANULOCYTES # BLD AUTO: 0.01 K/UL (ref 0–0.04)
IMM GRANULOCYTES NFR BLD: 0.1 % (ref 0–0.4)
KETONES UR STRIP-SCNC: NEGATIVE MG/DL
LEUKOCYTE ESTERASE UR QL STRIP: NEGATIVE
LIPASE SERPL-CCNC: 77 U/L
LYMPHOCYTES # BLD AUTO: 2.42 K/UL (ref 1–4.8)
LYMPHOCYTES NFR BLD AUTO: 32.9 % (ref 27–41)
MCH RBC QN AUTO: 28.7 PG (ref 27–31)
MCHC RBC AUTO-ENTMCNC: 32.8 G/DL (ref 32–36)
MCV RBC AUTO: 87.6 FL (ref 80–96)
MONOCYTES # BLD AUTO: 0.39 K/UL (ref 0–0.8)
MONOCYTES NFR BLD AUTO: 5.3 % (ref 2–6)
MPC BLD CALC-MCNC: 9.6 FL (ref 9.4–12.4)
NEUTROPHILS # BLD AUTO: 4.35 K/UL (ref 1.8–7.7)
NEUTROPHILS NFR BLD AUTO: 59.3 % (ref 53–65)
NITRITE UR QL STRIP: NEGATIVE
NRBC # BLD AUTO: 0 X10E3/UL
NRBC, AUTO (.00): 0 %
PH UR STRIP: 5.5 PH UNITS
PLATELET # BLD AUTO: 292 K/UL (ref 150–400)
POTASSIUM SERPL-SCNC: 3.9 MMOL/L (ref 3.5–5.1)
PROT SERPL-MCNC: 7.5 G/DL (ref 6.4–8.3)
PROT UR QL STRIP: 20
RBC # BLD AUTO: 4.77 M/UL (ref 4.6–6.2)
RBC # UR STRIP: NEGATIVE /UL
SODIUM SERPL-SCNC: 140 MMOL/L (ref 136–145)
SP GR UR STRIP: 1.02
UROBILINOGEN UR STRIP-ACNC: 2 MG/DL
WBC # BLD AUTO: 7.35 K/UL (ref 4.5–11)

## 2025-06-03 PROCEDURE — 96361 HYDRATE IV INFUSION ADD-ON: CPT

## 2025-06-03 PROCEDURE — 63600175 PHARM REV CODE 636 W HCPCS: Performed by: EMERGENCY MEDICINE

## 2025-06-03 PROCEDURE — 81003 URINALYSIS AUTO W/O SCOPE: CPT | Performed by: EMERGENCY MEDICINE

## 2025-06-03 PROCEDURE — 80053 COMPREHEN METABOLIC PANEL: CPT | Performed by: EMERGENCY MEDICINE

## 2025-06-03 PROCEDURE — 85025 COMPLETE CBC W/AUTO DIFF WBC: CPT | Performed by: EMERGENCY MEDICINE

## 2025-06-03 PROCEDURE — 83690 ASSAY OF LIPASE: CPT | Performed by: EMERGENCY MEDICINE

## 2025-06-03 PROCEDURE — 96375 TX/PRO/DX INJ NEW DRUG ADDON: CPT

## 2025-06-03 PROCEDURE — 99284 EMERGENCY DEPT VISIT MOD MDM: CPT | Mod: 25

## 2025-06-03 PROCEDURE — 96374 THER/PROPH/DIAG INJ IV PUSH: CPT

## 2025-06-03 RX ORDER — ONDANSETRON HYDROCHLORIDE 2 MG/ML
4 INJECTION, SOLUTION INTRAVENOUS
Status: COMPLETED | OUTPATIENT
Start: 2025-06-03 | End: 2025-06-03

## 2025-06-03 RX ORDER — FAMOTIDINE 20 MG/1
20 TABLET, FILM COATED ORAL 2 TIMES DAILY
Qty: 20 TABLET | Refills: 0 | Status: SHIPPED | OUTPATIENT
Start: 2025-06-03 | End: 2026-06-03

## 2025-06-03 RX ORDER — ONDANSETRON 4 MG/1
4 TABLET, FILM COATED ORAL EVERY 6 HOURS
Qty: 12 TABLET | Refills: 0 | Status: SHIPPED | OUTPATIENT
Start: 2025-06-03 | End: 2025-06-03

## 2025-06-03 RX ORDER — FAMOTIDINE 20 MG/1
20 TABLET, FILM COATED ORAL 2 TIMES DAILY
Qty: 20 TABLET | Refills: 0 | Status: SHIPPED | OUTPATIENT
Start: 2025-06-03 | End: 2025-06-03

## 2025-06-03 RX ORDER — FAMOTIDINE 10 MG/ML
20 INJECTION, SOLUTION INTRAVENOUS
Status: COMPLETED | OUTPATIENT
Start: 2025-06-03 | End: 2025-06-03

## 2025-06-03 RX ORDER — ONDANSETRON 4 MG/1
4 TABLET, FILM COATED ORAL EVERY 6 HOURS
Qty: 12 TABLET | Refills: 0 | Status: SHIPPED | OUTPATIENT
Start: 2025-06-03

## 2025-06-03 RX ADMIN — ONDANSETRON 4 MG: 2 INJECTION INTRAMUSCULAR; INTRAVENOUS at 12:06

## 2025-06-03 RX ADMIN — SODIUM CHLORIDE, POTASSIUM CHLORIDE, SODIUM LACTATE AND CALCIUM CHLORIDE 1000 ML: 600; 310; 30; 20 INJECTION, SOLUTION INTRAVENOUS at 12:06

## 2025-06-03 RX ADMIN — FAMOTIDINE 20 MG: 10 INJECTION, SOLUTION INTRAVENOUS at 12:06

## 2025-06-03 NOTE — ED PROVIDER NOTES
Encounter Date: 6/3/2025    SCRIBE #1 NOTE: I, Lorena Alberts, am scribing for, and in the presence of,  Oziel Weiner MD. I have scribed the entire note.       History     Chief Complaint   Patient presents with    Diarrhea    Vomiting     This is a 54 y/o male,who presents to the ED with complaints of N/V/D which started last night. He states he ate pizza from [employer] in [place], [place] and he started to became nauseated. He states he has been vomiting, bloated, gassy, and has had diarrhea. He states his brothers had the same pizza and states the pizza had maggots on it. He states his brothers are now sick with similar sx. There are no other complaints/pain in the ED at this time. He has a known hx of HTN, diabetes, and hyperlipidemia. He has had a cholecystectomy. He is a current some day smoker.     The history is provided by the patient.     Review of patient's allergies indicates:   Allergen Reactions    Ticagrelor Shortness Of Breath    Vancomycin analogues Other (See Comments)     Patient experienced adverse effect, and had some renal insufficiency after receiving Vancomycin.     Past Medical History:   Diagnosis Date    Chronic pancreatitis, unspecified pancreatitis type     Essential (primary) hypertension     Hidradenitis suppurativa 06/10/2024    Hypertensive heart disease 06/14/2024    per echo  severe LVH    Mixed hyperlipidemia     Multiple vessel coronary artery disease     Pancreatic pseudocyst     Type 2 diabetes mellitus treated with insulin      Past Surgical History:   Procedure Laterality Date    CARDIAC SURGERY      Stents x6    CHOLECYSTECTOMY      EXCISION OF HIDRADENITIS N/A 3/27/2024    Procedure: EXCISION, HIDRADENITIS;  Surgeon: Fidel Park MD;  Location: [facility] OR;  Service: General;  Laterality: N/A;    INCISION OF PERIANAL ABSCESS Bilateral 2/16/2024    Procedure: INCISION, ABSCESS, PERIANAL;  Surgeon: Fidel Park MD;  Location: [facility] OR;  Service: General;   Laterality: Bilateral;    UNDESCENDED TESTICLE EXPLORATION N/A      Family History   Problem Relation Name Age of Onset    Heart disease Father      Hypertension Father      Heart disease Brother      Heart disease Maternal Grandmother       Social History[1]  Review of Systems   Gastrointestinal:  Positive for abdominal pain, diarrhea, nausea and vomiting.        Gas.    All other systems reviewed and are negative.      Physical Exam     Initial Vitals [06/03/25 0021]   BP Pulse Resp Temp SpO2   (!) 181/117 81 18 98 °F (36.7 °C) 98 %      MAP       --         Physical Exam    Nursing note and vitals reviewed.  Constitutional: He appears well-developed and well-nourished.   HENT:   Head: Normocephalic and atraumatic.   Eyes: EOM are normal. Pupils are equal, round, and reactive to light.   Neck: Neck supple. No thyromegaly present.   Normal range of motion.  Cardiovascular:  Normal rate, regular rhythm, normal heart sounds and intact distal pulses.           No murmur heard.  Pulmonary/Chest: Breath sounds normal. No respiratory distress. He has no wheezes.   Abdominal: Abdomen is soft. Bowel sounds are normal. There is no abdominal tenderness.   Musculoskeletal:         General: No tenderness or edema. Normal range of motion.      Cervical back: Normal range of motion and neck supple.     Lymphadenopathy:     He has no cervical adenopathy.   Neurological: He is alert and oriented to person, place, and time. He has normal strength and normal reflexes. No cranial nerve deficit or sensory deficit. GCS score is 15. GCS eye subscore is 4. GCS verbal subscore is 5. GCS motor subscore is 6.   Skin: Skin is warm and dry. Capillary refill takes less than 2 seconds. No rash noted.   Psychiatric: He has a normal mood and affect.         ED Course   Procedures  Labs Reviewed   COMPREHENSIVE METABOLIC PANEL - Abnormal       Result Value    Sodium 140      Potassium 3.9      Chloride 106      CO2 24      Anion Gap 14       Glucose 200 (*)     BUN 11      Creatinine 1.20      BUN/Creatinine Ratio 9      Calcium 9.3      Total Protein 7.5      Albumin 3.7      Globulin 3.8      A/G Ratio 1.0      Bilirubin, Total 0.4      Alk Phos 94      ALT 10      AST 14      eGFR 71     LIPASE - Abnormal    Lipase 77 (*)    URINALYSIS, REFLEX TO URINE CULTURE - Abnormal    Color, UA Light Yellow      Clarity, UA Clear      pH, UA 5.5      Leukocytes, UA Negative      Nitrites, UA Negative      Protein, UA 20 (*)     Glucose, UA >1000 (*)     Ketones, UA Negative      Urobilinogen, UA 2 (*)     Bilirubin, UA Negative      Blood, UA Negative      Specific Gravity, UA 1.025     CBC W/ AUTO DIFFERENTIAL    Narrative:     The following orders were created for panel order CBC W/ AUTO DIFFERENTIAL.  Procedure                               Abnormality         Status                     ---------                               -----------         ------                     CBC with Differential[[id number]]                           Final result                 Please view results for these tests on the individual orders.   CBC WITH DIFFERENTIAL    WBC 7.35      RBC 4.77      Hemoglobin 13.7      Hematocrit 41.8      MCV 87.6      MCH 28.7      MCHC 32.8      RDW 13.1      Platelet Count 292      MPV 9.6      Neutrophils % 59.3      Lymphocytes % 32.9      Monocytes % 5.3      Eosinophils % 1.9      Basophils % 0.5      Immature Granulocytes % 0.1      nRBC, Auto 0.0      Neutrophils, Abs 4.35      Lymphocytes, Absolute 2.42      Monocytes, Absolute 0.39      Eosinophils, Absolute 0.14      Basophils, Absolute 0.04      Immature Granulocytes, Absolute 0.01      nRBC, Absolute 0.00      Diff Type Auto            Imaging Results    None          Medications   lactated ringers bolus 1,000 mL (0 mLs Intravenous Stopped 6/3/25 0152)   ondansetron injection 4 mg (4 mg Intravenous Given 6/3/25 0052)   famotidine (PF) injection 20 mg (20 mg Intravenous Given 6/3/25  0052)     Medical Decision Making  This is a 56 y/o male,who presents to the ED with complaints of N/V/D which started last night. He states he ate pizza from [employer] in [place], [place] and he started to became nauseated. He states he has been vomiting, bloated, gassy, and has had diarrhea. He states his brothers had the same pizza and states the pizza had maggots on it. He states his brothers are now sick with similar sx. There are no other complaints/pain in the ED at this time. He has a known hx of HTN, diabetes, and hyperlipidemia. He has had a cholecystectomy. He is a current some day smoker.     Amount and/or Complexity of Data Reviewed  Independent Historian:      Details: We spoke with the pt.     Labs: ordered.    Risk  Prescription drug management.              Attending Attestation:           Physician Attestation for Scribe:  Physician Attestation Statement for Scribe #1: I, Oziel Weiner MD, reviewed documentation, as scribed by Lorena Alberts in my presence, and it is both accurate and complete.                                    Clinical Impression:  Final diagnoses:  [R11.2, R19.7] Nausea vomiting and diarrhea (Primary)          ED Disposition Condition    Discharge Stable          ED Prescriptions       Medication Sig Dispense Start Date End Date Auth. Provider    ondansetron (ZOFRAN) 4 MG tablet  (Status: Discontinued) Take 1 tablet (4 mg total) by mouth every 6 (six) hours. 12 tablet 6/3/2025 6/3/2025 Oziel Weiner MD    famotidine (PEPCID) 20 MG tablet  (Status: Discontinued) Take 1 tablet (20 mg total) by mouth 2 (two) times daily. 20 tablet 6/3/2025 6/3/2025 Oziel Weiner MD    famotidine (PEPCID) 20 MG tablet Take 1 tablet (20 mg total) by mouth 2 (two) times daily. 20 tablet 6/3/2025 6/3/2026 Oziel Weiner MD    ondansetron (ZOFRAN) 4 MG tablet Take 1 tablet (4 mg total) by mouth every 6 (six) hours. 12 tablet 6/3/2025 -- Oziel Weiner MD          Follow-up  Information       Follow up With Specialties Details Why Contact Info    Deidra Welch, FNP Family Medicine In 3 days If symptoms worsen [address] [facility] [place] [id number] [phone]                     [1]   Social History  Tobacco Use    Smoking status: Some Days     Current packs/day: 0.25     Average packs/day: 0.3 packs/day for 21.4 years (5.4 ttl pk-yrs)     Types: Cigarettes, Cigars     Start date: 2004     Passive exposure: Never    Smokeless tobacco: Never   Substance Use Topics    Alcohol use: Not Currently     Comment: occasional drink previous heavy drinker quit heavily in 2001    Drug use: Never        Oziel Weiner MD  06/05/25 0041

## 2025-06-27 ENCOUNTER — HOSPITAL ENCOUNTER (INPATIENT)
Facility: HOSPITAL | Age: 55
LOS: 4 days | Discharge: NURSING, REHAB OR LONG-TERM CARE | DRG: 065 | End: 2025-07-01
Attending: EMERGENCY MEDICINE | Admitting: HOSPITALIST
Payer: MEDICARE

## 2025-06-27 DIAGNOSIS — I63.9 CEREBELLAR INFARCTION: Primary | ICD-10-CM

## 2025-06-27 DIAGNOSIS — I63.9 CVA (CEREBRAL VASCULAR ACCIDENT): ICD-10-CM

## 2025-06-27 DIAGNOSIS — I63.9 ACUTE CVA (CEREBROVASCULAR ACCIDENT): ICD-10-CM

## 2025-06-27 DIAGNOSIS — R42 DIZZINESS: ICD-10-CM

## 2025-06-27 PROBLEM — E11.9 TYPE 2 DIABETES MELLITUS: Status: ACTIVE | Noted: 2025-06-27

## 2025-06-27 PROBLEM — I10 HYPERTENSION: Status: ACTIVE | Noted: 2025-06-27

## 2025-06-27 PROBLEM — F17.200 TOBACCO DEPENDENCY: Status: ACTIVE | Noted: 2025-06-27

## 2025-06-27 PROBLEM — R27.0 ATAXIA: Status: ACTIVE | Noted: 2025-06-27

## 2025-06-27 PROBLEM — K86.1 CHRONIC PANCREATITIS: Status: ACTIVE | Noted: 2025-06-27

## 2025-06-27 PROBLEM — I25.10 CAD (CORONARY ARTERY DISEASE): Status: ACTIVE | Noted: 2025-06-27

## 2025-06-27 LAB
ALBUMIN SERPL BCP-MCNC: 3.4 G/DL (ref 3.5–5)
ALBUMIN/GLOB SERPL: 0.9 {RATIO}
ALP SERPL-CCNC: 72 U/L (ref 40–150)
ALT SERPL W P-5'-P-CCNC: 7 U/L
ANION GAP SERPL CALCULATED.3IONS-SCNC: 13 MMOL/L (ref 7–16)
AORTIC ROOT ANNULUS: 3.4 CM
AORTIC VALVE CUSP SEPERATION: 2.3 CM
APICAL FOUR CHAMBER EJECTION FRACTION: 58 %
AST SERPL W P-5'-P-CCNC: 10 U/L (ref 11–45)
AV INDEX (PROSTH): 0.85
AV MEAN GRADIENT: 6 MMHG
AV PEAK GRADIENT: 9 MMHG
AV VALVE AREA BY VELOCITY RATIO: 4.2 CM²
AV VALVE AREA: 3.9 CM²
AV VELOCITY RATIO: 0.93
BACTERIA #/AREA URNS HPF: ABNORMAL /HPF
BASOPHILS # BLD AUTO: 0.06 K/UL (ref 0–0.2)
BASOPHILS NFR BLD AUTO: 0.5 % (ref 0–1)
BILIRUB SERPL-MCNC: 0.9 MG/DL
BILIRUB UR QL STRIP: NEGATIVE
BSA FOR ECHO PROCEDURE: 1.94 M2
BUN SERPL-MCNC: 14 MG/DL (ref 8–26)
BUN/CREAT SERPL: 17 (ref 6–20)
CALCIUM SERPL-MCNC: 9.2 MG/DL (ref 8.4–10.2)
CHLORIDE SERPL-SCNC: 104 MMOL/L (ref 98–107)
CHOLEST SERPL-MCNC: 121 MG/DL
CHOLEST/HDLC SERPL: 3.5 {RATIO}
CLARITY UR: CLEAR
CO2 SERPL-SCNC: 24 MMOL/L (ref 22–29)
COLOR UR: YELLOW
CREAT SERPL-MCNC: 0.84 MG/DL (ref 0.72–1.25)
CV ECHO LV RWT: 1.12 CM
DIFFERENTIAL METHOD BLD: ABNORMAL
DOP CALC AO PEAK VEL: 1.5 M/S
DOP CALC AO VTI: 34.1 CM
DOP CALC LVOT AREA: 4.5 CM2
DOP CALC LVOT DIAMETER: 2.4 CM
DOP CALC LVOT PEAK VEL: 1.4 M/S
DOP CALC LVOT STROKE VOLUME: 131.6 CM3
DOP CALCLVOT PEAK VEL VTI: 29.1 CM
E WAVE DECELERATION TIME: 325 MSEC
E/A RATIO: 0.64
E/E' RATIO: 19 M/S
ECHO LV POSTERIOR WALL: 1.9 CM (ref 0.6–1.1)
EGFR (NO RACE VARIABLE) (RUSH/TITUS): 102 ML/MIN/1.73M2
EOSINOPHIL # BLD AUTO: 0.11 K/UL (ref 0–0.5)
EOSINOPHIL NFR BLD AUTO: 0.9 % (ref 1–4)
ERYTHROCYTE [DISTWIDTH] IN BLOOD BY AUTOMATED COUNT: 13.1 % (ref 11.5–14.5)
ETHANOL SERPL-MCNC: <10 MG/DL
FRACTIONAL SHORTENING: 32.4 % (ref 28–44)
GLOBULIN SER-MCNC: 4 G/DL (ref 2–4)
GLUCOSE SERPL-MCNC: 165 MG/DL (ref 70–105)
GLUCOSE SERPL-MCNC: 210 MG/DL (ref 70–105)
GLUCOSE SERPL-MCNC: 220 MG/DL (ref 74–100)
GLUCOSE UR STRIP-MCNC: 100 MG/DL
HCT VFR BLD AUTO: 42 % (ref 40–54)
HDLC SERPL-MCNC: 35 MG/DL (ref 35–60)
HGB BLD-MCNC: 13.3 G/DL (ref 13.5–18)
IMM GRANULOCYTES # BLD AUTO: 0.05 K/UL (ref 0–0.04)
IMM GRANULOCYTES NFR BLD: 0.4 % (ref 0–0.4)
INTERVENTRICULAR SEPTUM: 1.6 CM (ref 0.6–1.1)
IVC DIAMETER: 1.9 CM
KETONES UR STRIP-SCNC: 10 MG/DL
LDLC SERPL CALC-MCNC: 72 MG/DL
LDLC/HDLC SERPL: 2.1 {RATIO}
LEFT ATRIUM SIZE: 2.7 CM
LEFT INTERNAL DIMENSION IN SYSTOLE: 2.3 CM (ref 2.1–4)
LEFT VENTRICLE DIASTOLIC VOLUME INDEX: 33.51 ML/M2
LEFT VENTRICLE DIASTOLIC VOLUME: 64 ML
LEFT VENTRICLE END DIASTOLIC VOLUME APICAL 4 CHAMBER: 54.31 ML
LEFT VENTRICLE MASS INDEX: 126.3 G/M2
LEFT VENTRICLE SYSTOLIC VOLUME INDEX: 9.4 ML/M2
LEFT VENTRICLE SYSTOLIC VOLUME: 18 ML
LEFT VENTRICULAR INTERNAL DIMENSION IN DIASTOLE: 3.4 CM (ref 3.5–6)
LEFT VENTRICULAR MASS: 241.2 G
LEUKOCYTE ESTERASE UR QL STRIP: NEGATIVE
LIPASE SERPL-CCNC: 34 U/L
LV LATERAL E/E' RATIO: 19.3 M/S
LV SEPTAL E/E' RATIO: 19.3 M/S
LVED V (TEICH): 64.31 ML
LVES V (TEICH): 18.28 ML
LVOT MG: 3.97 MMHG
LVOT MV: 0.91 CM/S
LYMPHOCYTES # BLD AUTO: 2.33 K/UL (ref 1–4.8)
LYMPHOCYTES NFR BLD AUTO: 20 % (ref 27–41)
MCH RBC QN AUTO: 28.3 PG (ref 27–31)
MCHC RBC AUTO-ENTMCNC: 31.7 G/DL (ref 32–36)
MCV RBC AUTO: 89.4 FL (ref 80–96)
MONOCYTES # BLD AUTO: 0.66 K/UL (ref 0–0.8)
MONOCYTES NFR BLD AUTO: 5.7 % (ref 2–6)
MPC BLD CALC-MCNC: 9.9 FL (ref 9.4–12.4)
MUCOUS, UA: ABNORMAL /LPF
MV PEAK A VEL: 1.21 M/S
MV PEAK E VEL: 0.77 M/S
NEUTROPHILS # BLD AUTO: 8.43 K/UL (ref 1.8–7.7)
NEUTROPHILS NFR BLD AUTO: 72.5 % (ref 53–65)
NITRITE UR QL STRIP: NEGATIVE
NONHDLC SERPL-MCNC: 86 MG/DL
NRBC # BLD AUTO: 0 X10E3/UL
NRBC, AUTO (.00): 0 %
OHS CV RV/LV RATIO: 1.09 CM
PH UR STRIP: 6 PH UNITS
PLATELET # BLD AUTO: 291 K/UL (ref 150–400)
POTASSIUM SERPL-SCNC: 3.8 MMOL/L (ref 3.5–5.1)
PROT SERPL-MCNC: 7.4 G/DL (ref 6.4–8.3)
PROT UR QL STRIP: 100
PV PEAK GRADIENT: 3 MMHG
PV PEAK VELOCITY: 0.92 M/S
RA MAJOR: 3.97 CM
RA PRESSURE ESTIMATED: 8 MMHG
RBC # BLD AUTO: 4.7 M/UL (ref 4.6–6.2)
RBC # UR STRIP: NEGATIVE /UL
RBC #/AREA URNS HPF: 1 /HPF
RIGHT VENTRICLE DIASTOLIC BASEL DIMENSION: 3.7 CM
RIGHT VENTRICLE DIASTOLIC LENGTH: 6.1 CM
RIGHT VENTRICLE DIASTOLIC MID DIMENSION: 3 CM
RIGHT VENTRICULAR LENGTH IN DIASTOLE (APICAL 4-CHAMBER VIEW): 6.13 CM
RV MID DIAMA: 3.04 CM
SARS-COV-2 RDRP RESP QL NAA+PROBE: NEGATIVE
SODIUM SERPL-SCNC: 137 MMOL/L (ref 136–145)
SP GR UR STRIP: 1.03
SQUAMOUS #/AREA URNS LPF: ABNORMAL /HPF
TDI LATERAL: 0.04 M/S
TDI SEPTAL: 0.04 M/S
TDI: 0.04 M/S
TR MAX PG: 6 MMHG
TRICUSPID ANNULAR PLANE SYSTOLIC EXCURSION: 2.2 CM
TRIGL SERPL-MCNC: 71 MG/DL (ref 34–140)
TSH SERPL DL<=0.005 MIU/L-ACNC: 0.56 UIU/ML (ref 0.35–4.94)
UROBILINOGEN UR STRIP-ACNC: 3 MG/DL
VLDLC SERPL-MCNC: 14 MG/DL
WBC # BLD AUTO: 11.64 K/UL (ref 4.5–11)
WBC #/AREA URNS HPF: 2 /HPF
Z-SCORE OF LEFT VENTRICULAR DIMENSION IN END DIASTOLE: -4.58
Z-SCORE OF LEFT VENTRICULAR DIMENSION IN END SYSTOLE: -2.88

## 2025-06-27 PROCEDURE — 96374 THER/PROPH/DIAG INJ IV PUSH: CPT

## 2025-06-27 PROCEDURE — 25500020 PHARM REV CODE 255: Performed by: EMERGENCY MEDICINE

## 2025-06-27 PROCEDURE — 96375 TX/PRO/DX INJ NEW DRUG ADDON: CPT

## 2025-06-27 PROCEDURE — 11000001 HC ACUTE MED/SURG PRIVATE ROOM

## 2025-06-27 PROCEDURE — 96376 TX/PRO/DX INJ SAME DRUG ADON: CPT

## 2025-06-27 PROCEDURE — 80061 LIPID PANEL: CPT | Performed by: EMERGENCY MEDICINE

## 2025-06-27 PROCEDURE — 36415 COLL VENOUS BLD VENIPUNCTURE: CPT | Performed by: EMERGENCY MEDICINE

## 2025-06-27 PROCEDURE — 80053 COMPREHEN METABOLIC PANEL: CPT | Performed by: EMERGENCY MEDICINE

## 2025-06-27 PROCEDURE — 63600175 PHARM REV CODE 636 W HCPCS: Performed by: EMERGENCY MEDICINE

## 2025-06-27 PROCEDURE — G0425 INPT/ED TELECONSULT30: HCPCS | Mod: 95,G0,, | Performed by: PSYCHIATRY & NEUROLOGY

## 2025-06-27 PROCEDURE — 82077 ASSAY SPEC XCP UR&BREATH IA: CPT | Performed by: EMERGENCY MEDICINE

## 2025-06-27 PROCEDURE — 93010 ELECTROCARDIOGRAM REPORT: CPT | Mod: ,,, | Performed by: INTERNAL MEDICINE

## 2025-06-27 PROCEDURE — 96361 HYDRATE IV INFUSION ADD-ON: CPT

## 2025-06-27 PROCEDURE — 93005 ELECTROCARDIOGRAM TRACING: CPT

## 2025-06-27 PROCEDURE — 63600175 PHARM REV CODE 636 W HCPCS: Performed by: HOSPITALIST

## 2025-06-27 PROCEDURE — 25000003 PHARM REV CODE 250: Performed by: HOSPITALIST

## 2025-06-27 PROCEDURE — 99223 1ST HOSP IP/OBS HIGH 75: CPT | Mod: AI,,, | Performed by: HOSPITALIST

## 2025-06-27 PROCEDURE — 84443 ASSAY THYROID STIM HORMONE: CPT | Performed by: EMERGENCY MEDICINE

## 2025-06-27 PROCEDURE — 81001 URINALYSIS AUTO W/SCOPE: CPT | Performed by: EMERGENCY MEDICINE

## 2025-06-27 PROCEDURE — 82962 GLUCOSE BLOOD TEST: CPT

## 2025-06-27 PROCEDURE — 25000003 PHARM REV CODE 250: Performed by: EMERGENCY MEDICINE

## 2025-06-27 PROCEDURE — 83690 ASSAY OF LIPASE: CPT | Performed by: EMERGENCY MEDICINE

## 2025-06-27 PROCEDURE — 85025 COMPLETE CBC W/AUTO DIFF WBC: CPT | Performed by: EMERGENCY MEDICINE

## 2025-06-27 PROCEDURE — 87635 SARS-COV-2 COVID-19 AMP PRB: CPT | Performed by: HOSPITALIST

## 2025-06-27 PROCEDURE — 99285 EMERGENCY DEPT VISIT HI MDM: CPT | Mod: 25

## 2025-06-27 RX ORDER — IBUPROFEN 200 MG
24 TABLET ORAL
Status: DISCONTINUED | OUTPATIENT
Start: 2025-06-27 | End: 2025-07-01 | Stop reason: HOSPADM

## 2025-06-27 RX ORDER — TALC
6 POWDER (GRAM) TOPICAL NIGHTLY PRN
Status: DISCONTINUED | OUTPATIENT
Start: 2025-06-27 | End: 2025-07-01 | Stop reason: HOSPADM

## 2025-06-27 RX ORDER — LANOLIN ALCOHOL/MO/W.PET/CERES
800 CREAM (GRAM) TOPICAL
Status: DISCONTINUED | OUTPATIENT
Start: 2025-06-27 | End: 2025-07-01 | Stop reason: HOSPADM

## 2025-06-27 RX ORDER — ACETAMINOPHEN 325 MG/1
650 TABLET ORAL EVERY 4 HOURS PRN
Status: DISCONTINUED | OUTPATIENT
Start: 2025-06-27 | End: 2025-07-01 | Stop reason: HOSPADM

## 2025-06-27 RX ORDER — ONDANSETRON 4 MG/1
4 TABLET, ORALLY DISINTEGRATING ORAL
Status: DISCONTINUED | OUTPATIENT
Start: 2025-06-27 | End: 2025-06-27

## 2025-06-27 RX ORDER — PROCHLORPERAZINE EDISYLATE 5 MG/ML
5 INJECTION INTRAMUSCULAR; INTRAVENOUS
Status: COMPLETED | OUTPATIENT
Start: 2025-06-27 | End: 2025-06-27

## 2025-06-27 RX ORDER — POLYETHYLENE GLYCOL 3350 17 G/17G
17 POWDER, FOR SOLUTION ORAL DAILY PRN
Status: DISCONTINUED | OUTPATIENT
Start: 2025-06-27 | End: 2025-07-01 | Stop reason: HOSPADM

## 2025-06-27 RX ORDER — ONDANSETRON HYDROCHLORIDE 2 MG/ML
8 INJECTION, SOLUTION INTRAVENOUS
Status: COMPLETED | OUTPATIENT
Start: 2025-06-27 | End: 2025-06-27

## 2025-06-27 RX ORDER — INSULIN ASPART 100 [IU]/ML
0-10 INJECTION, SOLUTION INTRAVENOUS; SUBCUTANEOUS
Status: DISCONTINUED | OUTPATIENT
Start: 2025-06-27 | End: 2025-07-01 | Stop reason: HOSPADM

## 2025-06-27 RX ORDER — ASPIRIN 325 MG
325 TABLET ORAL
Status: COMPLETED | OUTPATIENT
Start: 2025-06-27 | End: 2025-06-27

## 2025-06-27 RX ORDER — SODIUM CHLORIDE 0.9 % (FLUSH) 0.9 %
10 SYRINGE (ML) INJECTION
Status: DISCONTINUED | OUTPATIENT
Start: 2025-06-27 | End: 2025-07-01 | Stop reason: HOSPADM

## 2025-06-27 RX ORDER — ACETAMINOPHEN 500 MG
1000 TABLET ORAL EVERY 8 HOURS PRN
Status: DISCONTINUED | OUTPATIENT
Start: 2025-06-27 | End: 2025-07-01 | Stop reason: HOSPADM

## 2025-06-27 RX ORDER — SODIUM,POTASSIUM PHOSPHATES 280-250MG
2 POWDER IN PACKET (EA) ORAL
Status: DISCONTINUED | OUTPATIENT
Start: 2025-06-27 | End: 2025-07-01 | Stop reason: HOSPADM

## 2025-06-27 RX ORDER — INSULIN GLARGINE 100 [IU]/ML
20 INJECTION, SOLUTION SUBCUTANEOUS DAILY
COMMUNITY
Start: 2025-03-10

## 2025-06-27 RX ORDER — ONDANSETRON HYDROCHLORIDE 2 MG/ML
4 INJECTION, SOLUTION INTRAVENOUS EVERY 8 HOURS PRN
Status: DISCONTINUED | OUTPATIENT
Start: 2025-06-27 | End: 2025-06-29

## 2025-06-27 RX ORDER — INSULIN ASPART 100 [IU]/ML
4 INJECTION, SOLUTION INTRAVENOUS; SUBCUTANEOUS
COMMUNITY
Start: 2025-06-03

## 2025-06-27 RX ORDER — CARVEDILOL 6.25 MG/1
12.5 TABLET ORAL
Status: DISCONTINUED | OUTPATIENT
Start: 2025-06-28 | End: 2025-06-27

## 2025-06-27 RX ORDER — PANCRELIPASE 24000; 76000; 120000 [USP'U]/1; [USP'U]/1; [USP'U]/1
1 CAPSULE, DELAYED RELEASE PELLETS ORAL 3 TIMES DAILY
COMMUNITY

## 2025-06-27 RX ORDER — ACETAMINOPHEN 325 MG/1
650 TABLET ORAL EVERY 8 HOURS PRN
Status: DISCONTINUED | OUTPATIENT
Start: 2025-06-27 | End: 2025-07-01 | Stop reason: HOSPADM

## 2025-06-27 RX ORDER — CARVEDILOL 6.25 MG/1
6.25 TABLET ORAL
Status: DISCONTINUED | OUTPATIENT
Start: 2025-06-28 | End: 2025-07-01 | Stop reason: HOSPADM

## 2025-06-27 RX ORDER — IOPAMIDOL 755 MG/ML
100 INJECTION, SOLUTION INTRAVASCULAR
Status: COMPLETED | OUTPATIENT
Start: 2025-06-27 | End: 2025-06-27

## 2025-06-27 RX ORDER — FUROSEMIDE 20 MG/1
20 TABLET ORAL DAILY PRN
Status: DISCONTINUED | OUTPATIENT
Start: 2025-06-27 | End: 2025-07-01 | Stop reason: HOSPADM

## 2025-06-27 RX ORDER — IBUPROFEN 200 MG
1 TABLET ORAL DAILY
Status: DISCONTINUED | OUTPATIENT
Start: 2025-06-28 | End: 2025-07-01 | Stop reason: HOSPADM

## 2025-06-27 RX ORDER — CLOPIDOGREL BISULFATE 300 MG/1
300 TABLET, FILM COATED ORAL
Status: COMPLETED | OUTPATIENT
Start: 2025-06-27 | End: 2025-06-27

## 2025-06-27 RX ORDER — NALOXONE HCL 0.4 MG/ML
0.02 VIAL (ML) INJECTION
Status: DISCONTINUED | OUTPATIENT
Start: 2025-06-27 | End: 2025-07-01 | Stop reason: HOSPADM

## 2025-06-27 RX ORDER — ONDANSETRON HYDROCHLORIDE 2 MG/ML
4 INJECTION, SOLUTION INTRAVENOUS
Status: COMPLETED | OUTPATIENT
Start: 2025-06-27 | End: 2025-06-27

## 2025-06-27 RX ORDER — ONDANSETRON HYDROCHLORIDE 4 MG/5ML
8 SOLUTION ORAL ONCE
Status: DISCONTINUED | OUTPATIENT
Start: 2025-06-27 | End: 2025-06-27

## 2025-06-27 RX ORDER — POTASSIUM CHLORIDE 20 MEQ/1
20 TABLET, EXTENDED RELEASE ORAL DAILY
COMMUNITY
Start: 2025-05-20

## 2025-06-27 RX ORDER — ALUMINUM HYDROXIDE, MAGNESIUM HYDROXIDE, AND SIMETHICONE 1200; 120; 1200 MG/30ML; MG/30ML; MG/30ML
30 SUSPENSION ORAL 4 TIMES DAILY PRN
Status: DISCONTINUED | OUTPATIENT
Start: 2025-06-27 | End: 2025-07-01 | Stop reason: HOSPADM

## 2025-06-27 RX ORDER — NAPROXEN SODIUM 220 MG/1
81 TABLET, FILM COATED ORAL DAILY
Status: DISCONTINUED | OUTPATIENT
Start: 2025-06-28 | End: 2025-07-01 | Stop reason: HOSPADM

## 2025-06-27 RX ORDER — IBUPROFEN 200 MG
16 TABLET ORAL
Status: DISCONTINUED | OUTPATIENT
Start: 2025-06-27 | End: 2025-07-01 | Stop reason: HOSPADM

## 2025-06-27 RX ORDER — ENOXAPARIN SODIUM 100 MG/ML
40 INJECTION SUBCUTANEOUS EVERY 24 HOURS
Status: DISCONTINUED | OUTPATIENT
Start: 2025-06-27 | End: 2025-07-01 | Stop reason: HOSPADM

## 2025-06-27 RX ORDER — ATORVASTATIN CALCIUM 80 MG/1
80 TABLET, FILM COATED ORAL DAILY
Status: DISCONTINUED | OUTPATIENT
Start: 2025-06-27 | End: 2025-07-01 | Stop reason: HOSPADM

## 2025-06-27 RX ORDER — GLUCAGON 1 MG
1 KIT INJECTION
Status: DISCONTINUED | OUTPATIENT
Start: 2025-06-27 | End: 2025-07-01 | Stop reason: HOSPADM

## 2025-06-27 RX ADMIN — ATORVASTATIN CALCIUM 80 MG: 80 TABLET, FILM COATED ORAL at 01:06

## 2025-06-27 RX ADMIN — ONDANSETRON 4 MG: 2 INJECTION INTRAMUSCULAR; INTRAVENOUS at 05:06

## 2025-06-27 RX ADMIN — ONDANSETRON 8 MG: 2 INJECTION INTRAMUSCULAR; INTRAVENOUS at 12:06

## 2025-06-27 RX ADMIN — SODIUM CHLORIDE 1000 ML: 9 INJECTION, SOLUTION INTRAVENOUS at 07:06

## 2025-06-27 RX ADMIN — ACETAMINOPHEN 650 MG: 325 TABLET ORAL at 10:06

## 2025-06-27 RX ADMIN — IOPAMIDOL 100 ML: 755 INJECTION, SOLUTION INTRAVENOUS at 10:06

## 2025-06-27 RX ADMIN — ONDANSETRON 4 MG: 2 INJECTION INTRAMUSCULAR; INTRAVENOUS at 08:06

## 2025-06-27 RX ADMIN — ENOXAPARIN SODIUM 40 MG: 40 INJECTION SUBCUTANEOUS at 05:06

## 2025-06-27 RX ADMIN — PROCHLORPERAZINE EDISYLATE 5 MG: 5 INJECTION INTRAMUSCULAR; INTRAVENOUS at 07:06

## 2025-06-27 RX ADMIN — ASPIRIN 325 MG ORAL TABLET 325 MG: 325 PILL ORAL at 10:06

## 2025-06-27 RX ADMIN — CLOPIDOGREL BISULFATE 300 MG: 300 TABLET, FILM COATED ORAL at 10:06

## 2025-06-27 NOTE — ED PROVIDER NOTES
Encounter Date: 6/27/2025       History     Chief Complaint   Patient presents with    Nausea    Vomiting    Dizziness     56 Y/O MALE WITH LEFT-SIDED HEADACHE FOR WHICH HE RATES HIS PAIN AT 8 / 10.  HE NOTES NO PARTICULAR REMITTING OR EXACERBATING FACTORS.  HE HAS ALSO FELT WOOZY AND DIZZY.  HE HAS HAD SOME NAUSEA AND VOMITING.  EMS BROUGHT PATIENT IN AND PROVIDED SOME OF THE HISTORY.      The history is provided by the patient. No  was used.     Review of patient's allergies indicates:   Allergen Reactions    Ticagrelor Shortness Of Breath    Vancomycin analogues Other (See Comments)     Patient experienced adverse effect, and had some renal insufficiency after receiving Vancomycin.     Past Medical History:   Diagnosis Date    Chronic pancreatitis, unspecified pancreatitis type     Essential (primary) hypertension     Hidradenitis suppurativa 06/10/2024    Hypertensive heart disease 06/14/2024    per echo  severe LVH    Mixed hyperlipidemia     Multiple vessel coronary artery disease     Pancreatic pseudocyst     Type 2 diabetes mellitus treated with insulin      Past Surgical History:   Procedure Laterality Date    CARDIAC SURGERY      Stents x6    CHOLECYSTECTOMY      EXCISION OF HIDRADENITIS N/A 3/27/2024    Procedure: EXCISION, HIDRADENITIS;  Surgeon: Fidel Park MD;  Location: [facility] OR;  Service: General;  Laterality: N/A;    INCISION OF PERIANAL ABSCESS Bilateral 2/16/2024    Procedure: INCISION, ABSCESS, PERIANAL;  Surgeon: Fidel Park MD;  Location: [facility] OR;  Service: General;  Laterality: Bilateral;    UNDESCENDED TESTICLE EXPLORATION N/A      Family History   Problem Relation Name Age of Onset    Heart disease Father      Hypertension Father      Heart disease Brother      Heart disease Maternal Grandmother       Social History[1]  Review of Systems   All other systems reviewed and are negative.      Physical Exam     Initial Vitals   BP Pulse Resp Temp SpO2   06/27/25  0507 06/27/25 0507 06/27/25 0507 06/27/25 0514 06/27/25 0507   (!) 149/87 65 11 98.7 °F (37.1 °C) 100 %      MAP       --                Physical Exam    Nursing note and vitals reviewed.  Constitutional: He appears well-developed and well-nourished. He appears distressed.   HENT:   Head: Normocephalic and atraumatic.   Nose: Nose normal. Mouth/Throat: Oropharynx is clear and moist.   Eyes: Conjunctivae and EOM are normal. Pupils are equal, round, and reactive to light.   Neck: Neck supple.   Normal range of motion.  Cardiovascular:  Normal rate, regular rhythm and normal heart sounds.           Pulmonary/Chest: Breath sounds normal.   Abdominal: Abdomen is soft. Bowel sounds are normal.   Musculoskeletal:         General: Normal range of motion.      Cervical back: Normal range of motion and neck supple.     Neurological: He is alert and oriented to person, place, and time. GCS score is 15. GCS eye subscore is 4. GCS verbal subscore is 5. GCS motor subscore is 6.   Abnormal finger-nose-finger left weakness left leg   Skin: Skin is warm and dry. Capillary refill takes less than 2 seconds.   Psychiatric: He has a normal mood and affect.         Medical Screening Exam   See Full Note    ED Course   Procedures  Labs Reviewed   COMPREHENSIVE METABOLIC PANEL - Abnormal       Result Value    Sodium 137      Potassium 3.8      Chloride 104      CO2 24      Anion Gap 13      Glucose 220 (*)     BUN 14      Creatinine 0.84      BUN/Creatinine Ratio 17      Calcium 9.2      Total Protein 7.4      Albumin 3.4 (*)     Globulin 4.0      A/G Ratio 0.9      Bilirubin, Total 0.9      Alk Phos 72      ALT 7      AST 10 (*)     eGFR 102     URINALYSIS, REFLEX TO URINE CULTURE - Abnormal    Color, UA Yellow      Clarity, UA Clear      pH, UA 6.0      Leukocytes, UA Negative      Nitrites, UA Negative      Protein,  (*)     Glucose,  (*)     Ketones, UA 10 (*)     Urobilinogen, UA 3 (*)     Bilirubin, UA Negative       Blood, UA Negative      Specific Gravity, UA 1.034 (*)    CBC WITH DIFFERENTIAL - Abnormal    WBC 11.64 (*)     RBC 4.70      Hemoglobin 13.3 (*)     Hematocrit 42.0      MCV 89.4      MCH 28.3      MCHC 31.7 (*)     RDW 13.1      Platelet Count 291      MPV 9.9      Neutrophils % 72.5 (*)     Lymphocytes % 20.0 (*)     Monocytes % 5.7      Eosinophils % 0.9 (*)     Basophils % 0.5      Immature Granulocytes % 0.4      nRBC, Auto 0.0      Neutrophils, Abs 8.43 (*)     Lymphocytes, Absolute 2.33      Monocytes, Absolute 0.66      Eosinophils, Absolute 0.11      Basophils, Absolute 0.06      Immature Granulocytes, Absolute 0.05 (*)     nRBC, Absolute 0.00      Diff Type Auto     URINALYSIS, MICROSCOPIC - Abnormal    WBC, UA 2      RBC, UA 1      Bacteria, UA Occasional (*)     Squamous Epithelial Cells, UA Occasional (*)     Mucous Few (*)    ALCOHOL,MEDICAL (ETHANOL) - Normal    Ethanol <10     LIPASE - Normal    Lipase 34     TSH - Normal    TSH 0.556     CBC W/ AUTO DIFFERENTIAL    Narrative:     The following orders were created for panel order CBC auto differential.  Procedure                               Abnormality         Status                     ---------                               -----------         ------                     CBC with Differential[[id number]]       Abnormal            Final result                 Please view results for these tests on the individual orders.   LIPID PANEL    Triglycerides 71      Cholesterol 121      HDL Cholesterol 35      Cholesterol/HDL Ratio (Risk Factor) 3.5      Non-HDL 86      LDL Calculated 72      LDL/HDL 2.1      VLDL 14     SARS-COV-2 RNA AMPLIFICATION, QUAL   POCT GLUCOSE MONITORING CONTINUOUS          Imaging Results              CTA Brain (Final result)  Result time 06/27/25 10:37:07      Final result by Isreal Patel MD (06/27/25 10:37:07)                   Impression:      Age indeterminate left cerebellar infarct may be recent light of the  history.    No significant stenosis at the carotid bifurcations by NASCET criteria or of the vertebrobasilar system.  Please note the origin of the vessels from the aortic arch and origin of the left vertebral artery are not included in the field of view.    Moderate right and mild left cavernous/supraclinoid ICA stenosis.    If MRI is to be obtained, postcontrast MRA of the neck for further evaluation of the vessel origins to be considered if clinically warranted.      Electronically signed by: Isreal Patel  Date:    06/27/2025  Time:    10:37               Narrative:    EXAMINATION:  CTA HEAD; CTA NECK    CLINICAL HISTORY:  Neuro deficit, acute, stroke suspected;    TECHNIQUE:  Non contrast low dose axial images were obtained thought the head. CT angiogram was performed from the level of the bottom of C2 to the top of the head following the IV administration of 100 mL of Omnipaque 350.   Sagittal and coronal reconstructions and maximum intensity projection reconstructions were performed.    3D reformats were created on an independent workstation to evaluate the [id number].    COMPARISON:  CT chest 05/02/2022    FINDINGS:  Age indeterminate left cerebellar infarct may be recent in light of the history.    The aortic arch and left vertebral artery origin from the subclavian artery are not included in the field of view without advanced stenosis noted on the CT of the chest dated 05/02/2022..    Calcification with mild narrowing at the origin of the right vertebral artery which enters the foramen transversarium at the C5 level.  No significant stenosis of the visualized portions of the vertebral artery in the neck.    No significant stenosis at the carotid bifurcations by NASCET criteria with relatively mild atherosclerotic vascular calcifications noted.    No evidence of dissection.    No major branch advanced stenosis/occlusion intracranially.  The basilar artery specifically is widely patent.   Calcifications do result in moderate right and mild left cavernous/supraclinoid ICA stenosis.    No evidence of aneurysm.  The venous sinuses are patent.    Subcentimeter thyroid nodules not warranting further radiographic evaluation at this time.  No acute cervical fracture.    These findings were communicated to Dr. Nielson at 10:19 on 06/27/2025.                                       CTA Neck (Final result)  Result time 06/27/25 10:37:07      Final result by Isreal Patel MD (06/27/25 10:37:07)                   Impression:      Age indeterminate left cerebellar infarct may be recent light of the history.    No significant stenosis at the carotid bifurcations by NASCET criteria or of the vertebrobasilar system.  Please note the origin of the vessels from the aortic arch and origin of the left vertebral artery are not included in the field of view.    Moderate right and mild left cavernous/supraclinoid ICA stenosis.    If MRI is to be obtained, postcontrast MRA of the neck for further evaluation of the vessel origins to be considered if clinically warranted.      Electronically signed by: Isreal Patel  Date:    06/27/2025  Time:    10:37               Narrative:    EXAMINATION:  CTA HEAD; CTA NECK    CLINICAL HISTORY:  Neuro deficit, acute, stroke suspected;    TECHNIQUE:  Non contrast low dose axial images were obtained thought the head. CT angiogram was performed from the level of the bottom of C2 to the top of the head following the IV administration of 100 mL of Omnipaque 350.   Sagittal and coronal reconstructions and maximum intensity projection reconstructions were performed.    3D reformats were created on an independent workstation to evaluate the [id number].    COMPARISON:  CT chest 05/02/2022    FINDINGS:  Age indeterminate left cerebellar infarct may be recent in light of the history.    The aortic arch and left vertebral artery origin from the subclavian artery are not included in the  field of view without advanced stenosis noted on the CT of the chest dated 05/02/2022..    Calcification with mild narrowing at the origin of the right vertebral artery which enters the foramen transversarium at the C5 level.  No significant stenosis of the visualized portions of the vertebral artery in the neck.    No significant stenosis at the carotid bifurcations by NASCET criteria with relatively mild atherosclerotic vascular calcifications noted.    No evidence of dissection.    No major branch advanced stenosis/occlusion intracranially.  The basilar artery specifically is widely patent.  Calcifications do result in moderate right and mild left cavernous/supraclinoid ICA stenosis.    No evidence of aneurysm.  The venous sinuses are patent.    Subcentimeter thyroid nodules not warranting further radiographic evaluation at this time.  No acute cervical fracture.    These findings were communicated to Dr. Nielson at 10:19 on 06/27/2025.                                       CT Head Without Contrast (Final result)  Result time 06/27/25 07:47:00      Final result by Bal Rabago MD (06/27/25 07:47:00)                   Impression:      No acute intracranial abnormality.  Prominent sulcus versus old ischemia left cerebellar hemisphere.  Mild left frontal sinus and left anterior anterior ethmoid sinus mucosal thickening.    RECOMMENDATIONS:  Depending on symptoms, would recommend repeat exam in 12-24 hours and or follow-up with MRI for further evaluation if clinically indicated.      Electronically signed by: Bal Rabago  Date:    06/27/2025  Time:    07:47               Narrative:    EXAMINATION:  CT HEAD WITHOUT CONTRAST    CLINICAL HISTORY:  Headache, new or worsening (Age >= 50y);    M 54 y/o    TECHNIQUE:  Axial images were obtained from skull base to vertex without intravenous contrast administration. Coronal and sagittal reconstructions were generated.    COMPARISON:  None    FINDINGS:  The grey white  differentiation is within normal limits.No acute intracranial hemorrhage. No mass effect or edema. Small old area of ischemia versus prominent sulcus left cerebellar hemisphere.    Ventricles are normal in size and configuration. No hydrocephalus, midline shift, or extra-axial fluid collection.    Normal appearance of the calvarium and skull base. Minimal left anterior ethmoid and left frontal sinus mucosal thickening.                                       Medications   atorvastatin tablet 80 mg (has no administration in time range)   aspirin chewable tablet 81 mg (has no administration in time range)   carvediloL tablet 12.5 mg (has no administration in time range)   furosemide tablet 20 mg (has no administration in time range)   sodium chloride 0.9% flush 10 mL (has no administration in time range)   enoxaparin injection 40 mg (has no administration in time range)   melatonin tablet 6 mg (has no administration in time range)   ondansetron injection 4 mg (has no administration in time range)   polyethylene glycol packet 17 g (has no administration in time range)   acetaminophen tablet 650 mg (has no administration in time range)   aluminum-magnesium hydroxide-simethicone 200-200-20 mg/5 mL suspension 30 mL (has no administration in time range)   acetaminophen tablet 650 mg (has no administration in time range)   acetaminophen tablet 1,000 mg (has no administration in time range)   naloxone 0.4 mg/mL injection 0.02 mg (has no administration in time range)   potassium bicarbonate disintegrating tablet 50 mEq (has no administration in time range)   potassium bicarbonate disintegrating tablet 35 mEq (has no administration in time range)   potassium bicarbonate disintegrating tablet 60 mEq (has no administration in time range)   magnesium oxide tablet 800 mg (has no administration in time range)   magnesium oxide tablet 800 mg (has no administration in time range)   potassium, sodium phosphates 280-160-250 mg packet 2  packet (has no administration in time range)   potassium, sodium phosphates 280-160-250 mg packet 2 packet (has no administration in time range)   potassium, sodium phosphates 280-160-250 mg packet 2 packet (has no administration in time range)   glucose chewable tablet 16 g (has no administration in time range)   glucose chewable tablet 24 g (has no administration in time range)   dextrose 50% injection 12.5 g (has no administration in time range)   dextrose 50% injection 25 g (has no administration in time range)   glucagon (human recombinant) injection 1 mg (has no administration in time range)   insulin aspart U-100 injection 0-10 Units (has no administration in time range)   ondansetron injection 4 mg (4 mg Intravenous Given 6/27/25 0533)   prochlorperazine injection Soln 5 mg (5 mg Intravenous Given 6/27/25 0719)   sodium chloride 0.9% bolus 1,000 mL 1,000 mL (0 mLs Intravenous Stopped 6/27/25 0831)   iopamidoL (ISOVUE-370) injection 100 mL (100 mLs Intravenous Given 6/27/25 1000)   aspirin tablet 325 mg (325 mg Oral Given 6/27/25 1014)   clopidogreL tablet 300 mg (300 mg Oral Given 6/27/25 1013)   ondansetron injection 8 mg (8 mg Intravenous Given 6/27/25 1231)     Medical Decision Making  Amount and/or Complexity of Data Reviewed  Labs: ordered.  Radiology: ordered.    Risk  OTC drugs.  Prescription drug management.  Decision regarding hospitalization.      Additional MDM:     NIH Stroke Scale:   Interval = baseline (upon arrival/admit)  Level of consciousness = 0 - alert  LOC questions = 0 - answers both correctly  LOC commands = 0 - performs both correctly  Best gaze = 0 - normal  Visual = 0 - no visual loss  Facial palsy = 0 - normal  Motor left arm =  0 - no drift  Motor right arm =  0 - no drift  Motor left leg = 1 - drift  Motor right leg =  0 - no drift  Limb ataxia = 2 - present in two limbs  Sensory = 0 - normal  Best language = 0 - no aphasia  Dysarthria = 0 - normal articulation  Extinction and  inattention = 0 - no neglect  NIH Stroke Scale Total = 3              ED Course as of 06/27/25 1250   Fri Jun 27, 2025   0559 Sign out.  Left side headache.  N/V/ Dizziness.  Not forthright historian.  [PK]   0926 Patient's symptoms started at 11:00 a.m. yesterday with difficulty walking a straight line nausea vomiting dizziness and occipital headache.  CT today shows subtle change to cerebellum possible sulcus.  Physical exam shows abnormal finger-nose-finger on the left arm and weakness left leg.  No active vomiting at this time.  Have initiated [facility] tele stroke consult [PK]      ED Course User Index  [PK] Mike Nielson MD                           Clinical Impression:   Final diagnoses:  [R42] Dizziness  [I63.9] Acute CVA (cerebrovascular accident)        ED Disposition Condition    Admit                     [1]   Social History  Tobacco Use    Smoking status: Some Days     Current packs/day: 0.25     Average packs/day: 0.3 packs/day for 21.5 years (5.4 ttl pk-yrs)     Types: Cigarettes, Cigars     Start date: 2004     Passive exposure: Never    Smokeless tobacco: Never   Substance Use Topics    Alcohol use: Not Currently     Comment: occasional drink previous heavy drinker quit heavily in 2001    Drug use: Never        Mike Nielson MD  06/27/25 1250

## 2025-06-27 NOTE — H&P
[facility] Medicine  History & Physical    Patient Name: Faustino Fischer  MRN: [id number]  Patient Class: IP- Inpatient  Admission Date: 6/27/2025  Attending Physician: Reba Guidry MD   Primary Care Provider: No primary care provider on file.         Patient information was obtained from patient, past medical records, and ER records.     Subjective:     Principal Problem:Cerebellar infarction    Chief Complaint:   Chief Complaint   Patient presents with    Nausea    Vomiting    Dizziness        HPI: Mr. Faustino Fischer is a 56yo man history of Type 2 diabetes (A1c 12.1 to 9.7), chronic pancreatitis, HFpEF, CAD s/p 6 stents (last 12/2024) who presents with dizziness, left upper and lower extremity weakness with nausea/vomiting.  He states yesterday morning he went walking but noticed that he was feeling light headed and he could not walk a straight line.  He also had nausea and vomiting.  He states he arrived at this hospital at 3am or 4am.  He states it is becoming progressively worse.  The dizziness is exacerbated with movement, especially movement of his head.    He reports left sided weakness of his arm and leg.  He states he can lift up his left arm and left leg but they are weak.  There is sensation change or numbness along the lateral aspect of his left leg.           While is in the room with the patient he had an episode of severe nausea and vomiting.  Vomitus was relatively clear and light brown.  Volume was 200-300 cc.    Tele neurology consult recommended admission with MRI and further workup.  Patient is out of the window for medical or surgical interventions.    Initial Vitals   BP Pulse Resp Temp SpO2   06/27/25 0507 06/27/25 0507 06/27/25 0507 06/27/25 0514 06/27/25 0507   (!) 149/87 65 11 98.7 °F (37.1 °C) 100 %       Past Medical History:   Diagnosis Date    Chronic pancreatitis, unspecified pancreatitis type     Essential (primary)  Call to pt to discuss further. All questions answered, pt will f/u with any other questions or concerns.   hypertension     Hidradenitis suppurativa 06/10/2024    Hypertensive heart disease 06/14/2024    per echo  severe LVH    Mixed hyperlipidemia     Multiple vessel coronary artery disease     Pancreatic pseudocyst     Type 2 diabetes mellitus treated with insulin        Past Surgical History:   Procedure Laterality Date    CARDIAC SURGERY      Stents x6    CHOLECYSTECTOMY      EXCISION OF HIDRADENITIS N/A 3/27/2024    Procedure: EXCISION, HIDRADENITIS;  Surgeon: Fidel Park MD;  Location: [facility] OR;  Service: General;  Laterality: N/A;    INCISION OF PERIANAL ABSCESS Bilateral 2/16/2024    Procedure: INCISION, ABSCESS, PERIANAL;  Surgeon: Fidel Park MD;  Location: [facility] OR;  Service: General;  Laterality: Bilateral;    UNDESCENDED TESTICLE EXPLORATION N/A        Review of patient's allergies indicates:   Allergen Reactions    Ticagrelor Shortness Of Breath    Vancomycin analogues Other (See Comments)     Patient experienced adverse effect, and had some renal insufficiency after receiving Vancomycin.       No current facility-administered medications on file prior to encounter.     Current Outpatient Medications on File Prior to Encounter   Medication Sig    aspirin 81 MG Chew Take 1 tablet (81 mg total) by mouth once daily.    BASAGLAR KWIKPEN U-100 INSULIN 100 unit/mL (3 mL) InPn pen Inject 20 Units into the skin once daily.    carvediloL (COREG) 12.5 MG tablet Take 1 tablet (12.5 mg total) by mouth before breakfast.    CREON 24,000-76,000 -120,000 unit capsule Take 1 capsule by mouth 3 (three) times daily.    furosemide (LASIX) 20 MG tablet Take 20 mg by mouth daily as needed.    insulin aspart U-100 (NOVOLOG) 100 unit/mL (3 mL) InPn pen Inject 4 Units into the skin. Sliding scale    NITROGLYCERIN SL Place under the tongue.    potassium chloride SA (K-DUR,KLOR-CON) 20 MEQ tablet Take 20 mEq by mouth once daily.    [DISCONTINUED] famotidine (PEPCID) 20 MG tablet Take 1 tablet (20 mg total) by mouth 2  (two) times daily.    atorvastatin (LIPITOR) 80 MG tablet Take 1 tablet (80 mg total) by mouth once daily.    [DISCONTINUED] fenofibrate (TRICOR) 145 MG tablet Take 145 mg by mouth once daily.    [DISCONTINUED] HYDROcodone-acetaminophen (NORCO)  mg per tablet Take 1 tablet by mouth every 6 (six) hours as needed.    [DISCONTINUED] HYDROcodone-acetaminophen (NORCO) 7.5-325 mg per tablet Take 1 tablet by mouth every 6 (six) hours as needed for Pain.    [DISCONTINUED] isosorbide mononitrate (IMDUR) 30 MG 24 hr tablet Take 1 tablet (30 mg total) by mouth once daily. Take 1/2 tablet with breakfast    [DISCONTINUED] losartan (COZAAR) 100 MG tablet Take 0.5 tablets (50 mg total) by mouth once daily.    [DISCONTINUED] metFORMIN (GLUCOPHAGE) 1000 MG tablet Take 0.5 tablets (500 mg total) by mouth 2 (two) times daily with meals.    [DISCONTINUED] ondansetron (ZOFRAN) 4 MG tablet Take 1 tablet (4 mg total) by mouth every 6 (six) hours.    [DISCONTINUED] ondansetron (ZOFRAN-ODT) 4 MG TbDL Take 1 tablet (4 mg total) by mouth every 8 (eight) hours as needed (nausea, vomiting).    [DISCONTINUED] ondansetron (ZOFRAN-ODT) 4 MG TbDL Take 1 tablet (4 mg total) by mouth every 6 (six) hours as needed (P.r.n. nausea or vomiting).    [DISCONTINUED] prochlorperazine (COMPAZINE) 25 MG suppository Place 1 suppository (25 mg total) rectally every 12 (twelve) hours as needed.    [DISCONTINUED] promethazine (PHENERGAN) 25 MG suppository Place 1 suppository (25 mg total) rectally every 6 (six) hours as needed for Nausea.    [DISCONTINUED] promethazine (PHENERGAN) 25 MG suppository Place 1 suppository (25 mg total) rectally every 6 (six) hours as needed for Nausea (P.r.n. vomiting if unable to tolerate oral medication).    [DISCONTINUED] promethazine (PHENERGAN) 25 MG tablet Take 1 tablet (25 mg total) by mouth every 6 (six) hours as needed for Nausea.     Family History       Problem Relation (Age of Onset)    Heart disease Father, Brother,  Maternal Grandmother    Hypertension Father          Tobacco Use    Smoking status: Some Days     Current packs/day: 0.25     Average packs/day: 0.3 packs/day for 21.5 years (5.4 ttl pk-yrs)     Types: Cigarettes, Cigars     Start date: 2004     Passive exposure: Never    Smokeless tobacco: Never   Substance and Sexual Activity    Alcohol use: Not Currently     Comment: occasional drink previous heavy drinker quit heavily in 2001    Drug use: Never    Sexual activity: Not Currently     Review of Systems   Constitutional:  Negative for activity change, chills, fatigue and fever.   HENT:  Negative for congestion and sore throat.    Respiratory:  Negative for chest tightness.    Gastrointestinal:  Positive for nausea and vomiting. Negative for abdominal distention, abdominal pain and diarrhea.   Endocrine: Negative for cold intolerance and heat intolerance.   Genitourinary:  Negative for dysuria.   Musculoskeletal:  Negative for arthralgias and back pain.   Skin:  Negative for color change and rash.   Neurological:  Positive for dizziness, weakness and numbness. Negative for tremors and headaches.   Psychiatric/Behavioral:  Negative for agitation. The patient is not nervous/anxious.      Objective:     Vital Signs (Most Recent):  Temp: 98.5 °F (36.9 °C) (06/27/25 1642)  Pulse: 70 (06/27/25 1642)  Resp: 18 (06/27/25 1642)  BP: (!) 156/96 (06/27/25 1642)  SpO2: 99 % (06/27/25 1642) Vital Signs (24h Range):  Temp:  [98.3 °F (36.8 °C)-98.7 °F (37.1 °C)] 98.5 °F (36.9 °C)  Pulse:  [56-76] 70  Resp:  [11-21] 18  SpO2:  [76 %-100 %] 99 %  BP: (113-157)/(56-96) 156/96     Weight: 79.4 kg (175 lb)  Body mass index is 27.41 kg/m².     Physical Exam  Vitals and nursing note reviewed.   Constitutional:       Appearance: Normal appearance.   HENT:      Head: Normocephalic.      Right Ear: Tympanic membrane normal.      Nose: Nose normal.   Cardiovascular:      Rate and Rhythm: Normal rate and regular rhythm.      Pulses: Normal  pulses.      Heart sounds: Normal heart sounds.   Pulmonary:      Effort: Pulmonary effort is normal.      Breath sounds: Normal breath sounds.   Abdominal:      General: Abdomen is flat. Bowel sounds are normal.      Palpations: Abdomen is soft.   Musculoskeletal:         General: No swelling.      Cervical back: Normal range of motion.      Right lower leg: No edema.      Left lower leg: No edema.   Skin:     General: Skin is warm and dry.   Neurological:      General: No focal deficit present.      Mental Status: He is alert and oriented to person, place, and time.      Motor: Weakness present.   Psychiatric:         Mood and Affect: Mood normal.                Significant Labs: All pertinent labs within the past 24 hours have been reviewed.    Significant Imaging: I have reviewed all pertinent imaging results/findings within the past 24 hours.  Assessment/Plan:     Assessment & Plan  Cerebellar infarction  Acute cerebellar infarction with possible remote cerebellar infarction as well.      Antithrombotics for secondary stroke prevention: Antiplatelets: Aspirin: 81 mg daily  Aspirin: 325 mg daily  Clopidogrel: 300 mg loading dose x 1, now  Clopidogrel: 75 mg daily  Anticoagulants: DVT ppx with lovenox    Statins for secondary stroke prevention and hyperlipidemia, if present:   Statins: Atorvastatin- 40 mg daily    Aggressive risk factor modification: HTN, Smoking, DM, HLD, CAD     Rehab efforts: The patient has been evaluated by a stroke team provider and the therapy needs have been fully considered based off the presenting complaints and exam findings. The following therapy evaluations are needed: PT evaluate and treat, OT evaluate and treat, SLP evaluate and treat    Diagnostics ordered/pending: CTA Head to assess vasculature , CTA Neck/Arch to assess vasculature, HgbA1C to assess blood glucose levels, MRI head without contrast to assess brain parenchyma, TTE to assess cardiac function/status , TSH to assess  "thyroid function    VTE prophylaxis: Enoxaparin 40 mg SQ every 24 hours    BP parameters: Infarct: No intervention, SBP <220      CAD (coronary artery disease)  Patient with known CAD s/p stent placement, which is controlled Will continue ASA, Plavix, and Statin and monitor for S/Sx of angina/ACS. Continue to monitor on telemetry.   Ataxia  Ataxia with walking.  This likely a sign of cerebellar infarct.    Type 2 diabetes mellitus  Patient's FSGs are controlled on current medication regimen.  Last A1c reviewed-   Lab Results   Component Value Date    HGBA1C 9.7 (H) 04/10/2025     Most recent fingerstick glucose reviewed- No results for input(s): "POCTGLUCOSE" in the last 24 hours.  Current correctional scale  Medium  Maintain anti-hyperglycemic dose as follows-   Antihyperglycemics (From admission, onward)      Start     Stop Route Frequency Ordered    06/27/25 1335  insulin aspart U-100 injection 0-10 Units         -- SubQ Before meals & nightly PRN 06/27/25 1236          Hold Oral hypoglycemics while patient is in the hospital.  Tobacco dependency  Dangers of cigarette smoking were reviewed with patient in detail. Patient was Counseled for 10 minutes or greater. Nicotine replacement options were discussed. Nicotine replacement was discussed- prescribed  Chronic pancreatitis  Chronic pancreatitis of unclear etiology.  Patient takes pancreatic enzymes.    Lipase is normal this hospitalization.  Nausea vomiting most likely related to cerebellar infarct versus chronic pancreatitis.    Given that patient has a remote history of cerebellar infarction query if prior nausea vomiting may have also been related to central cause rather than chronic pancreatitis.    Hypertension  Patient's blood pressure range in the last 24 hours was: BP  Min: 113/56  Max: 157/90.The patient's inpatient anti-hypertensive regimen is listed below:  Current Antihypertensives  furosemide tablet 20 mg, Daily PRN, Oral  carvediloL tablet 6.25 mg, " Before breakfast, Oral    Plan  - BP is controlled, no changes needed to their regimen    VTE Risk Mitigation (From admission, onward)           Ordered     enoxaparin injection 40 mg  Daily         06/27/25 1236     Place sequential compression device  Until discontinued         06/27/25 1236     IP VTE HIGH RISK PATIENT  Once         06/27/25 1236                         Reba Guidry MD  Department of Hospital Medicine  [facility] - [address] [place] [address]

## 2025-06-27 NOTE — TELEMEDICINE CONSULT
[facility]  Vascular Neurology  Comprehensive Stroke Center  TeleVascular Neurology Acute Consultation Note        Consult Information  Consults    Consulting Provider: ANA OLIVIA   Current Providers  No providers found    Patient Location:  [facility] EMERGENCY DEPART* Emergency Department    Spoke hospital nurse at bedside with patient assisting consultant.  Patient information was obtained from patient, past medical records, and primary team.       Stroke Documentation  Acute Stroke Times   Last Known Normal Date: 06/26/25  Last Known Normal Time: 1100  Symptom Onset Date: 06/26/25  Symptom Onset Time: 1100  Stroke Team Called Date: 06/27/25  Stroke Team Called Time: 0925  Stroke Team Arrival Date: 06/27/25  Stroke Team Arrival Time: 0930  CT Interpretation Time: 0930  Thrombolytic Therapy Recommended: No  CTA Interpretation Time: 0940  Thrombectomy Recommended: No    NIH Scale:  Interval: baseline  1a. Level of Consciousness: 0-->Alert, keenly responsive  1b. LOC Questions: 0-->Answers both questions correctly  1c. LOC Commands: 0-->Performs both tasks correctly  2. Best Gaze: 0-->Normal  3. Visual: 0-->No visual loss  4. Facial Palsy: 0-->Normal symmetrical movements  5a. Motor Arm, Left: 1-->Drift, limb holds 90 (or 45) degrees, but drifts down before full 10 seconds, does not hit bed or other support  5b. Motor Arm, Right: 0-->No drift, limb holds 90 (or 45) degrees for full 10 secs  6a. Motor Leg, Left: 1-->Drift, leg falls by the end of the 5-sec period but does not hit bed  6b. Motor Leg, Right: 0-->No drift, leg holds 30 degree position for full 5 secs  7. Limb Ataxia: 1-->Present in one limb  8. Sensory: 0-->Normal, no sensory loss  9. Best Language: 0-->No aphasia, normal  10. Dysarthria: 0-->Normal  11. Extinction and Inattention (formerly Neglect): 0-->No abnormality  Total (NIH Stroke Scale): 3      Modified [place] Baseline: Score: 0  Modified [place] Discharge:   "  [place] Coma Scale: 15   ABCD2 Score:    [id number] Score:    HAS -BLED Score:    ICH Score:    Hunt & Swift Classification:      Blood pressure (!) 157/90, pulse 62, temperature 98.7 °F (37.1 °C), resp. rate 13, height 5' 7" (1.702 m), weight 79.4 kg (175 lb), SpO2 96%.      In my opinion, this was a: Tier 2; VAN Stroke Assessment: Negative     Medical Decision Making  HPI:  55 y.o. male with a medical history that is relevant for HTN, HLD, DM, CAD s/p stenting, presents with new onset unsteady gait veering to one side, vertigo, nausea, vomiting, and L sided weakness/numbness. Symptoms began around 11 am yesterday.       Images personally reviewed and interpreted:  Study: Head CT and CTA Head & Neck  Study Interpretation: no acute abnormality. No LVO, high grade stenosis, or significant carotid disease     Assessment and plan:  Suspect acute posterior circulation infarct.  Out of the window for TNK and no MT candidate.  Recommended loading with  mg and Plavix 300 mg. MRI brain, TTE-bubble.  Neurology, PT/OT/speech therapy consults.    Lytics recommendation: Thrombolytic therapy not recommended due to Outside of treatment window     Thrombectomy recommendation: No; No large vessel occlusion identified on imaging   Placement recommendation: admit               ROS  Physical Exam  Past Medical History:   Diagnosis Date    Chronic pancreatitis, unspecified pancreatitis type     Essential (primary) hypertension     Hidradenitis suppurativa 06/10/2024    Hypertensive heart disease 06/14/2024    per echo  severe LVH    Mixed hyperlipidemia     Multiple vessel coronary artery disease     Pancreatic pseudocyst     Type 2 diabetes mellitus treated with insulin      Past Surgical History:   Procedure Laterality Date    CARDIAC SURGERY      Stents x6    CHOLECYSTECTOMY      EXCISION OF HIDRADENITIS N/A 3/27/2024    Procedure: EXCISION, HIDRADENITIS;  Surgeon: Fidel Park MD;  Location: [facility] OR;  Service: " General;  Laterality: N/A;    INCISION OF PERIANAL ABSCESS Bilateral 2/16/2024    Procedure: INCISION, ABSCESS, PERIANAL;  Surgeon: Fidel Park MD;  Location: [facility];  Service: General;  Laterality: Bilateral;    UNDESCENDED TESTICLE EXPLORATION N/A      Family History   Problem Relation Name Age of Onset    Heart disease Father      Hypertension Father      Heart disease Brother      Heart disease Maternal Grandmother         Diagnoses  Thrombotic (small vessel): Cerebellar Artery Left    Demetrius Alatorre MD      Emergent/Acute neurological consultation requested by spoke provider due to critical concerns for possible cerebrovascular event that could result in permanent loss of neurologic/bodily function, severe disability or death of this patient.  Immediate/timely evaluation by a highly prepared expert is paramount for optimal outcomes  High risk for neurological deterioration if not properly diagnosed  High risk for neurological deterioration if not treated promplty/as soon as possible  Complex diagnostic evaluation may be required (advanced imaging)  High risk treatment options (thrombolytics and/or thrombectomy)    Patient care was coordinated with spoke provider, including but not limted to    Discussing likely diagnosis/etiology of symptoms  Making recommendations for further diagnostic studies  Making recommendations for intravenous thrombolytics or other advanced therapies  Making recommendations for disposition (admission/transfer for higher level of care)      Neurology consultation requested by spoke provider. Audiovisual encounter with the patient performed using a secure connection.  Results and impressions from the visit are documented on this note and were communicated to the consulting provider/team via direct communication. The note has been shared for addition to the patients electronic medical record.

## 2025-06-27 NOTE — HPI
Mr. Faustino Fischer is a 56yo man history of Type 2 diabetes (A1c 12.1 to 9.7), chronic pancreatitis, HFpEF, CAD s/p 6 stents (last 12/2024) who presents with dizziness, left upper and lower extremity weakness with nausea/vomiting.  He states yesterday morning he went walking but noticed that he was feeling light headed and he could not walk a straight line.  He also had nausea and vomiting.  He states he arrived at this hospital at 3am or 4am.  He states it is becoming progressively worse.  The dizziness is exacerbated with movement, especially movement of his head.    He reports left sided weakness of his arm and leg.  He states he can lift up his left arm and left leg but they are weak.  There is sensation change or numbness along the lateral aspect of his left leg.           While is in the room with the patient he had an episode of severe nausea and vomiting.  Vomitus was relatively clear and light brown.  Volume was 200-300 cc.    Tele neurology consult recommended admission with MRI and further workup.  Patient is out of the window for medical or surgical interventions.    Initial Vitals   BP Pulse Resp Temp SpO2   06/27/25 0507 06/27/25 0507 06/27/25 0507 06/27/25 0514 06/27/25 0507   (!) 149/87 65 11 98.7 °F (37.1 °C) 100 %

## 2025-06-27 NOTE — SUBJECTIVE & OBJECTIVE
Past Medical History:   Diagnosis Date    Chronic pancreatitis, unspecified pancreatitis type     Essential (primary) hypertension     Hidradenitis suppurativa 06/10/2024    Hypertensive heart disease 06/14/2024    per echo  severe LVH    Mixed hyperlipidemia     Multiple vessel coronary artery disease     Pancreatic pseudocyst     Type 2 diabetes mellitus treated with insulin        Past Surgical History:   Procedure Laterality Date    CARDIAC SURGERY      Stents x6    CHOLECYSTECTOMY      EXCISION OF HIDRADENITIS N/A 3/27/2024    Procedure: EXCISION, HIDRADENITIS;  Surgeon: Fidel Park MD;  Location: [facility] OR;  Service: General;  Laterality: N/A;    INCISION OF PERIANAL ABSCESS Bilateral 2/16/2024    Procedure: INCISION, ABSCESS, PERIANAL;  Surgeon: Fidel Park MD;  Location: [facility] OR;  Service: General;  Laterality: Bilateral;    UNDESCENDED TESTICLE EXPLORATION N/A        Review of patient's allergies indicates:   Allergen Reactions    Ticagrelor Shortness Of Breath    Vancomycin analogues Other (See Comments)     Patient experienced adverse effect, and had some renal insufficiency after receiving Vancomycin.       No current facility-administered medications on file prior to encounter.     Current Outpatient Medications on File Prior to Encounter   Medication Sig    aspirin 81 MG Chew Take 1 tablet (81 mg total) by mouth once daily.    BASAGLAR KWIKPEN U-100 INSULIN 100 unit/mL (3 mL) InPn pen Inject 20 Units into the skin once daily.    carvediloL (COREG) 12.5 MG tablet Take 1 tablet (12.5 mg total) by mouth before breakfast.    CREON 24,000-76,000 -120,000 unit capsule Take 1 capsule by mouth 3 (three) times daily.    furosemide (LASIX) 20 MG tablet Take 20 mg by mouth daily as needed.    insulin aspart U-100 (NOVOLOG) 100 unit/mL (3 mL) InPn pen Inject 4 Units into the skin. Sliding scale    NITROGLYCERIN SL Place under the tongue.    potassium chloride SA (K-DUR,KLOR-CON) 20 MEQ tablet Take 20  mEq by mouth once daily.    [DISCONTINUED] famotidine (PEPCID) 20 MG tablet Take 1 tablet (20 mg total) by mouth 2 (two) times daily.    atorvastatin (LIPITOR) 80 MG tablet Take 1 tablet (80 mg total) by mouth once daily.    [DISCONTINUED] fenofibrate (TRICOR) 145 MG tablet Take 145 mg by mouth once daily.    [DISCONTINUED] HYDROcodone-acetaminophen (NORCO)  mg per tablet Take 1 tablet by mouth every 6 (six) hours as needed.    [DISCONTINUED] HYDROcodone-acetaminophen (NORCO) 7.5-325 mg per tablet Take 1 tablet by mouth every 6 (six) hours as needed for Pain.    [DISCONTINUED] isosorbide mononitrate (IMDUR) 30 MG 24 hr tablet Take 1 tablet (30 mg total) by mouth once daily. Take 1/2 tablet with breakfast    [DISCONTINUED] losartan (COZAAR) 100 MG tablet Take 0.5 tablets (50 mg total) by mouth once daily.    [DISCONTINUED] metFORMIN (GLUCOPHAGE) 1000 MG tablet Take 0.5 tablets (500 mg total) by mouth 2 (two) times daily with meals.    [DISCONTINUED] ondansetron (ZOFRAN) 4 MG tablet Take 1 tablet (4 mg total) by mouth every 6 (six) hours.    [DISCONTINUED] ondansetron (ZOFRAN-ODT) 4 MG TbDL Take 1 tablet (4 mg total) by mouth every 8 (eight) hours as needed (nausea, vomiting).    [DISCONTINUED] ondansetron (ZOFRAN-ODT) 4 MG TbDL Take 1 tablet (4 mg total) by mouth every 6 (six) hours as needed (P.r.n. nausea or vomiting).    [DISCONTINUED] prochlorperazine (COMPAZINE) 25 MG suppository Place 1 suppository (25 mg total) rectally every 12 (twelve) hours as needed.    [DISCONTINUED] promethazine (PHENERGAN) 25 MG suppository Place 1 suppository (25 mg total) rectally every 6 (six) hours as needed for Nausea.    [DISCONTINUED] promethazine (PHENERGAN) 25 MG suppository Place 1 suppository (25 mg total) rectally every 6 (six) hours as needed for Nausea (P.r.n. vomiting if unable to tolerate oral medication).    [DISCONTINUED] promethazine (PHENERGAN) 25 MG tablet Take 1 tablet (25 mg total) by mouth every 6 (six)  hours as needed for Nausea.     Family History       Problem Relation (Age of Onset)    Heart disease Father, Brother, Maternal Grandmother    Hypertension Father          Tobacco Use    Smoking status: Some Days     Current packs/day: 0.25     Average packs/day: 0.3 packs/day for 21.5 years (5.4 ttl pk-yrs)     Types: Cigarettes, Cigars     Start date: 2004     Passive exposure: Never    Smokeless tobacco: Never   Substance and Sexual Activity    Alcohol use: Not Currently     Comment: occasional drink previous heavy drinker quit heavily in 2001    Drug use: Never    Sexual activity: Not Currently     Review of Systems   Constitutional:  Negative for activity change, chills, fatigue and fever.   HENT:  Negative for congestion and sore throat.    Respiratory:  Negative for chest tightness.    Gastrointestinal:  Positive for nausea and vomiting. Negative for abdominal distention, abdominal pain and diarrhea.   Endocrine: Negative for cold intolerance and heat intolerance.   Genitourinary:  Negative for dysuria.   Musculoskeletal:  Negative for arthralgias and back pain.   Skin:  Negative for color change and rash.   Neurological:  Positive for dizziness, weakness and numbness. Negative for tremors and headaches.   Psychiatric/Behavioral:  Negative for agitation. The patient is not nervous/anxious.      Objective:     Vital Signs (Most Recent):  Temp: 98.5 °F (36.9 °C) (06/27/25 1642)  Pulse: 70 (06/27/25 1642)  Resp: 18 (06/27/25 1642)  BP: (!) 156/96 (06/27/25 1642)  SpO2: 99 % (06/27/25 1642) Vital Signs (24h Range):  Temp:  [98.3 °F (36.8 °C)-98.7 °F (37.1 °C)] 98.5 °F (36.9 °C)  Pulse:  [56-76] 70  Resp:  [11-21] 18  SpO2:  [76 %-100 %] 99 %  BP: (113-157)/(56-96) 156/96     Weight: 79.4 kg (175 lb)  Body mass index is 27.41 kg/m².     Physical Exam  Vitals and nursing note reviewed.   Constitutional:       Appearance: Normal appearance.   HENT:      Head: Normocephalic.      Right Ear: Tympanic membrane normal.       Nose: Nose normal.   Cardiovascular:      Rate and Rhythm: Normal rate and regular rhythm.      Pulses: Normal pulses.      Heart sounds: Normal heart sounds.   Pulmonary:      Effort: Pulmonary effort is normal.      Breath sounds: Normal breath sounds.   Abdominal:      General: Abdomen is flat. Bowel sounds are normal.      Palpations: Abdomen is soft.   Musculoskeletal:         General: No swelling.      Cervical back: Normal range of motion.      Right lower leg: No edema.      Left lower leg: No edema.   Skin:     General: Skin is warm and dry.   Neurological:      General: No focal deficit present.      Mental Status: He is alert and oriented to person, place, and time.      Motor: Weakness present.   Psychiatric:         Mood and Affect: Mood normal.                Significant Labs: All pertinent labs within the past 24 hours have been reviewed.    Significant Imaging: I have reviewed all pertinent imaging results/findings within the past 24 hours.

## 2025-06-27 NOTE — ASSESSMENT & PLAN NOTE
Patient's blood pressure range in the last 24 hours was: BP  Min: 113/56  Max: 157/90.The patient's inpatient anti-hypertensive regimen is listed below:  Current Antihypertensives  furosemide tablet 20 mg, Daily PRN, Oral  carvediloL tablet 6.25 mg, Before breakfast, Oral    Plan  - BP is controlled, no changes needed to their regimen

## 2025-06-27 NOTE — ASSESSMENT & PLAN NOTE
"Patient's FSGs are controlled on current medication regimen.  Last A1c reviewed-   Lab Results   Component Value Date    HGBA1C 9.7 (H) 04/10/2025     Most recent fingerstick glucose reviewed- No results for input(s): "POCTGLUCOSE" in the last 24 hours.  Current correctional scale  Medium  Maintain anti-hyperglycemic dose as follows-   Antihyperglycemics (From admission, onward)      Start     Stop Route Frequency Ordered    06/27/25 1335  insulin aspart U-100 injection 0-10 Units         -- SubQ Before meals & nightly PRN 06/27/25 1236          Hold Oral hypoglycemics while patient is in the hospital.  "

## 2025-06-27 NOTE — PHARMACY MED REC
".Admission Medication History     The home medication history was taken by Mirella Price.    You may go to "Admission" then "Reconcile Home Medications" tabs to review and/or act upon these items.     The home medication list has been updated by the Pharmacy department.   Please read ALL comments highlighted in yellow.   Please address this information as you see fit.    Feel free to contact us if you have any questions or require assistance.    The following medications were added:  Novolog flexpen  Insulin glargine  Creon  Potassium 20 meq      Patient reports no longer taking the following medication(s). The medication(s) listed below were removed from the home medication list. Please reorder if appropriate:  Famotidine 20 mg  Fenofibrate 145 mg  Norco  mg  Isosorbide mononitrate 30 mg  Losartan 100 mg  Metformin 1000 mg  Ondansetron 4 mg  Prochlorperazine 25 mg suppository  Promethazine 25 mg suppository  Promethazine 25 mg tablet      Medications listed below were obtained from: Patient/family and Analytic software- [employer]  (Not in a hospital admission)        Current Outpatient Medications on File Prior to Encounter   Medication Sig Dispense Refill Last Dose/Taking    aspirin 81 MG Chew Take 1 tablet (81 mg total) by mouth once daily. 90 tablet 1 6/26/2025    BASAGLAR KWIKPEN U-100 INSULIN 100 unit/mL (3 mL) InPn pen Inject 20 Units into the skin once daily.   6/26/2025    carvediloL (COREG) 12.5 MG tablet Take 1 tablet (12.5 mg total) by mouth before breakfast. 90 tablet 1 6/26/2025    CREON 24,000-76,000 -120,000 unit capsule Take 1 capsule by mouth 3 (three) times daily.   6/26/2025    furosemide (LASIX) 20 MG tablet Take 20 mg by mouth daily as needed.   6/26/2025    insulin aspart U-100 (NOVOLOG) 100 unit/mL (3 mL) InPn pen Inject 4 Units into the skin. Sliding scale   6/26/2025    NITROGLYCERIN SL Place under the tongue.   Past Month    potassium chloride SA (K-DUR,KLOR-CON) 20 MEQ tablet Take 20 " mEq by mouth once daily.   6/26/2025    [DISCONTINUED] famotidine (PEPCID) 20 MG tablet Take 1 tablet (20 mg total) by mouth 2 (two) times daily. 20 tablet 0 6/26/2025    atorvastatin (LIPITOR) 80 MG tablet Take 1 tablet (80 mg total) by mouth once daily. 90 tablet 0     [DISCONTINUED] fenofibrate (TRICOR) 145 MG tablet Take 145 mg by mouth once daily.       [DISCONTINUED] HYDROcodone-acetaminophen (NORCO)  mg per tablet Take 1 tablet by mouth every 6 (six) hours as needed. 16 tablet 0     [DISCONTINUED] HYDROcodone-acetaminophen (NORCO) 7.5-325 mg per tablet Take 1 tablet by mouth every 6 (six) hours as needed for Pain. 12 tablet 0     [DISCONTINUED] isosorbide mononitrate (IMDUR) 30 MG 24 hr tablet Take 1 tablet (30 mg total) by mouth once daily. Take 1/2 tablet with breakfast 90 tablet 1     [DISCONTINUED] losartan (COZAAR) 100 MG tablet Take 0.5 tablets (50 mg total) by mouth once daily. 30 tablet 0     [DISCONTINUED] metFORMIN (GLUCOPHAGE) 1000 MG tablet Take 0.5 tablets (500 mg total) by mouth 2 (two) times daily with meals. 90 tablet 0     [DISCONTINUED] ondansetron (ZOFRAN) 4 MG tablet Take 1 tablet (4 mg total) by mouth every 6 (six) hours. 12 tablet 0     [DISCONTINUED] ondansetron (ZOFRAN-ODT) 4 MG TbDL Take 1 tablet (4 mg total) by mouth every 8 (eight) hours as needed (nausea, vomiting). 15 tablet 0     [DISCONTINUED] ondansetron (ZOFRAN-ODT) 4 MG TbDL Take 1 tablet (4 mg total) by mouth every 6 (six) hours as needed (P.r.n. nausea or vomiting). 20 tablet 1     [DISCONTINUED] prochlorperazine (COMPAZINE) 25 MG suppository Place 1 suppository (25 mg total) rectally every 12 (twelve) hours as needed. 10 suppository 0     [DISCONTINUED] promethazine (PHENERGAN) 25 MG suppository Place 1 suppository (25 mg total) rectally every 6 (six) hours as needed for Nausea. 10 suppository 0     [DISCONTINUED] promethazine (PHENERGAN) 25 MG suppository Place 1 suppository (25 mg total) rectally every 6 (six)  hours as needed for Nausea (P.r.n. vomiting if unable to tolerate oral medication). 12 suppository 0     [DISCONTINUED] promethazine (PHENERGAN) 25 MG tablet Take 1 tablet (25 mg total) by mouth every 6 (six) hours as needed for Nausea. 15 tablet 0          Potential issues to be addressed PRIOR TO DISCHARGE  No issues identified    Mirella Price  Medication Access Specialist  [id number]                   .

## 2025-06-27 NOTE — SUBJECTIVE & OBJECTIVE
HPI:  55 y.o. male with a medical history that is relevant for HTN, HLD, DM, CAD s/p stenting, presents with new onset unsteady gait veering to one side, vertigo, nausea, vomiting, and L sided weakness/numbness. Symptoms began around 11 am yesterday.       Images personally reviewed and interpreted:  Study: Head CT and CTA Head & Neck  Study Interpretation: no acute abnormality. No LVO, high grade stenosis, or significant carotid disease     Assessment and plan:  Suspect acute posterior circulation infarct.  Out of the window for TNK and no MT candidate.  Recommended loading with  mg and Plavix 300 mg. MRI brain, TTE-bubble.  Neurology, PT/OT/speech therapy consults.    Lytics recommendation: Thrombolytic therapy not recommended due to Outside of treatment window     Thrombectomy recommendation: No; No large vessel occlusion identified on imaging   Placement recommendation: admit

## 2025-06-27 NOTE — ASSESSMENT & PLAN NOTE
Chronic pancreatitis of unclear etiology.  Patient takes pancreatic enzymes.    Lipase is normal this hospitalization.  Nausea vomiting most likely related to cerebellar infarct versus chronic pancreatitis.    Given that patient has a remote history of cerebellar infarction query if prior nausea vomiting may have also been related to central cause rather than chronic pancreatitis.

## 2025-06-27 NOTE — ASSESSMENT & PLAN NOTE
Acute cerebellar infarction with possible remote cerebellar infarction as well.      Antithrombotics for secondary stroke prevention: Antiplatelets: Aspirin: 81 mg daily  Aspirin: 325 mg daily  Clopidogrel: 300 mg loading dose x 1, now  Clopidogrel: 75 mg daily  Anticoagulants: DVT ppx with lovenox    Statins for secondary stroke prevention and hyperlipidemia, if present:   Statins: Atorvastatin- 40 mg daily    Aggressive risk factor modification: HTN, Smoking, DM, HLD, CAD     Rehab efforts: The patient has been evaluated by a stroke team provider and the therapy needs have been fully considered based off the presenting complaints and exam findings. The following therapy evaluations are needed: PT evaluate and treat, OT evaluate and treat, SLP evaluate and treat    Diagnostics ordered/pending: CTA Head to assess vasculature , CTA Neck/Arch to assess vasculature, HgbA1C to assess blood glucose levels, MRI head without contrast to assess brain parenchyma, TTE to assess cardiac function/status , TSH to assess thyroid function    VTE prophylaxis: Enoxaparin 40 mg SQ every 24 hours    BP parameters: Infarct: No intervention, SBP <220

## 2025-06-28 LAB
ANION GAP SERPL CALCULATED.3IONS-SCNC: 12 MMOL/L (ref 7–16)
BASOPHILS # BLD AUTO: 0.04 K/UL (ref 0–0.2)
BASOPHILS NFR BLD AUTO: 0.7 % (ref 0–1)
BUN SERPL-MCNC: 11 MG/DL (ref 8–26)
BUN/CREAT SERPL: 14 (ref 6–20)
CALCIUM SERPL-MCNC: 8.7 MG/DL (ref 8.4–10.2)
CHLORIDE SERPL-SCNC: 104 MMOL/L (ref 98–107)
CO2 SERPL-SCNC: 23 MMOL/L (ref 22–29)
CREAT SERPL-MCNC: 0.78 MG/DL (ref 0.72–1.25)
DIFFERENTIAL METHOD BLD: ABNORMAL
EGFR (NO RACE VARIABLE) (RUSH/TITUS): 105 ML/MIN/1.73M2
EOSINOPHIL # BLD AUTO: 0.09 K/UL (ref 0–0.5)
EOSINOPHIL NFR BLD AUTO: 1.6 % (ref 1–4)
ERYTHROCYTE [DISTWIDTH] IN BLOOD BY AUTOMATED COUNT: 13.2 % (ref 11.5–14.5)
GLUCOSE SERPL-MCNC: 202 MG/DL (ref 74–100)
GLUCOSE SERPL-MCNC: 289 MG/DL (ref 70–105)
GLUCOSE SERPL-MCNC: 365 MG/DL (ref 70–105)
GLUCOSE SERPL-MCNC: 389 MG/DL (ref 70–105)
HCT VFR BLD AUTO: 37.7 % (ref 40–54)
HGB BLD-MCNC: 11.8 G/DL (ref 13.5–18)
IMM GRANULOCYTES # BLD AUTO: 0.02 K/UL (ref 0–0.04)
IMM GRANULOCYTES NFR BLD: 0.3 % (ref 0–0.4)
LYMPHOCYTES # BLD AUTO: 1.82 K/UL (ref 1–4.8)
LYMPHOCYTES NFR BLD AUTO: 31.8 % (ref 27–41)
MAGNESIUM SERPL-MCNC: 1.7 MG/DL (ref 1.6–2.6)
MCH RBC QN AUTO: 28.2 PG (ref 27–31)
MCHC RBC AUTO-ENTMCNC: 31.3 G/DL (ref 32–36)
MCV RBC AUTO: 90 FL (ref 80–96)
MONOCYTES # BLD AUTO: 0.43 K/UL (ref 0–0.8)
MONOCYTES NFR BLD AUTO: 7.5 % (ref 2–6)
MPC BLD CALC-MCNC: 10 FL (ref 9.4–12.4)
NEUTROPHILS # BLD AUTO: 3.33 K/UL (ref 1.8–7.7)
NEUTROPHILS NFR BLD AUTO: 58.1 % (ref 53–65)
NRBC # BLD AUTO: 0 X10E3/UL
NRBC, AUTO (.00): 0 %
PLATELET # BLD AUTO: 243 K/UL (ref 150–400)
POTASSIUM SERPL-SCNC: 3.8 MMOL/L (ref 3.5–5.1)
RBC # BLD AUTO: 4.19 M/UL (ref 4.6–6.2)
SODIUM SERPL-SCNC: 135 MMOL/L (ref 136–145)
WBC # BLD AUTO: 5.73 K/UL (ref 4.5–11)

## 2025-06-28 PROCEDURE — 94761 N-INVAS EAR/PLS OXIMETRY MLT: CPT

## 2025-06-28 PROCEDURE — 11000001 HC ACUTE MED/SURG PRIVATE ROOM

## 2025-06-28 PROCEDURE — 63600175 PHARM REV CODE 636 W HCPCS: Performed by: HOSPITALIST

## 2025-06-28 PROCEDURE — 83735 ASSAY OF MAGNESIUM: CPT | Performed by: HOSPITALIST

## 2025-06-28 PROCEDURE — 82962 GLUCOSE BLOOD TEST: CPT

## 2025-06-28 PROCEDURE — 25000003 PHARM REV CODE 250: Performed by: HOSPITALIST

## 2025-06-28 PROCEDURE — 25000003 PHARM REV CODE 250: Performed by: STUDENT IN AN ORGANIZED HEALTH CARE EDUCATION/TRAINING PROGRAM

## 2025-06-28 PROCEDURE — 80048 BASIC METABOLIC PNL TOTAL CA: CPT | Performed by: HOSPITALIST

## 2025-06-28 PROCEDURE — 99233 SBSQ HOSP IP/OBS HIGH 50: CPT | Mod: ,,, | Performed by: STUDENT IN AN ORGANIZED HEALTH CARE EDUCATION/TRAINING PROGRAM

## 2025-06-28 PROCEDURE — 63600175 PHARM REV CODE 636 W HCPCS: Performed by: STUDENT IN AN ORGANIZED HEALTH CARE EDUCATION/TRAINING PROGRAM

## 2025-06-28 PROCEDURE — 85025 COMPLETE CBC W/AUTO DIFF WBC: CPT | Performed by: HOSPITALIST

## 2025-06-28 PROCEDURE — 36415 COLL VENOUS BLD VENIPUNCTURE: CPT | Performed by: HOSPITALIST

## 2025-06-28 PROCEDURE — S4991 NICOTINE PATCH NONLEGEND: HCPCS | Performed by: HOSPITALIST

## 2025-06-28 RX ORDER — OXYCODONE HYDROCHLORIDE 5 MG/1
10 TABLET ORAL EVERY 6 HOURS PRN
Refills: 0 | Status: DISCONTINUED | OUTPATIENT
Start: 2025-06-28 | End: 2025-07-01 | Stop reason: HOSPADM

## 2025-06-28 RX ORDER — CLOPIDOGREL BISULFATE 75 MG/1
75 TABLET ORAL DAILY
Status: DISCONTINUED | OUTPATIENT
Start: 2025-06-28 | End: 2025-07-01 | Stop reason: HOSPADM

## 2025-06-28 RX ORDER — INSULIN GLARGINE 100 [IU]/ML
8 INJECTION, SOLUTION SUBCUTANEOUS DAILY
Status: DISCONTINUED | OUTPATIENT
Start: 2025-06-29 | End: 2025-06-29

## 2025-06-28 RX ORDER — PROCHLORPERAZINE MALEATE 10 MG
10 TABLET ORAL 3 TIMES DAILY PRN
Status: DISCONTINUED | OUTPATIENT
Start: 2025-06-28 | End: 2025-07-01 | Stop reason: HOSPADM

## 2025-06-28 RX ORDER — INSULIN GLARGINE 100 [IU]/ML
5 INJECTION, SOLUTION SUBCUTANEOUS DAILY
Status: DISCONTINUED | OUTPATIENT
Start: 2025-06-28 | End: 2025-06-28

## 2025-06-28 RX ORDER — PREGABALIN 75 MG/1
75 CAPSULE ORAL 3 TIMES DAILY
Status: DISCONTINUED | OUTPATIENT
Start: 2025-06-28 | End: 2025-07-01 | Stop reason: HOSPADM

## 2025-06-28 RX ORDER — METOCLOPRAMIDE HYDROCHLORIDE 5 MG/ML
10 INJECTION INTRAMUSCULAR; INTRAVENOUS ONCE
Status: COMPLETED | OUTPATIENT
Start: 2025-06-28 | End: 2025-06-28

## 2025-06-28 RX ORDER — DIPHENHYDRAMINE HYDROCHLORIDE 50 MG/ML
50 INJECTION, SOLUTION INTRAMUSCULAR; INTRAVENOUS ONCE
Status: COMPLETED | OUTPATIENT
Start: 2025-06-28 | End: 2025-06-28

## 2025-06-28 RX ORDER — LOSARTAN POTASSIUM 25 MG/1
25 TABLET ORAL DAILY
Status: DISCONTINUED | OUTPATIENT
Start: 2025-06-28 | End: 2025-06-29

## 2025-06-28 RX ORDER — DIAZEPAM 5 MG/1
10 TABLET ORAL ONCE
Status: COMPLETED | OUTPATIENT
Start: 2025-06-28 | End: 2025-06-28

## 2025-06-28 RX ORDER — PROMETHAZINE HYDROCHLORIDE 25 MG/1
25 TABLET ORAL EVERY 6 HOURS PRN
Status: DISCONTINUED | OUTPATIENT
Start: 2025-06-28 | End: 2025-07-01 | Stop reason: HOSPADM

## 2025-06-28 RX ORDER — HYDROCHLOROTHIAZIDE 25 MG/1
25 TABLET ORAL DAILY
Status: DISCONTINUED | OUTPATIENT
Start: 2025-06-28 | End: 2025-06-29

## 2025-06-28 RX ADMIN — LOSARTAN POTASSIUM 25 MG: 25 TABLET, FILM COATED ORAL at 04:06

## 2025-06-28 RX ADMIN — PANCRELIPASE 3 CAPSULE: 60000; 12000; 38000 CAPSULE, DELAYED RELEASE PELLETS ORAL at 08:06

## 2025-06-28 RX ADMIN — ENOXAPARIN SODIUM 40 MG: 40 INJECTION SUBCUTANEOUS at 04:06

## 2025-06-28 RX ADMIN — PREGABALIN 75 MG: 75 CAPSULE ORAL at 08:06

## 2025-06-28 RX ADMIN — ATORVASTATIN CALCIUM 80 MG: 80 TABLET, FILM COATED ORAL at 09:06

## 2025-06-28 RX ADMIN — INSULIN ASPART 5 UNITS: 100 INJECTION, SOLUTION INTRAVENOUS; SUBCUTANEOUS at 08:06

## 2025-06-28 RX ADMIN — HYDROCHLOROTHIAZIDE 25 MG: 25 TABLET ORAL at 12:06

## 2025-06-28 RX ADMIN — OXYCODONE HYDROCHLORIDE 10 MG: 5 TABLET ORAL at 04:06

## 2025-06-28 RX ADMIN — NICOTINE 1 PATCH: 14 PATCH, EXTENDED RELEASE TRANSDERMAL at 08:06

## 2025-06-28 RX ADMIN — DIAZEPAM 10 MG: 5 TABLET ORAL at 04:06

## 2025-06-28 RX ADMIN — METOCLOPRAMIDE 10 MG: 5 INJECTION, SOLUTION INTRAMUSCULAR; INTRAVENOUS at 12:06

## 2025-06-28 RX ADMIN — CARVEDILOL 6.25 MG: 6.25 TABLET, FILM COATED ORAL at 05:06

## 2025-06-28 RX ADMIN — CLOPIDOGREL 75 MG: 75 TABLET ORAL at 12:06

## 2025-06-28 RX ADMIN — PANCRELIPASE 3 CAPSULE: 60000; 12000; 38000 CAPSULE, DELAYED RELEASE PELLETS ORAL at 03:06

## 2025-06-28 RX ADMIN — DIPHENHYDRAMINE HYDROCHLORIDE 50 MG: 50 INJECTION INTRAMUSCULAR; INTRAVENOUS at 12:06

## 2025-06-28 RX ADMIN — ASPIRIN 81 MG CHEWABLE TABLET 81 MG: 81 TABLET CHEWABLE at 09:06

## 2025-06-28 RX ADMIN — INSULIN GLARGINE 5 UNITS: 100 INJECTION, SOLUTION SUBCUTANEOUS at 01:06

## 2025-06-28 RX ADMIN — PREGABALIN 75 MG: 75 CAPSULE ORAL at 03:06

## 2025-06-28 NOTE — ASSESSMENT & PLAN NOTE
Acute cerebellar infarction with possible remote cerebellar infarction as well.      Antithrombotics for secondary stroke prevention: Antiplatelets: Aspirin: 81 mg daily  Aspirin: 325 mg daily  Clopidogrel: 300 mg loading dose x 1, now  Clopidogrel: 75 mg daily  Anticoagulants: DVT ppx with lovenox    Statins for secondary stroke prevention and hyperlipidemia, if present:   Statins: Atorvastatin- 40 mg daily    Aggressive risk factor modification: HTN, Smoking, DM, HLD, CAD     Rehab efforts: The patient has been evaluated by a stroke team provider and the therapy needs have been fully considered based off the presenting complaints and exam findings. The following therapy evaluations are needed: PT evaluate and treat, OT evaluate and treat, SLP evaluate and treat    Diagnostics ordered/pending: CTA Head to assess vasculature , CTA Neck/Arch to assess vasculature, HgbA1C to assess blood glucose levels, MRI head without contrast to assess brain parenchyma, TTE to assess cardiac function/status , TSH to assess thyroid function    VTE prophylaxis: Enoxaparin 40 mg SQ every 24 hours    BP parameters: Infarct: No intervention, SBP <220    6/28 Out of 48 hour window for permissive hypertension we will start to bring down blood pressure.

## 2025-06-28 NOTE — ASSESSMENT & PLAN NOTE
"Patient's FSGs are controlled on current medication regimen.  Last A1c reviewed-   Lab Results   Component Value Date    HGBA1C 9.7 (H) 04/10/2025     Most recent fingerstick glucose reviewed- No results for input(s): "POCTGLUCOSE" in the last 24 hours.  Current correctional scale  Medium  Maintain anti-hyperglycemic dose as follows-   Antihyperglycemics (From admission, onward)      Start     Stop Route Frequency Ordered    06/27/25 1335  insulin aspart U-100 injection 0-10 Units         -- SubQ Before meals & nightly PRN 06/27/25 1236          Hold Oral hypoglycemics while patient is in the hospital.  Glycemic control  "

## 2025-06-28 NOTE — SUBJECTIVE & OBJECTIVE
Interval History:  No acute events overnight    Review of Systems  Objective:     Vital Signs (Most Recent):  Temp: 98.3 °F (36.8 °C) (06/28/25 1118)  Pulse: 63 (06/28/25 1118)  Resp: 18 (06/28/25 1118)  BP: (!) 176/101 (06/28/25 1118)  SpO2: 95 % (06/28/25 1118) Vital Signs (24h Range):  Temp:  [97.6 °F (36.4 °C)-99.1 °F (37.3 °C)] 98.3 °F (36.8 °C)  Pulse:  [59-76] 63  Resp:  [17-20] 18  SpO2:  [76 %-99 %] 95 %  BP: (137-184)/() 176/101     Weight: 85.4 kg (188 lb 4.4 oz)  Body mass index is 29.49 kg/m².  No intake or output data in the 24 hours ending 06/28/25 1204      Physical Exam  Vitals and nursing note reviewed.   Constitutional:       Appearance: Normal appearance.   HENT:      Head: Normocephalic.      Right Ear: Tympanic membrane normal.      Nose: Nose normal.   Cardiovascular:      Rate and Rhythm: Normal rate and regular rhythm.      Pulses: Normal pulses.      Heart sounds: Normal heart sounds.   Pulmonary:      Effort: Pulmonary effort is normal.      Breath sounds: Normal breath sounds.   Abdominal:      General: Abdomen is flat. Bowel sounds are normal.      Palpations: Abdomen is soft.   Musculoskeletal:         General: No swelling.      Cervical back: Normal range of motion.      Right lower leg: No edema.      Left lower leg: No edema.   Skin:     General: Skin is warm and dry.   Neurological:      General: No focal deficit present.      Mental Status: He is alert and oriented to person, place, and time.      Motor: Weakness present.   Psychiatric:         Mood and Affect: Mood normal.               Significant Labs: All pertinent labs within the past 24 hours have been reviewed.    Significant Imaging: I have reviewed all pertinent imaging results/findings within the past 24 hours.

## 2025-06-28 NOTE — PLAN OF CARE
[facility] - [address] [place], [place] [address]etry  Initial Discharge Assessment       Primary Care Provider: No primary care provider on file.    Admission Diagnosis: Dizziness [R42]  CVA (cerebral vascular accident) [I63.9]  Acute CVA (cerebrovascular accident) [I63.9]    Admission Date: 6/27/2025  Expected Discharge Date:     Transition of Care Barriers: (P) Mobility    Payor: MEDICARE / Plan: MEDICARE PART A & B / Product Type: Government /     Extended Emergency Contact Information  Primary Emergency Contact: Vera Mehta  Mobile Phone: [phone]  Relation: Mother  Preferred language: English   needed? No    Discharge Plan A: (P) Home with family, Home  Discharge Plan B: (P) Home Health, Long-term acute care facility (LTAC), Rehab, Skilled Nursing Facility      [facility] [address] [place] [address], [place] - [address]  [address] [place] [address]  Phone: [phone] Fax: [phone]      Initial Assessment (most recent)       Adult Discharge Assessment - 06/28/25 1249          Discharge Assessment    Assessment Type Discharge Planning Assessment (P)      Confirmed/corrected address, phone number and insurance Yes (P)      Confirmed Demographics Correct on Facesheet (P)      Source of Information patient;family (P)      Communicated BETY with patient/caregiver Date not available/Unable to determine (P)      People in Home parent(s) (P)      Name(s) of People in Home Kristen Simpson [phone] (P)      Do you have help at home or someone to help you manage your care at home? No (P)      Prior to hospitilization cognitive status: Alert/Oriented (P)      Current cognitive status: Alert/Oriented (P)      Walking or Climbing Stairs Difficulty no (P)      Dressing/Bathing Difficulty no (P)      Home Accessibility wheelchair accessible (P)      Home Layout Able to live on 1st floor (P)      Equipment Currently Used at Home none (P)      Readmission within 30 days? No (P)      Patient  currently being followed by outpatient case management? No (P)      Do you currently have service(s) that help you manage your care at home? Yes (P)      Name and Contact number of agency Pt couldn't remember the name of the agency (P)      Is the pt/caregiver preference to resume services with current agency Yes (P)      Do you take prescription medications? Yes (P)      Do you have prescription coverage? Yes (P)    Medicare A & B    Do you have any problems affording any of your prescribed medications? No (P)      Is the patient taking medications as prescribed? yes (P)      Who is going to help you get home at discharge? BrotherRadu [phone] (P)      How do you get to doctors appointments? family or friend will provide;health plan transportation (P)      Are you on dialysis? No (P)      Do you take coumadin? No (P)    Pt takes an aspirin daily    Discharge Plan A Home with family;Home (P)      Discharge Plan B Home Health;Long-term acute care facility (LTAC);Rehab;Skilled Nursing Facility (P)      Discharge Plan discussed with: Patient;Sibling (P)      Name(s) and Number(s) Brothradu black [phone] (P)      Transition of Care Barriers Mobility (P)         Physical Activity    On average, how many days per week do you engage in moderate to strenuous exercise (like a brisk walk)? 5 days (P)      On average, how many minutes do you engage in exercise at this level? 30 min (P)         Financial Resource Strain    How hard is it for you to pay for the very basics like food, housing, medical care, and heating? Not hard at all (P)         Housing Stability    In the last 12 months, was there a time when you were not able to pay the mortgage or rent on time? No (P)      In the past 12 months, how many times have you moved where you were living? 1 (P)      At any time in the past 12 months, were you homeless or living in a shelter (including now)? No (P)         Transportation Needs    In  the past 12 months, has lack of transportation kept you from medical appointments or from getting medications? No (P)      In the past 12 months, has lack of transportation kept you from meetings, work, or from getting things needed for daily living? No (P)         Food Insecurity    Within the past 12 months, you worried that your food would run out before you got the money to buy more. Never true (P)      Within the past 12 months, the food you bought just didn't last and you didn't have money to get more. Never true (P)         Stress    Do you feel stress - tense, restless, nervous, or anxious, or unable to sleep at night because your mind is troubled all the time - these days? To some extent (P)         Social Isolation    How often do you feel lonely or isolated from those around you?  Never (P)         Alcohol Use    Q1: How often do you have a drink containing alcohol? Never (P)      Q2: How many drinks containing alcohol do you have on a typical day when you are drinking? Patient does not drink (P)      Q3: How often do you have six or more drinks on one occasion? Never (P)         [employer]    In the past 12 months has the electric, gas, oil, or water company threatened to shut off services in your home? No (P)         Health Literacy    How often do you need to have someone help you when you read instructions, pamphlets, or other written material from your doctor or pharmacy? Rarely (P)                  Pt was seen by LSW for initial assessment/dc planning. Pt 's brother, Radu was at bedside. Pt was alert/oriented x3. Pt stated that he lives with his mother and will return home when he is eligible for dc. Pt is insured with Medicare A & B. IM was obtained. SS will follow up with dc planning.

## 2025-06-28 NOTE — NURSING
2300 Patient complained of a headache and was administered tylenol. Patient to continue to  be monitored.  0000: Patient blood pressure elevated and continue to complain of a headache and nausea states headache is frontal and migrates to the back. Nurse informed physician and a order was placed  for reglan and benadryl iv  0400 Patient bp elevated see flowsheet physician informed that patient stated he felt relaxed from the benadryl and reglan but the headache was still there with moderate pain of 5  \0440 physician on the floor to see patient a order for valium to be placed. Patient informed of may feel drwsiness patient verbalized understaning patient to continue with established plan of care  0620 patient states he feels better. Patient to continue with established plan of care

## 2025-06-28 NOTE — ASSESSMENT & PLAN NOTE
Patient's blood pressure range in the last 24 hours was: BP  Min: 137/89  Max: 184/100.The patient's inpatient anti-hypertensive regimen is listed below:  Current Antihypertensives  furosemide tablet 20 mg, Daily PRN, Oral  carvediloL tablet 6.25 mg, Before breakfast, Oral  hydroCHLOROthiazide tablet 25 mg, Daily, Oral    Plan  - BP is controlled, no changes needed to their regimen

## 2025-06-28 NOTE — ASSESSMENT & PLAN NOTE
Chronic pancreatitis of unclear etiology.  Patient takes pancreatic enzymes.    Lipase is normal this hospitalization.  Nausea vomiting most likely related to cerebellar infarct versus chronic pancreatitis.    Given that patient has a remote history of cerebellar infarction query if prior nausea vomiting may have also been related to central cause rather than chronic pancreatitis.  Continue pancrelipase

## 2025-06-28 NOTE — PROGRESS NOTES
[facility] Medicine  Progress Note    Patient Name: Faustino Fischer  MRN: [id number]  Patient Class: IP- Inpatient   Admission Date: 6/27/2025  Length of Stay: 1 days  Attending Physician: Pedro Pierce DO  Primary Care Provider: No primary care provider on file.        Subjective     Principal Problem:Cerebellar infarction        HPI:  Mr. Faustino Fischer is a 56yo man history of Type 2 diabetes (A1c 12.1 to 9.7), chronic pancreatitis, HFpEF, CAD s/p 6 stents (last 12/2024) who presents with dizziness, left upper and lower extremity weakness with nausea/vomiting.  He states yesterday morning he went walking but noticed that he was feeling light headed and he could not walk a straight line.  He also had nausea and vomiting.  He states he arrived at this hospital at 3am or 4am.  He states it is becoming progressively worse.  The dizziness is exacerbated with movement, especially movement of his head.    He reports left sided weakness of his arm and leg.  He states he can lift up his left arm and left leg but they are weak.  There is sensation change or numbness along the lateral aspect of his left leg.           While is in the room with the patient he had an episode of severe nausea and vomiting.  Vomitus was relatively clear and light brown.  Volume was 200-300 cc.    Tele neurology consult recommended admission with MRI and further workup.  Patient is out of the window for medical or surgical interventions.    Initial Vitals   BP Pulse Resp Temp SpO2   06/27/25 0507 06/27/25 0507 06/27/25 0507 06/27/25 0514 06/27/25 0507   (!) 149/87 65 11 98.7 °F (37.1 °C) 100 %       Overview/Hospital Course:  6/28 start BP medications today    Interval History:  No acute events overnight    Review of Systems  Objective:     Vital Signs (Most Recent):  Temp: 98.3 °F (36.8 °C) (06/28/25 1118)  Pulse: 63 (06/28/25 1118)  Resp: 18 (06/28/25 1118)  BP: (!) 176/101 (06/28/25 1118)  SpO2:  95 % (06/28/25 1118) Vital Signs (24h Range):  Temp:  [97.6 °F (36.4 °C)-99.1 °F (37.3 °C)] 98.3 °F (36.8 °C)  Pulse:  [59-76] 63  Resp:  [17-20] 18  SpO2:  [76 %-99 %] 95 %  BP: (137-184)/() 176/101     Weight: 85.4 kg (188 lb 4.4 oz)  Body mass index is 29.49 kg/m².  No intake or output data in the 24 hours ending 06/28/25 1204      Physical Exam  Vitals and nursing note reviewed.   Constitutional:       Appearance: Normal appearance.   HENT:      Head: Normocephalic.      Right Ear: Tympanic membrane normal.      Nose: Nose normal.   Cardiovascular:      Rate and Rhythm: Normal rate and regular rhythm.      Pulses: Normal pulses.      Heart sounds: Normal heart sounds.   Pulmonary:      Effort: Pulmonary effort is normal.      Breath sounds: Normal breath sounds.   Abdominal:      General: Abdomen is flat. Bowel sounds are normal.      Palpations: Abdomen is soft.   Musculoskeletal:         General: No swelling.      Cervical back: Normal range of motion.      Right lower leg: No edema.      Left lower leg: No edema.   Skin:     General: Skin is warm and dry.   Neurological:      General: No focal deficit present.      Mental Status: He is alert and oriented to person, place, and time.      Motor: Weakness present.   Psychiatric:         Mood and Affect: Mood normal.               Significant Labs: All pertinent labs within the past 24 hours have been reviewed.    Significant Imaging: I have reviewed all pertinent imaging results/findings within the past 24 hours.      Assessment & Plan  Cerebellar infarction  Acute cerebellar infarction with possible remote cerebellar infarction as well.      Antithrombotics for secondary stroke prevention: Antiplatelets: Aspirin: 81 mg daily  Aspirin: 325 mg daily  Clopidogrel: 300 mg loading dose x 1, now  Clopidogrel: 75 mg daily  Anticoagulants: DVT ppx with lovenox    Statins for secondary stroke prevention and hyperlipidemia, if present:   Statins: Atorvastatin- 40  "mg daily    Aggressive risk factor modification: HTN, Smoking, DM, HLD, CAD     Rehab efforts: The patient has been evaluated by a stroke team provider and the therapy needs have been fully considered based off the presenting complaints and exam findings. The following therapy evaluations are needed: PT evaluate and treat, OT evaluate and treat, SLP evaluate and treat    Diagnostics ordered/pending: CTA Head to assess vasculature , CTA Neck/Arch to assess vasculature, HgbA1C to assess blood glucose levels, MRI head without contrast to assess brain parenchyma, TTE to assess cardiac function/status , TSH to assess thyroid function    VTE prophylaxis: Enoxaparin 40 mg SQ every 24 hours    BP parameters: Infarct: No intervention, SBP <220    6/28 Out of 48 hour window for permissive hypertension we will start to bring down blood pressure.  CAD (coronary artery disease)  Patient with known CAD s/p stent placement, which is controlled Will continue ASA, Plavix, and Statin and monitor for S/Sx of angina/ACS. Continue to monitor on telemetry.   Ataxia  Ataxia with walking.  This likely a sign of cerebellar infarct.    Type 2 diabetes mellitus  Patient's FSGs are controlled on current medication regimen.  Last A1c reviewed-   Lab Results   Component Value Date    HGBA1C 9.7 (H) 04/10/2025     Most recent fingerstick glucose reviewed- No results for input(s): "POCTGLUCOSE" in the last 24 hours.  Current correctional scale  Medium  Maintain anti-hyperglycemic dose as follows-   Antihyperglycemics (From admission, onward)      Start     Stop Route Frequency Ordered    06/27/25 1335  insulin aspart U-100 injection 0-10 Units         -- SubQ Before meals & nightly PRN 06/27/25 1236          Hold Oral hypoglycemics while patient is in the hospital.  Glycemic control  Tobacco dependency  Dangers of cigarette smoking were reviewed with patient in detail. Patient was Counseled for 10 minutes or greater. Nicotine replacement options " were discussed. Nicotine replacement was discussed- prescribed  Chronic pancreatitis  Chronic pancreatitis of unclear etiology.  Patient takes pancreatic enzymes.    Lipase is normal this hospitalization.  Nausea vomiting most likely related to cerebellar infarct versus chronic pancreatitis.    Given that patient has a remote history of cerebellar infarction query if prior nausea vomiting may have also been related to central cause rather than chronic pancreatitis.  Continue pancrelipase    Hypertension  Patient's blood pressure range in the last 24 hours was: BP  Min: 137/89  Max: 184/100.The patient's inpatient anti-hypertensive regimen is listed below:  Current Antihypertensives  furosemide tablet 20 mg, Daily PRN, Oral  carvediloL tablet 6.25 mg, Before breakfast, Oral  hydroCHLOROthiazide tablet 25 mg, Daily, Oral    Plan  - BP is controlled, no changes needed to their regimen    VTE Risk Mitigation (From admission, onward)           Ordered     enoxaparin injection 40 mg  Daily         06/27/25 1236     Place sequential compression device  Until discontinued         06/27/25 1236     IP VTE HIGH RISK PATIENT  Once         06/27/25 1236                    Discharge Planning   BETY:      Code Status: Full Code   Medical Readiness for Discharge Date:            Labs and consultant notes reviewed.  Metabolic panel tomorrow.  Start HCTZ, start losartan.  Add Compro.  Add oxycodone.    51 minutes spent on face to face patient interaction, discussion with family, ancillary staff, and/or other physicians as well as review of pertinent labs, images, and notes.         Please place Justification for DME      Pedro Pierce DO  Department of Hospital Medicine   [facility] - [address] [place] [address]

## 2025-06-28 NOTE — HOSPITAL COURSE
6/28 start BP medications today  6/29 headaches better, still nauseated  6/30 improved today, eating well given Bgs are over 300, will discharge to swingbed in am  7/1 discharge in to swingbed today, med list reviewed, follow with pcp in 1 week

## 2025-06-29 LAB
ANION GAP SERPL CALCULATED.3IONS-SCNC: 14 MMOL/L (ref 7–16)
BUN SERPL-MCNC: 11 MG/DL (ref 8–26)
BUN/CREAT SERPL: 13 (ref 6–20)
CALCIUM SERPL-MCNC: 9 MG/DL (ref 8.4–10.2)
CHLORIDE SERPL-SCNC: 102 MMOL/L (ref 98–107)
CO2 SERPL-SCNC: 26 MMOL/L (ref 22–29)
CREAT SERPL-MCNC: 0.85 MG/DL (ref 0.72–1.25)
EGFR (NO RACE VARIABLE) (RUSH/TITUS): 102 ML/MIN/1.73M2
GLUCOSE SERPL-MCNC: 180 MG/DL (ref 70–105)
GLUCOSE SERPL-MCNC: 240 MG/DL (ref 70–105)
GLUCOSE SERPL-MCNC: 243 MG/DL (ref 74–100)
GLUCOSE SERPL-MCNC: 247 MG/DL (ref 70–105)
GLUCOSE SERPL-MCNC: 247 MG/DL (ref 70–105)
GLUCOSE SERPL-MCNC: 260 MG/DL (ref 70–105)
GLUCOSE SERPL-MCNC: 276 MG/DL (ref 70–105)
GLUCOSE SERPL-MCNC: 313 MG/DL (ref 70–105)
OHS QRS DURATION: 86 MS
OHS QTC CALCULATION: 416 MS
POTASSIUM SERPL-SCNC: 4 MMOL/L (ref 3.5–5.1)
SODIUM SERPL-SCNC: 138 MMOL/L (ref 136–145)

## 2025-06-29 PROCEDURE — 97161 PT EVAL LOW COMPLEX 20 MIN: CPT

## 2025-06-29 PROCEDURE — 97165 OT EVAL LOW COMPLEX 30 MIN: CPT

## 2025-06-29 PROCEDURE — 11000001 HC ACUTE MED/SURG PRIVATE ROOM

## 2025-06-29 PROCEDURE — 94761 N-INVAS EAR/PLS OXIMETRY MLT: CPT

## 2025-06-29 PROCEDURE — 36415 COLL VENOUS BLD VENIPUNCTURE: CPT | Performed by: HOSPITALIST

## 2025-06-29 PROCEDURE — 25000003 PHARM REV CODE 250: Performed by: HOSPITALIST

## 2025-06-29 PROCEDURE — 82962 GLUCOSE BLOOD TEST: CPT

## 2025-06-29 PROCEDURE — 99900035 HC TECH TIME PER 15 MIN (STAT)

## 2025-06-29 PROCEDURE — 63600175 PHARM REV CODE 636 W HCPCS: Performed by: HOSPITALIST

## 2025-06-29 PROCEDURE — S4991 NICOTINE PATCH NONLEGEND: HCPCS | Performed by: HOSPITALIST

## 2025-06-29 PROCEDURE — 63600175 PHARM REV CODE 636 W HCPCS: Performed by: STUDENT IN AN ORGANIZED HEALTH CARE EDUCATION/TRAINING PROGRAM

## 2025-06-29 PROCEDURE — 80048 BASIC METABOLIC PNL TOTAL CA: CPT | Performed by: HOSPITALIST

## 2025-06-29 PROCEDURE — 99232 SBSQ HOSP IP/OBS MODERATE 35: CPT | Mod: ,,, | Performed by: STUDENT IN AN ORGANIZED HEALTH CARE EDUCATION/TRAINING PROGRAM

## 2025-06-29 PROCEDURE — 25000003 PHARM REV CODE 250: Performed by: STUDENT IN AN ORGANIZED HEALTH CARE EDUCATION/TRAINING PROGRAM

## 2025-06-29 RX ORDER — LOSARTAN POTASSIUM 25 MG/1
25 TABLET ORAL ONCE
Status: COMPLETED | OUTPATIENT
Start: 2025-06-29 | End: 2025-06-29

## 2025-06-29 RX ORDER — HYDROCHLOROTHIAZIDE 25 MG/1
50 TABLET ORAL DAILY
Status: DISCONTINUED | OUTPATIENT
Start: 2025-06-30 | End: 2025-07-01 | Stop reason: HOSPADM

## 2025-06-29 RX ORDER — HYDROCHLOROTHIAZIDE 25 MG/1
25 TABLET ORAL ONCE
Status: COMPLETED | OUTPATIENT
Start: 2025-06-29 | End: 2025-06-29

## 2025-06-29 RX ORDER — LOSARTAN POTASSIUM 50 MG/1
50 TABLET ORAL DAILY
Status: DISCONTINUED | OUTPATIENT
Start: 2025-06-30 | End: 2025-07-01 | Stop reason: HOSPADM

## 2025-06-29 RX ORDER — ONDANSETRON 4 MG/1
4 TABLET, ORALLY DISINTEGRATING ORAL EVERY 6 HOURS
Status: DISCONTINUED | OUTPATIENT
Start: 2025-06-29 | End: 2025-07-01 | Stop reason: HOSPADM

## 2025-06-29 RX ORDER — INSULIN GLARGINE 100 [IU]/ML
15 INJECTION, SOLUTION SUBCUTANEOUS DAILY
Status: DISCONTINUED | OUTPATIENT
Start: 2025-06-29 | End: 2025-07-01 | Stop reason: HOSPADM

## 2025-06-29 RX ADMIN — INSULIN ASPART 2 UNITS: 100 INJECTION, SOLUTION INTRAVENOUS; SUBCUTANEOUS at 04:06

## 2025-06-29 RX ADMIN — OXYCODONE HYDROCHLORIDE 10 MG: 5 TABLET ORAL at 06:06

## 2025-06-29 RX ADMIN — ENOXAPARIN SODIUM 40 MG: 40 INJECTION SUBCUTANEOUS at 04:06

## 2025-06-29 RX ADMIN — CARVEDILOL 6.25 MG: 6.25 TABLET, FILM COATED ORAL at 05:06

## 2025-06-29 RX ADMIN — PANCRELIPASE 3 CAPSULE: 60000; 12000; 38000 CAPSULE, DELAYED RELEASE PELLETS ORAL at 10:06

## 2025-06-29 RX ADMIN — ONDANSETRON 4 MG: 4 TABLET, ORALLY DISINTEGRATING ORAL at 11:06

## 2025-06-29 RX ADMIN — CLOPIDOGREL 75 MG: 75 TABLET ORAL at 08:06

## 2025-06-29 RX ADMIN — ATORVASTATIN CALCIUM 80 MG: 80 TABLET, FILM COATED ORAL at 08:06

## 2025-06-29 RX ADMIN — PANCRELIPASE 3 CAPSULE: 60000; 12000; 38000 CAPSULE, DELAYED RELEASE PELLETS ORAL at 02:06

## 2025-06-29 RX ADMIN — PREGABALIN 75 MG: 75 CAPSULE ORAL at 02:06

## 2025-06-29 RX ADMIN — PREGABALIN 75 MG: 75 CAPSULE ORAL at 08:06

## 2025-06-29 RX ADMIN — OXYCODONE HYDROCHLORIDE 10 MG: 5 TABLET ORAL at 01:06

## 2025-06-29 RX ADMIN — INSULIN ASPART 4 UNITS: 100 INJECTION, SOLUTION INTRAVENOUS; SUBCUTANEOUS at 09:06

## 2025-06-29 RX ADMIN — PREGABALIN 75 MG: 75 CAPSULE ORAL at 10:06

## 2025-06-29 RX ADMIN — ONDANSETRON 4 MG: 2 INJECTION INTRAMUSCULAR; INTRAVENOUS at 11:06

## 2025-06-29 RX ADMIN — OXYCODONE HYDROCHLORIDE 10 MG: 5 TABLET ORAL at 07:06

## 2025-06-29 RX ADMIN — ONDANSETRON 4 MG: 4 TABLET, ORALLY DISINTEGRATING ORAL at 05:06

## 2025-06-29 RX ADMIN — PANCRELIPASE 3 CAPSULE: 60000; 12000; 38000 CAPSULE, DELAYED RELEASE PELLETS ORAL at 08:06

## 2025-06-29 RX ADMIN — HYDROCHLOROTHIAZIDE 25 MG: 25 TABLET ORAL at 11:06

## 2025-06-29 RX ADMIN — ASPIRIN 81 MG CHEWABLE TABLET 81 MG: 81 TABLET CHEWABLE at 08:06

## 2025-06-29 RX ADMIN — LOSARTAN POTASSIUM 25 MG: 25 TABLET, FILM COATED ORAL at 08:06

## 2025-06-29 RX ADMIN — INSULIN ASPART 2 UNITS: 100 INJECTION, SOLUTION INTRAVENOUS; SUBCUTANEOUS at 10:06

## 2025-06-29 RX ADMIN — INSULIN ASPART 4 UNITS: 100 INJECTION, SOLUTION INTRAVENOUS; SUBCUTANEOUS at 11:06

## 2025-06-29 RX ADMIN — LOSARTAN POTASSIUM 25 MG: 25 TABLET, FILM COATED ORAL at 11:06

## 2025-06-29 RX ADMIN — INSULIN GLARGINE 15 UNITS: 100 INJECTION, SOLUTION SUBCUTANEOUS at 09:06

## 2025-06-29 RX ADMIN — HYDROCHLOROTHIAZIDE 25 MG: 25 TABLET ORAL at 08:06

## 2025-06-29 RX ADMIN — NICOTINE 1 PATCH: 14 PATCH, EXTENDED RELEASE TRANSDERMAL at 08:06

## 2025-06-29 NOTE — ASSESSMENT & PLAN NOTE
Acute cerebellar infarction with possible remote cerebellar infarction as well.      Antithrombotics for secondary stroke prevention: Antiplatelets: Aspirin: 81 mg daily  Aspirin: 325 mg daily  Clopidogrel: 300 mg loading dose x 1, now  Clopidogrel: 75 mg daily  Anticoagulants: DVT ppx with lovenox    Statins for secondary stroke prevention and hyperlipidemia, if present:   Statins: Atorvastatin- 40 mg daily    Aggressive risk factor modification: HTN, Smoking, DM, HLD, CAD     Rehab efforts: The patient has been evaluated by a stroke team provider and the therapy needs have been fully considered based off the presenting complaints and exam findings. The following therapy evaluations are needed: PT evaluate and treat, OT evaluate and treat, SLP evaluate and treat    Diagnostics ordered/pending: CTA Head to assess vasculature , CTA Neck/Arch to assess vasculature, HgbA1C to assess blood glucose levels, MRI head without contrast to assess brain parenchyma, TTE to assess cardiac function/status , TSH to assess thyroid function    VTE prophylaxis: Enoxaparin 40 mg SQ every 24 hours    BP parameters: Infarct: No intervention, SBP <220    6/28 Out of 48 hour window for permissive hypertension we will start to bring down blood pressure.  Still battling nausea and head aches but better today

## 2025-06-29 NOTE — PT/OT/SLP EVAL
Occupational Therapy   Evaluation    Name: Faustino Fischer  MRN: [id number]  Admitting Diagnosis: Cerebellar infarction  Recent Surgery: * No surgery found *      Recommendations:     Discharge Recommendations: High Intensity Therapy (to moderate)  Discharge Equipment Recommendations:  to be determined by next level of care  Barriers to discharge:  Decreased caregiver support (on going medical workup and treatment)    Assessment:     Faustino Fischer is a 55 y.o. male with a medical diagnosis of Cerebellar infarction.  He presents with alert and agreeable to treatment. Performance deficits affecting function: weakness, impaired endurance, impaired sensation, impaired self care skills, decreased upper extremity function, gait instability, impaired balance, impaired coordination.     Pt is admitted with dizziness and (L) side weakness. Pt was found to have a cerebellar infarction. Pt c/o a headache from occipital region to right behind his eyes. Pt c/o increased dizziness with movement and with standing or walking. Pt has decreased coordination of (L) UE, requiring increased time and effort to perform tasks. Pt demonstrates ataxia with ambulation which causes  decreased stability and increased shakiness. Pt is at an increased risk for falls and would greatly benefit from high intensity rehab at discharge. Pt is very highly motivated to improve his balance and function.    Rehab Prognosis: Good; patient would benefit from acute skilled OT services to address these deficits and reach maximum level of function.       Plan:     Patient to be seen 5 x/week to address the above listed problems via self-care/home management, therapeutic activities, therapeutic exercises, neuromuscular re-education  Plan of Care Expires: 08/01/25  Plan of Care Reviewed with: patient    Subjective     Chief Complaint: Cerebellar infarction    Patient/Family Comments/goals: Pt wants to go to [facility] in [place] as he has family in [place] that can  check in on him there.    Occupational Profile:  Living Environment: Pt lives on the first floor of his parent's house with his parents staying on the second floor. Pt has not steps or stairs that he has to manage.  Previous level of function: Pt was independent with his self-care and with his mobility  Roles and Routines: Pt is disabled, takes care of himself, drives  Equipment Used at Home: none  Assistance upon Discharge: Pt will have assistance from facility staff    Pain/Comfort:  Pain Rating 1: 5/10  Location 1: head  Pain Addressed 1: Pre-medicate for activity  Pain Rating Post-Intervention 1: 5/10    Patients cultural, spiritual, Muslim conflicts given the current situation: no    Objective:     Communicated with: ROCIO Astorga prior to session.  Patient found HOB elevated with telemetry upon OT entry to room.    General Precautions: Standard, fall  Orthopedic Precautions: N/A  Braces: N/A  Respiratory Status: Room air    Occupational Performance:    Bed Mobility:    Patient completed Scooting/Bridging with modified independence  Patient completed Supine to Sit with modified independence  Patient completed Sit to Supine with stand by assistance    Functional Mobility/Transfers:  Patient completed Sit <> Stand Transfer with contact guard assistance and minimum assistance  with  rolling walker   Functional Mobility: Pt ambulated about 6 to 8 feet from the bed wiith RW and min(A) and became very dizzy and shaking badly, so pt steps backwards to bed with RW and moderate assistance.    Activities of Daily Living:  Grooming: independence with access to supplies mostly using his (R) hand  Upper Body Dressing: minimum assistance gown as jean-paul    Cognitive/Visual Perceptual:  Cognitive/Psychosocial Skills:     -       Oriented to: Person, Place, and Situation   -       Follows Commands/attention:Follows one-step commands  -       Communication: clear/fluent  -       Safety awareness/insight to disability:  intact   -       Mood/Affect/Coping skills/emotional control: Cooperative    Physical Exam:  Balance:    -       sitting with supervision- pt had 1 episode of dizziness and rocking while sitting EOB, standing with CGA to min(A) with RW  Dominant hand:    -       left  Upper Extremity Range of Motion:     -       Right Upper Extremity: WFL  -       Left Upper Extremity: WFL  Upper Extremity Strength:    -       Right Upper Extremity: WNL  -       Left Upper Extremity: grossly 3(+) to 4/5   Strength:    -       Right Upper Extremity: WNL  -       Left Upper Extremity: 3/5  Fine Motor Coordination:    -       Intact  Right hand, finger to nose, Right hand thumb/finger opposition skills, and Right hand, manipulation of objects  -       Impaired  Left hand, finger to nose for fist attempt, but improved with repetition and Left hand, manipulation of objects increased time and focus  Gross motor coordination:   Pt has decreased co-ordination of (L) UE    AMPAC 6 Click ADL:  AMPAC Total Score: 20    Treatment & Education:  Pt educated on OT role/POC.   Importance of OOB activity with staff assistance.  Importance of sitting up in the chair throughout the day as tolerated, especially for meals   Safety during functional t/f and mobility with use of RW  Importance of assisting with self-care activities   Gave pt instructions to use her (L) UE for grooming and bathing, and to close his eyes and focus on touching very specific areas  All questions/concerns answered within OT scope of practice      Patient left HOB elevated with all lines intact, call button in reach, and RN notified    GOALS:   Multidisciplinary Problems       Occupational Therapy Goals          Problem: Occupational Therapy    Goal Priority Disciplines Outcome Interventions   Occupational Therapy Goal     OT, PT/OT Ongoing    Description: STG: (in 1 week)  Pt will perform grooming with setup using his (L) UE primarily  Pt will bathe with min(A) from  chair  Pt will perform UE dressing with CGA  Pt will perform LE dressing with min(A)  Pt will transfer bed/chair/bsc with min(A) with RW  Pt will perform standing task x 3 min with CGA with RW   Pt will tolerate 20 minutes of tx without fatigue      LTG: (in 5 weeks)  1.Restore to max I with self care and mobility.                           History:     Past Medical History:   Diagnosis Date    Chronic pancreatitis, unspecified pancreatitis type     Essential (primary) hypertension     Hidradenitis suppurativa 06/10/2024    Hypertensive heart disease 06/14/2024    per echo  severe LVH    Mixed hyperlipidemia     Multiple vessel coronary artery disease     Pancreatic pseudocyst     Type 2 diabetes mellitus treated with insulin          Past Surgical History:   Procedure Laterality Date    CARDIAC SURGERY      Stents x6    CHOLECYSTECTOMY      EXCISION OF HIDRADENITIS N/A 3/27/2024    Procedure: EXCISION, HIDRADENITIS;  Surgeon: Fidel Park MD;  Location: [facility];  Service: General;  Laterality: N/A;    INCISION OF PERIANAL ABSCESS Bilateral 2/16/2024    Procedure: INCISION, ABSCESS, PERIANAL;  Surgeon: Fidel Park MD;  Location: [facility];  Service: General;  Laterality: Bilateral;    UNDESCENDED TESTICLE EXPLORATION N/A        Time Tracking:     OT Date of Treatment: 06/29/25  OT Start Time: 0926  OT Stop Time: 0955  OT Total Time (min): 29 min    Billable Minutes:Evaluation low complexity    6/29/2025

## 2025-06-29 NOTE — PT/OT/SLP EVAL
Physical Therapy Evaluation    Patient Name:  Faustino Fischer   MRN:  [id number]    Recommendations:     Discharge Recommendations: High Intensity Therapy (to moderate)   Discharge Equipment Recommendations: to be determined by next level of care   Barriers to discharge: Decreased caregiver support    Assessment:     Faustino Fischer is a 55 y.o. male admitted with a medical diagnosis of Cerebellar infarction.  He presents with the following impairments/functional limitations: weakness, impaired endurance, impaired sensation, impaired functional mobility, gait instability, impaired balance     Patient presenting with cerebellar CVA. Strength is ok in LLE but patient having difficulty with coordination and balance. LLE extensor tone present. Ataxic gait which is typical with cerebellar cva. Also having significant post stroke fatigue with gait. He lives with his parents currently. Patient is very motivated to improve and would benefit from high to moderate intensity rehab depending on progress.     Rehab Prognosis: Good; patient would benefit from acute skilled PT services to address these deficits and reach maximum level of function.    Recent Surgery: * No surgery found *      Plan:     During this hospitalization, patient to be seen 5 x/week to address the identified rehab impairments via gait training, therapeutic activities, therapeutic exercises, neuromuscular re-education and progress toward the following goals:    Plan of Care Expires:  08/03/25    Subjective     Chief Complaint: fatigue and headaches  Patient/Family Comments/goals: agreeable  Pain/Comfort:  Pain Rating 1: 7/10  Location 1: head  Pain Addressed 1: Pre-medicate for activity    Patients cultural, spiritual, Jainism conflicts given the current situation: no    Living Environment:  Home with parents, 1 step, medicaid waiver x3 a week  Prior to admission, patients level of function was independent but having some balance issues.  Equipment used  at home: none.  DME owned (not currently used): none.  Upon discharge, patient will have assistance from facility staff.    Objective:     Communicated with nurse prior to session.  Patient found HOB elevated with telemetry  upon PT entry to room.    General Precautions: Standard, fall  Orthopedic Precautions:    Braces:    Respiratory Status: Room air    Exams:  Sensation:    -       Impaired  light/touch Lateral aspect of LLE  RLE ROM: WFL  RLE Strength: 5/5  LLE ROM: WFL except extensor tone present  LLE Strength: ankle 3+/5, hip and knee 4-/5    Functional Mobility:  Bed Mobility:     Supine to Sit: stand by assistance  Transfers:     Sit to Stand:  contact guard assistance with rolling walker  Gait: 15 ft with RW and CGA, very fatigued, decreased foot clearance with LLE, locking L knee out to keep support  Balance: fair      AM-PAC 6 CLICK MOBILITY  Total Score:20       Treatment & Education:  Patient educated on the role of physical therapy in the acute care setting, call light usage, and safety including calling nurse for mobility  Patient educated on CVA rehab and recovery.  PT answered all patient questions within PT scope of practice  Mobility as noted      Patient left sitting edge of bed with all lines intact, call button in reach, and nurse notified.    GOALS:   Multidisciplinary Problems       Physical Therapy Goals          Problem: Physical Therapy    Goal Priority Disciplines Outcome Interventions   Physical Therapy Goal     PT, PT/OT Progressing    Description: Short term goals:  . Sit to stand transfer with Modified [place]  . Gait  x 200 feet with Modified [place] using TBD.   . Stand for 15 minutes with Modified [place] using TBD    Long term goals:  Patient will gain highest level functional mobility with lowest level of assistive device to return to desired living arrangement and prior ADL's.                          DME Justifications:  TBD    History:     Past Medical History:    Diagnosis Date    Chronic pancreatitis, unspecified pancreatitis type     Essential (primary) hypertension     Hidradenitis suppurativa 06/10/2024    Hypertensive heart disease 06/14/2024    per echo  severe LVH    Mixed hyperlipidemia     Multiple vessel coronary artery disease     Pancreatic pseudocyst     Type 2 diabetes mellitus treated with insulin        Past Surgical History:   Procedure Laterality Date    CARDIAC SURGERY      Stents x6    CHOLECYSTECTOMY      EXCISION OF HIDRADENITIS N/A 3/27/2024    Procedure: EXCISION, HIDRADENITIS;  Surgeon: Fidel Park MD;  Location: [facility] OR;  Service: General;  Laterality: N/A;    INCISION OF PERIANAL ABSCESS Bilateral 2/16/2024    Procedure: INCISION, ABSCESS, PERIANAL;  Surgeon: Fidel Park MD;  Location: [facility] OR;  Service: General;  Laterality: Bilateral;    UNDESCENDED TESTICLE EXPLORATION N/A        Time Tracking:     PT Received On: 06/29/25  PT Start Time: 0802     PT Stop Time: 0834  PT Total Time (min): 32 min     Billable Minutes: Evaluation 32      06/29/2025

## 2025-06-29 NOTE — PLAN OF CARE
Problem: Physical Therapy  Goal: Physical Therapy Goal  Description: Short term goals:  . Sit to stand transfer with Modified [place]  . Gait  x 200 feet with Modified [place] using TBD.   . Stand for 15 minutes with Modified [place] using TBD    Long term goals:  Patient will gain highest level functional mobility with lowest level of assistive device to return to desired living arrangement and prior ADL's.     Outcome: Progressing

## 2025-06-29 NOTE — SUBJECTIVE & OBJECTIVE
Interval History:  No acute events overnight    Review of Systems  Objective:     Vital Signs (Most Recent):  Temp: 97.9 °F (36.6 °C) (06/29/25 0719)  Pulse: 63 (06/29/25 0730)  Resp: 18 (06/29/25 0719)  BP: 139/86 (06/29/25 0719)  SpO2: 98 % (06/29/25 0719) Vital Signs (24h Range):  Temp:  [97.7 °F (36.5 °C)-98.8 °F (37.1 °C)] 97.9 °F (36.6 °C)  Pulse:  [62-63] 63  Resp:  [14-18] 18  SpO2:  [95 %-98 %] 98 %  BP: (130-176)/() 139/86     Weight: 85.4 kg (188 lb 4.4 oz)  Body mass index is 29.49 kg/m².    Intake/Output Summary (Last 24 hours) at 6/29/2025 1115  Last data filed at 6/28/2025 1758  Gross per 24 hour   Intake --   Output 700 ml   Net -700 ml         Physical Exam  Vitals and nursing note reviewed.   Constitutional:       Appearance: Normal appearance.   HENT:      Head: Normocephalic.      Right Ear: Tympanic membrane normal.      Nose: Nose normal.   Cardiovascular:      Rate and Rhythm: Normal rate and regular rhythm.      Pulses: Normal pulses.      Heart sounds: Normal heart sounds.   Pulmonary:      Effort: Pulmonary effort is normal.      Breath sounds: Normal breath sounds.   Abdominal:      General: Abdomen is flat. Bowel sounds are normal.      Palpations: Abdomen is soft.   Musculoskeletal:         General: No swelling.      Cervical back: Normal range of motion.      Right lower leg: No edema.      Left lower leg: No edema.   Skin:     General: Skin is warm and dry.   Neurological:      General: No focal deficit present.      Mental Status: He is alert and oriented to person, place, and time.      Motor: Weakness present.   Psychiatric:         Mood and Affect: Mood normal.               Significant Labs: All pertinent labs within the past 24 hours have been reviewed.    Significant Imaging: I have reviewed all pertinent imaging results/findings within the past 24 hours.

## 2025-06-29 NOTE — PROGRESS NOTES
[facility] Medicine  Progress Note    Patient Name: Faustino Fischer  MRN: [id number]  Patient Class: IP- Inpatient   Admission Date: 6/27/2025  Length of Stay: 2 days  Attending Physician: Pedro Pierce DO  Primary Care Provider: No primary care provider on file.        Subjective     Principal Problem:Cerebellar infarction        HPI:  Mr. Faustino Fischer is a 54yo man history of Type 2 diabetes (A1c 12.1 to 9.7), chronic pancreatitis, HFpEF, CAD s/p 6 stents (last 12/2024) who presents with dizziness, left upper and lower extremity weakness with nausea/vomiting.  He states yesterday morning he went walking but noticed that he was feeling light headed and he could not walk a straight line.  He also had nausea and vomiting.  He states he arrived at this hospital at 3am or 4am.  He states it is becoming progressively worse.  The dizziness is exacerbated with movement, especially movement of his head.    He reports left sided weakness of his arm and leg.  He states he can lift up his left arm and left leg but they are weak.  There is sensation change or numbness along the lateral aspect of his left leg.           While is in the room with the patient he had an episode of severe nausea and vomiting.  Vomitus was relatively clear and light brown.  Volume was 200-300 cc.    Tele neurology consult recommended admission with MRI and further workup.  Patient is out of the window for medical or surgical interventions.    Initial Vitals   BP Pulse Resp Temp SpO2   06/27/25 0507 06/27/25 0507 06/27/25 0507 06/27/25 0514 06/27/25 0507   (!) 149/87 65 11 98.7 °F (37.1 °C) 100 %       Overview/Hospital Course:  6/28 start BP medications today  6/29 headaches better, still nauseated    Interval History:  No acute events overnight    Review of Systems  Objective:     Vital Signs (Most Recent):  Temp: 97.9 °F (36.6 °C) (06/29/25 0719)  Pulse: 63 (06/29/25 0730)  Resp: 18 (06/29/25  0719)  BP: 139/86 (06/29/25 0719)  SpO2: 98 % (06/29/25 0719) Vital Signs (24h Range):  Temp:  [97.7 °F (36.5 °C)-98.8 °F (37.1 °C)] 97.9 °F (36.6 °C)  Pulse:  [62-63] 63  Resp:  [14-18] 18  SpO2:  [95 %-98 %] 98 %  BP: (130-176)/() 139/86     Weight: 85.4 kg (188 lb 4.4 oz)  Body mass index is 29.49 kg/m².    Intake/Output Summary (Last 24 hours) at 6/29/2025 1115  Last data filed at 6/28/2025 1758  Gross per 24 hour   Intake --   Output 700 ml   Net -700 ml         Physical Exam  Vitals and nursing note reviewed.   Constitutional:       Appearance: Normal appearance.   HENT:      Head: Normocephalic.      Right Ear: Tympanic membrane normal.      Nose: Nose normal.   Cardiovascular:      Rate and Rhythm: Normal rate and regular rhythm.      Pulses: Normal pulses.      Heart sounds: Normal heart sounds.   Pulmonary:      Effort: Pulmonary effort is normal.      Breath sounds: Normal breath sounds.   Abdominal:      General: Abdomen is flat. Bowel sounds are normal.      Palpations: Abdomen is soft.   Musculoskeletal:         General: No swelling.      Cervical back: Normal range of motion.      Right lower leg: No edema.      Left lower leg: No edema.   Skin:     General: Skin is warm and dry.   Neurological:      General: No focal deficit present.      Mental Status: He is alert and oriented to person, place, and time.      Motor: Weakness present.   Psychiatric:         Mood and Affect: Mood normal.               Significant Labs: All pertinent labs within the past 24 hours have been reviewed.    Significant Imaging: I have reviewed all pertinent imaging results/findings within the past 24 hours.      Assessment & Plan  Cerebellar infarction  Acute cerebellar infarction with possible remote cerebellar infarction as well.      Antithrombotics for secondary stroke prevention: Antiplatelets: Aspirin: 81 mg daily  Aspirin: 325 mg daily  Clopidogrel: 300 mg loading dose x 1, now  Clopidogrel: 75 mg  "daily  Anticoagulants: DVT ppx with lovenox    Statins for secondary stroke prevention and hyperlipidemia, if present:   Statins: Atorvastatin- 40 mg daily    Aggressive risk factor modification: HTN, Smoking, DM, HLD, CAD     Rehab efforts: The patient has been evaluated by a stroke team provider and the therapy needs have been fully considered based off the presenting complaints and exam findings. The following therapy evaluations are needed: PT evaluate and treat, OT evaluate and treat, SLP evaluate and treat    Diagnostics ordered/pending: CTA Head to assess vasculature , CTA Neck/Arch to assess vasculature, HgbA1C to assess blood glucose levels, MRI head without contrast to assess brain parenchyma, TTE to assess cardiac function/status , TSH to assess thyroid function    VTE prophylaxis: Enoxaparin 40 mg SQ every 24 hours    BP parameters: Infarct: No intervention, SBP <220    6/28 Out of 48 hour window for permissive hypertension we will start to bring down blood pressure.  Still battling nausea and head aches but better today  CAD (coronary artery disease)  Patient with known CAD s/p stent placement, which is controlled Will continue ASA, Plavix, and Statin and monitor for S/Sx of angina/ACS. Continue to monitor on telemetry.   Ataxia  Ataxia with walking.  This likely a sign of cerebellar infarct.    Type 2 diabetes mellitus  Patient's FSGs are controlled on current medication regimen.  Last A1c reviewed-   Lab Results   Component Value Date    HGBA1C 9.7 (H) 04/10/2025     Most recent fingerstick glucose reviewed- No results for input(s): "POCTGLUCOSE" in the last 24 hours.  Current correctional scale  Medium  Maintain anti-hyperglycemic dose as follows-   Antihyperglycemics (From admission, onward)      Start     Stop Route Frequency Ordered    06/29/25 0900  insulin glargine U-100 (Lantus) injection 15 Units         -- SubQ Daily 06/29/25 0736    06/27/25 1335  insulin aspart U-100 injection 0-10 Units    "      -- SubQ Before meals & nightly PRN 06/27/25 1236          Hold Oral hypoglycemics while patient is in the hospital.  Glycemic control  Tobacco dependency  Dangers of cigarette smoking were reviewed with patient in detail. Patient was Counseled for 10 minutes or greater. Nicotine replacement options were discussed. Nicotine replacement was discussed- prescribed  Chronic pancreatitis  Chronic pancreatitis of unclear etiology.  Patient takes pancreatic enzymes.    Lipase is normal this hospitalization.  Nausea vomiting most likely related to cerebellar infarct versus chronic pancreatitis.    Given that patient has a remote history of cerebellar infarction query if prior nausea vomiting may have also been related to central cause rather than chronic pancreatitis.  Continue pancrelipase    Hypertension  Patient's blood pressure range in the last 24 hours was: BP  Min: 130/84  Max: 176/100.The patient's inpatient anti-hypertensive regimen is listed below:  Current Antihypertensives  furosemide tablet 20 mg, Daily PRN, Oral  carvediloL tablet 6.25 mg, Before breakfast, Oral  losartan tablet 50 mg, Daily, Oral  hydroCHLOROthiazide tablet 50 mg, Daily, Oral  hydroCHLOROthiazide tablet 25 mg, Once, Oral  losartan tablet 25 mg, Once, Oral    Plan  - BP is controlled, no changes needed to their regimen    VTE Risk Mitigation (From admission, onward)           Ordered     enoxaparin injection 40 mg  Daily         06/27/25 1236     Place sequential compression device  Until discontinued         06/27/25 1236     IP VTE HIGH RISK PATIENT  Once         06/27/25 1236                    Discharge Planning   BETY:      Code Status: Full Code   Medical Readiness for Discharge Date:   Discharge Plan A: Home with family, Home          Continue to intensify blood pressure regimen increasing hydrochlorothiazide and losartan today.  Headaches are better still battling nausea scheduled Zofran        Please place Justification for  ERNESTINA Pierce DO  Department of Hospital Medicine   [facility] - [address] [place] [address]

## 2025-06-29 NOTE — ASSESSMENT & PLAN NOTE
"Patient's FSGs are controlled on current medication regimen.  Last A1c reviewed-   Lab Results   Component Value Date    HGBA1C 9.7 (H) 04/10/2025     Most recent fingerstick glucose reviewed- No results for input(s): "POCTGLUCOSE" in the last 24 hours.  Current correctional scale  Medium  Maintain anti-hyperglycemic dose as follows-   Antihyperglycemics (From admission, onward)      Start     Stop Route Frequency Ordered    06/29/25 0900  insulin glargine U-100 (Lantus) injection 15 Units         -- SubQ Daily 06/29/25 0736    06/27/25 1335  insulin aspart U-100 injection 0-10 Units         -- SubQ Before meals & nightly PRN 06/27/25 1236          Hold Oral hypoglycemics while patient is in the hospital.  Glycemic control  "

## 2025-06-29 NOTE — PLAN OF CARE
Problem: Occupational Therapy  Goal: Occupational Therapy Goal  Description: STG: (in 1 week)  Pt will perform grooming with setup using his (L) UE primarily  Pt will bathe with min(A) from chair  Pt will perform UE dressing with CGA  Pt will perform LE dressing with min(A)  Pt will transfer bed/chair/bsc with min(A) with RW  Pt will perform standing task x 3 min with CGA with RW   Pt will tolerate 20 minutes of tx without fatigue      LTG: (in 5 weeks)  1.Restore to max I with self care and mobility.    Outcome: Ongoing

## 2025-06-29 NOTE — ASSESSMENT & PLAN NOTE
Patient's blood pressure range in the last 24 hours was: BP  Min: 130/84  Max: 176/100.The patient's inpatient anti-hypertensive regimen is listed below:  Current Antihypertensives  furosemide tablet 20 mg, Daily PRN, Oral  carvediloL tablet 6.25 mg, Before breakfast, Oral  losartan tablet 50 mg, Daily, Oral  hydroCHLOROthiazide tablet 50 mg, Daily, Oral  hydroCHLOROthiazide tablet 25 mg, Once, Oral  losartan tablet 25 mg, Once, Oral    Plan  - BP is controlled, no changes needed to their regimen

## 2025-06-30 LAB
ANION GAP SERPL CALCULATED.3IONS-SCNC: 14 MMOL/L (ref 7–16)
BUN SERPL-MCNC: 12 MG/DL (ref 8–26)
BUN/CREAT SERPL: 12 (ref 6–20)
CALCIUM SERPL-MCNC: 9.1 MG/DL (ref 8.4–10.2)
CHLORIDE SERPL-SCNC: 100 MMOL/L (ref 98–107)
CO2 SERPL-SCNC: 24 MMOL/L (ref 22–29)
CREAT SERPL-MCNC: 1.04 MG/DL (ref 0.72–1.25)
EGFR (NO RACE VARIABLE) (RUSH/TITUS): 84 ML/MIN/1.73M2
GLUCOSE SERPL-MCNC: 219 MG/DL (ref 70–105)
GLUCOSE SERPL-MCNC: 232 MG/DL (ref 74–100)
GLUCOSE SERPL-MCNC: 304 MG/DL (ref 70–105)
GLUCOSE SERPL-MCNC: 332 MG/DL (ref 70–105)
GLUCOSE SERPL-MCNC: 343 MG/DL (ref 70–105)
POTASSIUM SERPL-SCNC: 3.7 MMOL/L (ref 3.5–5.1)
SODIUM SERPL-SCNC: 134 MMOL/L (ref 136–145)

## 2025-06-30 PROCEDURE — 97112 NEUROMUSCULAR REEDUCATION: CPT

## 2025-06-30 PROCEDURE — 99232 SBSQ HOSP IP/OBS MODERATE 35: CPT | Mod: ,,,

## 2025-06-30 PROCEDURE — 36415 COLL VENOUS BLD VENIPUNCTURE: CPT | Performed by: HOSPITALIST

## 2025-06-30 PROCEDURE — 99900035 HC TECH TIME PER 15 MIN (STAT)

## 2025-06-30 PROCEDURE — 25000003 PHARM REV CODE 250: Performed by: HOSPITALIST

## 2025-06-30 PROCEDURE — 82962 GLUCOSE BLOOD TEST: CPT

## 2025-06-30 PROCEDURE — 25000003 PHARM REV CODE 250: Performed by: STUDENT IN AN ORGANIZED HEALTH CARE EDUCATION/TRAINING PROGRAM

## 2025-06-30 PROCEDURE — 63600175 PHARM REV CODE 636 W HCPCS: Performed by: HOSPITALIST

## 2025-06-30 PROCEDURE — 11000001 HC ACUTE MED/SURG PRIVATE ROOM

## 2025-06-30 PROCEDURE — 80048 BASIC METABOLIC PNL TOTAL CA: CPT | Performed by: HOSPITALIST

## 2025-06-30 PROCEDURE — 97116 GAIT TRAINING THERAPY: CPT

## 2025-06-30 PROCEDURE — S4991 NICOTINE PATCH NONLEGEND: HCPCS | Performed by: HOSPITALIST

## 2025-06-30 PROCEDURE — 97530 THERAPEUTIC ACTIVITIES: CPT

## 2025-06-30 PROCEDURE — 63600175 PHARM REV CODE 636 W HCPCS: Performed by: STUDENT IN AN ORGANIZED HEALTH CARE EDUCATION/TRAINING PROGRAM

## 2025-06-30 PROCEDURE — 94761 N-INVAS EAR/PLS OXIMETRY MLT: CPT

## 2025-06-30 RX ADMIN — LOSARTAN POTASSIUM 50 MG: 50 TABLET, FILM COATED ORAL at 09:06

## 2025-06-30 RX ADMIN — INSULIN ASPART 4 UNITS: 100 INJECTION, SOLUTION INTRAVENOUS; SUBCUTANEOUS at 08:06

## 2025-06-30 RX ADMIN — PANCRELIPASE 3 CAPSULE: 60000; 12000; 38000 CAPSULE, DELAYED RELEASE PELLETS ORAL at 09:06

## 2025-06-30 RX ADMIN — ONDANSETRON 4 MG: 4 TABLET, ORALLY DISINTEGRATING ORAL at 11:06

## 2025-06-30 RX ADMIN — ENOXAPARIN SODIUM 40 MG: 40 INJECTION SUBCUTANEOUS at 04:06

## 2025-06-30 RX ADMIN — ONDANSETRON 4 MG: 4 TABLET, ORALLY DISINTEGRATING ORAL at 05:06

## 2025-06-30 RX ADMIN — INSULIN GLARGINE 15 UNITS: 100 INJECTION, SOLUTION SUBCUTANEOUS at 09:06

## 2025-06-30 RX ADMIN — CLOPIDOGREL 75 MG: 75 TABLET ORAL at 09:06

## 2025-06-30 RX ADMIN — NICOTINE 1 PATCH: 14 PATCH, EXTENDED RELEASE TRANSDERMAL at 09:06

## 2025-06-30 RX ADMIN — ALUMINUM HYDROXIDE, MAGNESIUM HYDROXIDE, AND SIMETHICONE 30 ML: 200; 200; 20 SUSPENSION ORAL at 04:06

## 2025-06-30 RX ADMIN — OXYCODONE HYDROCHLORIDE 10 MG: 5 TABLET ORAL at 05:06

## 2025-06-30 RX ADMIN — ASPIRIN 81 MG CHEWABLE TABLET 81 MG: 81 TABLET CHEWABLE at 09:06

## 2025-06-30 RX ADMIN — ACETAMINOPHEN 1000 MG: 500 TABLET ORAL at 02:06

## 2025-06-30 RX ADMIN — PREGABALIN 75 MG: 75 CAPSULE ORAL at 02:06

## 2025-06-30 RX ADMIN — PREGABALIN 75 MG: 75 CAPSULE ORAL at 09:06

## 2025-06-30 RX ADMIN — PREGABALIN 75 MG: 75 CAPSULE ORAL at 08:06

## 2025-06-30 RX ADMIN — INSULIN ASPART 8 UNITS: 100 INJECTION, SOLUTION INTRAVENOUS; SUBCUTANEOUS at 05:06

## 2025-06-30 RX ADMIN — ATORVASTATIN CALCIUM 80 MG: 80 TABLET, FILM COATED ORAL at 09:06

## 2025-06-30 RX ADMIN — CARVEDILOL 6.25 MG: 6.25 TABLET, FILM COATED ORAL at 05:06

## 2025-06-30 RX ADMIN — PANCRELIPASE 3 CAPSULE: 60000; 12000; 38000 CAPSULE, DELAYED RELEASE PELLETS ORAL at 02:06

## 2025-06-30 RX ADMIN — PROMETHAZINE HYDROCHLORIDE 25 MG: 25 TABLET ORAL at 02:06

## 2025-06-30 RX ADMIN — INSULIN ASPART 8 UNITS: 100 INJECTION, SOLUTION INTRAVENOUS; SUBCUTANEOUS at 12:06

## 2025-06-30 RX ADMIN — HYDROCHLOROTHIAZIDE 50 MG: 25 TABLET ORAL at 09:06

## 2025-06-30 RX ADMIN — PANCRELIPASE 3 CAPSULE: 60000; 12000; 38000 CAPSULE, DELAYED RELEASE PELLETS ORAL at 08:06

## 2025-06-30 NOTE — ASSESSMENT & PLAN NOTE
Patient's blood pressure range in the last 24 hours was: BP  Min: 116/75  Max: 133/90.The patient's inpatient anti-hypertensive regimen is listed below:  Current Antihypertensives  furosemide tablet 20 mg, Daily PRN, Oral  carvediloL tablet 6.25 mg, Before breakfast, Oral  losartan tablet 50 mg, Daily, Oral  hydroCHLOROthiazide tablet 50 mg, Daily, Oral    Plan  - BP is controlled, no changes needed to their regimen

## 2025-06-30 NOTE — SUBJECTIVE & OBJECTIVE
Interval History:      Review of Systems  Objective:     Vital Signs (Most Recent):  Temp: 97.8 °F (36.6 °C) (06/30/25 1445)  Pulse: (!) 59 (06/30/25 1140)  Resp: 19 (06/30/25 1140)  BP: 116/75 (06/30/25 1140)  SpO2: 97 % (06/30/25 1300) Vital Signs (24h Range):  Temp:  [97.4 °F (36.3 °C)-98 °F (36.7 °C)] 97.8 °F (36.6 °C)  Pulse:  [58-66] 59  Resp:  [14-19] 19  SpO2:  [94 %-97 %] 97 %  BP: (116-133)/(75-90) 116/75     Weight: 85.4 kg (188 lb 4.4 oz)  Body mass index is 29.49 kg/m².    Intake/Output Summary (Last 24 hours) at 6/30/2025 1630  Last data filed at 6/29/2025 1939  Gross per 24 hour   Intake --   Output 400 ml   Net -400 ml         Physical Exam  Vitals and nursing note reviewed.   Constitutional:       Appearance: Normal appearance.   HENT:      Head: Normocephalic.      Right Ear: Tympanic membrane normal.      Nose: Nose normal.   Cardiovascular:      Rate and Rhythm: Normal rate and regular rhythm.      Pulses: Normal pulses.      Heart sounds: Normal heart sounds.   Pulmonary:      Effort: Pulmonary effort is normal.      Breath sounds: Normal breath sounds.   Abdominal:      General: Abdomen is flat. Bowel sounds are normal.      Palpations: Abdomen is soft.   Musculoskeletal:         General: No swelling.      Cervical back: Normal range of motion.      Right lower leg: No edema.      Left lower leg: No edema.   Skin:     General: Skin is warm and dry.   Neurological:      General: No focal deficit present.      Mental Status: He is alert and oriented to person, place, and time.      Motor: Weakness present.   Psychiatric:         Mood and Affect: Mood normal.               Significant Labs: All pertinent labs within the past 24 hours have been reviewed.    Significant Imaging: I have reviewed all pertinent imaging results/findings within the past 24 hours.

## 2025-06-30 NOTE — PLAN OF CARE
[facility]  [facility]assessment    Primary Care Provider: No primary care provider on file.    Expected Discharge Date:     Reassessment (most recent)       Discharge Reassessment - 06/30/25 0902          Discharge Reassessment    Assessment Type Discharge Planning Reassessment     Discharge Plan discussed with: Patient     Discharge Plan A Rehab     Discharge Plan B Home;Home with family;Home Health;Long-term acute care facility (LTAC);Skilled Nursing Facility                 CM consult completed for : Patient now requesting swingbed     1000: CM called [facility] and confirmed with Khalida that they have received pt's referral and that Corine is the CM that will be handling his referral. They will be calling with updates. Their contact phone number is [phone]    1320: [facility] states pt has a too high of a level of functioning. Referral sent to [facility]. Ale guzman        Met with pt to review discharge recommendation of Rehab/SWB and is agreeable to plan    Patient/family provided list of facilities in-network with patient's payor plan. Providers that are owned, operated, or affiliated with [facility] are included on the list.     Notified that referral sent to below listed facilities from in-network list based on proximity to home/family support:   [facility] in [place], [place]  2. [facility]  3.  4.  5. (can send more than 5)    Patient/family instructed to identify preference.    Preferred Facility: (if more than 1, listed in order of descending preference)  1.  OR  Patient has declined to select a preferred provider and elects placement with the first accepting in network provider that is available to provide services as ordered by the physician       If an additional preferred facility not listed above is identified, additional referral to be sent. If above facilities unable to accept, will send  additional referrals to in-network providers.

## 2025-06-30 NOTE — PT/OT/SLP PROGRESS
"Occupational Therapy   Treatment    Name: Faustino Fischer  MRN: [id number]  Admitting Diagnosis:  Cerebellar infarction       Recommendations:     Discharge Recommendations: High Intensity Therapy  Discharge Equipment Recommendations:  to be determined by next level of care  Barriers to discharge:  Decreased caregiver support    Assessment:     Faustino Fischer is a 55 y.o. male with a medical diagnosis of Cerebellar infarction.  He presents with alert and agreeable to treatment. Performance deficits affecting function are weakness, impaired functional mobility, decreased lower extremity function, decreased coordination, decreased upper extremity function, impaired balance, pain.     Rehab Prognosis:  Good; patient would benefit from acute skilled OT services to address these deficits and reach maximum level of function.       Plan:     Patient to be seen 5 x/week to address the above listed problems via self-care/home management, therapeutic activities, therapeutic exercises  Plan of Care Expires: 08/01/25  Plan of Care Reviewed with: patient    Subjective     Chief Complaint: Cerebellar infarction    Patient/Family Comments/goals: "I told the  that I wanted to go to [facility] [place]."  Pain/Comfort:  Pain Rating 1: 5/10  Location 1: head  Pain Addressed 1: Distraction, Cessation of Activity  Pain Rating Post-Intervention 1: 5/10    Objective:     Communicated with: ROCIO Reagan prior to session.  Patient found HOB elevated with telemetry upon OT entry to room.    General Precautions: Standard, fall    Orthopedic Precautions:N/A  Braces: N/A  Respiratory Status: Room air     Occupational Performance:     Bed Mobility:    Patient completed Scooting/Bridging with contact guard assistance  Patient completed Supine to Sit with contact guard assistance  Patient completed Sit to Supine with contact guard assistance     Functional Mobility/Transfers:  Patient completed Sit <> Stand Transfer with contact guard " assistance and minimum assistance  with  rolling walker   Functional Mobility: NT    Activities of Daily Living:  NT      [facility] 6 Click ADL: 20    Treatment & Education:  Pt performed dressing boards for sara, ties and laces. Pt was able to complete board with a little increased time and effort. Pt had to pause periodically due to (L) hand cramping.  Pt performed weight bearing stretch for (L) hand hand wrist. Pt was educated to perform this stretch whenever he feels like his hand is cramping.   Pt performed red t-band ex with his (L) UE for 5 to 10 reps of each:    Shoulder extension   Shoulder ER   Elbow flexion   Elbow extension   Shoulder abduction  Pt was issued red t-band and instructed to continue to perform exercises in short bursts off and on during the day.  Pt performed standing with RW with CGA. Pt was able to weight shift without having shaking or jerking. Pt was able to perform steps in place with control and CGA without shaking with increased effort.       Patient left HOB elevated with all lines intact, call button in reach, and RN notified    GOALS:   Multidisciplinary Problems       Occupational Therapy Goals          Problem: Occupational Therapy    Goal Priority Disciplines Outcome Interventions   Occupational Therapy Goal     OT, PT/OT Ongoing    Description: STG: (in 1 week)  Pt will perform grooming with setup using his (L) UE primarily  Pt will bathe with min(A) from chair  Pt will perform UE dressing with CGA  Pt will perform LE dressing with min(A)  Pt will transfer bed/chair/bsc with min(A) with RW  Pt will perform standing task x 3 min with CGA with RW   Pt will tolerate 20 minutes of tx without fatigue      LTG: (in 5 weeks)  1.Restore to max I with self care and mobility.                           Time Tracking:     OT Date of Treatment: 06/30/25  OT Start Time: 1009  OT Stop Time: 1055  OT Total Time (min): 46 min    Billable Minutes:Therapeutic Activity 46 min    OT/RADHA: OT           6/30/2025

## 2025-06-30 NOTE — PROGRESS NOTES
[facility] Hospital Medicine  Progress Note    Patient Name: Faustino Fischer  MRN: [id number]  Patient Class: IP- Inpatient   Admission Date: 6/27/2025  Length of Stay: 3 days  Attending Physician: Alia Davis MD  Primary Care Provider: No primary care provider on file.        Subjective     Principal Problem:Cerebellar infarction        HPI:  Mr. Faustino Fischer is a 56yo man history of Type 2 diabetes (A1c 12.1 to 9.7), chronic pancreatitis, HFpEF, CAD s/p 6 stents (last 12/2024) who presents with dizziness, left upper and lower extremity weakness with nausea/vomiting.  He states yesterday morning he went walking but noticed that he was feeling light headed and he could not walk a straight line.  He also had nausea and vomiting.  He states he arrived at this hospital at 3am or 4am.  He states it is becoming progressively worse.  The dizziness is exacerbated with movement, especially movement of his head.    He reports left sided weakness of his arm and leg.  He states he can lift up his left arm and left leg but they are weak.  There is sensation change or numbness along the lateral aspect of his left leg.           While is in the room with the patient he had an episode of severe nausea and vomiting.  Vomitus was relatively clear and light brown.  Volume was 200-300 cc.    Tele neurology consult recommended admission with MRI and further workup.  Patient is out of the window for medical or surgical interventions.    Initial Vitals   BP Pulse Resp Temp SpO2   06/27/25 0507 06/27/25 0507 06/27/25 0507 06/27/25 0514 06/27/25 0507   (!) 149/87 65 11 98.7 °F (37.1 °C) 100 %       Overview/Hospital Course:  6/28 start BP medications today  6/29 headaches better, still nauseated  6/30 improved today, eating well given Bgs are over 300, will discharge to swingbed in am    Interval History:      Review of Systems  Objective:     Vital Signs (Most Recent):  Temp: 97.8 °F (36.6 °C)  (06/30/25 1445)  Pulse: (!) 59 (06/30/25 1140)  Resp: 19 (06/30/25 1140)  BP: 116/75 (06/30/25 1140)  SpO2: 97 % (06/30/25 1300) Vital Signs (24h Range):  Temp:  [97.4 °F (36.3 °C)-98 °F (36.7 °C)] 97.8 °F (36.6 °C)  Pulse:  [58-66] 59  Resp:  [14-19] 19  SpO2:  [94 %-97 %] 97 %  BP: (116-133)/(75-90) 116/75     Weight: 85.4 kg (188 lb 4.4 oz)  Body mass index is 29.49 kg/m².    Intake/Output Summary (Last 24 hours) at 6/30/2025 1630  Last data filed at 6/29/2025 1939  Gross per 24 hour   Intake --   Output 400 ml   Net -400 ml         Physical Exam  Vitals and nursing note reviewed.   Constitutional:       Appearance: Normal appearance.   HENT:      Head: Normocephalic.      Right Ear: Tympanic membrane normal.      Nose: Nose normal.   Cardiovascular:      Rate and Rhythm: Normal rate and regular rhythm.      Pulses: Normal pulses.      Heart sounds: Normal heart sounds.   Pulmonary:      Effort: Pulmonary effort is normal.      Breath sounds: Normal breath sounds.   Abdominal:      General: Abdomen is flat. Bowel sounds are normal.      Palpations: Abdomen is soft.   Musculoskeletal:         General: No swelling.      Cervical back: Normal range of motion.      Right lower leg: No edema.      Left lower leg: No edema.   Skin:     General: Skin is warm and dry.   Neurological:      General: No focal deficit present.      Mental Status: He is alert and oriented to person, place, and time.      Motor: Weakness present.   Psychiatric:         Mood and Affect: Mood normal.               Significant Labs: All pertinent labs within the past 24 hours have been reviewed.    Significant Imaging: I have reviewed all pertinent imaging results/findings within the past 24 hours.      Assessment & Plan  Cerebellar infarction  Acute cerebellar infarction with possible remote cerebellar infarction as well.      Antithrombotics for secondary stroke prevention: Antiplatelets: Aspirin: 81 mg daily  Aspirin: 325 mg  "daily  Clopidogrel: 300 mg loading dose x 1, now  Clopidogrel: 75 mg daily  Anticoagulants: DVT ppx with lovenox    Statins for secondary stroke prevention and hyperlipidemia, if present:   Statins: Atorvastatin- 40 mg daily    Aggressive risk factor modification: HTN, Smoking, DM, HLD, CAD     Rehab efforts: The patient has been evaluated by a stroke team provider and the therapy needs have been fully considered based off the presenting complaints and exam findings. The following therapy evaluations are needed: PT evaluate and treat, OT evaluate and treat, SLP evaluate and treat    Diagnostics ordered/pending: CTA Head to assess vasculature , CTA Neck/Arch to assess vasculature, HgbA1C to assess blood glucose levels, MRI head without contrast to assess brain parenchyma, TTE to assess cardiac function/status , TSH to assess thyroid function    VTE prophylaxis: Enoxaparin 40 mg SQ every 24 hours    BP parameters: Infarct: No intervention, SBP <220    6/28 Out of 48 hour window for permissive hypertension we will start to bring down blood pressure.  Still battling nausea and head aches but better today  CAD (coronary artery disease)  Patient with known CAD s/p stent placement, which is controlled Will continue ASA, Plavix, and Statin and monitor for S/Sx of angina/ACS. Continue to monitor on telemetry.   Ataxia  Ataxia with walking.  This likely a sign of cerebellar infarct.    Type 2 diabetes mellitus  Patient's FSGs are controlled on current medication regimen.  Last A1c reviewed-   Lab Results   Component Value Date    HGBA1C 9.7 (H) 04/10/2025     Most recent fingerstick glucose reviewed- No results for input(s): "POCTGLUCOSE" in the last 24 hours.  Current correctional scale  Medium  Maintain anti-hyperglycemic dose as follows-   Antihyperglycemics (From admission, onward)      Start     Stop Route Frequency Ordered    06/29/25 0900  insulin glargine U-100 (Lantus) injection 15 Units         -- SubQ Daily " 06/29/25 0736    06/27/25 1335  insulin aspart U-100 injection 0-10 Units         -- SubQ Before meals & nightly PRN 06/27/25 1236          Hold Oral hypoglycemics while patient is in the hospital.  Glycemic control  Tobacco dependency  Dangers of cigarette smoking were reviewed with patient in detail. Patient was Counseled for 10 minutes or greater. Nicotine replacement options were discussed. Nicotine replacement was discussed- prescribed  Chronic pancreatitis  Chronic pancreatitis of unclear etiology.  Patient takes pancreatic enzymes.    Lipase is normal this hospitalization.  Nausea vomiting most likely related to cerebellar infarct versus chronic pancreatitis.    Given that patient has a remote history of cerebellar infarction query if prior nausea vomiting may have also been related to central cause rather than chronic pancreatitis.  Continue pancrelipase    Hypertension  Patient's blood pressure range in the last 24 hours was: BP  Min: 116/75  Max: 133/90.The patient's inpatient anti-hypertensive regimen is listed below:  Current Antihypertensives  furosemide tablet 20 mg, Daily PRN, Oral  carvediloL tablet 6.25 mg, Before breakfast, Oral  losartan tablet 50 mg, Daily, Oral  hydroCHLOROthiazide tablet 50 mg, Daily, Oral    Plan  - BP is controlled, no changes needed to their regimen    VTE Risk Mitigation (From admission, onward)           Ordered     enoxaparin injection 40 mg  Daily         06/27/25 1236     Place sequential compression device  Until discontinued         06/27/25 1236     IP VTE HIGH RISK PATIENT  Once         06/27/25 1236                    Discharge Planning   BETY: 7/7/2025     Code Status: Full Code   Medical Readiness for Discharge Date:   Discharge Plan A: Rehab                  Please place Justification for DME      Alia Davis MD  Department of Hospital Medicine   [facility]

## 2025-06-30 NOTE — PT/OT/SLP PROGRESS
"Physical Therapy Treatment    Patient Name:  Faustino Fischer   MRN:  [id number]    Recommendations:     Discharge Recommendations: High Intensity Therapy (to moderate)  Discharge Equipment Recommendations: to be determined by next level of care  Barriers to discharge: Decreased caregiver support    Assessment:     Faustino Fischer is a 55 y.o. male admitted with a medical diagnosis of Cerebellar infarction.  He presents with the following impairments/functional limitations: weakness, impaired endurance, impaired functional mobility, decreased lower extremity function, decreased upper extremity function, decreased coordination, pain     Patient seems to be improving. Therapy today focused on improving coordination and muscle control with movements. Would benefit from inpatient rehab at [facility].    Rehab Prognosis: Good; patient would benefit from acute skilled PT services to address these deficits and reach maximum level of function.    Recent Surgery: * No surgery found *      Plan:     During this hospitalization, patient to be seen 5 x/week to address the identified rehab impairments via gait training, therapeutic activities, therapeutic exercises, neuromuscular re-education and progress toward the following goals:    Plan of Care Expires:  08/03/25    Subjective     Chief Complaint: nausea  Patient/Family Comments/goals: "I will do anything you want"  Pain/Comfort:  Pain Rating 1: 5/10  Location 1: head  Pain Addressed 1: Pre-medicate for activity      Objective:     Communicated with nurse prior to session.  Patient found HOB elevated with telemetry upon PT entry to room.     General Precautions: Standard, fall  Orthopedic Precautions:    Braces:    Respiratory Status: Room air     Functional Mobility:  Bed Mobility:     Supine to Sit: stand by assistance  Transfers:     Sit to Stand:  contact guard assistance with rolling walker  Gait: 25 ft with RW and CGA; gait cycle better today with improved L quad control during " LLE stance, gait limited by fatigue, slow mic  Balance: fair      AM-PAC 6 CLICK MOBILITY  Turning over in bed (including adjusting bedclothes, sheets and blankets)?: 4  Sitting down on and standing up from a chair with arms (e.g., wheelchair, bedside commode, etc.): 4  Moving from lying on back to sitting on the side of the bed?: 4  Moving to and from a bed to a chair (including a wheelchair)?: 3  Need to walk in hospital room?: 3  Climbing 3-5 steps with a railing?: 2  Basic Mobility Total Score: 20       Treatment & Education:  Mre DF/PF, MRE ankle circles, MRE LE flex/ext all bed level with focus on smooth controlled movement LLE 2x15  EOB sitting LAQ and heel/toe raises with focus on smooth controlled movement LLE 2x15  Mobility as noted  CVA questions answered    Patient left sitting edge of bed with all lines intact, call button in reach, and nurse notified..    GOALS:   Multidisciplinary Problems       Physical Therapy Goals          Problem: Physical Therapy    Goal Priority Disciplines Outcome Interventions   Physical Therapy Goal     PT, PT/OT Progressing    Description: Short term goals:  . Sit to stand transfer with Modified [place]  . Gait  x 200 feet with Modified [place] using TBD.   . Stand for 15 minutes with Modified [place] using TBD    Long term goals:  Patient will gain highest level functional mobility with lowest level of assistive device to return to desired living arrangement and prior ADL's.                          DME Justifications:  none    Time Tracking:     PT Received On: 06/30/25  PT Start Time: 1358     PT Stop Time: 1438  PT Total Time (min): 40 min     Billable Minutes: Gait Training 10 and Neuromuscular Re-education 30    Treatment Type: Treatment  PT/PTA: PT     Number of PTA visits since last PT visit: 0     06/30/2025

## 2025-07-01 VITALS
RESPIRATION RATE: 19 BRPM | BODY MASS INDEX: 29.55 KG/M2 | WEIGHT: 188.25 LBS | HEART RATE: 62 BPM | SYSTOLIC BLOOD PRESSURE: 132 MMHG | OXYGEN SATURATION: 97 % | TEMPERATURE: 98 F | DIASTOLIC BLOOD PRESSURE: 81 MMHG | HEIGHT: 67 IN

## 2025-07-01 LAB
GLUCOSE SERPL-MCNC: 182 MG/DL (ref 70–105)
GLUCOSE SERPL-MCNC: 253 MG/DL (ref 70–105)

## 2025-07-01 PROCEDURE — 99239 HOSP IP/OBS DSCHRG MGMT >30: CPT | Mod: ,,,

## 2025-07-01 PROCEDURE — 82962 GLUCOSE BLOOD TEST: CPT

## 2025-07-01 PROCEDURE — 97110 THERAPEUTIC EXERCISES: CPT

## 2025-07-01 PROCEDURE — 63600175 PHARM REV CODE 636 W HCPCS: Performed by: STUDENT IN AN ORGANIZED HEALTH CARE EDUCATION/TRAINING PROGRAM

## 2025-07-01 PROCEDURE — 94761 N-INVAS EAR/PLS OXIMETRY MLT: CPT

## 2025-07-01 PROCEDURE — 97112 NEUROMUSCULAR REEDUCATION: CPT

## 2025-07-01 PROCEDURE — 97116 GAIT TRAINING THERAPY: CPT

## 2025-07-01 PROCEDURE — S4991 NICOTINE PATCH NONLEGEND: HCPCS | Performed by: HOSPITALIST

## 2025-07-01 PROCEDURE — 97530 THERAPEUTIC ACTIVITIES: CPT

## 2025-07-01 PROCEDURE — 25000003 PHARM REV CODE 250: Performed by: STUDENT IN AN ORGANIZED HEALTH CARE EDUCATION/TRAINING PROGRAM

## 2025-07-01 PROCEDURE — 25000003 PHARM REV CODE 250: Performed by: HOSPITALIST

## 2025-07-01 RX ORDER — GABAPENTIN 100 MG/1
100 CAPSULE ORAL 3 TIMES DAILY
Qty: 90 CAPSULE | Refills: 11 | Status: SHIPPED | OUTPATIENT
Start: 2025-07-01 | End: 2026-07-01

## 2025-07-01 RX ORDER — HYDROCHLOROTHIAZIDE 50 MG/1
50 TABLET ORAL DAILY
Qty: 30 TABLET | Refills: 11 | Status: SHIPPED | OUTPATIENT
Start: 2025-07-02 | End: 2026-07-02

## 2025-07-01 RX ORDER — CLOPIDOGREL BISULFATE 75 MG/1
75 TABLET ORAL DAILY
Qty: 30 TABLET | Refills: 11 | Status: SHIPPED | OUTPATIENT
Start: 2025-07-02 | End: 2026-07-02

## 2025-07-01 RX ORDER — CARVEDILOL 6.25 MG/1
6.25 TABLET ORAL
Qty: 90 TABLET | Refills: 3 | Status: SHIPPED | OUTPATIENT
Start: 2025-07-02 | End: 2026-07-02

## 2025-07-01 RX ORDER — IBUPROFEN 200 MG
1 TABLET ORAL DAILY
Qty: 30 PATCH | Refills: 0 | Status: SHIPPED | OUTPATIENT
Start: 2025-07-02 | End: 2025-08-01

## 2025-07-01 RX ORDER — LOSARTAN POTASSIUM 50 MG/1
50 TABLET ORAL DAILY
Qty: 90 TABLET | Refills: 3 | Status: SHIPPED | OUTPATIENT
Start: 2025-07-02 | End: 2026-07-02

## 2025-07-01 RX ADMIN — PREGABALIN 75 MG: 75 CAPSULE ORAL at 08:07

## 2025-07-01 RX ADMIN — ONDANSETRON 4 MG: 4 TABLET, ORALLY DISINTEGRATING ORAL at 12:07

## 2025-07-01 RX ADMIN — LOSARTAN POTASSIUM 50 MG: 50 TABLET, FILM COATED ORAL at 08:07

## 2025-07-01 RX ADMIN — HYDROCHLOROTHIAZIDE 50 MG: 25 TABLET ORAL at 08:07

## 2025-07-01 RX ADMIN — ATORVASTATIN CALCIUM 80 MG: 80 TABLET, FILM COATED ORAL at 08:07

## 2025-07-01 RX ADMIN — ONDANSETRON 4 MG: 4 TABLET, ORALLY DISINTEGRATING ORAL at 11:07

## 2025-07-01 RX ADMIN — NICOTINE 1 PATCH: 14 PATCH, EXTENDED RELEASE TRANSDERMAL at 08:07

## 2025-07-01 RX ADMIN — PANCRELIPASE 3 CAPSULE: 60000; 12000; 38000 CAPSULE, DELAYED RELEASE PELLETS ORAL at 08:07

## 2025-07-01 RX ADMIN — CLOPIDOGREL 75 MG: 75 TABLET ORAL at 08:07

## 2025-07-01 RX ADMIN — ASPIRIN 81 MG CHEWABLE TABLET 81 MG: 81 TABLET CHEWABLE at 08:07

## 2025-07-01 RX ADMIN — CARVEDILOL 6.25 MG: 6.25 TABLET, FILM COATED ORAL at 05:07

## 2025-07-01 RX ADMIN — INSULIN GLARGINE 15 UNITS: 100 INJECTION, SOLUTION SUBCUTANEOUS at 08:07

## 2025-07-01 RX ADMIN — ONDANSETRON 4 MG: 4 TABLET, ORALLY DISINTEGRATING ORAL at 05:07

## 2025-07-01 NOTE — PLAN OF CARE
Pt has been accepted at [facility] and can go today. Pt and Dr. Davis informed. Packet placed in [employer] [id number]

## 2025-07-01 NOTE — PLAN OF CARE
[facility] - [facility] Telemetry  Discharge Final Note    Primary Care Provider: No primary care provider on file.    Expected Discharge Date: 7/1/2025    Final Discharge Note (most recent)       Final Note - 07/01/25 1333          Final Note    Assessment Type Final Discharge Note     Anticipated Discharge Disposition Rehab Facility     What phone number can be called within the next 1-3 days to see how you are doing after discharge? [phone]        Post-Acute Status    Post-Acute Authorization Placement     Post-Acute Placement Status Set-up Complete/Auth obtained     Patient choice form signed by patient/caregiver List with quality metrics by geographic area provided     Discharge Delays None known at this time                     Important Message from Medicare  Important Message from Medicare regarding Discharge Appeal Rights: Given to patient/caregiver, Explained to patient/caregiver, Signed/date by patient/caregiver     Date IMM was signed: 06/30/25  Time IMM was signed: 1500    After-discharge care                Destination       [facility]   Service: Inpatient Rehabilitation    [address] [facility] [place] [address]   Phone: [phone]                   Pt d/c to [facility]. D/c paperwork faxed. IM updated. No further needs noted.

## 2025-07-01 NOTE — PT/OT/SLP PROGRESS
Physical Therapy Treatment    Patient Name:  Faustino Fischer   MRN:  [id number]    Recommendations:     Discharge Recommendations: High Intensity Therapy (to moderate)  Discharge Equipment Recommendations: to be determined by next level of care  Barriers to discharge: Decreased caregiver support    Assessment:     Faustino Fischer is a 55 y.o. male admitted with a medical diagnosis of Cerebellar infarction.  He presents with the following impairments/functional limitations: weakness, impaired functional mobility, gait instability, impaired balance, decreased lower extremity function, decreased upper extremity function     Patient motivated to work with therapy and improving. Planning for dc today    Rehab Prognosis: Good; patient would benefit from acute skilled PT services to address these deficits and reach maximum level of function.    Recent Surgery: * No surgery found *      Plan:     During this hospitalization, patient to be seen 5 x/week to address the identified rehab impairments via gait training, therapeutic activities, therapeutic exercises, neuromuscular re-education and progress toward the following goals:    Plan of Care Expires:  08/03/25    Subjective     Chief Complaint: nausea  Patient/Family Comments/goals: agreeable  Pain/Comfort:  Pain Rating 1: 4/10  Location 1: head      Objective:     Communicated with nurse prior to session.  Patient found HOB elevated with telemetry upon PT entry to room.     General Precautions: Standard, fall  Orthopedic Precautions:    Braces:    Respiratory Status: Room air     Functional Mobility:  Bed Mobility:     Supine to Sit: stand by assistance  Transfers:     Sit to Stand:  contact guard assistance with rolling walker  Gait: 35 ft with RW and CGA;  improving L quad control during LLE stance, gait limited by fatigue, slow mic  Balance: fair      AM-PAC 6 CLICK MOBILITY  Turning over in bed (including adjusting bedclothes, sheets and blankets)?: 4  Sitting down  on and standing up from a chair with arms (e.g., wheelchair, bedside commode, etc.): 4  Moving from lying on back to sitting on the side of the bed?: 4  Moving to and from a bed to a chair (including a wheelchair)?: 3  Need to walk in hospital room?: 3  Climbing 3-5 steps with a railing?: 2  Basic Mobility Total Score: 20       Treatment & Education:  Mre DF/PF, MRE ankle circles, MRE LE flex/ext all bed level with focus on smooth controlled movement LLE 2x15  EOB sitting LAQ and heel/toe raises with focus on smooth controlled movement LLE 2x15  Mobility as noted  CVA questions answered    Patient left sitting edge of bed with all lines intact, call button in reach, and nurse notified..    GOALS:   Multidisciplinary Problems       Physical Therapy Goals          Problem: Physical Therapy    Goal Priority Disciplines Outcome Interventions   Physical Therapy Goal     PT, PT/OT Progressing    Description: Short term goals:  . Sit to stand transfer with Modified [place]  . Gait  x 200 feet with Modified [place] using TBD.   . Stand for 15 minutes with Modified [place] using TBD    Long term goals:  Patient will gain highest level functional mobility with lowest level of assistive device to return to desired living arrangement and prior ADL's.                          DME Justifications:  none    Time Tracking:     PT Received On: 07/01/25  PT Start Time: 0911     PT Stop Time: 0951  PT Total Time (min): 40 min     Billable Minutes: Gait Training 10 and Neuromuscular Re-education 30    Treatment Type: Treatment  PT/PTA: PT     Number of PTA visits since last PT visit: 0     07/01/2025

## 2025-07-01 NOTE — ASSESSMENT & PLAN NOTE
Patient's blood pressure range in the last 24 hours was: BP  Min: 107/73  Max: 144/76.The patient's inpatient anti-hypertensive regimen is listed below:  Current Antihypertensives  furosemide tablet 20 mg, Daily PRN, Oral  carvediloL tablet 6.25 mg, Before breakfast, Oral  losartan tablet 50 mg, Daily, Oral  hydroCHLOROthiazide tablet 50 mg, Daily, Oral  carvedilol (COREG) tablet, Before breakfast, Oral  hydrochlorothiazide (HYDRODIURIL) tablet, Daily, Oral  losartan (COZAAR) tablet, Daily, Oral    Plan  - BP is controlled, no changes needed to their regimen

## 2025-07-01 NOTE — DISCHARGE SUMMARY
[facility] Medicine  Discharge Summary      Patient Name: Faustino Fischer  MRN: [id number]  [facility]: [id number]  Patient Class: IP- Inpatient  Admission Date: 6/27/2025  Hospital Length of Stay: 4 days  Discharge Date and Time: 07/01/2025 11:15 AM  Attending Physician: Alia Davis MD   Discharging Provider: Alia Davis MD  Primary Care Provider: No primary care provider on file.    Primary Care Team: Networked reference to record PCT     HPI:   Mr. Faustino Fischer is a 54yo man history of Type 2 diabetes (A1c 12.1 to 9.7), chronic pancreatitis, HFpEF, CAD s/p 6 stents (last 12/2024) who presents with dizziness, left upper and lower extremity weakness with nausea/vomiting.  He states yesterday morning he went walking but noticed that he was feeling light headed and he could not walk a straight line.  He also had nausea and vomiting.  He states he arrived at this hospital at 3am or 4am.  He states it is becoming progressively worse.  The dizziness is exacerbated with movement, especially movement of his head.    He reports left sided weakness of his arm and leg.  He states he can lift up his left arm and left leg but they are weak.  There is sensation change or numbness along the lateral aspect of his left leg.           While is in the room with the patient he had an episode of severe nausea and vomiting.  Vomitus was relatively clear and light brown.  Volume was 200-300 cc.    Tele neurology consult recommended admission with MRI and further workup.  Patient is out of the window for medical or surgical interventions.    Initial Vitals   BP Pulse Resp Temp SpO2   06/27/25 0507 06/27/25 0507 06/27/25 0507 06/27/25 0514 06/27/25 0507   (!) 149/87 65 11 98.7 °F (37.1 °C) 100 %       * No surgery found *      Hospital Course:   6/28 start BP medications today  6/29 headaches better, still nauseated  6/30 improved today, eating well given Bgs are over 300, will discharge to  swingbed in am  7/1 discharge in to swingbed today, med list reviewed, follow with pcp in 1 week      Goals of Care Treatment Preferences:  Code Status: Full Code         Consults:   Consults (From admission, onward)          Status Ordering Provider     Inpatient consult to Social Work  Once        Provider:  (Not yet assigned)    Completed SUZETTE PETER     Consult to Telemedicine - Acute Stroke  Once        Provider:  Demetrius Alatorre MD    Acknowledged OMAR PAYNE            Assessment & Plan  Cerebellar infarction  Acute cerebellar infarction with possible remote cerebellar infarction as well.      Antithrombotics for secondary stroke prevention: Antiplatelets: Aspirin: 81 mg daily  Aspirin: 325 mg daily  Clopidogrel: 300 mg loading dose x 1, now  Clopidogrel: 75 mg daily  Anticoagulants: DVT ppx with lovenox    Statins for secondary stroke prevention and hyperlipidemia, if present:   Statins: Atorvastatin- 40 mg daily    Aggressive risk factor modification: HTN, Smoking, DM, HLD, CAD     Rehab efforts: The patient has been evaluated by a stroke team provider and the therapy needs have been fully considered based off the presenting complaints and exam findings. The following therapy evaluations are needed: PT evaluate and treat, OT evaluate and treat, SLP evaluate and treat    Diagnostics ordered/pending: CTA Head to assess vasculature , CTA Neck/Arch to assess vasculature, HgbA1C to assess blood glucose levels, MRI head without contrast to assess brain parenchyma, TTE to assess cardiac function/status , TSH to assess thyroid function    VTE prophylaxis: Enoxaparin 40 mg SQ every 24 hours    BP parameters: Infarct: No intervention, SBP <220    6/28 Out of 48 hour window for permissive hypertension we will start to bring down blood pressure.  Still battling nausea and head aches but better today  CAD (coronary artery disease)  Patient with known CAD s/p stent placement, which is controlled Will  "continue ASA, Plavix, and Statin and monitor for S/Sx of angina/ACS. Continue to monitor on telemetry.   Ataxia  Ataxia with walking.  This likely a sign of cerebellar infarct.    Type 2 diabetes mellitus  Patient's FSGs are controlled on current medication regimen.  Last A1c reviewed-   Lab Results   Component Value Date    HGBA1C 9.7 (H) 04/10/2025     Most recent fingerstick glucose reviewed- No results for input(s): "POCTGLUCOSE" in the last 24 hours.  Current correctional scale  Medium  Maintain anti-hyperglycemic dose as follows-   Antihyperglycemics (From admission, onward)      Start     Stop Route Frequency Ordered    06/29/25 0900  insulin glargine U-100 (Lantus) injection 15 Units         -- SubQ Daily 06/29/25 0736    06/27/25 1335  insulin aspart U-100 injection 0-10 Units         -- SubQ Before meals & nightly PRN 06/27/25 1236          Hold Oral hypoglycemics while patient is in the hospital.  Glycemic control  Tobacco dependency  Dangers of cigarette smoking were reviewed with patient in detail. Patient was Counseled for 10 minutes or greater. Nicotine replacement options were discussed. Nicotine replacement was discussed- prescribed  Chronic pancreatitis  Chronic pancreatitis of unclear etiology.  Patient takes pancreatic enzymes.    Lipase is normal this hospitalization.  Nausea vomiting most likely related to cerebellar infarct versus chronic pancreatitis.    Given that patient has a remote history of cerebellar infarction query if prior nausea vomiting may have also been related to central cause rather than chronic pancreatitis.  Continue pancrelipase    Hypertension  Patient's blood pressure range in the last 24 hours was: BP  Min: 107/73  Max: 144/76.The patient's inpatient anti-hypertensive regimen is listed below:  Current Antihypertensives  furosemide tablet 20 mg, Daily PRN, Oral  carvediloL tablet 6.25 mg, Before breakfast, Oral  losartan tablet 50 mg, Daily, Oral  hydroCHLOROthiazide " tablet 50 mg, Daily, Oral  carvedilol (COREG) tablet, Before breakfast, Oral  hydrochlorothiazide (HYDRODIURIL) tablet, Daily, Oral  losartan (COZAAR) tablet, Daily, Oral    Plan  - BP is controlled, no changes needed to their regimen    Final Active Diagnoses:    Diagnosis Date Noted POA    PRINCIPAL PROBLEM:  Cerebellar infarction [I63.9] 06/27/2025 Yes    Ataxia [R27.0] 06/27/2025 Yes    Tobacco dependency [F17.200] 06/27/2025 Yes    Chronic pancreatitis [K86.1] 06/27/2025 Yes    Type 2 diabetes mellitus [E11.9] 06/27/2025 Yes    CAD (coronary artery disease) [I25.10] 06/27/2025 Yes    Hypertension [I10] 06/27/2025 Yes      Problems Resolved During this Admission:       Discharged Condition: stable    Disposition: Skilled Nursing Facility    Follow Up:   Contact information for after-discharge care       Destination       [facility] .    Service: Inpatient Rehabilitation  Contact information:  [facility] [place] [address]  [phone]                                 Patient Instructions:      Reason for not Ordering Smoking Cessation Referral     Order Specific Question Answer Comments   Reason for not ordering: Patient refused      Reason for not Prescribing Nicotine Replacement   Order Comments: Already prescribed, see med list     Order Specific Question Answer Comments   Reason for not Prescribing: Not medically appropriate at this time        Significant Diagnostic Studies: N/A    Pending Diagnostic Studies:       Procedure Component Value Units Date/Time    EXTRA TUBES [[id number]] Collected: 06/29/25 0526    Order Status: Sent Lab Status: In process Updated: 06/29/25 0535    Specimen: Blood, Venous     Narrative:      The following orders were created for panel order EXTRA TUBES.  Procedure                               Abnormality         Status                     ---------                               -----------         ------                      Lavender Top Hold[[id number]]                               In process                   Please view results for these tests on the individual orders.           Medications:  Reconciled Home Medications:      Medication List        START taking these medications      clopidogreL 75 mg tablet  Commonly known as: PLAVIX  Take 1 tablet (75 mg total) by mouth once daily.  Start taking on: July 2, 2025     gabapentin 100 MG capsule  Commonly known as: NEURONTIN  Take 1 capsule (100 mg total) by mouth 3 (three) times daily.     hydroCHLOROthiazide 50 MG tablet  Commonly known as: HYDRODIURIL  Take 1 tablet (50 mg total) by mouth once daily.  Start taking on: July 2, 2025     losartan 50 MG tablet  Commonly known as: COZAAR  Take 1 tablet (50 mg total) by mouth once daily.  Start taking on: July 2, 2025     nicotine 14 mg/24 hr  Commonly known as: NICODERM CQ  Place 1 patch onto the skin once daily.  Start taking on: July 2, 2025            CHANGE how you take these medications      carvediloL 6.25 MG tablet  Commonly known as: COREG  Take 1 tablet (6.25 mg total) by mouth before breakfast.  Start taking on: July 2, 2025  What changed:   medication strength  how much to take            CONTINUE taking these medications      aspirin 81 MG Chew  Take 1 tablet (81 mg total) by mouth once daily.     atorvastatin 80 MG tablet  Commonly known as: LIPITOR  Take 1 tablet (80 mg total) by mouth once daily.     BASAGLAR KWIKPEN U-100 INSULIN 100 unit/mL (3 mL) Inpn pen  Generic drug: insulin glargine U-100 (Lantus)  Inject 20 Units into the skin once daily.     CREON 24,000-76,000 -120,000 unit capsule  Generic drug: lipase-protease-amylase 24,000-76,000-120,000 units  Take 1 capsule by mouth 3 (three) times daily.     furosemide 20 MG tablet  Commonly known as: LASIX  Take 20 mg by mouth daily as needed.     insulin aspart U-100 100 unit/mL (3 mL) Inpn pen  Commonly known as: NovoLOG  Inject 4 Units into the skin. Sliding  scale     NITROGLYCERIN SL  Place under the tongue.     potassium chloride SA 20 MEQ tablet  Commonly known as: K-DUR,KLOR-CON  Take 20 mEq by mouth once daily.              Indwelling Lines/Drains at time of discharge:   Lines/Drains/Airways       None                       Time spent on the discharge of patient: 40 minutes         Alia Davis MD  Department of Hospital Medicine  [facility] - [address] [place] [address]

## 2025-07-01 NOTE — PT/OT/SLP PROGRESS
"Occupational Therapy   Treatment    Name: Faustino Fischer  MRN: [id number]  Admitting Diagnosis:  Cerebellar infarction       Recommendations:     Discharge Recommendations: High Intensity Therapy  Discharge Equipment Recommendations:  to be determined by next level of care  Barriers to discharge:  Decreased caregiver support    Assessment:     Faustino Fischer is a 55 y.o. male with a medical diagnosis of Cerebellar infarction.  He presents with alert and agreeable to treatment. Performance deficits affecting function are weakness, impaired functional mobility, gait instability, decreased coordination.     Rehab Prognosis:  Good; patient would benefit from acute skilled OT services to address these deficits and reach maximum level of function.       Plan:     Patient to be seen 5 x/week to address the above listed problems via self-care/home management, therapeutic activities, therapeutic exercises  Plan of Care Expires: 08/01/25  Plan of Care Reviewed with: patient    Subjective     Chief Complaint: Cerebellar infarction    Patient/Family Comments/goals: "I'm going with inpt rehab there across the street today."  Pain/Comfort:  Pain Rating 1: 5/10  Location 1: head  Pain Addressed 1: Cessation of Activity, Nurse notified  Pain Rating Post-Intervention 1: 5/10    Objective:     Communicated with: ROCIO Thompson prior to session.  Patient found sitting edge of bed with telemetry upon OT entry to room.    General Precautions: Standard, fall    Orthopedic Precautions:N/A  Braces: N/A  Respiratory Status: Room air     Occupational Performance:     Bed Mobility:    Patient completed Scooting/Bridging with independence     Functional Mobility/Transfers:  Patient completed Sit <> Stand Transfer with contact guard assistance and minimum assistance  with  rolling walker   Functional Mobility: Pt ambulated in room with RW and CGA with slow intentional steps without excessive shaking or LOB    Activities of Daily " Living:  NT      [facility] 6 Click ADL: 20    Treatment & Education:  Pt performed (L) UE ex for 15 reps of each:   Elbow flexion with 2# db   Shoulder flexion to 1/2 range with 2# db   Shoulder abduction up to 1/2 range with 2# db   Shoulder extension with red t-band   Shoulder diagonals with red t-band   Elbow extension with red t-band   Shoulder ER with red t-band    Pt stood and performed marching with CGA with RW for support.  Pt performed 5 reps of sit to stands with focus on very slow return to sitting with CGA.    Patient left sitting edge of bed with call button in reach, RN notified, and family present    GOALS:   Multidisciplinary Problems       Occupational Therapy Goals          Problem: Occupational Therapy    Goal Priority Disciplines Outcome Interventions   Occupational Therapy Goal     OT, PT/OT Ongoing    Description: STG: (in 1 week)  Pt will perform grooming with setup using his (L) UE primarily  Pt will bathe with min(A) from chair  Pt will perform UE dressing with CGA  Pt will perform LE dressing with min(A)  Pt will transfer bed/chair/bsc with min(A) with RW  Pt will perform standing task x 3 min with CGA with RW   Pt will tolerate 20 minutes of tx without fatigue      LTG: (in 5 weeks)  1.Restore to max I with self care and mobility.                             Time Tracking:     OT Date of Treatment: 07/01/25  OT Start Time: 1116  OT Stop Time: 1146  OT Total Time (min): 30 min    Billable Minutes:Therapeutic Activity 15 min  Therapeutic Exercise 15 min    OT/RADHA: OT          7/1/2025

## 2025-07-08 ENCOUNTER — HOSPITAL ENCOUNTER (EMERGENCY)
Facility: HOSPITAL | Age: 55
Discharge: HOME | End: 2025-07-09
Payer: MEDICARE

## 2025-07-08 DIAGNOSIS — R10.13 EPIGASTRIC ABDOMINAL PAIN: ICD-10-CM

## 2025-07-08 DIAGNOSIS — R53.1 GENERALIZED WEAKNESS: ICD-10-CM

## 2025-07-08 LAB
ALBUMIN SERPL BCP-MCNC: 3.6 G/DL (ref 3.5–5)
ALBUMIN/GLOB SERPL: 1 {RATIO}
ALP SERPL-CCNC: 84 U/L (ref 40–150)
ALT SERPL W P-5'-P-CCNC: 18 U/L
ANION GAP SERPL CALCULATED.3IONS-SCNC: 13 MMOL/L (ref 7–16)
AST SERPL W P-5'-P-CCNC: 23 U/L (ref 11–45)
BASOPHILS # BLD AUTO: 0.04 K/UL (ref 0–0.2)
BASOPHILS NFR BLD AUTO: 0.6 % (ref 0–1)
BILIRUB SERPL-MCNC: 0.8 MG/DL
BUN SERPL-MCNC: 31 MG/DL (ref 8–26)
BUN/CREAT SERPL: 16 (ref 6–20)
CALCIUM SERPL-MCNC: 8.9 MG/DL (ref 8.4–10.2)
CHLORIDE SERPL-SCNC: 101 MMOL/L (ref 98–107)
CO2 SERPL-SCNC: 24 MMOL/L (ref 22–29)
CREAT SERPL-MCNC: 1.89 MG/DL (ref 0.72–1.25)
DIFFERENTIAL METHOD BLD: ABNORMAL
EGFR (NO RACE VARIABLE) (RUSH/TITUS): 41 ML/MIN/1.73M2
EOSINOPHIL # BLD AUTO: 0.29 K/UL (ref 0–0.5)
EOSINOPHIL NFR BLD AUTO: 4.4 % (ref 1–4)
ERYTHROCYTE [DISTWIDTH] IN BLOOD BY AUTOMATED COUNT: 12.4 % (ref 11.5–14.5)
GLOBULIN SER-MCNC: 3.7 G/DL (ref 2–4)
GLUCOSE SERPL-MCNC: 129 MG/DL (ref 74–100)
HCT VFR BLD AUTO: 44.9 % (ref 40–54)
HGB BLD-MCNC: 14.6 G/DL (ref 13.5–18)
IMM GRANULOCYTES # BLD AUTO: 0.03 K/UL (ref 0–0.04)
IMM GRANULOCYTES NFR BLD: 0.5 % (ref 0–0.4)
LIPASE SERPL-CCNC: 31 U/L
LYMPHOCYTES # BLD AUTO: 2.36 K/UL (ref 1–4.8)
LYMPHOCYTES NFR BLD AUTO: 35.7 % (ref 27–41)
MCH RBC QN AUTO: 28.3 PG (ref 27–31)
MCHC RBC AUTO-ENTMCNC: 32.5 G/DL (ref 32–36)
MCV RBC AUTO: 87 FL (ref 80–96)
MONOCYTES # BLD AUTO: 0.52 K/UL (ref 0–0.8)
MONOCYTES NFR BLD AUTO: 7.9 % (ref 2–6)
MPC BLD CALC-MCNC: 9.6 FL (ref 9.4–12.4)
NEUTROPHILS # BLD AUTO: 3.37 K/UL (ref 1.8–7.7)
NEUTROPHILS NFR BLD AUTO: 50.9 % (ref 53–65)
NRBC # BLD AUTO: 0 X10E3/UL
NRBC, AUTO (.00): 0 %
PLATELET # BLD AUTO: 334 K/UL (ref 150–400)
POTASSIUM SERPL-SCNC: 3.7 MMOL/L (ref 3.5–5.1)
PROT SERPL-MCNC: 7.3 G/DL (ref 6.4–8.3)
RBC # BLD AUTO: 5.16 M/UL (ref 4.6–6.2)
SODIUM SERPL-SCNC: 134 MMOL/L (ref 136–145)
WBC # BLD AUTO: 6.61 K/UL (ref 4.5–11)

## 2025-07-08 PROCEDURE — 80053 COMPREHEN METABOLIC PANEL: CPT

## 2025-07-08 PROCEDURE — 83690 ASSAY OF LIPASE: CPT

## 2025-07-08 PROCEDURE — 93005 ELECTROCARDIOGRAM TRACING: CPT

## 2025-07-08 PROCEDURE — 96361 HYDRATE IV INFUSION ADD-ON: CPT

## 2025-07-08 PROCEDURE — 85025 COMPLETE CBC W/AUTO DIFF WBC: CPT

## 2025-07-08 PROCEDURE — 99285 EMERGENCY DEPT VISIT HI MDM: CPT | Mod: 25

## 2025-07-08 PROCEDURE — 63600175 PHARM REV CODE 636 W HCPCS

## 2025-07-08 PROCEDURE — 93010 ELECTROCARDIOGRAM REPORT: CPT | Mod: ,,, | Performed by: HOSPITALIST

## 2025-07-08 PROCEDURE — 25000003 PHARM REV CODE 250

## 2025-07-08 PROCEDURE — 36415 COLL VENOUS BLD VENIPUNCTURE: CPT

## 2025-07-08 PROCEDURE — 96374 THER/PROPH/DIAG INJ IV PUSH: CPT

## 2025-07-08 RX ORDER — PROCHLORPERAZINE EDISYLATE 5 MG/ML
10 INJECTION INTRAMUSCULAR; INTRAVENOUS
Status: COMPLETED | OUTPATIENT
Start: 2025-07-08 | End: 2025-07-08

## 2025-07-08 RX ADMIN — SODIUM CHLORIDE 1000 ML: 9 INJECTION, SOLUTION INTRAVENOUS at 10:07

## 2025-07-08 RX ADMIN — PROCHLORPERAZINE EDISYLATE 10 MG: 5 INJECTION INTRAMUSCULAR; INTRAVENOUS at 06:07

## 2025-07-09 VITALS
HEART RATE: 67 BPM | BODY MASS INDEX: 28.12 KG/M2 | TEMPERATURE: 98 F | DIASTOLIC BLOOD PRESSURE: 82 MMHG | OXYGEN SATURATION: 98 % | SYSTOLIC BLOOD PRESSURE: 125 MMHG | RESPIRATION RATE: 16 BRPM | HEIGHT: 66 IN | WEIGHT: 175 LBS

## 2025-07-09 PROCEDURE — 63600175 PHARM REV CODE 636 W HCPCS: Performed by: FAMILY MEDICINE

## 2025-07-09 RX ORDER — PROCHLORPERAZINE MALEATE 10 MG
10 TABLET ORAL
Status: COMPLETED | OUTPATIENT
Start: 2025-07-09 | End: 2025-07-09

## 2025-07-09 RX ADMIN — PROCHLORPERAZINE MALEATE 10 MG: 10 TABLET ORAL at 12:07

## 2025-07-09 NOTE — ED PROVIDER NOTES
Encounter Date: 7/8/2025       History     Chief Complaint   Patient presents with    Abdominal Pain    Diarrhea    Vomiting    Headache    Hiccups     Patient presents to the ED via [facility] from [facility] with c/o upper abdominal pain, nausea, vomiting, diarrhea, hiccups, and headache since Friday. Patient states he has not been able to get rid of the hiccups and he has a bad headache in the back of his head     55-year old male presents to the emergency department for evaluation of headache, nausea, vomiting, diarrhea that has been going on for the past three days. Reports that he was seen in ED two weeks ago for evaluation of same symptoms and was admitted to hospital for a stroke. Reports that symptoms have worsened over the past several days. Denies neck stiffness, chest pain, shortness of breath, dysuria.    The history is provided by the patient. No  was used.   Abdominal Pain  The current episode started several days ago. The onset of the illness was abrupt. The problem has not changed since onset.The abdominal pain is located in the epigastric region. The abdominal pain does not radiate. The severity of the abdominal pain is 6/10. The other symptoms of the illness include fatigue, nausea, vomiting and diarrhea. The other symptoms of the illness do not include fever, shortness of breath or dysuria.   The diarrhea began 3 to 5 days ago. The diarrhea is watery. The diarrhea occurs 2 - 4 times per day.   The fatigue began 3 to 5 days ago. The fatigue has been unchanged since its onset.   Nausea began 3 to 5 days ago.   Vomiting occurs 6 to 10 times per day. The emesis contains stomach contents.   Symptoms associated with the illness do not include chills, urgency, frequency or back pain.     Review of patient's allergies indicates:   Allergen Reactions    Ticagrelor Shortness Of Breath    Vancomycin analogues Other (See Comments)     Patient experienced adverse effect, and had some  renal insufficiency after receiving Vancomycin.     Past Medical History:   Diagnosis Date    Chronic pancreatitis, unspecified pancreatitis type     Essential (primary) hypertension     Hidradenitis suppurativa 06/10/2024    Hypertensive heart disease 06/14/2024    per echo  severe LVH    Mixed hyperlipidemia     Multiple vessel coronary artery disease     Pancreatic pseudocyst     Type 2 diabetes mellitus treated with insulin      Past Surgical History:   Procedure Laterality Date    CARDIAC SURGERY      Stents x6    CHOLECYSTECTOMY      EXCISION OF HIDRADENITIS N/A 3/27/2024    Procedure: EXCISION, HIDRADENITIS;  Surgeon: Fidel Park MD;  Location: [facility] OR;  Service: General;  Laterality: N/A;    INCISION OF PERIANAL ABSCESS Bilateral 2/16/2024    Procedure: INCISION, ABSCESS, PERIANAL;  Surgeon: Fidel Park MD;  Location: [facility] OR;  Service: General;  Laterality: Bilateral;    UNDESCENDED TESTICLE EXPLORATION N/A      Family History   Problem Relation Name Age of Onset    Heart disease Father      Hypertension Father      Heart disease Brother      Heart disease Maternal Grandmother       Social History[1]  Review of Systems   Constitutional:  Positive for fatigue. Negative for activity change, appetite change, chills and fever.   Eyes:  Negative for photophobia, pain and visual disturbance.   Respiratory:  Negative for chest tightness, shortness of breath and wheezing.    Cardiovascular:  Negative for chest pain and palpitations.   Gastrointestinal:  Positive for abdominal pain, diarrhea, nausea and vomiting.   Genitourinary:  Negative for dysuria, frequency and urgency.   Musculoskeletal:  Positive for neck pain. Negative for back pain and neck stiffness.   Neurological:  Positive for weakness. Negative for dizziness, tremors, syncope, light-headedness and headaches.   Psychiatric/Behavioral:  Negative for confusion.    All other systems reviewed and are negative.      Physical Exam     Initial  Vitals [07/08/25 1758]   BP Pulse Resp Temp SpO2   124/89 68 20 97.6 °F (36.4 °C) 100 %      MAP       --         Physical Exam    Vitals reviewed.  Constitutional: He appears well-nourished. No distress.   HENT:   Head: Normocephalic.   Eyes: Conjunctivae and EOM are normal. Pupils are equal, round, and reactive to light. Right eye exhibits no discharge. Left eye exhibits no discharge.   Neck: Neck supple.   Normal range of motion.  Cardiovascular:  Normal rate, regular rhythm and normal heart sounds.           Pulmonary/Chest: Breath sounds normal. No respiratory distress. He has no wheezes. He has no rhonchi. He has no rales. He exhibits no tenderness.   Abdominal: Abdomen is soft. Bowel sounds are normal. He exhibits no distension and no mass. There is no abdominal tenderness. There is no rebound and no guarding.   Musculoskeletal:         General: No tenderness. Normal range of motion.      Cervical back: Normal range of motion and neck supple.     Lymphadenopathy:     He has no cervical adenopathy.   Neurological: He is alert and oriented to person, place, and time. No sensory deficit. GCS score is 15. GCS eye subscore is 4. GCS verbal subscore is 5. GCS motor subscore is 6.   Skin: Skin is warm and dry. Capillary refill takes less than 2 seconds.   Psychiatric: He has a normal mood and affect. His behavior is normal.         Medical Screening Exam   See Full Note    ED Course   Procedures  Labs Reviewed   COMPREHENSIVE METABOLIC PANEL - Abnormal       Result Value    Sodium 134 (*)     Potassium 3.7      Chloride 101      CO2 24      Anion Gap 13      Glucose 129 (*)     BUN 31 (*)     Creatinine 1.89 (*)     BUN/Creatinine Ratio 16      Calcium 8.9      Total Protein 7.3      Albumin 3.6      Globulin 3.7      A/G Ratio 1.0      Bilirubin, Total 0.8      Alk Phos 84      ALT 18      AST 23      eGFR 41 (*)    CBC WITH DIFFERENTIAL - Abnormal    WBC 6.61      RBC 5.16      Hemoglobin 14.6      Hematocrit 44.9       MCV 87.0      MCH 28.3      MCHC 32.5      RDW 12.4      Platelet Count 334      MPV 9.6      Neutrophils % 50.9 (*)     Lymphocytes % 35.7      Monocytes % 7.9 (*)     Eosinophils % 4.4 (*)     Basophils % 0.6      Immature Granulocytes % 0.5 (*)     nRBC, Auto 0.0      Neutrophils, Abs 3.37      Lymphocytes, Absolute 2.36      Monocytes, Absolute 0.52      Eosinophils, Absolute 0.29      Basophils, Absolute 0.04      Immature Granulocytes, Absolute 0.03      nRBC, Absolute 0.00      Diff Type Auto     LIPASE - Normal    Lipase 31     CBC W/ AUTO DIFFERENTIAL    Narrative:     The following orders were created for panel order CBC auto differential.  Procedure                               Abnormality         Status                     ---------                               -----------         ------                     CBC with Differential[[id number]]       Abnormal            Final result                 Please view results for these tests on the individual orders.   URINALYSIS, REFLEX TO URINE CULTURE   EXTRA TUBES    Narrative:     The following orders were created for panel order EXTRA TUBES.  Procedure                               Abnormality         Status                     ---------                               -----------         ------                     Light Blue Top Hold[[id number]]                             In process                 Gold Top Hold[[id number]]                                   In process                   Please view results for these tests on the individual orders.   LIGHT BLUE TOP HOLD   GOLD TOP HOLD          Imaging Results              CT Abdomen Pelvis  Without Contrast (Final result)  Result time 07/08/25 22:24:23      Final result by Rui Higginbotham MD (07/08/25 22:24:23)                   Impression:      1. No acute abnormality  2. Mild hepatomegaly.  3. Multiple pancreatic calcifications consistent with chronic pancreatitis.  No evidence of acute pancreatitis.   Recommend clinical correlation.  3.3 cm fluid density mass in the pancreatic head suggesting pancreatic cyst or pseudocyst, mildly increased from the prior study.  Recommend surveillance.  4. Small probable right renal cysts with limited characterization.      Electronically signed by: Rui Araujo  Date:    07/08/2025  Time:    22:24               Narrative:    EXAMINATION:  CT ABDOMEN PELVIS WITHOUT CONTRAST    CLINICAL HISTORY:  Abdominal pain, acute, nonlocalized;    TECHNIQUE:  Low dose axial images, sagittal and coronal reformations were obtained from the lung bases to the pubic symphysis, Oral contrast was not administered.    COMPARISON:  02/09/2025    FINDINGS:  Heart: Normal in size. No pericardial effusion.    Lung Bases: Well aerated, without consolidation or pleural fluid.    Liver: Liver is mildly enlarged.  No focal abnormality.    Gallbladder: Status post cholecystectomy.    Bile Ducts: Bile ducts appear within normal limits.    Pancreas: Multiple calcifications throughout the pancreas consistent with chronic pancreatitis. No inflammatory changes to indicate acute pancreatitis. Recommend clinical correlation. 3.3 cm fluid density mass in the pancreatic head suggesting pancreatic cyst or pseudocyst.  This is mildly increased in size from approximately 2 cm on the prior study 02/09/2025.  Recommend surveillance.    Spleen: Unremarkable.    Adrenals: Unremarkable.    Kidneys/ Ureters: Small probable fluid density cyst in the right kidney.  Limited characterization on this noncontrast study.  No stone, mass, hydronephrosis or hydroureter bilaterally is detected.    Bladder: No evidence of wall thickening.    Reproductive organs: Unremarkable.    GI Tract/Mesentery: No evidence of bowel obstruction or inflammation.    The appendix is within normal limits.    Peritoneal Space: No ascites. No free air.    Retroperitoneum: No significant adenopathy.    Abdominal wall: Unremarkable.    Vasculature: No  significant atherosclerosis or aneurysm.    Bones: No acute fracture.                                       X-Ray Chest 1 View (Final result)  Result time 07/08/25 21:04:02      Final result by Rui Higginbotham MD (07/08/25 21:04:02)                   Impression:      No acute abnormality.      Electronically signed by: Rui Higginbotham  Date:    07/08/2025  Time:    21:04               Narrative:    EXAMINATION:  XR CHEST 1 VIEW    CLINICAL HISTORY:  Epigastric pain    TECHNIQUE:  Single frontal view of the chest was performed.    COMPARISON:  02/09/2025    FINDINGS:  The lungs are clear, with normal appearance of pulmonary vasculature and no pleural effusion or pneumothorax.    The cardiac silhouette is normal in size. The hilar and mediastinal contours are unremarkable.    Bones are intact.                                       X-Ray KUB (Final result)  Result time 07/08/25 21:07:34   Procedure changed from XR ABDOMEN, ACUTE 2 OR MORE VIEWS WITH CHEST     Final result by Rui Higginbotham MD (07/08/25 21:07:34)                   Impression:      1. No acute radiographic abnormality.  2. Multiple pancreatic calcifications suggesting chronic pancreatitis.  Recommend clinical correlation if acute pancreatitis is suspected.  CT may be helpful..  3. Hepatomegaly.      Electronically signed by: Rui Higginbotham  Date:    07/08/2025  Time:    21:07               Narrative:    EXAMINATION:  XR KUB    CLINICAL HISTORY:  epigastric abdominal pain;    TECHNIQUE:  Single AP supine view of the abdomen (KUB) was performed    COMPARISON:  09/23/2024    FINDINGS:  No evidence of bowel obstruction.  Mild gaseous distention of the colon.    Status post cholecystectomy.    No radiographic mass.  Multiple small calcifications extending cross the mid upper abdomen suggesting multiple pancreatic calcifications associated with chronic pancreatitis.  Recommend clinical correlation.    There is prominence of the hepatic silhouette.    No  acute osseous abnormality.                                        CT Head Without Contrast (Final result)  Result time 07/08/25 19:09:58      Final result by Rui Higginbotham MD (07/08/25 19:09:58)                   Impression:      Remote inferior left cerebellar infarct with residual encephalomalacia.  The extent of the infarct appears more prominent medially with possible minimal local edema.  This could represent an additional adjacent subacute infarct.  MRI follow-up would better characterize.  Recommend clinical correlation and follow-up.    This report was flagged in Epic as abnormal.      Electronically signed by: Rui Higginbotham  Date:    07/08/2025  Time:    19:09               Narrative:    EXAMINATION:  CT HEAD WITHOUT CONTRAST    CLINICAL HISTORY:  Headache, new or worsening (Age >= 50y);    TECHNIQUE:  Low dose axial CT images obtained throughout the head without intravenous contrast. Sagittal and coronal reconstructions were performed.    COMPARISON:  MRI 06/27/2025, CT 06/27/2025    FINDINGS:  Intracranial compartment:    No significant midline shift or acute hydrocephalus.  No extra-axial blood or fluid collections.    Inferior left cerebellar remote infarct with residual encephalomalacia.  This is slightly more extensive medially compared to the prior studies, extending to the midline of the inferior left cerebellum with possible minimal local edema.  This could represent an additional adjacent subacute infarct component.  MRI follow-up would better characterize.    No parenchymal mass, hemorrhage, or major vascular distribution infarct.    Skull/extracranial contents (limited evaluation): No fracture. Mastoid air cells and paranasal sinuses are essentially clear.                                       Medications   sodium chloride 0.9% bolus 1,000 mL 1,000 mL (1,000 mLs Intravenous New Bag 7/8/25 2200)   prochlorperazine injection Soln 10 mg (10 mg Intravenous Given 7/8/25 1840)     Medical Decision  Making  55-year old male presents to the emergency department for evaluation of headache, nausea, vomiting, diarrhea that has been going on for the past three days. Reports that he was seen in ED two weeks ago for evaluation of same symptoms and was admitted to hospital for a stroke. Reports that symptoms have worsened over the past several days. Denies neck stiffness, chest pain, shortness of breath, dysuria.  EKG ordered and reviewed with results significant for Normal sinus rhythm  Incomplete right bundle branch block  Left anterior fascicular block  Inferior infarct ,age undetermined  Anterolateral infarct ,age undetermined  Abnormal ECG  No previous ECGs available at 64 beats per minute, no STEMI. Interpreted by Dr. Garza  Labs ordered  Case discussed with Dr. Garza  Ordered and reviewed CT head as well as radiologist's interpretation with results significant for remote inferior left cerebellar infarct with residual encephalomalacia.  The extent of the infarct appears more prominent medially with possible minimal local edema.  This could represent an additional adjacent subacute infarct.  MRI follow-up would better characterize.  Recommend clinical correlation and follow-up.  Ordered and reviewed xray chest 1 view as well as radiologist's interpretation with results significant for no acute abnormality.  Ordered and reviewed KUB as well as radiologist's interpretation with results significant for 1. No acute radiographic abnormality.  2. Multiple pancreatic calcifications suggesting chronic pancreatitis.  Recommend clinical correlation if acute pancreatitis is suspected.  CT may be helpful..  3. Hepatomegaly.  Ordered and reviewed CT abdomen as well as radiologist's interpretation with results significant for 1. No acute abnormality  2. Mild hepatomegaly.  3. Multiple pancreatic calcifications consistent with chronic pancreatitis.  No evidence of acute pancreatitis.  Recommend clinical correlation.  3.3 cm  fluid density mass in the pancreatic head suggesting pancreatic cyst or pseudocyst, mildly increased from the prior study.  Recommend surveillance.  4. Small probable right renal cysts with limited characterization.  1L NS, compazine IV administered in ED  Follow-up and return precautions discussed with patient, who verbalized understanding  Diagnosis: Abdominal pain      Amount and/or Complexity of Data Reviewed  Labs: ordered.  Radiology: ordered.    Risk  Prescription drug management.                                      Clinical Impression:   Final diagnoses:  [R10.13] Epigastric abdominal pain  [R53.1] Generalized weakness        ED Disposition Condition    Discharge Stable          ED Prescriptions    None       Follow-up Information    None              [1]   Social History  Tobacco Use    Smoking status: Some Days     Current packs/day: 0.25     Average packs/day: 0.3 packs/day for 21.5 years (5.4 ttl pk-yrs)     Types: Cigarettes, Cigars     Start date: 2004     Passive exposure: Never    Smokeless tobacco: Never   Substance Use Topics    Alcohol use: Not Currently     Comment: occasional drink previous heavy drinker quit heavily in 2001    Drug use: Never        Pradip Arredondo NP  07/08/25 2245

## 2025-07-09 NOTE — DISCHARGE INSTRUCTIONS
Follow up with primary care provider in the morning for re-evaluation. Return to the emergency department for new or worsening symptoms.

## 2025-07-10 LAB
OHS QRS DURATION: 96 MS
OHS QTC CALCULATION: 433 MS

## 2025-08-12 DIAGNOSIS — I63.9 CVA (CEREBRAL VASCULAR ACCIDENT): Primary | ICD-10-CM

## 2025-08-27 ENCOUNTER — CLINICAL SUPPORT (OUTPATIENT)
Dept: REHABILITATION | Facility: HOSPITAL | Age: 55
End: 2025-08-27
Payer: MEDICARE

## 2025-08-27 DIAGNOSIS — M62.81 LEFT-SIDED MUSCLE WEAKNESS: ICD-10-CM

## 2025-08-27 DIAGNOSIS — I63.09 CEREBROVASCULAR ACCIDENT (CVA) DUE TO THROMBOSIS OF OTHER PRECEREBRAL ARTERY: ICD-10-CM

## 2025-08-27 DIAGNOSIS — Z74.09 DECREASED FUNCTIONAL MOBILITY AND ENDURANCE: Primary | ICD-10-CM

## 2025-08-27 PROCEDURE — 97110 THERAPEUTIC EXERCISES: CPT | Mod: PN

## 2025-08-27 PROCEDURE — 97161 PT EVAL LOW COMPLEX 20 MIN: CPT | Mod: PN

## 2025-09-03 ENCOUNTER — CLINICAL SUPPORT (OUTPATIENT)
Dept: REHABILITATION | Facility: HOSPITAL | Age: 55
End: 2025-09-03
Payer: MEDICARE

## 2025-09-03 DIAGNOSIS — M62.81 LEFT-SIDED MUSCLE WEAKNESS: ICD-10-CM

## 2025-09-03 DIAGNOSIS — Z74.09 DECREASED FUNCTIONAL MOBILITY AND ENDURANCE: Primary | ICD-10-CM

## 2025-09-03 DIAGNOSIS — I63.09 CEREBROVASCULAR ACCIDENT (CVA) DUE TO THROMBOSIS OF OTHER PRECEREBRAL ARTERY: ICD-10-CM

## 2025-09-03 PROCEDURE — 97112 NEUROMUSCULAR REEDUCATION: CPT | Mod: PN,CQ

## 2025-09-03 PROCEDURE — 97530 THERAPEUTIC ACTIVITIES: CPT | Mod: PN,CQ

## 2025-09-05 ENCOUNTER — CLINICAL SUPPORT (OUTPATIENT)
Dept: REHABILITATION | Facility: HOSPITAL | Age: 55
End: 2025-09-05
Payer: MEDICARE

## 2025-09-05 DIAGNOSIS — I63.09 CEREBROVASCULAR ACCIDENT (CVA) DUE TO THROMBOSIS OF OTHER PRECEREBRAL ARTERY: Primary | ICD-10-CM

## 2025-09-05 PROCEDURE — 97530 THERAPEUTIC ACTIVITIES: CPT | Mod: PN,CQ

## 2025-09-05 PROCEDURE — 97112 NEUROMUSCULAR REEDUCATION: CPT | Mod: PN,CQ

## (undated) DEVICE — GLOVE 7.0 PROTEXIS PI BLUE

## (undated) DEVICE — GLOVE PROTEXIS PI SYN SURG 7

## (undated) DEVICE — COLLECTOR SPECIMEN ANAEROBIC

## (undated) DEVICE — SPONGE COTTON TRAY 4X4IN

## (undated) DEVICE — GLOVE PROTEXIS PI SYN SURG 8.0

## (undated) DEVICE — SUT ETHILON 3-0 PS2 18 BLK

## (undated) DEVICE — GLOVE SENSICARE PI SURG 6.5

## (undated) DEVICE — SOL NACL IRR 1000ML BTL

## (undated) DEVICE — GLOVE PROTEXIS PI SYN SURG 7.5

## (undated) DEVICE — APPLICATOR CHLORAPREP ORN 26ML

## (undated) DEVICE — GOWN NONREINF SET-IN SLV 2XL

## (undated) DEVICE — SWAB AEROBIC CULTURETTE

## (undated) DEVICE — KIT BASIC RUSH

## (undated) DEVICE — LEGGING CLEAR POLY 2/PACK

## (undated) DEVICE — SUT PROLENE 3-0 SH DA 36 BL

## (undated) DEVICE — GLOVE 8.5 PROTEXIS PI BLUE

## (undated) DEVICE — BANDAGE GAUZE COT STRL 4.5X4.1

## (undated) DEVICE — GLOVE 8 PROTEXIS PI BLUE

## (undated) DEVICE — GLOVE 6.5 PROTEXIS PI BLUE